# Patient Record
Sex: MALE | Race: BLACK OR AFRICAN AMERICAN | Employment: OTHER | URBAN - METROPOLITAN AREA
[De-identification: names, ages, dates, MRNs, and addresses within clinical notes are randomized per-mention and may not be internally consistent; named-entity substitution may affect disease eponyms.]

---

## 2018-03-30 ENCOUNTER — HOSPITAL ENCOUNTER (EMERGENCY)
Facility: CLINIC | Age: 68
Discharge: HOME | End: 2018-03-30
Attending: EMERGENCY MEDICINE
Payer: COMMERCIAL

## 2018-03-30 VITALS
SYSTOLIC BLOOD PRESSURE: 146 MMHG | RESPIRATION RATE: 20 BRPM | OXYGEN SATURATION: 98 % | DIASTOLIC BLOOD PRESSURE: 71 MMHG | HEART RATE: 66 BPM | TEMPERATURE: 98.1 F

## 2018-03-30 DIAGNOSIS — I10 HYPERTENSION, UNSPECIFIED TYPE: Primary | ICD-10-CM

## 2018-03-30 LAB
-: ABNORMAL
ABSOLUTE EOS #: 0.2 K/UL (ref 0–0.4)
ABSOLUTE IMMATURE GRANULOCYTE: ABNORMAL K/UL (ref 0–0.3)
ABSOLUTE LYMPH #: 2.2 K/UL (ref 1–4.8)
ABSOLUTE MONO #: 0.4 K/UL (ref 0.1–1.2)
AMORPHOUS: ABNORMAL
ANION GAP SERPL CALCULATED.3IONS-SCNC: 14 MMOL/L (ref 9–17)
BACTERIA: ABNORMAL
BASOPHILS # BLD: 1 % (ref 0–2)
BASOPHILS ABSOLUTE: 0.1 K/UL (ref 0–0.2)
BILIRUBIN URINE: NEGATIVE
BUN BLDV-MCNC: 20 MG/DL (ref 8–23)
BUN/CREAT BLD: ABNORMAL (ref 9–20)
CALCIUM SERPL-MCNC: 9.1 MG/DL (ref 8.6–10.4)
CASTS UA: ABNORMAL /LPF (ref 0–2)
CHLORIDE BLD-SCNC: 104 MMOL/L (ref 98–107)
CO2: 23 MMOL/L (ref 20–31)
COLOR: YELLOW
COMMENT UA: ABNORMAL
CREAT SERPL-MCNC: 1.5 MG/DL (ref 0.7–1.2)
CRYSTALS, UA: ABNORMAL /HPF
DIFFERENTIAL TYPE: ABNORMAL
EKG ATRIAL RATE: 72 BPM
EKG P AXIS: 65 DEGREES
EKG P-R INTERVAL: 192 MS
EKG Q-T INTERVAL: 444 MS
EKG QRS DURATION: 146 MS
EKG QTC CALCULATION (BAZETT): 486 MS
EKG R AXIS: -151 DEGREES
EKG T AXIS: 42 DEGREES
EKG VENTRICULAR RATE: 72 BPM
EOSINOPHILS RELATIVE PERCENT: 3 % (ref 1–4)
EPITHELIAL CELLS UA: ABNORMAL /HPF (ref 0–5)
GFR AFRICAN AMERICAN: 56 ML/MIN
GFR NON-AFRICAN AMERICAN: 47 ML/MIN
GFR SERPL CREATININE-BSD FRML MDRD: ABNORMAL ML/MIN/{1.73_M2}
GFR SERPL CREATININE-BSD FRML MDRD: ABNORMAL ML/MIN/{1.73_M2}
GLUCOSE BLD-MCNC: 195 MG/DL (ref 75–110)
GLUCOSE BLD-MCNC: 211 MG/DL (ref 70–99)
GLUCOSE URINE: ABNORMAL
HCT VFR BLD CALC: 45.9 % (ref 41–53)
HEMOGLOBIN: 15 G/DL (ref 13.5–17.5)
IMMATURE GRANULOCYTES: ABNORMAL %
KETONES, URINE: NEGATIVE
LEUKOCYTE ESTERASE, URINE: NEGATIVE
LYMPHOCYTES # BLD: 33 % (ref 24–44)
MCH RBC QN AUTO: 27.4 PG (ref 26–34)
MCHC RBC AUTO-ENTMCNC: 32.7 G/DL (ref 31–37)
MCV RBC AUTO: 83.7 FL (ref 80–100)
MONOCYTES # BLD: 6 % (ref 2–11)
MUCUS: ABNORMAL
NITRITE, URINE: NEGATIVE
NRBC AUTOMATED: ABNORMAL PER 100 WBC
OTHER OBSERVATIONS UA: ABNORMAL
PDW BLD-RTO: 14.5 % (ref 12.5–15.4)
PH UA: 6 (ref 5–8)
PLATELET # BLD: 138 K/UL (ref 140–450)
PLATELET ESTIMATE: ABNORMAL
PMV BLD AUTO: 7.7 FL (ref 6–12)
POTASSIUM SERPL-SCNC: 3.9 MMOL/L (ref 3.7–5.3)
PROTEIN UA: ABNORMAL
RBC # BLD: 5.48 M/UL (ref 4.5–5.9)
RBC # BLD: ABNORMAL 10*6/UL
RBC UA: ABNORMAL /HPF (ref 0–2)
RENAL EPITHELIAL, UA: ABNORMAL /HPF
SEG NEUTROPHILS: 57 % (ref 36–66)
SEGMENTED NEUTROPHILS ABSOLUTE COUNT: 3.8 K/UL (ref 1.8–7.7)
SODIUM BLD-SCNC: 141 MMOL/L (ref 135–144)
SPECIFIC GRAVITY UA: 1.02 (ref 1–1.03)
TRICHOMONAS: ABNORMAL
TROPONIN INTERP: NORMAL
TROPONIN T: <0.03 NG/ML
TURBIDITY: CLEAR
URINE HGB: ABNORMAL
UROBILINOGEN, URINE: NORMAL
WBC # BLD: 6.6 K/UL (ref 3.5–11)
WBC # BLD: ABNORMAL 10*3/UL
WBC UA: ABNORMAL /HPF (ref 0–5)
YEAST: ABNORMAL

## 2018-03-30 PROCEDURE — 36415 COLL VENOUS BLD VENIPUNCTURE: CPT

## 2018-03-30 PROCEDURE — 84484 ASSAY OF TROPONIN QUANT: CPT

## 2018-03-30 PROCEDURE — 81001 URINALYSIS AUTO W/SCOPE: CPT

## 2018-03-30 PROCEDURE — 85025 COMPLETE CBC W/AUTO DIFF WBC: CPT

## 2018-03-30 PROCEDURE — 80048 BASIC METABOLIC PNL TOTAL CA: CPT

## 2018-03-30 PROCEDURE — 93005 ELECTROCARDIOGRAM TRACING: CPT

## 2018-03-30 PROCEDURE — 99283 EMERGENCY DEPT VISIT LOW MDM: CPT

## 2018-03-30 PROCEDURE — 82947 ASSAY GLUCOSE BLOOD QUANT: CPT

## 2019-09-27 ENCOUNTER — HOSPITAL ENCOUNTER (OUTPATIENT)
Age: 69
Setting detail: SPECIMEN
Discharge: HOME | End: 2019-09-27
Payer: COMMERCIAL

## 2019-09-27 LAB
ABSOLUTE EOS #: 0.26 K/UL (ref 0–0.44)
ABSOLUTE IMMATURE GRANULOCYTE: 0.06 K/UL (ref 0–0.3)
ABSOLUTE LYMPH #: 2.7 K/UL (ref 1.1–3.7)
ABSOLUTE MONO #: 0.62 K/UL (ref 0.1–1.2)
ALBUMIN SERPL-MCNC: 3.4 G/DL (ref 3.5–5.2)
ALBUMIN/GLOBULIN RATIO: 0.8 (ref 1–2.5)
ALP BLD-CCNC: 82 U/L (ref 40–129)
ALT SERPL-CCNC: 32 U/L (ref 5–41)
ANION GAP SERPL CALCULATED.3IONS-SCNC: 14 MMOL/L (ref 9–17)
AST SERPL-CCNC: 27 U/L
BASOPHILS # BLD: 1 % (ref 0–2)
BASOPHILS ABSOLUTE: 0.09 K/UL (ref 0–0.2)
BILIRUB SERPL-MCNC: 0.29 MG/DL (ref 0.3–1.2)
BUN BLDV-MCNC: 42 MG/DL (ref 8–23)
BUN/CREAT BLD: ABNORMAL (ref 9–20)
CALCIUM SERPL-MCNC: 9 MG/DL (ref 8.6–10.4)
CHLORIDE BLD-SCNC: 111 MMOL/L (ref 98–107)
CHOLESTEROL, FASTING: 104 MG/DL
CHOLESTEROL/HDL RATIO: 2.7
CO2: 20 MMOL/L (ref 20–31)
CREAT SERPL-MCNC: 1.85 MG/DL (ref 0.7–1.2)
DIFFERENTIAL TYPE: ABNORMAL
EOSINOPHILS RELATIVE PERCENT: 3 % (ref 1–4)
ESTIMATED AVERAGE GLUCOSE: 169 MG/DL
FOLATE: 5.3 NG/ML
GFR AFRICAN AMERICAN: 44 ML/MIN
GFR NON-AFRICAN AMERICAN: 36 ML/MIN
GFR SERPL CREATININE-BSD FRML MDRD: ABNORMAL ML/MIN/{1.73_M2}
GFR SERPL CREATININE-BSD FRML MDRD: ABNORMAL ML/MIN/{1.73_M2}
GLUCOSE FASTING: 128 MG/DL (ref 70–99)
HBA1C MFR BLD: 7.5 % (ref 4–6)
HCT VFR BLD CALC: 46 % (ref 40.7–50.3)
HDLC SERPL-MCNC: 38 MG/DL
HEMOGLOBIN: 14 G/DL (ref 13–17)
IMMATURE GRANULOCYTES: 1 %
IRON SATURATION: 53 % (ref 20–55)
IRON: 127 UG/DL (ref 59–158)
LDL CHOLESTEROL: 51 MG/DL (ref 0–130)
LYMPHOCYTES # BLD: 34 % (ref 24–43)
MAGNESIUM: 1.4 MG/DL (ref 1.6–2.6)
MCH RBC QN AUTO: 25.4 PG (ref 25.2–33.5)
MCHC RBC AUTO-ENTMCNC: 30.4 G/DL (ref 28.4–34.8)
MCV RBC AUTO: 83.3 FL (ref 82.6–102.9)
MONOCYTES # BLD: 8 % (ref 3–12)
NRBC AUTOMATED: 0 PER 100 WBC
PDW BLD-RTO: 15.8 % (ref 11.8–14.4)
PLATELET # BLD: 123 K/UL (ref 138–453)
PLATELET ESTIMATE: ABNORMAL
PMV BLD AUTO: 10.5 FL (ref 8.1–13.5)
POTASSIUM SERPL-SCNC: 4.8 MMOL/L (ref 3.7–5.3)
RBC # BLD: 5.52 M/UL (ref 4.21–5.77)
RBC # BLD: ABNORMAL 10*6/UL
SEG NEUTROPHILS: 53 % (ref 36–65)
SEGMENTED NEUTROPHILS ABSOLUTE COUNT: 4.12 K/UL (ref 1.5–8.1)
SODIUM BLD-SCNC: 145 MMOL/L (ref 135–144)
T3 TOTAL: 120 NG/DL (ref 80–200)
THYROXINE, FREE: 1.19 NG/DL (ref 0.93–1.7)
TOTAL IRON BINDING CAPACITY: 240 UG/DL (ref 250–450)
TOTAL PROTEIN: 7.8 G/DL (ref 6.4–8.3)
TRIGLYCERIDE, FASTING: 73 MG/DL
TSH SERPL DL<=0.05 MIU/L-ACNC: 1.94 MIU/L (ref 0.3–5)
UNSATURATED IRON BINDING CAPACITY: 113 UG/DL (ref 112–347)
URIC ACID: 9.1 MG/DL (ref 3.4–7)
VITAMIN B-12: 716 PG/ML (ref 232–1245)
VLDLC SERPL CALC-MCNC: ABNORMAL MG/DL (ref 1–30)
WBC # BLD: 7.9 K/UL (ref 3.5–11.3)
WBC # BLD: ABNORMAL 10*3/UL

## 2019-09-27 PROCEDURE — 36415 COLL VENOUS BLD VENIPUNCTURE: CPT

## 2019-09-27 PROCEDURE — 84550 ASSAY OF BLOOD/URIC ACID: CPT

## 2019-09-27 PROCEDURE — 82746 ASSAY OF FOLIC ACID SERUM: CPT

## 2019-09-27 PROCEDURE — 80061 LIPID PANEL: CPT

## 2019-09-27 PROCEDURE — 85025 COMPLETE CBC W/AUTO DIFF WBC: CPT

## 2019-09-27 PROCEDURE — 83036 HEMOGLOBIN GLYCOSYLATED A1C: CPT

## 2019-09-27 PROCEDURE — 83550 IRON BINDING TEST: CPT

## 2019-09-27 PROCEDURE — 84480 ASSAY TRIIODOTHYRONINE (T3): CPT

## 2019-09-27 PROCEDURE — 84439 ASSAY OF FREE THYROXINE: CPT

## 2019-09-27 PROCEDURE — 83735 ASSAY OF MAGNESIUM: CPT

## 2019-09-27 PROCEDURE — 84443 ASSAY THYROID STIM HORMONE: CPT

## 2019-09-27 PROCEDURE — 80053 COMPREHEN METABOLIC PANEL: CPT

## 2019-09-27 PROCEDURE — 82607 VITAMIN B-12: CPT

## 2019-09-27 PROCEDURE — 83540 ASSAY OF IRON: CPT

## 2020-06-17 ENCOUNTER — HOSPITAL ENCOUNTER (INPATIENT)
Age: 70
LOS: 5 days | Discharge: HOME HEALTH | DRG: 253 | End: 2020-06-22
Attending: EMERGENCY MEDICINE | Admitting: INTERNAL MEDICINE
Payer: COMMERCIAL

## 2020-06-17 ENCOUNTER — APPOINTMENT (OUTPATIENT)
Dept: GENERAL RADIOLOGY | Age: 70
DRG: 253 | End: 2020-06-17
Payer: COMMERCIAL

## 2020-06-17 PROBLEM — E11.628 DIABETIC FOOT INFECTION (HCC): Status: ACTIVE | Noted: 2020-06-17

## 2020-06-17 PROBLEM — L08.9 DIABETIC FOOT INFECTION (HCC): Status: ACTIVE | Noted: 2020-06-17

## 2020-06-17 LAB
ABSOLUTE EOS #: 0.17 K/UL (ref 0–0.44)
ABSOLUTE IMMATURE GRANULOCYTE: 0.05 K/UL (ref 0–0.3)
ABSOLUTE LYMPH #: 1.78 K/UL (ref 1.1–3.7)
ABSOLUTE MONO #: 0.51 K/UL (ref 0.1–1.2)
ANION GAP SERPL CALCULATED.3IONS-SCNC: 12 MMOL/L (ref 9–17)
BASOPHILS # BLD: 1 % (ref 0–2)
BASOPHILS ABSOLUTE: 0.04 K/UL (ref 0–0.2)
BNP INTERPRETATION: ABNORMAL
BUN BLDV-MCNC: 21 MG/DL (ref 8–23)
BUN/CREAT BLD: 13 (ref 9–20)
C-REACTIVE PROTEIN: 33.1 MG/L (ref 0–5)
CALCIUM SERPL-MCNC: 8.6 MG/DL (ref 8.6–10.4)
CHLORIDE BLD-SCNC: 103 MMOL/L (ref 98–107)
CO2: 23 MMOL/L (ref 20–31)
CREAT SERPL-MCNC: 1.64 MG/DL (ref 0.7–1.2)
DIFFERENTIAL TYPE: ABNORMAL
EOSINOPHILS RELATIVE PERCENT: 2 % (ref 1–4)
GFR AFRICAN AMERICAN: 51 ML/MIN
GFR NON-AFRICAN AMERICAN: 42 ML/MIN
GFR SERPL CREATININE-BSD FRML MDRD: ABNORMAL ML/MIN/{1.73_M2}
GFR SERPL CREATININE-BSD FRML MDRD: ABNORMAL ML/MIN/{1.73_M2}
GLUCOSE BLD-MCNC: 220 MG/DL (ref 70–99)
HCT VFR BLD CALC: 41.1 % (ref 40.7–50.3)
HEMOGLOBIN: 13 G/DL (ref 13–17)
IMMATURE GRANULOCYTES: 1 %
LYMPHOCYTES # BLD: 20 % (ref 24–43)
MAGNESIUM: 1.6 MG/DL (ref 1.6–2.6)
MCH RBC QN AUTO: 26.3 PG (ref 25.2–33.5)
MCHC RBC AUTO-ENTMCNC: 31.6 G/DL (ref 28.4–34.8)
MCV RBC AUTO: 83 FL (ref 82.6–102.9)
MONOCYTES # BLD: 6 % (ref 3–12)
NRBC AUTOMATED: 0 PER 100 WBC
PDW BLD-RTO: 13.2 % (ref 11.8–14.4)
PLATELET # BLD: 163 K/UL (ref 138–453)
PLATELET ESTIMATE: ABNORMAL
PMV BLD AUTO: 9.2 FL (ref 8.1–13.5)
POTASSIUM SERPL-SCNC: 4.1 MMOL/L (ref 3.7–5.3)
PRO-BNP: 2561 PG/ML
RBC # BLD: 4.95 M/UL (ref 4.21–5.77)
RBC # BLD: ABNORMAL 10*6/UL
SEDIMENTATION RATE, ERYTHROCYTE: 128 MM (ref 0–20)
SEG NEUTROPHILS: 70 % (ref 36–65)
SEGMENTED NEUTROPHILS ABSOLUTE COUNT: 6.27 K/UL (ref 1.5–8.1)
SODIUM BLD-SCNC: 138 MMOL/L (ref 135–144)
TROPONIN INTERP: ABNORMAL
TROPONIN INTERP: ABNORMAL
TROPONIN T: ABNORMAL NG/ML
TROPONIN T: ABNORMAL NG/ML
TROPONIN, HIGH SENSITIVITY: 39 NG/L (ref 0–22)
TROPONIN, HIGH SENSITIVITY: 39 NG/L (ref 0–22)
WBC # BLD: 8.8 K/UL (ref 3.5–11.3)
WBC # BLD: ABNORMAL 10*3/UL

## 2020-06-17 PROCEDURE — 93005 ELECTROCARDIOGRAM TRACING: CPT | Performed by: EMERGENCY MEDICINE

## 2020-06-17 PROCEDURE — 96375 TX/PRO/DX INJ NEW DRUG ADDON: CPT

## 2020-06-17 PROCEDURE — 2580000003 HC RX 258: Performed by: EMERGENCY MEDICINE

## 2020-06-17 PROCEDURE — 87205 SMEAR GRAM STAIN: CPT

## 2020-06-17 PROCEDURE — 96365 THER/PROPH/DIAG IV INF INIT: CPT

## 2020-06-17 PROCEDURE — 6360000002 HC RX W HCPCS: Performed by: EMERGENCY MEDICINE

## 2020-06-17 PROCEDURE — 84484 ASSAY OF TROPONIN QUANT: CPT

## 2020-06-17 PROCEDURE — 6370000000 HC RX 637 (ALT 250 FOR IP): Performed by: NURSE PRACTITIONER

## 2020-06-17 PROCEDURE — 99223 1ST HOSP IP/OBS HIGH 75: CPT | Performed by: NURSE PRACTITIONER

## 2020-06-17 PROCEDURE — 2580000003 HC RX 258: Performed by: NURSE PRACTITIONER

## 2020-06-17 PROCEDURE — 71045 X-RAY EXAM CHEST 1 VIEW: CPT

## 2020-06-17 PROCEDURE — 73630 X-RAY EXAM OF FOOT: CPT

## 2020-06-17 PROCEDURE — G0378 HOSPITAL OBSERVATION PER HR: HCPCS

## 2020-06-17 PROCEDURE — 87186 SC STD MICRODIL/AGAR DIL: CPT

## 2020-06-17 PROCEDURE — 85651 RBC SED RATE NONAUTOMATED: CPT

## 2020-06-17 PROCEDURE — 2500000003 HC RX 250 WO HCPCS: Performed by: NURSE PRACTITIONER

## 2020-06-17 PROCEDURE — 1200000000 HC SEMI PRIVATE

## 2020-06-17 PROCEDURE — 87070 CULTURE OTHR SPECIMN AEROBIC: CPT

## 2020-06-17 PROCEDURE — 87077 CULTURE AEROBIC IDENTIFY: CPT

## 2020-06-17 PROCEDURE — 36415 COLL VENOUS BLD VENIPUNCTURE: CPT

## 2020-06-17 PROCEDURE — 85025 COMPLETE CBC W/AUTO DIFF WBC: CPT

## 2020-06-17 PROCEDURE — 96372 THER/PROPH/DIAG INJ SC/IM: CPT

## 2020-06-17 PROCEDURE — 6360000002 HC RX W HCPCS: Performed by: NURSE PRACTITIONER

## 2020-06-17 PROCEDURE — 99285 EMERGENCY DEPT VISIT HI MDM: CPT

## 2020-06-17 PROCEDURE — 96367 TX/PROPH/DG ADDL SEQ IV INF: CPT

## 2020-06-17 PROCEDURE — 80048 BASIC METABOLIC PNL TOTAL CA: CPT

## 2020-06-17 PROCEDURE — 83880 ASSAY OF NATRIURETIC PEPTIDE: CPT

## 2020-06-17 PROCEDURE — 86140 C-REACTIVE PROTEIN: CPT

## 2020-06-17 PROCEDURE — 83735 ASSAY OF MAGNESIUM: CPT

## 2020-06-17 PROCEDURE — 2060000000 HC ICU INTERMEDIATE R&B

## 2020-06-17 PROCEDURE — 87040 BLOOD CULTURE FOR BACTERIA: CPT

## 2020-06-17 RX ORDER — DOCUSATE SODIUM 100 MG/1
100 CAPSULE, LIQUID FILLED ORAL 2 TIMES DAILY
Status: DISCONTINUED | OUTPATIENT
Start: 2020-06-17 | End: 2020-06-22 | Stop reason: HOSPADM

## 2020-06-17 RX ORDER — AMLODIPINE BESYLATE 5 MG/1
10 TABLET ORAL DAILY
Status: DISCONTINUED | OUTPATIENT
Start: 2020-06-18 | End: 2020-06-22 | Stop reason: HOSPADM

## 2020-06-17 RX ORDER — ATORVASTATIN CALCIUM 40 MG/1
40 TABLET, FILM COATED ORAL DAILY
Status: DISCONTINUED | OUTPATIENT
Start: 2020-06-18 | End: 2020-06-18

## 2020-06-17 RX ORDER — ACETAMINOPHEN 650 MG/1
650 SUPPOSITORY RECTAL EVERY 6 HOURS PRN
Status: DISCONTINUED | OUTPATIENT
Start: 2020-06-17 | End: 2020-06-22 | Stop reason: HOSPADM

## 2020-06-17 RX ORDER — MAGNESIUM SULFATE 1 G/100ML
1 INJECTION INTRAVENOUS PRN
Status: DISCONTINUED | OUTPATIENT
Start: 2020-06-17 | End: 2020-06-22 | Stop reason: HOSPADM

## 2020-06-17 RX ORDER — POTASSIUM CHLORIDE 7.45 MG/ML
10 INJECTION INTRAVENOUS PRN
Status: DISCONTINUED | OUTPATIENT
Start: 2020-06-17 | End: 2020-06-22 | Stop reason: HOSPADM

## 2020-06-17 RX ORDER — OXYCODONE AND ACETAMINOPHEN 10; 325 MG/1; MG/1
1 TABLET ORAL EVERY 6 HOURS PRN
Status: DISCONTINUED | OUTPATIENT
Start: 2020-06-17 | End: 2020-06-22 | Stop reason: HOSPADM

## 2020-06-17 RX ORDER — TRAZODONE HYDROCHLORIDE 50 MG/1
150 TABLET ORAL NIGHTLY
Status: DISCONTINUED | OUTPATIENT
Start: 2020-06-17 | End: 2020-06-22 | Stop reason: HOSPADM

## 2020-06-17 RX ORDER — LANOLIN ALCOHOL/MO/W.PET/CERES
325 CREAM (GRAM) TOPICAL
Status: DISCONTINUED | OUTPATIENT
Start: 2020-06-18 | End: 2020-06-22 | Stop reason: HOSPADM

## 2020-06-17 RX ORDER — HYDROCHLOROTHIAZIDE 12.5 MG/1
25 CAPSULE, GELATIN COATED ORAL DAILY
Status: DISCONTINUED | OUTPATIENT
Start: 2020-06-18 | End: 2020-06-18

## 2020-06-17 RX ORDER — POTASSIUM CHLORIDE 20 MEQ/1
40 TABLET, EXTENDED RELEASE ORAL PRN
Status: DISCONTINUED | OUTPATIENT
Start: 2020-06-17 | End: 2020-06-22 | Stop reason: HOSPADM

## 2020-06-17 RX ORDER — INSULIN GLARGINE 100 [IU]/ML
90 INJECTION, SOLUTION SUBCUTANEOUS NIGHTLY
Status: DISCONTINUED | OUTPATIENT
Start: 2020-06-17 | End: 2020-06-19

## 2020-06-17 RX ORDER — HYDRALAZINE HYDROCHLORIDE 20 MG/ML
10 INJECTION INTRAMUSCULAR; INTRAVENOUS EVERY 6 HOURS PRN
Status: DISCONTINUED | OUTPATIENT
Start: 2020-06-17 | End: 2020-06-17

## 2020-06-17 RX ORDER — HYDRALAZINE HYDROCHLORIDE 20 MG/ML
20 INJECTION INTRAMUSCULAR; INTRAVENOUS EVERY 6 HOURS PRN
Status: DISCONTINUED | OUTPATIENT
Start: 2020-06-17 | End: 2020-06-22 | Stop reason: HOSPADM

## 2020-06-17 RX ORDER — SODIUM CHLORIDE 0.9 % (FLUSH) 0.9 %
10 SYRINGE (ML) INJECTION EVERY 12 HOURS SCHEDULED
Status: DISCONTINUED | OUTPATIENT
Start: 2020-06-17 | End: 2020-06-22 | Stop reason: HOSPADM

## 2020-06-17 RX ORDER — PROMETHAZINE HYDROCHLORIDE 25 MG/1
12.5 TABLET ORAL EVERY 6 HOURS PRN
Status: DISCONTINUED | OUTPATIENT
Start: 2020-06-17 | End: 2020-06-22 | Stop reason: HOSPADM

## 2020-06-17 RX ORDER — NICOTINE 21 MG/24HR
1 PATCH, TRANSDERMAL 24 HOURS TRANSDERMAL DAILY PRN
Status: DISCONTINUED | OUTPATIENT
Start: 2020-06-17 | End: 2020-06-22 | Stop reason: HOSPADM

## 2020-06-17 RX ORDER — SODIUM CHLORIDE 0.9 % (FLUSH) 0.9 %
10 SYRINGE (ML) INJECTION PRN
Status: DISCONTINUED | OUTPATIENT
Start: 2020-06-17 | End: 2020-06-22 | Stop reason: HOSPADM

## 2020-06-17 RX ORDER — LISINOPRIL 10 MG/1
20 TABLET ORAL DAILY
Status: DISCONTINUED | OUTPATIENT
Start: 2020-06-18 | End: 2020-06-22 | Stop reason: HOSPADM

## 2020-06-17 RX ORDER — POLYETHYLENE GLYCOL 3350 17 G/17G
17 POWDER, FOR SOLUTION ORAL DAILY PRN
Status: DISCONTINUED | OUTPATIENT
Start: 2020-06-17 | End: 2020-06-22 | Stop reason: HOSPADM

## 2020-06-17 RX ORDER — ONDANSETRON 2 MG/ML
4 INJECTION INTRAMUSCULAR; INTRAVENOUS EVERY 6 HOURS PRN
Status: DISCONTINUED | OUTPATIENT
Start: 2020-06-17 | End: 2020-06-22 | Stop reason: HOSPADM

## 2020-06-17 RX ORDER — ALOGLIPTIN 12.5 MG/1
12.5 TABLET, FILM COATED ORAL DAILY
Status: DISCONTINUED | OUTPATIENT
Start: 2020-06-18 | End: 2020-06-18

## 2020-06-17 RX ORDER — FUROSEMIDE 40 MG/1
40 TABLET ORAL DAILY
Status: ON HOLD | COMMUNITY
End: 2020-09-20 | Stop reason: SDUPTHER

## 2020-06-17 RX ORDER — FUROSEMIDE 40 MG/1
40 TABLET ORAL 2 TIMES DAILY
Status: DISCONTINUED | OUTPATIENT
Start: 2020-06-18 | End: 2020-06-22 | Stop reason: HOSPADM

## 2020-06-17 RX ORDER — ACETAMINOPHEN 325 MG/1
650 TABLET ORAL EVERY 6 HOURS PRN
Status: DISCONTINUED | OUTPATIENT
Start: 2020-06-17 | End: 2020-06-19 | Stop reason: SDUPTHER

## 2020-06-17 RX ORDER — ASPIRIN 81 MG/1
81 TABLET ORAL DAILY
Status: DISCONTINUED | OUTPATIENT
Start: 2020-06-18 | End: 2020-06-18

## 2020-06-17 RX ORDER — HEPARIN SODIUM 5000 [USP'U]/ML
5000 INJECTION, SOLUTION INTRAVENOUS; SUBCUTANEOUS EVERY 8 HOURS SCHEDULED
Status: DISCONTINUED | OUTPATIENT
Start: 2020-06-17 | End: 2020-06-22 | Stop reason: HOSPADM

## 2020-06-17 RX ORDER — SODIUM CHLORIDE 9 MG/ML
INJECTION, SOLUTION INTRAVENOUS CONTINUOUS
Status: DISCONTINUED | OUTPATIENT
Start: 2020-06-17 | End: 2020-06-21

## 2020-06-17 RX ORDER — FERROUS SULFATE 325(65) MG
325 TABLET ORAL
Status: ON HOLD | COMMUNITY
End: 2022-04-13

## 2020-06-17 RX ADMIN — TRAZODONE HYDROCHLORIDE 150 MG: 50 TABLET ORAL at 23:21

## 2020-06-17 RX ADMIN — SODIUM CHLORIDE: 9 INJECTION, SOLUTION INTRAVENOUS at 23:20

## 2020-06-17 RX ADMIN — HEPARIN SODIUM 5000 UNITS: 5000 INJECTION INTRAVENOUS; SUBCUTANEOUS at 23:21

## 2020-06-17 RX ADMIN — Medication 10 ML: at 23:19

## 2020-06-17 RX ADMIN — OXYCODONE HYDROCHLORIDE AND ACETAMINOPHEN 1 TABLET: 10; 325 TABLET ORAL at 23:35

## 2020-06-17 RX ADMIN — CEFEPIME HYDROCHLORIDE 2 G: 2 INJECTION, POWDER, FOR SOLUTION INTRAVENOUS at 18:00

## 2020-06-17 RX ADMIN — INSULIN GLARGINE 90 UNITS: 100 INJECTION, SOLUTION SUBCUTANEOUS at 23:20

## 2020-06-17 RX ADMIN — HYDRALAZINE HYDROCHLORIDE 10 MG: 20 INJECTION INTRAMUSCULAR; INTRAVENOUS at 17:59

## 2020-06-17 RX ADMIN — METRONIDAZOLE 500 MG: 500 INJECTION, SOLUTION INTRAVENOUS at 23:23

## 2020-06-17 RX ADMIN — DOCUSATE SODIUM 100 MG: 100 CAPSULE, LIQUID FILLED ORAL at 23:22

## 2020-06-17 RX ADMIN — METOPROLOL TARTRATE 50 MG: 25 TABLET, FILM COATED ORAL at 23:21

## 2020-06-17 ASSESSMENT — PAIN DESCRIPTION - LOCATION
LOCATION: FOOT
LOCATION: LEG;FOOT

## 2020-06-17 ASSESSMENT — PAIN DESCRIPTION - FREQUENCY
FREQUENCY: INTERMITTENT
FREQUENCY: INTERMITTENT

## 2020-06-17 ASSESSMENT — ENCOUNTER SYMPTOMS
COLOR CHANGE: 1
COLOR CHANGE: 0
SHORTNESS OF BREATH: 0
EYE DISCHARGE: 0
NAUSEA: 0
SORE THROAT: 0
EYE REDNESS: 0
RHINORRHEA: 0
COUGH: 0
WHEEZING: 0
VOMITING: 0
SHORTNESS OF BREATH: 1
DIARRHEA: 0

## 2020-06-17 ASSESSMENT — PAIN DESCRIPTION - ORIENTATION
ORIENTATION: LEFT
ORIENTATION: LEFT

## 2020-06-17 ASSESSMENT — PAIN DESCRIPTION - DESCRIPTORS
DESCRIPTORS: BURNING;SHARP;THROBBING
DESCRIPTORS: THROBBING;BURNING;SHARP

## 2020-06-17 ASSESSMENT — PAIN SCALES - GENERAL
PAINLEVEL_OUTOF10: 8
PAINLEVEL_OUTOF10: 6

## 2020-06-17 ASSESSMENT — PAIN DESCRIPTION - PAIN TYPE: TYPE: CHRONIC PAIN;NEUROPATHIC PAIN

## 2020-06-17 NOTE — ED NOTES
ASSESSMENT:    Presents [facility] per [facility]vt auto per own motorized w/c. Lives alone. Was evaluated at home today per JEAN PAUL Abdullahi. Patient states this nurse comes to home 2-3 times per week. Today was concerned about an open wound to bottom lt foot, 2\" X 2\". . Bottom of heel. States is a diabetic foot ulcer with necrosis and oozing sero-sanguinous foul smelling drainage. This RN also states patient needs a Cardiology consult as patient does nor have one. Patient states has no PCP. Use to see jarrett Alonzo MD. But left the practrice. Now sees JEAN PAUL Abdullahi. [facility] is alert and oriented. Answers questions approp. Has hx RBKA per Dr Markel Otto, Podiatry about 5 years ago. Also has hx PVD, Osteomyelitis lt foot, acute kidney failure, and Hep-B. Lt lower leg is hard, scaly with marked discoloration, pink and dark brown. Leg warm to touch and 4+ edema and vascular scarring. .   Monitor shows NSR 60's. Denies chest pain or SOB. C/o pain to lt foot. Denies fever or chills. Denies n/v.    Kvng Thomas RN  06/17/20 1716       Kvng Thomas RN  06/17/20 7093       Kvng Thomas RN  06/17/20 4309

## 2020-06-17 NOTE — ED PROVIDER NOTES
EMERGENCY DEPARTMENT ENCOUNTER    Pt Name: Sammy Dozier  MRN: [id number]  [place] 1950  Date of evaluation: 6/17/20  CHIEF COMPLAINT       Chief Complaint   Patient presents with    Wound Infection     left foot    Hypertension     HISTORY OF PRESENT ILLNESS   This is a 51-year-old male that presents with complaints of hypertension and a wound in the left foot. The patient has a history of diabetes, with a previous right above-knee amputation as well as a left transmetatarsal amputation. Patient was evaluated today by his nurse practitioner, she sent him to the emergency department for cardiology consultation for his hypertension and to be evaluated for congestive heart failure. She also states that he had a significant wound to his left heel and recommended he be seen. Patient denies any fevers or chills, he does have some pain and discomfort in the left foot. He denies any chest pain, he has some mild associated shortness of breath. Patient states he is compliant with his prescribed hypertension medications. REVIEW OF SYSTEMS     Review of Systems   Constitutional: Negative for chills and fever. HENT: Negative for rhinorrhea and sore throat. Eyes: Negative for discharge, redness and visual disturbance. Respiratory: Positive for shortness of breath. Negative for cough. Cardiovascular: Negative for chest pain, palpitations and leg swelling. Gastrointestinal: Negative for diarrhea, nausea and vomiting. Musculoskeletal: Negative for arthralgias, myalgias and neck pain. Skin: Positive for wound. Negative for color change and rash. Neurological: Negative for seizures, weakness and headaches. Psychiatric/Behavioral: Negative for hallucinations, self-injury and suicidal ideas.      PASTMEDICAL HISTORY     Past Medical History:   Diagnosis Date    RISSA [facility]acute kidney [facility]) [facility] [address]     Cerebrovascular disease     Erectile dysfunction     Hepatitis B infection     Hyperlipidemia  Hypertension     Kidney cysts     Liver cyst     MRSA (methicillin resistant staph aureus) culture positive 1/10/2014    right foot    [facility]trophic ulcer of the foot [facility] [address]     Obesity     Osteomyelitis of ankle or foot     Peripheral vascular disease ([facility] [address]     Tobacco abuse     Type II or unspecified type diabetes mellitus without mention of complication, not stated as uncontrolled      Past Problem List  Patient Active Problem List   Diagnosis Code    HTN (hypertension) I10    [facility] I73.9    Diabetes mellitus type 2, insulin dependent ([facility] E11.9, Z79.4    Insomnia G47.00    Erectile dysfunction N52.9    Partial traumatic amputation of left foot ([facility] J66.614T    Obesity E66.9    Tobacco abuse Z72.0    Hepatitis B infection B19.10    Liver cyst K76.89    Kidney cysts N28.1    Osteomyelitis of ankle or foot M86.9    Neurotrophic ulcer of the foot ([facility] L97.509    RISSA (acute kidney injury) ([facility] N17.9    Hyperlipidemia E78.5    Microcytic anemia D50.9     SURGICAL HISTORY       Past Surgical History:   Procedure Laterality Date    FOOT AMPUTATION      FOOT AMPUTATION THROUGH METATARSAL  2011    pt can't give exact date of surg-- toes and part of lt foot removed    FOOT SURGERY Right 3/2013     CURRENT MEDICATIONS       Previous Medications    ACETAMINOPHEN (TYLENOL) 325 MG TABLET    Take 650 mg by mouth every 4 hours as needed for Pain. Give 2 tabs = 650 MG by mouth every 4 hours prn    ACETAMINOPHEN (TYLENOL) 650 MG SUPPOSITORY    Place 650 mg rectally every 4 hours as needed for Fever. AMLODIPINE (NORVASC) 10 MG TABLET    take 1 tablet by mouth once daily    ASPIRIN 81 MG EC TABLET    Take 81 mg by mouth daily. ATORVASTATIN (LIPITOR) 10 MG TABLET    Take 10 mg by mouth daily. B-D INS SYR ULTRAFINE 1CC/31G 31G X 5/16\" 1 ML MISC        BISACODYL (DULCOLAX) 10 MG SUPPOSITORY    Place 10 mg rectally daily as needed for Constipation.     BLOOD GLUC METER DISP-STRIPS (BLOOD GLUCOSE METER DISPOSABLE) DANIEL    Use three to four times daily. Patient is insulin dependent 250.00    CARBAMIDE PEROXIDE (DEBROX) 6.5 % OTIC SOLUTION    1 drop as needed. CIPROFLOXACIN (CIPRO) 500 MG TABLET        COLLAGENASE (SANTYL) OINTMENT    Apply topically daily. DOCUSATE SODIUM (COLACE) 100 MG CAPSULE    Take 100 mg by mouth 2 times daily     DOXYCYCLINE (VIBRAMYCIN) 100 MG CAPSULE        FERROUS SULFATE (IRON 325) 325 (65 FE) MG TABLET    Take 325 mg by mouth daily (with breakfast)    FUROSEMIDE (LASIX) 40 MG TABLET    Take 40 mg by mouth 2 times daily    GLUCAGON HCL, RDNA, (GLUCAGEN) 1 MG SOLR    Infuse 1 mg intravenously once. Glucagon 1 mg IM for blood sugar levels below 40 if unable to take orange juice with sugar by mouth or tube -repeat-    GLUCOSE BLOOD VI TEST STRIPS (TRUETRACK TEST) STRIP    by In Vitro route 3 times daily. HYDROCHLOROTHIAZIDE (MICROZIDE) 12.5 MG CAPSULE    Take 2 capsules by mouth daily. INSULIN ASPART (NOVOLOG) 100 UNIT/ML INJECTION    Inject 3 Units into the skin continuous prn for High Blood Sugar (he is to use sliding scale to increase from 3 units if needed).  use 0 units,151-200 use 3 units, 201-250 use 4 units, 251-300 use 5 units, 301-350 use 6 units, 351-400 use 7 units use before meals and at bedtime. INSULIN GLARGINE (LANTUS) 100 UNIT/ML INJECTION    INJECT 60 UNITS INTO THE SKIN NIGHTLY. MAY NEED TO SLOWLY INCREASE AS DIRECTED    INSULIN SYRINGE-NEEDLE U-100 (ACCUSURE INS SYR 1CC/30GX5/16\") 30G X 5/16\" 1 ML MISC    by Does not apply route. LISINOPRIL (PRINIVIL;ZESTRIL) 20 MG TABLET    10 mg     MAGNESIUM HYDROXIDE (MILK OF MAGNESIA) 400 MG/5ML SUSPENSION    Take 30 mLs by mouth daily as needed for Constipation. METOPROLOL (LOPRESSOR) 25 MG TABLET    Take 2 tablets by mouth 2 times daily. METOPROLOL (LOPRESSOR) 50 MG TABLET        METRONIDAZOLE (FLAGYL) 500 MG TABLET    Take 500 mg by mouth 3 times daily. Pulmonary:      Effort: Pulmonary effort is normal. No accessory muscle usage or respiratory distress. Breath sounds: Normal breath sounds. Chest:      Chest wall: No deformity or tenderness. Abdominal:      General: Bowel sounds are normal. There is no distension or abdominal bruit. Palpations: Abdomen is not rigid. Tenderness: There is no abdominal tenderness. There is no guarding or rebound. Musculoskeletal:        Feet:    Skin:     General: Skin is warm. Findings: No rash. Neurological:      Mental Status: He is alert and oriented to person, place, and time. GCS: GCS eye subscore is 4. GCS verbal subscore is 5. GCS motor subscore is 6. Psychiatric:         Speech: Speech normal.         MEDICAL DECISION MAKIN-year-old male presents with complaints of some shortness of breath, hypertension and a left-sided heel wound. Plan is consultation with podiatry, x-ray, sed rate CRP, cardiac enzymes BNP EKG and reevaluation. 5:39 PM EDT  Patient has a mildly elevated troponin associated with hypertension. Plan is admission to the hospitalist service, antibiotics per the podiatry service. CRITICAL CARE:       PROCEDURES:    Procedures    DIAGNOSTIC RESULTS   EKG:All EKG's are interpreted by the Emergency Department Physician who either signs or Co-signs this chart in the absence of a cardiologist.    Patient's EKG shows sinus rhythm at of 64, SD QRS and QTC intervals unremarkable, patient has normal axis no ST elevations or depressions, no significant T wave changes. Nonspecific EKG. RADIOLOGY:All plain film, CT, MRI, and formal ultrasound images (except ED bedside ultrasound) are read by the radiologist, see reports below, unless otherwisenoted in MDM or here. XR CHEST PORTABLE   Final Result   No acute cardiopulmonary disease. XR FOOT LEFT (MIN 3 VIEWS)   Final Result   Status post transmetatarsal amputation. Diffuse soft tissue edema.       No convincing evidence for osteomyelitis. LABS: All lab results were reviewed by myself, and all abnormals are listed below. Labs Reviewed   BASIC METABOLIC PANEL - Abnormal; Notable for the following components:       Result Value    Glucose 220 (*)     CREATININE 1.64 (*)     GFR Non- 42 (*)     GFR  51 (*)     All other components within normal limits   BRAIN NATRIURETIC PEPTIDE - Abnormal; Notable for the following components:    Pro-BNP 2,561 (*)     All other components within normal limits   CBC WITH AUTO DIFFERENTIAL - Abnormal; Notable for the following components:    Seg Neutrophils 70 (*)     Lymphocytes 20 (*)     Immature Granulocytes 1 (*)     All other components within normal limits   TROPONIN - Abnormal; Notable for the following components:    Troponin, High Sensitivity 39 (*)     All other components within normal limits   SEDIMENTATION RATE - Abnormal; Notable for the following components:    Sed Rate 128 (*)     All other components within normal limits   MAGNESIUM   TROPONIN   C-REACTIVE PROTEIN       EMERGENCY DEPARTMENTCOURSE:         Vitals:    Vitals:    06/17/20 1619   BP: (!) 198/70   Pulse: 62   Resp: 16   Temp: 98.2 °F (36.8 °C)   TempSrc: Oral   SpO2: 99%   Weight: 275 lb (124.7 kg)   Height: 6' (1.829 m)       The patient was given the following medications while in the emergency department:  Orders Placed This Encounter   Medications    hydrALAZINE (APRESOLINE) injection 10 mg     CONSULTS:  IP CONSULT TO PODIATRY    FINAL IMPRESSION      1. Hypertensive urgency    2. Elevated troponin    3. Diabetic ulcer of left heel associated with type 1 diabetes mellitus, unspecified ulcer stage [facility])          DISPOSITION/PLAN   DISPOSITION Decision To Admit 06/17/2020 05:36:21 PM      PATIENT REFERRED TO:  No follow-up provider specified.   DISCHARGE MEDICATIONS:  New Prescriptions    No medications on file     Gianna Membreno MD  Attending Emergency Physician                    Erin Rothman MD  06/17/20 5273

## 2020-06-18 PROBLEM — E83.42 HYPOMAGNESEMIA: Status: ACTIVE | Noted: 2020-06-18

## 2020-06-18 PROBLEM — E87.6 HYPOKALEMIA: Status: ACTIVE | Noted: 2020-06-18

## 2020-06-18 LAB
ANION GAP SERPL CALCULATED.3IONS-SCNC: 12 MMOL/L (ref 9–17)
BUN BLDV-MCNC: 21 MG/DL (ref 8–23)
BUN/CREAT BLD: 10 (ref 9–20)
CALCIUM SERPL-MCNC: 8.5 MG/DL (ref 8.6–10.4)
CHLORIDE BLD-SCNC: 106 MMOL/L (ref 98–107)
CO2: 23 MMOL/L (ref 20–31)
CREAT SERPL-MCNC: 2.02 MG/DL (ref 0.7–1.2)
EKG ATRIAL RATE: 64 BPM
EKG P AXIS: 63 DEGREES
EKG P-R INTERVAL: 206 MS
EKG Q-T INTERVAL: 486 MS
EKG QRS DURATION: 148 MS
EKG QTC CALCULATION (BAZETT): 501 MS
EKG R AXIS: 44 DEGREES
EKG T AXIS: 27 DEGREES
EKG VENTRICULAR RATE: 64 BPM
ESTIMATED AVERAGE GLUCOSE: 137 MG/DL
GFR AFRICAN AMERICAN: 40 ML/MIN
GFR NON-AFRICAN AMERICAN: 33 ML/MIN
GFR SERPL CREATININE-BSD FRML MDRD: ABNORMAL ML/MIN/{1.73_M2}
GFR SERPL CREATININE-BSD FRML MDRD: ABNORMAL ML/MIN/{1.73_M2}
GLUCOSE BLD-MCNC: 110 MG/DL (ref 75–110)
GLUCOSE BLD-MCNC: 126 MG/DL (ref 75–110)
GLUCOSE BLD-MCNC: 153 MG/DL (ref 75–110)
GLUCOSE BLD-MCNC: 51 MG/DL (ref 70–99)
GLUCOSE BLD-MCNC: 81 MG/DL (ref 75–110)
GLUCOSE BLD-MCNC: 93 MG/DL (ref 75–110)
HBA1C MFR BLD: 6.4 % (ref 4–6)
HCT VFR BLD CALC: 39.5 % (ref 40.7–50.3)
HEMOGLOBIN: 12.6 G/DL (ref 13–17)
MAGNESIUM: 1.4 MG/DL (ref 1.6–2.6)
MCH RBC QN AUTO: 26.5 PG (ref 25.2–33.5)
MCHC RBC AUTO-ENTMCNC: 31.9 G/DL (ref 28.4–34.8)
MCV RBC AUTO: 83.2 FL (ref 82.6–102.9)
NRBC AUTOMATED: 0 PER 100 WBC
PDW BLD-RTO: 13.2 % (ref 11.8–14.4)
PLATELET # BLD: 158 K/UL (ref 138–453)
PMV BLD AUTO: 9.6 FL (ref 8.1–13.5)
POTASSIUM SERPL-SCNC: 3.1 MMOL/L (ref 3.7–5.3)
RBC # BLD: 4.75 M/UL (ref 4.21–5.77)
SODIUM BLD-SCNC: 141 MMOL/L (ref 135–144)
WBC # BLD: 7.3 K/UL (ref 3.5–11.3)

## 2020-06-18 PROCEDURE — 2500000003 HC RX 250 WO HCPCS: Performed by: NURSE PRACTITIONER

## 2020-06-18 PROCEDURE — 96361 HYDRATE IV INFUSION ADD-ON: CPT

## 2020-06-18 PROCEDURE — G0378 HOSPITAL OBSERVATION PER HR: HCPCS

## 2020-06-18 PROCEDURE — 96366 THER/PROPH/DIAG IV INF ADDON: CPT

## 2020-06-18 PROCEDURE — 93010 ELECTROCARDIOGRAM REPORT: CPT | Performed by: INTERNAL MEDICINE

## 2020-06-18 PROCEDURE — 97535 SELF CARE MNGMENT TRAINING: CPT

## 2020-06-18 PROCEDURE — 97163 PT EVAL HIGH COMPLEX 45 MIN: CPT

## 2020-06-18 PROCEDURE — 2580000003 HC RX 258: Performed by: NURSE PRACTITIONER

## 2020-06-18 PROCEDURE — 96367 TX/PROPH/DG ADDL SEQ IV INF: CPT

## 2020-06-18 PROCEDURE — 96372 THER/PROPH/DIAG INJ SC/IM: CPT

## 2020-06-18 PROCEDURE — 99222 1ST HOSP IP/OBS MODERATE 55: CPT | Performed by: INTERNAL MEDICINE

## 2020-06-18 PROCEDURE — 6360000002 HC RX W HCPCS: Performed by: NURSE PRACTITIONER

## 2020-06-18 PROCEDURE — 1200000000 HC SEMI PRIVATE

## 2020-06-18 PROCEDURE — 36415 COLL VENOUS BLD VENIPUNCTURE: CPT

## 2020-06-18 PROCEDURE — 82947 ASSAY GLUCOSE BLOOD QUANT: CPT

## 2020-06-18 PROCEDURE — 6370000000 HC RX 637 (ALT 250 FOR IP): Performed by: INTERNAL MEDICINE

## 2020-06-18 PROCEDURE — 93923 UPR/LXTR ART STDY 3+ LVLS: CPT

## 2020-06-18 PROCEDURE — 97166 OT EVAL MOD COMPLEX 45 MIN: CPT

## 2020-06-18 PROCEDURE — 85027 COMPLETE CBC AUTOMATED: CPT

## 2020-06-18 PROCEDURE — 99232 SBSQ HOSP IP/OBS MODERATE 35: CPT | Performed by: INTERNAL MEDICINE

## 2020-06-18 PROCEDURE — 6370000000 HC RX 637 (ALT 250 FOR IP): Performed by: STUDENT IN AN ORGANIZED HEALTH CARE EDUCATION/TRAINING PROGRAM

## 2020-06-18 PROCEDURE — 83735 ASSAY OF MAGNESIUM: CPT

## 2020-06-18 PROCEDURE — 83036 HEMOGLOBIN GLYCOSYLATED A1C: CPT

## 2020-06-18 PROCEDURE — 6370000000 HC RX 637 (ALT 250 FOR IP): Performed by: NURSE PRACTITIONER

## 2020-06-18 PROCEDURE — 80048 BASIC METABOLIC PNL TOTAL CA: CPT

## 2020-06-18 PROCEDURE — 4A03X51 MEASUREMENT OF ARTERIAL FLOW, PERIPHERAL, EXTERNAL APPROACH: ICD-10-PCS | Performed by: SURGERY

## 2020-06-18 PROCEDURE — 97530 THERAPEUTIC ACTIVITIES: CPT

## 2020-06-18 RX ORDER — NICOTINE POLACRILEX 4 MG
15 LOZENGE BUCCAL PRN
Status: DISCONTINUED | OUTPATIENT
Start: 2020-06-18 | End: 2020-06-22 | Stop reason: HOSPADM

## 2020-06-18 RX ORDER — DEXTROSE MONOHYDRATE 50 MG/ML
100 INJECTION, SOLUTION INTRAVENOUS PRN
Status: DISCONTINUED | OUTPATIENT
Start: 2020-06-18 | End: 2020-06-22 | Stop reason: HOSPADM

## 2020-06-18 RX ORDER — INSULIN GLARGINE 100 [IU]/ML
40 INJECTION, SOLUTION SUBCUTANEOUS EVERY MORNING
Status: ON HOLD | COMMUNITY
End: 2020-06-22 | Stop reason: SDUPTHER

## 2020-06-18 RX ORDER — BUDESONIDE AND FORMOTEROL FUMARATE DIHYDRATE 160; 4.5 UG/1; UG/1
2 AEROSOL RESPIRATORY (INHALATION) 2 TIMES DAILY
Status: DISCONTINUED | OUTPATIENT
Start: 2020-06-18 | End: 2020-06-22 | Stop reason: HOSPADM

## 2020-06-18 RX ORDER — SIMVASTATIN 10 MG
10 TABLET ORAL NIGHTLY
COMMUNITY

## 2020-06-18 RX ORDER — METOPROLOL SUCCINATE 25 MG/1
25 TABLET, EXTENDED RELEASE ORAL 2 TIMES DAILY
Status: ON HOLD | COMMUNITY
End: 2022-04-13

## 2020-06-18 RX ORDER — INSULIN GLARGINE 100 [IU]/ML
40 INJECTION, SOLUTION SUBCUTANEOUS EVERY MORNING
Status: DISCONTINUED | OUTPATIENT
Start: 2020-06-19 | End: 2020-06-22 | Stop reason: HOSPADM

## 2020-06-18 RX ORDER — BUDESONIDE AND FORMOTEROL FUMARATE DIHYDRATE 160; 4.5 UG/1; UG/1
2 AEROSOL RESPIRATORY (INHALATION) 2 TIMES DAILY
Status: ON HOLD | COMMUNITY
End: 2022-04-13

## 2020-06-18 RX ORDER — ATORVASTATIN CALCIUM 10 MG/1
10 TABLET, FILM COATED ORAL DAILY
Status: DISCONTINUED | OUTPATIENT
Start: 2020-06-19 | End: 2020-06-22 | Stop reason: HOSPADM

## 2020-06-18 RX ORDER — METOPROLOL SUCCINATE 25 MG/1
25 TABLET, EXTENDED RELEASE ORAL 2 TIMES DAILY
Status: DISCONTINUED | OUTPATIENT
Start: 2020-06-18 | End: 2020-06-22 | Stop reason: HOSPADM

## 2020-06-18 RX ORDER — TRAZODONE HYDROCHLORIDE 150 MG/1
150 TABLET ORAL NIGHTLY
COMMUNITY

## 2020-06-18 RX ORDER — INSULIN ASPART 100 [IU]/ML
0-10 INJECTION, SOLUTION INTRAVENOUS; SUBCUTANEOUS 3 TIMES DAILY PRN
COMMUNITY

## 2020-06-18 RX ORDER — INSULIN GLARGINE 100 [IU]/ML
90 INJECTION, SOLUTION SUBCUTANEOUS NIGHTLY
Status: ON HOLD | COMMUNITY
End: 2020-06-22 | Stop reason: HOSPADM

## 2020-06-18 RX ORDER — DEXTROSE MONOHYDRATE 25 G/50ML
12.5 INJECTION, SOLUTION INTRAVENOUS PRN
Status: DISCONTINUED | OUTPATIENT
Start: 2020-06-18 | End: 2020-06-22 | Stop reason: HOSPADM

## 2020-06-18 RX ADMIN — DOCUSATE SODIUM 100 MG: 100 CAPSULE, LIQUID FILLED ORAL at 10:33

## 2020-06-18 RX ADMIN — METRONIDAZOLE 500 MG: 500 INJECTION, SOLUTION INTRAVENOUS at 22:30

## 2020-06-18 RX ADMIN — HYDROCHLOROTHIAZIDE 25 MG: 12.5 CAPSULE ORAL at 10:19

## 2020-06-18 RX ADMIN — SODIUM CHLORIDE: 9 INJECTION, SOLUTION INTRAVENOUS at 17:45

## 2020-06-18 RX ADMIN — MAGNESIUM SULFATE HEPTAHYDRATE 1 G: 1 INJECTION, SOLUTION INTRAVENOUS at 21:09

## 2020-06-18 RX ADMIN — INSULIN LISPRO 1 UNITS: 100 INJECTION, SOLUTION INTRAVENOUS; SUBCUTANEOUS at 21:08

## 2020-06-18 RX ADMIN — VANCOMYCIN HYDROCHLORIDE 2500 MG: 1 INJECTION, POWDER, LYOPHILIZED, FOR SOLUTION INTRAVENOUS at 00:32

## 2020-06-18 RX ADMIN — MAGNESIUM SULFATE HEPTAHYDRATE 1 G: 1 INJECTION, SOLUTION INTRAVENOUS at 19:54

## 2020-06-18 RX ADMIN — ASPIRIN 81 MG: 81 TABLET, COATED ORAL at 10:19

## 2020-06-18 RX ADMIN — METOPROLOL SUCCINATE 25 MG: 25 TABLET, EXTENDED RELEASE ORAL at 18:48

## 2020-06-18 RX ADMIN — METOPROLOL TARTRATE 50 MG: 25 TABLET, FILM COATED ORAL at 10:18

## 2020-06-18 RX ADMIN — HEPARIN SODIUM 5000 UNITS: 5000 INJECTION INTRAVENOUS; SUBCUTANEOUS at 21:08

## 2020-06-18 RX ADMIN — AMLODIPINE BESYLATE 10 MG: 5 TABLET ORAL at 10:19

## 2020-06-18 RX ADMIN — TRAZODONE HYDROCHLORIDE 150 MG: 50 TABLET ORAL at 19:54

## 2020-06-18 RX ADMIN — ALOGLIPTIN 12.5 MG: 12.5 TABLET, FILM COATED ORAL at 10:19

## 2020-06-18 RX ADMIN — ATORVASTATIN CALCIUM 40 MG: 40 TABLET, FILM COATED ORAL at 10:19

## 2020-06-18 RX ADMIN — COLLAGENASE SANTYL: 250 OINTMENT TOPICAL at 12:23

## 2020-06-18 RX ADMIN — CEFEPIME HYDROCHLORIDE 2 G: 2 INJECTION, POWDER, FOR SOLUTION INTRAVENOUS at 17:38

## 2020-06-18 RX ADMIN — METRONIDAZOLE 500 MG: 500 INJECTION, SOLUTION INTRAVENOUS at 10:20

## 2020-06-18 RX ADMIN — HEPARIN SODIUM 5000 UNITS: 5000 INJECTION INTRAVENOUS; SUBCUTANEOUS at 05:52

## 2020-06-18 RX ADMIN — METRONIDAZOLE 500 MG: 500 INJECTION, SOLUTION INTRAVENOUS at 15:28

## 2020-06-18 RX ADMIN — HEPARIN SODIUM 5000 UNITS: 5000 INJECTION INTRAVENOUS; SUBCUTANEOUS at 15:28

## 2020-06-18 RX ADMIN — LISINOPRIL 20 MG: 10 TABLET ORAL at 10:18

## 2020-06-18 RX ADMIN — CEFEPIME HYDROCHLORIDE 2 G: 2 INJECTION, POWDER, FOR SOLUTION INTRAVENOUS at 05:52

## 2020-06-18 RX ADMIN — FERROUS SULFATE TAB EC 325 MG (65 MG FE EQUIVALENT) 325 MG: 325 (65 FE) TABLET DELAYED RESPONSE at 10:18

## 2020-06-18 RX ADMIN — DOCUSATE SODIUM 100 MG: 100 CAPSULE, LIQUID FILLED ORAL at 19:54

## 2020-06-18 RX ADMIN — OXYCODONE HYDROCHLORIDE AND ACETAMINOPHEN 1 TABLET: 10; 325 TABLET ORAL at 22:30

## 2020-06-18 RX ADMIN — POTASSIUM CHLORIDE 40 MEQ: 20 TABLET, EXTENDED RELEASE ORAL at 10:29

## 2020-06-18 ASSESSMENT — ENCOUNTER SYMPTOMS
DIARRHEA: 0
COLOR CHANGE: 1
SHORTNESS OF BREATH: 0
CONSTIPATION: 0
BLOOD IN STOOL: 0
CHEST TIGHTNESS: 0
COUGH: 0
NAUSEA: 0
VOMITING: 0
WHEEZING: 0
ABDOMINAL PAIN: 0

## 2020-06-18 ASSESSMENT — PAIN SCALES - GENERAL
PAINLEVEL_OUTOF10: 0
PAINLEVEL_OUTOF10: 2
PAINLEVEL_OUTOF10: 7

## 2020-06-18 NOTE — PROGRESS NOTES
Progress Note  Podiatric Medicine and Surgery     Subjective     CC: Left plantar heel wound    Patient seen and examined at bedside. Afebrile, hypertensive  Denies any pain  NIVS to be obtained today    HPI :  Jacquelyn Sewell is a 79 y.o. male seen at [facility] [address] for a heel wound on the left foot. The patient states the wound has been getting progressively worse with increasing serous drainage and edema. The patient was previously cared for by Dr. Jaswant Monroe, but states he does not currently follow with a podiatrist. 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 Their last HgbA1c was 7.5% in September 2019. Patient admitted to the hospital for further work up of his CHF and hypertension. The patient denies any nausea, vomiting, fever, chills, shortness of breath.     PCP is No primary care provider on file. ROS: Denies N/V/F/C/SOB/CP. Otherwise negative except at stated in the HPI.      Medications:  Scheduled Meds:   insulin lispro  0-12 Units Subcutaneous TID WC    insulin lispro  0-6 Units Subcutaneous Nightly    cefepime  2 g Intravenous Q12H    aspirin  81 mg Oral Daily    amLODIPine  10 mg Oral Daily    docusate sodium  100 mg Oral BID    ferrous sulfate  325 mg Oral Daily with breakfast    [Held by provider] furosemide  40 mg Oral BID    hydroCHLOROthiazide  25 mg Oral Daily    insulin glargine  90 Units Subcutaneous Nightly    lisinopril  20 mg Oral Daily    metoprolol tartrate  50 mg Oral BID    atorvastatin  40 mg Oral Daily    alogliptin  12.5 mg Oral Daily    traZODone  150 mg Oral Nightly    sodium chloride flush  10 mL Intravenous 2 times per day    collagenase   Topical Daily    metroNIDAZOLE  500 mg Intravenous Q8H    vancomycin (VANCOCIN) intermittent dosing (placeholder)   Other RX Placeholder    vancomycin  1,750 mg Intravenous Q24H    heparin (porcine)  5,000 Units Subcutaneous 3 times per day       Continuous Infusions:   dextrose      sodium chloride the left. Gross deformity is s/p right BKA.     Dermatologic: Full thickness ulcer #1 located left plantar heel and measures approximately 4.5cm x 3.0cm x 0.8cm. The wound base is fibro-necrotic. Periwound skin is macerated. serous drainage noted with no associated mal odor. Erythema minimal with minimal associated increase in warmth. Does not probe to bone, sinus track, or undermine. No fluctuance, crepitus, or induration. Interdigital maceration absent. Clinical Images:          Imaging:   XR CHEST PORTABLE   Final Result   No acute cardiopulmonary disease. XR FOOT LEFT (MIN 3 VIEWS)   Final Result   Status post transmetatarsal amputation. Diffuse soft tissue edema. No convincing evidence for osteomyelitis. VL Lower Extremity Arterial Segmental Pressures W Ppg    (Results Pending)       Cultures:     Assessment   Jacquelyn Sewell is a 79 y.o. male with   1. Montesinos grade 2 wound, left foot  2. S/p R BKA  3. Type II DM with secondary peripheral neuropathy  4. PAD    Principal Problem:    Diabetic foot infection ([address]  Active Problems:    Uncontrolled hypertension    PVD (peripheral vascular disease) ([address]    Type 2 diabetes mellitus with stage 3 chronic kidney disease, with long-term current use of insulin (HCC)    Tobacco abuse    RISSA (acute kidney injury) ([address]    Dyslipidemia    Tobacco dependence    Diabetic ulcer of left heel associated with type 2 diabetes mellitus (HCC)    Elevated troponin    Hypomagnesemia    Hypokalemia  Resolved Problems:    * No resolved hospital problems. *       Plan     · Patient examined and evaluated at bedside   · Treatment options discussed in detail with the patient. · Radiographs reviewed and discussed in detail with the patient. · No apparent soft tissue emphysema or osteomyelitis appreciated. · NIVS ordered due to diminished peripheral pulses, to be obtained today. · Medical management per Internal Medicine.   · Abx: Cefepime, ID consulted  · Dressing applied to Left foot: santyl + DSD   · Continue to use ROOKE/PRAFO boot to the left lower extremity to elevate heels. · Toe touch for transfers to Left lower extremity. · Will discuss with  Dr. Shannon Chan.     Parker Tyler DPM   Podiatric Medicine & Surgery   6/18/2020 at 9:36 AM

## 2020-06-18 NOTE — CONSULTS
[facility]  Initial Consult Note  Date: 6/18/2020    Hospital day :1     Impression:   1. Diabetes mellitus type 2 with complications including neuropathy, nephropathy and peripheral arterial disease  2. Left plantar foot infected diabetic ulceration  3. Acute kidney injury on chronic kidney disease stage III  4. Chronic dermatitis    Recommendations   · Continue cefepime and vancomycin. · The x-ray does not show findings consistent with osteomyelitis. · The patient would likely benefit from surgical debridement. · We will follow the culture data and the clinical progress as well as await the podiatry input-plan. Chief complaint/reason for consultation:   Infected diabetic foot ulcer    History of Present Illness:   Jacquelyn Sewell is a 79y.o.-year-old male who was initially admitted on 6/17/2020. Brittnee Dowd has a history of diabetes mellitus type 2 with multiple complications including peripheral neuropathy, chronic kidney disease, and peripheral arterial disease. 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 The patient is a poor historian and cannot really tell me how long he has had the ulceration on the plantar aspect of his left heel. He thinks he has had it for about 2 weeks and reports that there is a visiting nurse who does take care of him at home. The wound has been progressively getting worse over the past 2 weeks and he recalls that 2 weeks ago he had some fevers and chills but none recently. Due to the progressively worsening wound the patient was brought into the emergency room for evaluation. The patient was noted to have a full-thickness ulceration on the plantar aspect of the left heel with a fibronecrotic wound base difficult to stage. The patient was seen by the podiatry service and has since been admitted for a diabetic foot ulcer and I been asked to evaluate and help with antibiotic choice.     I have personally reviewed the past medical history, past surgical history, medications, social history, and family history, and I have updated the database accordingly.   Past Medical History:     Past Medical History:   Diagnosis Date    [facility]ISSA [facility] ([facility]     Cerebrovascular disease     Erectile dysfunction     Hepatitis B infection     Hyperlipidemia     Hypertension     Kidney cysts     Liver cyst     MRSA (methicillin resistant staph aureus) culture positive 1/10/2014    right foot    Neurotrophic ulcer of the foot ([facility]     Obesity     Osteomyelitis of ankle or foot     Peripheral vascular disease ([facility]     Tobacco abuse     Type II or unspecified type diabetes mellitus without mention of complication, not stated as uncontrolled      Past Surgical  History:     Past Surgical History:   Procedure Laterality Date    FOOT AMPUTATION      FOOT AMPUTATION THROUGH METATARSAL  2011    pt can't give exact date of surg-- toes and part of lt foot removed    FOOT SURGERY Right 3/2013     Medications:      insulin lispro  0-12 Units Subcutaneous TID WC    insulin lispro  0-6 Units Subcutaneous Nightly    vancomycin  1,500 mg Intravenous Q24H    cefepime  2 g Intravenous Q12H    aspirin  81 mg Oral Daily    amLODIPine  10 mg Oral Daily    docusate sodium  100 mg Oral BID    ferrous sulfate  325 mg Oral Daily with breakfast    [Held by provider] furosemide  40 mg Oral BID    hydroCHLOROthiazide  25 mg Oral Daily    insulin glargine  90 Units Subcutaneous Nightly    lisinopril  20 mg Oral Daily    metoprolol tartrate  50 mg Oral BID    atorvastatin  40 mg Oral Daily    alogliptin  12.5 mg Oral Daily    traZODone  150 mg Oral Nightly    sodium chloride flush  10 mL Intravenous 2 times per day    collagenase   Topical Daily    metroNIDAZOLE  500 mg Intravenous Q8H    vancomycin (VANCOCIN) intermittent dosing (placeholder)   Other RX Placeholder    heparin (porcine)  5,000 Units throat or runny nose. Cardiovascular: No chest pain or palpitations. Lung: No shortness of breath or cough. Abdomen: No nausea, vomiting, diarrhea, or abdominal pain. Genitourinary: No increased urinary frequency, or dysuria. Musculoskeletal: No muscle aches or pains. Hematologic: No bleeding or bruising. Neurologic: No headache, weakness, numbness, or tingling. Physical Examination :   BP (!) 144/59   Pulse 62   Temp 98.4 °F (36.9 °C) (Oral)   Resp 20   Ht 6' (1.829 m)   Wt 240 lb 9.6 oz (109.1 kg)   SpO2 97%   BMI 32.63 kg/m²     Temperature Range: Temp: 98.4 °F (36.9 °C) Temp  Av.3 °F (36.8 °C)  Min: 97.9 °F (36.6 °C)  Max: 98.6 °F (37 °C)     \"[x]\" Indicates a positive item  \"[]\" Indicates a negative item      Constitutional: [x] Appears well-developed and well-nourished [x] No apparent distress      [] Abnormal-   Mental status  [x] Alert and awake  [x] Oriented to person/place/time [x]Able to follow commands      Eyes:  EOM    [x]  Normal  [] Abnormal-  Sclera  [x]  Normal  [] Abnormal -         Discharge [x]  None visible  [] Abnormal -    HENT:   [x] Normocephalic, atraumatic. [] Abnormal   [x] Mouth/Throat: Mucous membranes are moist.     External Ears [x] Normal  [] Abnormal-     Neck: [x] No visualized mass     Pulmonary/Chest: [x] Respiratory effort normal.  [x] No visualized signs of difficulty breathing or respiratory distress        [] Abnormal-      Musculoskeletal:   [x] Normal gait with no signs of ataxia         [x] Normal range of motion of neck        [] Abnormal-       Neurological:        [x] No Facial Asymmetry (Cranial nerve 7 motor function) (limited exam to video visit)          [x] No gaze palsy        [] Abnormal-         Skin:        [x] No significant exanthematous lesions or discoloration noted on facial skin         [x] Abnormal-there are multiple hyperpigmented skin lesions on the extremities, abdominal wall.   There is significant dermatitis in the left lower extremity. There is a left plantar heel full-thickness ulceration with some fibrin necrotic tissue unstageable. Psychiatric:       [x] Normal Affect [x] No Hallucinations        [] Abnormal-       Medical Decision Making:   I have independently reviewed/ordered the following labs:  CBC with Differential:   Recent Labs     06/17/20  1700 06/18/20  0534   WBC 8.8 7.3   HGB 13.0 12.6*   HCT 41.1 39.5*    158   LYMPHOPCT 20*  --    MONOPCT 6  --      BMP:   Recent Labs     06/17/20  1700 06/18/20  0534    141   K 4.1 3.1*    106   CO2 23 23   BUN 21 21   CREATININE 1.64* 2.02*   MG 1.6 1.4*     Hepatic Function Panel: No results for input(s): PROT, LABALBU, BILIDIR, IBILI, BILITOT, ALKPHOS, ALT, AST in the last 72 hours. Lab Results   Component Value Date    CRP 33.1 (H) 06/17/2020     Lab Results   Component Value Date    SEDRATE 128 (H) 06/17/2020       No results for input(s): PROCAL in the last 72 hours. Imaging Studies:   THREE XRAY VIEWS OF THE LEFT FOOT 6/17/2020 4:54 pm  FINDINGS:   Status post transmetatarsal amputation. Diffuse soft tissue edema. No convincing evidence for osteomyelitis. ONE XRAY VIEW OF THE CHEST 6/17/2020 4:54 pm   FINDINGS:   No acute cardiopulmonary disease. Cultures:     Culture, Blood 1 [[id number]] Collected: 06/17/20 2219   Order Status: Completed Specimen: Blood Updated: 06/18/20 1149    Specimen Description . BLOOD    Special Requests 4 lhand    Culture NO GROWTH 11 HOURS   Culture, Blood 2 [[id number]] Collected: 06/17/20 2221   Order Status: Completed Specimen: Blood Updated: 06/18/20 1149    Specimen Description . BLOOD    Special Requests RT AC 12ML    Culture NO GROWTH 11 HOURS   Wound Culture [[id number]] (Abnormal) Collected: 06/17/20 0400   Order Status: Completed Specimen: No Site Given from Foot Updated: 06/18/20 0955    Specimen Description . FOOT SWAB    Special Requests NOT REPORTED    Direct Exam NO NEUTROPHILS SEEN     MODERATE MIXED BACTERIAL MORPHOTYPES SEEN ON GRAM STAIN. Abnormal     Culture PENDING       Thank you for allowing us to participate in the care of this patient. Please call with questions. Electronically signed by Caren Horta MD on 6/18/2020 at 12:46 PM      [facility]  Caren Horta MD  [employer] messaging  OFFICE: [phone]    Issa Nelson is a 79 y.o. male being evaluated by a Virtual Visit (video visit) encounter to address concerns as mentioned above. A caregiver was present when appropriate. Due to this being a TeleHealth encounter (During YRWYA-90 public health emergency), evaluation of the following organ systems was limited: Vitals/Constitutional/EENT/Resp/CV/GI//MS/Neuro/Skin/Heme-Lymph-Imm. Pursuant to the emergency declaration under the [address] [place] [address], [address] [place] [address] authority and the [employer] and [employer] Act, this Virtual Visit was conducted with patient's (and/or legal guardian's) consent, to reduce the patient's risk of exposure to COVID-19 and provide necessary medical care. Services were provided through a video synchronous discussion virtually to substitute for in-person inpatient visit. This note is created with the assistance of a speech recognition program.  While intending to generate a document that actually reflects the content of the visit, the document can still have some errors including those of syntax and sound a like substitutions which may escape proof reading. In such instances, actual meaning can be extrapolated by contextual diversion.

## 2020-06-18 NOTE — CARE COORDINATION
SW met with pt to discuss the therapy team recommendation regarding SNF, pt is refusing SNF and planning to return home with current services from [facility]

## 2020-06-18 NOTE — H&P
[facility]    HISTORY AND PHYSICAL EXAMINATION            Date:   6/17/2020  Patient name:  Elvis Correa  Date of admission:  6/17/2020  4:26 PM  MRN:   [id number]  Account:  [de-identified]  YOB: 1950  PCP:    Anat Garcia  Room:   [facility]-02  Code Status:    Full Code    Chief Complaint:     Chief Complaint   Patient presents with    Wound Infection     left foot    Hypertension     History Obtained From:     Patient and electronic medical record. History of Present Illness:     Elvis Correa is a 79 y.o. Non-/non  male who presents with Wound Infection (left foot) and Hypertension   and is admitted to the hospital for the management of Di[facility] foot [facility] ([facility] Patient reports to the hospital with complaint of high blood pressure and worsening left foot wound. The patient states that he developed a wound to the bottom of his foot several weeks ago. He has a visiting RN that comes to the house and performs dressing changes 3X week. He states that the nurse noted increased drainage to the area. He was subsequently advised to report to the hospital for evaluation. He had also had increased blood pressure readings. He is on multiple antihypertensive agents. He endorses neuropathy to his left lower extremity. He has a right BKA. He denies nausea, vomiting, fever or chills. He has additional past medical history that includes CKD, diabetes, hypertension and hyperlipidemia. He is a current every day cigarette smoker. He does not currently follow with podiatry. Creatinine 1.64, proBNP 2561, high-sensitivity troponin 39, 39, CRP 33.1, sed rate 128, , WBC 8.8    EKG indicates normal sinus rhythm, indeterminate axis, right bundle branch block.     Past Medical History:     Past Medical History:   Diagnosis Date    [facility]     Cerebrovascular disease     Erectile dysfunction     mouth every evening  7/9/13  Yes Brenda Saint, MD   traZODone (DESYREL) 50 MG tablet One or two tabs at bedtime  Patient taking differently: 150 mg One or two tabs at bedtime 7/9/13  Yes Brenda Saint, MD   insulin aspart (NOVOLOG) 100 UNIT/ML injection Inject 3 Units into the skin continuous prn for High Blood Sugar (he is to use sliding scale to increase from 3 units if needed).  use 0 units,151-200 use 3 units, 201-250 use 4 units, 251-300 use 5 units, 301-350 use 6 units, 351-400 use 7 units use before meals and at bedtime. 7/9/13  Yes Brenda Saint, MD   metroNIDAZOLE (FLAGYL) 500 MG tablet Take 500 mg by mouth 3 times daily. Historical Provider, MD   magnesium hydroxide (MILK OF MAGNESIA) 400 MG/5ML suspension Take 30 mLs by mouth daily as needed for Constipation. Historical Provider, MD   bisacodyl (DULCOLAX) 10 MG suppository Place 10 mg rectally daily as needed for Constipation. Historical Provider, MD   Sodium Phosphates (FLEET) Place 1 enema rectally once as needed. Historical Provider, MD   acetaminophen (TYLENOL) 650 MG suppository Place 650 mg rectally every 4 hours as needed for Fever. Historical Provider, MD   polyvinyl alcohol (LIQUIFILM TEARS) 1.4 % ophthalmic solution Place 1 drop into both eyes as needed. Historical Provider, MD   carbamide peroxide (DEBROX) 6.5 % otic solution 1 drop as needed. Historical Provider, MD   glucagon hcl, rDNA, (GLUCAGEN) 1 MG SOLR Infuse 1 mg intravenously once. Glucagon 1 mg IM for blood sugar levels below 40 if unable to take orange juice with sugar by mouth or tube -repeat-    Historical Provider, MD   atorvastatin (LIPITOR) 10 MG tablet Take 10 mg by mouth daily. Historical Provider, MD   sitaGLIPtin (JANUVIA) 50 MG tablet Take 1 tablet by mouth daily. 10/15/13   Stephany Hawkins MD   collagenase (SANTYL) ointment Apply topically daily.  10/14/13   Sara Frye DPM   metoprolol (LOPRESSOR) 50 MG tablet Constitutional: Negative for chills, diaphoresis and fever. HENT: Negative for congestion. Eyes: Negative for visual disturbance. Respiratory: Negative for cough, chest tightness, shortness of breath and wheezing. Cardiovascular: Negative for chest pain, palpitations and leg swelling. Gastrointestinal: Negative for abdominal pain, blood in stool, constipation, diarrhea, nausea and vomiting. Endocrine: Negative for cold intolerance and heat intolerance. Genitourinary: Negative for difficulty urinating, dysuria, frequency and urgency. Musculoskeletal: Positive for gait problem. Negative for arthralgias. Skin: Positive for color change and wound. Neurological: Positive for numbness. Negative for dizziness, weakness, light-headedness and headaches. Chronic neuropathy   Psychiatric/Behavioral: The patient is not nervous/anxious. All other systems reviewed and are negative. Physical Exam:   BP (!) 214/78   Pulse 95   Temp 97.9 °F (36.6 °C) (Oral)   Resp 18   Ht 6' (1.829 m)   Wt 275 lb (124.7 kg)   SpO2 95%   BMI 37.30 kg/m²   Temp (24hrs), Av.1 °F (36.7 °C), Min:97.9 °F (36.6 °C), Max:98.2 °F (36.8 °C)    No results for input(s): POCGLU in the last 72 hours. Intake/Output Summary (Last 24 hours) at 2020 2248  Last data filed at 2020 2151  Gross per 24 hour   Intake --   Output 100 ml   Net -100 ml       Physical Exam  Vitals signs and nursing note reviewed. Constitutional:       General: He is not in acute distress. Appearance: He is well-developed. He is not diaphoretic. HENT:      Head: Normocephalic and atraumatic. Right Ear: Hearing normal.      Left Ear: Hearing normal.      Nose: Nose normal. No rhinorrhea. Eyes:      General: Lids are normal.      Extraocular Movements:      Right eye: Normal extraocular motion. Left eye: Normal extraocular motion.       Conjunctiva/sclera: Conjunctivae normal.      Right eye: Right conjunctiva is not injected. Left eye: Left conjunctiva is not injected. Pupils: Pupils are equal, round, and reactive to light. Pupils are equal.      Right eye: Pupil is reactive. Left eye: Pupil is reactive. Neck:      Musculoskeletal: Neck supple. Thyroid: No thyromegaly. Vascular: No carotid bruit. Trachea: Trachea and phonation normal. No tracheal deviation. Cardiovascular:      Rate and Rhythm: Normal rate and regular rhythm. Pulses: Normal pulses. Heart sounds: Normal heart sounds. No murmur. Pulmonary:      Effort: Pulmonary effort is normal. No respiratory distress. Breath sounds: No stridor. Examination of the right-lower field reveals decreased breath sounds. Examination of the left-lower field reveals decreased breath sounds. Decreased breath sounds present. Abdominal:      General: Bowel sounds are normal. There is no distension. Palpations: Abdomen is soft. There is no mass. Tenderness: There is no abdominal tenderness. There is no guarding. Musculoskeletal:         General: No tenderness. Comments: Right BKA. Skin:     General: Skin is warm and dry. Comments: Several scabs and small lesions to the patients chest at various stages of healing. Stasis dermatitis to left lower extremity. Dressing to left foot, see photos for wound reference. Neurological:      Mental Status: He is alert and oriented to person, place, and time. He is not disoriented. Cranial Nerves: No cranial nerve deficit. Psychiatric:         Speech: Speech normal.         Behavior: Behavior normal. Behavior is cooperative.              Investigations:      Laboratory Testing:  Recent Results (from the past 24 hour(s))   Basic Metabolic Panel    Collection Time: 06/17/20  5:00 PM   Result Value Ref Range    Glucose 220 (H) 70 - 99 mg/dL    BUN 21 8 - 23 mg/dL    CREATININE 1.64 (H) 0.70 - 1.20 mg/dL    Bun/Cre Ratio 13 9 - 20    Calcium 8.6 8.6 - 10.4 mg/dL Sodium 138 135 - 144 mmol/L    Potassium 4.1 3.7 - 5.3 mmol/L    Chloride 103 98 - 107 mmol/L    CO2 23 20 - 31 mmol/L    Anion Gap 12 9 - 17 mmol/L    GFR Non-African American 42 (L) >60 mL/min    GFR  51 (L) >60 mL/min    GFR Comment          GFR Staging NOT REPORTED    Brain Natriuretic Peptide    Collection Time: 06/17/20  5:00 PM   Result Value Ref Range    Pro-BNP 2,561 (H) <300 pg/mL    BNP Interpretation Pro-BNP Reference Range:    CBC Auto Differential    Collection Time: 06/17/20  5:00 PM   Result Value Ref Range    WBC 8.8 3.5 - 11.3 k/uL    RBC 4.95 4.21 - 5.77 m/uL    Hemoglobin 13.0 13.0 - 17.0 g/dL    Hematocrit 41.1 40.7 - 50.3 %    MCV 83.0 82.6 - 102.9 fL    MCH 26.3 25.2 - 33.5 pg    MCHC 31.6 28.4 - 34.8 g/dL    RDW 13.2 11.8 - 14.4 %    Platelets 625 435 - 548 k/uL    MPV 9.2 8.1 - 13.5 fL    NRBC Automated 0.0 0.0 per 100 WBC    Differential Type NOT REPORTED     Seg Neutrophils 70 (H) 36 - 65 %    Lymphocytes 20 (L) 24 - 43 %    Monocytes 6 3 - 12 %    Eosinophils % 2 1 - 4 %    Basophils 1 0 - 2 %    Immature Granulocytes 1 (H) 0 %    Segs Absolute 6.27 1.50 - 8.10 k/uL    Absolute Lymph # 1.78 1.10 - 3.70 k/uL    Absolute Mono # 0.51 0.10 - 1.20 k/uL    Absolute Eos # 0.17 0.00 - 0.44 k/uL    Basophils Absolute 0.04 0.00 - 0.20 k/uL    Absolute Immature Granulocyte 0.05 0.00 - 0.30 k/uL    WBC Morphology NOT REPORTED     RBC Morphology NOT REPORTED     Platelet Estimate NOT REPORTED    Magnesium    Collection Time: 06/17/20  5:00 PM   Result Value Ref Range    Magnesium 1.6 1.6 - 2.6 mg/dL   Troponin    Collection Time: 06/17/20  5:00 PM   Result Value Ref Range    Troponin, High Sensitivity 39 (H) 0 - 22 ng/L    Troponin T NOT REPORTED <0.03 ng/mL    Troponin Interp NOT REPORTED    Sedimentation Rate    Collection Time: 06/17/20  5:00 PM   Result Value Ref Range    Sed Rate 128 (H) 0 - 20 mm   C-Reactive Protein    Collection Time: 06/17/20  5:00 PM   Result Value Ref Range CRP 33.1 (H) 0.0 - 5.0 mg/L   EKG 12 Lead    Collection Time: 06/17/20  5:07 PM   Result Value Ref Range    Ventricular Rate 64 BPM    Atrial Rate 64 BPM    P-R Interval 206 ms    QRS Duration 148 ms    Q-T Interval 486 ms    QTc Calculation (Bazett) 501 ms    P Axis 63 degrees    R Axis 44 degrees    T Axis 27 degrees       Imaging/Diagnostics:  Xr Foot Left (min 3 Views)    Result Date: 6/17/2020  Status post transmetatarsal amputation. Diffuse soft tissue edema. No convincing evidence for osteomyelitis. Xr Chest Portable    Result Date: 6/17/2020  No acute cardiopulmonary disease. Assessment :      Hospital Problems           Last Modified POA    * (Principal) Diabetic foot infection ([facility] 6/17/2020 Yes    Uncontrolled hypertension 6/17/2020 Yes    Type 2 diabetes mellitus with stage 3 chronic kidney disease, with long-term current use of insulin ([facility] 6/17/2020 Yes    Dyslipidemia 6/17/2020 Yes    Tobacco dependence 6/17/2020 Yes          Plan:     Patient status inpatient in the  Progressive Unit/Step down unit. 1. CXR, xray left foot reviewed. EKG reviewed. 2. Consult podiatry, appreciate recommendations. 3. Consult infectious disease for antibiotic recommendations. 4. Uncontrolled hypertension- resume home antihypertensives, PRN IV hydralazine for SBP >160.  5. DM- monitor and control blood glucose. Insulin sliding scale. Check hbA1C.   6. CKD- avoid nephrotoxic agents. 7. Dyslipidemia- continue atorvastatin. 8. IV hydration. 9. Tobacco dependence- smoking cessation. 10. Subq heparin for DVT prophylaxis. 11. Diabetic diet. 12. Activity as tolerated with assist.   13. PT/OT. 14. Discussed importance of medical compliance with patient. Plan of care discussed with patient and April RN.      Consultations:   IP CONSULT TO PODIATRY  IP CONSULT TO HOSPITALIST  IP CONSULT TO INFECTIOUS DISEASES  PHARMACY TO DOSE VANCOMYCIN    Patient is admitted as inpatient status because of co-morbidities listed above, severity of signs and symptoms as outlined, requirement for current medical therapies and most importantly because of direct risk to patient if care not provided in a hospital setting.     ALBERTO Rojas CNP  6/17/2020  10:48 PM    Copy sent to Dr. Reginald Park     (Please note that portions of this note were completed with a voice recognition program. Efforts were made to edit the dictations but occasionally words are mis-transcribed.)

## 2020-06-18 NOTE — PLAN OF CARE
Problem: Falls - Risk of:  Goal: Will remain free from falls  Description: Will remain free from falls  Outcome: Ongoing  Note: Bed in lowest position and locked     Problem: Falls - Risk of:  Goal: Absence of physical injury  Description: Absence of physical injury  Outcome: Ongoing  Note: Bed alarm in use. Wheelchair next to bed. Problem: Physical Regulation:  Goal: Will remain free from infection  Description: Will remain free from infection  Outcome: Ongoing  Note: Hand hygiene in and out of room.

## 2020-06-18 NOTE — PROGRESS NOTES
Occupational Therapy   Occupational Therapy Initial Assessment  Date: 2020   Patient Name: Sary Moreno  MRN: [id number]     : 1950    Date of Service: 2020    Discharge Recommendations:  [address] RN reports patient is medically stable for therapy treatment this date. Chart reviewed prior to treatment and patient is agreeable for therapy. All lines intact and patient positioned comfortably at end of treatment. All patient needs addressed prior to ending therapy session. Assessment   Performance deficits / Impairments: Decreased functional mobility ; Decreased ADL status; Decreased strength;Decreased safe awareness;Decreased balance;Decreased endurance;Decreased posture;Decreased cognition  Prognosis: Good  Decision Making: Medium Complexity  OT Education: OT Role;Energy Conservation;Plan of Care;Home Exercise Program;ADL Adaptive Strategies;Transfer Training;Equipment; Family Education;Precautions  REQUIRES OT FOLLOW UP: Yes  Activity Tolerance  Activity Tolerance: Patient Tolerated treatment well;Patient limited by fatigue  Safety Devices  Safety Devices in place: Yes  Type of devices: Call light within reach;Nurse notified;Gait belt;Patient at risk for falls; Bed alarm in place; Left in bed; All fall risk precautions in place           Patient Diagnosis(es): The primary encounter diagnosis was Hypertensive urgency. Diagnoses of Elevated troponin and Diabetic ulcer of left heel associated with type 1 diabetes mellitus, unspecified ulcer stage ([address] were also pertinent to this visit.      has a past medical history of RISSA (acute kidney injury) ([address] Cerebrovascular disease, Erectile dysfunction, Hepatitis B infection, Hyperlipidemia, Hypertension, Kidney cysts, Liver cyst, MRSA (methicillin resistant staph aureus) culture positive, Neurotrophic ulcer of the foot ([address] Obesity, Osteomyelitis of ankle or foot, Peripheral vascular disease ([address] Tobacco abuse, and Type II or unspecified type diabetes mellitus without mention of complication, not stated as uncontrolled. has a past surgical history that includes Foot amputation through metatarsal (2011); Foot Amputation; and Foot surgery (Right, 3/2013).            Restrictions  Restrictions/Precautions  Restrictions/Precautions: Weight Bearing, General Precautions  Required Braces or Orthoses?: Yes(Prosthesis for right TKA)  Lower Extremity Weight Bearing Restrictions  Left Lower Extremity Weight Bearing: Toe Touch Weight Bearing(For transfers)  Position Activity Restriction  Other position/activity restrictions: Right BKA with poorly fitting prosthesis, Left heel wounds    Subjective   General  Chart Reviewed: Yes  Patient assessed for rehabilitation services?: Yes  Family / Caregiver Present: No  Patient Currently in Pain: Yes  Vital Signs  Temp: 98.4 °F (36.9 °C)  Temp Source: Oral  Pulse: 62  Heart Rate Source: Monitor  Resp: 20  BP: (!) 144/59  BP Location: Left Arm  BP Upper/Lower: Upper  MAP (mmHg): 81  Patient Currently in Pain: Yes  Oxygen Therapy  SpO2: 97 %  O2 Device: None (Room air)  Social/Functional History  Social/Functional History  Lives With: Alone  Type of Home: Apartment(Senior Apartment)  Home Layout: One level  Home Access: Level entry, Elevator  Bathroom Shower/Tub: Tub/Shower unit  Bathroom Toilet: Standard  Bathroom Equipment: Tub transfer bench, Grab bars in shower  Home Equipment: Wheelchair-electric, Rolling walker(R TKA prosthesis)  Receives Help From: Home health(HHA 7days a week 2hours, 5 days a week in the evening, Meals delivered)  ADL Assistance: Needs assistance  Homemaking Assistance: Needs assistance  Ambulation Assistance: Independent(RW, prothesis, short distances in apartment, also uses Power chair inside apartment)  Transfer Assistance: Independent(Uses lift chair and walker to transfer)  Active : No  Patient's  Info: Uses transportation services  Occupation: Retired  Additional Comments: No falls       Objective   Vision: Impaired  Vision Exceptions: Wears glasses at all times  Hearing: Within functional limits    Orientation  Overall Orientation Status: Within Functional Limits  Observation/Palpation  Posture: Poor(Flexed posture)  Observation: Left LE edema, Right TKA prosthesis with poor fit due to being very loose  Balance  Sitting Balance: Contact guard assistance  Standing Balance: Maximum assistance(x2)  Functional Mobility  Functional Mobility Comments: pt unable to take steps this date. Pt stood at walker using prosthetic R LE and mostly able in static standing to maintain TTWB on L LE, but unable to take hops/steps with walker. Toilet Transfers  Toilet Transfers Comments: unable to safely transfer BSC  ADL  Feeding: Independent  Grooming: Stand by assistance  UE Bathing: Moderate assistance  LE Bathing: Maximum assistance  UE Dressing: Moderate assistance  LE Dressing: Maximum assistance  Toileting: Maximum assistance  Tone RUE  RUE Tone: Normotonic  Tone LUE  LUE Tone: Normotonic  Coordination  Movements Are Fluid And Coordinated: Yes     Bed mobility  Rolling to Left: Contact guard assistance  Rolling to Right: Contact guard assistance  Supine to Sit: Contact guard assistance  Sit to Supine: Contact guard assistance  Comment: good use of bed rails  Transfers  Sit to stand: 2 Person assistance;Maximum assistance  Stand to sit: Maximum assistance;2 Person assistance  Transfer Comments: max cues for safety/tech for TTWB.      Cognition  Overall Cognitive Status: Exceptions  Safety Judgement: Decreased awareness of need for assistance;Decreased awareness of need for safety  Problem Solving: Assistance required to implement solutions;Assistance required to generate solutions;Assistance required to correct errors made;Decreased awareness of errors  Insights: Decreased awareness of deficits  Perception  Overall Perceptual Status: WFL     Sensation  Overall Sensation Status: Impaired(BLE neuropathy)        LUE AROM (degrees)  LUE AROM : WFL  RUE AROM (degrees)  RUE AROM : WFL  LUE Strength  Gross LUE Strength: WFL  LUE Strength Comment: B UE's 4/5  RUE Strength  Gross RUE Strength: WFL                   Plan   Plan  Times per week: 4-5x/week, 1-2x/day  Current Treatment Recommendations: Strengthening, Balance Training, Functional Mobility Training, Endurance Training, Equipment Evaluation, Education, & procurement, Patient/Caregiver Education & Training, Self-Care / ADL, Safety Education & Training, Positioning      Goals  Short term goals  Time Frame for Short term goals: by discharge, pt will  Short term goal 1: demo mod A x 1 for ADL transfers with good safety, approp DME  Short term goal 2: demo mod A with toileting routine at approp level  Short term goal 3: demo SBA with UB ADLs and mod A LB ADLs with good safety/pacing, good adherance to WB restrictions and approp AE/DME  Short term goal 4: demo and good understanding of fall prevention techs, EC/WS techs, equip needs, and B UE HEP  Short term goal 5: demo mod A with functional mob short distances for ADL completion with approp AD/DME  Patient Goals   Patient goals : to go home       Therapy Time   Individual Concurrent Group Co-treatment   Time In 1027         Time Out 1116         Minutes 49              Patient would benefit from SNF for continued occupational therapy to increase independence with  ADL of bathing, dressing, toileting and grooming. Writer recommending SNF placement for for activity tolerance and strength which will increase independence with ADL's coordinated with bed mobility and chair transfers. Continued skilled OT services to address decreased safety awareness with ADL and IADL tasks and for education and increased independence with DME and AE for fall prevention and ec/ws techniques prior to d/c home.     Co-treatment with PT warranted secondary to decreased safety and independence requiring 2 skilled

## 2020-06-18 NOTE — PROGRESS NOTES
Transitions of Care Pharmacy Service   Medication Review    The patient's list of current home medications has been reviewed and updated. His medications [facility]. Source(s) of information: [facility]  [place], PCP office, RN notations on home med list      PROVIDER ACTION REQUESTED  Discrepancies on current hospital orders that need to be addressed by a physician/nurse practitioner:    Medication Action Requested   Alogliptin,  Amlodipine 10mg,  Aspirin 81mg,  HCTZ 25mg   Not current home meds -- please discontinue if appropriate, otherwise he will need new prescriptions at discharge         Lipitor 40mg  Lasix 40mg BID,  Lantus 90 units HS,  Lisinopril 20mg,  Lopressor 50mg BID Home doses are different -- please adjust orders to match as appopriate    Lipitor 10mg (formulary sub for home dose Zocor 10mg)  Lasix 40mg daily  Lantus 40 units AM + 90 units HS  Lisinopril 10mg daily  Toprol XL 25mg BID      Symbicort added to list -- please review/reorder as appopriate         -----------------------------------------------------------------------------------------------------------------------  Based on information provided by the above source(s), I have updated the patient's home med list as described below.      I had changed or updated the following medications on the patient's home medication list:  Removed   (old orders from 2013/2014) Tylenol suppository  Amlodipine  Aspirin 81mg  Lipitor (takes Zocor instead)  Debrox ear drops  Cipro   Santyl  Doxycycline  Glucagon inj  HCTZ  Milk of mag  Duplicate entry for metoprolol  Flagyl  2 entries for Percocet  Artificial tears  Senna  Januvia  Fleet enema     Added Symbicort 160/4.5mcg inhaler 2 puff BID     Adjusted   Lasix 40mg BID adjusted to 40mg once daily  Novolog insulin prn adjusted to 8 units AM and 12 units with supper plus additional sliding scale  Lantus 90 units nightly adjusted to Basaglar 40 units AM and 90 units nightly  Lopressor 50mg BID adjusted to XL 25mg BID  Zocor 20mg tab adjusted to 10mg tab nightly  Trazodone 50mg tab adjusted to 150mg tab nightly           Please feel free to call me with any questions about this encounter. Thank you. Kassi Renae [facility] Pharmacy Service  Phone:  [phone]  Fax: [phone]      Electronically signed by Kassi Renae [address] [place] [address] on 6/18/2020 at 4:02 PM           Medications Prior to Admission:   insulin aspart (NOVOLOG FLEXPEN) 100 UNIT/ML injection pen, Inject 8-12 Units into the skin 2 times daily (before meals) 8 units AM and 12 units at supper + additional sliding scale  insulin glargine (BASAGLAR KWIKPEN) 100 UNIT/ML injection pen, Inject 40 Units into the skin every morning Injects 40 units AM and 90 units HS  insulin glargine (BASAGLAR KWIKPEN) 100 UNIT/ML injection pen, Inject 90 Units into the skin nightly Injects 40 units AM and 90 units HS  metoprolol succinate (TOPROL XL) 25 MG extended release tablet, Take 25 mg by mouth 2 times daily  simvastatin (ZOCOR) 10 MG tablet, Take 10 mg by mouth nightly  traZODone (DESYREL) 150 MG tablet, Take 150 mg by mouth nightly  budesonide-formoterol (SYMBICORT) 160-4.5 MCG/ACT AERO, Inhale 2 puffs into the lungs 2 times daily  ferrous sulfate (IRON 325) 325 (65 Fe) MG tablet, Take 325 mg by mouth daily (with breakfast)  furosemide (LASIX) 40 MG tablet, Take 40 mg by mouth daily   docusate sodium (COLACE) 100 MG capsule, Take 100 mg by mouth 2 times daily   acetaminophen (TYLENOL) 325 MG tablet, Take 650 mg by mouth every 4 hours as needed for Pain.  Give 2 tabs = 650 MG by mouth every 4 hours prn  lisinopril (PRINIVIL;ZESTRIL) 10 MG tablet, Take 10 mg by mouth daily

## 2020-06-18 NOTE — PROGRESS NOTES
Physical Therapy    Facility/Department: [facility]  Initial Assessment    NAME: Anitha Terrazas  : 1950  MRN: [id number]    Date of Service: 2020    Discharge Recommendations:  Subacute/Skilled Nursing Facility        Assessment   Body structures, Functions, Activity limitations: Decreased functional mobility ; Decreased ADL status; Decreased strength;Decreased posture;Decreased cognition;Decreased endurance;Decreased balance  Assessment: Recommend SNF due to patient requires 2 assist to stand with bed raised, and unable to maintain TTWB on LLE. Patient requires skilled PT to improve transfers and ambulation to return home. Prognosis: Good  Decision Making: High Complexity  PT Education: Goals;PT Role;Plan of Care;General Safety;Weight-bearing Education  Patient Education: Patient requires frequent verbal cues to understand / maintain TTWB. REQUIRES PT FOLLOW UP: Yes  Activity Tolerance  Activity Tolerance: Patient Tolerated treatment well       Patient Diagnosis(es): The primary encounter diagnosis was Hypertensive urgency. Diagnoses of Elevated troponin and Diabetic ulcer of left heel associated with type 1 diabetes mellitus, unspecified ulcer stage ([address] were also pertinent to this visit. has a past medical history of RISSA (acute kidney injury) ([address] Cerebrovascular disease, Erectile dysfunction, Hepatitis B infection, Hyperlipidemia, Hypertension, Kidney cysts, Liver cyst, MRSA (methicillin resistant staph aureus) culture positive, Neurotrophic ulcer of the foot ([address] Obesity, Osteomyelitis of ankle or foot, Peripheral vascular disease ([address] Tobacco abuse, and Type II or unspecified type diabetes mellitus without mention of complication, not stated as uncontrolled. has a past surgical history that includes Foot amputation through metatarsal (); Foot Amputation; and Foot surgery (Right, 3/2013).     Restrictions  Restrictions/Precautions  Restrictions/Precautions: Weight Bearing, General Precautions  Required Braces or Orthoses?: Yes(Prosthesis for right TKA)  Lower Extremity Weight Bearing Restrictions  Left Lower Extremity Weight Bearing: Toe Touch Weight Bearing(For transfers)  Position Activity Restriction  Other position/activity restrictions: Right BKA with poorly fitting prosthesis, Left heel wounds  Vision/Hearing  Vision: Impaired  Vision Exceptions: Wears glasses at all times  Hearing: Within functional limits     Subjective  General  Chart Reviewed: Yes  Patient assessed for rehabilitation services?: Yes  Additional Pertinent Hx: CHF, HTN, CVA, Right BKA, DM, Left foot amp through metatarsals  Response To Previous Treatment: Not applicable  Family / Caregiver Present: No  Diagnosis: Left heel diabetic foot infection  Follows Commands: Within Functional Limits  General Comment  Comments: RN reports patient medically appropriate for PT. Subjective  Subjective: Patient pleasant and agreeable to PT.   Pain Screening  Patient Currently in Pain: Yes     Pre Treatment Pain Screening  Intervention List: Patient able to continue with treatment    Orientation  Orientation  Overall Orientation Status: Within Normal Limits  Social/Functional History  Social/Functional History  Lives With: Alone  Type of Home: Apartment(Senior Apartment)  Home Layout: One level  Home Access: Level entry, Elevator  Bathroom Shower/Tub: Tub/Shower unit  Bathroom Toilet: Standard  Bathroom Equipment: Tub transfer bench, Grab bars in shower  Home Equipment: Wheelchair-electric, Rolling walker(R TKA prosthesis)  Receives Help From: Home health(HHA 7days a week 2hours, 5 days a week in the evening, Meals delivered)  ADL Assistance: Needs assistance  Homemaking Assistance: Needs assistance  Ambulation Assistance: Independent(RW, prothesis, short distances in apartment, also uses Power chair inside apartment)  Transfer Assistance: Independent(Uses lift chair and walker to transfer)  Active : No  Patient's  Info: Uses transportation services  Occupation: Retired  Additional Comments: No falls  Cognition   Cognition  Overall Cognitive Status: Exceptions  Arousal/Alertness: Appropriate responses to stimuli  Attention Span: Appears intact  Memory: Appears intact  Safety Judgement: Decreased awareness of need for assistance;Decreased awareness of need for safety  Problem Solving: Assistance required to implement solutions;Assistance required to generate solutions;Assistance required to correct errors made;Decreased awareness of errors  Insights: Fully aware of deficits  Initiation: Requires cues for some    Objective     Observation/Palpation  Posture: Poor(Flexed posture)  Observation: Left LE edema, Right TKA prosthesis with poor fit due to being very loose    AROM RLE (degrees)  RLE General AROM: hip and knee WFL  AROM LLE (degrees)  LLE General AROM: hip and knee WFL, ankle DF to neutral  Strength RLE  Comment: hip flexion 4-/5, knee 4-/5  Strength LLE  Comment: hip 4-/5, knee 4-/5  Motor Control  Gross Motor?: (slowed motor planning and slowed motor response)  Sensation  Overall Sensation Status: Impaired(BLE neuropathy)  Bed mobility  Rolling to Left: Contact guard assistance  Rolling to Right: Contact guard assistance  Supine to Sit: Contact guard assistance  Sit to Supine: Contact guard assistance  Comment: Good use of bed rails  Transfers  Sit to Stand: Maximum Assistance;2 Person Assistance(RW)  Stand to sit: Maximum Assistance;2 Person Assistance(RW)  Comment: Patient required bed to be raised significantly to stand with 2 assist, not able to maintain TTWB, very unsteady.   Ambulation  Ambulation?: No(Patient unable to to take steps due to unable to maintain TTWB on left and RLE unstable due to loose fitting RLE prosthesis)     Balance  Sitting - Static: Good  Sitting - Dynamic: Good  Standing - Static: Poor        Plan   Plan  Times per week: 1-2x/d, 5-7 d/wk  Current Treatment Recommendations: Strengthening, Balance Training, Functional Mobility Training, Transfer Training, Patient/Caregiver Education & Training, Safety Education & Training, Home Exercise Program, Endurance Training  Safety Devices  Type of devices: All fall risk precautions in place, Gait belt, Bed alarm in place, Patient at risk for falls, Nurse notified, Left in bed, Call light within reach    G-Code       OutComes Score  Balance Score: 1 (06/18/20 1252)  Gait Score: 0 (06/18/20 1252)        Tinetti Total Score: 1 (06/18/20 1252)                                   AM-PAC Score  AM-PAC Inpatient Mobility Raw Score : 11 (06/18/20 1249)  AM-PAC Inpatient T-Scale Score : 33.86 (06/18/20 1249)  Mobility Inpatient CMS 0-100% Score: 72.57 (06/18/20 1249)  Mobility Inpatient CMS G-Code Modifier : CL (06/18/20 1249)          Goals  Short term goals  Time Frame for Short term goals: 12 visits  Short term goal 1: Patient will be indep with bed mobility. Short term goal 2: Patient will perform sit to stand and pivot transfers with mod x 2. Short term goal 3: Patient will be indep with HEP and TTWB. Short term goal 4: Patient will be reassessed for ambulation when appropriate. Short term goal 5: Patient will tolerate 30 minutes of ther-ex and ther-act. Patient Goals   Patient goals : Improve transfers and ambulation     Co-treatment with OT warranted secondary to decreased safety and independence requiring 2 skilled therapy professionals to address individual discipline's goals. PT addressing pre gait trunk strengthening, weight shifting prior to transfers, transfer training and postural control in sitting.   Therapy Time   Individual Concurrent Group Co-treatment   Time In 1015         Time Out 1055         Minutes 40         Timed Code Treatment Minutes: [address] [facility] [address], PT

## 2020-06-18 NOTE — PLAN OF CARE
Pt remains free from falls, resting in bed. Calls out appropriately. VSS. Afebrile. WBC stable. Continues on IV fluids, antibiotics. Awaiting plan per podiatry.

## 2020-06-18 NOTE — PROGRESS NOTES
[facility]    Progress Note    6/18/2020    8:47 AM    Name:   Jim Anne  MRN:     [id number]     Acct:      [de-identified]   Room:   [address] [place] [address] Day:  1  Admit Date:  6/17/2020  4:26 PM    PCP:   Reynold Da Silva  Code Status:  Full Code    Subjective:     C/C:   Chief Complaint   Patient presents with    Wound Infection     left foot    Hypertension     Interval History Status: not changed. Feels ok  Denies cp/sob  Has a wound on foot  Sbp>200 yesterday    Brief History:     Per my BLAYNE:  Trini Angela is a 79 y.o. Non-/non  male who presents with Wound Infection (left foot) and Hypertension   and is admitted to the hospital for the management of Diabetic foot infection ([facility] [address]    Patient reports to the hospital with complaint of high blood pressure and worsening left foot wound. The patient states that he developed a wound to the bottom of his foot several weeks ago. He has a visiting RN that comes to the house and performs dressing changes 3X week. He states that the nurse noted increased drainage to the area. He was subsequently advised to report to the hospital for evaluation. He had also had increased blood pressure readings. He is on multiple antihypertensive agents. He endorses neuropathy to his left lower extremity. He has a right BKA. He denies nausea, vomiting, fever or chills. He has additional past medical history that includes CKD, diabetes, hypertension and hyperlipidemia. He is a current every day cigarette smoker. \"    Review of Systems:     Constitutional:  negative for chills, fevers, sweats  Respiratory:  negative for cough, dyspnea on exertion, shortness of breath, wheezing  Cardiovascular:  negative for chest pain, chest pressure/discomfort, lower extremity edema, palpitations  Gastrointestinal:  negative for abdominal pain, constipation, diarrhea, nausea, vomiting  Neurological:  negative for dizziness, headache    Medications:      Allergies:  No Known Allergies    Current Meds:   Scheduled Meds:    insulin lispro  0-12 Units Subcutaneous TID WC    insulin lispro  0-6 Units Subcutaneous Nightly    cefepime  2 g Intravenous Q12H    aspirin  81 mg Oral Daily    amLODIPine  10 mg Oral Daily    docusate sodium  100 mg Oral BID    ferrous sulfate  325 mg Oral Daily with breakfast    furosemide  40 mg Oral BID    hydroCHLOROthiazide  25 mg Oral Daily    insulin glargine  90 Units Subcutaneous Nightly    lisinopril  20 mg Oral Daily    metoprolol tartrate  50 mg Oral BID    atorvastatin  40 mg Oral Daily    alogliptin  12.5 mg Oral Daily    traZODone  150 mg Oral Nightly    sodium chloride flush  10 mL Intravenous 2 times per day    collagenase   Topical Daily    metroNIDAZOLE  500 mg Intravenous Q8H    vancomycin (VANCOCIN) intermittent dosing (placeholder)   Other RX Placeholder    vancomycin  1,750 mg Intravenous Q24H    heparin (porcine)  5,000 Units Subcutaneous 3 times per day     Continuous Infusions:    dextrose      sodium chloride 75 mL/hr at 06/17/20 2320     PRN Meds: glucose, dextrose, glucagon (rDNA), dextrose, oxyCODONE-acetaminophen, sodium chloride flush, potassium chloride **OR** potassium alternative oral replacement **OR** potassium chloride, magnesium sulfate, acetaminophen **OR** acetaminophen, polyethylene glycol, promethazine **OR** ondansetron, nicotine, hydrALAZINE    Data:     Past Medical History:   has a past medical history of RISSA (acute kidney injury) ([facility] [address] Cerebrovascular disease, Erectile dysfunction, Hepatitis B infection, Hyperlipidemia, Hypertension, Kidney cysts, Liver cyst, MRSA (methicillin resistant staph aureus) culture positive, Neurotrophic ulcer of the foot ([facility] [address] Obesity, Osteomyelitis of ankle or foot, Peripheral vascular disease ([facility] Tobacco abuse, and Type II or unspecified type diabetes mellitus without mention of complication, not stated as uncontrolled. Social History:   reports that he has been smoking cigarettes. He has a 30.00 pack-year smoking history. He has never used smokeless tobacco. He reports that he does not drink alcohol or use drugs. Family History:   Family History   Problem Relation Age of Onset    Diabetes Mother     Diabetes Sister     Diabetes Brother     Diabetes Sister     Diabetes Sister     Diabetes Sister        Vitals:  BP (!) 162/65   Pulse 65   Temp 98.2 °F (36.8 °C) (Oral)   Resp 16   Ht 6' (1.829 m)   Wt 240 lb 9.6 oz (109.1 kg)   SpO2 94%   BMI 32.63 kg/m²   Temp (24hrs), Av.2 °F (36.8 °C), Min:97.9 °F (36.6 °C), Max:98.6 °F (37 °C)    Recent Labs     20  0720  0735   POCGLU 81 93       I/O (24Hr):     Intake/Output Summary (Last 24 hours) at 2020 0847  Last data filed at 2020 0558  Gross per 24 hour   Intake 1047.5 ml   Output 250 ml   Net 797.5 ml       Labs:  Hematology:  Recent Labs     20  1700 20  0534   WBC 8.8 7.3   RBC 4.95 4.75   HGB 13.0 12.6*   HCT 41.1 39.5*   MCV 83.0 83.2   MCH 26.3 26.5   MCHC 31.6 31.9   RDW 13.2 13.2    158   MPV 9.2 9.6   SEDRATE 128*  --    CRP 33.1*  --      Chemistry:  Recent Labs     20  17020  2218 20  0534     --  141   K 4.1  --  3.1*     --  106   CO2 23  --  23   GLUCOSE 220*  --  51*   BUN 21  --  21   CREATININE 1.64*  --  2.02*   MG 1.6  --  1.4*   ANIONGAP 12  --  12   LABGLOM 42*  --  33*   GFRAA 51*  --  40*   CALCIUM 8.6  --  8.5*   PROBNP 2,561*  --   --    TROPHS 39* 39*  --      Recent Labs     20  0720  0735   POCGLU 81 93     ABG:No results found for: POCPH, PHART, PH, POCPCO2, JSR3VHW, PCO2, POCPO2, PO2ART, PO2, POCHCO3, YRS6PDY, HCO3, NBEA, PBEA, BEART, BE, THGBART, THB, QNH4BKI, OPRJ4JGJ, E5FJKQMP, O2SAT, FIO2  Lab Results   Component Value Date/Time    SPECIAL RT [facility] 12ML 2020 10:21 PM     Lab Results   Component Value Date/Time    CULTURE NO GROWTH 7 HOURS 06/17/2020 10:21 PM       Radiology:  Barbara Yaakove Foot Left (min 3 Views)    Result Date: 6/17/2020  Status post transmetatarsal amputation. Diffuse soft tissue edema. No convincing evidence for osteomyelitis. Xr Chest Portable    Result Date: 6/17/2020  No acute cardiopulmonary disease. Physical Examination:        General appearance:  alert, cooperative and no distress  Mental Status:  oriented to person, place and time and normal affect  Lungs:  clear to auscultation bilaterally, normal effort  Heart:  regular rate and rhythm, no murmur  Abdomen:  soft, nontender, nondistended, normal bowel sounds, no masses, hepatomegaly, splenomegaly  Extremities:  no edema, redness, tenderness in the calf; right bka  Skin:  See pics left foot    Assessment:        Hospital Problems           Last Modified POA    * (Principal) Diabetic foot infection ([address] 6/17/2020 Yes    Uncontrolled hypertension 6/17/2020 Yes    PVD (peripheral vascular disease) ([address] 6/18/2020 Yes    Type 2 diabetes mellitus with stage 3 chronic kidney disease, with long-term current use of insulin ([address] 6/17/2020 Yes    Tobacco abuse 6/18/2020 Yes    RISSA (acute kidney injury) ([address] 6/18/2020 Yes    Dyslipidemia 6/17/2020 Yes    Tobacco dependence 6/17/2020 Yes    Diabetic ulcer of left heel associated with type 2 diabetes mellitus ([address] 6/18/2020 Yes    Elevated troponin 6/17/2020 Yes    Hypomagnesemia 6/18/2020 Yes    Hypokalemia 6/18/2020 Yes          Plan:        1. Podiatry eval  2. ID eval  3. Currently on broad spectrum iv antibiotics pending ID consult  4. Replace mg and k  5. Hold lasix due to rissa  6.  Monitor/control htn    Toan HERNANDEZ Blood, DO  6/18/2020  8:47 AM

## 2020-06-18 NOTE — PROGRESS NOTES
Patient brought to unit on bed. Moved him into new bed, vitals taken, and patient oriented to room. He used urinal at bedside. Assessment completed and patient left resting in stable condition.

## 2020-06-18 NOTE — CONSULTS
Pharmacy Note  Vancomycin Consult - Initial Note     Theresa Mclaughlin is a 79 y.o. male ordered Vancomycin. .  Current diagnosis for which MRSA is suspected/confirmed: Diabetic foot infection  Vancomycin order/consult received from Dr. Kaleb Currie . 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 No results found for: PROCAL  Recent Labs     06/17/20  1700   BUN 21     Recent Labs     06/17/20  1700   CREATININE 1.64*     Recent Labs     06/17/20  1700   WBC 8.8       Intake/Output Summary (Last 24 hours) at 6/17/2020 2221  Last data filed at 6/17/2020 2151  Gross per 24 hour   Intake --   Output 100 ml   Net -100 ml       Temp: 97.9F    Actual Weight:    Wt Readings from Last 1 Encounters:   06/17/20 275 lb (124.7 kg)     CrCl based on IBW 57 mL/min        PLAN   Initial loading dose of 25mg/kg (max of 2500 mg) = 2500  mg   2. Vancomycin 1750 mg IV every 24 hours. 3.   Ensured BUN/sCr ordered at baseline and at least every 3rd day. 4.   ONLY for suspected pneumonia or COPD: MRSA nasal swab  is not ordered. Non respiratory infection. .    5. Trough ordered for: 6/19 @ 2200 . Trough Goals (Non-dialysis patient) Peaks are not routinely recommended.   10-20 mcg/mL for mild skin/soft tissue infections or UTI.  15-20 mcg/mL is the target trough for all other indications that MRSA infection is suspected. TROUGH TIMING: (Additional levels drawn based on renal function and/or clinical response). Dosing interval Timing of Trough   Every 8 hr, 12 hr, or 18 hr regimen Prior to the 4th dose  Twice weekly troughs for every 8 hour dosing   Every 24 hr regimen Prior to the 3rd or 4th dose   Every 36 hr regimen Prior to the 3rd dose           Thank you for the consult. Pharmacy will continue to follow.   Mortimer Pack, RPh/PharmJASMINE  6/17/2020  10:21 PM

## 2020-06-18 NOTE — PROGRESS NOTES
Pharmacy Note  Vancomycin Consult - Follow Up     Vancomycin Therapy Day: 2  Current Dosinmg Q24h  Current diagnosis for which MRSA is suspected/confirmed: Diabetic foot infection  ONLY for suspected pneumonia or COPD: MRSA nasal swab   N/A: Non respiratory infection. .        Actual Weight:   Wt Readings from Last 1 Encounters:   20 240 lb 9.6 oz (109.1 kg)       Recent Labs     20  1700 20  0534   CREATININE 1.64* 2.02*     Calculate CrCl based on IBW: 37mL/min    Recent Labs     20  1700 20  0534   WBC 8.8 7.3         Intake/Output Summary (Last 24 hours) at 2020 1831  Last data filed at 2020 1738  Gross per 24 hour   Intake 1930.5 ml   Output 1125 ml   Net 805.5 ml           ASSESSMENT/PLAN    Due to large rise in serum Cr from yesterday, will hold vancomycin at this time and obtain random level with morning labs. Discussed with Dr. Leah Dai. Thank you for the consult. Will Continue to follow. Paulo Herrera.  Brandi Doran, PharmD  Emergency Department and Critical Care Pharmacist

## 2020-06-18 NOTE — PROGRESS NOTES
Patient weight is between 101-149kg. For prophylaxis with Enoxaparin, Pharmacy adjusted the dose to account for the patient's increased body weight in accordance with hospital approved protocol. The dose has been changed to 30mg BID. Please contact pharmacy with any concerns @ [phone]. Thank you.    Ruth Martinez  6/17/2020 10:19 PM

## 2020-06-18 NOTE — CARE COORDINATION
Case Management Initial Discharge Plan  Eletha Police,         Readmission Risk              Risk of Unplanned Readmission:        21             Met with:patient to discuss discharge plans. Information verified: address, contacts, phone number, , insurance Yes  PCP: Shruthi Wolf  Date of last visit: 2020    Insurance Provider: [address] [place] [address]     Discharge Planning  Current Residence:  Private residence - apartment   Living Arrangements:    alone  Home has 3stories/ 0 stairs to climb has elevator  Support Systems:   family, AOA    Current Services PTA:  Aid service  Agency: [address] [place] [address]   Patient able to perform ADL's:Assisted  DME in home:  Power wheel chair, walker, shower chair   DME used to aid ambulation prior to admission:      DME used during admission:       Potential Assistance Needed:       Pharmacy: [facility]  Potential Assistance Purchasing Medications:     Does patient want to participate in local refill/ meds to beds program?  No    Patient agreeable to home care: Yes  Freedom of choice provided:  yes      Type of Home Care Services:     Patient expects to be discharged to:       Prior SNF/Rehab Placement and Facility: cannot remember names of previous SNF placement   Agreeable to SNF/Rehab: No  [place] of choice provided: n/a   Evaluation: yes    Expected Discharge date: Follow Up Appointment: Best Day/ Time:      Transportation provider: call a ride transportation service   Transportation arrangements needed for discharge: TBD    Discharge Plan:    SW met with pt to discuss discharge plan, pt is planning to return home at discharge with current services. Pt reports living in an apartment with an elevator. Pt reports using [facility] the [facility] store. Pt reports using call a ride transport services to get around. SW received call from Isidro Escamilla with [address] pt   regarding pts discharge plan.   Isidro Escamilla reports the pt has aid services with [address] [place] [address] Monday -Sunday 7 days a week from 7-11am and 6-8pm, and nurse visit 3 times a week to wrap legs.   Marimar Davila reports pt has visiting physician     Marimar Davila can be reached [phone]      Electronically signed by RASHEED Rivero, LSW on 6/18/20 at 4:11 PM EDT

## 2020-06-19 ENCOUNTER — ANESTHESIA EVENT (OUTPATIENT)
Dept: OPERATING ROOM | Age: 70
DRG: 253 | End: 2020-06-19
Payer: COMMERCIAL

## 2020-06-19 ENCOUNTER — APPOINTMENT (OUTPATIENT)
Dept: CARDIAC CATH/INVASIVE PROCEDURES | Age: 70
DRG: 253 | End: 2020-06-19
Payer: COMMERCIAL

## 2020-06-19 LAB
ANION GAP SERPL CALCULATED.3IONS-SCNC: 10 MMOL/L (ref 9–17)
BUN BLDV-MCNC: 19 MG/DL (ref 8–23)
BUN/CREAT BLD: 10 (ref 9–20)
CALCIUM SERPL-MCNC: 8.1 MG/DL (ref 8.6–10.4)
CHLORIDE BLD-SCNC: 108 MMOL/L (ref 98–107)
CO2: 23 MMOL/L (ref 20–31)
CREAT SERPL-MCNC: 1.94 MG/DL (ref 0.7–1.2)
GFR AFRICAN AMERICAN: 42 ML/MIN
GFR NON-AFRICAN AMERICAN: 34 ML/MIN
GFR SERPL CREATININE-BSD FRML MDRD: ABNORMAL ML/MIN/{1.73_M2}
GFR SERPL CREATININE-BSD FRML MDRD: ABNORMAL ML/MIN/{1.73_M2}
GLUCOSE BLD-MCNC: 121 MG/DL (ref 75–110)
GLUCOSE BLD-MCNC: 126 MG/DL (ref 70–99)
GLUCOSE BLD-MCNC: 141 MG/DL (ref 75–110)
GLUCOSE BLD-MCNC: 204 MG/DL (ref 75–110)
GLUCOSE BLD-MCNC: 214 MG/DL (ref 75–110)
HCT VFR BLD CALC: 36.1 % (ref 40.7–50.3)
HEMOGLOBIN: 11.6 G/DL (ref 13–17)
MAGNESIUM: 1.9 MG/DL (ref 1.6–2.6)
MCH RBC QN AUTO: 26.9 PG (ref 25.2–33.5)
MCHC RBC AUTO-ENTMCNC: 32.1 G/DL (ref 28.4–34.8)
MCV RBC AUTO: 83.8 FL (ref 82.6–102.9)
NRBC AUTOMATED: 0 PER 100 WBC
PDW BLD-RTO: 13.4 % (ref 11.8–14.4)
PLATELET # BLD: 149 K/UL (ref 138–453)
PMV BLD AUTO: 10.7 FL (ref 8.1–13.5)
POTASSIUM SERPL-SCNC: 4.2 MMOL/L (ref 3.7–5.3)
RBC # BLD: 4.31 M/UL (ref 4.21–5.77)
SARS-COV-2, PCR: NORMAL
SARS-COV-2, RAPID: NOT DETECTED
SARS-COV-2: NORMAL
SODIUM BLD-SCNC: 141 MMOL/L (ref 135–144)
SOURCE: NORMAL
VANCOMYCIN RANDOM DATE LAST DOSE: NORMAL
VANCOMYCIN RANDOM DOSE AMOUNT: NORMAL
VANCOMYCIN RANDOM TIME LAST DOSE: NORMAL
VANCOMYCIN RANDOM: 13.2 UG/ML
WBC # BLD: 6.3 K/UL (ref 3.5–11.3)

## 2020-06-19 PROCEDURE — 80202 ASSAY OF VANCOMYCIN: CPT

## 2020-06-19 PROCEDURE — 6370000000 HC RX 637 (ALT 250 FOR IP): Performed by: NURSE PRACTITIONER

## 2020-06-19 PROCEDURE — 94640 AIRWAY INHALATION TREATMENT: CPT

## 2020-06-19 PROCEDURE — 2060000000 HC ICU INTERMEDIATE R&B

## 2020-06-19 PROCEDURE — 97116 GAIT TRAINING THERAPY: CPT

## 2020-06-19 PROCEDURE — 83735 ASSAY OF MAGNESIUM: CPT

## 2020-06-19 PROCEDURE — 6360000004 HC RX CONTRAST MEDICATION

## 2020-06-19 PROCEDURE — B4101ZZ FLUOROSCOPY OF ABDOMINAL AORTA USING LOW OSMOLAR CONTRAST: ICD-10-PCS | Performed by: SURGERY

## 2020-06-19 PROCEDURE — 6360000002 HC RX W HCPCS: Performed by: NURSE PRACTITIONER

## 2020-06-19 PROCEDURE — 96361 HYDRATE IV INFUSION ADD-ON: CPT

## 2020-06-19 PROCEDURE — 82947 ASSAY GLUCOSE BLOOD QUANT: CPT

## 2020-06-19 PROCEDURE — B41G1ZZ FLUOROSCOPY OF LEFT LOWER EXTREMITY ARTERIES USING LOW OSMOLAR CONTRAST: ICD-10-PCS | Performed by: SURGERY

## 2020-06-19 PROCEDURE — 97535 SELF CARE MNGMENT TRAINING: CPT

## 2020-06-19 PROCEDURE — G0378 HOSPITAL OBSERVATION PER HR: HCPCS

## 2020-06-19 PROCEDURE — 2500000003 HC RX 250 WO HCPCS

## 2020-06-19 PROCEDURE — 85027 COMPLETE CBC AUTOMATED: CPT

## 2020-06-19 PROCEDURE — 6360000002 HC RX W HCPCS: Performed by: INTERNAL MEDICINE

## 2020-06-19 PROCEDURE — 6370000000 HC RX 637 (ALT 250 FOR IP): Performed by: INTERNAL MEDICINE

## 2020-06-19 PROCEDURE — 97530 THERAPEUTIC ACTIVITIES: CPT

## 2020-06-19 PROCEDURE — U0002 COVID-19 LAB TEST NON-CDC: HCPCS

## 2020-06-19 PROCEDURE — 75710 ARTERY X-RAYS ARM/LEG: CPT | Performed by: SURGERY

## 2020-06-19 PROCEDURE — 2580000003 HC RX 258: Performed by: INTERNAL MEDICINE

## 2020-06-19 PROCEDURE — 37228 HC PLASTY UNI TIB/PER INITIAL: CPT | Performed by: SURGERY

## 2020-06-19 PROCEDURE — 7100000001 HC PACU RECOVERY - ADDTL 15 MIN

## 2020-06-19 PROCEDURE — 7100000000 HC PACU RECOVERY - FIRST 15 MIN

## 2020-06-19 PROCEDURE — 36415 COLL VENOUS BLD VENIPUNCTURE: CPT

## 2020-06-19 PROCEDURE — 80048 BASIC METABOLIC PNL TOTAL CA: CPT

## 2020-06-19 PROCEDURE — 96372 THER/PROPH/DIAG INJ SC/IM: CPT

## 2020-06-19 PROCEDURE — 99211 OFF/OP EST MAY X REQ PHY/QHP: CPT

## 2020-06-19 PROCEDURE — 6360000002 HC RX W HCPCS

## 2020-06-19 PROCEDURE — 2580000003 HC RX 258: Performed by: NURSE PRACTITIONER

## 2020-06-19 PROCEDURE — 047Q3ZZ DILATION OF LEFT ANTERIOR TIBIAL ARTERY, PERCUTANEOUS APPROACH: ICD-10-PCS | Performed by: SURGERY

## 2020-06-19 PROCEDURE — 97168 OT RE-EVAL EST PLAN CARE: CPT

## 2020-06-19 PROCEDURE — 6370000000 HC RX 637 (ALT 250 FOR IP): Performed by: SURGERY

## 2020-06-19 PROCEDURE — 96366 THER/PROPH/DIAG IV INF ADDON: CPT

## 2020-06-19 PROCEDURE — 99232 SBSQ HOSP IP/OBS MODERATE 35: CPT | Performed by: INTERNAL MEDICINE

## 2020-06-19 PROCEDURE — 2500000003 HC RX 250 WO HCPCS: Performed by: NURSE PRACTITIONER

## 2020-06-19 PROCEDURE — 75774 ARTERY X-RAY EACH VESSEL: CPT | Performed by: SURGERY

## 2020-06-19 RX ORDER — INSULIN GLARGINE 100 [IU]/ML
75 INJECTION, SOLUTION SUBCUTANEOUS NIGHTLY
Status: DISCONTINUED | OUTPATIENT
Start: 2020-06-19 | End: 2020-06-20

## 2020-06-19 RX ORDER — DIPHENHYDRAMINE HCL 25 MG
25 TABLET ORAL EVERY 6 HOURS PRN
Status: DISCONTINUED | OUTPATIENT
Start: 2020-06-19 | End: 2020-06-22 | Stop reason: HOSPADM

## 2020-06-19 RX ORDER — DEXTROSE, SODIUM CHLORIDE, AND POTASSIUM CHLORIDE 5; .45; .15 G/100ML; G/100ML; G/100ML
INJECTION INTRAVENOUS CONTINUOUS
Status: DISCONTINUED | OUTPATIENT
Start: 2020-06-19 | End: 2020-06-21

## 2020-06-19 RX ORDER — ACETAMINOPHEN 325 MG/1
650 TABLET ORAL EVERY 4 HOURS PRN
Status: DISCONTINUED | OUTPATIENT
Start: 2020-06-19 | End: 2020-06-22 | Stop reason: HOSPADM

## 2020-06-19 RX ORDER — CLOPIDOGREL BISULFATE 75 MG/1
75 TABLET ORAL DAILY
Status: DISCONTINUED | OUTPATIENT
Start: 2020-06-20 | End: 2020-06-22

## 2020-06-19 RX ORDER — CLOPIDOGREL BISULFATE 75 MG/1
300 TABLET ORAL ONCE
Status: COMPLETED | OUTPATIENT
Start: 2020-06-19 | End: 2020-06-19

## 2020-06-19 RX ADMIN — DIPHENHYDRAMINE HCL 25 MG: 25 TABLET ORAL at 04:15

## 2020-06-19 RX ADMIN — OXYCODONE HYDROCHLORIDE AND ACETAMINOPHEN 1 TABLET: 10; 325 TABLET ORAL at 21:11

## 2020-06-19 RX ADMIN — HEPARIN SODIUM 5000 UNITS: 5000 INJECTION INTRAVENOUS; SUBCUTANEOUS at 13:50

## 2020-06-19 RX ADMIN — CEFEPIME HYDROCHLORIDE 1 G: 1 INJECTION, POWDER, FOR SOLUTION INTRAMUSCULAR; INTRAVENOUS at 17:36

## 2020-06-19 RX ADMIN — METRONIDAZOLE 500 MG: 500 INJECTION, SOLUTION INTRAVENOUS at 23:32

## 2020-06-19 RX ADMIN — DIPHENHYDRAMINE HCL 25 MG: 25 TABLET ORAL at 23:37

## 2020-06-19 RX ADMIN — HEPARIN SODIUM 5000 UNITS: 5000 INJECTION INTRAVENOUS; SUBCUTANEOUS at 05:31

## 2020-06-19 RX ADMIN — AMLODIPINE BESYLATE 10 MG: 5 TABLET ORAL at 07:50

## 2020-06-19 RX ADMIN — BUDESONIDE AND FORMOTEROL FUMARATE DIHYDRATE 2 PUFF: 160; 4.5 AEROSOL RESPIRATORY (INHALATION) at 20:31

## 2020-06-19 RX ADMIN — BUDESONIDE AND FORMOTEROL FUMARATE DIHYDRATE 2 PUFF: 160; 4.5 AEROSOL RESPIRATORY (INHALATION) at 09:17

## 2020-06-19 RX ADMIN — INSULIN LISPRO 4 UNITS: 100 INJECTION, SOLUTION INTRAVENOUS; SUBCUTANEOUS at 17:35

## 2020-06-19 RX ADMIN — COLLAGENASE SANTYL: 250 OINTMENT TOPICAL at 08:01

## 2020-06-19 RX ADMIN — METOPROLOL SUCCINATE 25 MG: 25 TABLET, EXTENDED RELEASE ORAL at 15:54

## 2020-06-19 RX ADMIN — VANCOMYCIN HYDROCHLORIDE 1500 MG: 1.5 INJECTION, POWDER, LYOPHILIZED, FOR SOLUTION INTRAVENOUS at 07:59

## 2020-06-19 RX ADMIN — TRAZODONE HYDROCHLORIDE 150 MG: 50 TABLET ORAL at 20:49

## 2020-06-19 RX ADMIN — INSULIN LISPRO 2 UNITS: 100 INJECTION, SOLUTION INTRAVENOUS; SUBCUTANEOUS at 20:51

## 2020-06-19 RX ADMIN — INSULIN LISPRO 2 UNITS: 100 INJECTION, SOLUTION INTRAVENOUS; SUBCUTANEOUS at 13:49

## 2020-06-19 RX ADMIN — METOPROLOL SUCCINATE 25 MG: 25 TABLET, EXTENDED RELEASE ORAL at 07:50

## 2020-06-19 RX ADMIN — CLOPIDOGREL BISULFATE 300 MG: 75 TABLET ORAL at 15:54

## 2020-06-19 RX ADMIN — CEFEPIME HYDROCHLORIDE 1 G: 1 INJECTION, POWDER, FOR SOLUTION INTRAMUSCULAR; INTRAVENOUS at 05:31

## 2020-06-19 RX ADMIN — DOCUSATE SODIUM 100 MG: 100 CAPSULE, LIQUID FILLED ORAL at 20:49

## 2020-06-19 RX ADMIN — LISINOPRIL 20 MG: 10 TABLET ORAL at 07:50

## 2020-06-19 RX ADMIN — ATORVASTATIN CALCIUM 10 MG: 10 TABLET, FILM COATED ORAL at 07:50

## 2020-06-19 RX ADMIN — METRONIDAZOLE 500 MG: 500 INJECTION, SOLUTION INTRAVENOUS at 06:12

## 2020-06-19 RX ADMIN — INSULIN GLARGINE 40 UNITS: 100 INJECTION, SOLUTION SUBCUTANEOUS at 13:13

## 2020-06-19 RX ADMIN — INSULIN GLARGINE 75 UNITS: 100 INJECTION, SOLUTION SUBCUTANEOUS at 20:50

## 2020-06-19 RX ADMIN — METRONIDAZOLE 500 MG: 500 INJECTION, SOLUTION INTRAVENOUS at 14:01

## 2020-06-19 RX ADMIN — HEPARIN SODIUM 5000 UNITS: 5000 INJECTION INTRAVENOUS; SUBCUTANEOUS at 20:50

## 2020-06-19 RX ADMIN — DIPHENHYDRAMINE HCL 25 MG: 25 TABLET ORAL at 13:58

## 2020-06-19 RX ADMIN — DOCUSATE SODIUM 100 MG: 100 CAPSULE, LIQUID FILLED ORAL at 07:51

## 2020-06-19 RX ADMIN — FERROUS SULFATE TAB EC 325 MG (65 MG FE EQUIVALENT) 325 MG: 325 (65 FE) TABLET DELAYED RESPONSE at 07:51

## 2020-06-19 ASSESSMENT — PAIN SCALES - GENERAL
PAINLEVEL_OUTOF10: 10
PAINLEVEL_OUTOF10: 10
PAINLEVEL_OUTOF10: 0

## 2020-06-19 ASSESSMENT — ENCOUNTER SYMPTOMS
RESPIRATORY NEGATIVE: 1
GASTROINTESTINAL NEGATIVE: 1
ALLERGIC/IMMUNOLOGIC NEGATIVE: 1

## 2020-06-19 NOTE — PROGRESS NOTES
[facility] Contin[facility]  Follow Up      NAME:  Anitha Terrazas  MEDICAL RECORD NUMBER:  [id number]  AGE: 79 y.o. GENDER: male  : 1950  TODAY'S DATE:  2020    [facility] nursing consult noted. The patient's wound is being managed by vascular and podiatry services. Will sign off case at this time. Please call or reconsult if needed. Thank you.      Adin ESCOBARN, RN, [address] [place]. Lb Sanchez and [facility]   Wound, Ostomy, and Continence Care  [phone]

## 2020-06-19 NOTE — PLAN OF CARE
Problem: Falls - Risk of:  Goal: Will remain free from falls  Description: Will remain free from falls  6/19/2020 0538 by Robert Culp RN  Outcome: Ongoing  Note: Siderails up x 2  Hourly rounding  Call light in reach  Instructed to call for assist before attempting out of bed. Remains free from falls and accidental injury at this time   Floor free from obstacles  Bed is locked and in lowest position  Adequate lighting provided  Bed alarm on, [place] star and Stay with Me signs posted  Will continue to monitor. Problem: Pain:  Goal: Control of acute pain  Description: Control of acute pain  6/19/2020 0538 by Robert Culp RN  Outcome: Ongoing  Note: Pain level assessment complete. Patient educated on pain scale and control interventions  Percocet PRN pain medication given per patient request  Patient instructed to call out with new onset of pain or unrelieved pain  Will continue to monitor.      Problem: Skin Integrity:  Goal: Demonstration of wound healing without infection will improve  Description: Demonstration of wound healing without infection will improve  6/18/2020 1655 by Amanda Mahoney RN  Outcome: Ongoing

## 2020-06-19 NOTE — PROGRESS NOTES
Nutrition Assessment    Type and Reason for Visit: Initial, Positive Nutrition Screen(wound)    Nutrition Recommendations:   1. Once medically feasible, suggest diet advancement to Carb Control (4 carb choices); sodium and fluid restriction per Physician order  2. Start ONS BID once diet is advanced, may also add extra protein to meals  3. Monitor nutrition progression    Nutrition Assessment: Patient with increased nutrient needs related to wound healing. Patient admitted with peripheral arterial disease with nonhealing wound left heel. Patient currently NPO for procedure. Once diet is advanced suggest adding double protein to meals and/or nutrition supplement (Glucerna) BID. Will monitor diet advancement and tolerance. Malnutrition Assessment:  · Malnutrition Status: Insufficient data  · Context: Acute illness or injury  · Findings of the 6 clinical characteristics of malnutrition (Minimum of 2 out of 6 clinical characteristics is required to make the diagnosis of moderate or severe Protein Calorie Malnutrition based on AND/ASPEN Guidelines):  1. Energy Intake-Unable to assess,      2. Weight Loss-Unable to assess(No weight hx per EMR),    3. Fat Loss-Unable to assess,    4. Muscle Loss-Unable to assess,    5. Fluid Accumulation-Mild fluid accumulation, Extremities  6.  Strength-Not measured    Nutrition Risk Level:  Moderate    Nutrient Needs:  · Estimated Daily Total Kcal: 4045-7936 kcal (16-18 kcal/kg)  · Estimated Daily Protein (g): 113-122 g (1.4-1.5 g/kg IBW)(increased needs for wound healing, also noted CKD stage 3 - do not suggest going higher than 122 g)  · Estimated Daily Total Fluid (ml/day): 1 mL/kcal     Nutrition Diagnosis:   · Problem: Increased nutrient needs  · Etiology: related to Acute injury/trauma, Increased demand for energy/nutrients     Signs and symptoms:  as evidenced by Presence of wounds    Objective Information:  · Nutrition-Focused Physical Findings: GI: WDL; Edema: +1 RUE/LUE; SkinL diabetic ulcer (non-healing) L heel - Montesinos Grade 2  · Wound Type: Diabetic Ulcer  · Current Nutrition Therapies:  · Oral Diet Orders: NPO   · Oral Diet intake: NPO  · Anthropometric Measures:  · Ht: 6' (182.9 cm)   · Current Body Wt: 242 lb 8.1 oz (110 kg)(bed)  · Admission Body Wt: 240 lb 8.4 oz (109.1 kg)  · Usual Body Wt:  Unable to obtain information  · Ideal Body Wt: 178 lb (80.7 kg), % Ideal Body 136%  · Adjusted Body Wt:  , body weight adjusted for BKA  · BMI Classification: (not appropriate to use d/t BKA)    Nutrition Interventions:   Continue NPO(advance diet as medically feasible & start ONS)  Continued Inpatient Monitoring    Nutrition Evaluation:   · Evaluation: Goals set   · Goals: PO intake to meet 75% of protein needs    · Monitoring: Nutrition Progression, Wound Healing, Weight      Giovana Ferrera RDN, NNAMDIN  RD Office Phone: [phone]

## 2020-06-19 NOTE — CONSULTS
David Thorpe      Name: Jens Ferrer  MRN: [id number]     Acct: [de-identified]  Room: [address] [place] [address]    Admit Date: 6/17/2020  PCP: Fiona Lilly    Physician Requesting Consult:  Dr. Brian Bishop    Reason for Consult: Left foot wound, peripheral arterial disease    Chief Complaint:     Chief Complaint   Patient presents with    Wound Infection     left foot    Hypertension         History Obtained From:     patient    History of Present Illness:      Jens Ferrer is a  79 y.o.  male who presents with Wound Infection (left foot) and Hypertension      This is a 72-year-old male with a previous right below-knee amputation. He has previous transmetatarsal amputation and heel wound. His noninvasive vascular study showed an YANCI of 0.3. Although he has chronic kidney disease, an angiogram is recommended for limb salvage. I discussed this with the patient and he is agreeable. Past Medical History:     Past Medical History:   Diagnosis Date    RISSA (acute kidney injury) ([address]     Cerebrovascular disease     Erectile dysfunction     Hepatitis B infection     Hyperlipidemia     Hypertension     Kidney cysts     Liver cyst     MRSA (methicillin resistant staph aureus) culture positive 1/10/2014    right foot    Neurotrophic ulcer of the foot ([address]     Obesity     Osteomyelitis of ankle or foot     Peripheral vascular disease ([address]     Tobacco abuse     Type II or unspecified type diabetes mellitus without mention of complication, not stated as uncontrolled         Past Surgical History:     Past Surgical History:   Procedure Laterality Date    FOOT AMPUTATION      FOOT AMPUTATION THROUGH METATARSAL  2011    pt can't give exact date of surg-- toes and part of lt foot removed    FOOT SURGERY Right 3/2013        Medications Prior to Admission:       Prior to Admission medications    Medication Sig Start Date End Date Taking?  Authorizing Provider   insulin aspart (NOVOLOG FLEXPEN) 100 UNIT/ML injection pen Inject 8-12 Units into the skin 2 times daily (before meals) 8 units AM and 12 units at supper + additional sliding scale   Yes Historical Provider, MD   insulin glargine (BASAGLAR KWIKPEN) 100 UNIT/ML injection pen Inject 40 Units into the skin every morning Injects 40 units AM and 90 units HS   Yes Historical Provider, MD   insulin glargine (BASAGLAR KWIKPEN) 100 UNIT/ML injection pen Inject 90 Units into the skin nightly Injects 40 units AM and 90 units HS   Yes Historical Provider, MD   metoprolol succinate (TOPROL XL) 25 MG extended release tablet Take 25 mg by mouth 2 times daily   Yes Historical Provider, MD   simvastatin (ZOCOR) 10 MG tablet Take 10 mg by mouth nightly   Yes Historical Provider, MD   traZODone (DESYREL) 150 MG tablet Take 150 mg by mouth nightly   Yes Historical Provider, MD   budesonide-formoterol (SYMBICORT) 160-4.5 MCG/ACT AERO Inhale 2 puffs into the lungs 2 times daily   Yes Historical Provider, MD   ferrous sulfate (IRON 325) 325 (65 Fe) MG tablet Take 325 mg by mouth daily (with breakfast)   Yes Historical Provider, MD   furosemide (LASIX) 40 MG tablet Take 40 mg by mouth daily    Yes Historical Provider, MD   docusate sodium (COLACE) 100 MG capsule Take 100 mg by mouth 2 times daily    Yes Historical Provider, MD   acetaminophen (TYLENOL) 325 MG tablet Take 650 mg by mouth every 4 hours as needed for Pain. Give 2 tabs = 650 MG by mouth every 4 hours prn   Yes Historical Provider, MD   lisinopril (PRINIVIL;ZESTRIL) 10 MG tablet Take 10 mg by mouth daily  7/27/13  Yes Historical Provider, MD   Insulin Syringe-Needle U-100 (ACCUSURE INS SYR 1CC/30GX5/16\") 30G X 5/16\" 1 ML MISC by Does not apply route. 7/9/13   Edwige Avila MD        Allergies:       Patient has no known allergies. Social History:     Tobacco:    reports that he has been smoking cigarettes. He has a 30.00 pack-year smoking history.  He has never used smokeless tobacco.  Alcohol:      reports no history of alcohol use. Drug Use:  reports no history of drug use.     Family History:     Family History   Problem Relation Age of Onset    Diabetes Mother     Diabetes Sister     Diabetes Brother     Diabetes Sister     Diabetes Sister     Diabetes Sister        Review of Systems:     Positive and Negative as described in HPI    Constitutional:  negative for  fevers, chills, sweats, fatigue, and weight loss  HEENT:  negative for vision or hearing changes,   Respiratory:  negative for shortness of breath, cough, or congestion  Cardiovascular:  negative for  chest pain, palpitations  Gastrointestinal:  negative for nausea, vomiting, diarrhea, constipation, abdominal pain  Genitourinary:  negative for frequency, dysuria  Integument/Breast:  negative for rash, skin lesions  Musculoskeletal:  negative for muscle aches or joint pain  Neurological:  negative for headaches, dizziness, lightheadedness, numbness, pain and tingling extremities  Behavior/Psych:  negative for depression and anxiety    Code Status:  Full Code    Physical Exam:     Vitals:  BP (!) 143/61   Pulse 63   Temp 98 °F (36.7 °C) (Oral)   Resp 18   Ht 6' (1.829 m)   Wt 242 lb 8 oz (110 kg)   SpO2 97%   BMI 32.89 kg/m²   Temp (24hrs), Av.1 °F (36.7 °C), Min:97.9 °F (36.6 °C), Max:98.4 °F (36.9 °C)      General appearance - alert, well appearing and in no acute distress  Mental status -patient is drowsy that that is okay I know thank you head - normocephalic and atraumatic  Eyes - pupils equal and reactive, extraocular eye movements intact, conjunctiva clear  Ears - hearing appears to be intact  Nose - no drainage noted  Mouth - mucous membranes moist  Neck - supple, no carotid bruits, thyroid not palpable, no JVD  Chest - clear to auscultation, normal effort  Heart - normal rate, regular rhythm, no murmurs  Abdomen - soft, non-tender, non-distended, bowel sounds present all four quadrants, no masses, hepatomegaly, splenomegaly or aortic enlargement  Neurological -somewhat somnolent. Extremities -left foot with heel ulcer with full-thickness skin necrosis. Right below-knee amputation noted      Data:     Lab Results   Component Value Date    WBC 6.3 06/19/2020    HGB 11.6 (L) 06/19/2020    HCT 36.1 (L) 06/19/2020    MCV 83.8 06/19/2020     06/19/2020     Lab Results   Component Value Date     06/19/2020    K 4.2 06/19/2020     06/19/2020    CO2 23 06/19/2020    BUN 19 06/19/2020    CREATININE 1.94 06/19/2020    GLUCOSE 126 06/19/2020    GLUCOSE 133 10/18/2011    CALCIUM 8.1 06/19/2020      Lab Results   Component Value Date    INR 1.0 07/11/2013    PROTIME 10.3 07/11/2013       Assessment:     Primary Problem  Diabetic foot infection ([facility]  3 26-year-old male with diabetic foot infection and significant peripheral arterial disease    Active Hospital Problems    Diagnosis Date Noted    Hypomagnesemia [E83.42] 06/18/2020    Hypokalemia [E87.6] 06/18/2020    Diabetic foot infection ([facility] [Y19.384, L08.9]     Tobacco dependence [F17.200]     Diabetic ulcer of left heel associated with type 2 diabetes mellitus (HCC) [M37.780, L97.429]     Elevated troponin [R79.89]     Dyslipidemia [E78.5]     RISSA ([facility]) ([facility] [N17.9]     Tobacco abuse [Z72.0] 01/22/2013    PVD (peripheral vascular disease) ([facility] [I73.9] 09/10/2011    Uncontrolled hypertension [I10]     Type 2 diabetes mellitus with stage 3 chronic kidney disease, with long-term current use of insulin ([facility] [E11.22, N18.3, Z79.4]        Plan:     1. We will plan for an arteriogram today with potential revascularization.       Electronically signed by Craig Harrington MD on 6/19/2020 at 7:51 AM     Copy sent to Dr. Chapo Lara

## 2020-06-19 NOTE — PROGRESS NOTES
unable to support weight with arm to unweight RLE. Distance: 2 minimal steps forward, unable to turn or pivot     Balance  Sitting - Static: Good  Sitting - Dynamic: Good  Standing - Static: Poor  Standing - Dynamic: Poor  Exercises  Comments: Had to complete tx prior to performing exercises due to patient had to leave for procedure. HEP left in his room to review next tx. G-Code     OutComes Score    AM-PAC Score  AM-PAC Inpatient Mobility Raw Score : 11 (06/18/20 1249)  AM-PAC Inpatient T-Scale Score : 33.86 (06/18/20 1249)  Mobility Inpatient CMS 0-100% Score: 72.57 (06/18/20 1249)  Mobility Inpatient CMS G-Code Modifier : CL (06/18/20 1249)          Goals  Short term goals  Time Frame for Short term goals: 12 visits  Short term goal 1: Patient will be indep with bed mobility. Short term goal 2: Patient will perform sit to stand and pivot transfers with mod x 2. Short term goal 3: Patient will be indep with HEP and TTWB. Short term goal 4: Patient will amb 5 steps with RW and mod x 2. Short term goal 5: Patient will tolerate 30 minutes of ther-ex and ther-act. Patient Goals   Patient goals : Improve transfers and ambulation    Plan    Plan  Times per week: 1-2x/d, 5-7 d/wk  Current Treatment Recommendations: Strengthening, Balance Training, Functional Mobility Training, Transfer Training, Patient/Caregiver Education & Training, Safety Education & Training, Home Exercise Program, Endurance Training  Safety Devices  Type of devices: All fall risk precautions in place, Gait belt, Bed alarm in place, Patient at risk for falls, Nurse notified, Left in bed, Call light within reach   Co-treatment with OT warranted secondary to decreased safety and independence requiring 2 skilled therapy professionals to address individual discipline's goals. PT addressing pre gait trunk strengthening, weight shifting prior to transfers and transfer training.   Therapy Time   Individual Concurrent Group Co-treatment   Time In 3143         Time Out 1007         Minutes [facility],[address] [facility], [place]

## 2020-06-19 NOTE — PROGRESS NOTES
Pharmacy Note  Vancomycin Consult - Follow Up     Vancomycin Therapy Day: 3  Current Dosing: vancomycin 1500 mg q24h - dc'd prior to level due to unstable renal function. Current diagnosis for which MRSA is suspected/confirmed: Diabetic foot infection  ONLY for suspected pneumonia or COPD: MRSA nasal swab   N/A: Non respiratory infection. .      Temp: 98 F    Actual Weight:   Wt Readings from Last 1 Encounters:   06/19/20 242 lb 8 oz (110 kg)       Recent Labs     06/18/20  0534 06/19/20  0518   CREATININE 2.02* 1.94*     Calculate CrCl based on IBW: 38.9 mL/min    Recent Labs     06/18/20  0534 06/19/20  0518   WBC 7.3 6.3         Intake/Output Summary (Last 24 hours) at 6/19/2020 0636  Last data filed at 6/19/2020 0025  Gross per 24 hour   Intake 883 ml   Output 1275 ml   Net -392 ml           ASSESSMENT/PLAN    1. Random trough drawn 6/19/20 05:18 was 13.2 mcg/mL. SCr has decreased from 2.02 to 1.94. Will restart vancomycin at 1500 mg q24h and continue to monitor closely. Next trough 6/21/20 0700. Thank you for the consult. Will Continue to follow.   Mei Azul  6/19/2020  6:36 AM

## 2020-06-19 NOTE — PROGRESS NOTES
full-thickness ulceration on the plantar aspect of the left heel with a fibronecrotic wound base difficult to stage. The patient was seen by the podiatry service and has since been admitted for a diabetic foot ulcer and I been asked to evaluate and help with antibiotic choice. Current evaluation:2020    BP (!) 143/61   Pulse 63   Temp 98 °F (36.7 °C) (Oral)   Resp 18   Ht 6' (1.829 m)   Wt 242 lb 8 oz (110 kg)   SpO2 97%   BMI 32.89 kg/m²     Temperature Range: Temp: 98 °F (36.7 °C) Temp  Av.1 °F (36.7 °C)  Min: 97.9 °F (36.6 °C)  Max: 98.4 °F (36.9 °C)  The patient is seen and evaluated at bedside he is awake and alert in no acute distress  He was seen by vascular surgery today and taken to the operating room underwent an ultrasound-guided access of the right common femoral artery with aortogram and left tibial angiogram.  The patient underwent angioplasty of the left anterior tibial artery. Review of Systems   Constitutional: Negative. Respiratory: Negative. Cardiovascular: Negative. Gastrointestinal: Negative. Genitourinary: Negative. Musculoskeletal: Negative. Skin: Positive for wound. Allergic/Immunologic: Negative. Neurological: Negative. Psychiatric/Behavioral: Negative.         Physical Examination :     BP (!) 143/61   Pulse 63   Temp 98 °F (36.7 °C) (Oral)   Resp 18   Ht 6' (1.829 m)   Wt 242 lb 8 oz (110 kg)   SpO2 97%   BMI 32.89 kg/m²     Temperature Range: Temp: 98 °F (36.7 °C) Temp  Av.1 °F (36.7 °C)  Min: 97.9 °F (36.6 °C)  Max: 98.4 °F (36.9 °C)      \"[x]\" Indicates a positive item  \"[]\" Indicates a negative item      Constitutional: [x] Appears well-developed and well-nourished [x] No apparent distress      [] Abnormal-     Mental status  [x] Alert and awake  [x] Oriented to person/place/time [x]Able to follow commands      Eyes:  EOM    [x]  Normal  [] Abnormal-  Sclera  [x]  Normal  [] Abnormal -         Discharge [x]  None visible [] Abnormal -    HENT:   [x] Normocephalic, atraumatic. [] Abnormal   [x] Mouth/Throat: Mucous membranes are moist.     External Ears [x] Normal  [] Abnormal-     Neck: [x] No visualized mass     Pulmonary/Chest: [x] Respiratory effort normal.  [x] No visualized signs of difficulty breathing or respiratory distress        [] Abnormal-      Musculoskeletal:   [x] Normal gait with no signs of ataxia         [x] Normal range of motion of neck        [] Abnormal-       Neurological:        [x] No Facial Asymmetry (Cranial nerve 7 motor function) (limited exam to video visit)          [x] No gaze palsy        [] Abnormal-         Skin:        [x] No significant exanthematous lesions or discoloration noted on facial skin         [] Abnormal-            Psychiatric:       [x] Normal Affect [x] No Hallucinations        [] Abnormal-     Other pertinent observable physical exam findings-     Laboratory data:   I have independently reviewed the followinglabs:  CBC with Differential:   Recent Labs     06/17/20  1700 06/18/20  0534 06/19/20  0518   WBC 8.8 7.3 6.3   HGB 13.0 12.6* 11.6*   HCT 41.1 39.5* 36.1*    158 149   LYMPHOPCT 20*  --   --    MONOPCT 6  --   --      BMP:   Recent Labs     06/18/20  0534 06/19/20  0518    141   K 3.1* 4.2    108*   CO2 23 23   BUN 21 19   CREATININE 2.02* 1.94*   MG 1.4* 1.9     Hepatic Function Panel: No results for input(s): PROT, LABALBU, BILIDIR, IBILI, BILITOT, ALKPHOS, ALT, AST in the last 72 hours. No results for input(s): VANCOTROUGH in the last 72 hours. Lab Results   Component Value Date    CRP 33.1 (H) 06/17/2020     Lab Results   Component Value Date    SEDRATE 128 (H) 06/17/2020       No results for input(s): PROCAL in the last 72 hours.     Imaging Studies:   Lower Arterial Plethysmography, PVR Lower with PPG0 6/18/2020 06:04 PM  Summary        Abnormal PVR study at rest for the left lower extremity ,with evidence of    tibial arterial occlusive disease and calcinosis. ABIs consistent with    ischemia. History of TM amputation.    History of right AK amputation. Cultures:     Culture, Blood 1 [[id number]] Collected: 06/17/20 2219   Order Status: Completed Specimen: Blood Updated: 06/19/20 8720    Specimen Description . BLOOD    Special Requests 4 lhand    Culture NO GROWTH 1 DAY   Culture, Blood 2 [[id number]] Collected: 06/17/20 2221   Order Status: Completed Specimen: Blood Updated: 06/19/20 5132    Specimen Description . BLOOD    Special Requests RT AC 12ML    Culture NO GROWTH 1 DAY   Wound Culture [[id number]] (Abnormal) Collected: 06/17/20 0400   Order Status: Completed Specimen: No Site Given from Foot Updated: 06/18/20 0955    Specimen Description . FOOT SWAB    Special Requests NOT REPORTED    Direct Exam NO NEUTROPHILS SEEN     MODERATE MIXED BACTERIAL MORPHOTYPES SEEN ON GRAM STAIN. Abnormal     Culture PENDING         Medications:      vancomycin  1,500 mg Intravenous Q24H    insulin lispro  0-12 Units Subcutaneous TID WC    insulin lispro  0-6 Units Subcutaneous Nightly    cefepime  1 g Intravenous Q12H    atorvastatin  10 mg Oral Daily    insulin glargine  40 Units Subcutaneous QAM    budesonide-formoterol  2 puff Inhalation BID    metoprolol succinate  25 mg Oral BID    amLODIPine  10 mg Oral Daily    docusate sodium  100 mg Oral BID    ferrous sulfate  325 mg Oral Daily with breakfast    [Held by provider] furosemide  40 mg Oral BID    insulin glargine  90 Units Subcutaneous Nightly    lisinopril  20 mg Oral Daily    traZODone  150 mg Oral Nightly    sodium chloride flush  10 mL Intravenous 2 times per day    collagenase   Topical Daily    metroNIDAZOLE  500 mg Intravenous Q8H    vancomycin (VANCOCIN) intermittent dosing (placeholder)   Other RX Placeholder    heparin (porcine)  5,000 Units Subcutaneous 3 times per day           [facility]  Farnaz Saucedo MD  Perfect Serve messaging  OFFICE: [phone]      Electronically signed by Caren Horta MD on 6/19/2020 at 7:05 AM  Thank you for allowing us to participate in the care of this patient. Please call with questions. Issa Nelson is a 79 y.o. male being evaluated by a Virtual Visit (video visit) encounter to address concerns as mentioned above. A caregiver was present when appropriate. Due to this being a TeleHealth encounter (During [facility]- public health emergency), evaluation of the following organ systems was limited: Vitals/Constitutional/EENT/Resp/CV/GI//MS/Neuro/Skin/Heme-Lymph-Imm. Pursuant to the emergency declaration under the [address] [place] [address] authority and the [employer] and [employer] Act, this Virtual Visit was conducted with patient's (and/or legal guardian's) consent, to reduce the patient's risk of exposure to COVID-19 and provide necessary medical care. Services were provided through a video synchronous discussion virtually to substitute for in-person inpatient visit. An electronic signature was used to authenticate this note. This note iscreated with the assistance of a speech recognition program.  While intending to generate a document that actually reflects the content of the visit, the document can still have some errors including those of syntax andsound a like substitutions which may escape proof reading. In such instances, actual meaning can be extrapolated by contextual diversion.

## 2020-06-19 NOTE — PRE SEDATION
Sedation Pre-Procedure Note    Patient Name: Fred Escamilla   YOB: 1950  Room/Bed: 1001/1001-02  Medical Record Number: [id number]  Date: 6/19/2020   Time: 10:50 AM       Indication: Ischemic gangrene left heel    Consent: I have discussed with the patient and/or the patient representative the indication, alternatives, and the possible risks and/or complications of the planned procedure and the anesthesia methods. The patient and/or patient representative appear to understand and agree to proceed. Vital Signs:   Vitals:    06/19/20 0844   BP: (!) 163/63   Pulse: 64   Resp: 20   Temp: 98.2 °F (36.8 °C)   SpO2: 94%       Past Medical History:   has a past medical history of RISSA (acute kidney injury) ([address] Cerebrovascular disease, Erectile dysfunction, Hepatitis B infection, Hyperlipidemia, Hypertension, Kidney cysts, Liver cyst, MRSA (methicillin resistant staph aureus) culture positive, Neurotrophic ulcer of the foot ([address] Obesity, Osteomyelitis of ankle or foot, Peripheral vascular disease ([address] Tobacco abuse, and Type II or unspecified type diabetes mellitus without mention of complication, not stated as uncontrolled. Past Surgical History:   has a past surgical history that includes Foot amputation through metatarsal (2011); Foot Amputation; and Foot surgery (Right, 3/2013).     Medications:   Scheduled Meds:    vancomycin  1,500 mg Intravenous Q24H    insulin lispro  0-12 Units Subcutaneous TID WC    insulin lispro  0-6 Units Subcutaneous Nightly    cefepime  1 g Intravenous Q12H    atorvastatin  10 mg Oral Daily    insulin glargine  40 Units Subcutaneous QAM    budesonide-formoterol  2 puff Inhalation BID    metoprolol succinate  25 mg Oral BID    amLODIPine  10 mg Oral Daily    docusate sodium  100 mg Oral BID    ferrous sulfate  325 mg Oral Daily with breakfast    [Held by provider] furosemide  40 mg Oral BID    insulin glargine  90 Units Subcutaneous Nightly    lisinopril  20 mg Oral Daily    traZODone  150 mg Oral Nightly    sodium chloride flush  10 mL Intravenous 2 times per day    collagenase   Topical Daily    metroNIDAZOLE  500 mg Intravenous Q8H    vancomycin (VANCOCIN) intermittent dosing (placeholder)   Other RX Placeholder    heparin (porcine)  5,000 Units Subcutaneous 3 times per day     Continuous Infusions:    dextrose      sodium chloride 75 mL/hr at 06/18/20 1745     PRN Meds: diphenhydrAMINE, glucose, dextrose, glucagon (rDNA), dextrose, oxyCODONE-acetaminophen, sodium chloride flush, potassium chloride **OR** potassium alternative oral replacement **OR** potassium chloride, magnesium sulfate, acetaminophen **OR** acetaminophen, polyethylene glycol, promethazine **OR** ondansetron, nicotine, hydrALAZINE  Home Meds:   Prior to Admission medications    Medication Sig Start Date End Date Taking?  Authorizing Provider   insulin aspart (NOVOLOG FLEXPEN) 100 UNIT/ML injection pen Inject 8-12 Units into the skin 2 times daily (before meals) 8 units AM and 12 units at supper + additional sliding scale   Yes Historical Provider, MD   insulin glargine (BASAGLAR KWIKPEN) 100 UNIT/ML injection pen Inject 40 Units into the skin every morning Injects 40 units AM and 90 units HS   Yes Historical Provider, MD   insulin glargine (BASAGLAR KWIKPEN) 100 UNIT/ML injection pen Inject 90 Units into the skin nightly Injects 40 units AM and 90 units HS   Yes Historical Provider, MD   metoprolol succinate (TOPROL XL) 25 MG extended release tablet Take 25 mg by mouth 2 times daily   Yes Historical Provider, MD   simvastatin (ZOCOR) 10 MG tablet Take 10 mg by mouth nightly   Yes Historical Provider, MD   traZODone (DESYREL) 150 MG tablet Take 150 mg by mouth nightly   Yes Historical Provider, MD   budesonide-formoterol (SYMBICORT) 160-4.5 MCG/ACT AERO Inhale 2 puffs into the lungs 2 times daily   Yes Historical Provider, MD   ferrous sulfate (IRON 325) 325 (65 Fe) MG tablet Take 325 mg by mouth daily (with breakfast)   Yes Historical Provider, MD   furosemide (LASIX) 40 MG tablet Take 40 mg by mouth daily    Yes Historical Provider, MD   docusate sodium (COLACE) 100 MG capsule Take 100 mg by mouth 2 times daily    Yes Historical Provider, MD   acetaminophen (TYLENOL) 325 MG tablet Take 650 mg by mouth every 4 hours as needed for Pain. Give 2 tabs = 650 MG by mouth every 4 hours prn   Yes Historical Provider, MD   lisinopril (PRINIVIL;ZESTRIL) 10 MG tablet Take 10 mg by mouth daily  7/27/13  Yes Historical Provider, MD   Insulin Syringe-Needle U-100 (ACCUSURE INS SYR 1CC/30GX5/16\") 30G X 5/16\" 1 ML MISC by Does not apply route. 7/9/13   Laurine Primrose, MD     Coumadin Use Last 7 Days:  no  Antiplatelet drug therapy use last 7 days: no  Other anticoagulant use last 7 days: no  Additional Medication Information:        Pre-Sedation Documentation and Exam:   I have personally completed a history, physical exam & review of systems for this patient (see notes).     Mallampati Airway Assessment:  Mallampati Class II - (soft palate, fauces & uvula are visible)    Prior History of Anesthesia Complications:   none    ASA Classification:  Class 2 - A normal healthy patient with mild systemic disease    Sedation/ Anesthesia Plan:   intravenous sedation    Medications Planned:   midazolam (Versed) intravenously and fentanyl intravenously    Patient is an appropriate candidate for plan of sedation: yes    Electronically signed by Oralia Oneal MD on 6/19/2020 at 10:50 AM

## 2020-06-19 NOTE — BRIEF OP NOTE
Brief Postoperative Note      Patient: James Vasquez  YOB: 1950  MRN: [id number]    Date of Procedure: 6/19/2020    Pre-Op Diagnosis: Peripheral arterial disease with nonhealing wound left heel    Post-Op Diagnosis: Same    Procedure:  1)  US guided access right common femoral artery  2)  Aortogram, left tibial angiogram  3)  Angioplasty of left anterior tibial artery    Surgeon: Hiral Palmer MD    Assistant:  * No surgical staff found *    Anesthesia: Fentanyl 100mcg, Versed 1 mg    Estimated Blood Loss (mL): Minimal    Complications: None    Specimens:   * No specimens in log *    Implants:  * No implants in log *      Drains: * No LDAs found *    Findings:   No evidence of aortoiliac occlusive disease  patent popliteal artery above and below the knee  Occlusion of all 3 tibial vessels. Recanalization of occluded anterior tibial artery with distal reconstitution of the posterior tibial via anterior tibial collaterals   Inline flow restored to the anterior tibial artery to the foot.     Electronically signed by Hiral Palmer MD on 6/19/2020 at 10:52 AM

## 2020-06-19 NOTE — DISCHARGE INSTR - COC
infection ([facility] E11.628, L08.9    Tobacco dependence F17.200    Diabetic ulcer of left heel associated with type 2 diabetes mellitus ([facility] E11.621, L97.429    Elevated troponin R79.89    Hypomagnesemia E83.42    Hypokalemia E87.6       Isolation/Infection:   Isolation          Contact        Patient Infection Status     Infection Onset Added Last Indicated Last Indicated By Review Planned Expiration Resolved Resolved By    MRSA  01/13/14 01/13/14 Livier Atkinson, COREY        right foot          Nurse Assessment:  Last Vital Signs: BP (!) 163/61   Pulse 67   Temp 97.7 °F (36.5 °C) (Oral)   Resp 16   Ht 6' (1.829 m)   Wt 242 lb 8 oz (110 kg)   SpO2 96%   BMI 32.89 kg/m²     Last documented pain score (0-10 scale): Pain Level: 0(sleeping when not disturbed)  Last Weight:   Wt Readings from Last 1 Encounters:   06/19/20 242 lb 8 oz (110 kg)     Mental Status:  oriented and alert    IV Access:  - None    Nursing Mobility/ADLs:  Walking   Dependent  Transfer  Assisted  Bathing  Assisted  Dressing  Assisted  Toileting  Independent  Feeding  Independent  Med Admin  Independent  Med Delivery   whole    Wound Care Documentation and Therapy:  Wound 06/17/20 Heel Left;Plantar (Active)   Wound Pressure Unstageable 6/18/2020 11:00 AM   Dressing Status Clean;Dry; Intact 6/19/2020  4:15 AM   Dressing Changed Changed/New 6/18/2020 11:00 AM   Dressing/Treatment Ace wrap 6/19/2020  4:15 AM   Wound Cleansed Rinsed/Irrigated with saline 6/18/2020 11:00 AM   Wound Length (cm) 5 cm 6/18/2020 11:00 AM   Wound Width (cm) 3 cm 6/18/2020 11:00 AM   Wound Surface Area (cm^2) 15 cm^2 6/18/2020 11:00 AM   Wound Assessment Other (Comment) 6/19/2020  4:15 AM   Drainage Amount None 6/19/2020  4:15 AM   Odor Strong 6/19/2020  4:15 AM   Number of days: 2        Elimination:  Continence:   · Bowel:  Yes  · Bladder: Yes  Urinary Catheter: None   Colostomy/Ileostomy/Ileal Conduit: No       Date of Last BM: 6/19/20    Intake/Output Summary (Last 24 hours) at 6/19/2020 1356  Last data filed at 6/19/2020 1347  Gross per 24 hour   Intake 929 ml   Output 1000 ml   Net -71 ml     I/O last 3 completed shifts: In: 883 [I.V.:883]  Out: May [Urine:1275]    Safety Concerns: At Risk for Falls    Impairments/Disabilities:      None    Nutrition Therapy:  Current Nutrition Therapy:   - Oral Diet:  Carb Control 4 carbs/meal (1800kcals/day)    Routes of Feeding: Oral  Liquids: No Restrictions  Daily Fluid Restriction: no  Last Modified Barium Swallow with Video (Video Swallowing Test): not done    Treatments at the Time of Hospital Discharge:   Respiratory Treatments: see MAR  Oxygen Therapy:  is not on home oxygen therapy. Ventilator:    - No ventilator support    Rehab Therapies: Physical Therapy and Occupational Therapy  Weight Bearing Status/Restrictions: Non-weight bearing on left leg  Other Medical Equipment (for information only, NOT a DME order):  wheelchair  Other Treatments:   Skilled nurse assessment  Medication teaching and compliance  Wound Vac change  Resume home health aide    Patient's personal belongings (please select all that are sent with patient):  clothing    RN SIGNATURE:  Electronically signed by Stacey Gan RN on 6/22/20 at 4:18 PM EDT    CASE MANAGEMENT/SOCIAL WORK SECTION    Inpatient Status Date: 6/17/20    Readmission Risk Assessment Score:  Readmission Risk              Risk of Unplanned Readmission:        23           Discharging to Facility/ Agency   · Name: [facility] Please ADD PT/OT at discharge. · Address:  · Phone:[phone]  · Fax:[phone]    Dialysis Facility (if applicable)   · Name:  · Address:  · Dialysis Schedule:  · Phone:  · Fax:    / signature: Electronically signed by RASHEED Cintron, LSW on 6/19/20 at 1:56 PM EDT    PHYSICIAN SECTION    Prognosis: Fair    Condition at Discharge: Stable    Rehab Potential (if transferring to Rehab):  Fair    Recommended Labs or Other Treatments

## 2020-06-19 NOTE — CARE COORDINATION
Social work; called to confirm pt was current with [facility] and pt is current with them. Will need to call and fax at discharge: phone: [phone] and fax dominique: [phone]. PT/OT to be added per VIPIN Sidhu w    SOCIAL WORK; need to fax dominique when completed. Not yet signed. The Plan for Transition of Care is related to the following treatment goals: home at discharge. The Patient and/or patient representative patient was provided with a choice of provider and agrees   with the discharge plan. [x] Yes [] No    Freedom of choice list was provided with basic dialogue that supports the patient's individualized plan of care/goals, treatment preferences and shares the quality data associated with the providers.  [x] Yes [] No

## 2020-06-19 NOTE — POST SEDATION
Sedation Post Procedure Note    Patient Name: Dusty Hatch   YOB: 1950  Room/Bed: [facility]-02  Medical Record Number: [id number]  Date: 6/19/2020   Time: 10:51 AM         Physicians/Assistants: Yana Rivera MD    Procedure Performed:    1)  US guided access right common femoral artery  2)  Aortogram, left tibial angiogram  3)  Angioplasty of left anterior tibial artery    Post-Sedation Vital Signs:  Vitals:    06/19/20 0844   BP: (!) 163/63   Pulse: 64   Resp: 20   Temp: 98.2 °F (36.8 °C)   SpO2: 94%      Vital signs were reviewed and were stable after the procedure (see flow sheet for vitals)            Post-Sedation Exam: Lungs: clear to auscultation bilaterally and Cardiovascular: regular rate and rhythm           Complications: none    Electronically signed by Yana Rivera MD on 6/19/2020 at 10:51 AM

## 2020-06-19 NOTE — PROGRESS NOTES
Progress Note  Podiatric Medicine and Surgery     Subjective     CC: Left plantar heel wound    Patient seen and examined at bedside with Dr. Agustin Tineo, mildly hypertensive  Denies any pain  Denies any N/V/F/C/CP/SOB    HPI :  Jassi Cevallos is a 79 y.o. male seen at [facility] [address] for a heel wound on the left foot. The patient states the wound has been getting progressively worse with increasing serous drainage and edema. The patient was previously cared for by Dr. Terry Chappell, but states he does not currently follow with a podiatrist. 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 Their last HgbA1c was 7.5% in September 2019. Patient admitted to the hospital for further work up of his CHF and hypertension. The patient denies any nausea, vomiting, fever, chills, shortness of breath.     PCP is No primary care provider on file. ROS: Denies N/V/F/C/SOB/CP. Otherwise negative except at stated in the HPI.      Medications:  Scheduled Meds:   vancomycin  1,500 mg Intravenous Q24H    insulin lispro  0-12 Units Subcutaneous TID WC    insulin lispro  0-6 Units Subcutaneous Nightly    cefepime  1 g Intravenous Q12H    atorvastatin  10 mg Oral Daily    insulin glargine  40 Units Subcutaneous QAM    budesonide-formoterol  2 puff Inhalation BID    metoprolol succinate  25 mg Oral BID    amLODIPine  10 mg Oral Daily    docusate sodium  100 mg Oral BID    ferrous sulfate  325 mg Oral Daily with breakfast    [Held by provider] furosemide  40 mg Oral BID    insulin glargine  90 Units Subcutaneous Nightly    lisinopril  20 mg Oral Daily    traZODone  150 mg Oral Nightly    sodium chloride flush  10 mL Intravenous 2 times per day    collagenase   Topical Daily    metroNIDAZOLE  500 mg Intravenous Q8H    vancomycin (VANCOCIN) intermittent dosing (placeholder)   Other RX Placeholder    heparin (porcine)  5,000 Units Subcutaneous 3 times per day       Continuous Infusions:   dextrose      all lower extremity muscle groups on the left. Gross deformity is s/p right BKA.     Dermatologic: Full thickness ulcer #1 located left plantar heel and measures approximately 4.5cm x 3.0cm x 0.8cm. The wound base is fibro-necrotic. Periwound skin is macerated. serous drainage noted with no associated mal odor. Erythema minimal with minimal associated increase in warmth. Does not probe to bone, sinus track, or undermine. No fluctuance, crepitus, or induration. Interdigital maceration absent. Clinical Images:              Imaging:   VL Lower Extremity Arterial Segmental Pressures W Ppg   Final Result      XR CHEST PORTABLE   Final Result   No acute cardiopulmonary disease. XR FOOT LEFT (MIN 3 VIEWS)   Final Result   Status post transmetatarsal amputation. Diffuse soft tissue edema. No convincing evidence for osteomyelitis. Cultures:  Cx 6/17: mixed bacterial morphotypes    Assessment   Mekhi Fernandez is a 79 y.o. male with   1. Montesinos grade 2 wound, left foot  2. S/p R BKA  3. Type II DM with secondary peripheral neuropathy  4. PAD    Principal Problem:    Diabetic foot infection ([address]  Active Problems:    Uncontrolled hypertension    PVD (peripheral vascular disease) ([address]    Type 2 diabetes mellitus with stage 3 chronic kidney disease, with long-term current use of insulin (HCC)    Tobacco abuse    RISSA (acute kidney injury) ([address]    Dyslipidemia    Tobacco dependence    Diabetic ulcer of left heel associated with type 2 diabetes mellitus (HCC)    Elevated troponin    Hypomagnesemia    Hypokalemia  Resolved Problems:    * No resolved hospital problems. *       Plan     · Patient examined and evaluated at bedside with Dr. Gilbert Taveras  · Treatment options discussed in detail with the patient. · Radiographs reviewed and discussed in detail with the patient. · No apparent soft tissue emphysema or osteomyelitis appreciated.   · NIVS: Left: 0.3, tibial arterial occlusion  · Vascular surgery following. Due to poor blood flow patient will need some type of revascularization procedure. Vascular team discussed with patient extensively and patient is in agreement. Possible angio of LLE later today. · Medical management per Internal Medicine. · Abx: Cefepime/Vanc, ID consulted  · Dressing applied to Left foot: santyl + DSD   · Continue to use ROOKE/PRAFO boot to the left lower extremity to elevate heels. · Toe touch for transfers to Left lower extremity. · Will await any formal debridement pending revascularization and vascular recs  · Discussed with  Dr. Favio Driscoll. Roberta Tan DPM   Podiatric Medicine & Surgery   6/19/2020 at 7:35 AM    I personally evaluated patient at 46. Patient underwent revascularization today of LLE. I have recommend debridement of his ulcer in OR tomorrow with possible wound vac. Discussed risks and benefits. Discussed risk of limb loss. Patient agreeable. NPO after midnight, patient scheduled for 0800 tomorrow.     Radha Lord DPM FACFAS

## 2020-06-19 NOTE — PLAN OF CARE
Nutrition Problem: Increased nutrient needs  Intervention: Food and/or Nutrient Delivery: Continue NPO(advance diet as medically feasible & start ONS)  Nutritional Goals: PO intake to meet 75% of protein needs

## 2020-06-19 NOTE — PROGRESS NOTES
Occupational Therapy   Occupational Therapy Re-Assessment  Date: 2020   Patient Name: Rand Kim  MRN: [id number]     : 1950    Date of Service: 2020    Discharge Recommendations:  [address]     RN reports patient is medically stable for therapy treatment this date. Chart reviewed prior to treatment and patient is agreeable for therapy. All lines intact and patient positioned comfortably at end of treatment. All patient needs addressed prior to ending therapy session. Assessment  (pt reassesed this date for ADL transfers, goals remain approp)  Performance deficits / Impairments: Decreased functional mobility ; Decreased ADL status; Decreased strength;Decreased safe awareness;Decreased balance;Decreased endurance;Decreased posture;Decreased cognition  Prognosis: Good  OT Education: OT Role;Energy Conservation;Plan of Care;Home Exercise Program;ADL Adaptive Strategies;Transfer Training;Equipment; Family Education;Precautions  REQUIRES OT FOLLOW UP: Yes  Activity Tolerance  Activity Tolerance: Patient Tolerated treatment well;Patient limited by fatigue  Safety Devices  Safety Devices in place: Yes  Type of devices: Call light within reach;Nurse notified;Gait belt;Patient at risk for falls; Bed alarm in place; Left in bed; All fall risk precautions in place           Patient Diagnosis(es): The primary encounter diagnosis was Hypertensive urgency. Diagnoses of Elevated troponin and Diabetic ulcer of left heel associated with type 1 diabetes mellitus, unspecified ulcer stage ([address] were also pertinent to this visit.      has a past medical history of RISSA (acute kidney injury) ([address] Cerebrovascular disease, Erectile dysfunction, Hepatitis B infection, Hyperlipidemia, Hypertension, Kidney cysts, Liver cyst, MRSA (methicillin resistant staph aureus) culture positive, Neurotrophic ulcer of the foot ([address] Obesity, Osteomyelitis of ankle or foot, Peripheral vascular disease ([address] prothesis, short distances in apartment, also uses Power chair inside apartment)  Transfer Assistance: Independent(Uses lift chair and walker to transfer)  Active : No  Patient's  Info: Uses transportation services  Occupation: Retired  Additional Comments: No falls       Objective   Vision: Impaired  Vision Exceptions: Wears glasses at all times  Hearing: Within functional limits    Orientation  Overall Orientation Status: Within Functional Limits  Observation/Palpation  Posture: Fair  Observation: Left LE edema, Right TKA prosthesis with poor fit due to being very loose  Balance  Sitting Balance: Contact guard assistance  Standing Balance: Maximum assistance(x2)  Functional Mobility  Functional Mobility Comments: pt trialed with sit pivot transfer bed to w/c; mod A x 2 with max verbal cues on tech and max A to safely position pwc along side of bed. Pt needing cues to push from R LE to lift hips to be able to move laterally. Pt also trialed with standing at walker and needing max A x2. Pt is unable to keep TTWB d.t overall weakness. Pt max A x 2 to pivot back to bed. Toilet Transfers  Toilet Transfers Comments: unable to safely transfer [facility]  Wheelchair Bed Transfers  Wheelchair/Bed - Technique: Sit pivot  Equipment Used: Wheelchair(pt's pwc)  Level of Asssistance: 2 Person assistance; Moderate assistance  ADL  Feeding: Independent  Grooming: Stand by assistance  UE Bathing: Minimal assistance; Moderate assistance  LE Bathing: Maximum assistance  UE Dressing:  Moderate assistance;Minimal assistance  LE Dressing: Maximum assistance  Toileting: Maximum assistance  Tone RUE  RUE Tone: Normotonic  Tone LUE  LUE Tone: Normotonic  Coordination  Movements Are Fluid And Coordinated: Yes     Bed mobility  Rolling to Left: Contact guard assistance  Rolling to Right: Contact guard assistance  Supine to Sit: Contact guard assistance  Sit to Supine: Minimal assistance  Transfers  Sit to stand: 2 Person goal 4: demo and good understanding of fall prevention techs, EC/WS techs, equip needs, and B UE HEP  Short term goal 5: demo mod A with functional mob short distances for ADL completion with approp AD/DME  Patient Goals   Patient goals : to go home       Therapy Time   Individual Concurrent Group Co-treatment   Time In 0936         Time Out 1007         Minutes 31              Patient would benefit from SNF for continued occupational therapy to increase independence with  ADL of bathing, dressing, toileting and grooming. Writer recommending SNF placement for for activity tolerance and strength which will increase independence with ADL's coordinated with bed mobility and chair transfers. Continued skilled OT services to address decreased safety awareness with ADL and IADL tasks and for education and increased independence with DME and AE for fall prevention and ec/ws techniques prior to d/c home. Co-treatment with PT warranted secondary to decreased safety and independence requiring 2 skilled therapy professionals to address individual discipline's goals. OT addressing preparation for ADL transfer, sitting balance for increased ADL performance, sitting/activity tolerance, functional reaching, environmental safety/scanning, fall prevention, functional mobility for ADL transfers, ability to sequence and follow directions and functional UE strength.     Marisela Kimball, OT

## 2020-06-19 NOTE — OP NOTE
Operative Note  Patient: Flaquito Venegas  YOB: 1950  MRN: [id number]    Date of Procedure: 6/19/2020    Pre-Op Diagnosis: Peripheral arterial disease with nonhealing wound left heel    Post-Op Diagnosis: Same    Procedure:  1)  US guided access right common femoral artery  2)  Aortogram, left tibial angiogram  3)  Angioplasty of left anterior tibial artery    Surgeon: Ahmet Smyth MD    Assistant:  * No surgical staff found *    Anesthesia: Fentanyl 100mcg, Versed 1 mg    Estimated Blood Loss (mL): Minimal    Complications: None    Specimens:   * No specimens in log *    Implants:  * No implants in log *      Drains: * No LDAs found *    Findings:   No evidence of aortoiliac occlusive disease  patent popliteal artery above and below the knee  Occlusion of all 3 tibial vessels. Recanalization of occluded anterior tibial artery with distal reconstitution of the posterior tibial via anterior tibial collaterals   Inline flow restored to the anterior tibial artery to the foot. Indications and description of operative procedure: This is a 68-year-old male with diabetes. He presents with a diabetic foot infection and a previous left TMA. His YANCI was 0.30 on the left leg and he ready has a right below-knee amputation. He has chronic kidney disease so the amount of contrast was limited to 7after the risks benefits alternatives were discussed informed consent was obtained. The patient was brought to the angios suite and placed supine the groins were cleaned and prepped in usual fashion sterile drapes were applied. Ultrasound was used to identify the right common femoral artery. ML's. Using a micropuncture needle and catheter access was gained. 5 [place] sheath was placed and an aortogram with pelvic runoff was taken. The bifurcation was crossed and the wire advanced easily through the SFA which was seen to be calcified.   In the below-knee popliteal artery the catheter was placed and a left lower extremity runoff of the tibials was taken with the findings as above. Patient was heparinized with 5000 units of heparin and a 6 [place] destination sheath was used to cross the aortic bifurcation. Using a support catheter and a Glidewire the chronic occlusion in the anterior tibial artery was successfully crossed and reentry was gained into the true lumen as confirmed on hand-injection of contrast.  The area was angioplastied with a 3 mm x 250 mm balloon and left inflated for 3 minutes. On completion there was inline flow through the anterior tibial artery with reconstitution of the posterior tibial at the heel via collaterals. A 6 Mynx was used to close the arteriotomy.   The procedure was tolerated well without complication

## 2020-06-19 NOTE — PROGRESS NOTES
[address]    Progress Note    6/19/2020    2:25 PM    Name:   Issa Nelson  MRN:     [id number]     Acct:      [de-identified]   Room:   [address] [place] [address]  IP Day:  2  Admit Date:  6/17/2020  4:26 PM    PCP:   Elmer Chun  Code Status:  Full Code    Subjective:     C/C:   Chief Complaint   Patient presents with    Wound Infection     left foot    Hypertension     Interval History Status: not changed. States he feels about the same  [facility] for angio and had angioplasty of ant tibial artery done    No cp/sob/n/v    Brief History:     Per my BLAYNE:  \"Rohan Sr is a 79 y.o. Non-/non  male who presents with Wound Infection (left foot) and Hypertension   and is admitted to the hospital for the management of Diabetic foot infection ([facility] [address]    Patient reports to the hospital with complaint of high blood pressure and worsening left foot wound.  The patient states that he developed a wound to the bottom of his foot several weeks ago. Eliazar Figueroa has a visiting RN that comes to the house and performs dressing changes 3X week. Eliazar Figueroa states that the nurse noted increased drainage to the area. He was subsequently advised to report to the hospital for evaluation. Eliazar Figueroa had also had increased blood pressure readings. He is on multiple antihypertensive agents. He endorses neuropathy to his left lower extremity. He has a right BKA.  He denies nausea, vomiting, fever or chills.  He has additional past medical history that includes CKD, diabetes, hypertension and hyperlipidemia.  He is a current every day cigarette smoker. \"    Review of Systems:     Constitutional:  negative for chills, fevers, sweats  Respiratory:  negative for cough, dyspnea on exertion, shortness of breath, wheezing  Cardiovascular:  negative for chest pain, chest pressure/discomfort, palpitations  Gastrointestinal:  negative for abdominal pain, constipation, diarrhea, nausea, vomiting  Neurological: negative for dizziness, headache    Medications:      Allergies:  No Known Allergies    Current Meds:   Scheduled Meds:    vancomycin  1,500 mg Intravenous Q24H    clopidogrel  300 mg Oral Once    Followed by   Monica Tanner ON 6/20/2020] clopidogrel  75 mg Oral Daily    insulin lispro  0-12 Units Subcutaneous TID WC    insulin lispro  0-6 Units Subcutaneous Nightly    cefepime  1 g Intravenous Q12H    atorvastatin  10 mg Oral Daily    insulin glargine  40 Units Subcutaneous QAM    budesonide-formoterol  2 puff Inhalation BID    metoprolol succinate  25 mg Oral BID    amLODIPine  10 mg Oral Daily    docusate sodium  100 mg Oral BID    ferrous sulfate  325 mg Oral Daily with breakfast    [Held by provider] furosemide  40 mg Oral BID    insulin glargine  90 Units Subcutaneous Nightly    lisinopril  20 mg Oral Daily    traZODone  150 mg Oral Nightly    sodium chloride flush  10 mL Intravenous 2 times per day    collagenase   Topical Daily    metroNIDAZOLE  500 mg Intravenous Q8H    vancomycin (VANCOCIN) intermittent dosing (placeholder)   Other RX Placeholder    heparin (porcine)  5,000 Units Subcutaneous 3 times per day     Continuous Infusions:    dextrose 5% and 0.45% NaCl with KCl 20 mEq      dextrose      sodium chloride 75 mL/hr at 06/18/20 1745     PRN Meds: diphenhydrAMINE, acetaminophen, glucose, dextrose, glucagon (rDNA), dextrose, oxyCODONE-acetaminophen, sodium chloride flush, potassium chloride **OR** potassium alternative oral replacement **OR** potassium chloride, magnesium sulfate, [DISCONTINUED] acetaminophen **OR** acetaminophen, polyethylene glycol, promethazine **OR** ondansetron, nicotine, hydrALAZINE    Data:     Past Medical History:   has a past medical history of RISSA (acute kidney injury) ([facility] [address] Cerebrovascular disease, Erectile dysfunction, Hepatitis B infection, Hyperlipidemia, Hypertension, Kidney cysts, Liver cyst, MRSA (methicillin resistant staph aureus) culture positive, Neurotrophic ulcer of the foot ([facility] [address] Obesity, Osteomyelitis of ankle or foot, Peripheral vascular disease ([facility] [address] Tobacco abuse, and Type II or unspecified type diabetes mellitus without mention of complication, not stated as uncontrolled. Social History:   reports that he has been smoking cigarettes. He has a 30.00 pack-year smoking history. He has never used smokeless tobacco. He reports that he does not drink alcohol or use drugs. Family History:   Family History   Problem Relation Age of Onset    Diabetes Mother     Diabetes Sister     Diabetes Brother     Diabetes Sister     Diabetes Sister     Diabetes Sister        Vitals:  BP (!) 163/61   Pulse 67   Temp 97.7 °F (36.5 °C) (Oral)   Resp 16   Ht 6' (1.829 m)   Wt 242 lb 8 oz (110 kg)   SpO2 96%   BMI 32.89 kg/m²   Temp (24hrs), Av.7 °F (36.5 °C), Min:96.8 °F (36 °C), Max:98.2 °F (36.8 °C)    Recent Labs     20  1730 20  0727 20  1210   POCGLU 126* 153* 121* 141*       I/O (24Hr):     Intake/Output Summary (Last 24 hours) at 2020 1425  Last data filed at 2020 1347  Gross per 24 hour   Intake 929 ml   Output 1000 ml   Net -71 ml       Labs:  Hematology:  Recent Labs     20  17020  0534 20  0518   WBC 8.8 7.3 6.3   RBC 4.95 4.75 4.31   HGB 13.0 12.6* 11.6*   HCT 41.1 39.5* 36.1*   MCV 83.0 83.2 83.8   MCH 26.3 26.5 26.9   MCHC 31.6 31.9 32.1   RDW 13.2 13.2 13.4    158 149   MPV 9.2 9.6 10.7   SEDRATE 128*  --   --    CRP 33.1*  --   --      Chemistry:  Recent Labs     20  1700 20  2218 20  0534 20  0518     --  141 141   K 4.1  --  3.1* 4.2     --  106 108*   CO2 23  --  23 23   GLUCOSE 220*  --  51* 126*   BUN 21  --  21 19   CREATININE 1.64*  --  2.02* 1.94*   MG 1.6  --  1.4* 1.9   ANIONGAP 12  --  12 10   LABGLOM 42*  --  33* 34*   GFRAA 51*  --  40* 42*   CALCIUM 8.6  --  8.5* 8.1*   PROBNP 2,561*  --   --   --    TROPHS Elevated troponin 6/17/2020 Yes    Hypomagnesemia 6/18/2020 Yes    Hypokalemia 6/18/2020 Yes          Plan:        1. Resume lantus post-angio (was held this am)  2. Reduce pm lantus dose as am glucose levels lower  3. Monitor renal function  4.  Continue iv antibiotics for foot infection    Toan Santana, DO  6/19/2020  2:25 PM

## 2020-06-20 ENCOUNTER — ANESTHESIA (OUTPATIENT)
Dept: OPERATING ROOM | Age: 70
DRG: 253 | End: 2020-06-20
Payer: COMMERCIAL

## 2020-06-20 VITALS — TEMPERATURE: 96.8 F | OXYGEN SATURATION: 100 % | DIASTOLIC BLOOD PRESSURE: 71 MMHG | SYSTOLIC BLOOD PRESSURE: 146 MMHG

## 2020-06-20 LAB
ANION GAP SERPL CALCULATED.3IONS-SCNC: 7 MMOL/L (ref 9–17)
BUN BLDV-MCNC: 19 MG/DL (ref 8–23)
BUN/CREAT BLD: 9 (ref 9–20)
CALCIUM SERPL-MCNC: 8.2 MG/DL (ref 8.6–10.4)
CHLORIDE BLD-SCNC: 111 MMOL/L (ref 98–107)
CO2: 24 MMOL/L (ref 20–31)
CREAT SERPL-MCNC: 2.09 MG/DL (ref 0.7–1.2)
GFR AFRICAN AMERICAN: 38 ML/MIN
GFR NON-AFRICAN AMERICAN: 32 ML/MIN
GFR SERPL CREATININE-BSD FRML MDRD: ABNORMAL ML/MIN/{1.73_M2}
GFR SERPL CREATININE-BSD FRML MDRD: ABNORMAL ML/MIN/{1.73_M2}
GLUCOSE BLD-MCNC: 111 MG/DL (ref 75–110)
GLUCOSE BLD-MCNC: 174 MG/DL (ref 75–110)
GLUCOSE BLD-MCNC: 175 MG/DL (ref 75–110)
GLUCOSE BLD-MCNC: 192 MG/DL (ref 75–110)
GLUCOSE BLD-MCNC: 45 MG/DL (ref 70–99)
GLUCOSE BLD-MCNC: 55 MG/DL (ref 75–110)
GLUCOSE BLD-MCNC: 59 MG/DL (ref 75–110)
GLUCOSE BLD-MCNC: 60 MG/DL (ref 75–110)
GLUCOSE BLD-MCNC: 95 MG/DL (ref 75–110)
GLUCOSE BLD-MCNC: 97 MG/DL (ref 75–110)
POTASSIUM SERPL-SCNC: 3.8 MMOL/L (ref 3.7–5.3)
SODIUM BLD-SCNC: 142 MMOL/L (ref 135–144)

## 2020-06-20 PROCEDURE — 6360000002 HC RX W HCPCS: Performed by: SURGERY

## 2020-06-20 PROCEDURE — 6370000000 HC RX 637 (ALT 250 FOR IP): Performed by: SURGERY

## 2020-06-20 PROCEDURE — 6360000002 HC RX W HCPCS: Performed by: NURSE PRACTITIONER

## 2020-06-20 PROCEDURE — 6370000000 HC RX 637 (ALT 250 FOR IP): Performed by: INTERNAL MEDICINE

## 2020-06-20 PROCEDURE — 2580000003 HC RX 258: Performed by: NURSE PRACTITIONER

## 2020-06-20 PROCEDURE — 36415 COLL VENOUS BLD VENIPUNCTURE: CPT

## 2020-06-20 PROCEDURE — 88304 TISSUE EXAM BY PATHOLOGIST: CPT

## 2020-06-20 PROCEDURE — 97535 SELF CARE MNGMENT TRAINING: CPT

## 2020-06-20 PROCEDURE — 2500000003 HC RX 250 WO HCPCS: Performed by: ANESTHESIOLOGY

## 2020-06-20 PROCEDURE — G0378 HOSPITAL OBSERVATION PER HR: HCPCS

## 2020-06-20 PROCEDURE — 0JBR0ZZ EXCISION OF LEFT FOOT SUBCUTANEOUS TISSUE AND FASCIA, OPEN APPROACH: ICD-10-PCS | Performed by: PODIATRIST

## 2020-06-20 PROCEDURE — 2580000003 HC RX 258: Performed by: SURGERY

## 2020-06-20 PROCEDURE — 3700000000 HC ANESTHESIA ATTENDED CARE: Performed by: PODIATRIST

## 2020-06-20 PROCEDURE — 2500000003 HC RX 250 WO HCPCS: Performed by: SURGERY

## 2020-06-20 PROCEDURE — 99232 SBSQ HOSP IP/OBS MODERATE 35: CPT | Performed by: INTERNAL MEDICINE

## 2020-06-20 PROCEDURE — 2709999900 HC NON-CHARGEABLE SUPPLY: Performed by: PODIATRIST

## 2020-06-20 PROCEDURE — 2060000000 HC ICU INTERMEDIATE R&B

## 2020-06-20 PROCEDURE — 3600000012 HC SURGERY LEVEL 2 ADDTL 15MIN: Performed by: PODIATRIST

## 2020-06-20 PROCEDURE — 97530 THERAPEUTIC ACTIVITIES: CPT

## 2020-06-20 PROCEDURE — 3700000001 HC ADD 15 MINUTES (ANESTHESIA): Performed by: PODIATRIST

## 2020-06-20 PROCEDURE — 2500000003 HC RX 250 WO HCPCS: Performed by: PODIATRIST

## 2020-06-20 PROCEDURE — 6360000002 HC RX W HCPCS: Performed by: ANESTHESIOLOGY

## 2020-06-20 PROCEDURE — 82947 ASSAY GLUCOSE BLOOD QUANT: CPT

## 2020-06-20 PROCEDURE — 7100000000 HC PACU RECOVERY - FIRST 15 MIN: Performed by: PODIATRIST

## 2020-06-20 PROCEDURE — 2580000003 HC RX 258: Performed by: ANESTHESIOLOGY

## 2020-06-20 PROCEDURE — 80048 BASIC METABOLIC PNL TOTAL CA: CPT

## 2020-06-20 PROCEDURE — 3600000002 HC SURGERY LEVEL 2 BASE: Performed by: PODIATRIST

## 2020-06-20 PROCEDURE — 2500000003 HC RX 250 WO HCPCS: Performed by: NURSE PRACTITIONER

## 2020-06-20 PROCEDURE — 7100000001 HC PACU RECOVERY - ADDTL 15 MIN: Performed by: PODIATRIST

## 2020-06-20 RX ORDER — MIDAZOLAM HYDROCHLORIDE 1 MG/ML
INJECTION INTRAMUSCULAR; INTRAVENOUS PRN
Status: DISCONTINUED | OUTPATIENT
Start: 2020-06-20 | End: 2020-06-20 | Stop reason: SDUPTHER

## 2020-06-20 RX ORDER — FENTANYL CITRATE 50 UG/ML
50 INJECTION, SOLUTION INTRAMUSCULAR; INTRAVENOUS EVERY 5 MIN PRN
Status: DISCONTINUED | OUTPATIENT
Start: 2020-06-20 | End: 2020-06-20 | Stop reason: HOSPADM

## 2020-06-20 RX ORDER — FENTANYL CITRATE 50 UG/ML
25 INJECTION, SOLUTION INTRAMUSCULAR; INTRAVENOUS EVERY 5 MIN PRN
Status: DISCONTINUED | OUTPATIENT
Start: 2020-06-20 | End: 2020-06-20 | Stop reason: HOSPADM

## 2020-06-20 RX ORDER — HYDROMORPHONE HCL 110MG/55ML
0.25 PATIENT CONTROLLED ANALGESIA SYRINGE INTRAVENOUS EVERY 5 MIN PRN
Status: DISCONTINUED | OUTPATIENT
Start: 2020-06-20 | End: 2020-06-20 | Stop reason: HOSPADM

## 2020-06-20 RX ORDER — LIDOCAINE HYDROCHLORIDE 20 MG/ML
INJECTION, SOLUTION EPIDURAL; INFILTRATION; INTRACAUDAL; PERINEURAL PRN
Status: DISCONTINUED | OUTPATIENT
Start: 2020-06-20 | End: 2020-06-20 | Stop reason: SDUPTHER

## 2020-06-20 RX ORDER — INSULIN GLARGINE 100 [IU]/ML
50 INJECTION, SOLUTION SUBCUTANEOUS NIGHTLY
Status: DISCONTINUED | OUTPATIENT
Start: 2020-06-20 | End: 2020-06-21

## 2020-06-20 RX ORDER — HYDROMORPHONE HCL 110MG/55ML
0.5 PATIENT CONTROLLED ANALGESIA SYRINGE INTRAVENOUS EVERY 5 MIN PRN
Status: DISCONTINUED | OUTPATIENT
Start: 2020-06-20 | End: 2020-06-20 | Stop reason: HOSPADM

## 2020-06-20 RX ORDER — FENTANYL CITRATE 50 UG/ML
INJECTION, SOLUTION INTRAMUSCULAR; INTRAVENOUS PRN
Status: DISCONTINUED | OUTPATIENT
Start: 2020-06-20 | End: 2020-06-20 | Stop reason: SDUPTHER

## 2020-06-20 RX ORDER — SODIUM CHLORIDE 9 MG/ML
INJECTION, SOLUTION INTRAVENOUS CONTINUOUS PRN
Status: DISCONTINUED | OUTPATIENT
Start: 2020-06-20 | End: 2020-06-20 | Stop reason: SDUPTHER

## 2020-06-20 RX ORDER — PROPOFOL 10 MG/ML
INJECTION, EMULSION INTRAVENOUS CONTINUOUS PRN
Status: DISCONTINUED | OUTPATIENT
Start: 2020-06-20 | End: 2020-06-20 | Stop reason: SDUPTHER

## 2020-06-20 RX ORDER — ONDANSETRON 2 MG/ML
4 INJECTION INTRAMUSCULAR; INTRAVENOUS
Status: DISCONTINUED | OUTPATIENT
Start: 2020-06-20 | End: 2020-06-20 | Stop reason: HOSPADM

## 2020-06-20 RX ADMIN — METOPROLOL SUCCINATE 25 MG: 25 TABLET, EXTENDED RELEASE ORAL at 15:12

## 2020-06-20 RX ADMIN — LIDOCAINE HYDROCHLORIDE 5 ML: 20 INJECTION, SOLUTION EPIDURAL; INFILTRATION; INTRACAUDAL; PERINEURAL at 08:12

## 2020-06-20 RX ADMIN — INSULIN GLARGINE 50 UNITS: 100 INJECTION, SOLUTION SUBCUTANEOUS at 20:42

## 2020-06-20 RX ADMIN — Medication 25 MCG: at 08:31

## 2020-06-20 RX ADMIN — METOPROLOL SUCCINATE 25 MG: 25 TABLET, EXTENDED RELEASE ORAL at 10:48

## 2020-06-20 RX ADMIN — TRAZODONE HYDROCHLORIDE 150 MG: 50 TABLET ORAL at 20:41

## 2020-06-20 RX ADMIN — INSULIN LISPRO 1 UNITS: 100 INJECTION, SOLUTION INTRAVENOUS; SUBCUTANEOUS at 20:43

## 2020-06-20 RX ADMIN — Medication 25 MCG: at 08:26

## 2020-06-20 RX ADMIN — DEXTROSE MONOHYDRATE 12.5 G: 25 INJECTION, SOLUTION INTRAVENOUS at 05:45

## 2020-06-20 RX ADMIN — DIPHENHYDRAMINE HCL 25 MG: 25 TABLET ORAL at 20:42

## 2020-06-20 RX ADMIN — FERROUS SULFATE TAB EC 325 MG (65 MG FE EQUIVALENT) 325 MG: 325 (65 FE) TABLET DELAYED RESPONSE at 10:47

## 2020-06-20 RX ADMIN — HEPARIN SODIUM 5000 UNITS: 5000 INJECTION INTRAVENOUS; SUBCUTANEOUS at 20:44

## 2020-06-20 RX ADMIN — PROPOFOL 100 MCG/KG/MIN: 10 INJECTION, EMULSION INTRAVENOUS at 08:12

## 2020-06-20 RX ADMIN — DEXTROSE MONOHYDRATE 12.5 G: 25 INJECTION, SOLUTION INTRAVENOUS at 07:18

## 2020-06-20 RX ADMIN — VANCOMYCIN HYDROCHLORIDE 1500 MG: 1.5 INJECTION, POWDER, LYOPHILIZED, FOR SOLUTION INTRAVENOUS at 10:49

## 2020-06-20 RX ADMIN — DOCUSATE SODIUM 100 MG: 100 CAPSULE, LIQUID FILLED ORAL at 10:47

## 2020-06-20 RX ADMIN — CEFEPIME HYDROCHLORIDE 1 G: 1 INJECTION, POWDER, FOR SOLUTION INTRAMUSCULAR; INTRAVENOUS at 05:18

## 2020-06-20 RX ADMIN — INSULIN LISPRO 2 UNITS: 100 INJECTION, SOLUTION INTRAVENOUS; SUBCUTANEOUS at 16:59

## 2020-06-20 RX ADMIN — DIPHENHYDRAMINE HCL 25 MG: 25 TABLET ORAL at 10:46

## 2020-06-20 RX ADMIN — LISINOPRIL 20 MG: 10 TABLET ORAL at 10:46

## 2020-06-20 RX ADMIN — METRONIDAZOLE 500 MG: 500 INJECTION, SOLUTION INTRAVENOUS at 15:13

## 2020-06-20 RX ADMIN — OXYCODONE HYDROCHLORIDE AND ACETAMINOPHEN 1 TABLET: 10; 325 TABLET ORAL at 20:48

## 2020-06-20 RX ADMIN — MIDAZOLAM 2 MG: 1 INJECTION INTRAMUSCULAR; INTRAVENOUS at 08:12

## 2020-06-20 RX ADMIN — Medication 25 MCG: at 08:12

## 2020-06-20 RX ADMIN — METRONIDAZOLE 500 MG: 500 INJECTION, SOLUTION INTRAVENOUS at 23:17

## 2020-06-20 RX ADMIN — SODIUM CHLORIDE: 9 INJECTION, SOLUTION INTRAVENOUS at 12:23

## 2020-06-20 RX ADMIN — CLOPIDOGREL BISULFATE 75 MG: 75 TABLET ORAL at 15:12

## 2020-06-20 RX ADMIN — ATORVASTATIN CALCIUM 10 MG: 10 TABLET, FILM COATED ORAL at 10:48

## 2020-06-20 RX ADMIN — METRONIDAZOLE 500 MG: 500 INJECTION, SOLUTION INTRAVENOUS at 05:53

## 2020-06-20 RX ADMIN — DOCUSATE SODIUM 100 MG: 100 CAPSULE, LIQUID FILLED ORAL at 20:42

## 2020-06-20 RX ADMIN — HEPARIN SODIUM 5000 UNITS: 5000 INJECTION INTRAVENOUS; SUBCUTANEOUS at 12:21

## 2020-06-20 RX ADMIN — SODIUM CHLORIDE: 9 INJECTION, SOLUTION INTRAVENOUS at 08:03

## 2020-06-20 RX ADMIN — AMLODIPINE BESYLATE 10 MG: 5 TABLET ORAL at 10:47

## 2020-06-20 RX ADMIN — Medication 25 MCG: at 08:21

## 2020-06-20 RX ADMIN — CEFEPIME HYDROCHLORIDE 1 G: 1 INJECTION, POWDER, FOR SOLUTION INTRAMUSCULAR; INTRAVENOUS at 17:00

## 2020-06-20 RX ADMIN — INSULIN LISPRO 2 UNITS: 100 INJECTION, SOLUTION INTRAVENOUS; SUBCUTANEOUS at 12:21

## 2020-06-20 ASSESSMENT — PULMONARY FUNCTION TESTS
PIF_VALUE: 1
PIF_VALUE: 0
PIF_VALUE: 1
PIF_VALUE: 0
PIF_VALUE: 1

## 2020-06-20 ASSESSMENT — PAIN SCALES - GENERAL
PAINLEVEL_OUTOF10: 0
PAINLEVEL_OUTOF10: 7
PAINLEVEL_OUTOF10: 7
PAINLEVEL_OUTOF10: 0
PAINLEVEL_OUTOF10: 5
PAINLEVEL_OUTOF10: 0

## 2020-06-20 ASSESSMENT — ENCOUNTER SYMPTOMS
RESPIRATORY NEGATIVE: 1
ALLERGIC/IMMUNOLOGIC NEGATIVE: 1
GASTROINTESTINAL NEGATIVE: 1

## 2020-06-20 ASSESSMENT — LIFESTYLE VARIABLES: SMOKING_STATUS: 1

## 2020-06-20 NOTE — PROGRESS NOTES
[facility]    Progress Note    6/20/2020    11:40 AM    Name:   Keron Groves  MRN:     [id number]     Acct:      [de-identified]   Room:   [address] [place] [address] Day:  3  Admit Date:  6/17/2020  4:26 PM    PCP:   Juancarlos Alcocer  Code Status:  Full Code    Subjective:     C/C:   Chief Complaint   Patient presents with    Wound Infection     left foot    Hypertension     Interval History Status: not changed. Had debridement this am  Denies cp/sob/n/v/abd pain    Low glucose this am, and also yesterday am    Brief History:     Per my BLAYNE:  \"Rohan Sr is a 79 y.o. Non-/non  male who presents with Wound Infection (left foot) and Hypertension   and is admitted to the hospital for the management of Diabetic foot infection ([facility]    Patient reports to the hospital with complaint of high blood pressure and worsening left foot wound.  The patient states that he developed a wound to the bottom of his foot several weeks ago. Raghav Keating has a visiting RN that comes to the house and performs dressing changes 3X week. Raghav Keating states that the nurse noted increased drainage to the area. He was subsequently advised to report to the hospital for evaluation. Raghav Keating had also had increased blood pressure readings. He is on multiple antihypertensive agents. He endorses neuropathy to his left lower extremity. He has a right BKA.  He denies nausea, vomiting, fever or chills.  He has additional past medical history that includes CKD, diabetes, hypertension and hyperlipidemia.  He is a current every day cigarette smoker. \"    Review of Systems:     Constitutional:  negative for chills, fevers, sweats  Respiratory:  negative for cough, dyspnea on exertion, shortness of breath, wheezing  Cardiovascular:  negative for chest pain, chest pressure/discomfort, palpitations  Gastrointestinal:  negative for abdominal pain, constipation, diarrhea, nausea, vomiting  Neurological:  negative for dizziness, headache    Medications:      Allergies:  No Known Allergies    Current Meds:   Scheduled Meds:    vancomycin  1,500 mg Intravenous Q24H    clopidogrel  75 mg Oral Daily    insulin glargine  75 Units Subcutaneous Nightly    insulin lispro  0-12 Units Subcutaneous TID WC    insulin lispro  0-6 Units Subcutaneous Nightly    cefepime  1 g Intravenous Q12H    atorvastatin  10 mg Oral Daily    [Held by provider] insulin glargine  40 Units Subcutaneous QAM    budesonide-formoterol  2 puff Inhalation BID    metoprolol succinate  25 mg Oral BID    amLODIPine  10 mg Oral Daily    docusate sodium  100 mg Oral BID    ferrous sulfate  325 mg Oral Daily with breakfast    [Held by provider] furosemide  40 mg Oral BID    lisinopril  20 mg Oral Daily    traZODone  150 mg Oral Nightly    sodium chloride flush  10 mL Intravenous 2 times per day    collagenase   Topical Daily    metroNIDAZOLE  500 mg Intravenous Q8H    vancomycin (VANCOCIN) intermittent dosing (placeholder)   Other RX Placeholder    heparin (porcine)  5,000 Units Subcutaneous 3 times per day     Continuous Infusions:    dextrose 5% and 0.45% NaCl with KCl 20 mEq      dextrose      sodium chloride 75 mL/hr at 06/18/20 1745     PRN Meds: diphenhydrAMINE, acetaminophen, glucose, dextrose, glucagon (rDNA), dextrose, oxyCODONE-acetaminophen, sodium chloride flush, potassium chloride **OR** potassium alternative oral replacement **OR** potassium chloride, magnesium sulfate, [DISCONTINUED] acetaminophen **OR** acetaminophen, polyethylene glycol, promethazine **OR** ondansetron, nicotine, hydrALAZINE    Data:     Past Medical History:   has a past medical history of RISSA (acute kidney injury) ([facility] [address] Cerebrovascular disease, Erectile dysfunction, Hepatitis B infection, Hyperlipidemia, Hypertension, Kidney cysts, Liver cyst, MRSA (methicillin resistant staph aureus) culture positive, Neurotrophic ulcer of the foot ([facility] [address] Obesity, Osteomyelitis TROPHS 39* 39*  --   --   --      Recent Labs     06/18/20  0534  06/19/20  1923 06/20/20  0553 06/20/20  0705 06/20/20  0910 06/20/20  0934 06/20/20  1013   LABA1C 6.4*  --   --   --   --   --   --   --    POCGLU  --    < > 214* 95 60* 59* 55* 111*    < > = values in this interval not displayed. ABG:No results found for: POCPH, PHART, PH, POCPCO2, ENM6PEE, PCO2, POCPO2, PO2ART, PO2, POCHCO3, SYE1UNW, HCO3, NBEA, PBEA, BEART, BE, THGBART, THB, YWD3FRP, BVNA1IQQ, O0RIITGK, O2SAT, FIO2  Lab Results   Component Value Date/Time    SPECIAL RT [facility] 12ML 06/17/2020 10:21 PM     Lab Results   Component Value Date/Time    CULTURE NO GROWTH 2 DAYS 06/17/2020 10:21 PM       Radiology:  RickBurke Obey Foot Left (min 3 Views)    Result Date: 6/17/2020  Status post transmetatarsal amputation. Diffuse soft tissue edema. No convincing evidence for osteomyelitis. Xr Chest Portable    Result Date: 6/17/2020  No acute cardiopulmonary disease.        Physical Examination:        General appearance:  alert, cooperative and no distress  Mental Status:  oriented to person, place and time and normal affect  Lungs:  clear to auscultation bilaterally, normal effort  Heart:  regular rate and rhythm, no murmur  Abdomen:  soft, nontender, nondistended, normal bowel sounds, no masses, hepatomegaly, splenomegaly  Extremities:  no redness, tenderness in the calves; right bka  Skin:  Left foot bandaged with vac on    Assessment:        Hospital Problems           Last Modified POA    * (Principal) Diabetic foot infection ([address] 6/17/2020 Yes    Uncontrolled hypertension 6/17/2020 Yes    PVD (peripheral vascular disease) ([address] 6/18/2020 Yes    Type 2 diabetes mellitus with stage 3 chronic kidney disease, with long-term current use of insulin ([address] 6/17/2020 Yes    Tobacco abuse 6/18/2020 Yes    RISSA (acute kidney injury) ([address] 6/18/2020 Yes    Dyslipidemia 6/17/2020 Yes    Tobacco dependence 6/17/2020 Yes    Diabetic ulcer of left heel associated with type 2 diabetes mellitus ([facility] [address] 6/18/2020 Yes    Elevated troponin 6/17/2020 Yes    Hypomagnesemia 6/18/2020 Yes    Hypokalemia 6/18/2020 Yes          Plan:        1. Hold lantus this am for hypoglycemia pre and postop  2. Reduce nighttime lantus  3. Had debridement of foot today  4.  Cont iv antibiotics    Scott Santana, DO  6/20/2020  11:40 AM

## 2020-06-20 NOTE — PROGRESS NOTES
[facility]  Progress Note    Charlotte Espinal  MRN: [id number]  Date: 6/20/2020    Reason for F/U :   Diabetic foot infection    Impression :   1. Diabetes mellitus type 2 with complications including neuropathy, nephropathy    2. Peripheral arterial disease  · Status post arteriogram with stenting 6/19/2020  3. Left plantar foot infected diabetic ulceration  · Status post debridement 6/20/2020  4. Acute kidney injury on chronic kidney disease stage III  5. Chronic dermatitis    Recommendations:   · Continue intravenous antimicrobial therapy with cefepime and vancomycin. · The patient has undergone revascularization surgery. · The patient underwent surgical debridement of the plantar ulceration in the operating room today and the wound extended to the fascial layer. The patient underwent surgical bed preparation with application of a wound VAC to the left heel. · We will follow the culture data from the operating room and decide on antimicrobial therapy at that time    Infection Control Recommendations:   Contact precautions    Discharge Planning:   Estimated Length of IV antimicrobials: To be determined  Patient will need Midline Catheter Insertion/ PICC line Insertion: No  Patient will need: Home IV , [place],  SNF,  LTAC: Undetermined  Patient willneed outpatient wound care: No    Medical Decision making / Summary of Stay:   Charlotte Espinal is a 79y.o.-year-old male who was initially admitted on 6/17/2020. [facility] has a history of diabetes mellitus type 2 with multiple complications including peripheral neuropathy, chronic kidney disease, and peripheral arterial disease. 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 The patient is a poor historian and cannot really tell me how long he has had the ulceration on the plantar aspect of his left heel.   He thinks he has had it for about 2 weeks and reports that there is a visiting nurse who does take care of him at home. The wound has been progressively getting worse over the past 2 weeks and he recalls that 2 weeks ago he had some fevers and chills but none recently. Due to the progressively worsening wound the patient was brought into the emergency room for evaluation. The patient was noted to have a full-thickness ulceration on the plantar aspect of the left heel with a fibronecrotic wound base difficult to stage. The patient was seen by the podiatry service and has since been admitted for a diabetic foot ulcer and I been asked to evaluate and help with antibiotic choice. He was seen by vascular surgery today and taken to the operating room underwent an ultrasound-guided access of the right common femoral artery with aortogram and left tibial angiogram.  The patient underwent angioplasty of the left anterior tibial artery on 2020    Current evaluation:2020    BP (!) 151/69   Pulse 66   Temp 97 °F (36.1 °C) (Temporal)   Resp 17   Ht 6' (1.829 m)   Wt 248 lb 3.2 oz (112.6 kg)   SpO2 100%   BMI 33.66 kg/m²     Temperature Range: Temp: 97 °F (36.1 °C) Temp  Av.5 °F (36.4 °C)  Min: 96.8 °F (36 °C)  Max: 98.3 °F (36.8 °C)  The patient is seen and evaluated at bedside he is awake and alert in no acute distress  He was taken to the operating room and underwent debridement of the plantar ulceration which went down to the fascial layers and there was no evidence of tracking to the calcaneal bone. Patient does not report any new issues or concerns. He denies any fevers, chills, coughing or shortness of breath. No abdominal pain nausea vomiting or diarrhea. Review of Systems   Constitutional: Negative. Respiratory: Negative. Cardiovascular: Negative. Gastrointestinal: Negative. Genitourinary: Negative. Musculoskeletal: Negative. Skin: Positive for wound. Allergic/Immunologic: Negative. Neurological: Negative. Psychiatric/Behavioral: Negative. Physical Examination :     BP (!) 151/69   Pulse 66   Temp 97 °F (36.1 °C) (Temporal)   Resp 17   Ht 6' (1.829 m)   Wt 248 lb 3.2 oz (112.6 kg)   SpO2 100%   BMI 33.66 kg/m²     Temperature Range: Temp: 97 °F (36.1 °C) Temp  Av.5 °F (36.4 °C)  Min: 96.8 °F (36 °C)  Max: 98.3 °F (36.8 °C)      \"[x]\" Indicates a positive item  \"[]\" Indicates a negative item      Constitutional: [x] Appears well-developed and well-nourished [x] No apparent distress      [] Abnormal-     Mental status  [x] Alert and awake  [x] Oriented to person/place/time [x]Able to follow commands      Eyes:  EOM    [x]  Normal  [] Abnormal-  Sclera  [x]  Normal  [] Abnormal -         Discharge [x]  None visible  [] Abnormal -    HENT:   [x] Normocephalic, atraumatic.   [] Abnormal   [x] Mouth/Throat: Mucous membranes are moist.     External Ears [x] Normal  [] Abnormal-     Neck: [x] No visualized mass     Pulmonary/Chest: [x] Respiratory effort normal.  [x] No visualized signs of difficulty breathing or respiratory distress        [] Abnormal-      Musculoskeletal:   [x] Normal gait with no signs of ataxia         [x] Normal range of motion of neck        [] Abnormal-       Neurological:        [x] No Facial Asymmetry (Cranial nerve 7 motor function) (limited exam to video visit)          [x] No gaze palsy        [] Abnormal-         Skin:        [x] No significant exanthematous lesions or discoloration noted on facial skin         [x] Abnormal-the patient has a wound VAC attached to the foot           Psychiatric:       [x] Normal Affect [x] No Hallucinations        [] Abnormal-     Other pertinent observable physical exam findings-     Laboratory data:   I have independently reviewed the followinglabs:  CBC with Differential:   Recent Labs     20  1700 20  0534 20  0518   WBC 8.8 7.3 6.3   HGB 13.0 12.6* 11.6*   HCT 41.1 39.5* 36.1*    158 149   LYMPHOPCT 20*  --   -- MONOPCT 6  --   --      BMP:   Recent Labs     06/18/20  0534 06/19/20  0518 06/20/20  0449    141 142   K 3.1* 4.2 3.8    108* 111*   CO2 23 23 24   BUN 21 19 19   CREATININE 2.02* 1.94* 2.09*   MG 1.4* 1.9  --      Hepatic Function Panel: No results for input(s): PROT, LABALBU, BILIDIR, IBILI, BILITOT, ALKPHOS, ALT, AST in the last 72 hours. No results for input(s): VANCOTROUGH in the last 72 hours. Lab Results   Component Value Date    CRP 33.1 (H) 06/17/2020     Lab Results   Component Value Date    SEDRATE 128 (H) 06/17/2020       No results for input(s): PROCAL in the last 72 hours. Imaging Studies:   Lower Arterial Plethysmography, PVR Lower with PPG0 6/18/2020 06:04 PM  Summary        Abnormal PVR study at rest for the left lower extremity ,with evidence of    tibial arterial occlusive disease and calcinosis. ABIs consistent with    ischemia. History of TM amputation.    History of right AK amputation. Cultures:     Culture, Blood 1 [[id number]] Collected: 06/17/20 2219   Order Status: Completed Specimen: Blood Updated: 06/19/20 4157    Specimen Description . BLOOD    Special Requests 4 lhand    Culture NO GROWTH 1 DAY   Culture, Blood 2 [[id number]] Collected: 06/17/20 2221   Order Status: Completed Specimen: Blood Updated: 06/19/20 1526    Specimen Description . BLOOD    Special Requests RT AC 12ML    Culture NO GROWTH 1 DAY   Wound Culture [[id number]] (Abnormal) Collected: 06/17/20 0400   Order Status: Completed Specimen: No Site Given from Foot Updated: 06/18/20 0955    Specimen Description . FOOT SWAB    Special Requests NOT REPORTED    Direct Exam NO NEUTROPHILS SEEN     MODERATE MIXED BACTERIAL MORPHOTYPES SEEN ON GRAM STAIN. Abnormal     Culture PENDING         Medications:      [MAR Hold] vancomycin  1,500 mg Intravenous Q24H    clopidogrel  75 mg Oral Daily    [MAR Hold] insulin glargine  75 Units Subcutaneous Nightly    [MAR Hold] insulin lispro  0-12 Units Subcutaneous

## 2020-06-20 NOTE — OP NOTE
[address] [address]                [address] [facility], [address] [place] [address]                                OPERATIVE REPORT    PATIENT NAME: Rubin Olmedo                       :        1950  MED REC NO:   [id number]                             ROOM:  ACCOUNT NO:   [de-identified]                           ADMIT DATE: 2020  PROVIDER:     Holly Thompson    DATE OF PROCEDURE:  2020    SURGEON:  Holly Thompson DPM    ASSISTANT SURGEON:  Teofilo Cuevas DPM, PGY-1    PREOPERATIVE DIAGNOSIS:  Nonhealing ulceration, left heel with extension  to fascial layer. POSTOPERATIVE DIAGNOSIS:  Nonhealing ulceration, left heel with  extension to fascial layer. PROCEDURE PERFORMED:  1. Surgical wound bed preparation, left heel ulcer. 2.  Application of negative pressure wound therapy, left heel. ANESTHESIA:  Local with IV sedation. HEMOSTASIS:  Manual pressure only. ESTIMATED BLOOD LOSS:  40 mL. MATERIALS:  [employer] wound VAC, black foam sponge. INJECTABLES:  10 mL of 1:1 solution of 1% lidocaine plain and 0.5%  Marcaine plain. COMPLICATIONS:  None apparent. PATHOLOGY:  Deep soft tissue sent for aerobic and anaerobic culture. INDICATIONS FOR PROCEDURE:  The patient is a 70-year-old male who was  admitted to [facility] with a nonhealing ulceration of his left  heel. He had noninvasive vascular testing indicating severe peripheral  vascular disease and he underwent revascularization with Vascular  Surgery with angioplasty of the anterior tibial artery. Angiogram had  indicated flow to the posterior tibial artery via collaterals. I had  lengthy discussion with the patient regarding treatment options. He has  a limb threatening wound and infection in his left heel. I suggested  debridement of the ulceration with possible application, negative  pressure wound therapy with biologic skin graft and bone biopsy if  indicated.   I had lengthy discussion with him regarding risks, benefits  and expected postoperative protocol. No guarantees were given or  implied regarding surgical outcome. All questions have been answered to  the patient's satisfaction. Consent was signed in the patient's chart. He had been n.p.o. status since midnight, the night before. PREOPERATIVE DETAILS:  Under mild sedation, the patient was brought back  to the operating room, placed on OR table in supine position. IV  sedation was initiated by the Anesthesia Department. Following this,  the left foot was anesthetized with 10 mL of 1:1 solution of 1%  lidocaine plain and 0.5% Marcaine plain. The operative limb was then  prepped and draped in the usual aseptic fashion. Time-out was performed  to confirm the correct patient, correct site, correct procedure. Everyone was in agreement. PROCEDURE IN DETAIL:  Attention was directed towards the plantar aspect  of the left heel. There is an ulceration measuring in total 5 cm x 3 cm  x 0.2 cm deep. There was nonviable eschar, fibrotic, and necrotic  tissue within the 100% of the wound bed. At this point, I proceeded to  perform surgical wound bed preparation in anticipation of application of  negative pressure wound therapy. I utilized a combination of a pickup  and scalpel as well as rongeur to remove all nonviable eschar. Remodelling of the wound edges was performed down to the fascial layer. There was some bleeding appreciated during the procedure and this was  managed with electrocautery. The removal of nonviable necrotic tissue  was carried down into the fascial layer. There was no exposure of bone. There was no sinus tracking appreciated. At this point, the wound was  flushed with copious amounts of sterile saline utilizing pulse lavage  system. All dirty gloves and instruments were exchanged at this point.    Following surgical wound bed preparation of the new wound measured  approximately 5.2 cm x 3.2 cm x 1.3 cm deep.  At this point then applied  negative pressure wound therapy, black foam sponge to the wound bed. This was set _____ for 125 mmHg suction at low intensity continuous. The dressings were then applied to the left foot. The patient tolerated  the procedure and anesthesia well. He was transferred to recovery room  with vital signs stable. PLAN:  We will follow up on the culture results that were taken from  intraoperatively. The patient will require negative pressure wound  therapy likely for several months. He may require further debridement  or application of skin graft substitute. The patient is at risk for  limb loss and this was then communicated with the patient. The patient  will be nonweightbearing on his left lower extremity. Plan is for  discharge to skilled nursing facility with follow up in my private  office within 1-2 weeks' time.         Sybil Roman    D: 06/20/2020 9:05:06       T: 06/20/2020 9:15:47     [email or web address]  Job#: [id number]     Doc#: [id number]    CC:

## 2020-06-20 NOTE — ANESTHESIA PRE PROCEDURE
Yes Historical Provider, MD   lisinopril (PRINIVIL;ZESTRIL) 10 MG tablet Take 10 mg by mouth daily  7/27/13  Yes Historical Provider, MD   Insulin Syringe-Needle U-100 (ACCUSURE INS SYR 1CC/30GX5/16\") 30G X 5/16\" 1 ML MISC by Does not apply route.  7/9/13   Baldev Butler MD       Current medications:    Current Facility-Administered Medications   Medication Dose Route Frequency Provider Last Rate Last Dose    fentaNYL (SUBLIMAZE) injection 25 mcg  25 mcg Intravenous Q5 Min PRN Dorys Felix MD        fentaNYL (SUBLIMAZE) injection 50 mcg  50 mcg Intravenous Q5 Min PRN Dorys Felix MD        HYDROmorphone (DILAUDID) injection 0.25 mg  0.25 mg Intravenous Q5 Min PRN Dorys Felix MD        HYDROmorphone (DILAUDID) injection 0.5 mg  0.5 mg Intravenous Q5 Min PRN Evelina Barnes MD        ondansetron (ZOFRAN) injection 4 mg  4 mg Intravenous Once PRN Dorys Felix MD        [MAR Hold] diphenhydrAMINE (BENADRYL) tablet 25 mg  25 mg Oral Q6H PRN ALBERTO Bravo - CNP   25 mg at 06/19/20 2337    [MAR Hold] vancomycin (VANCOCIN) 1,500 mg in dextrose 5 % 250 mL IVPB  1,500 mg Intravenous Q24H Toan HERNANDEZ Blood, DO   Stopped at 06/19/20 0939    acetaminophen (TYLENOL) tablet 650 mg  650 mg Oral Q4H PRN Opal Mcgraw MD        dextrose 5 % and 0.45 % NaCl with KCl 20 mEq infusion   Intravenous Continuous Opal Mcgraw MD        clopidogrel (PLAVIX) tablet 75 mg  75 mg Oral Daily Opal Mcgraw MD        [facility] Hold] insulin glargine (LANTUS) injection vial 75 Units  75 Units Subcutaneous Nightly Toan P Blood, DO   75 Units at 06/19/20 2050    [MAR Hold] insulin lispro (HUMALOG) injection vial 0-12 Units  0-12 Units Subcutaneous TID ALBERTO Latham - CNP   4 Units at 06/19/20 1735    [MAR Hold] insulin lispro (HUMALOG) injection vial 0-6 Units  0-6 Units Subcutaneous Nightly ALBERTO Morrison - CNP   2 Units at 06/19/20 2051    [MAR Hold] glucose (GLUTOSE) 40 % oral gel 15 g  15 g Oral PRN Tg Hose, APRN - CNP        [MAR Hold] dextrose 50 % IV solution  12.5 g Intravenous PRN Tg Hose, APRN - CNP   12.5 g at 06/20/20 0718    [MAR Hold] glucagon (rDNA) injection 1 mg  1 mg Intramuscular PRN Tg Hose, APRN - CNP        [MAR Hold] dextrose 5 % solution  100 mL/hr Intravenous PRN Tg Hose, APRN - CNP        [MAR Hold] cefepime (MAXIPIME) 1 g IVPB minibag  1 g Intravenous Q12H Catrachito Benito, APRN - CNP   Stopped at 06/20/20 0548    [MAR Hold] atorvastatin (LIPITOR) tablet 10 mg  10 mg Oral Daily Toan P Blood, DO   10 mg at 06/19/20 0750    [MAR Hold] insulin glargine (LANTUS) injection vial 40 Units  40 Units Subcutaneous QAM Toan P Blood, DO   40 Units at 06/19/20 1313    [MAR Hold] budesonide-formoterol (SYMBICORT) 160-4.5 MCG/ACT inhaler 2 puff  2 puff Inhalation BID Toan P Blood, DO   2 puff at 06/19/20 2031    [MAR Hold] metoprolol succinate (TOPROL XL) extended release tablet 25 mg  25 mg Oral BID Toan P Blood, DO   25 mg at 06/19/20 1554    [MAR Hold] amLODIPine (NORVASC) tablet 10 mg  10 mg Oral Daily Marsha Paloma, APRN - NP   10 mg at 06/19/20 0750    [MAR Hold] docusate sodium (COLACE) capsule 100 mg  100 mg Oral BID [place] Paloma, APRN - NP   100 mg at 06/19/20 2049    [MAR Hold] ferrous sulfate (FE TABS 325) EC tablet 325 mg  325 mg Oral Daily with breakfast Marsha Paloma, APRN - NP   325 mg at 06/19/20 0751    [Held by provider] furosemide (LASIX) tablet 40 mg  40 mg Oral BID Marsha Paloma, APRN - NP        [facility] Hold] lisinopril (PRINIVIL;ZESTRIL) tablet 20 mg  20 mg Oral Daily [place] Paloma, APRN - NP   20 mg at 06/19/20 0750    [MAR Hold] oxyCODONE-acetaminophen (PERCOCET)  MG per tablet 1 tablet  1 tablet Oral Q6H PRN [place] Paloma, APRN - NP   1 tablet at 06/19/20 2111    [MAR Hold] traZODone (DESYREL) tablet 150 mg  150 mg Oral Nightly Marsha Paloma, APRN - NP   150 mg at 06/19/20 2049   [facility] Hold] 0.9 % sodium chloride infusion   Intravenous Continuous Judyann Single APRN - NP 75 mL/hr at 06/18/20 1745      [MAR Hold] sodium chloride flush 0.9 % injection 10 mL  10 mL Intravenous 2 times per day Judyann Single APRN - NP   10 mL at 06/17/20 2319    [MAR Hold] sodium chloride flush 0.9 % injection 10 mL  10 mL Intravenous PRN Judyann Single, APRN - NP        [facility] Hold] potassium chloride (KLOR-CON M) extended release tablet 40 mEq  40 mEq Oral PRN Judyann Single, APRN - NP   40 mEq at 06/18/20 1029    Or    [MAR Hold] potassium bicarb-citric acid (EFFER-K) effervescent tablet 40 mEq  40 mEq Oral PRN Judyann Single, APRN - NP        Or   [facility] Hold] potassium chloride 10 mEq/100 mL IVPB (Peripheral Line)  10 mEq Intravenous PRN Judwillardn Single, APRN - NP       [facility] Hold] magnesium sulfate 1 g in dextrose 5% 100 mL IVPB  1 g Intravenous PRN Judwillardn Single, APRN - NP   Stopped at 06/18/20 2210    [MAR Hold] acetaminophen (TYLENOL) suppository 650 mg  650 mg Rectal Q6H PRN Judyann Single, APRN - NP        [facility] Hold] polyethylene glycol (GLYCOLAX) packet 17 g  17 g Oral Daily PRN Yvonnen Single, APRN - NP        [facility] Hold] promethazine (PHENERGAN) tablet 12.5 mg  12.5 mg Oral Q6H PRN Judyann Single, APRN - NP        Or   [facility] Hold] ondansetron (ZOFRAN) injection 4 mg  4 mg Intravenous Q6H PRN Judyann Single, APRN - NP        [MAR Hold] nicotine (NICODERM CQ) 21 MG/24HR 1 patch  1 patch Transdermal Daily PRN Judyann Single, APRN - NP        [facility] Hold] hydrALAZINE (APRESOLINE) injection 20 mg  20 mg Intravenous Q6H PRN Judyann Single, APRN - NP        [facility] Hold] collagenase ointment   Topical Daily Brittany Correia DPM        [MAR Hold] metronidazole (FLAGYL) 500 mg in NaCl 100 mL IVPB premix  500 mg Intravenous Q8H ALBERTO Samson - CNP   Stopped at 06/20/20 0653    [MAR Hold] vancomycin (VANCOCIN) intermittent dosing (placeholder)   Other RX Placeholder Sin Rogel APRN - CNP        [MAR Hold] heparin (porcine) injection 5,000 Units  5,000 Units Subcutaneous 3 times per day Bhumika ALBERTO Delgadillo - CNP   Stopped at 06/20/20 7386     Facility-Administered Medications Ordered in Other Encounters   Medication Dose Route Frequency Provider Last Rate Last Dose    propofol injection    Continuous PRN Tala Navarrete MD 67.6 mL/hr at 06/20/20 0812 100 mcg/kg/min at 06/20/20 7688    fentaNYL (SUBLIMAZE) injection    PRKARLY Navarrete MD   25 mcg at 06/20/20 5572    midazolam (VERSED) injection    PRN Tala Navarrete MD   2 mg at 06/20/20 0503    lidocaine PF 2 % injection    PRN Tala Navarrete MD   5 mL at 06/20/20 0812    0.9 % sodium chloride infusion    Continuous PRN Tala Navarrete MD           Allergies:  No Known Allergies    Problem List:    Patient Active Problem List   Diagnosis Code    Uncontrolled hypertension I10    PVD (peripheral vascular disease) ([facility] I73.9    Type 2 diabetes mellitus with stage 3 chronic kidney disease, with long-term current use of insulin (HCC) E11.22, N18.3, Z79.4    Insomnia G47.00    Erectile dysfunction N52.9    Partial traumatic amputation of left foot ([address] A81.471P    Obesity E66.9    Tobacco abuse Z72.0    Hepatitis B infection B19.10    Liver cyst K76.89    Kidney cysts N28.1    Osteomyelitis of ankle or foot M86.9    Neurotrophic ulcer of the foot ([facility] [address] L97.509    RISSA (acute kidney injury) ([address] N17.9    Dyslipidemia E78.5    Microcytic anemia D50.9    Diabetic foot infection ([address] E11.628, L08.9    Tobacco dependence F17.200    Diabetic ulcer of left heel associated with type 2 diabetes mellitus ([address] E11.621, L97.429    Elevated troponin R79.89    Hypomagnesemia E83.42    Hypokalemia E87.6       Past Medical History:        Diagnosis Date    RISSA (acute kidney injury) ([address]     Cerebrovascular disease     Erectile dysfunction     Hepatitis B infection     Hyperlipidemia     Hypertension     Kidney cysts     Liver cyst     MRSA (methicillin resistant staph aureus) culture positive 1/10/2014    right foot    Neurotrophic ulcer of the foot ([facility]     Obesity     Osteomyelitis of ankle or foot     Peripheral vascular disease ([facility]     Tobacco abuse     Type II or unspecified type diabetes mellitus without mention of complication, not stated as uncontrolled        Past Surgical History:        Procedure Laterality Date    FOOT AMPUTATION      FOOT AMPUTATION THROUGH METATARSAL  2011    pt can't give exact date of surg-- toes and part of lt foot removed    FOOT SURGERY Right 3/2013       Social History:    Social History     Tobacco Use    Smoking status: Current Every Day Smoker     Packs/day: 1.00     Years: 30.00     Pack years: 30.00     Types: Cigarettes    Smokeless tobacco: Never Used    Tobacco comment: working on quitting smoking   Substance Use Topics    Alcohol use: No                                Ready to quit: Not Answered  Counseling given: Not Answered  Comment: working on quitting smoking      Vital Signs (Current):   Vitals:    06/19/20 2031 06/19/20 2330 06/20/20 0545 06/20/20 0619   BP:  (!) 141/52 (!) 142/54    Pulse:  66 64    Resp:  18 16    Temp:  98.3 °F (36.8 °C) 98.2 °F (36.8 °C)    TempSrc:  Oral Oral    SpO2: 94% 94% 94%    Weight:    248 lb 3.2 oz (112.6 kg)   Height:                                                  BP Readings from Last 3 Encounters:   06/20/20 (!) 142/54   03/30/18 (!) 146/71   02/21/14 152/82       NPO Status:                                                                                 BMI:   Wt Readings from Last 3 Encounters:   06/20/20 248 lb 3.2 oz (112.6 kg)   03/07/14 260 lb (117.9 kg)   02/21/14 260 lb (117.9 kg)     Body mass index is 33.66 kg/m².     CBC:   Lab Results   Component Value Date    WBC 6.3 06/19/2020    RBC 4.31 06/19/2020    RBC 4.20 10/10/2011    HGB 11.6 06/19/2020    HCT 36.1 06/19/2020    MCV 83.8 06/19/2020    RDW 13.4 06/19/2020

## 2020-06-20 NOTE — BRIEF OP NOTE
PODIATRY BRIEF OP NOTE    PATIENT NAME: Jim Anne  YOB: 1950  -  79 y.o. male  MRN: [id number]  DATE: 6/20/2020  BILLING #: [id number]    Surgeon(s):  Caterina Turner DPM     ASSISTANTS: Claudia Rey DPM PGY1    PRE-OP DIAGNOSIS:   1. Chronic nonhealing ulcer down to the level of fascia, left heel  2. DM2 with neuropathy  3. PVD    POST-OP DIAGNOSIS: Same as above. PROCEDURE:   1. Surgical wound bed prep with application of wound vac, Left foot  2. Application of wound vac, left    ANESTHESIA: MAC with local (10 cc of 1:1 mixture 1% lidocaine plain and 0.5% marcaine plain)    HEMOSTASIS: via direct pressure    ESTIMATED BLOOD LOSS: Less than 20cc. MATERIALS: Wound vac  * No implants in log *    INJECTABLES: none    SPECIMEN: soft tissue from left heel  ID Type Source Tests Collected by Time Destination   A : left soft tissue heel Tissue Foot SURGICAL PATHOLOGY Caterina Turner DPM 6/20/2020 3863        COMPLICATIONS: none    FINDINGS: Gray necrotic and fibrotic tissue debrided until bleeding tissue was noted. Down to the level of fascia. No purulence noted. Wound measures 5 x 3 x 3 cm. Wound vac was placed, good seal was noted. The patient was counseled at length about the risks of ruben Covid-19 during their perioperative period and any recovery window from their procedure. The patient was made aware that ruben Covid-19  may worsen their prognosis for recovering from their procedure  and lend to a higher morbidity and/or mortality risk. All material risks, benefits, and reasonable alternatives including postponing the procedure were discussed. The patient does wish to proceed with the procedure at this time.     Claudia Rey DPM   Podiatric Medicine & Surgery   6/20/2020 at 9:07 AM

## 2020-06-20 NOTE — PLAN OF CARE
Updated Plan:    Patient underwent angio of left lower extremity. Angioplasty of left anterior tibial artery. Restored blood flow via anterior tibial artery to the foot. Due to revascularization, patient has a better chance of healing debridement of left heel wound. Recommended to patient that formal debridement with possible application of wound VAC versus epi fix would increase chance of healing potential.  Patient confirms understanding and is amenable to procedure. All risks and benefits of the procedure were discussed at length prior to the patient signing the consent form. Plan for OR tomorrow (6/20/2020) at approximately 8 AM for incision and debridement of any nonviable soft tissue or bone with possible application of wound VAC versus epi fix graft, left foot. · Consent is signed, witnessed, and placed in chart. Left foot marked. · N.p.o. at midnight  · COVID negative. Appreciate medical clearance prior to surgery.       Electronically signed by Timmy Ortiz DPM on 6/19/2020 at 8:48 PM

## 2020-06-20 NOTE — PROGRESS NOTES
Pharmacy Note  Vancomycin Consult - Brief Note     Vancomycin Day: 4  Current Dose:  Vancomycin 1500mg IV q 24 hours  Patient's labs, cultures, vitals, and vancomycin regimen reviewed. No changes today. Vancomycin trough ordered for tomorrow 6/21 @ 0700. Current diagnosis for which MRSA is suspected/confirmed: diabetic foot infection  ONLY for suspected pneumonia or COPD: MRSA nasal swab  N/A: Non respiratory infection. .        TEMP: 98.2  Calculated CrCl based on IBW:  36   mL/min    Recent Labs     06/18/20  0534 06/19/20  0518   WBC 7.3 6.3     Recent Labs     06/19/20  0518 06/20/20  0449   CREATININE 1.94* 2.09*     Estimated Creatinine Clearance: 43 mL/min (A) (based on SCr of 2.09 mg/dL (H)). Intake/Output Summary (Last 24 hours) at 6/20/2020 0735  Last data filed at 6/20/2020 0216  Gross per 24 hour   Intake 286 ml   Output 975 ml   Net -689 ml         Thank you for the consult. Pharmacy will continue to follow.   Josh Sullivan RPh/PharmD  6/20/2020 7:35 AM

## 2020-06-20 NOTE — CARE COORDINATION
Social Work-Met with patient to discuss dc options. Discussed therapy recommendation of SNF for rehab and wound care. Patient refused. He has 2 hours of aide service in the morning. Discussed that without proper nursing care, that he is a risk to lose his foot. Patient  is concerned that if he goes to rehab, he will lose his apartment and DME. His brother lives in the same building, but is in a wheelchair. Patient continues to refuse SNF. He is max assist to transfer.  Mason Flor

## 2020-06-20 NOTE — PROGRESS NOTES
that includes Foot amputation through metatarsal (2011); Foot Amputation; and Foot surgery (Right, 3/2013). Restrictions  Restrictions/Precautions  Restrictions/Precautions: Weight Bearing, General Precautions, Fall Risk  Required Braces or Orthoses?: Yes  Lower Extremity Weight Bearing Restrictions  Left Lower Extremity Weight Bearing: Toe Touch Weight Bearing(for transfers)  Position Activity Restriction  Other position/activity restrictions: Right BKA with poorly fitting prosthesis, Left heel wounds  Subjective   General  Chart Reviewed: Yes  Patient assessed for rehabilitation services?: Yes  Family / Caregiver Present: No  Vital Signs  Patient Currently in Pain: No   Orientation     Objective    ADL  Feeding: Minimal assistance(Assist to cut food, open containers, set up per limits LUE)  Additional Comments: min asssit for bed mobility for upright position for lunch           Bed mobility  Rolling to Left: Minimal assistance  Rolling to Right: Minimal assistance  Comment: min assist to sit in upright position to eat lunch                             Functional Activity Tolerance  Functional Activity Tolerance: Tolerates 30 min exercise with multiple rests                          Hand Dominance  Hand Dominance: Right   Limited L UE [facility] and [address] with 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            Plan   Plan  Times per week: 4-5x/week, 1-2x/day  Current Treatment Recommendations: Strengthening, Balance Training, Functional Mobility Training, Endurance Training, Equipment Evaluation, Education, & procurement, Patient/Caregiver Education & Training, Self-Care / ADL, Safety Education & Training, Positioning  G-Code     OutComes Score                                                  AM-PAC Score        AM-[facility] Inpatient Daily Activity Raw Score: 12 (06/20/20 1330)  AM-PAC Inpatient ADL T-Scale Score : 30.6 (06/20/20 1330)  ADL Inpatient CMS 0-100% Score: 66.57 (06/20/20 1330)  ADL Inpatient CMS G-Code Modifier : CL (06/20/20 1330)    Goals  Short term goals  Time Frame for Short term goals: by discharge, pt will  Short term goal 1: demo mod A x 1 for ADL transfers with good safety, approp DME  Short term goal 2: demo mod A with toileting routine at approp level  Short term goal 3: demo SBA with UB ADLs and mod A LB ADLs with good safety/pacing, good adherance to WB restrictions and approp AE/DME  Short term goal 4: demo and good understanding of fall prevention techs, EC/WS techs, equip needs, and B UE HEP  Short term goal 5: demo mod A with functional mob short distances for ADL completion with approp AD/DME  Patient Goals   Patient goals : to go home       Therapy Time   Individual Concurrent Group Co-treatment   Time In  12:55         Time Out  13:36         Minutes  41 minutes           Patient would benefit from SNF for continued occupational therapy to increase independence with  ADL of bathing, dressing, toileting and grooming. Writer recommending SNF placement for for activity tolerance and strength which will increase independence with ADL's coordinated with bed mobility and chair transfers. Continued skilled OT services to address decreased safety awareness with ADL and IADL tasks and for education and increased independence with DME and AE for fall prevention and ec/ws techniques prior to d/c home.          Michelle Emery, OT

## 2020-06-20 NOTE — PROGRESS NOTES
Physical Therapy  DATE: 2020    NAME: Sary Moreno  MRN: [id number]   : 1950    Patient not seen this date for Physical Therapy due to:  [] Blood transfusion in progress  [] Cancel by RN  [] Hemodialysis  []  Refusal by Patient   [] Spine Precautions   [] Strict Bedrest  [x] Surgery: checked patient in morning, but at I&D  [] Testing      [] Other        [] PT being discontinued at this time. Patient independent. No further needs. [] PT being discontinued at this time as the patient has been transferred to hospice care. No further needs.     Stacy Funes, PT

## 2020-06-20 NOTE — PROGRESS NOTES
Vascular Surgery   Progress Note    6/20/2020 6:53 AM  Subjective:   Admit Date: 6/17/2020  PCP: Andi Russ  Interval History: Patient stable with no evidence of hematoma in the groin. Even with revascularization only has a single vessel runoff anterior tibial.  The remainder of his vessels to the foot are severely limited and chronically occluded.     Diet: Dietary Nutrition Supplements: Diabetic Oral Supplement  Diet NPO, After Midnight    Medications:   Scheduled Meds:   vancomycin  1,500 mg Intravenous Q24H    clopidogrel  75 mg Oral Daily    insulin glargine  75 Units Subcutaneous Nightly    insulin lispro  0-12 Units Subcutaneous TID WC    insulin lispro  0-6 Units Subcutaneous Nightly    cefepime  1 g Intravenous Q12H    atorvastatin  10 mg Oral Daily    insulin glargine  40 Units Subcutaneous QAM    budesonide-formoterol  2 puff Inhalation BID    metoprolol succinate  25 mg Oral BID    amLODIPine  10 mg Oral Daily    docusate sodium  100 mg Oral BID    ferrous sulfate  325 mg Oral Daily with breakfast    [Held by provider] furosemide  40 mg Oral BID    lisinopril  20 mg Oral Daily    traZODone  150 mg Oral Nightly    sodium chloride flush  10 mL Intravenous 2 times per day    collagenase   Topical Daily    metroNIDAZOLE  500 mg Intravenous Q8H    vancomycin (VANCOCIN) intermittent dosing (placeholder)   Other RX Placeholder    heparin (porcine)  5,000 Units Subcutaneous 3 times per day     Continuous Infusions:   dextrose 5% and 0.45% NaCl with KCl 20 mEq      dextrose      sodium chloride 75 mL/hr at 06/18/20 1745         Labs:   CBC:   Recent Labs     06/17/20  1700 06/18/20  0534 06/19/20  0518   WBC 8.8 7.3 6.3   HGB 13.0 12.6* 11.6*    158 149     BMP:    Recent Labs     06/18/20  0534 06/19/20  0518 06/20/20  0449    141 142   K 3.1* 4.2 3.8    108* 111*   CO2 23 23 24   BUN 21 19 19   CREATININE 2.02* 1.94* 2.09*   GLUCOSE 51* 126* 45*     Hepatic: No results for input(s): AST, ALT, ALB, BILITOT, ALKPHOS in the last 72 hours. Troponin: Invalid input(s): TROPONIN  BNP: No results for input(s): BNP in the last 72 hours. Lipids: No results for input(s): CHOL, HDL in the last 72 hours. Invalid input(s): LDLCALCU  INR: No results for input(s): INR in the last 72 hours. Objective:   Vitals: BP (!) 142/54   Pulse 64   Temp 98.2 °F (36.8 °C) (Oral)   Resp 16   Ht 6' (1.829 m)   Wt 248 lb 3.2 oz (112.6 kg)   SpO2 94%   BMI 33.66 kg/m²   General appearance: alert, cooperative and no distress  Mental Status: oriented to person, place and time with normal affect  Neck: good carotid pulses, no JVD  Lungs: clear to auscultation bilaterally, normal effort  Heart: regular rate and rhythm, no murmur,  Abdomen: soft, non-tender, non-distended, bowel sounds present all four quadrants, no masses, hepatomegaly, splenomegaly or aortic enlargement      Assessment:   [address] [facility] 70-year-old male with diabetic foot infection and peripheral arterial disease postop day #1 status post angiogram with angioplasty of anterior tibial artery    Patient Active Problem List:     Uncontrolled hypertension     PVD (peripheral vascular disease) (HCC)     Type 2 diabetes mellitus with stage 3 chronic kidney disease, with long-term current use of insulin (HCC)     Insomnia     Erectile dysfunction     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 From a vascular standpoint his perfusion is improved. 2. Continue Plavix daily. 3. Continue local wound care.   This

## 2020-06-20 NOTE — PROGRESS NOTES
Progress Note  Podiatric Medicine and Surgery     Subjective     CC: Left plantar heel wound    Afebrile, VSS  Denies any pain  Denies any N/V/F/C/CP/SOB  OR today for I&D with application of wound vac vs epifix at 8 AM    HPI :  Cassie Davis is a 79 y.o. male seen at [address] [place] [address],[address] for a heel wound on the left foot. The patient states the wound has been getting progressively worse with increasing serous drainage and edema. The patient was previously cared for by Dr. Roney Wilcox, but states he does not currently follow with a podiatrist. 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 Their last HgbA1c was 7.5% in September 2019. Patient admitted to the hospital for further work up of his CHF and hypertension. The patient denies any nausea, vomiting, fever, chills, shortness of breath.     PCP is No primary care provider on file. ROS: Denies N/V/F/C/SOB/CP. Otherwise negative except at stated in the HPI.      Medications:  Scheduled Meds:   vancomycin  1,500 mg Intravenous Q24H    clopidogrel  75 mg Oral Daily    insulin glargine  75 Units Subcutaneous Nightly    insulin lispro  0-12 Units Subcutaneous TID WC    insulin lispro  0-6 Units Subcutaneous Nightly    cefepime  1 g Intravenous Q12H    atorvastatin  10 mg Oral Daily    insulin glargine  40 Units Subcutaneous QAM    budesonide-formoterol  2 puff Inhalation BID    metoprolol succinate  25 mg Oral BID    amLODIPine  10 mg Oral Daily    docusate sodium  100 mg Oral BID    ferrous sulfate  325 mg Oral Daily with breakfast    [Held by provider] furosemide  40 mg Oral BID    lisinopril  20 mg Oral Daily    traZODone  150 mg Oral Nightly    sodium chloride flush  10 mL Intravenous 2 times per day    collagenase   Topical Daily    metroNIDAZOLE  500 mg Intravenous Q8H    vancomycin (VANCOCIN) intermittent dosing (placeholder)   Other RX Placeholder    heparin (porcine)  5,000 Units Subcutaneous 3 times per day       Continuous Infusions:   dextrose 5% and 0.45% NaCl with KCl 20 mEq      dextrose      sodium chloride 75 mL/hr at 20 1745       PRN Meds:diphenhydrAMINE, acetaminophen, glucose, dextrose, glucagon (rDNA), dextrose, oxyCODONE-acetaminophen, sodium chloride flush, potassium chloride **OR** potassium alternative oral replacement **OR** potassium chloride, magnesium sulfate, [DISCONTINUED] acetaminophen **OR** acetaminophen, polyethylene glycol, promethazine **OR** ondansetron, nicotine, hydrALAZINE    Objective     Vitals:  Patient Vitals for the past 8 hrs:   BP Temp Temp src Pulse Resp SpO2 Weight   20 0619 -- -- -- -- -- -- 248 lb 3.2 oz (112.6 kg)   20 0545 (!) 142/54 98.2 °F (36.8 °C) Oral 64 16 94 % --     Average, Min, and Max for last 24 hours Vitals:  TEMPERATURE:  Temp  Av.9 °F (36.6 °C)  Min: 96.8 °F (36 °C)  Max: 98.3 °F (36.8 °C)    RESPIRATIONS RANGE: Resp  Av.1  Min: 12  Max: 20    PULSE RANGE: Pulse  Av.5  Min: 64  Max: 73    BLOOD PRESSURE RANGE:  Systolic (32XYA), WYC:410 , Min:141 , PVZ:626   ; Diastolic (52JQA), DANIA:29, Min:52, Max:69      PULSE OXIMETRY RANGE: SpO2  Av.5 %  Min: 94 %  Max: 96 %    I/O last 3 completed shifts: In: 286 [P.O.:240; I.V.:46]  Out: 975 [Urine:975]    CBC:  Recent Labs     20  1700 20  0534 20  0518   WBC 8.8 7.3 6.3   HGB 13.0 12.6* 11.6*   HCT 41.1 39.5* 36.1*    158 149   CRP 33.1*  --   --         BMP:  Recent Labs     20  0534 20  0518 20  0449    141 142   K 3.1* 4.2 3.8    108* 111*   CO2 23 23 24   BUN  19   CREATININE 2.02* 1.94* 2.09*   GLUCOSE 51* 126* 45*   CALCIUM 8.5* 8.1* 8.2*        Coags:  No results for input(s): APTT, PROT, INR in the last 72 hours.     Lab Results   Component Value Date    SEDRATE 128 (H) 2020     Recent Labs     20  1700   CRP 33.1*       Left Lower Extremity Physical Exam: Exam from , dressings intact  Vascular: DP and PT pulses patient. · No apparent soft tissue emphysema or osteomyelitis appreciated. · NIVS: Left: 0.3, tibial arterial occlusion  · Vascular surgery following. Received angio yesterday, revascularized flow to anterior tibial artery. · Medical management per Internal Medicine. · Abx: Cefepime/Vanc/flagyl, ID consulted  · Plan for OR today (6/20/2020) at 8 AM for I&D with possible application of wound VAC vs epifix graft, left foot. · Consent is signed, witnessed, and placed in chart. Left foot marked. · N.p.o. at midnight  · COVID negative. · Dressing applied to Left foot: santyl + DSD   · Continue to use ROOKE/PRAFO boot to the left lower extremity to elevate heels. · Toe touch for transfers to Left lower extremity. · Will discuss with  Dr. Shannon Chna.     Parker Tyler DPM   Podiatric Medicine & Surgery   6/20/2020 at 7:51 AM

## 2020-06-20 NOTE — PLAN OF CARE
[address] [place] [address]    Second Visit Note  For more detailed information please refer to the progress note of the day      6/20/2020    4:14 PM    Name:   Anne Rodríguez  MRN:     [id number]     Acct:      [de-identified]   Room:   [address] [place] [address] Day:  3  Admit Date:  6/17/2020  4:26 PM    PCP:   Bud Dallas  Code Status:  Full Code      Pt vitals were reviewed   New labs were reviewed   Patient was seen    Updated plan :     1. Monitor foot wound  2.  Cont iv antibiotics        Dilip Relic Blood, DO  6/20/2020  4:14 PM

## 2020-06-21 LAB
ANION GAP SERPL CALCULATED.3IONS-SCNC: 10 MMOL/L (ref 9–17)
BUN BLDV-MCNC: 14 MG/DL (ref 8–23)
BUN/CREAT BLD: 8 (ref 9–20)
C-REACTIVE PROTEIN: 32.2 MG/L (ref 0–5)
CALCIUM SERPL-MCNC: 8.1 MG/DL (ref 8.6–10.4)
CHLORIDE BLD-SCNC: 113 MMOL/L (ref 98–107)
CO2: 21 MMOL/L (ref 20–31)
CREAT SERPL-MCNC: 1.85 MG/DL (ref 0.7–1.2)
CULTURE: ABNORMAL
CULTURE: ABNORMAL
DIRECT EXAM: ABNORMAL
DIRECT EXAM: ABNORMAL
GFR AFRICAN AMERICAN: 44 ML/MIN
GFR NON-AFRICAN AMERICAN: 36 ML/MIN
GFR SERPL CREATININE-BSD FRML MDRD: ABNORMAL ML/MIN/{1.73_M2}
GFR SERPL CREATININE-BSD FRML MDRD: ABNORMAL ML/MIN/{1.73_M2}
GLUCOSE BLD-MCNC: 112 MG/DL (ref 75–110)
GLUCOSE BLD-MCNC: 130 MG/DL (ref 75–110)
GLUCOSE BLD-MCNC: 34 MG/DL (ref 75–110)
GLUCOSE BLD-MCNC: 46 MG/DL (ref 75–110)
GLUCOSE BLD-MCNC: 81 MG/DL (ref 75–110)
GLUCOSE BLD-MCNC: 91 MG/DL (ref 75–110)
GLUCOSE BLD-MCNC: 94 MG/DL (ref 70–99)
GLUCOSE BLD-MCNC: 99 MG/DL (ref 75–110)
Lab: ABNORMAL
POTASSIUM SERPL-SCNC: 4 MMOL/L (ref 3.7–5.3)
SODIUM BLD-SCNC: 144 MMOL/L (ref 135–144)
SPECIMEN DESCRIPTION: ABNORMAL
VANCOMYCIN TROUGH DATE LAST DOSE: NORMAL
VANCOMYCIN TROUGH DOSE AMOUNT: NORMAL
VANCOMYCIN TROUGH TIME LAST DOSE: NORMAL
VANCOMYCIN TROUGH: 17.9 UG/ML (ref 10–20)

## 2020-06-21 PROCEDURE — 2580000003 HC RX 258: Performed by: SURGERY

## 2020-06-21 PROCEDURE — 2580000003 HC RX 258: Performed by: INTERNAL MEDICINE

## 2020-06-21 PROCEDURE — 6360000002 HC RX W HCPCS: Performed by: SURGERY

## 2020-06-21 PROCEDURE — 6370000000 HC RX 637 (ALT 250 FOR IP): Performed by: SURGERY

## 2020-06-21 PROCEDURE — 2060000000 HC ICU INTERMEDIATE R&B

## 2020-06-21 PROCEDURE — 99232 SBSQ HOSP IP/OBS MODERATE 35: CPT | Performed by: INTERNAL MEDICINE

## 2020-06-21 PROCEDURE — 2500000003 HC RX 250 WO HCPCS: Performed by: SURGERY

## 2020-06-21 PROCEDURE — 80048 BASIC METABOLIC PNL TOTAL CA: CPT

## 2020-06-21 PROCEDURE — 94640 AIRWAY INHALATION TREATMENT: CPT

## 2020-06-21 PROCEDURE — G0378 HOSPITAL OBSERVATION PER HR: HCPCS

## 2020-06-21 PROCEDURE — 82947 ASSAY GLUCOSE BLOOD QUANT: CPT

## 2020-06-21 PROCEDURE — 96366 THER/PROPH/DIAG IV INF ADDON: CPT

## 2020-06-21 PROCEDURE — 80202 ASSAY OF VANCOMYCIN: CPT

## 2020-06-21 PROCEDURE — 86140 C-REACTIVE PROTEIN: CPT

## 2020-06-21 PROCEDURE — 36415 COLL VENOUS BLD VENIPUNCTURE: CPT

## 2020-06-21 PROCEDURE — 96372 THER/PROPH/DIAG INJ SC/IM: CPT

## 2020-06-21 PROCEDURE — 96361 HYDRATE IV INFUSION ADD-ON: CPT

## 2020-06-21 PROCEDURE — 6370000000 HC RX 637 (ALT 250 FOR IP): Performed by: INTERNAL MEDICINE

## 2020-06-21 PROCEDURE — 96376 TX/PRO/DX INJ SAME DRUG ADON: CPT

## 2020-06-21 PROCEDURE — 96375 TX/PRO/DX INJ NEW DRUG ADDON: CPT

## 2020-06-21 RX ORDER — DEXTROSE AND SODIUM CHLORIDE 5; .9 G/100ML; G/100ML
INJECTION, SOLUTION INTRAVENOUS CONTINUOUS
Status: DISCONTINUED | OUTPATIENT
Start: 2020-06-21 | End: 2020-06-22 | Stop reason: HOSPADM

## 2020-06-21 RX ORDER — INSULIN GLARGINE 100 [IU]/ML
40 INJECTION, SOLUTION SUBCUTANEOUS NIGHTLY
Status: DISCONTINUED | OUTPATIENT
Start: 2020-06-21 | End: 2020-06-22 | Stop reason: HOSPADM

## 2020-06-21 RX ADMIN — TRAZODONE HYDROCHLORIDE 150 MG: 50 TABLET ORAL at 22:49

## 2020-06-21 RX ADMIN — CEFEPIME HYDROCHLORIDE 1 G: 1 INJECTION, POWDER, FOR SOLUTION INTRAMUSCULAR; INTRAVENOUS at 05:33

## 2020-06-21 RX ADMIN — BUDESONIDE AND FORMOTEROL FUMARATE DIHYDRATE 2 PUFF: 160; 4.5 AEROSOL RESPIRATORY (INHALATION) at 07:42

## 2020-06-21 RX ADMIN — DEXTROSE AND SODIUM CHLORIDE: 5; 900 INJECTION, SOLUTION INTRAVENOUS at 17:24

## 2020-06-21 RX ADMIN — METRONIDAZOLE 500 MG: 500 INJECTION, SOLUTION INTRAVENOUS at 13:36

## 2020-06-21 RX ADMIN — METOPROLOL SUCCINATE 25 MG: 25 TABLET, EXTENDED RELEASE ORAL at 07:56

## 2020-06-21 RX ADMIN — METRONIDAZOLE 500 MG: 500 INJECTION, SOLUTION INTRAVENOUS at 06:17

## 2020-06-21 RX ADMIN — LISINOPRIL 20 MG: 10 TABLET ORAL at 07:56

## 2020-06-21 RX ADMIN — HEPARIN SODIUM 5000 UNITS: 5000 INJECTION INTRAVENOUS; SUBCUTANEOUS at 13:36

## 2020-06-21 RX ADMIN — CEFEPIME HYDROCHLORIDE 1 G: 1 INJECTION, POWDER, FOR SOLUTION INTRAMUSCULAR; INTRAVENOUS at 16:35

## 2020-06-21 RX ADMIN — BUDESONIDE AND FORMOTEROL FUMARATE DIHYDRATE 2 PUFF: 160; 4.5 AEROSOL RESPIRATORY (INHALATION) at 20:36

## 2020-06-21 RX ADMIN — ATORVASTATIN CALCIUM 10 MG: 10 TABLET, FILM COATED ORAL at 07:56

## 2020-06-21 RX ADMIN — COLLAGENASE SANTYL: 250 OINTMENT TOPICAL at 07:57

## 2020-06-21 RX ADMIN — FERROUS SULFATE TAB EC 325 MG (65 MG FE EQUIVALENT) 325 MG: 325 (65 FE) TABLET DELAYED RESPONSE at 07:56

## 2020-06-21 RX ADMIN — DOCUSATE SODIUM 100 MG: 100 CAPSULE, LIQUID FILLED ORAL at 20:27

## 2020-06-21 RX ADMIN — HYDRALAZINE HYDROCHLORIDE 20 MG: 20 INJECTION INTRAMUSCULAR; INTRAVENOUS at 17:25

## 2020-06-21 RX ADMIN — DIPHENHYDRAMINE HCL 25 MG: 25 TABLET ORAL at 20:27

## 2020-06-21 RX ADMIN — HYDROCORTISONE: 25 OINTMENT TOPICAL at 20:27

## 2020-06-21 RX ADMIN — DEXTROSE MONOHYDRATE 12.5 G: 25 INJECTION, SOLUTION INTRAVENOUS at 16:32

## 2020-06-21 RX ADMIN — AMLODIPINE BESYLATE 10 MG: 5 TABLET ORAL at 07:56

## 2020-06-21 RX ADMIN — CLOPIDOGREL BISULFATE 75 MG: 75 TABLET ORAL at 08:08

## 2020-06-21 RX ADMIN — METRONIDAZOLE 500 MG: 500 INJECTION, SOLUTION INTRAVENOUS at 22:54

## 2020-06-21 RX ADMIN — METOPROLOL SUCCINATE 25 MG: 25 TABLET, EXTENDED RELEASE ORAL at 16:32

## 2020-06-21 RX ADMIN — HYDROCORTISONE: 25 OINTMENT TOPICAL at 13:36

## 2020-06-21 RX ADMIN — DIPHENHYDRAMINE HCL 25 MG: 25 TABLET ORAL at 13:36

## 2020-06-21 RX ADMIN — HEPARIN SODIUM 5000 UNITS: 5000 INJECTION INTRAVENOUS; SUBCUTANEOUS at 05:33

## 2020-06-21 RX ADMIN — OXYCODONE HYDROCHLORIDE AND ACETAMINOPHEN 1 TABLET: 10; 325 TABLET ORAL at 20:27

## 2020-06-21 RX ADMIN — DEXTROSE MONOHYDRATE 12.5 G: 25 INJECTION, SOLUTION INTRAVENOUS at 04:09

## 2020-06-21 RX ADMIN — HEPARIN SODIUM 5000 UNITS: 5000 INJECTION INTRAVENOUS; SUBCUTANEOUS at 20:27

## 2020-06-21 RX ADMIN — DOCUSATE SODIUM 100 MG: 100 CAPSULE, LIQUID FILLED ORAL at 07:56

## 2020-06-21 RX ADMIN — VANCOMYCIN HYDROCHLORIDE 1500 MG: 1.5 INJECTION, POWDER, LYOPHILIZED, FOR SOLUTION INTRAVENOUS at 07:56

## 2020-06-21 ASSESSMENT — ENCOUNTER SYMPTOMS
ALLERGIC/IMMUNOLOGIC NEGATIVE: 1
RESPIRATORY NEGATIVE: 1
GASTROINTESTINAL NEGATIVE: 1

## 2020-06-21 ASSESSMENT — PAIN SCALES - GENERAL
PAINLEVEL_OUTOF10: 0
PAINLEVEL_OUTOF10: 7
PAINLEVEL_OUTOF10: 4

## 2020-06-21 NOTE — PLAN OF CARE
Problem: Falls - Risk of:  Goal: Will remain free from falls  Description: Will remain free from falls  Outcome: Ongoing   Will remain free from falls. Fall risk assessment completed. Patient instructed to use call light. Bed locked and in lowest position, side rails up 2/4, call light and bedside table within reach, clutter removed, and non-skid footwear on when pt out of bed. Hourly rounds will continue.      Problem: Physical Regulation:  Goal: Diagnostic test results will improve  Description: Diagnostic test results will improve  Outcome: Ongoing     Problem: Physical Regulation:  Goal: Will remain free from infection  Description: Will remain free from infection  Outcome: Ongoing     Problem: Physical Regulation:  Goal: Ability to maintain vital signs within normal range will improve  Description: Ability to maintain vital signs within normal range will improve  Outcome: Ongoing     Problem: Skin Integrity:  Goal: Demonstration of wound healing without infection will improve  Description: Demonstration of wound healing without infection will improve  Outcome: Ongoing     Problem: Skin Integrity:  Goal: Complications related to intravenous access or infusion will be avoided or minimized  Description: Complications related to intravenous access or infusion will be avoided or minimized  Outcome: Ongoing     Problem: Pain:  Goal: Pain level will decrease  Description: Pain level will decrease  Outcome: Ongoing     Problem: Nutrition  Goal: Optimal nutrition therapy  Outcome: Ongoing

## 2020-06-21 NOTE — PROGRESS NOTES
Pharmacy Note  Vancomycin Consult - Follow Up     Vancomycin Therapy Day: 5  Current Dosing: Vancomycin 1500mg IV q 24 hours  Current diagnosis for which MRSA is suspected/confirmed: diabetic foot infection  ONLY for suspected pneumonia or COPD: MRSA nasal swab   N/A: Non respiratory infection. .      Temp: 98.2    Actual Weight:   Wt Readings from Last 1 Encounters:   06/21/20 253 lb 4.8 oz (114.9 kg)       Recent Labs     06/20/20  0449 06/21/20  0701   CREATININE 2.09* 1.85*     Calculate CrCl based on IBW: 40 ml/min    Recent Labs     06/19/20  0518   WBC 6.3         Intake/Output Summary (Last 24 hours) at 6/21/2020 0745  Last data filed at 6/21/2020 0411  Gross per 24 hour   Intake 4908 ml   Output 1150 ml   Net 3758 ml           ASSESSMENT/PLAN    Vancomycin trough drawn this AM was 17.9 ug/ml, within our goal range of 15 - 20 ug/ml. Continue current vancomycin dosing of 1500mg IV q 24 hours. Thank you for the consult. Will Continue to follow.   Antonino Beltran RPh  6/21/2020  7:45 AM

## 2020-06-21 NOTE — PROGRESS NOTES
Progress Note  Podiatric Medicine and Surgery     Subjective     CC: Left plantar heel wound  Afebrile, hypertensive  Pain controlled  Denies any N/V/F/C/CP/SOB  Status post POD #1 left foot I&D with application of wound vac (D OS: 6/20/2020)    HPI :  Stewart Wells is a 79 y.o. male seen at [address] [place], [place] [id number],[address] for a heel wound on the left foot. The patient states the wound has been getting progressively worse with increasing serous drainage and edema. The patient was previously cared for by Dr. Saida Singh, but states he does not currently follow with a podiatrist. 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 Their last HgbA1c was 7.5% in September 2019. Patient admitted to the hospital for further work up of his CHF and hypertension. The patient denies any nausea, vomiting, fever, chills, shortness of breath.     PCP is No primary care provider on file. ROS: Denies N/V/F/C/SOB/CP. Otherwise negative except at stated in the HPI.      Medications:  Scheduled Meds:   insulin glargine  50 Units Subcutaneous Nightly    vancomycin  1,500 mg Intravenous Q24H    clopidogrel  75 mg Oral Daily    insulin lispro  0-12 Units Subcutaneous TID WC    insulin lispro  0-6 Units Subcutaneous Nightly    cefepime  1 g Intravenous Q12H    atorvastatin  10 mg Oral Daily    [Held by provider] insulin glargine  40 Units Subcutaneous QAM    budesonide-formoterol  2 puff Inhalation BID    metoprolol succinate  25 mg Oral BID    amLODIPine  10 mg Oral Daily    docusate sodium  100 mg Oral BID    ferrous sulfate  325 mg Oral Daily with breakfast    [Held by provider] furosemide  40 mg Oral BID    lisinopril  20 mg Oral Daily    traZODone  150 mg Oral Nightly    sodium chloride flush  10 mL Intravenous 2 times per day    collagenase   Topical Daily    metroNIDAZOLE  500 mg Intravenous Q8H    vancomycin (VANCOCIN) intermittent dosing (placeholder)   Other RX Placeholder    heparin (porcine)  5,000 Units Subcutaneous 3 times per day       Continuous Infusions:   dextrose 5% and 0.45% NaCl with KCl 20 mEq      dextrose      sodium chloride 75 mL/hr at 20 1223       PRN Meds:diphenhydrAMINE, acetaminophen, glucose, dextrose, glucagon (rDNA), dextrose, oxyCODONE-acetaminophen, sodium chloride flush, potassium chloride **OR** potassium alternative oral replacement **OR** potassium chloride, magnesium sulfate, [DISCONTINUED] acetaminophen **OR** acetaminophen, polyethylene glycol, promethazine **OR** ondansetron, nicotine, hydrALAZINE    Objective     Vitals:  Patient Vitals for the past 8 hrs:   BP Temp Temp src Pulse Resp SpO2 Weight   20 0646 -- -- -- -- -- -- 253 lb 4.8 oz (114.9 kg)   20 0401 (!) 152/60 98.2 °F (36.8 °C) Oral 69 16 97 % --     Average, Min, and Max for last 24 hours Vitals:  TEMPERATURE:  Temp  Av.5 °F (36.4 °C)  Min: 96.8 °F (36 °C)  Max: 98.5 °F (36.9 °C)    RESPIRATIONS RANGE: Resp  Av.5  Min: 0  Max: 21    PULSE RANGE: Pulse  Av.8  Min: 65  Max: 73    BLOOD PRESSURE RANGE:  Systolic (59DVF), AIN:497 , Min:137 , QOA:569   ; Diastolic (16CSC), ATQ:50, Min:57, Max:116      PULSE OXIMETRY RANGE: SpO2  Av.6 %  Min: 94 %  Max: 100 %    I/O last 3 completed shifts: In: 4908 [P.O.:990; I.V.:3918]  Out: 1150 [Urine:1150]    CBC:  Recent Labs     20  0518   WBC 6.3   HGB 11.6*   HCT 36.1*           BMP:  Recent Labs     2018 20  0449    142   K 4.2 3.8   * 111*   CO2 23 24   BUN 19 19   CREATININE 1.94* 2.09*   GLUCOSE 126* 45*   CALCIUM 8.1* 8.2*        Coags:  No results for input(s): APTT, PROT, INR in the last 72 hours. Lab Results   Component Value Date    SEDRATE 128 (H) 2020     No results for input(s): CRP in the last 72 hours.     Left Lower Extremity Physical Exam:     Vascular: DP and PT pulses are Non-palpable but dopplerable.      Neuro: Saph/sural/SP/DP/plantar sensation diminished to light touch.     Musculoskeletal: Muscle strength is 4/5 to all lower extremity muscle groups on the left. Gross deformity is s/p right BKA.     Dermatologic:  Wound VAC with black foam in place with good seal.  Sanguinous drainage noted in canister. Underlying full thickness ulcer located left plantar heel and measures approximately 5cm x 3.0cm x 3 cm. No fluctuance, crepitus, or induration. Interdigital maceration absent. Clinical Images: Wound VAC intact        Imaging:   VL Lower Extremity Arterial Segmental Pressures W Ppg   Final Result      XR CHEST PORTABLE   Final Result   No acute cardiopulmonary disease. XR FOOT LEFT (MIN 3 VIEWS)   Final Result   Status post transmetatarsal amputation. Diffuse soft tissue edema. No convincing evidence for osteomyelitis. Cultures:  Cx 6/17: non lactose fermenting GNR, mixed bacterial morphotypes  Intra-Op surgical path 6/20: Pending    Assessment   Jens Ferrer is a 79 y.o. male with   1. I&D with application of wound VAC, left foot (D OS: 6/20/2020)  2. Status post Montesinos grade 2 wound, left foot  3. S/p R BKA  4. Type II DM with secondary peripheral neuropathy  5. PAD    Principal Problem:    Diabetic foot infection ([address]  Active Problems:    Uncontrolled hypertension    PVD (peripheral vascular disease) ([address]    Type 2 diabetes mellitus with stage 3 chronic kidney disease, with long-term current use of insulin (HCC)    Tobacco abuse    RISSA (acute kidney injury) ([address]    Dyslipidemia    Tobacco dependence    Diabetic ulcer of left heel associated with type 2 diabetes mellitus (HCC)    Elevated troponin    Hypomagnesemia    Hypokalemia  Resolved Problems:    * No resolved hospital problems. *       Plan     · Treatment options discussed in detail with the patient. · Radiographs reviewed and discussed in detail with the patient. · No apparent soft tissue emphysema or osteomyelitis appreciated.   · NIVS: Left: 0.3, tibial arterial occlusion  · Vascular surgery following. Received angio, revascularized flow to anterior tibial artery. · Medical management per Internal Medicine. · Abx: Cefepime/Vanc/flagyl, ID consulted  · Patient is stable from podiatry standpoint. Patient is status post postop day #1 of left heel I&D with application of wound VAC (D OS: 2020). Explained to patient in detail that wound VAC will be changed 3 times a week and this will help granulate new tissue to form at area of wound. Recommended patient go to a facility due to limited to help at home. Patient states he has had a wound vac before and has a aide that comes to his house a few times a week. He usually uses a motorized wheelchair and walker, which he feels he is comfortable using tpo be NWB to the R. Patient is currently refusing facility and would like to be discharged home with home care. · Upon discharge patient will follow-up with Dr. Benetta Apgar within 1 week. Wound VAC changes to occur every //S by home care versus facility. Wound vac paperwork complete. Wound area to be kept clean, dry, and intact and to avoid getting it wet or moist.  · Dressing intact to Left foot: Wound VAC with black foam at 125 mmHg continuous. To be changed every Tuesday/Thursday/Saturday-next change on 2020. · Continue to use ROOKE/PRAFO boot to the left lower extremity to elevate heels. · Nonweightbearing to the left extremity  · Will discuss with  Dr. Benetta Apgar.     Chrissy Arias DPM   Podiatric Medicine & Surgery   2020 at 7:24 AM

## 2020-06-21 NOTE — FLOWSHEET NOTE
Patient was sleeping. Silent prayer shared. Follow up as needed.      06/21/20 1610   Encounter Summary   Services provided to: Patient   Referral/Consult From: Rounding   Continue Visiting   (6-21-20)   Complexity of Encounter Low   Length of Encounter 15 minutes   Routine   Type Initial   Assessment Sleeping   Intervention Prayer

## 2020-06-21 NOTE — PROGRESS NOTES
[facility]    Progress Note    6/21/2020    8:03 AM    Name:   Sherry Lloyd  MRN:     [id number]     Acct:      [de-identified]   Room:   [address] [place] [address] Day:  4  Admit Date:  6/17/2020  4:26 PM    PCP:   Shruthi Wolf  Code Status:  Full Code    Subjective:     C/C:   Chief Complaint   Patient presents with    Wound Infection     left foot    Hypertension     Interval History Status: not changed. Feels about the same  \no cp/sob/n/v  C/o itchy back and \"bumps\" there    Brief History:     Per my BLAYNE:  \"Rohan Sr is a 79 y.o. Non-/non  male who presents with Wound Infection (left foot) and Hypertension   and is admitted to the hospital for the management of Diabetic foot infection ([facility] [address]    Patient reports to the hospital with complaint of high blood pressure and worsening left foot wound.  The patient states that he developed a wound to the bottom of his foot several weeks ago. Jammie Ruiz has a visiting RN that comes to the house and performs dressing changes 3X week. Jammie Ruiz states that the nurse noted increased drainage to the area. He was subsequently advised to report to the hospital for evaluation. Jammie Ruiz had also had increased blood pressure readings. He is on multiple antihypertensive agents. He endorses neuropathy to his left lower extremity. He has a right BKA.  He denies nausea, vomiting, fever or chills.  He has additional past medical history that includes CKD, diabetes, hypertension and hyperlipidemia.  He is a current every day cigarette smoker. \"    Review of Systems:     Constitutional:  negative for chills, fevers, sweats  Respiratory:  negative for cough, dyspnea on exertion, shortness of breath, wheezing  Cardiovascular:  negative for chest pain, chest pressure/discomfort, palpitations  Gastrointestinal:  negative for abdominal pain, constipation, diarrhea, nausea, vomiting  Neurological:  negative for dizziness, headache    Medications:      Allergies:  No Known Allergies    Current Meds:   Scheduled Meds:    insulin glargine  50 Units Subcutaneous Nightly    vancomycin  1,500 mg Intravenous Q24H    clopidogrel  75 mg Oral Daily    insulin lispro  0-12 Units Subcutaneous TID WC    insulin lispro  0-6 Units Subcutaneous Nightly    cefepime  1 g Intravenous Q12H    atorvastatin  10 mg Oral Daily    [Held by provider] insulin glargine  40 Units Subcutaneous QAM    budesonide-formoterol  2 puff Inhalation BID    metoprolol succinate  25 mg Oral BID    amLODIPine  10 mg Oral Daily    docusate sodium  100 mg Oral BID    ferrous sulfate  325 mg Oral Daily with breakfast    [Held by provider] furosemide  40 mg Oral BID    lisinopril  20 mg Oral Daily    traZODone  150 mg Oral Nightly    sodium chloride flush  10 mL Intravenous 2 times per day    collagenase   Topical Daily    metroNIDAZOLE  500 mg Intravenous Q8H    vancomycin (VANCOCIN) intermittent dosing (placeholder)   Other RX Placeholder    heparin (porcine)  5,000 Units Subcutaneous 3 times per day     Continuous Infusions:    dextrose 5% and 0.45% NaCl with KCl 20 mEq      dextrose      sodium chloride 75 mL/hr at 06/20/20 1223     PRN Meds: diphenhydrAMINE, acetaminophen, glucose, dextrose, glucagon (rDNA), dextrose, oxyCODONE-acetaminophen, sodium chloride flush, potassium chloride **OR** potassium alternative oral replacement **OR** potassium chloride, magnesium sulfate, [DISCONTINUED] acetaminophen **OR** acetaminophen, polyethylene glycol, promethazine **OR** ondansetron, nicotine, hydrALAZINE    Data:     Past Medical History:   has a past medical history of RISSA (acute kidney injury) ([facility] [address] Cerebrovascular disease, Erectile dysfunction, Hepatitis B infection, Hyperlipidemia, Hypertension, Kidney cysts, Liver cyst, MRSA (methicillin resistant staph aureus) culture positive, Neurotrophic ulcer of the foot ([facility] [address] Obesity, Osteomyelitis of ankle or foot, Peripheral vascular disease ([facility] [address] Tobacco abuse, and Type II or unspecified type diabetes mellitus without mention of complication, not stated as uncontrolled. Social History:   reports that he has been smoking cigarettes. He has a 30.00 pack-year smoking history. He has never used smokeless tobacco. He reports that he does not drink alcohol or use drugs. Family History:   Family History   Problem Relation Age of Onset    Diabetes Mother     Diabetes Sister     Diabetes Brother     Diabetes Sister     Diabetes Sister     Diabetes Sister        Vitals:  BP (!) 158/60   Pulse 67   Temp 97.7 °F (36.5 °C) (Oral)   Resp 18   Ht 6' (1.829 m)   Wt 253 lb 4.8 oz (114.9 kg)   SpO2 96%   BMI 34.35 kg/m²   Temp (24hrs), Av.5 °F (36.4 °C), Min:96.8 °F (36 °C), Max:98.5 °F (36.9 °C)    Recent Labs     20  0406 20  0415 20  0753   POCGLU 174* 34* 99 81       I/O (24Hr):     Intake/Output Summary (Last 24 hours) at 2020 0803  Last data filed at 2020 0411  Gross per 24 hour   Intake 4908 ml   Output 1150 ml   Net 3758 ml       Labs:  Hematology:  Recent Labs     20   WBC 6.3   RBC 4.31   HGB 11.6*   HCT 36.1*   MCV 83.8   MCH 26.9   MCHC 32.1   RDW 13.4      MPV 10.7     Chemistry:  Recent Labs     20  0518 20  0449 20  0701    142 144   K 4.2 3.8 4.0   * 111* 113*   CO2 23 24 21   GLUCOSE 126* 45* 94   BUN 19 19 14   CREATININE 1.94* 2.09* 1.85*   MG 1.9  --   --    ANIONGAP 10 7* 10   LABGLOM 34* 32* 36*   GFRAA 42* 38* 44*   CALCIUM 8.1* 8.2* 8.1*     Recent Labs     20  1136 20  1605 20  0406 20  0415 20  0753   POCGLU 192* 175* 174* 34* 99 81     ABG:No results found for: POCPH, PHART, PH, POCPCO2, AYH1KZM, PCO2, POCPO2, PO2ART, PO2, POCHCO3, KTO1SCY, HCO3, NBEA, PBEA, BEART, BE, THGBART, THB, TQA9LNA, RQAL0YLH, G3WKXAXI, O2SAT, FIO2  Lab Results   Component Value Date/Time    [facility] [place] 12ML 06/17/2020 10:21 PM     Lab Results   Component Value Date/Time    CULTURE NO GROWTH 3 DAYS 06/17/2020 10:21 PM       Radiology:  Luiz Hess Foot Left (min 3 Views)    Result Date: 6/17/2020  Status post transmetatarsal amputation. Diffuse soft tissue edema. No convincing evidence for osteomyelitis. Xr Chest Portable    Result Date: 6/17/2020  No acute cardiopulmonary disease. Physical Examination:        General appearance:  alert, cooperative and no distress  Mental Status:  oriented to person, place and time and normal affect  Lungs:  clear to auscultation bilaterally, normal effort  Heart:  regular rate and rhythm, no murmur  Abdomen:  soft, nontender, nondistended, normal bowel sounds, no masses, hepatomegaly, splenomegaly  Extremities:  no redness, tenderness in the calves  Skin:  Left foot bandaged    Assessment:        Hospital Problems           Last Modified POA    * (Principal) Diabetic foot infection due to pad ([address] 6/17/2020 Yes    Uncontrolled hypertension 6/17/2020 Yes    PVD (peripheral vascular disease) ([address] 6/18/2020 Yes    Type 2 diabetes mellitus with stage 3 chronic kidney disease, with long-term current use of insulin ([address] 6/17/2020 Yes    Tobacco abuse 6/18/2020 Yes    RISSA (acute kidney injury) ([address] 6/18/2020 Yes    Dyslipidemia 6/17/2020 Yes    Tobacco dependence 6/17/2020 Yes    Diabetic ulcer of left heel associated with type 2 diabetes mellitus ([address] 6/18/2020 Yes    Elevated troponin 6/17/2020 Yes    Hypomagnesemia 6/18/2020 Yes    Hypokalemia 6/18/2020 Yes          Plan:        1. Reduce nighttime lantus more-usually takes 90u qhs at home  2. Continue to hold am lantus given hypoglycemia  3.  Cont iv antibiotics    Harlene Drown Blood, DO  6/21/2020  8:03 AM

## 2020-06-21 NOTE — PLAN OF CARE
Problem: Falls - Risk of:  Goal: Will remain free from falls  Description: Will remain free from falls  Outcome: Ongoing  Note: Pt fall risk, fall band present, falling star, safety alarm activated and in use as needed. Hourly rounding performed. Pt encouraged to use call light. See Yohana Dust fall risk assessment. Will continue to monitor patient closely.   Goal: Absence of physical injury  Description: Absence of physical injury  Outcome: Ongoing     Problem: Physical Regulation:  Goal: Diagnostic test results will improve  Description: Diagnostic test results will improve  Outcome: Ongoing  Goal: Will remain free from infection  Description: Will remain free from infection  Outcome: Ongoing  Goal: Ability to maintain vital signs within normal range will improve  Description: Ability to maintain vital signs within normal range will improve  Outcome: Ongoing     Problem: Skin Integrity:  Goal: Demonstration of wound healing without infection will improve  Description: Demonstration of wound healing without infection will improve  Outcome: Ongoing  Goal: Complications related to intravenous access or infusion will be avoided or minimized  Description: Complications related to intravenous access or infusion will be avoided or minimized  Outcome: Ongoing     Problem: Pain:  Goal: Pain level will decrease  Description: Pain level will decrease  Outcome: Ongoing  Goal: Control of acute pain  Description: Control of acute pain  Outcome: Ongoing  Goal: Control of chronic pain  Description: Control of chronic pain  Outcome: Ongoing     Problem: IP BALANCE  Goal: LTG - Patient will maintain balance to allow for safe/functional mobility  Outcome: Ongoing     Problem: Nutrition  Goal: Optimal nutrition therapy  Outcome: Ongoing

## 2020-06-22 VITALS
RESPIRATION RATE: 16 BRPM | SYSTOLIC BLOOD PRESSURE: 175 MMHG | HEIGHT: 72 IN | BODY MASS INDEX: 36.62 KG/M2 | TEMPERATURE: 98.2 F | OXYGEN SATURATION: 94 % | WEIGHT: 270.4 LBS | HEART RATE: 73 BPM | DIASTOLIC BLOOD PRESSURE: 65 MMHG

## 2020-06-22 LAB
ANION GAP SERPL CALCULATED.3IONS-SCNC: 9 MMOL/L (ref 9–17)
BUN BLDV-MCNC: 11 MG/DL (ref 8–23)
BUN/CREAT BLD: 7 (ref 9–20)
CALCIUM SERPL-MCNC: 8.3 MG/DL (ref 8.6–10.4)
CHLORIDE BLD-SCNC: 112 MMOL/L (ref 98–107)
CO2: 21 MMOL/L (ref 20–31)
CREAT SERPL-MCNC: 1.69 MG/DL (ref 0.7–1.2)
GFR AFRICAN AMERICAN: 49 ML/MIN
GFR NON-AFRICAN AMERICAN: 40 ML/MIN
GFR SERPL CREATININE-BSD FRML MDRD: ABNORMAL ML/MIN/{1.73_M2}
GFR SERPL CREATININE-BSD FRML MDRD: ABNORMAL ML/MIN/{1.73_M2}
GLUCOSE BLD-MCNC: 117 MG/DL (ref 75–110)
GLUCOSE BLD-MCNC: 168 MG/DL (ref 75–110)
GLUCOSE BLD-MCNC: 193 MG/DL (ref 75–110)
GLUCOSE BLD-MCNC: 68 MG/DL (ref 75–110)
GLUCOSE BLD-MCNC: 86 MG/DL (ref 70–99)
POTASSIUM SERPL-SCNC: 3.9 MMOL/L (ref 3.7–5.3)
SODIUM BLD-SCNC: 142 MMOL/L (ref 135–144)

## 2020-06-22 PROCEDURE — G0378 HOSPITAL OBSERVATION PER HR: HCPCS

## 2020-06-22 PROCEDURE — 99239 HOSP IP/OBS DSCHRG MGMT >30: CPT | Performed by: INTERNAL MEDICINE

## 2020-06-22 PROCEDURE — 2580000003 HC RX 258: Performed by: INTERNAL MEDICINE

## 2020-06-22 PROCEDURE — 94640 AIRWAY INHALATION TREATMENT: CPT

## 2020-06-22 PROCEDURE — 2500000003 HC RX 250 WO HCPCS: Performed by: SURGERY

## 2020-06-22 PROCEDURE — 6370000000 HC RX 637 (ALT 250 FOR IP): Performed by: SURGERY

## 2020-06-22 PROCEDURE — 2580000003 HC RX 258: Performed by: SURGERY

## 2020-06-22 PROCEDURE — 36415 COLL VENOUS BLD VENIPUNCTURE: CPT

## 2020-06-22 PROCEDURE — 6360000002 HC RX W HCPCS: Performed by: SURGERY

## 2020-06-22 PROCEDURE — 96366 THER/PROPH/DIAG IV INF ADDON: CPT

## 2020-06-22 PROCEDURE — C1760 CLOSURE DEV, VASC: HCPCS

## 2020-06-22 PROCEDURE — C1894 INTRO/SHEATH, NON-LASER: HCPCS

## 2020-06-22 PROCEDURE — 97535 SELF CARE MNGMENT TRAINING: CPT

## 2020-06-22 PROCEDURE — 97530 THERAPEUTIC ACTIVITIES: CPT

## 2020-06-22 PROCEDURE — 6370000000 HC RX 637 (ALT 250 FOR IP): Performed by: INTERNAL MEDICINE

## 2020-06-22 PROCEDURE — C1769 GUIDE WIRE: HCPCS

## 2020-06-22 PROCEDURE — C1887 CATHETER, GUIDING: HCPCS

## 2020-06-22 PROCEDURE — 96372 THER/PROPH/DIAG INJ SC/IM: CPT

## 2020-06-22 PROCEDURE — 96376 TX/PRO/DX INJ SAME DRUG ADON: CPT

## 2020-06-22 PROCEDURE — 82947 ASSAY GLUCOSE BLOOD QUANT: CPT

## 2020-06-22 PROCEDURE — 99232 SBSQ HOSP IP/OBS MODERATE 35: CPT | Performed by: INTERNAL MEDICINE

## 2020-06-22 PROCEDURE — C1725 CATH, TRANSLUMIN NON-LASER: HCPCS

## 2020-06-22 PROCEDURE — 80048 BASIC METABOLIC PNL TOTAL CA: CPT

## 2020-06-22 RX ORDER — DOXYCYCLINE HYCLATE 100 MG
100 TABLET ORAL 2 TIMES DAILY
Qty: 14 TABLET | Refills: 0 | Status: SHIPPED | OUTPATIENT
Start: 2020-06-22 | End: 2020-06-29

## 2020-06-22 RX ORDER — LISINOPRIL 10 MG/1
20 TABLET ORAL DAILY
Qty: 60 TABLET | Refills: 3 | Status: ON HOLD
Start: 2020-06-22 | End: 2020-09-20 | Stop reason: SDUPTHER

## 2020-06-22 RX ORDER — CLOPIDOGREL BISULFATE 75 MG/1
75 TABLET ORAL DAILY
Status: DISCONTINUED | OUTPATIENT
Start: 2020-06-22 | End: 2020-06-22 | Stop reason: HOSPADM

## 2020-06-22 RX ORDER — AMLODIPINE BESYLATE 10 MG/1
10 TABLET ORAL DAILY
Qty: 30 TABLET | Refills: 3 | Status: ON HOLD | OUTPATIENT
Start: 2020-06-23 | End: 2020-09-20 | Stop reason: SDUPTHER

## 2020-06-22 RX ORDER — INSULIN GLARGINE 100 [IU]/ML
40 INJECTION, SOLUTION SUBCUTANEOUS 2 TIMES DAILY
Qty: 5 PEN | Refills: 3 | Status: ON HOLD
Start: 2020-06-22 | End: 2020-09-20 | Stop reason: SDUPTHER

## 2020-06-22 RX ORDER — LEVOFLOXACIN 500 MG/1
500 TABLET, FILM COATED ORAL DAILY
Qty: 10 TABLET | Refills: 0 | Status: SHIPPED | OUTPATIENT
Start: 2020-06-22 | End: 2020-07-02

## 2020-06-22 RX ORDER — CLOPIDOGREL BISULFATE 75 MG/1
75 TABLET ORAL DAILY
Qty: 30 TABLET | Refills: 3 | Status: SHIPPED | OUTPATIENT
Start: 2020-06-23

## 2020-06-22 RX ORDER — OXYCODONE HYDROCHLORIDE AND ACETAMINOPHEN 5; 325 MG/1; MG/1
1 TABLET ORAL EVERY 4 HOURS PRN
Qty: 10 TABLET | Refills: 0 | Status: SHIPPED | OUTPATIENT
Start: 2020-06-22 | End: 2020-06-25

## 2020-06-22 RX ADMIN — INSULIN LISPRO 1 UNITS: 100 INJECTION, SOLUTION INTRAVENOUS; SUBCUTANEOUS at 12:03

## 2020-06-22 RX ADMIN — METRONIDAZOLE 500 MG: 500 INJECTION, SOLUTION INTRAVENOUS at 06:10

## 2020-06-22 RX ADMIN — BUDESONIDE AND FORMOTEROL FUMARATE DIHYDRATE 2 PUFF: 160; 4.5 AEROSOL RESPIRATORY (INHALATION) at 09:11

## 2020-06-22 RX ADMIN — HEPARIN SODIUM 5000 UNITS: 5000 INJECTION INTRAVENOUS; SUBCUTANEOUS at 15:25

## 2020-06-22 RX ADMIN — VANCOMYCIN HYDROCHLORIDE 1500 MG: 1.5 INJECTION, POWDER, LYOPHILIZED, FOR SOLUTION INTRAVENOUS at 09:25

## 2020-06-22 RX ADMIN — HYDROCORTISONE: 25 OINTMENT TOPICAL at 09:28

## 2020-06-22 RX ADMIN — AMLODIPINE BESYLATE 10 MG: 5 TABLET ORAL at 09:24

## 2020-06-22 RX ADMIN — CEFEPIME HYDROCHLORIDE 1 G: 1 INJECTION, POWDER, FOR SOLUTION INTRAMUSCULAR; INTRAVENOUS at 18:32

## 2020-06-22 RX ADMIN — ATORVASTATIN CALCIUM 10 MG: 10 TABLET, FILM COATED ORAL at 09:25

## 2020-06-22 RX ADMIN — METRONIDAZOLE 500 MG: 500 INJECTION, SOLUTION INTRAVENOUS at 15:25

## 2020-06-22 RX ADMIN — LISINOPRIL 20 MG: 10 TABLET ORAL at 09:25

## 2020-06-22 RX ADMIN — Medication 10 ML: at 09:30

## 2020-06-22 RX ADMIN — HYDRALAZINE HYDROCHLORIDE 20 MG: 20 INJECTION INTRAMUSCULAR; INTRAVENOUS at 11:10

## 2020-06-22 RX ADMIN — DIPHENHYDRAMINE HCL 25 MG: 25 TABLET ORAL at 09:25

## 2020-06-22 RX ADMIN — CLOPIDOGREL BISULFATE 75 MG: 75 TABLET ORAL at 10:05

## 2020-06-22 RX ADMIN — METOPROLOL SUCCINATE 25 MG: 25 TABLET, EXTENDED RELEASE ORAL at 09:24

## 2020-06-22 RX ADMIN — INSULIN LISPRO 1 UNITS: 100 INJECTION, SOLUTION INTRAVENOUS; SUBCUTANEOUS at 18:31

## 2020-06-22 RX ADMIN — DEXTROSE AND SODIUM CHLORIDE: 5; 900 INJECTION, SOLUTION INTRAVENOUS at 14:07

## 2020-06-22 RX ADMIN — FERROUS SULFATE TAB EC 325 MG (65 MG FE EQUIVALENT) 325 MG: 325 (65 FE) TABLET DELAYED RESPONSE at 09:42

## 2020-06-22 RX ADMIN — CEFEPIME HYDROCHLORIDE 1 G: 1 INJECTION, POWDER, FOR SOLUTION INTRAMUSCULAR; INTRAVENOUS at 05:35

## 2020-06-22 RX ADMIN — DOCUSATE SODIUM 100 MG: 100 CAPSULE, LIQUID FILLED ORAL at 09:24

## 2020-06-22 RX ADMIN — DEXTROSE AND SODIUM CHLORIDE: 5; 900 INJECTION, SOLUTION INTRAVENOUS at 04:28

## 2020-06-22 RX ADMIN — HEPARIN SODIUM 5000 UNITS: 5000 INJECTION INTRAVENOUS; SUBCUTANEOUS at 05:35

## 2020-06-22 RX ADMIN — METOPROLOL SUCCINATE 25 MG: 25 TABLET, EXTENDED RELEASE ORAL at 15:25

## 2020-06-22 ASSESSMENT — PAIN DESCRIPTION - ORIENTATION: ORIENTATION: LEFT

## 2020-06-22 ASSESSMENT — PAIN DESCRIPTION - FREQUENCY: FREQUENCY: INTERMITTENT

## 2020-06-22 ASSESSMENT — ENCOUNTER SYMPTOMS
RESPIRATORY NEGATIVE: 1
ALLERGIC/IMMUNOLOGIC NEGATIVE: 1
GASTROINTESTINAL NEGATIVE: 1

## 2020-06-22 ASSESSMENT — PAIN DESCRIPTION - PAIN TYPE: TYPE: CHRONIC PAIN

## 2020-06-22 ASSESSMENT — PAIN DESCRIPTION - LOCATION: LOCATION: FOOT

## 2020-06-22 NOTE — PROGRESS NOTES
position/activity restrictions: Right BKA with poorly fitting prosthesis, Left heel wounds, PT IS NOW NWB TO LEFT FOOT, pt unable to stand or transfer at this time  Subjective   General  Chart Reviewed: Yes  Additional Pertinent Hx: CHF, HTN, CVA, Right BKA, DM, Left foot amp through metatarsals  Subjective  Subjective: Patient pleasant and agreeable to PT. Pain Screening  Patient Currently in Pain: Yes  Pain Assessment  Pain Assessment: 0-10  Patient's Stated Pain Goal: No pain  Pain Type: Chronic pain  Pain Location: Foot  Pain Orientation: Left  Pain Frequency: Intermittent  Response to Pain Intervention: Patient Satisfied  Vital Signs  Patient Currently in Pain: Yes  Pre Treatment Pain Screening  Intervention List: Patient able to continue with treatment    Orientation     Cognition      Objective   Bed mobility  Scootin Person assistance;Maximal assistance  Transfers  Sit to Stand: Maximum Assistance;2 Person Assistance  Comment: attempted standing   Ambulation  Ambulation?: Yes  Ambulation 1  Surface: level tile  Assistance: Maximum assistance;2 Person assistance  Comments: pt unable to achieve lift off from bed with MAX A x 2 using RW, attempted standing 3 x with milton royer again with MAX A x 2 pt unable to lift off from bed.  Pt required min assist to don/doff prosthesis to R leg prior to the standing attempts       Balance  Sitting - Static: Good  Sitting - Dynamic: Good  Standing - Static: Poor  Standing - Dynamic: Poor            Comment: bed mobility to position R side lying MAX A x 2              G-Code     OutComes Score                                                     AM-PAC Score  AM-PAC Inpatient Mobility Raw Score : 10 (20)  AM-PAC Inpatient T-Scale Score : 32.29 (20)  Mobility Inpatient CMS 0-100% Score: 76.75 (20)  Mobility Inpatient CMS G-Code Modifier : CL (20)          Goals  Short term goals  Time Frame for Short term goals: 12 visits  Short term goal 1: Patient will be indep with bed mobility. Short term goal 2: Patient will perform sit to stand and pivot transfers with mod x 2. Short term goal 3: Patient will be indep with HEP and TTWB. Short term goal 4: Patient will amb 5 steps with RW and mod x 2. Short term goal 5: Patient will tolerate 30 minutes of ther-ex and ther-act. Patient Goals   Patient goals : Improve transfers and ambulation    Plan    Plan  Times per week: 1-2x/d, 5-7 d/wk  Current Treatment Recommendations: Strengthening, Balance Training, Functional Mobility Training, Transfer Training, Patient/Caregiver Education & Training, Safety Education & Training, Home Exercise Program, Endurance Training  Safety Devices  Type of devices: All fall risk precautions in place, Gait belt, Bed alarm in place, Patient at risk for falls, Nurse notified, Left in bed, Call light within reach     Therapy Time   Individual Concurrent Group Co-treatment   Time In  200         Time Out  1153         Minutes  45               Co-treatment with OT warranted secondary to decreased safety and independence requiring 2 skilled therapy professionals to address individual discipline's goals. PT addressing preparation for  Transfers, gait and pre gait activities,  sitting balance for increased  sitting/activity tolerance, functional mobility, environmental safety/scanning, fall prevention, functional mobility  transfers and functional LE strength. Upon writer exit, call light within reach, pt retired to bed. All lines intact and patient positioned comfortably. All patient needs addressed prior to ending therapy session. Chart reviewed prior to treatment and patient is agreeable for therapy. RN reports patient is medically stable for therapy treatment this date.        GEORGE GARCIA, PTA

## 2020-06-22 NOTE — PROGRESS NOTES
Progress Note  Podiatric Medicine and Surgery     Subjective     CC: Left plantar heel wound  Afebrile, mildly hypertensive  Pain controlled  Complains of having chills, Denies any N/V/F/CP/SOB  Status post POD #2 left foot I&D with application of wound vac (D OS: 6/20/2020)    HPI :  Sherry Lloyd is a 79 y.o. male seen at [address] for a heel wound on the left foot. The patient states the wound has been getting progressively worse with increasing serous drainage and edema. The patient was previously cared for by Dr. Rebecca Colón, but states he does not currently follow with a podiatrist. 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 Their last HgbA1c was 7.5% in September 2019. Patient admitted to the hospital for further work up of his CHF and hypertension. The patient denies any nausea, vomiting, fever, chills, shortness of breath.     PCP is No primary care provider on file. ROS: Denies N/V/F/C/SOB/CP. Otherwise negative except at stated in the HPI.      Medications:  Scheduled Meds:   insulin glargine  40 Units Subcutaneous Nightly    hydrocortisone   Topical BID    insulin lispro  0-6 Units Subcutaneous TID WC    insulin lispro  0-3 Units Subcutaneous Nightly    vancomycin  1,500 mg Intravenous Q24H    clopidogrel  75 mg Oral Daily    cefepime  1 g Intravenous Q12H    atorvastatin  10 mg Oral Daily    [Held by provider] insulin glargine  40 Units Subcutaneous QAM    budesonide-formoterol  2 puff Inhalation BID    metoprolol succinate  25 mg Oral BID    amLODIPine  10 mg Oral Daily    docusate sodium  100 mg Oral BID    ferrous sulfate  325 mg Oral Daily with breakfast    [Held by provider] furosemide  40 mg Oral BID    lisinopril  20 mg Oral Daily    traZODone  150 mg Oral Nightly    sodium chloride flush  10 mL Intravenous 2 times per day    collagenase   Topical Daily    metroNIDAZOLE  500 mg Intravenous Q8H    vancomycin (VANCOCIN) intermittent dosing (placeholder) Other RX Placeholder    heparin (porcine)  5,000 Units Subcutaneous 3 times per day       Continuous Infusions:   dextrose 5 % and 0.9 % NaCl 100 mL/hr at 20 0428    dextrose         PRN Meds:diphenhydrAMINE, acetaminophen, glucose, dextrose, glucagon (rDNA), dextrose, oxyCODONE-acetaminophen, sodium chloride flush, potassium chloride **OR** potassium alternative oral replacement **OR** potassium chloride, magnesium sulfate, [DISCONTINUED] acetaminophen **OR** acetaminophen, polyethylene glycol, promethazine **OR** ondansetron, nicotine, hydrALAZINE    Objective     Vitals:  Patient Vitals for the past 8 hrs:   BP Pulse Weight   20 0610 -- -- 270 lb 6.4 oz (122.7 kg)   20 2336 (!) 148/58 68 --     Average, Min, and Max for last 24 hours Vitals:  TEMPERATURE:  Temp  Av.2 °F (36.8 °C)  Min: 97.7 °F (36.5 °C)  Max: 98.8 °F (37.1 °C)    RESPIRATIONS RANGE: Resp  Av.5  Min: 16  Max: 18    PULSE RANGE: Pulse  Av  Min: 67  Max: 75    BLOOD PRESSURE RANGE:  Systolic (69WTN), XVN:854 , Min:148 , SRW:271   ; Diastolic (69LOK), JCA:96, Min:58, Max:67      PULSE OXIMETRY RANGE: SpO2  Av.6 %  Min: 93 %  Max: 96 %    I/O last 3 completed shifts: In: 1688 [P.O.:360; I.V.:1328]  Out: 1200 [Urine:1200]    CBC:  Recent Labs     20  0701   CRP 32.2*        BMP:  Recent Labs     20  0449 20  0701 20  0459    144 142   K 3.8 4.0 3.9   * 113* 112*   CO2 24 21 21   BUN 19 14 11   CREATININE 2.09* 1.85* 1.69*   GLUCOSE 45* 94 86   CALCIUM 8.2* 8.1* 8.3*        Coags:  No results for input(s): APTT, PROT, INR in the last 72 hours.     Lab Results   Component Value Date    SEDRATE 128 (H) 2020     Recent Labs     20  0701   CRP 32.2*       Left Lower Extremity Physical Exam:     Vascular: DP and PT pulses are Non-palpable but dopplerable.      Neuro: Saph/sural/SP/DP/plantar sensation diminished to light touch.     Musculoskeletal: Muscle strength is following. Received angio, revascularized flow to anterior tibial artery. · Medical management per Internal Medicine. · Abx: Cefepime/Vanc/flagyl, ID consulted  · Patient is stable from podiatry standpoint. Patient states he has had a wound vac before and has a aide that comes to his house a few times a week. He usually uses a motorized wheelchair and walker, which he feels he is comfortable using to be NWB to the R. Patient is currently refusing facility and would like to be discharged home with home care. · Upon discharge patient will follow-up with Dr. Urszula Serrato within 1 week. Wound VAC changes to occur every T/TH/S by home care versus facility. Wound vac paperwork complete. Wound area to be kept clean, dry, and intact and to avoid getting it wet or moist.  · Dressing intact to Left foot: Wound VAC with black foam at 125 mmHg continuous. To be changed every Tuesday/Thursday/Saturday-next change on 6/23/2020. · Continue to use ROOKE/PRAFO boot to the left lower extremity to elevate heels. · Nonweightbearing to the left extremity  · Will discuss with  Dr. Urszula Serrato.     Bernard Ramsey DPM   Podiatric Medicine & Surgery   6/22/2020 at 6:25 AM

## 2020-06-22 NOTE — PROGRESS NOTES
[facility]    Progress Note    6/22/2020    10:44 AM    Name:   Jim Anne  MRN:     [id number]     Acct:      [de-identified]   Room:   [address] [place] [address] Day:  5  Admit Date:  6/17/2020  4:26 PM    PCP:   Reynold Da Silva  Code Status:  Full Code    Subjective:     C/C:   Chief Complaint   Patient presents with    Wound Infection     left foot    Hypertension     Interval History Status: improved. Overall feels better  Denies cp/sob/n/v  No foot pain    Brief History:     Per my BLAYNE:  \"Rohan Sr is a 79 y.o. Non-/non  male who presents with Wound Infection (left foot) and Hypertension   and is admitted to the hospital for the management of Diabetic foot infection ([facility] [address]    Patient reports to the hospital with complaint of high blood pressure and worsening left foot wound.  The patient states that he developed a wound to the bottom of his foot several weeks ago. [facility] has a visiting RN that comes to the house and performs dressing changes 3X week. [facility] states that the nurse noted increased drainage to the area. He was subsequently advised to report to the hospital for evaluation. [facility] had also had increased blood pressure readings. He is on multiple antihypertensive agents. He endorses neuropathy to his left lower extremity. He has a right BKA.  He denies nausea, vomiting, fever or chills.  He has additional past medical history that includes CKD, diabetes, hypertension and hyperlipidemia.  He is a current every day cigarette smoker. \"    Had arteriogram with angioplasty to left ant tibial artery on 6/19/20 6/20/20: I&D left foot wound    Review of Systems:     Constitutional:  negative for chills, fevers, sweats  Respiratory:  negative for cough, dyspnea on exertion, shortness of breath, wheezing  Cardiovascular:  negative for chest pain, chest pressure/discomfort,  palpitations  Gastrointestinal:  negative for abdominal pain, constipation, diarrhea, nausea, vomiting  Neurological:  negative for dizziness, headache    Medications:      Allergies:  No Known Allergies    Current Meds:   Scheduled Meds:    clopidogrel  75 mg Oral Daily    insulin glargine  40 Units Subcutaneous Nightly    hydrocortisone   Topical BID    insulin lispro  0-6 Units Subcutaneous TID WC    insulin lispro  0-3 Units Subcutaneous Nightly    vancomycin  1,500 mg Intravenous Q24H    cefepime  1 g Intravenous Q12H    atorvastatin  10 mg Oral Daily    [Held by provider] insulin glargine  40 Units Subcutaneous QAM    budesonide-formoterol  2 puff Inhalation BID    metoprolol succinate  25 mg Oral BID    amLODIPine  10 mg Oral Daily    docusate sodium  100 mg Oral BID    ferrous sulfate  325 mg Oral Daily with breakfast    [Held by provider] furosemide  40 mg Oral BID    lisinopril  20 mg Oral Daily    traZODone  150 mg Oral Nightly    sodium chloride flush  10 mL Intravenous 2 times per day    collagenase   Topical Daily    metroNIDAZOLE  500 mg Intravenous Q8H    vancomycin (VANCOCIN) intermittent dosing (placeholder)   Other RX Placeholder    heparin (porcine)  5,000 Units Subcutaneous 3 times per day     Continuous Infusions:    dextrose 5 % and 0.9 % NaCl 100 mL/hr at 06/22/20 0428    dextrose       PRN Meds: diphenhydrAMINE, acetaminophen, glucose, dextrose, glucagon (rDNA), dextrose, oxyCODONE-acetaminophen, sodium chloride flush, potassium chloride **OR** potassium alternative oral replacement **OR** potassium chloride, magnesium sulfate, [DISCONTINUED] acetaminophen **OR** acetaminophen, polyethylene glycol, promethazine **OR** ondansetron, nicotine, hydrALAZINE    Data:     Past Medical History:   has a past medical history of RISSA (acute kidney injury) ([facility] [address] Cerebrovascular disease, Erectile dysfunction, Hepatitis B infection, Hyperlipidemia, Hypertension, Kidney cysts, Liver cyst, MRSA (methicillin resistant staph aureus) culture positive, Neurotrophic ulcer of the foot ([facility] [address] Obesity, Osteomyelitis of ankle or foot, Peripheral vascular disease ([facility] [address] Tobacco abuse, and Type II or unspecified type diabetes mellitus without mention of complication, not stated as uncontrolled. Social History:   reports that he has been smoking cigarettes. He has a 30.00 pack-year smoking history. He has never used smokeless tobacco. He reports that he does not drink alcohol or use drugs. Family History:   Family History   Problem Relation Age of Onset    Diabetes Mother     Diabetes Sister     Diabetes Brother     Diabetes Sister     Diabetes Sister     Diabetes Sister        Vitals:  BP (!) 169/63   Pulse 70   Temp 97.7 °F (36.5 °C) (Oral)   Resp 16   Ht 6' (1.829 m)   Wt 270 lb 6.4 oz (122.7 kg)   SpO2 92%   BMI 36.67 kg/m²   Temp (24hrs), Av.2 °F (36.8 °C), Min:97.7 °F (36.5 °C), Max:98.8 °F (37.1 °C)    Recent Labs     20  1657 20  0710 20  0834   POCGLU 112* 130* 68* 117*       I/O (24Hr):     Intake/Output Summary (Last 24 hours) at 2020 1044  Last data filed at 2020 0346  Gross per 24 hour   Intake 3000 ml   Output 1975 ml   Net 1025 ml       Labs:  Hematology:  Recent Labs     20  0701   CRP 32.2*     Chemistry:  Recent Labs     20  0449 20  0701 20  0459    144 142   K 3.8 4.0 3.9   * 113* 112*   CO2  21   GLUCOSE 45* 94 86   BUN 19 14 11   CREATININE 2.09* 1.85* 1.69*   ANIONGAP 7* 10 9   LABGLOM 32* 36* 40*   GFRAA 38* 44* 49*   CALCIUM 8.2* 8.1* 8.3*     Recent Labs     20  1058 20  1620 20  1657 20  0710 20  0834   POCGLU 91 46* 112* 130* 68* 117*     ABG:No results found for: POCPH, PHART, PH, POCPCO2, ZBW4GHI, PCO2, POCPO2, PO2ART, PO2, POCHCO3, PWV6MRE, HCO3, NBEA, PBEA, BEART, BE, THGBART, THB, PJM9YIE, ZWWJ3YEZ, W7RDZTGE, O2SAT, FIO2  Lab Results   Component Value Date/Time    SPECIAL RT AC 12ML 06/17/2020 10:21 PM     Lab Results   Component Value Date/Time    CULTURE NO GROWTH 4 DAYS 06/17/2020 10:21 PM       Radiology:  Shaw Gtz Foot Left (min 3 Views)    Result Date: 6/17/2020  Status post transmetatarsal amputation. Diffuse soft tissue edema. No convincing evidence for osteomyelitis. Xr Chest Portable    Result Date: 6/17/2020  No acute cardiopulmonary disease. Physical Examination:        General appearance:  alert, cooperative and no distress  Mental Status:  oriented to person, place and time and normal affect  Lungs:  clear to auscultation bilaterally, normal effort  Heart:  regular rate and rhythm, no murmur  Abdomen:  soft, nontender, nondistended, normal bowel sounds, no masses, hepatomegaly, splenomegaly  Extremities:  no redness, tenderness in the calf; rle bka  Skin:  no gross lesions, rashes, induration    Assessment:        Hospital Problems           Last Modified POA    * (Principal) Diabetic foot infection ([address] 6/17/2020 Yes    Uncontrolled hypertension 6/17/2020 Yes    PVD (peripheral vascular disease) ([address] 6/18/2020 Yes    Type 2 diabetes mellitus with stage 3 chronic kidney disease, with long-term current use of insulin ([address] 6/17/2020 Yes    Tobacco abuse 6/18/2020 Yes    RISSA (acute kidney injury) ([address] 6/18/2020 Yes    Dyslipidemia 6/17/2020 Yes    Tobacco dependence 6/17/2020 Yes    Diabetic ulcer of left heel associated with type 2 diabetes mellitus ([address] 6/18/2020 Yes    Elevated troponin 6/17/2020 Yes    Hypomagnesemia 6/18/2020 Yes    Hypokalemia 6/18/2020 Yes          Plan:        1. Dc home with antibiotics and VAC if ok with all  2.  rn to check with micro re: surgical culture specimen-I do not see it in computer    Melissa Valente Blood, DO  6/22/2020  10:44 AM

## 2020-06-22 NOTE — CARE COORDINATION
Call from Brooks from [facility] and wound vac will be covered under insurance. Op note faxed. To be delivered to patients room within 2-4 hours. Nurse Fina Cooper informed.

## 2020-06-22 NOTE — PROGRESS NOTES
Portable wound vac delivered. Writer sent perfect serve message to Dr. Marina Marquez DPM to see if he wants the wound vac placed today before discharge or if wet to dry dressing should be placed then let [facility] assess wound and apply wound vac tomorrow.      Dr. Ana Barrios responded to place wet to dry drsg tonight and allow home care to place wound vac tomorrow

## 2020-06-22 NOTE — PLAN OF CARE
Problem: Falls - Risk of:  Goal: Absence of physical injury  Description: Absence of physical injury  6/22/2020 0114 by Sg Wilks RN  Outcome: Ongoing     Problem: Physical Regulation:  Goal: Will remain free from infection  Description: Will remain free from infection  6/22/2020 0114 by Sg Wilks RN  Outcome: Ongoing     Problem: Skin Integrity:  Goal: Demonstration of wound healing without infection will improve  Description: Demonstration of wound healing without infection will improve  6/22/2020 0114 by Sg Wilks RN  Outcome: Ongoing     Problem: Pain:  Goal: Pain level will decrease  Description: Pain level will decrease  6/22/2020 0114 by Sg Wilks RN  Outcome: Ongoing     Problem: Nutrition  Goal: Optimal nutrition therapy  6/22/2020 0114 by Sg Wilks RN  Outcome: Ongoing

## 2020-06-22 NOTE — PROGRESS NOTES
[facility]  Progress Note    Jacquelyn Sewell  MRN: [id number]  Date: 6/22/2020    Reason for F/U :   Diabetic foot infection    Impression :   1. Diabetes mellitus type 2 with complications including neuropathy, nephropathy    2. Peripheral arterial disease  · Status post arteriogram with stenting 6/19/2020  3. Left plantar foot infected diabetic ulceration  · Status post debridement 6/20/2020  4. Acute kidney injury on chronic kidney disease stage III  5. Chronic dermatitis    Recommendations:   · The superficial culture did grow some acromial bacteria. · The surgical culture so far remains pending and is not clear if this was sent from the operating room and the nurse is going to follow-up with the microbiology lab. · Clinically the patient is doing well and the plan is for discharge today on oral antimicrobial therapy with Levaquin and doxycycline. · The patient will continue wound care by the podiatry service    Infection Control Recommendations:   Contact precautions    Discharge Planning:   Patient will need Midline Catheter Insertion/ PICC line Insertion: No  Patient willneed outpatient wound care: Yes    Medical Decision making / Summary of Stay:   Jacquelyn Sewell is a 79y.o.-year-old male who was initially admitted on 6/17/2020. Brittnee Dowd has a history of diabetes mellitus type 2 with multiple complications including peripheral neuropathy, chronic kidney disease, and peripheral arterial disease. 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 The patient is a poor historian and cannot really tell me how long he has had the ulceration on the plantar aspect of his left heel. He thinks he has had it for about 2 weeks and reports that there is a visiting nurse who does take care of him at home.   The wound has been progressively getting worse over the past 2 weeks and he recalls that 2 weeks ago he had some fevers and chills but none recently. Due to the progressively worsening wound the patient was brought into the emergency room for evaluation. The patient was noted to have a full-thickness ulceration on the plantar aspect of the left heel with a fibronecrotic wound base difficult to stage. The patient was seen by the podiatry service and has since been admitted for a diabetic foot ulcer and I been asked to evaluate and help with antibiotic choice. He was seen by vascular surgery today and taken to the operating room underwent an ultrasound-guided access of the right common femoral artery with aortogram and left tibial angiogram.  The patient underwent angioplasty of the left anterior tibial artery on 2020    He was taken to the operating room and underwent debridement of the plantar ulceration which went down to the fascial layers and there was no evidence of tracking to the calcaneal bone. Current evaluation:2020    BP (!) 169/63   Pulse 70   Temp 97.7 °F (36.5 °C) (Oral)   Resp 18   Ht 6' (1.829 m)   Wt 270 lb 6.4 oz (122.7 kg)   SpO2 92%   BMI 36.67 kg/m²     Temperature Range: Temp: 97.7 °F (36.5 °C) Temp  Av.2 °F (36.8 °C)  Min: 97.7 °F (36.5 °C)  Max: 98.8 °F (37.1 °C)  The patient is seen and evaluated at bedside and he is awake and alert in no acute distress. He is doing well does not report any new issues or concerns. He denies any fevers, chills, coughing or shortness of breath. No abdominal pain nausea vomiting or diarrhea. Review of Systems   Constitutional: Negative. Respiratory: Negative. Cardiovascular: Negative. Gastrointestinal: Negative. Genitourinary: Negative. Musculoskeletal: Negative. Skin: Positive for wound. Allergic/Immunologic: Negative. Neurological: Negative. Psychiatric/Behavioral: Negative.         Physical Examination :     BP (!) 169/63   Pulse 70   Temp 97.7 °F (36.5 °C) (Oral)   Resp 18   Ht 6' (1.829 m)   Wt 270 lb 6.4 oz (122.7 results for input(s): PROT, LABALBU, BILIDIR, IBILI, BILITOT, ALKPHOS, ALT, AST in the last 72 hours. Recent Labs     06/21/20  0701   SONU 17.9      Lab Results   Component Value Date    CRP 32.2 (H) 06/21/2020     Lab Results   Component Value Date    SEDRATE 128 (H) 06/17/2020       No results for input(s): PROCAL in the last 72 hours. Imaging Studies:   Lower Arterial Plethysmography, PVR Lower with PPG0 6/18/2020 06:04 PM  Summary        Abnormal PVR study at rest for the left lower extremity ,with evidence of    tibial arterial occlusive disease and calcinosis. ABIs consistent with    ischemia. History of TM amputation.    History of right AK amputation. Cultures:     Culture, Blood 1 [[id number]] Collected: 06/17/20 2219   Order Status: Completed Specimen: Blood Updated: 06/22/20 0547    Specimen Description . BLOOD    Special Requests 4 lhand    Culture NO GROWTH 4 DAYS   Culture, Blood 2 [[id number]] Collected: 06/17/20 2221   Order Status: Completed Specimen: Blood Updated: 06/22/20 0547    Specimen Description . BLOOD    Special Requests RT AC 12ML    Culture NO GROWTH 4 DAYS   Wound Culture [[id number]] (Abnormal)  Collected: 06/17/20 0400   Order Status: Completed Specimen: No Site Given from Foot Updated: 06/21/20 0980    Specimen Description . FOOT SWAB    Special Requests NOT REPORTED    Direct Exam NO NEUTROPHILS SEEN     MODERATE MIXED BACTERIAL MORPHOTYPES SEEN ON GRAM STAIN. Abnormal     Culture ACHROMOBACTER XYLOSOXIDANS SSP XYLOSOXIDANS LIGHT GROWTHAbnormal      NORMAL SKIN FLORAAbnormal    Achromobacter xylosoxidans ss xylosoxidans (1)     Antibiotic Interpretation GRIFFIN Status    amikacin Resistant  Final     >=64  RESISTANT   aztreonam Resistant  Final     >=64  RESISTANT   ceFAZolin Resistant  Final     >=64  RESISTANT   cefepime Sensitive  Final     8  SUSCEPTIBLE   cefTRIAXone Resistant  Final     >=64  RESISTANT   ciprofloxacin Sensitive  Final     1  SUSCEPTIBLE   gentamicin Resistant  Final     >=16  RESISTANT   meropenem Sensitive  Final     1  SUSCEPTIBLE   tigecycline   Final     NOT REPORTED   tobramycin Intermediate  Final     8  INTERMEDIATE   trimethoprim-sulfamethoxazole Sensitive  Final     <=20  SUSCEPTIBLE   piperacillin-tazobactam Sensitive  Final     <=4  SUSCEPTIBLE       Medications:      insulin glargine  40 Units Subcutaneous Nightly    hydrocortisone   Topical BID    insulin lispro  0-6 Units Subcutaneous TID WC    insulin lispro  0-3 Units Subcutaneous Nightly    vancomycin  1,500 mg Intravenous Q24H    clopidogrel  75 mg Oral Daily    cefepime  1 g Intravenous Q12H    atorvastatin  10 mg Oral Daily    [Held by provider] insulin glargine  40 Units Subcutaneous QAM    budesonide-formoterol  2 puff Inhalation BID    metoprolol succinate  25 mg Oral BID    amLODIPine  10 mg Oral Daily    docusate sodium  100 mg Oral BID    ferrous sulfate  325 mg Oral Daily with breakfast    [Held by provider] furosemide  40 mg Oral BID    lisinopril  20 mg Oral Daily    traZODone  150 mg Oral Nightly    sodium chloride flush  10 mL Intravenous 2 times per day    collagenase   Topical Daily    metroNIDAZOLE  500 mg Intravenous Q8H    vancomycin (VANCOCIN) intermittent dosing (placeholder)   Other RX Placeholder    heparin (porcine)  5,000 Units Subcutaneous 3 times per day           [facility]  Edis Perla MD  Perfect Serve messaging  OFFICE: [phone]      Electronically signed by Edis Perla MD on 6/22/2020 at 9:12 AM  Thank you for allowing us to participate in the care of this patient. Please call with questions. Soni Sheldon is a 79 y.o. male being evaluated by a Virtual Visit (video visit) encounter to address concerns as mentioned above. A caregiver was present when appropriate.  Due to this being a TeleHealth encounter (During [facility]- public health emergency), evaluation of the following organ systems was limited: Vitals/Constitutional/EENT/Resp/CV/GI//MS/Neuro/Skin/Heme-Lymph-Imm. Pursuant to the emergency declaration under the [address] [place] [address] and the [address] and [employer] Act, this Virtual Visit was conducted with patient's (and/or legal guardian's) consent, to reduce the patient's risk of exposure to COVID-19 and provide necessary medical care. Services were provided through a video synchronous discussion virtually to substitute for in-person inpatient visit. An electronic signature was used to authenticate this note. This note iscreated with the assistance of a speech recognition program.  While intending to generate a document that actually reflects the content of the visit, the document can still have some errors including those of syntax andsound a like substitutions which may escape proof reading. In such instances, actual meaning can be extrapolated by contextual diversion.

## 2020-06-22 NOTE — PROGRESS NOTES
Wound vac taken down, wet to dry dressing applied to patient's left heel per Dr. Lesia Tsang orders. Discharge instructions reviewed and explained to patient, no further questions. Will continue to monitor patient closely until discharge.

## 2020-06-22 NOTE — PLAN OF CARE
Problem: Falls - Risk of:  Goal: Will remain free from falls  Description: Will remain free from falls  6/22/2020 1828 by Alberto Mendoza RN  Outcome: Completed  6/22/2020 1221 by Bradford Hagen RN  Outcome: Ongoing  6/22/2020 1220 by Bradford Hagen RN  Outcome: Ongoing  Goal: Absence of physical injury  Description: Absence of physical injury  6/22/2020 1828 by Alberto Mendoza RN  Outcome: Completed  6/22/2020 1220 by Bradford Hagen RN  Outcome: Ongoing     Problem: Physical Regulation:  Goal: Diagnostic test results will improve  Description: Diagnostic test results will improve  6/22/2020 1828 by Alberto Mendoza RN  Outcome: Completed  6/22/2020 1221 by Bradford Hagen RN  Outcome: Ongoing  6/22/2020 1220 by Bradford Hagen RN  Outcome: Ongoing  Goal: Will remain free from infection  Description: Will remain free from infection  6/22/2020 1828 by Alberto Mendoza RN  Outcome: Completed  6/22/2020 [phone] by Bradford Hagen RN  Outcome: Ongoing  6/22/2020 1220 by Bradford Hagen RN  Outcome: Ongoing  Goal: Ability to maintain vital signs within normal range will improve  Description: Ability to maintain vital signs within normal range will improve  6/22/2020 1828 by Alberto Mendoza RN  Outcome: Completed  6/22/2020 1220 by Bradford Hagen RN  Outcome: Ongoing     Problem: Skin Integrity:  Goal: Demonstration of wound healing without infection will improve  Description: Demonstration of wound healing without infection will improve  6/22/2020 1828 by Alberto Mendoza RN  Outcome: Completed  6/22/2020 1220 by Bradford Hagen RN  Outcome: Ongoing  Goal: Complications related to intravenous access or infusion will be avoided or minimized  Description: Complications related to intravenous access or infusion will be avoided or minimized  6/22/2020 1828 by Alberto Mendoza RN  Outcome: Completed  6/22/2020 1220 by Bradford Hagen RN  Outcome: Ongoing     Problem: Pain:  Goal: Pain level will decrease  Description: Pain level will decrease  6/22/2020 1828 by Neva Flores RN  Outcome: Completed  6/22/2020 1220 by Kacie Beatty RN  Outcome: Ongoing  Goal: Control of acute pain  Description: Control of acute pain  6/22/2020 1828 by Neva Flores RN  Outcome: Completed  6/22/2020 1220 by Kacie Beatty RN  Outcome: Ongoing  Goal: Control of chronic pain  Description: Control of chronic pain  6/22/2020 1828 by Neva Flores RN  Outcome: Completed  6/22/2020 1220 by Kacie Beatty RN  Outcome: Ongoing     Problem: IP BALANCE  Goal: LTG - Patient will maintain balance to allow for safe/functional mobility  6/22/2020 1828 by Neva Flores RN  Outcome: Completed  6/22/2020 1220 by Kacie Beatty RN  Outcome: Ongoing     Problem: Nutrition  Goal: Optimal nutrition therapy  6/22/2020 1828 by Neva Flores RN  Outcome: Completed  6/22/2020 1220 by Kacie Beatty RN  Outcome: Ongoing

## 2020-06-22 NOTE — CARE COORDINATION
Spoke to [facility] [phone] from [facility] regarding status of wound vac. She will follow up and call me back. Appointment scheduled with Dr. Scarlet Lau 6-29-20 at 1:40pm and pt informed.

## 2020-06-22 NOTE — DISCHARGE SUMMARY
[facility]    Discharge Summary     Patient ID: Srinivasa Padilla  :  1950   MRN: [id number]     ACCOUNT:  [de-identified]   Patient's PCP: Sameer Nieto  Admit Date: 2020   Discharge Date: 2020     Length of Stay: 5  Code Status:  Full Code  Admitting Physician: Rukhsana Santana, DO  Discharge Physician: Rukhsana Santana DO     Active Discharge Diagnoses:     Hospital Problem Lists:  Principal Problem:    Diabetic foot infection ([facility]  Active Problems:    Uncontrolled hypertension    PVD (peripheral vascular disease) ([facility]    Type 2 diabetes mellitus with stage 3 chronic kidney disease, with long-term current use of insulin ([facility])    Tobacco abuse    RISSA (acute kidney injury) ([facility]    Dyslipidemia    Tobacco dependence    Diabetic ulcer of left heel associated with type 2 diabetes mellitus (HCC)    Elevated troponin    Hypomagnesemia    Hypokalemia  Resolved Problems:    * No resolved hospital problems. *      Admission Condition:  fair     Discharged Condition: stable    Hospital Stay:     Hospital Course:  Srinivasa Padilla is a 79 y.o. male who was admitted for the management of  Diabetic foot infection ([facility] , presented to ER with Wound Infection (left foot) and Hypertension    Admitted with diabetic foot infection, concern for inadequate blood flow. Seen by vascular who did angiogram and angioplasty of left anterior tibial artery. Taken next day for foot debridement by podiatry. Seen by ID and placed on broad spectrum iv antibiotics, will go home on po levaquin and doxy. Had wound VAC placed in OR and VAC is being set up for home with home care.      Had recurrent am hypoglycemia so nocturnal lantus reduced from 90u to 40u, am lantus 40u unchanged      Significant therapeutic interventions: see above    Significant Diagnostic Studies:   Labs / Micro:  CBC:   Lab Results   Component Value Date    WBC 6.3 2020    RBC 4.31 2020    RBC 4.20 10/10/2011    HGB 11.6 06/19/2020    HCT 36.1 06/19/2020    MCV 83.8 06/19/2020    MCH 26.9 06/19/2020    MCHC 32.1 06/19/2020    RDW 13.4 06/19/2020     06/19/2020     10/10/2011     CMP:    Lab Results   Component Value Date    GLUCOSE 86 06/22/2020    GLUCOSE 133 10/18/2011     06/22/2020    K 3.9 06/22/2020     06/22/2020    CO2 21 06/22/2020    BUN 11 06/22/2020    CREATININE 1.69 06/22/2020    ANIONGAP 9 06/22/2020    ALKPHOS 82 09/27/2019    ALT 32 09/27/2019    AST 27 09/27/2019    BILITOT 0.29 09/27/2019    LABALBU 3.4 09/27/2019    ALBUMIN 0.8 09/27/2019    LABGLOM 40 06/22/2020    GFRAA 49 06/22/2020    GFR      06/22/2020    GFR NOT REPORTED 06/22/2020    PROT 7.8 09/27/2019    CALCIUM 8.3 06/22/2020        Radiology:  Xr Foot Left (min 3 Views)    Result Date: 6/17/2020  Status post transmetatarsal amputation. Diffuse soft tissue edema. No convincing evidence for osteomyelitis. Xr Chest Portable    Result Date: 6/17/2020  No acute cardiopulmonary disease. Consultations:    Consults:     Final Specialist Recommendations/Findings:   IP CONSULT TO PODIATRY  IP CONSULT TO HOSPITALIST  IP CONSULT TO INFECTIOUS DISEASES  PHARMACY TO DOSE VANCOMYCIN  IP CONSULT TO VASCULAR SURGERY      The patient was seen and examined on day of discharge and this discharge summary is in conjunction with any daily progress note from day of discharge.     Discharge plan:     Disposition: Home    Physician Follow Up:     Niall Rueda [address] [place] [address]  [phone]    In 1 week      Issa Ling, [address]  [address] [phone]    Go on 6/29/2020  Appointment 6-29-20 at 1:40pm    [address] [phone]    home health agency    [facility]  [phone]      Wound Vac       Requiring Further Evaluation/Follow Up POST HOSPITALIZATION/Incidental Findings: foot wound    Diet: diabetic diet    Activity: As tolerated; nwb LLE    Instructions to Patient: take medications as prescribed  F/u as scheduled    Discharge Medications:      Medication List      START taking these medications    clopidogrel 75 MG tablet  Commonly known as:  PLAVIX  Take 1 tablet by mouth daily  Start taking on:  June 23, 2020     doxycycline hyclate 100 MG tablet  Commonly known as:  VIBRA-TABS  Take 1 tablet by mouth 2 times daily for 7 days     levoFLOXacin 500 MG tablet  Commonly known as:  Levaquin  Take 1 tablet by mouth daily for 10 days        CHANGE how you take these medications    amLODIPine 10 MG tablet  Commonly known as:  NORVASC  Take 1 tablet by mouth daily  Start taking on:  June 23, 2020  What changed:    · how much to take  · how to take this  · when to take this  · additional instructions     Basaglar KwikPen 100 UNIT/ML injection pen  Generic drug:  insulin glargine  Inject 40 Units into the skin 2 times daily Injects 40 units AM and 90 units HS  What changed:    · when to take this  · Another medication with the same name was removed. Continue taking this medication, and follow the directions you see here. lisinopril 10 MG tablet  Commonly known as:  PRINIVIL;ZESTRIL  Take 2 tablets by mouth daily  What changed:  how much to take     NovoLOG FlexPen 100 UNIT/ML injection pen  Generic drug:  insulin aspart  What changed:  Another medication with the same name was removed. Continue taking this medication, and follow the directions you see here. oxyCODONE-acetaminophen 5-325 MG per tablet  Commonly known as:  PERCOCET  Take 1 tablet by mouth every 4 hours as needed for Pain for up to 3 days. What changed:    · how to take this  · reasons to take this  · additional instructions     simvastatin 10 MG tablet  Commonly known as:  ZOCOR  What changed:  Another medication with the same name was removed. Continue taking this medication, and follow the directions you see here.      traZODone 150 MG tablet  Commonly known as:  DESYREL  What

## 2020-06-22 NOTE — PLAN OF CARE
Problem: Falls - Risk of:  Goal: Will remain free from falls  Description: Will remain free from falls  6/22/2020 1221 by Antonia Cole RN  Outcome: Ongoing  6/22/2020 1220 by Antonia Cole RN  Outcome: Ongoing  Pt fall risk, fall band present, falling star, safety alarm activated and in use as needed. Hourly rounding performed. Pt encouraged to use call light. See Mario Collier fall risk assessment. Will continue to monitor patient closely.     Problem: Falls - Risk of:  Goal: Absence of physical injury  Description: Absence of physical injury  6/22/2020 1220 by Antonia Cole RN  Outcome: Ongoing  Problem: Skin Integrity:  Goal: Demonstration of wound healing without infection will improve  Description: Demonstration of wound healing without infection will improve  6/22/2020 1220 by Antonia Cole RN  Outcome: Ongoing

## 2020-06-22 NOTE — PROGRESS NOTES
Writer called and notified Lydia Fan with [facility] of patient's discharge today.

## 2020-06-22 NOTE — PROGRESS NOTES
Podiatry resident rounding on unit and spoke with writer regarding patient case. Resident requested to be notified if patient will discharged today to make sure appropriate vac care is available. Writer will pass information to oncoming day shift nurse.

## 2020-06-22 NOTE — PROGRESS NOTES
Occupational Therapy  Facility/Department: [facility] PROGRESSIVE CARE  Daily Treatment Note  NAME: Osito Zepeda  : 1950  MRN: [id number]    Date of Service: 2020    Discharge Recommendations:  [address]   Patient would benefit from SNF for continued occupational therapy to increase independence with  ADL of bathing, dressing, toileting and grooming. Writer recommending SNF placement for for activity tolerance and strength which will increase independence with ADL's coordinated with bed mobility and chair transfers. Continued skilled OT services to address decreased safety awareness with ADL and IADL tasks and for education and increased independence with DME and AE for fall prevention and ec/ws techniques prior to d/c home. Assessment   Performance deficits / Impairments: Decreased functional mobility ; Decreased ADL status; Decreased strength;Decreased safe awareness;Decreased balance;Decreased endurance;Decreased posture;Decreased cognition  Prognosis: Fair  REQUIRES OT FOLLOW UP: Yes  Activity Tolerance  Activity Tolerance: Treatment limited secondary to decreased cognition;Patient limited by pain  Activity Tolerance: Poor  Safety Devices  Safety Devices in place: Yes  Type of devices: Patient at risk for falls; Bed alarm in place; Left in bed;Call light within reach;Nurse notified;Gait belt         Patient Diagnosis(es): The primary encounter diagnosis was Hypertensive urgency. Diagnoses of Elevated troponin and Diabetic ulcer of left heel associated with type 1 diabetes mellitus, unspecified ulcer stage ([address] were also pertinent to this visit.       has a past medical history of RISSA (acute kidney injury) ([address] Cerebrovascular disease, Erectile dysfunction, Hepatitis B infection, Hyperlipidemia, Hypertension, Kidney cysts, Liver cyst, MRSA (methicillin resistant staph aureus) culture positive, Neurotrophic ulcer of the foot ([address] Obesity, Osteomyelitis of ankle or foot, Peripheral vascular disease ([facility] Tobacco abuse, and Type II or unspecified type diabetes mellitus without mention of complication, not stated as uncontrolled. has a past surgical history that includes Foot amputation through metatarsal (2011); Foot Amputation; Foot surgery (Right, 3/2013); and Foot Debridement (Left, 6/20/2020). Restrictions  Restrictions/Precautions  Restrictions/Precautions: Weight Bearing, General Precautions, Fall Risk  Required Braces or Orthoses?: Yes  Lower Extremity Weight Bearing Restrictions  Left Lower Extremity Weight Bearing: Non Weight Bearing  Position Activity Restriction  Other position/activity restrictions: Right BKA with poorly fitting prosthesis, Left heel wounds, PT IS NOW NWB TO LEFT FOOT, pt unable to stand or transfer at this time  Subjective   General  Chart Reviewed: Yes  Patient assessed for rehabilitation services?: Yes  Response to previous treatment: Patient with no complaints from previous session  Family / Caregiver Present: No      Orientation  Orientation  Overall Orientation Status: Within Functional Limits  Objective             Balance  Sitting Balance: Stand by assistance  Standing Balance: Unable to assess  Functional Mobility  Functional Mobility Comments: UNABLE  Bed mobility  Rolling to Left: Moderate assistance  Rolling to Right: Moderate assistance  Supine to Sit: Moderate assistance;2 Person assistance  Sit to Supine: Moderate assistance;2 Person assistance  Scooting: Moderate assistance;2 Person assistance  Comment: Max verbal and hand over hand cues for use of bed rail and overall technique/safety  Transfers  Stand Step Transfers: Unable to assess  Stand Pivot Transfers: Unable to assess  Sit Pivot Transfers: Unable to assess  Sit to stand: Unable to assess  Stand to sit: Unable to assess  Transfer Comments: Attempted to stand pt up from EOB using both walker and milton stedy with NO success.  Pt is too weak and cannot maintain NWB to [facility] Coad LIFT needed for transfers. Cognition  Overall Cognitive Status: Exceptions  Arousal/Alertness: Appropriate responses to stimuli  Following Commands: Follows one step commands with increased time; Follows one step commands with repetition  Attention Span: Appears intact  Memory: Decreased recall of recent events;Decreased short term memory  Safety Judgement: Decreased awareness of need for assistance;Decreased awareness of need for safety  Problem Solving: Assistance required to implement solutions;Assistance required to generate solutions;Assistance required to correct errors made;Decreased awareness of errors;Assistance required to identify errors made  Insights: Not aware of deficits  Initiation: Requires cues for all  Sequencing: Requires cues for all     Perception  Overall Perceptual Status: Impaired  Initiation: Cues to initiate tasks                                   Plan   Plan  Times per week: 4-5x/week, 1-2x/day  Current Treatment Recommendations: Strengthening, Balance Training, Functional Mobility Training, Endurance Training, Equipment Evaluation, Education, & procurement, Patient/Caregiver Education & Training, Self-Care / ADL, Safety Education & Training, Positioning                        AM-PAC Score        AM-[facility] Inpatient Daily Activity Raw Score: 13 (06/22/20 1315)  AM-PAC Inpatient ADL T-Scale Score : 32.03 (06/22/20 1315)  ADL Inpatient CMS 0-100% Score: 63.03 (06/22/20 1315)  ADL Inpatient CMS G-Code Modifier : CL (06/22/20 1315)    Goals  Short term goals  Time Frame for Short term goals: by discharge, pt will  Short term goal 1: demo mod A x 1 for ADL transfers with good safety, approp DME  Short term goal 2: demo mod A with toileting routine at approp level  Short term goal 3: demo SBA with UB ADLs and mod A LB ADLs with good safety/pacing, good adherance to WB restrictions and approp AE/DME  Short term goal 4: demo and good understanding of fall prevention techs, EC/WS techs, equip needs, and B UE HEP  Short term goal 5: demo mod A with functional mob short distances for ADL completion with approp AD/DME  Patient Goals   Patient goals : to go home       Therapy Time   Individual Concurrent Group Co-treatment   Time In       1118   Time Out       1149   Minutes       31        Co-treatment with PT warranted secondary to decreased safety and independence requiring 2 skilled therapy professionals to address individual discipline's goals. OT addressing preparation for ADL transfer, sitting balance for increased ADL performance, sitting/activity tolerance, functional reaching, environmental safety/scanning, fall prevention, functional mobility for ADL transfers, ability to sequence and follow directions and functional UE strength.       Chapo Siddiqui OLIVER

## 2020-06-23 LAB
GLUCOSE BLD-MCNC: 186 MG/DL (ref 75–110)
SURGICAL PATHOLOGY REPORT: NORMAL

## 2020-06-23 NOTE — PROGRESS NOTES
Pt taken out per wheelchair. Pt discharged via [facility]   Discharge paperwork and instructions given to patient. Patient acknowledges understanding. Follow up appointments reviewed. Any questions answered. Patient had wound vac and belongings with him.

## 2020-06-24 LAB
CULTURE: NORMAL
CULTURE: NORMAL
Lab: NORMAL
Lab: NORMAL
SPECIMEN DESCRIPTION: NORMAL
SPECIMEN DESCRIPTION: NORMAL

## 2020-09-16 ENCOUNTER — HOSPITAL ENCOUNTER (INPATIENT)
Age: 70
LOS: 5 days | Discharge: HOME HEALTH | DRG: 637 | End: 2020-09-21
Attending: EMERGENCY MEDICINE | Admitting: INTERNAL MEDICINE
Payer: COMMERCIAL

## 2020-09-16 PROBLEM — E87.5 HYPERKALEMIA: Status: ACTIVE | Noted: 2020-09-16

## 2020-09-16 PROBLEM — N18.9 ACUTE KIDNEY INJURY SUPERIMPOSED ON CKD (HCC): Status: ACTIVE | Noted: 2020-09-16

## 2020-09-16 PROBLEM — Z91.199 H/O NONCOMPLIANCE WITH MEDICAL TREATMENT, PRESENTING HAZARDS TO HEALTH: Status: ACTIVE | Noted: 2020-09-16

## 2020-09-16 PROBLEM — N17.9 ACUTE KIDNEY INJURY SUPERIMPOSED ON CKD (HCC): Status: ACTIVE | Noted: 2020-09-16

## 2020-09-16 LAB
ABSOLUTE EOS #: 0.2 K/UL (ref 0–0.4)
ABSOLUTE IMMATURE GRANULOCYTE: ABNORMAL K/UL (ref 0–0.3)
ABSOLUTE LYMPH #: 1.6 K/UL (ref 1–4.8)
ABSOLUTE MONO #: 0.4 K/UL (ref 0.1–1.2)
ALBUMIN SERPL-MCNC: 3.3 G/DL (ref 3.5–5.2)
ALBUMIN/GLOBULIN RATIO: 0.8 (ref 1–2.5)
ALP BLD-CCNC: 79 U/L (ref 40–129)
ALT SERPL-CCNC: 11 U/L (ref 5–41)
ANION GAP SERPL CALCULATED.3IONS-SCNC: 10 MMOL/L (ref 9–17)
ANION GAP SERPL CALCULATED.3IONS-SCNC: 11 MMOL/L (ref 9–17)
ANION GAP SERPL CALCULATED.3IONS-SCNC: 11 MMOL/L (ref 9–17)
AST SERPL-CCNC: 15 U/L
BASOPHILS # BLD: 1 % (ref 0–2)
BASOPHILS ABSOLUTE: 0.1 K/UL (ref 0–0.2)
BETA-HYDROXYBUTYRATE: 0.57 MMOL/L (ref 0.02–0.27)
BILIRUB SERPL-MCNC: 0.4 MG/DL (ref 0.3–1.2)
BUN BLDV-MCNC: 57 MG/DL (ref 8–23)
BUN BLDV-MCNC: 57 MG/DL (ref 8–23)
BUN BLDV-MCNC: 61 MG/DL (ref 8–23)
BUN/CREAT BLD: ABNORMAL (ref 9–20)
CALCIUM SERPL-MCNC: 9.4 MG/DL (ref 8.6–10.4)
CALCIUM SERPL-MCNC: 9.6 MG/DL (ref 8.6–10.4)
CALCIUM SERPL-MCNC: 9.9 MG/DL (ref 8.6–10.4)
CHLORIDE BLD-SCNC: 100 MMOL/L (ref 98–107)
CHLORIDE BLD-SCNC: 100 MMOL/L (ref 98–107)
CHLORIDE BLD-SCNC: 95 MMOL/L (ref 98–107)
CO2: 20 MMOL/L (ref 20–31)
CO2: 21 MMOL/L (ref 20–31)
CO2: 21 MMOL/L (ref 20–31)
CREAT SERPL-MCNC: 2.7 MG/DL (ref 0.7–1.2)
CREAT SERPL-MCNC: 3 MG/DL (ref 0.7–1.2)
CREAT SERPL-MCNC: 3.2 MG/DL (ref 0.7–1.2)
DIFFERENTIAL TYPE: ABNORMAL
EKG ATRIAL RATE: 72 BPM
EKG P AXIS: 33 DEGREES
EKG P-R INTERVAL: 188 MS
EKG Q-T INTERVAL: 436 MS
EKG QRS DURATION: 128 MS
EKG QTC CALCULATION (BAZETT): 477 MS
EKG R AXIS: -120 DEGREES
EKG T AXIS: 34 DEGREES
EKG VENTRICULAR RATE: 72 BPM
EOSINOPHILS RELATIVE PERCENT: 3 % (ref 1–4)
GFR AFRICAN AMERICAN: 23 ML/MIN
GFR AFRICAN AMERICAN: 25 ML/MIN
GFR AFRICAN AMERICAN: 28 ML/MIN
GFR NON-AFRICAN AMERICAN: 19 ML/MIN
GFR NON-AFRICAN AMERICAN: 21 ML/MIN
GFR NON-AFRICAN AMERICAN: 23 ML/MIN
GFR SERPL CREATININE-BSD FRML MDRD: ABNORMAL ML/MIN/{1.73_M2}
GLUCOSE BLD-MCNC: 487 MG/DL (ref 70–99)
GLUCOSE BLD-MCNC: 555 MG/DL (ref 70–99)
GLUCOSE BLD-MCNC: 738 MG/DL (ref 70–99)
HCT VFR BLD CALC: 43 % (ref 41–53)
HEMOGLOBIN: 14 G/DL (ref 13.5–17.5)
IMMATURE GRANULOCYTES: ABNORMAL %
LYMPHOCYTES # BLD: 31 % (ref 24–44)
MCH RBC QN AUTO: 25.8 PG (ref 26–34)
MCHC RBC AUTO-ENTMCNC: 32.4 G/DL (ref 31–37)
MCV RBC AUTO: 79.6 FL (ref 80–100)
MONOCYTES # BLD: 7 % (ref 2–11)
NRBC AUTOMATED: ABNORMAL PER 100 WBC
PDW BLD-RTO: 14.2 % (ref 12.5–15.4)
PLATELET # BLD: 120 K/UL (ref 140–450)
PLATELET ESTIMATE: ABNORMAL
PMV BLD AUTO: 7.7 FL (ref 6–12)
POTASSIUM SERPL-SCNC: 5.1 MMOL/L (ref 3.7–5.3)
POTASSIUM SERPL-SCNC: 5.2 MMOL/L (ref 3.7–5.3)
POTASSIUM SERPL-SCNC: 6.2 MMOL/L (ref 3.7–5.3)
RBC # BLD: 5.41 M/UL (ref 4.5–5.9)
RBC # BLD: ABNORMAL 10*6/UL
SEG NEUTROPHILS: 58 % (ref 36–66)
SEGMENTED NEUTROPHILS ABSOLUTE COUNT: 3.1 K/UL (ref 1.8–7.7)
SODIUM BLD-SCNC: 126 MMOL/L (ref 135–144)
SODIUM BLD-SCNC: 131 MMOL/L (ref 135–144)
SODIUM BLD-SCNC: 132 MMOL/L (ref 135–144)
TOTAL PROTEIN: 7.6 G/DL (ref 6.4–8.3)
WBC # BLD: 5.4 K/UL (ref 3.5–11)
WBC # BLD: ABNORMAL 10*3/UL

## 2020-09-16 PROCEDURE — 83036 HEMOGLOBIN GLYCOSYLATED A1C: CPT

## 2020-09-16 PROCEDURE — 2580000003 HC RX 258: Performed by: EMERGENCY MEDICINE

## 2020-09-16 PROCEDURE — 96361 HYDRATE IV INFUSION ADD-ON: CPT

## 2020-09-16 PROCEDURE — 36415 COLL VENOUS BLD VENIPUNCTURE: CPT

## 2020-09-16 PROCEDURE — 80048 BASIC METABOLIC PNL TOTAL CA: CPT

## 2020-09-16 PROCEDURE — 84550 ASSAY OF BLOOD/URIC ACID: CPT

## 2020-09-16 PROCEDURE — 85025 COMPLETE CBC W/AUTO DIFF WBC: CPT

## 2020-09-16 PROCEDURE — 99222 1ST HOSP IP/OBS MODERATE 55: CPT | Performed by: NURSE PRACTITIONER

## 2020-09-16 PROCEDURE — 80053 COMPREHEN METABOLIC PANEL: CPT

## 2020-09-16 PROCEDURE — G0378 HOSPITAL OBSERVATION PER HR: HCPCS

## 2020-09-16 PROCEDURE — 93005 ELECTROCARDIOGRAM TRACING: CPT | Performed by: EMERGENCY MEDICINE

## 2020-09-16 PROCEDURE — 83605 ASSAY OF LACTIC ACID: CPT

## 2020-09-16 PROCEDURE — 6370000000 HC RX 637 (ALT 250 FOR IP): Performed by: EMERGENCY MEDICINE

## 2020-09-16 PROCEDURE — 99285 EMERGENCY DEPT VISIT HI MDM: CPT

## 2020-09-16 PROCEDURE — 96374 THER/PROPH/DIAG INJ IV PUSH: CPT

## 2020-09-16 PROCEDURE — 82010 KETONE BODYS QUAN: CPT

## 2020-09-16 PROCEDURE — 82550 ASSAY OF CK (CPK): CPT

## 2020-09-16 PROCEDURE — 2060000000 HC ICU INTERMEDIATE R&B

## 2020-09-16 PROCEDURE — 96375 TX/PRO/DX INJ NEW DRUG ADDON: CPT

## 2020-09-16 PROCEDURE — 84132 ASSAY OF SERUM POTASSIUM: CPT

## 2020-09-16 RX ORDER — SODIUM CHLORIDE 9 MG/ML
INJECTION, SOLUTION INTRAVENOUS CONTINUOUS
Status: DISCONTINUED | OUTPATIENT
Start: 2020-09-16 | End: 2020-09-16

## 2020-09-16 RX ORDER — DEXTROSE AND SODIUM CHLORIDE 5; .45 G/100ML; G/100ML
INJECTION, SOLUTION INTRAVENOUS CONTINUOUS PRN
Status: CANCELLED | OUTPATIENT
Start: 2020-09-16

## 2020-09-16 RX ORDER — 0.9 % SODIUM CHLORIDE 0.9 %
1000 INTRAVENOUS SOLUTION INTRAVENOUS ONCE
Status: COMPLETED | OUTPATIENT
Start: 2020-09-16 | End: 2020-09-16

## 2020-09-16 RX ORDER — BUDESONIDE AND FORMOTEROL FUMARATE DIHYDRATE 160; 4.5 UG/1; UG/1
2 AEROSOL RESPIRATORY (INHALATION) 2 TIMES DAILY
Status: DISCONTINUED | OUTPATIENT
Start: 2020-09-16 | End: 2020-09-21 | Stop reason: HOSPADM

## 2020-09-16 RX ORDER — MAGNESIUM SULFATE 1 G/100ML
1 INJECTION INTRAVENOUS PRN
Status: DISCONTINUED | OUTPATIENT
Start: 2020-09-16 | End: 2020-09-21 | Stop reason: HOSPADM

## 2020-09-16 RX ORDER — INSULIN GLARGINE 100 [IU]/ML
40 INJECTION, SOLUTION SUBCUTANEOUS 2 TIMES DAILY
Status: DISCONTINUED | OUTPATIENT
Start: 2020-09-16 | End: 2020-09-17

## 2020-09-16 RX ORDER — ACETAMINOPHEN 325 MG/1
650 TABLET ORAL EVERY 6 HOURS PRN
Status: DISCONTINUED | OUTPATIENT
Start: 2020-09-16 | End: 2020-09-21 | Stop reason: HOSPADM

## 2020-09-16 RX ORDER — DOCUSATE SODIUM 100 MG/1
100 CAPSULE, LIQUID FILLED ORAL 2 TIMES DAILY
Status: DISCONTINUED | OUTPATIENT
Start: 2020-09-16 | End: 2020-09-21 | Stop reason: HOSPADM

## 2020-09-16 RX ORDER — CLOPIDOGREL BISULFATE 75 MG/1
75 TABLET ORAL DAILY
Status: DISCONTINUED | OUTPATIENT
Start: 2020-09-17 | End: 2020-09-21 | Stop reason: HOSPADM

## 2020-09-16 RX ORDER — AMLODIPINE BESYLATE 10 MG/1
10 TABLET ORAL DAILY
Status: DISCONTINUED | OUTPATIENT
Start: 2020-09-17 | End: 2020-09-19

## 2020-09-16 RX ORDER — POTASSIUM CHLORIDE 7.45 MG/ML
10 INJECTION INTRAVENOUS PRN
Status: DISCONTINUED | OUTPATIENT
Start: 2020-09-16 | End: 2020-09-21 | Stop reason: HOSPADM

## 2020-09-16 RX ORDER — ALBUTEROL SULFATE 2.5 MG/3ML
10 SOLUTION RESPIRATORY (INHALATION) ONCE
Status: DISCONTINUED | OUTPATIENT
Start: 2020-09-16 | End: 2020-09-16

## 2020-09-16 RX ORDER — METOPROLOL SUCCINATE 25 MG/1
25 TABLET, EXTENDED RELEASE ORAL 2 TIMES DAILY
Status: DISCONTINUED | OUTPATIENT
Start: 2020-09-17 | End: 2020-09-21 | Stop reason: HOSPADM

## 2020-09-16 RX ORDER — LANOLIN ALCOHOL/MO/W.PET/CERES
325 CREAM (GRAM) TOPICAL
Status: DISCONTINUED | OUTPATIENT
Start: 2020-09-17 | End: 2020-09-21 | Stop reason: HOSPADM

## 2020-09-16 RX ORDER — ATORVASTATIN CALCIUM 10 MG/1
10 TABLET, FILM COATED ORAL DAILY
Status: DISCONTINUED | OUTPATIENT
Start: 2020-09-17 | End: 2020-09-21 | Stop reason: HOSPADM

## 2020-09-16 RX ORDER — SODIUM CHLORIDE 9 MG/ML
INJECTION, SOLUTION INTRAVENOUS CONTINUOUS
Status: DISCONTINUED | OUTPATIENT
Start: 2020-09-16 | End: 2020-09-17

## 2020-09-16 RX ORDER — DEXTROSE MONOHYDRATE 25 G/50ML
12.5 INJECTION, SOLUTION INTRAVENOUS PRN
Status: DISCONTINUED | OUTPATIENT
Start: 2020-09-16 | End: 2020-09-17 | Stop reason: SDUPTHER

## 2020-09-16 RX ADMIN — SODIUM CHLORIDE 1000 ML: 9 INJECTION, SOLUTION INTRAVENOUS at 16:55

## 2020-09-16 RX ADMIN — Medication 10 UNITS: at 17:01

## 2020-09-16 ASSESSMENT — ENCOUNTER SYMPTOMS
SHORTNESS OF BREATH: 0
DIARRHEA: 0
VOMITING: 0
SORE THROAT: 0

## 2020-09-16 ASSESSMENT — PAIN SCALES - GENERAL: PAINLEVEL_OUTOF10: 0

## 2020-09-16 NOTE — ED NOTES
[facility] contacted to initiate admission to [facility], [address] [place] [address]  09/16/20 1267

## 2020-09-16 NOTE — ED PROVIDER NOTES
[facility]  [facility]  Phone: [facility]  EMERGENCY DEPARTMENT ENCOUNTER      Pt Name: Mary Vazquez  MRN: [id number]  [place] 1950  Date of evaluation: 9/16/2020  Provider: Brittney Rich DO    CHIEF COMPLAINT       Chief Complaint   Patient presents with    Hyperglycemia     EMS reports that he had blood drawn yesterday and they received a call from \"a third party\" for report of hyperglycemia, pt denies complaints or concerns at present time, the patient states that he has not been taking his medications because he doesnt feel like he needs it, EMS report a \"high\" reading for blood sugar         HISTORY OF PRESENT ILLNESS   (Location/Symptom, Timing/Onset,Context/Setting, Quality, Duration, Modifying Factors, Severity)  Note limiting factors. Mary Vazquez is a 79 y.o. male who presents to the emergency department for the evaluation of an elevated blood sugar. Patient states he had his blood drawn yesterday and he received a report today that his blood sugar was very high. He was told to go to the ER. He states he has his insulin at home but he has not been taking it because he feels like he has been managing his blood sugar well without it. He states he feels fine. Denies any chest pain or abdominal pain, no nausea, vomiting or diarrhea. Denies fatigue or any mental status change. Nursing Notes were reviewed. REVIEW OF SYSTEMS    (2-9systems for level 4, 10 or more for level 5)     Review of Systems   Constitutional: Negative for fever. HENT: Negative for sore throat. Respiratory: Negative for shortness of breath. Cardiovascular: Negative for chest pain. Gastrointestinal: Negative for diarrhea and vomiting. Genitourinary: Negative for dysuria. Skin: Negative for rash. Neurological: Negative for weakness. All other systems reviewed and are negative.       Except asnoted above the remainder of the review of systems was reviewed and negative. PAST MEDICAL HISTORY     Past Medical History:   Diagnosis Date    [facility]) [facility]     Cerebrovascular disease     Erectile dysfunction     Hepatitis B infection     Hyperlipidemia     Hypertension     Kidney cysts     Liver cyst     MRSA (methicillin resistant staph aureus) culture positive 1/10/2014    right foot    Neurotrophic ulcer of the foot ([facility]     Obesity     Osteomyelitis of ankle or foot     Peripheral vascular disease ([facility]     Tobacco abuse     Type II or unspecified type diabetes mellitus without mention of complication, not stated as uncontrolled          SURGICAL HISTORY       Past Surgical History:   Procedure Laterality Date    FOOT AMPUTATION      FOOT AMPUTATION THROUGH METATARSAL  2011    pt can't give exact date of surg-- toes and part of lt foot removed    FOOT DEBRIDEMENT Left 6/20/2020    WOUND BED PREPARATION AND WOUND VAC APPLICATION performed by Haritha Harp DPM at [facility] [facility] Right 3/2013         CURRENT MEDICATIONS     Previous Medications    ACETAMINOPHEN (TYLENOL) 325 MG TABLET    Take 650 mg by mouth every 4 hours as needed for Pain.  Give 2 tabs = 650 MG by mouth every 4 hours prn    AMLODIPINE (NORVASC) 10 MG TABLET    Take 1 tablet by mouth daily    BUDESONIDE-FORMOTEROL (SYMBICORT) 160-4.5 MCG/ACT AERO    Inhale 2 puffs into the lungs 2 times daily    CLOPIDOGREL (PLAVIX) 75 MG TABLET    Take 1 tablet by mouth daily    DOCUSATE SODIUM (COLACE) 100 MG CAPSULE    Take 100 mg by mouth 2 times daily     FERROUS SULFATE (IRON 325) 325 (65 FE) MG TABLET    Take 325 mg by mouth daily (with breakfast)    FUROSEMIDE (LASIX) 40 MG TABLET    Take 40 mg by mouth daily     INSULIN ASPART (NOVOLOG FLEXPEN) 100 UNIT/ML INJECTION PEN    Inject 8-12 Units into the skin 2 times daily (before meals) 8 units AM and 12 units at supper + additional sliding scale    INSULIN GLARGINE (BASAGLAR KWIKPEN) 100 UNIT/ML INJECTION PEN    Inject 40 Units into the skin 2 times daily Injects 40 units AM and 90 units HS    INSULIN SYRINGE-NEEDLE U-100 (ACCUSURE INS SYR 1CC/30GX5/16\") 30G X 5/16\" 1 ML MISC    by Does not apply route. LISINOPRIL (PRINIVIL;ZESTRIL) 10 MG TABLET    Take 2 tablets by mouth daily    METOPROLOL SUCCINATE (TOPROL XL) 25 MG EXTENDED RELEASE TABLET    Take 25 mg by mouth 2 times daily    SIMVASTATIN (ZOCOR) 10 MG TABLET    Take 10 mg by mouth nightly    TRAZODONE (DESYREL) 150 MG TABLET    Take 150 mg by mouth nightly       ALLERGIES     Patient has no known allergies.     FAMILY HISTORY       Family History   Problem Relation Age of Onset    Diabetes Mother     Diabetes Sister     Diabetes Brother     Diabetes Sister     Diabetes Sister     Diabetes Sister           SOCIAL HISTORY       Social History     Socioeconomic History    Marital status:      Spouse name: None    Number of children: None    Years of education: None    Highest education level: None   Occupational History    None   Social Needs    Financial resource strain: None    Food insecurity     Worry: None     Inability: None    Transportation needs     Medical: None     Non-medical: None   Tobacco Use    Smoking status: Current Every Day Smoker     Packs/day: 1.00     Years: 30.00     Pack years: 30.00     Types: Cigarettes    Smokeless tobacco: Never Used    Tobacco comment: working on quitting smoking   Substance and Sexual Activity    Alcohol use: No    Drug use: No    Sexual activity: Yes     Partners: Female   Lifestyle    Physical activity     Days per week: None     Minutes per session: None    Stress: None   Relationships    Social connections     Talks on phone: None     Gets together: None     Attends Hoahaoism service: None     Active member of club or organization: None     Attends meetings of clubs or organizations: None     Relationship status: None    Intimate partner violence     Fear of current or excoriations/ulcerations of the lower extremities   Neurological:      General: No focal deficit present. Mental Status: He is alert and oriented to person, place, and time. Mental status is at baseline. Sensory: No sensory deficit. Motor: No weakness. Comments: Speaking normally. No facial asymmetry. Moving all 4 extremities. No gait testing   Psychiatric:         Mood and Affect: Mood normal.         EMERGENCY DEPARTMENT COURSE and DIFFERENTIAL DIAGNOSIS/MDM:   Vitals:    Vitals:    09/16/20 1652   BP: (!) 114/55   Pulse: 66   Resp: 20   Temp: 98 °F (36.7 °C)   TempSrc: Oral   SpO2: 98%   Weight: 122.5 kg (270 lb)       Patient presents to the emergency department with the complaint described above. Vital signs are grossly normal and he is nontoxic and resting comfortably. Patient is oriented x3, he has no mental status changes no chest pain or abdominal pain and no vomiting. He does appear dehydrated, it is reported that his blood sugar was approximately 700 wherever he had blood work but I cannot see it in our system or care everywhere. EMS took his blood sugar and it read high.   At this time I will do metabolic work-up with beta hydroxybutyrate, I am going to give him IV fluids and IV insulin and I will reevaluate      DIAGNOSTIC RESULTS     LABS:  Labs Reviewed   CBC WITH AUTO DIFFERENTIAL - Abnormal; Notable for the following components:       Result Value    MCV 79.6 (*)     MCH 25.8 (*)     Platelets 957 (*)     All other components within normal limits   COMPREHENSIVE METABOLIC PANEL - Abnormal; Notable for the following components:    Glucose 738 (*)     BUN 61 (*)     CREATININE 3.20 (*)     Sodium 126 (*)     Potassium 6.2 (*)     Chloride 95 (*)     Alb 3.3 (*)     Albumin/Globulin Ratio 0.8 (*)     GFR Non- 19 (*)     GFR  23 (*)     All other components within normal limits   BETA-HYDROXYBUTYRATE - Abnormal; Notable for the following Admitted 09/16/2020 06:02:44 PM      PATIENT REFERRED TO:  Harman Paredesbi  [facility] [address]  [phone]            DISCHARGE MEDICATIONS:  New Prescriptions    No medications on file          (Please note that portions of this note were completed with a voice recognition program.  Efforts were made to edit the dictations but occasionally words are mis-transcribed.)    Tamera Membreno DO (electronically signed)  Board Certified Emergency Physician          Tamera Membreno DO  09/16/20 802 [facility]  09/16/20 0598

## 2020-09-16 NOTE — ED NOTES
Lab calls with glucose 555, Dr. Bobby Herring notified, no new orders received      Rafael Varela RN  09/16/20 9983

## 2020-09-17 ENCOUNTER — APPOINTMENT (OUTPATIENT)
Dept: GENERAL RADIOLOGY | Age: 70
DRG: 637 | End: 2020-09-17
Payer: COMMERCIAL

## 2020-09-17 PROBLEM — Z89.511 HISTORY OF RIGHT BELOW KNEE AMPUTATION (HCC): Status: ACTIVE | Noted: 2017-08-15

## 2020-09-17 LAB
ANION GAP SERPL CALCULATED.3IONS-SCNC: 12 MMOL/L (ref 9–17)
ANION GAP SERPL CALCULATED.3IONS-SCNC: 8 MMOL/L (ref 9–17)
BUN BLDV-MCNC: 47 MG/DL (ref 8–23)
BUN BLDV-MCNC: 52 MG/DL (ref 8–23)
BUN/CREAT BLD: 19 (ref 9–20)
BUN/CREAT BLD: 21 (ref 9–20)
CALCIUM SERPL-MCNC: 9 MG/DL (ref 8.6–10.4)
CALCIUM SERPL-MCNC: 9.9 MG/DL (ref 8.6–10.4)
CHLORIDE BLD-SCNC: 106 MMOL/L (ref 98–107)
CHLORIDE BLD-SCNC: 109 MMOL/L (ref 98–107)
CO2: 21 MMOL/L (ref 20–31)
CO2: 22 MMOL/L (ref 20–31)
CREAT SERPL-MCNC: 2.29 MG/DL (ref 0.7–1.2)
CREAT SERPL-MCNC: 2.77 MG/DL (ref 0.7–1.2)
CREATININE URINE: 46.3 MG/DL (ref 39–259)
ESTIMATED AVERAGE GLUCOSE: 430 MG/DL
GFR AFRICAN AMERICAN: 28 ML/MIN
GFR AFRICAN AMERICAN: 34 ML/MIN
GFR NON-AFRICAN AMERICAN: 23 ML/MIN
GFR NON-AFRICAN AMERICAN: 28 ML/MIN
GFR SERPL CREATININE-BSD FRML MDRD: ABNORMAL ML/MIN/{1.73_M2}
GLUCOSE BLD-MCNC: 118 MG/DL (ref 75–110)
GLUCOSE BLD-MCNC: 165 MG/DL (ref 70–99)
GLUCOSE BLD-MCNC: 214 MG/DL (ref 75–110)
GLUCOSE BLD-MCNC: 221 MG/DL (ref 70–99)
GLUCOSE BLD-MCNC: 223 MG/DL (ref 75–110)
GLUCOSE BLD-MCNC: 253 MG/DL (ref 75–110)
GLUCOSE BLD-MCNC: 289 MG/DL (ref 75–110)
GLUCOSE BLD-MCNC: 432 MG/DL (ref 75–110)
HBA1C MFR BLD: 16.6 % (ref 4–6)
HCT VFR BLD CALC: 40 % (ref 40.7–50.3)
HEMOGLOBIN: 13.5 G/DL (ref 13–17)
LACTIC ACID, WHOLE BLOOD: NORMAL MMOL/L (ref 0.7–2.1)
LACTIC ACID: 1 MMOL/L (ref 0.5–2.2)
MAGNESIUM: 1.5 MG/DL (ref 1.6–2.6)
MAGNESIUM: 1.6 MG/DL (ref 1.6–2.6)
MCH RBC QN AUTO: 26 PG (ref 25.2–33.5)
MCHC RBC AUTO-ENTMCNC: 33.8 G/DL (ref 28–38)
MCV RBC AUTO: 77.1 FL (ref 82.6–102.9)
NRBC AUTOMATED: ABNORMAL PER 100 WBC
PDW BLD-RTO: 13.5 % (ref 11.8–14.4)
PHOSPHORUS: 2.8 MG/DL (ref 2.5–4.5)
PHOSPHORUS: 2.9 MG/DL (ref 2.5–4.5)
PLATELET # BLD: 115 K/UL (ref 138–453)
PMV BLD AUTO: 9.6 FL (ref 8.1–13.5)
POTASSIUM SERPL-SCNC: 4.8 MMOL/L (ref 3.7–5.3)
POTASSIUM SERPL-SCNC: 4.9 MMOL/L (ref 3.7–5.3)
POTASSIUM SERPL-SCNC: 5 MMOL/L (ref 3.7–5.3)
RBC # BLD: 5.19 M/UL (ref 4.21–5.77)
SODIUM BLD-SCNC: 138 MMOL/L (ref 135–144)
SODIUM BLD-SCNC: 140 MMOL/L (ref 135–144)
SODIUM,UR: 65 MMOL/L
TOTAL CK: 68 U/L (ref 39–308)
URIC ACID: 9.9 MG/DL (ref 3.4–7)
WBC # BLD: 5.5 K/UL (ref 3.5–11.3)

## 2020-09-17 PROCEDURE — 85027 COMPLETE CBC AUTOMATED: CPT

## 2020-09-17 PROCEDURE — G0378 HOSPITAL OBSERVATION PER HR: HCPCS

## 2020-09-17 PROCEDURE — 94761 N-INVAS EAR/PLS OXIMETRY MLT: CPT

## 2020-09-17 PROCEDURE — 97535 SELF CARE MNGMENT TRAINING: CPT

## 2020-09-17 PROCEDURE — 6370000000 HC RX 637 (ALT 250 FOR IP): Performed by: INTERNAL MEDICINE

## 2020-09-17 PROCEDURE — 2580000003 HC RX 258: Performed by: INTERNAL MEDICINE

## 2020-09-17 PROCEDURE — 82947 ASSAY GLUCOSE BLOOD QUANT: CPT

## 2020-09-17 PROCEDURE — 84100 ASSAY OF PHOSPHORUS: CPT

## 2020-09-17 PROCEDURE — 94640 AIRWAY INHALATION TREATMENT: CPT

## 2020-09-17 PROCEDURE — 82570 ASSAY OF URINE CREATININE: CPT

## 2020-09-17 PROCEDURE — 36415 COLL VENOUS BLD VENIPUNCTURE: CPT

## 2020-09-17 PROCEDURE — 73630 X-RAY EXAM OF FOOT: CPT

## 2020-09-17 PROCEDURE — 83735 ASSAY OF MAGNESIUM: CPT

## 2020-09-17 PROCEDURE — 97166 OT EVAL MOD COMPLEX 45 MIN: CPT

## 2020-09-17 PROCEDURE — 96361 HYDRATE IV INFUSION ADD-ON: CPT

## 2020-09-17 PROCEDURE — 97162 PT EVAL MOD COMPLEX 30 MIN: CPT

## 2020-09-17 PROCEDURE — 99213 OFFICE O/P EST LOW 20 MIN: CPT

## 2020-09-17 PROCEDURE — 2060000000 HC ICU INTERMEDIATE R&B

## 2020-09-17 PROCEDURE — 80048 BASIC METABOLIC PNL TOTAL CA: CPT

## 2020-09-17 PROCEDURE — 97530 THERAPEUTIC ACTIVITIES: CPT

## 2020-09-17 PROCEDURE — 99233 SBSQ HOSP IP/OBS HIGH 50: CPT | Performed by: INTERNAL MEDICINE

## 2020-09-17 PROCEDURE — 84300 ASSAY OF URINE SODIUM: CPT

## 2020-09-17 RX ORDER — SODIUM CHLORIDE 9 MG/ML
INJECTION, SOLUTION INTRAVENOUS CONTINUOUS
Status: DISCONTINUED | OUTPATIENT
Start: 2020-09-17 | End: 2020-09-19

## 2020-09-17 RX ORDER — DEXTROSE MONOHYDRATE 25 G/50ML
12.5 INJECTION, SOLUTION INTRAVENOUS PRN
Status: DISCONTINUED | OUTPATIENT
Start: 2020-09-17 | End: 2020-09-21 | Stop reason: HOSPADM

## 2020-09-17 RX ORDER — NICOTINE POLACRILEX 4 MG
15 LOZENGE BUCCAL PRN
Status: DISCONTINUED | OUTPATIENT
Start: 2020-09-17 | End: 2020-09-21 | Stop reason: HOSPADM

## 2020-09-17 RX ORDER — INSULIN GLARGINE 100 [IU]/ML
20 INJECTION, SOLUTION SUBCUTANEOUS 2 TIMES DAILY
Status: DISCONTINUED | OUTPATIENT
Start: 2020-09-17 | End: 2020-09-18

## 2020-09-17 RX ORDER — DEXTROSE MONOHYDRATE 50 MG/ML
100 INJECTION, SOLUTION INTRAVENOUS PRN
Status: DISCONTINUED | OUTPATIENT
Start: 2020-09-17 | End: 2020-09-21 | Stop reason: HOSPADM

## 2020-09-17 RX ADMIN — BUDESONIDE AND FORMOTEROL FUMARATE DIHYDRATE 2 PUFF: 160; 4.5 AEROSOL RESPIRATORY (INHALATION) at 19:31

## 2020-09-17 RX ADMIN — INSULIN LISPRO 6 UNITS: 100 INJECTION, SOLUTION INTRAVENOUS; SUBCUTANEOUS at 20:36

## 2020-09-17 RX ADMIN — NYSTATIN 500000 UNITS: 100000 SUSPENSION ORAL at 17:55

## 2020-09-17 RX ADMIN — INSULIN LISPRO 9 UNITS: 100 INJECTION, SOLUTION INTRAVENOUS; SUBCUTANEOUS at 17:55

## 2020-09-17 RX ADMIN — INSULIN GLARGINE 40 UNITS: 100 INJECTION, SOLUTION SUBCUTANEOUS at 00:41

## 2020-09-17 RX ADMIN — NYSTATIN 500000 UNITS: 100000 SUSPENSION ORAL at 20:37

## 2020-09-17 RX ADMIN — TRAZODONE HYDROCHLORIDE 150 MG: 100 TABLET ORAL at 00:40

## 2020-09-17 RX ADMIN — SODIUM CHLORIDE: 9 INJECTION, SOLUTION INTRAVENOUS at 19:16

## 2020-09-17 RX ADMIN — SODIUM CHLORIDE: 9 INJECTION, SOLUTION INTRAVENOUS at 07:14

## 2020-09-17 RX ADMIN — DOCUSATE SODIUM 100 MG: 100 CAPSULE, LIQUID FILLED ORAL at 00:40

## 2020-09-17 RX ADMIN — AMLODIPINE BESYLATE 10 MG: 10 TABLET ORAL at 08:13

## 2020-09-17 RX ADMIN — TRAZODONE HYDROCHLORIDE 150 MG: 100 TABLET ORAL at 20:37

## 2020-09-17 RX ADMIN — ATORVASTATIN CALCIUM 10 MG: 10 TABLET, FILM COATED ORAL at 08:14

## 2020-09-17 RX ADMIN — DOCUSATE SODIUM 100 MG: 100 CAPSULE, LIQUID FILLED ORAL at 08:13

## 2020-09-17 RX ADMIN — SODIUM CHLORIDE: 9 INJECTION, SOLUTION INTRAVENOUS at 00:21

## 2020-09-17 RX ADMIN — INSULIN LISPRO 18 UNITS: 100 INJECTION, SOLUTION INTRAVENOUS; SUBCUTANEOUS at 00:41

## 2020-09-17 RX ADMIN — METOPROLOL SUCCINATE 25 MG: 25 TABLET, EXTENDED RELEASE ORAL at 08:13

## 2020-09-17 RX ADMIN — INSULIN GLARGINE 20 UNITS: 100 INJECTION, SOLUTION SUBCUTANEOUS at 20:37

## 2020-09-17 RX ADMIN — INSULIN LISPRO 6 UNITS: 100 INJECTION, SOLUTION INTRAVENOUS; SUBCUTANEOUS at 13:30

## 2020-09-17 RX ADMIN — DOCUSATE SODIUM 100 MG: 100 CAPSULE, LIQUID FILLED ORAL at 20:37

## 2020-09-17 RX ADMIN — BUDESONIDE AND FORMOTEROL FUMARATE DIHYDRATE 2 PUFF: 160; 4.5 AEROSOL RESPIRATORY (INHALATION) at 09:56

## 2020-09-17 RX ADMIN — INSULIN GLARGINE 20 UNITS: 100 INJECTION, SOLUTION SUBCUTANEOUS at 09:29

## 2020-09-17 RX ADMIN — SODIUM CHLORIDE: 9 INJECTION, SOLUTION INTRAVENOUS at 12:31

## 2020-09-17 RX ADMIN — CLOPIDOGREL BISULFATE 75 MG: 75 TABLET ORAL at 08:13

## 2020-09-17 RX ADMIN — METOPROLOL SUCCINATE 25 MG: 25 TABLET, EXTENDED RELEASE ORAL at 20:37

## 2020-09-17 RX ADMIN — INSULIN LISPRO 9 UNITS: 100 INJECTION, SOLUTION INTRAVENOUS; SUBCUTANEOUS at 03:44

## 2020-09-17 RX ADMIN — FERROUS SULFATE TAB EC 325 MG (65 MG FE EQUIVALENT) 325 MG: 325 (65 FE) TABLET DELAYED RESPONSE at 08:14

## 2020-09-17 ASSESSMENT — ENCOUNTER SYMPTOMS
ALLERGIC/IMMUNOLOGIC NEGATIVE: 1
RESPIRATORY NEGATIVE: 1
EYES NEGATIVE: 1
GASTROINTESTINAL NEGATIVE: 1

## 2020-09-17 ASSESSMENT — PAIN SCALES - GENERAL
PAINLEVEL_OUTOF10: 0

## 2020-09-17 NOTE — PROGRESS NOTES
Physical Therapy    Facility/Department: [facility] PROGRESSIVE CARE  Initial Assessment    NAME: Caroline Harper  : 1950  MRN: [id number]    Date of Service: 2020    Discharge Recommendations:  Subacute/Skilled Nursing Facility, Continue to assess pending progress       Caroline Harper is a 79 y.o. male who presents to the emergency department for the evaluation of an elevated blood sugar. Patient states he had his blood drawn yesterday and he received a report today that his blood sugar was very high. He was told to go to the ER. He states he has his insulin at home but he has not been taking it because he feels like he has been managing his blood sugar well without it. He states he feels fine. Denies any chest pain or abdominal pain, no nausea, vomiting or diarrhea. Denies fatigue or any mental status change. RN reports patient is medically stable for therapy treatment this date. Chart reviewed prior to treatment and patient is agreeable for therapy. All lines intact and patient positioned comfortably at end of treatment. All patient needs addressed prior to ending therapy session. Assessment   Body structures, Functions, Activity limitations: Decreased functional mobility ; Decreased balance;Decreased endurance; Increased pain;Decreased strength  Assessment: Pt tolerated PT session well, but became very fatigued after bed mobility. Pt unable to perform transfers this date due to unclear WB orders. Per RN, Gary Perez, she believes pt is to be NWB LLE, as pt also stated he should be NWB but only puts some weight through LLE during pivot transfers. Re-assess for transfers when appropriate.   Specific instructions for Next Treatment: re-assess transfers when appropriate  Prognosis: Good  Decision Making: Medium Complexity  Clinical Presentation: evolving  PT Education: PT Role;Functional Mobility Training;Plan of Care;General Safety;Pressure Relief;Gait Training  Patient Education: Emphasis on re-assessment for transfers once WB status is given. REQUIRES PT FOLLOW UP: Yes  Activity Tolerance  Activity Tolerance: Patient limited by endurance; Patient limited by fatigue  Activity Tolerance: After bed mobility, pt very fatigued and tired. Patient Diagnosis(es): The primary encounter diagnosis was Hyperosmolarity due to type 1 diabetes mellitus ([address] Diagnoses of Hyperglycemic crisis in diabetes mellitus ([address] Acute renal failure, unspecified acute renal failure type ([address] Hyperkalemia, and Dehydration were also pertinent to this visit. has a past medical history of RISSA (acute kidney injury) ([address] Cerebrovascular disease, Erectile dysfunction, Hepatitis B infection, Hyperlipidemia, Hypertension, Kidney cysts, Liver cyst, MRSA (methicillin resistant staph aureus) culture positive, Neurotrophic ulcer of the foot ([address] Obesity, Osteomyelitis of ankle or foot, Peripheral vascular disease ([address] Tobacco abuse, and Type II or unspecified type diabetes mellitus without mention of complication, not stated as uncontrolled. has a past surgical history that includes Foot amputation through metatarsal (2011); Foot Amputation; Foot surgery (Right, 3/2013); and Foot Debridement (Left, 6/20/2020).     Restrictions  Restrictions/Precautions  Restrictions/Precautions: Fall Risk, Contact Precautions  Required Braces or Orthoses?: Yes(prosthetic on RLE)  Position Activity Restriction  Other position/activity restrictions: RUE IV, renal diet, R BKA, L TMA  Vision/Hearing  Vision: Impaired(Pt states he is getting cataract sx on R eye in september)  Hearing: Exceptions to [facility]  Hearing Exceptions: Hard of hearing/hearing concerns     Subjective  General  Chart Reviewed: Yes  Patient assessed for rehabilitation services?: Yes  Response To Previous Treatment: Not applicable  Family / Caregiver Present: No  Follows Commands: Within Functional Limits  Subjective  Subjective: Pt states his lower back hurts from laying in bed all day.  Pain Screening  Patient Currently in Pain: Yes          Orientation  Orientation  Overall Orientation Status: Within Functional Limits  Social/Functional History  Social/Functional History  Lives With: Alone  Type of Home: Apartment(3rd floor apartment)  Home Layout: Multi-level  Home Access: Elevator, Level entry  Bathroom Shower/Tub: Tub/Shower unit  Bathroom Toilet: Standard  Bathroom Equipment: Tub transfer bench, Grab bars in shower, Grab bars around toilet  Bathroom Accessibility: Accessible  Home Equipment: Electric scooter  Receives Help From: Home health, Family(Pt states he has an aid a few hours in the morning and a few hours at night. Pt states he has a supportive brother that lives in the same building)  ADL Assistance: Needs assistance  Homemaking Assistance: Needs assistance  Homemaking Responsibilities: Yes  Ambulation Assistance: Needs assistance(Uses motorized scooter. Pt states he has been non abulatory in the last year)  Transfer Assistance: Needs assistance  Active : No  Patient's  Info: Uses transportation services  Occupation: Retired  Type of occupation: factory work  Leisure & Hobbies: Watch TV  Additional Comments: Pt denies recent falls. Cognition   Cognition  Overall Cognitive Status: Exceptions  Arousal/Alertness: Appropriate responses to stimuli  Following Commands: Follows multistep commands with increased time; Follows multistep commands with repitition; Follows one step commands with increased time; Follows one step commands consistently; Follows one step commands with repetition  Attention Span: Appears intact  Memory: Decreased short term memory  Safety Judgement: Decreased awareness of need for assistance;Decreased awareness of need for safety  Problem Solving: Decreased awareness of errors;Assistance required to correct errors made;Assistance required to implement solutions;Assistance required to generate solutions;Assistance required to identify errors made  Insights: Decreased awareness of deficits  Initiation: Requires cues for some  Sequencing: Requires cues for some    Objective     Observation/Palpation  Posture: Fair  Observation: RUE IV    AROM RLE (degrees)  RLE AROM: WFL  RLE General AROM: R BKA  AROM LLE (degrees)  LLE AROM : WFL  LLE General AROM: L TMA  AROM RUE (degrees)  RUE General AROM: see OT notes  AROM LUE (degrees)  LUE General AROM: see OT notes  Strength RLE  Strength RLE: WFL  Strength LLE  Strength LLE: WFL  Tone RLE  RLE Tone: Normotonic  Tone LLE  LLE Tone: Normotonic  Sensation  Overall Sensation Status: WFL  Bed mobility  Rolling to Left: 2 Person assistance;Minimal assistance  Rolling to Right: Minimal assistance;2 Person assistance  Supine to Sit: Moderate assistance;2 Person assistance  Sit to Supine: Minimal assistance;2 Person assistance  Scooting: Maximal assistance;2 Person assistance  Comment: HOB elevated, hand rails used. Pt required assist with trunk and LEs from supine>sit. Pt required less assist when getting back into bed, but was able to complete with increased time. Waffle mattress was placed under pt requiring him to roll left<>right a couple times. verbal cueing to use handrails and Sondra to initiate roll with Sondra to main hips in side lying position. Transfers  Comment: Transfers not assessed this date. Per pt, he is able to perform pivot transfers at home alone from surface to surface. Was unable to assess transfers this date, as unclear order for LLE as pt stated he is suppose to be NWB to LLE but only puts some weight through it to transfers. Pt states he does not use RLE (prosthetic leg) to transfer, but uses the LLE instead. WIll assess transfers when WB status is given for LLE. Per RNRocio at [facility] is okay.   Ambulation  Ambulation?: No(Pt non-ambulatory at baseline)     Balance  Posture: Fair  Sitting - Static: Good  Sitting - Dynamic: Fair;+(required assist with UE, unable to tell where center was, requiring verbal/tactile cueing.)        Plan   Plan  Times per week: 1-2x day / 5-6 days per week  Specific instructions for Next Treatment: re-assess transfers when appropriate  Current Treatment Recommendations: Strengthening, Endurance Training, Balance Training, Functional Mobility Training, Positioning, Safety Education & Training  Safety Devices  Type of devices: All fall risk precautions in place, Bed alarm in place, Call light within reach, Left in bed, Nurse notified, Patient at risk for falls    G-Code       OutComes Score  AM-PAC Score  AM-PAC Inpatient Mobility Raw Score : 9 (09/17/20 1502)  AM-PAC Inpatient T-Scale Score : 30.55 (09/17/20 1502)  Mobility Inpatient CMS 0-100% Score: 81.38 (09/17/20 1502)  Mobility Inpatient CMS G-Code Modifier : CM (09/17/20 1502)          Goals  Short term goals  Time Frame for Short term goals: 12 visits  Short term goal 1: Pt will roll left<>right with SBA  Short term goal 2: Pt will supine<>sit with SBA  Short term goal 3: Pt will sit unsupported at bedside to improve core and decrease fall risk for ~10mins without UE support  Short term goal 4: Pt will tolerate 25mins of PT to improve functional mobility and activity tolerance  Patient Goals   Patient goals :  To return home       Therapy Time   Individual Concurrent Group Co-treatment   Time In 1401         Time Out 1450         Minutes 49          Tx time: 40mins       Aristides Cardenas PT

## 2020-09-17 NOTE — CARE COORDINATION
Case Management Initial Discharge Plan  Desi Narvaez,         Readmission Risk              Risk of Unplanned Readmission:        20             Met with:patient to discuss discharge plans. Information verified: address, contacts, phone number, , insurance Yes  PCP: Jerel Flowers  Date of last visit: ? Insurance Provider: [address] [place] [address]    Discharge Planning  Current Residence:  apartment  Living Arrangements:    lives alone  Home has 3rd floor apartment with elevator present   Support Systems:     Current Services PTA:  HHA twice a day  7 days a week Agency: Current with [facility] [address]  Patient able to perform ADL's:Assisted  DME in home:  Walker, bath bench, power chair, lift chair, glucometer, Meal delivery  DME used to aid ambulation prior to admission:   Power chair, walker  DME used during admission:  walker    Potential Assistance Needed:       Pharmacy: [facility] bubble packs and delivers medications   Potential Assistance Purchasing Medications:     Does patient want to participate in local refill/ meds to beds program?       Patient agreeable to home care: Yes  Freedom of choice provided:  yes  The Plan for Transition of Care is related to the following treatment goals: continue HHA daily for personal care, SN for wound care. The Patient  was provided with a choice of provider and agrees   with the discharge plan. [x] Yes [] No    Freedom of choice list was provided with basic dialogue that supports the patient's individualized plan of care/goals, treatment preferences and shares the quality data associated with the providers. [x] Yes [] No      Type of Home Care Services:     Patient expects to be discharged to:       Prior SNF/Rehab Placement and Facility:   Agreeable to SNF/Rehab: No  [place] of choice provided: n/a   Evaluation: n/a    Expected Discharge date:      Follow Up Appointment: Best Day/ Time:      Transportation provider: Call a Ride  Transportation arrangements needed for discharge: Yes    Discharge Plan:   Met with pt to review plan of care. Pt lives alone; is mostly non ambulatory. ? His safety in transfer to power chair. PT eval done and recommends SNF but pt is refusing. Encouraged pt to rehink his decision. Pt has a HHA morning and evening 7 days a week from [facility]. Spoke with [facility] at [facility] and they can provide PT/Ot but pt has always refused it. They have nursing visiting also for wound care. Pt is pleasantly non compliant with his care and medications. His brother lives in the same [facility] building but has his own health problems. Pt has no other family to help him out. PLAN  Pt refuses SNF. Current with [facility] for HHA daily BID and SN for wound care. Can add PT/OT if pt will agree. Podiatry consult pending.         Electronically signed by Jae Russo on 9/17/20 at 3:42 PM EDT

## 2020-09-17 NOTE — CONSULTS
Consultation Note  Podiatric Medicine and Surgery     Subjective     Chief Complaint: Left foot wound    HPI:  Desmond Aldridge is a 79 y.o. male seen at [facility] for diabetic left foot wound. Patient was originally admitted to the hospital due to uncontrolled blood glucose levels, elevated into the 700s. Patient very somnolent, difficult to obtain review of systems. Patient does report that he sees Dr. Ariel Barros for his wound. PCP is Shalonda Macdonald    ROS:   Review of Systems   Unable to perform ROS: Other (Patient somnolent, difficult to get him to respond to questions)       Past Medical History   has a past medical history of RISSA (acute kidney injury) ([facility] [address] Cerebrovascular disease, Erectile dysfunction, Hepatitis B infection, Hyperlipidemia, Hypertension, Kidney cysts, Liver cyst, MRSA (methicillin resistant staph aureus) culture positive, Neurotrophic ulcer of the foot ([address] Obesity, Osteomyelitis of ankle or foot, Peripheral vascular disease ([facility] [address] Tobacco abuse, and Type II or unspecified type diabetes mellitus without mention of complication, not stated as uncontrolled. Past Surgical History   has a past surgical history that includes Foot amputation through metatarsal (2011); Foot Amputation; Foot surgery (Right, 3/2013); and Foot Debridement (Left, 6/20/2020). Medications  Prior to Admission medications    Medication Sig Start Date End Date Taking?  Authorizing Provider   insulin glargine (BASAGLAR KWIKPEN) 100 UNIT/ML injection pen Inject 40 Units into the skin 2 times daily Injects 40 units AM and 90 units HS 6/22/20   Toan P Blood, DO   lisinopril (PRINIVIL;ZESTRIL) 10 MG tablet Take 2 tablets by mouth daily 6/22/20   [facility] Sag[facility] P Blood, DO   amLODIPine (NORVASC) 10 MG tablet Take 1 tablet by mouth daily 6/23/20   Stephani Sagola P Blood, DO   clopidogrel (PLAVIX) 75 MG tablet Take 1 tablet by mouth daily 6/23/20   Stephani Sagola P Blood, DO   insulin aspart (NOVOLOG FLEXPEN) 100 UNIT/ML injection pen Inject 8-12 Units into the skin 2 times daily (before meals) 8 units AM and 12 units at supper + additional sliding scale    Historical Provider, MD   metoprolol succinate (TOPROL XL) 25 MG extended release tablet Take 25 mg by mouth 2 times daily    Historical Provider, MD   simvastatin (ZOCOR) 10 MG tablet Take 10 mg by mouth nightly    Historical Provider, MD   traZODone (DESYREL) 150 MG tablet Take 150 mg by mouth nightly    Historical Provider, MD   budesonide-formoterol (SYMBICORT) 160-4.5 MCG/ACT AERO Inhale 2 puffs into the lungs 2 times daily    Historical Provider, MD   ferrous sulfate (IRON 325) 325 (65 Fe) MG tablet Take 325 mg by mouth daily (with breakfast)    Historical Provider, MD   furosemide (LASIX) 40 MG tablet Take 40 mg by mouth daily     Historical Provider, MD   docusate sodium (COLACE) 100 MG capsule Take 100 mg by mouth 2 times daily     Historical Provider, MD   acetaminophen (TYLENOL) 325 MG tablet Take 650 mg by mouth every 4 hours as needed for Pain. Give 2 tabs = 650 MG by mouth every 4 hours prn    Historical Provider, MD   Insulin Syringe-Needle U-100 (ACCUSURE INS SYR 1CC/30GX5/16\") 30G X 5/16\" 1 ML MISC by Does not apply route.  7/9/13   Celestino Maynard MD    Scheduled Meds:   insulin glargine  20 Units Subcutaneous BID    insulin lispro  0-18 Units Subcutaneous 4x Daily AC & HS    nystatin  5 mL Oral 4x Daily    amLODIPine  10 mg Oral Daily    budesonide-formoterol  2 puff Inhalation BID    clopidogrel  75 mg Oral Daily    docusate sodium  100 mg Oral BID    ferrous sulfate  325 mg Oral Daily with breakfast    metoprolol succinate  25 mg Oral BID    atorvastatin  10 mg Oral Daily    traZODone  150 mg Oral Nightly     Continuous Infusions:   dextrose      sodium chloride 150 mL/hr at 09/17/20 1231     PRN Meds:.glucose, dextrose, glucagon (rDNA), dextrose, acetaminophen, potassium chloride, magnesium sulfate, sodium phosphate IVPB **OR** sodium phosphate IVPB **OR** sodium phosphate IVPB    Allergies  has No Known Allergies. Family History  family history includes Diabetes in his brother, mother, sister, sister, sister, and sister. Social History   reports that he has been smoking cigarettes. He has a 30.00 pack-year smoking history. He has never used smokeless tobacco.   reports no history of alcohol use. reports no history of drug use. Objective     Vitals:  Patient Vitals for the past 8 hrs:   BP Temp Temp src Pulse Resp SpO2   20 1523 (!) 114/52 97.3 °F (36.3 °C) Oral 73 16 97 %   20 1130 (!) 130/51 98 °F (36.7 °C) Axillary 76 16 99 %     Average, Min, and Max for last 24 hours Vitals:  TEMPERATURE:  Temp  Av.5 °F (36.4 °C)  Min: 97.3 °F (36.3 °C)  Max: 98 °F (36.7 °C)    RESPIRATIONS RANGE: Resp  Av.3  Min: 16  Max: 19    PULSE RANGE: Pulse  Av.2  Min: 72  Max: 86    BLOOD PRESSURE RANGE:  Systolic (15GNE), BQR:313 , Min:108 , HSN:882   ; Diastolic (74ULI), JLP:08, Min:42, Max:86      PULSE OXIMETRY RANGE: SpO2  Av.5 %  Min: 97 %  Max: 100 %  I&O:  I/O last 3 completed shifts: In:  [P.O.:350;  I.V.:1655]  Out: 675 [Urine:675]    CBC:  Recent Labs     20  1648 20  0527   WBC 5.4 5.5   HGB 14.0 13.5   HCT 43.0 40.0*   * 115*        BMP:  Recent Labs     20  1803 20  2137 20  2356 20  0527   * 132*  --  140   K 5.2 5.1 5.0 4.9    100  --  106   CO2   --     BUN 57* 57*  --  52*   CREATININE 3.00* 2.70*  --  2.77*   GLUCOSE 555* 487*  --  165*   CALCIUM 9.4 9.6  --  9.9        Coags:  Recent Labs     20  1648   PROT 7.6       Lab Results   Component Value Date    LABA1C 16.6 (H) 2020     Lab Results   Component Value Date    SEDRATE 128 (H) 2020     Lab Results   Component Value Date    CRP 32.2 (H) 2020         Lower Extremity Physical Exam: LLE focused due to previous below-knee amputation on the right lower extremity    Vascular: DP and PT pulses are palpable. CFT <3 seconds to all digits. Hair growth is absent to the level of the digits. No edema. Neuro: Saph/sural/SP/DP/plantar sensation diminished to light touch. Musculoskeletal: Muscle strength is deferred to all lower extremity muscle groups. Gross deformity is present, status post transmetatarsal amputation. Dermatologic: Full thickness ulcer #1 located to the plantar heel and measures approximately 4.0 cm cm x 2.0 cm cm x 0.8 cm cm. Skin bridge noted between 2 areas of fibro-granular tissue. Periwound skin is hyperkeratotic. Scant serous drainage noted with no associated mal odor. Erythema absent with no associated increase in warmth. Wound probes to bone but does not sinus track or undermine. No fluctuance, crepitus, or induration. Interdigital maceration absent. Clinical Images:            Imaging:   XR FOOT LEFT (MIN 3 VIEWS)    (Results Pending)       Cultures: None    Assessment     Desi Narvaez is a 79 y.o. male with   1. Diabetic left foot ulcer, Montesinos grade 3  2. DM2 with associated peripheral neuropathy  3. Smoking history    Principal Problem:    Diabetes mellitus type 2 with complications, uncontrolled ([address]  Active Problems:    Acute kidney injury superimposed on CKD (HCC)    Hyperkalemia    H/O noncompliance with medical treatment, presenting hazards to health    PVD (peripheral vascular disease) ([address]    Insomnia    Dyslipidemia    Tobacco dependence    Diabetic ulcer of left heel associated with type 2 diabetes mellitus ([address]    Uncontrolled diabetes mellitus type 2 without complications ([address]    History of right below knee amputation ([address]  Resolved Problems:    * No resolved hospital problems.  *        Plan     · Patient examined and evaluated at bedside   · Treatment options discussed in detail with the patient  · XR left foot ordered       · Dressing applied to Left foot: Saline WTD, DSD  · NWB to Left lower extremity  · Discussed with Dr. Marcos Mcfadden Efraín Lincoln DPM   Podiatric Medicine & Surgery   9/17/2020 at 6:13 PM

## 2020-09-17 NOTE — PROGRESS NOTES
Occupational Therapy   Occupational Therapy Initial Assessment  Date: 2020   Patient Name: Quinton Campos  MRN: [id number]     : 1950    Date of Service: 2020    RN Timi Javier reports patient is medically stable for therapy treatment this date. Chart reviewed prior to treatment and patient is agreeable for therapy. All lines intact and patient positioned comfortably at end of treatment. All patient needs addressed prior to ending therapy session. Discharge Recommendations:  Subacute/Skilled Nursing Facility/ continue to assess pending progress  OT Equipment Recommendations  Equipment Needed: Yes  Mobility Devices: ADL Assistive Devices  ADL Assistive Devices: Reacher;Long-handled Sponge;Emergency Alert System    Assessment   Performance deficits / Impairments: Decreased functional mobility ; Decreased ADL status; Decreased strength;Decreased safe awareness;Decreased cognition;Decreased endurance;Decreased high-level IADLs;Decreased fine motor control  Assessment: Pt would benefit fom continued skilled OT services to maximize I and safety during functional task to return home at [facility] with assist.  Prognosis: Fair  Decision Making: Medium Complexity  OT Education: OT Role;Plan of Care;Energy Conservation  Patient Education: fall prevention/call light use, recommendations for continued therapy, safety in function, benefits of waffle mattress use/pressure relief education  Barriers to Learning: cognitive deficits  REQUIRES OT FOLLOW UP: Yes  Activity Tolerance  Activity Tolerance: Patient Tolerated treatment well;Patient limited by fatigue;Patient limited by pain  Activity Tolerance: fair minus  Safety Devices  Safety Devices in place: Yes  Type of devices: Bed alarm in place;Call light within reach;Gait belt;Patient at risk for falls; Left in bed;Nurse notified(elevated LLE heel off of bed with pillow for skin integrity)           Patient Diagnosis(es): The primary encounter diagnosis was Hyperosmolarity due to type 1 diabetes mellitus ([address] Diagnoses of Hyperglycemic crisis in diabetes mellitus ([address] Acute renal failure, unspecified acute renal failure type ([address] Hyperkalemia, and Dehydration were also pertinent to this visit. has a past medical history of RISSA (acute kidney injury) ([address] Cerebrovascular disease, Erectile dysfunction, Hepatitis B infection, Hyperlipidemia, Hypertension, Kidney cysts, Liver cyst, MRSA (methicillin resistant staph aureus) culture positive, Neurotrophic ulcer of the foot ([address] Obesity, Osteomyelitis of ankle or foot, Peripheral vascular disease ([address] Tobacco abuse, and Type II or unspecified type diabetes mellitus without mention of complication, not stated as uncontrolled. has a past surgical history that includes Foot amputation through metatarsal (2011); Foot Amputation; Foot surgery (Right, 3/2013); and Foot Debridement (Left, 6/20/2020). PER H&P: Donnie Encinas is a 79 y.o. Non-/non  male who presents with Hyperglycemia (EMS reports that he had blood drawn yesterday and they received a call from \"a third party\" for report of hyperglycemia, pt denies complaints or concerns at present time, the patient states that he has not been taking his medications because he doesnt feel like he needs it, EMS report a \"high\" reading for blood sugar)   and is admitted to the hospital for the management of Diabetes mellitus type 2 with complications, uncontrolled ([address]    Patient reported to the emergency department in [address] at the direction of his physician with a chief complaint of elevated blood sugar. He has a history of diabetes, dyslipidemia, peripheral vascular disease, tobacco dependence, hypertension, CKD, tobacco use, and noncompliance. He states that he had his blood drawn yesterday and that his blood sugar was noted high. He stated that his blood sugar was 138 at home, but over 600 initially in the emergency department.   He is supposed to take insulin for management of his diabetes, but states that he has not taken it in months, as he was taking it at night and felt his blood sugar was low in the morning. He is asymptomatic upon assessment. He also has a diabetic foot wound on the left foot which is currently being managed by podiatry. Pertinent labs include initial glucose 738, creatinine 3.20, BUN 61, sodium 126, potassium 6.2, GFR 23, beta hydroxybutyrate 0.57. No evidence of DKA was present. He is being admitted for diabetes mellitus type 2 with complications. Restrictions  Restrictions/Precautions  Restrictions/Precautions: Fall Risk, Contact Precautions  Required Braces or Orthoses?: Yes(prosthetic on RLE)  Position Activity Restriction  Other position/activity restrictions: RUE IV, renal diet, R BKA, L TMA, unclear NWB status in LLE     Subjective   General  Chart Reviewed: Yes  Patient assessed for rehabilitation services?: Yes  Family / Caregiver Present: No  Patient Currently in Pain: Yes  Pre Treatment Pain Screening  Comments / Details: Pt states he has pain in his back, pt did not rate  Vital Signs  Patient Currently in Pain: Yes  Social/Functional History  Social/Functional History  Lives With: Alone  Type of Home: Apartment(3rd floor apartment)  Home Layout: Multi-level  Home Access: Elevator, Level entry  Bathroom Shower/Tub: Tub/Shower unit  Bathroom Toilet: Standard  Bathroom Equipment: Tub transfer bench, Grab bars in shower, Grab bars around toilet  Bathroom Accessibility: Accessible  Home Equipment: Electric scooter  Receives Help From: Home health, Family(Pt states he has an aid a few hours in the morning and a few hours at night. Pt states he has a supportive brother that lives in the same building)  ADL Assistance: Needs assistance  Homemaking Assistance: Needs assistance  Homemaking Responsibilities: Yes  Ambulation Assistance: Needs assistance(Uses motorized scooter.  Pt states he has been non abulatory in the last year)  Transfer Assistance: Needs assistance  Active : No  Patient's  Info: Uses transportation services  Occupation: Retired  Type of occupation: factory work  Leisure & Hobbies: Watch TV  Additional Comments: Pt denies recent falls. Objective   Vision: Impaired(Pt states he is getting cataract sx on R eye in September)  Hearing: Exceptions to [facility]  Hearing Exceptions: Hard of hearing/hearing concerns    Orientation  Overall Orientation Status: Impaired  Orientation Level: Oriented to time;Oriented to situation;Oriented to person;Disoriented to place  Observation/Palpation  Posture: Fair  Observation: RUE IV  Edema: none  Balance  Sitting Balance: Contact guard assistance  Standing Balance: (N/T not safe or appropriate)  Standing Balance  Time: sitting hyacinth < 3 min on EOB  Functional Mobility  Functional Mobility Comments: N/T not safe or appropriate/ unclear NWB for LLE  Toilet Transfers  Toilet Transfer: Unable to assess  Toilet Transfers Comments: N/T not safe or appropriate at this time  ADL  Feeding: Setup  Grooming: Setup;Minimal assistance  UE Bathing: Setup;Minimal assistance  LE Bathing: Dependent/Total  UE Dressing: Setup; Moderate assistance(with hosp gown)  LE Dressing: Dependent/Total  Toileting: Dependent/Total  Additional Comments: Pt is DEP for all care when up d/t x2 staff assist for safety in balance when up.  Pt requires increased time and effort to complete  Tone RUE  RUE Tone: Normotonic  Tone LUE  LUE Tone: Normotonic  Coordination  Movements Are Fluid And Coordinated: No  Coordination and Movement description: Fine motor impairments(Pt has a tremor like movement in B hands as well as slowed and delayed opposition)     Bed mobility  Rolling to Left: 2 Person assistance;Minimal assistance  Rolling to Right: Minimal assistance;2 Person assistance  Supine to Sit: Moderate assistance;2 Person assistance(Pt required assistance for trunk and BLEs)  Sit to Supine: Minimal assistance;2 Person assistance(Pt required less assist to get back into bed)  Scooting: Maximal assistance;2 Person assistance  Comment: Waffle mattress was placed under pt requiring him to roll left and right multiple times. Pt required MOD verbal/tactile cues for hand placement on bed rail, initiation of roll and awareness/assist with lines all to increase safety. Pt was educated on benefits/use of waffle mattress and pressure relief. Pt verbalized understanding. Transfers  Transfer Comments: N/T not safe or appropriate at this time. Unclear NWB status on LLE. Cognition  Overall Cognitive Status: Exceptions  Arousal/Alertness: Appropriate responses to stimuli  Following Commands: Follows multistep commands with increased time; Follows multistep commands with repitition; Follows one step commands with increased time; Follows one step commands consistently; Follows one step commands with repetition  Attention Span: Appears intact  Memory: Decreased short term memory  Safety Judgement: Decreased awareness of need for assistance;Decreased awareness of need for safety  Problem Solving: Decreased awareness of errors;Assistance required to correct errors made;Assistance required to implement solutions;Assistance required to generate solutions;Assistance required to identify errors made  Insights: Decreased awareness of deficits  Initiation: Requires cues for some  Sequencing: Requires cues for some  Perception  Overall Perceptual Status: WFL     Sensation  Overall Sensation Status: WFL        LUE AROM (degrees)  LUE AROM : WFL  RUE AROM (degrees)  RUE AROM : WFL  LUE Strength  Gross LUE Strength: Exceptions to [facility]  LUE Strength Comment: Grossly 4/5  RUE Strength  Gross RUE Strength: Exceptions to [facility]  RUE Strength Comment: Grossly 4/5                   Plan   Plan  Times per week: 4-5x/wk 1x/day as hyacinth  Current Treatment Recommendations: Strengthening, Functional Mobility Training, Endurance Training, Safety Education & Training, Pain Management, Patient/Caregiver Education & Training, Equipment Evaluation, Education, & procurement, Self-Care / ADL, Positioning             AM-PAC Score: 14              Goals  Short term goals  Time Frame for Short term goals: By discharge, pt to demo  Short term goal 1: bed mobility to MIN A of 1 with use of bedrails as needed. Short term goal 2: increased BUE strength by 1/2 grade/ I with simple BUE HEP with use of handouts to assist with self care tasks. Short term goal 3: UB ADLs to CGA and LB ADLs to MOD A while supine in bed using bed rails/AE as needed. Short term goal 4: toileting to MOD A with use of AD/grab bars as needed. Short term goal 5: Paticipate in reassessment for ADL transfers and functional mobility, add goals to current POC. Long term goals  Long term goal 1: Pt to demo increased sitting hyacinth > 20 min with SBA using bedrail as needed to participate self care routine. Long term goal 2: Caregivers to be I with pressure relief, fall prevention/call light use and recommendations for AE with use of handouts as needed. Patient Goals   Patient goals : To go juan!        Therapy Time   Individual Concurrent Group Co-treatment   Time In 1400(plus 10 min chart review/RN communication)         Time Out 1439         Minutes [place] [id number]

## 2020-09-17 NOTE — CONSULTS
[facility] Wound Ostomy Continence Nurse  Consult Note       NAME:  Artemio Pineda  MEDICAL RECORD NUMBER:  [id number]  AGE: 79 y.o. GENDER: male  : 1950  TODAY'S DATE:  2020    Subjective:     Reason for Wound Ignacio Monday Care and Assessment: left plantar heel wound       Artemio Pineda is a 79 y.o. male referred by:   [x] Physician  [] Nursing  [] Other:       Wound Identification:  Wound Type: diabetic  Contributing Factors: diabetes and poor glucose control    Wound History: Left plantar heel wound present at least since 2020 with I&D by Podiatry service on 2020. Home care nursing has been managing. Current Wound Care Treatment:  Aquacel removed from wound bed this visit.     Patient Goal of Care:  [x] Wound Healing  [] Odor Control  [] Palliative Care  [] Pain Control   [] Other:         PAST MEDICAL HISTORY        Diagnosis Date    RISSA (acute kidney injury) ([address]     Cerebrovascular disease     Erectile dysfunction     Hepatitis B infection     Hyperlipidemia     Hypertension     Kidney cysts     Liver cyst     MRSA (methicillin resistant staph aureus) culture positive 1/10/2014    right foot    Neurotrophic ulcer of the foot ([address]     Obesity     Osteomyelitis of ankle or foot     Peripheral vascular disease [facility]     Tobacco abuse     Type II or unspecified type diabetes mellitus without mention of complication, not stated as uncontrolled        PAST SURGICAL HISTORY    Past Surgical History:   Procedure Laterality Date    FOOT AMPUTATION      FOOT AMPUTATION THROUGH METATARSAL      pt can't give exact date of surg-- toes and part of lt foot removed    FOOT DEBRIDEMENT Left 2020    WOUND BED PREPARATION AND WOUND VAC APPLICATION performed by Coty Coates DPM at [address] [place] [address] Right 3/2013       FAMILY HISTORY    Family History   Problem Relation Age of Onset    Diabetes Mother     Diabetes Sister     Diabetes Brother     Diabetes Sister     Diabetes Sister    Richa Diabetes Sister        SOCIAL HISTORY    Social History     Tobacco Use    Smoking status: Current Every Day Smoker     Packs/day: 1.00     Years: 30.00     Pack years: 30.00     Types: Cigarettes    Smokeless tobacco: Never Used    Tobacco comment: working on quitting smoking   Substance Use Topics    Alcohol use: No    Drug use: No       ALLERGIES    No Known Allergies        Objective:      BP (!) 114/52   Pulse 73   Temp 97.3 °F (36.3 °C) (Oral)   Resp 16   Ht 6' (1.829 m)   Wt 196 lb 9.6 oz (89.2 kg)   SpO2 97%   BMI 26.66 kg/m²       LABS    CBC:   Lab Results   Component Value Date    WBC 5.5 09/17/2020    RBC 5.19 09/17/2020    RBC 4.20 10/10/2011    HGB 13.5 09/17/2020     CMP:  Albumin:    Lab Results   Component Value Date    LABALBU 3.3 09/16/2020     PT/INR:    Lab Results   Component Value Date    PROTIME 10.3 07/11/2013    INR 1.0 07/11/2013     HgBA1c:    Lab Results   Component Value Date    LABA1C 16.6 09/16/2020     PTT: No components found for: LABPTT    Review of Systems    Assessment:     Physical Exam      Mckinley Risk Score: Mckinley Scale Score: 16    Patient Active Problem List   Diagnosis Code    Uncontrolled hypertension I10    PVD (peripheral vascular disease) ([facility]) I73.9    Type 2 diabetes mellitus with stage 3 chronic kidney disease, with long-term current use of insulin (HCC) E11.22, N18.3, Z79.4    Insomnia G47.00    Erectile dysfunction N52.9    Partial traumatic amputation of left foot ([facility] O13.308P    Obesity E66.9    Tobacco abuse Z72.0    Hepatitis B infection B19.10    Liver cyst K76.89    Kidney cysts N28.1    Osteomyelitis of ankle or foot M86.9    Neurotrophic ulcer of the foot ([facility] L97.509    RISSA (acute kidney injury) ([facility] N17.9    Dyslipidemia E78.5    Microcytic anemia D50.9    Diabetic foot infection (HCC) E11.628, L08.9    Tobacco dependence F17.200    Diabetic ulcer of left heel associated with type 2 diabetes mellitus (HCC) E11.621, L97.429    Hypomagnesemia E83.42    Hypokalemia E87.6    Diabetes mellitus type 2 with complications, uncontrolled (HCC) E11.8, E11.65    Acute kidney injury superimposed on CKD (HCC) N17.9, N18.9    Hyperkalemia E87.5    H/O noncompliance with medical treatment, presenting hazards to health Z91.19    Uncontrolled diabetes mellitus type 2 without complications ([address] Y03.05    History of right below knee amputation [facility]) Z89.511       Patient Active Problem List   Diagnosis    Uncontrolled hypertension    PVD (peripheral vascular disease) (HCC)    Type 2 diabetes mellitus with stage 3 chronic kidney disease, with long-term current use of insulin (HCC)    Insomnia    Erectile dysfunction    Partial traumatic amputation of left foot (HCC)    Obesity    Tobacco abuse    Hepatitis B infection    Liver cyst    Kidney cysts    Osteomyelitis of ankle or foot    Neurotrophic ulcer of the foot ([address]    RISSA (acute kidney injury) ([address]    Dyslipidemia    Microcytic anemia    Diabetic foot infection ([address]    Tobacco dependence    Diabetic ulcer of left heel associated with type 2 diabetes mellitus ([address]    Hypomagnesemia    Hypokalemia    Diabetes mellitus type 2 with complications, uncontrolled ([address]    Acute kidney injury superimposed on CKD ([address]    Hyperkalemia    H/O noncompliance with medical treatment, presenting hazards to health    Uncontrolled diabetes mellitus type 2 without complications ([address]    History of right below knee amputation ([address]       Measurements:  Wound 06/17/20 Heel Left;Plantar (Active)   Wound Image   09/17/20 1547   Wound Diabetic 09/17/20 1547   Dressing Status Old drainage; Changed 09/17/20 1547   Dressing Changed Changed/New 09/17/20 1547   Dressing/Treatment Moist to moist 09/17/20 1547   Wound Cleansed Rinsed/Irrigated with saline 09/17/20 1547   Dressing Change Due 09/17/20 09/17/20 1547   Wound Width (cm) 1.5 cm 09/17/20 1547   Wound Depth (cm) 0.8 cm 09/17/20 1547   Wound Assessment Drainage;Pale;Pink;Slough; Yellow;Fibrin 09/17/20 1547   Drainage Amount Moderate 09/17/20 1547   Drainage Description Serosanguinous 09/17/20 1547   Odor None 09/17/20 1547   Margins Attached edges; Defined edges 09/17/20 1547   Ary-wound Assessment Maceration;Calloused 09/17/20 1547   Non-staged Wound Description Full thickness 09/17/20 1547   [place]%Wound Bed 75 09/17/20 1547   Yellow%Wound Bed 25 09/17/20 1547   Culture Taken No 09/17/20 0711   Number of days: 92     Left plantar heel wound pictures and measurements taken. The wound bed appears to be fibrin covered with evidence of some newly epithelialized tissue around the perimeter. Wound edges with signs of maceration. Right buttock with discoloration that appears to be previous site of wound that is now closed. Foam in place to protect. Response to treatment:  Well tolerated by patient. Plan:     Plan of Care:     -Podiatry service to eval and treat, will defer dressing orders at this time. Skin barrier wipe and moist to moist saline dressing applied to plantar foot wound.    -Right buttock foam dressing to protect previously healed area. -Turn every 2 hours    -Float heels of of bed with pillows under     -Mepilex sacrum dressing to sacrococcygeal area. Peel back dressing, inspect skin beneath, re-secure. Change every 72 hours and prn wrinkles, soilage. Discontinue Sacral Mepilex if  repeatedly soiled by incontinence.     -Routine incontinence care with David barrier cloths and zinc oxide cream. Apply zinc oxide cream BID and prn incontinence. Covidien moisture wicking under pads vs cloth backed briefs    -Static air overlay. Check inflation each shift by sliding hand under the air overlay. Feel for the patient's heaviest/ most dependant body part. Ideally 1/2 to 1\" of air will be between your hand and the patient's body. Add air prn.     Specialty Bed Required : Yes   [] Low Air Loss   [x] Pressure

## 2020-09-17 NOTE — ED PROVIDER NOTES
[facility] ED  [address] [facility]  Phone: [phone]        ADDENDUM:      Care of this patient was assumed from Dr. Tamera Marie. The patient was seen for Hyperglycemia (EMS reports that he had blood drawn yesterday and they received a call from \"a third party\" for report of hyperglycemia, pt denies complaints or concerns at present time, the patient states that he has not been taking his medications because he doesnt feel like he needs it, EMS report a \"high\" reading for blood sugar)  . The patient's initial evaluation and plan have been discussed with the prior provider who initially evaluated the patient. Nursing Notes, Past Medical Hx, Past Surgical Hx, Allergies, were all reviewed. PAST MEDICAL HISTORY    has a past medical history of RISSA (acute kidney injury) ([address] Cerebrovascular disease, Erectile dysfunction, Hepatitis B infection, Hyperlipidemia, Hypertension, Kidney cysts, Liver cyst, MRSA (methicillin resistant staph aureus) culture positive, Neurotrophic ulcer of the foot ([address] Obesity, Osteomyelitis of ankle or foot, Peripheral vascular disease ([address] Tobacco abuse, and Type II or unspecified type diabetes mellitus without mention of complication, not stated as uncontrolled. SURGICAL HISTORY      has a past surgical history that includes Foot amputation through metatarsal (2011); Foot Amputation; Foot surgery (Right, 3/2013); and Foot Debridement (Left, 6/20/2020). CURRENT MEDICATIONS       Previous Medications    ACETAMINOPHEN (TYLENOL) 325 MG TABLET    Take 650 mg by mouth every 4 hours as needed for Pain.  Give 2 tabs = 650 MG by mouth every 4 hours prn    AMLODIPINE (NORVASC) 10 MG TABLET    Take 1 tablet by mouth daily    BUDESONIDE-FORMOTEROL (SYMBICORT) 160-4.5 MCG/ACT AERO    Inhale 2 puffs into the lungs 2 times daily    CLOPIDOGREL (PLAVIX) 75 MG TABLET    Take 1 tablet by mouth daily    DOCUSATE SODIUM (COLACE) 100 MG CAPSULE    Take 100 mg by mouth 2 times WBC Morphology NOT REPORTED     RBC Morphology NOT REPORTED     Platelet Estimate NOT REPORTED    Comprehensive Metabolic Panel   Result Value Ref Range    Glucose 738 (HH) 70 - 99 mg/dL    BUN 61 (H) 8 - 23 mg/dL    CREATININE 3.20 (H) 0.70 - 1.20 mg/dL    Bun/Cre Ratio NOT REPORTED 9 - 20    Calcium 9.9 8.6 - 10.4 mg/dL    Sodium 126 (L) 135 - 144 mmol/L    Potassium 6.2 (HH) 3.7 - 5.3 mmol/L    Chloride 95 (L) 98 - 107 mmol/L    CO2 21 20 - 31 mmol/L    Anion Gap 10 9 - 17 mmol/L    Alkaline Phosphatase 79 40 - 129 U/L    ALT 11 5 - 41 U/L    AST 15 <40 U/L    Total Bilirubin 0.40 0.3 - 1.2 mg/dL    Total Protein 7.6 6.4 - 8.3 g/dL    Alb 3.3 (L) 3.5 - 5.2 g/dL    Albumin/Globulin Ratio 0.8 (L) 1.0 - 2.5    GFR Non- 19 (L) >60 mL/min    GFR  23 (L) >60 mL/min    GFR Comment          GFR Staging NOT REPORTED    BETA-HYDROXYBUTYRATE   Result Value Ref Range    Beta-Hydroxybutyrate 0.57 (H) 0.02 - 0.27 mmol/L   Basic Metabolic Panel   Result Value Ref Range    Glucose 555 (HH) 70 - 99 mg/dL    BUN 57 (H) 8 - 23 mg/dL    CREATININE 3.00 (H) 0.70 - 1.20 mg/dL    Bun/Cre Ratio NOT REPORTED 9 - 20    Calcium 9.4 8.6 - 10.4 mg/dL    Sodium 131 (L) 135 - 144 mmol/L    Potassium 5.2 3.7 - 5.3 mmol/L    Chloride 100 98 - 107 mmol/L    CO2 20 20 - 31 mmol/L    Anion Gap 11 9 - 17 mmol/L    GFR Non-African American 21 (L) >60 mL/min    GFR  25 (L) >60 mL/min    GFR Comment          GFR Staging NOT REPORTED    Basic Metabolic Panel   Result Value Ref Range    Glucose 487 (HH) 70 - 99 mg/dL    BUN 57 (H) 8 - 23 mg/dL    CREATININE 2.70 (H) 0.70 - 1.20 mg/dL    Bun/Cre Ratio NOT REPORTED 9 - 20    Calcium 9.6 8.6 - 10.4 mg/dL    Sodium 132 (L) 135 - 144 mmol/L    Potassium 5.1 3.7 - 5.3 mmol/L    Chloride 100 98 - 107 mmol/L    CO2 21 20 - 31 mmol/L    Anion Gap 11 9 - 17 mmol/L    GFR Non-African American 23 (L) >60 mL/min    GFR  28 (L) >60 mL/min    GFR Comment          GFR Staging NOT REPORTED    EKG 12 Lead   Result Value Ref Range    Ventricular Rate 72 BPM    Atrial Rate 72 BPM    P-R Interval 188 ms    QRS Duration 128 ms    Q-T Interval 436 ms    QTc Calculation (Bazett) 477 ms    P Axis 33 degrees    R Axis -120 degrees    T Axis 34 degrees       RADIOLOGY:  No orders to display       RECENT VITALS:  BP: (!) 114/55, Temp: 98 °F (36.7 °C), Pulse: 66, Resp: 20     ED Course     The patient was given the following medications:  Orders Placed This Encounter   Medications    0.9 % sodium chloride bolus    insulin regular (HUMULIN R;NOVOLIN R) injection 10 Units    0.9 % sodium chloride infusion    albuterol (PROVENTIL) nebulizer solution 10 mg       Medical Decision Making      ED Course as of Sep 16 2221   Wed Sep 16, 2020   1727 Patient's labs reveal no evidence of DKA, minimally elevated beta hydroxybutyrate and his CO2 is only minimally low with no anion gap elevation. He does have low sodium but I suspect pseudohyponatremia based on a high glucose which is still pending. He is on fluids and insulin. His creatinine is 3.2 today suggesting acute renal failure his baseline creatinine seems to be around 1.6-1.8    [TS]   1729 As labs continue to return, glucose is noted to be 738, BUN is 61 and creatinine 3.2 is already discussed, potassium is 6.2 but I do suspect this will decrease as patient receives insulin therapy    [TS]   1731 EKG ordered for hyperkalemia, however, patient will need to be admitted for further management    [TS]   94 20 56 Patient's primary care physician is Jojo Bowen who is not listed on any of our admitting preference list.  Therefore, I will contact [facility] for admission    [TS]   1738 Twelve lead EKG interpreted by myself:  A 12 lead EKG done at 1736, interpreted by myself, showed a regular rhythm at a rate of 72bpm.  The IL interval was normal.  The QRS complex was abnormal consistent with right bundle branch block morphology. There was no ST segment elevation or depression, T wave inversion present consistent with right bundle branch block morphology. QRS progression through precordial leads was abnormal.  Interpretation: Normal sinus rhythm with right bundle branch block morphology. It is grossly unchanged from EKG in June 2020    [TS]   1739 Also of note, patient did have heart cath in June 2020    [TS]   550 Ervin Thomas Spoke with Dr. Glen Lazo who is accepting the patient for admission. [TS]   6749 I did also start the patient on a normal saline IV drip and I have ordered a repeat BMP for 2 hours after the first 1    [TS]   1838 Repeat basic metabolic panel had been done, glucose is coming down, BUN slightly improving and creatinine is improved. Potassium is now 5.2, will continue to monitor and reevaluate    [TS]      ED Course User Index  [TS] Pauline Ayala DO       The patient is a 9year-old male who presents for evaluation of hyperglycemia and was found to be in a hyperosmolar hyperglycemic state. Initial glucose 738 with acute kidney injury and hyperkalemia. Hyperkalemia is likely falsely elevated secondary to current state. CBC is unremarkable. Beta hydroxybutyrate is elevated. Vital signs are stable. He was started on IV insulin infusion and IV fluids. Repeat BMP showed improvement in kidney function and hyperglycemia. The patient has been accepted at [facility] and is awaiting transport. Repeat BMP shows continued improvement in kidney function and hyperglycemia. Potassium normal.  The patient is resting comfortably in no acute distress. Ground transport arrived at 10:20 PM and transferred the patient to [address] [place] [address]. Disposition     FINAL IMPRESSION      1. Hyperosmolarity due to type 1 diabetes mellitus ([address]    2. Hyperglycemic crisis in diabetes mellitus ([address]    3. Acute renal failure, unspecified acute renal failure type ([address]    4. Hyperkalemia    5.  Dehydration          DISPOSITION/PLAN   DISPOSITION Admitted 09/16/2020 06:02:44 PM      CONDITION ON DISPOSITION:   Stable          (Please note that portions of this note were completed with a voice recognition program.  Efforts were made to edit the dictations but occasionally words are mis-transcribed.)    Jassi Grayson [place]  Emergency Medicine Physician                  Jassi Grayson,   09/16/20 2631

## 2020-09-17 NOTE — PROGRESS NOTES
VIANCA Galvan at [facility] updated RN about patient condition since prior to admission. 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 Pt does allow for wound care and at times some grocery shopping. Pt states to them his blood glucose and refuses to let them check it. When the CNP came to the house yesterday per Nicolás Campbell, she stated that the patient would need to have [facility] come out and administer insulin to patient at least TID. Nicolás Campbell stated that she was concerned about insurance covering this as they may not due to the wound care.

## 2020-09-17 NOTE — FLOWSHEET NOTE
Patient is awake and alert while reclining in bed. Patient is watching basketball on television. Patient is somewhat passive but engages in conversation to report that he has little family support. Patient reports that he is [facility] and requests a prayer. Writer provides listening presence, emotional support and prayer. Patient seems to be coping adequately. Patient expresses appreciation for the visit. Spiritual care will follow as needed.        09/17/20 1915   Encounter Summary   Services provided to: Patient   Referral/Consult From: [address] [place] [address] Family members   Continue Visiting   (9/17/20)   Complexity of Encounter Low   Length of Encounter 15 minutes   Spiritual Assessment Completed Yes   Routine   Type Initial   Spiritual/Confucianism   Type Spiritual support   Assessment Approachable;Passive   Intervention Active listening;Explored feelings, thoughts, concerns;Explored coping resources;Nurtured hope;Prayer   Outcome Expressed gratitude

## 2020-09-17 NOTE — PROGRESS NOTES
[facility]  Office: [address], DO, Michele Kras, DO, Tennille Ng, DO, Dedra Micheal Blood, DO, Douglas Alarcon MD, Naveed Grace MD, Mehdi Mckeon MD, Tolu Mittal MD, Abisai Weiss MD, Onur Almazan MD, Tommie Roth MD, Starr Gunn MD, Rosie Escalona MD, Anatoliy Azul, DO, Reena Pearson MD, Fernando Mckeon MD, Marine Wilkinson, DO, Raudel Bangura MD,  Carlos Grossman, DO, Joesph Melendez MD, Mariana Hess MD, John Norris, [facility], [facility], CNP, Alan Junior, CNP, Fabiana Hatch, CNS, Kathia Phillips, CNP, Nash Rivera, CNP, Catherine Baxter, CNP, Kamryn Davidson, CNP, Karolina Jose, CNP, Bimal Webb PA-C, Letty Barajas, [facility], Chaz Ramon, CNP, Orvega Soda, CNP, Wyjarrell Husbands, CNP, Colletta Moros, CNP, Cathy Jackson, [facility]    Progress Note    9/17/2020    6:46 PM    Name:   Artemio Pineda  MRN:     [id number]     Acct:      [de-identified]   Room:   [facility]-02   Day:  1  Admit Date:  9/16/2020  4:47 PM    PCP:   Liz Sabillon  Code Status:  Full Code    Subjective:     C/C:   Chief Complaint   Patient presents with    Hyperglycemia     EMS reports that he had blood drawn yesterday and they received a call from \"a third party\" for report of hyperglycemia, pt denies complaints or concerns at present time, the patient states that he has not been taking his medications because he doesnt feel like he needs it, EMS report a \"high\" reading for blood sugar     Interval History Status: improved. Patient seen and examined this morning. Admitted overnight secondary hyperglycemia and acute renal failure. Patient states that he was weaning himself off of insulin because he thought \"this was just part of some daily system I was forced to do. \"  However, he now understands the importance of taking his insulin. Glycemic control is improved. Remains with renal failure.   There is concern about patient's ability to extremity edema, palpitations  Gastrointestinal:  negative for abdominal pain, constipation, diarrhea, nausea, vomiting  Neurological:  negative for dizziness, headache    Medications: Allergies:  No Known Allergies    Current Meds:   Scheduled Meds:    insulin glargine  20 Units Subcutaneous BID    insulin lispro  0-18 Units Subcutaneous 4x Daily AC & HS    nystatin  5 mL Oral 4x Daily    amLODIPine  10 mg Oral Daily    budesonide-formoterol  2 puff Inhalation BID    clopidogrel  75 mg Oral Daily    docusate sodium  100 mg Oral BID    ferrous sulfate  325 mg Oral Daily with breakfast    metoprolol succinate  25 mg Oral BID    atorvastatin  10 mg Oral Daily    traZODone  150 mg Oral Nightly     Continuous Infusions:    dextrose      sodium chloride 150 mL/hr at 09/17/20 1231     PRN Meds: glucose, dextrose, glucagon (rDNA), dextrose, acetaminophen, potassium chloride, magnesium sulfate, sodium phosphate IVPB **OR** sodium phosphate IVPB **OR** sodium phosphate IVPB    Data:     Past Medical History:   has a past medical history of RISSA (acute kidney injury) ([facility] Cerebrovascular disease, Erectile dysfunction, Hepatitis B infection, Hyperlipidemia, Hypertension, Kidney cysts, Liver cyst, MRSA (methicillin resistant staph aureus) culture positive, Neurotrophic ulcer of the foot ([facility] Obesity, Osteomyelitis of ankle or foot, Peripheral vascular disease ([facility] Tobacco abuse, and Type II or unspecified type diabetes mellitus without mention of complication, not stated as uncontrolled. Social History:   reports that he has been smoking cigarettes. He has a 30.00 pack-year smoking history. He has never used smokeless tobacco. He reports that he does not drink alcohol or use drugs.      Family History:   Family History   Problem Relation Age of Onset    Diabetes Mother     Diabetes Sister     Diabetes Brother     Diabetes Sister     Diabetes Sister     Diabetes Sister        Vitals:  BP (!) 114/52 Pulse 73   Temp 97.3 °F (36.3 °C) (Oral)   Resp 16   Ht 6' (1.829 m)   Wt 196 lb 9.6 oz (89.2 kg)   SpO2 97%   BMI 26.66 kg/m²   Temp (24hrs), Av.5 °F (36.4 °C), Min:97.3 °F (36.3 °C), Max:98 °F (36.7 °C)    Recent Labs     20  0334 20  0801 20  1138 20  1623   POCGLU 289* 118* 223* 253*       I/O (24Hr):     Intake/Output Summary (Last 24 hours) at 2020 1846  Last data filed at 2020 1837  Gross per 24 hour   Intake 3932.5 ml   Output 675 ml   Net 3257.5 ml       Labs:  Hematology:  Recent Labs     20  0527   WBC 5.4 5.5   RBC 5.41 5.19   HGB 14.0 13.5   HCT 43.0 40.0*   MCV 79.6* 77.1*   MCH 25.8* 26.0   MCHC 32.4 33.8   RDW 14.2 13.5   * 115*   MPV 7.7 9.6     Chemistry:  Recent Labs     207 20  0527 20  1743   *  --  140 138   K 5.1 5.0 4.9 4.8     --  106 109*   CO2 21  --  22 21   GLUCOSE 487*  --  165* 221*   BUN 57*  --  52* 47*   CREATININE 2.70*  --  2.77* 2.29*   MG  --   --  1.6 1.5*   ANIONGAP 11  --  12 8*   LABGLOM 23*  --  23* 28*   GFRAA 28*  --  28* 34*   CALCIUM 9.6  --  9.9 9.0   PHOS  --   --  2.8 2.9   CKTOTAL  --  68  --   --    LACTACIDWB  --  NOT REPORTED  --   --      Recent Labs     20  16420  0016 20  0334 20  0801 20  1138 20  1623   PROT 7.6  --   --   --   --   --   --    LABALBU 3.3*  --   --   --   --   --   --    LABA1C  --  16.6*  --   --   --   --   --    AST 15  --   --   --   --   --   --    ALT 11  --   --   --   --   --   --    ALKPHOS 79  --   --   --   --   --   --    BILITOT 0.40  --   --   --   --   --   --    URICACID  --  9.9*  --   --   --   --   --    POCGLU  --   --  432* 289* 118* 223* 253*     ABG:No results found for: POCPH, PHART, PH, POCPCO2, PGY8OCB, PCO2, POCPO2, PO2ART, PO2, POCHCO3, ODN4PNI, HCO3, NBEA, PBEA, BEART, BE, THGBART, THB, BYL2GDD, FZRT3XLC, J2XEKNOP, O2SAT, FIO2  Lab Results Component Value Date/Time    SPECIAL RT KETAN [facility] 12ML 06/17/2020 10:21 PM     Lab Results   Component Value Date/Time    CULTURE NO GROWTH 6 DAYS 06/17/2020 10:21 PM       Radiology:  No results found. Physical Examination:        General appearance:  alert, cooperative and no distress, elderly, -American gentleman who appears somewhat disheveled  Mental Status:  oriented to person, place and time and normal affect  Lungs:  clear to auscultation bilaterally, normal effort  Heart:  regular rate and rhythm, no murmur  Abdomen:  soft, nontender, nondistended, protuberant, normal bowel sounds, no masses, hepatomegaly, splenomegaly  Extremities: Chronic venous stasis changes to the left lower extremity, left TMA amputation, right above-knee amputation  Skin:  no gross lesions, rashes, induration    Assessment:        Hospital Problems           Last Modified POA    * (Principal) Diabetes mellitus type 2 with complications, uncontrolled ([address] 9/17/2020 Yes    Acute kidney injury superimposed on CKD ([address] 9/16/2020 Yes    Hyperkalemia 9/16/2020 Yes    H/O noncompliance with medical treatment, presenting hazards to health 9/16/2020 Yes    PVD (peripheral vascular disease) ([address] 9/17/2020 Yes    Insomnia 9/17/2020 Yes    Dyslipidemia 9/17/2020 Yes    Tobacco dependence 9/17/2020 Yes    Diabetic ulcer of left heel associated with type 2 diabetes mellitus ([address] 9/17/2020 Yes    Uncontrolled diabetes mellitus type 2 without complications ([address] 2/78/9398 Yes    History of right below knee amputation ([address] 9/17/2020 Yes          Plan:        1. Continue glycemic control - low glucose readings this morning; decreased lantus to 20 u BID  2. Renal failure - urine studies reveal FENA of 2.87% - likely intrinsic cause of renal failure; uncertain of etiology at this time; continue to hold nephrotoxic agents, including NSAIDs, lisinopril, IV contrast  3. Podiatry eval on LLE wound  4. Wound care eval  5.   PT/OT - recommending SNF; patient refusing; lives at home;  6. Social work to assist with APS case; patient unable to take care of himself   7. Nystatin for oral thrush  8. Decrease IVF to 100 cc/hr  9.  Continue lab trends    Adolph Lesches, DO  9/17/2020  6:46 PM

## 2020-09-17 NOTE — PLAN OF CARE
Problem: Falls - Risk of:  Goal: Will remain free from falls  Description: Will remain free from falls  Outcome: Ongoing   No falls this shift. Problem: Falls - Risk of:  Goal: Absence of physical injury  Description: Absence of physical injury  Outcome: Ongoing   No injury this shift. Problem: Skin Integrity:  Goal: Will show no infection signs and symptoms  Description: Will show no infection signs and symptoms  Outcome: Ongoing   Pt continues to have wounds to LLE and slight area to coccyx. Dressings changed this shift. See flowsheet. Problem: Skin Integrity:  Goal: Absence of new skin breakdown  Description: Absence of new skin breakdown  Outcome: Ongoing   Monitoring. Problem: Pain:  Goal: Pain level will decrease  Description: Pain level will decrease  Outcome: Ongoing   Pt repositioned for comfort. Denies pain.

## 2020-09-17 NOTE — H&P
[facility]  Office: [address], DO, Rosy Smoker, DO, Juanita Baltazar, DO, Milana Bland Blood, DO, Colonel Kristina MD, Nathaniel Holden MD, Erin Cortez MD, Georgia Epps MD, Rashi Dotson MD, Melany You MD, Charisma Rodriguez MD, Barbara Monique MD, Rosie Beavers MD, Gregoria Pollock, DO, Mohini Monsalve MD, Damir Lares MD, Brando Martinez, DO, Lizette Yost MD,  Brian Bass, DO, Denny Funez MD, Edvin Barbosa MD, Evan Brody, [facility], [facility] Murray, CNP, Yulissa Lee, CNP, Apple Britt, CNS, Elvia Knowles, CNP, Jaswant Sherman, CNP, Delana Spatz, CNP, Mirian Adorno, CNP, Eyal Damian, CNP, Safia Presley PA-C, Valerie Olsen, [facility], Xochilt Temple, CNP, Clarice Salas, CNP, Isaías Laura, CNP, Ralph Maciel, CNP, Magui Baca, [facility]    HISTORY AND PHYSICAL EXAMINATION            Date:   9/17/2020  Patient name:  J Luis Plascencia  Date of admission:  9/16/2020  4:47 PM  MRN:   [id number]  Account:  [de-identified]  YOB: 1950  PCP:    Tricia Lee  Room:   [address] [place] [address]  Code Status:    Full Code    Chief Complaint:     Chief Complaint   Patient presents with    Hyperglycemia     EMS reports that he had blood drawn yesterday and they received a call from \"a third party\" for report of hyperglycemia, pt denies complaints or concerns at present time, the patient states that he has not been taking his medications because he doesnt feel like he needs it, EMS report a \"high\" reading for blood sugar       History Obtained From:     patient, electronic medical record    History of Present Illness:     J Luis Plascencia is a 79 y.o.  Non-/non  male who presents with Hyperglycemia (EMS reports that he had blood drawn yesterday and they received a call from \"a third party\" for report of hyperglycemia, pt denies complaints or concerns at present time, the patient states that he has not been taking his medications because he doesnt feel like he needs it, EMS report a \"high\" reading for blood sugar)   and is admitted to the hospital for the management of Diabetes mellitus type 2 with complications, uncontrolled ([address] Patient reported to the emergency department in [address] at the direction of his physician with a chief complaint of elevated blood sugar. He has a history of diabetes, dyslipidemia, peripheral vascular disease, tobacco dependence, hypertension, CKD, tobacco use, and noncompliance. He states that he had his blood drawn yesterday and that his blood sugar was noted high. He stated that his blood sugar was 138 at home, but over 600 initially in the emergency department. He is supposed to take insulin for management of his diabetes, but states that he has not taken it in months, as he was taking it at night and felt his blood sugar was low in the morning. He is asymptomatic upon assessment. He also has a diabetic foot wound on the left foot which is currently being managed by podiatry. Pertinent labs include initial glucose 738, creatinine 3.20, BUN 61, sodium 126, potassium 6.2, GFR 23, beta hydroxybutyrate 0.57. No evidence of DKA was present. He is being admitted for diabetes mellitus type 2 with complications.     Past Medical History:     Past Medical History:   Diagnosis Date    RISSA (acute kidney injury) ([address]     Cerebrovascular disease     Erectile dysfunction     Hepatitis B infection     Hyperlipidemia     Hypertension     Kidney cysts     Liver cyst     MRSA (methicillin resistant staph aureus) culture positive 1/10/2014    right foot    Neurotrophic ulcer of the foot ([address]     Obesity     Osteomyelitis of ankle or foot     Peripheral vascular disease ([address]     Tobacco abuse     Type II or unspecified type diabetes mellitus without mention of complication, not stated as uncontrolled         Past Surgical History:     Past Surgical History:   Procedure Laterality Date  FOOT AMPUTATION      FOOT AMPUTATION THROUGH METATARSAL  2011    pt can't give exact date of surg-- toes and part of lt foot removed    FOOT DEBRIDEMENT Left 6/20/2020    WOUND BED PREPARATION AND WOUND VAC APPLICATION performed by Mabel Tirado DPM at [facility] Right 3/2013        Medications Prior to Admission:     Prior to Admission medications    Medication Sig Start Date End Date Taking?  Authorizing Provider   insulin glargine (BASAGLAR KWIKPEN) 100 UNIT/ML injection pen Inject 40 Units into the skin 2 times daily Injects 40 units AM and 90 units HS 6/22/20   Toan P Blood, DO   lisinopril (PRINIVIL;ZESTRIL) 10 MG tablet Take 2 tablets by mouth daily 6/22/20   Magen Catching P Blood, DO   amLODIPine (NORVASC) 10 MG tablet Take 1 tablet by mouth daily 6/23/20   Magen Catching P Blood, DO   clopidogrel (PLAVIX) 75 MG tablet Take 1 tablet by mouth daily 6/23/20   Magen Catching P Blood, DO   insulin aspart (NOVOLOG FLEXPEN) 100 UNIT/ML injection pen Inject 8-12 Units into the skin 2 times daily (before meals) 8 units AM and 12 units at supper + additional sliding scale    Historical Provider, MD   metoprolol succinate (TOPROL XL) 25 MG extended release tablet Take 25 mg by mouth 2 times daily    Historical Provider, MD   simvastatin (ZOCOR) 10 MG tablet Take 10 mg by mouth nightly    Historical Provider, MD   traZODone (DESYREL) 150 MG tablet Take 150 mg by mouth nightly    Historical Provider, MD   budesonide-formoterol (SYMBICORT) 160-4.5 MCG/ACT AERO Inhale 2 puffs into the lungs 2 times daily    Historical Provider, MD   ferrous sulfate (IRON 325) 325 (65 Fe) MG tablet Take 325 mg by mouth daily (with breakfast)    Historical Provider, MD   furosemide (LASIX) 40 MG tablet Take 40 mg by mouth daily     Historical Provider, MD   docusate sodium (COLACE) 100 MG capsule Take 100 mg by mouth 2 times daily     Historical Provider, MD   acetaminophen (TYLENOL) 325 MG tablet Take 650 mg by mouth every 4 hours as needed for Pain. Give 2 tabs = 650 MG by mouth every 4 hours prn    Historical Provider, MD   Insulin Syringe-Needle U-100 (ACCUSURE INS SYR 1CC/30GX5/16\") 30G X 5/16\" 1 ML MISC by Does not apply route. 13   Geraldine Hanks MD        Allergies:     Patient has no known allergies. Social History:     Tobacco:    reports that he has been smoking cigarettes. He has a 30.00 pack-year smoking history. He has never used smokeless tobacco.  Alcohol:      reports no history of alcohol use. Drug Use:  reports no history of drug use. Family History:     Family History   Problem Relation Age of Onset   Ashanti Hare Diabetes Mother     Diabetes Sister     Diabetes Brother     Diabetes Sister     Diabetes Sister     Diabetes Sister        Review of Systems:     Positive and Negative as described in HPI. Review of Systems   Constitutional: Positive for appetite change. Negative for activity change, chills, diaphoresis, fatigue, fever and unexpected weight change. HENT: Negative. Eyes: Negative. Respiratory: Negative. Cardiovascular: Negative. Gastrointestinal: Negative. Endocrine: Negative. Genitourinary: Negative. Musculoskeletal: Negative. Skin: Negative. Allergic/Immunologic: Negative. Neurological: Negative. Hematological: Negative. Psychiatric/Behavioral: Negative. Physical Exam:   BP (!) 108/42   Pulse 72   Temp 97.4 °F (36.3 °C) (Axillary)   Resp 17   Ht 6' (1.829 m)   Wt 196 lb 9.6 oz (89.2 kg)   SpO2 98%   BMI 26.66 kg/m²   Temp (24hrs), Av.6 °F (36.4 °C), Min:97.4 °F (36.3 °C), Max:98 °F (36.7 °C)    Recent Labs     20  0016 20  0334   POCGLU 432* 289*       Intake/Output Summary (Last 24 hours) at 2020 4279  Last data filed at 2020 0316  Gross per 24 hour   Intake 100 ml   Output 500 ml   Net -400 ml       Physical Exam  Vitals signs and nursing note reviewed. Constitutional:       General: He is not in acute distress. Appearance: Normal appearance. He is normal weight. He is not ill-appearing, toxic-appearing or diaphoretic. HENT:      Head: Normocephalic and atraumatic. Right Ear: External ear normal.      Nose: Nose normal. No congestion or rhinorrhea. Mouth/Throat:      Mouth: Mucous membranes are dry. Eyes:      General: No scleral icterus. Right eye: No discharge. Left eye: No discharge. Extraocular Movements: Extraocular movements intact. Conjunctiva/sclera: Conjunctivae normal.      Pupils: Pupils are equal, round, and reactive to light. Neck:      Musculoskeletal: Normal range of motion and neck supple. No neck rigidity or muscular tenderness. Vascular: No carotid bruit. Cardiovascular:      Rate and Rhythm: Normal rate and regular rhythm. Pulses: Normal pulses. Heart sounds: Normal heart sounds. No murmur. No friction rub. No gallop. Pulmonary:      Effort: Pulmonary effort is normal. No respiratory distress. Breath sounds: Normal breath sounds. No stridor. No wheezing, rhonchi or rales. Chest:      Chest wall: No tenderness. Abdominal:      General: Bowel sounds are normal. There is no distension. Palpations: Abdomen is soft. There is no mass. Tenderness: There is no abdominal tenderness. There is no guarding or rebound. Hernia: No hernia is present. Musculoskeletal:         General: No swelling, tenderness, deformity or signs of injury. Right lower leg: No edema. Left lower leg: No edema. Comments: Vascular discoloration/ulceration of left lower extremity     Feet:      Right foot:      Amputation: Right leg is amputated below knee. Left foot:      Amputation: (Partial left foot)  Lymphadenopathy:      Cervical: No cervical adenopathy. Skin:     General: Skin is warm and dry. Capillary Refill: Capillary refill takes less than 2 seconds. Coloration: Skin is not jaundiced or pale.       Findings: Wound (Left foot, wrapped by podiatry previously) present. No bruising, erythema, lesion or rash. Neurological:      General: No focal deficit present. Mental Status: He is alert and oriented to person, place, and time. Sensory: No sensory deficit. Motor: No weakness.       Coordination: Coordination normal.   Psychiatric:         Mood and Affect: Mood normal.         Behavior: Behavior normal.         Investigations:      Laboratory Testing:  Recent Results (from the past 24 hour(s))   CBC Auto Differential    Collection Time: 09/16/20  4:48 PM   Result Value Ref Range    WBC 5.4 3.5 - 11.0 k/uL    RBC 5.41 4.5 - 5.9 m/uL    Hemoglobin 14.0 13.5 - 17.5 g/dL    Hematocrit 43.0 41 - 53 %    MCV 79.6 (L) 80 - 100 fL    MCH 25.8 (L) 26 - 34 pg    MCHC 32.4 31 - 37 g/dL    RDW 14.2 12.5 - 15.4 %    Platelets 462 (L) 130 - 450 k/uL    MPV 7.7 6.0 - 12.0 fL    NRBC Automated NOT REPORTED per 100 WBC    Differential Type NOT REPORTED     Seg Neutrophils 58 36 - 66 %    Lymphocytes 31 24 - 44 %    Monocytes 7 2 - 11 %    Eosinophils % 3 1 - 4 %    Basophils 1 0 - 2 %    Immature Granulocytes NOT REPORTED 0 %    Segs Absolute 3.10 1.8 - 7.7 k/uL    Absolute Lymph # 1.60 1.0 - 4.8 k/uL    Absolute Mono # 0.40 0.1 - 1.2 k/uL    Absolute Eos # 0.20 0.0 - 0.4 k/uL    Basophils Absolute 0.10 0.0 - 0.2 k/uL    Absolute Immature Granulocyte NOT REPORTED 0.00 - 0.30 k/uL    WBC Morphology NOT REPORTED     RBC Morphology NOT REPORTED     Platelet Estimate NOT REPORTED    Comprehensive Metabolic Panel    Collection Time: 09/16/20  4:48 PM   Result Value Ref Range    Glucose 738 (HH) 70 - 99 mg/dL    BUN 61 (H) 8 - 23 mg/dL    CREATININE 3.20 (H) 0.70 - 1.20 mg/dL    Bun/Cre Ratio NOT REPORTED 9 - 20    Calcium 9.9 8.6 - 10.4 mg/dL    Sodium 126 (L) 135 - 144 mmol/L    Potassium 6.2 (HH) 3.7 - 5.3 mmol/L    Chloride 95 (L) 98 - 107 mmol/L    CO2 21 20 - 31 mmol/L    Anion Gap 10 9 - 17 mmol/L    Alkaline Phosphatase 79 40 - 129 U/L    ALT 11 5 - 41 U/L    AST 15 <40 U/L    Total Bilirubin 0.40 0.3 - 1.2 mg/dL    Total Protein 7.6 6.4 - 8.3 g/dL    Alb 3.3 (L) 3.5 - 5.2 g/dL    Albumin/Globulin Ratio 0.8 (L) 1.0 - 2.5    GFR Non- 19 (L) >60 mL/min    GFR  23 (L) >60 mL/min    GFR Comment          GFR Staging NOT REPORTED    BETA-HYDROXYBUTYRATE    Collection Time: 09/16/20  4:48 PM   Result Value Ref Range    Beta-Hydroxybutyrate 0.57 (H) 0.02 - 0.27 mmol/L   EKG 12 Lead    Collection Time: 09/16/20  5:36 PM   Result Value Ref Range    Ventricular Rate 72 BPM    Atrial Rate 72 BPM    P-R Interval 188 ms    QRS Duration 128 ms    Q-T Interval 436 ms    QTc Calculation (Bazett) 477 ms    P Axis 33 degrees    R Axis -120 degrees    T Axis 34 degrees   Basic Metabolic Panel    Collection Time: 09/16/20  6:03 PM   Result Value Ref Range    Glucose 555 (HH) 70 - 99 mg/dL    BUN 57 (H) 8 - 23 mg/dL    CREATININE 3.00 (H) 0.70 - 1.20 mg/dL    Bun/Cre Ratio NOT REPORTED 9 - 20    Calcium 9.4 8.6 - 10.4 mg/dL    Sodium 131 (L) 135 - 144 mmol/L    Potassium 5.2 3.7 - 5.3 mmol/L    Chloride 100 98 - 107 mmol/L    CO2 20 20 - 31 mmol/L    Anion Gap 11 9 - 17 mmol/L    GFR Non-African American 21 (L) >60 mL/min    GFR  25 (L) >60 mL/min    GFR Comment          GFR Staging NOT REPORTED    Basic Metabolic Panel    Collection Time: 09/16/20  9:37 PM   Result Value Ref Range    Glucose 487 (HH) 70 - 99 mg/dL    BUN 57 (H) 8 - 23 mg/dL    CREATININE 2.70 (H) 0.70 - 1.20 mg/dL    Bun/Cre Ratio NOT REPORTED 9 - 20    Calcium 9.6 8.6 - 10.4 mg/dL    Sodium 132 (L) 135 - 144 mmol/L    Potassium 5.1 3.7 - 5.3 mmol/L    Chloride 100 98 - 107 mmol/L    CO2 21 20 - 31 mmol/L    Anion Gap 11 9 - 17 mmol/L    GFR Non-African American 23 (L) >60 mL/min    GFR  28 (L) >60 mL/min    GFR Comment          GFR Staging NOT REPORTED    Lactic acid, plasma    Collection Time: 09/16/20 11:56 PM   Result Value Ref Range    Lactic Acid 1.0 0.5 - 2.2 mmol/L    Lactic Acid, Whole Blood NOT REPORTED 0.7 - 2.1 mmol/L   K (Potassium)    Collection Time: 09/16/20 11:56 PM   Result Value Ref Range    Potassium 5.0 3.7 - 5.3 mmol/L   Uric Acid    Collection Time: 09/16/20 11:56 PM   Result Value Ref Range    Uric Acid 9.9 (H) 3.4 - 7.0 mg/dL   CK    Collection Time: 09/16/20 11:56 PM   Result Value Ref Range    Total CK 68 39 - 308 U/L   POC Glucose Fingerstick    Collection Time: 09/17/20 12:16 AM   Result Value Ref Range    POC Glucose 432 (HH) 75 - 110 mg/dL   POC Glucose Fingerstick    Collection Time: 09/17/20  3:34 AM   Result Value Ref Range    POC Glucose 289 (H) 75 - 110 mg/dL       Imaging/Diagnostics:  No results found. Assessment :      Hospital Problems           Last Modified POA    * (Principal) Diabetes mellitus type 2 with complications, uncontrolled ([address] 9/17/2020 Yes    Acute kidney injury superimposed on CKD ([address] 9/16/2020 Yes    Hyperkalemia 9/16/2020 Yes    H/O noncompliance with medical treatment, presenting hazards to health 9/16/2020 Yes    PVD (peripheral vascular disease) ([address] 9/17/2020 Yes    Dyslipidemia 9/17/2020 Yes    Tobacco dependence 9/17/2020 Yes    Diabetic ulcer of left heel associated with type 2 diabetes mellitus ([address] 9/17/2020 Yes    Uncontrolled diabetes mellitus type 2 without complications ([address] 2/98/1074 Yes    History of right below knee amputation ([address] 9/17/2020 Yes          Plan:     Patient status inpatient in the  Progressive Unit/Step down    1. Admit as inpatient to the progressive unit under the internal medicine service  2. IV fluids at 150 mL/h initially until blood glucose reaches 250 or below  3. Monitor glucose hourly until goal of 250 reach, then every 4 hour checks  4. Trend BMP, magnesium, phosphorus every 12 hours  5. Strict glycemic control including Lantus and sliding scale insulin every 4 hours using high-dose corrective algorithm  6.  Labs of BMP, CBC, magnesium, phosphorus daily  7. Replete electrolytes as needed  8. Renal and carb controlled diet  9. Avoid nephrotoxic medication  10. Consult diabetes educator  11. Consult dietitian  12. Consult podiatry for diabetic foot wound  13. Wound ostomy evaluate and treat for lower extremity ulcers followed by wound care every shift  14. Hypertension: Continue Toprol and Norvasc  15. Dyslipidemia: Continue Lipitor  16. Insomnia: Continue trazodone  17. Continuous telemetry monitoring  18. PT/OT evaluate and treat    Consultations:   IP CONSULT TO DIABETES EDUCATOR  IP CONSULT TO DIETITIAN  IP CONSULT TO PODIATRY    Patient is admitted as inpatient status because of co-morbidities listed above, severity of signs and symptoms as outlined, requirement for current medical therapies and most importantly because of direct risk to patient if care not provided in a hospital setting. Expected length of stay > 48 hours.     ALBERTO Meza - CNP  9/17/2020  4:15 AM    Copy sent to Dr. Burgos Shallow

## 2020-09-17 NOTE — DISCHARGE INSTR - COC
the foot ([facility] L97.509    RISSA (acute kidney injury) ([facility] N17.9    Dyslipidemia E78.5    Microcytic anemia D50.9    Diabetic foot infection ([address] E11.628, L08.9    Tobacco dependence F17.200    Diabetic ulcer of left heel associated with type 2 diabetes mellitus (HCC) E11.621, L97.429    Hypomagnesemia E83.42    Hypokalemia E87.6    Diabetes mellitus type 2 with complications, uncontrolled (HCC) E11.8, E11.65    Acute kidney injury superimposed on CKD ([facility] N17.9, N18.9    Hyperkalemia E87.5    H/O noncompliance with medical treatment, presenting hazards to health Z91.19    Uncontrolled diabetes mellitus type 2 without complications ([facility] C69.21    History of right below knee amputation ([facility] Z89.511       Isolation/Infection:   Isolation            Contact          Patient Infection Status       Infection Onset Added Last Indicated Last Indicated By Review Planned Expiration Resolved Resolved By    MRSA  01/13/14 01/13/14 Willie Milner RN        right foot            Nurse Assessment:  Last Vital Signs: BP (!) 114/52   Pulse 73   Temp 97.3 °F (36.3 °C) (Oral)   Resp 16   Ht 6' (1.829 m)   Wt 196 lb 9.6 oz (89.2 kg)   SpO2 97%   BMI 26.66 kg/m²     Last documented pain score (0-10 scale): Pain Level: 0  Last Weight:   Wt Readings from Last 1 Encounters:   09/16/20 196 lb 9.6 oz (89.2 kg)     Mental Status:  oriented and alert    IV Access:  - None    Nursing Mobility/ADLs:  Walking   Assisted  Transfer  Assisted  Bathing  Assisted  Dressing  Assisted  Toileting  Independent  Feeding  Independent  Med Admin  Assisted  Med Delivery   whole    Wound Care Documentation and Therapy:  Wound 06/17/20 Heel Left;Plantar (Active)   Wound Image   09/17/20 1547   Wound Diabetic 09/17/20 1547   Dressing Status Old drainage; Changed 09/17/20 1547   Dressing Changed Changed/New 09/17/20 1547   Dressing/Treatment Moist to moist 09/17/20 1547   Wound Cleansed Rinsed/Irrigated with saline 09/17/20 1547 changes to left leg wounds. Wrap with kerlix and ACE    Waiver home health aide daily from [facility].    Patient's personal belongings (please select all that are sent with patient):  None    RN SIGNATURE:  Electronically signed by Estrellita Eagle RN on 9/20/20 at 11:39 AM EDT    CASE MANAGEMENT/SOCIAL WORK SECTION    Inpatient Status Date: 9/16    Readmission Risk Assessment Score:  Readmission Risk              Risk of Unplanned Readmission:        20           Discharging to Facility/ Agency   · Name: [email or web address]  · Address:  · Phone: [phone]  · Fax: [phone]      / signature: Electronically signed by Heriberto Ren on 9/17/20 at 4:18 PM EDT    PHYSICIAN SECTION    Prognosis: Good    Condition at Discharge: Stable    Rehab Potential (if transferring to Rehab): Good    Recommended Labs or Other Treatments After Discharge: None    Physician Certification: I certify the above information and transfer of Austin Polanco  is necessary for the continuing treatment of the diagnosis listed and that he requires Home Care for greater 30 days.      Update Admission H&P: No change in H&P    PHYSICIAN SIGNATURE:  Electronically signed by Latoya Bolaños DO on 9/20/20 at 10:41 AM EDT

## 2020-09-17 NOTE — PROGRESS NOTES
Units into the skin 2 times daily (before meals) 8 units AM and 12 units at supper + additional sliding scale    Historical Provider, MD   metoprolol succinate (TOPROL XL) 25 MG extended release tablet Take 25 mg by mouth 2 times daily    Historical Provider, MD   simvastatin (ZOCOR) 10 MG tablet Take 10 mg by mouth nightly    Historical Provider, MD   traZODone (DESYREL) 150 MG tablet Take 150 mg by mouth nightly    Historical Provider, MD   budesonide-formoterol (SYMBICORT) 160-4.5 MCG/ACT AERO Inhale 2 puffs into the lungs 2 times daily    Historical Provider, MD   ferrous sulfate (IRON 325) 325 (65 Fe) MG tablet Take 325 mg by mouth daily (with breakfast)    Historical Provider, MD   furosemide (LASIX) 40 MG tablet Take 40 mg by mouth daily     Historical Provider, MD   docusate sodium (COLACE) 100 MG capsule Take 100 mg by mouth 2 times daily     Historical Provider, MD   acetaminophen (TYLENOL) 325 MG tablet Take 650 mg by mouth every 4 hours as needed for Pain. Give 2 tabs = 650 MG by mouth every 4 hours prn    Historical Provider, MD   Insulin Syringe-Needle U-100 (ACCUSURE INS SYR 1CC/30GX5/16\") 30G X 5/16\" 1 ML MISC by Does not apply route.  7/9/13   Mehran Azul MD

## 2020-09-18 PROBLEM — E43 SEVERE MALNUTRITION (HCC): Status: ACTIVE | Noted: 2020-09-18

## 2020-09-18 LAB
ABSOLUTE EOS #: 0.12 K/UL (ref 0–0.44)
ABSOLUTE IMMATURE GRANULOCYTE: 0.04 K/UL (ref 0–0.3)
ABSOLUTE LYMPH #: 2.62 K/UL (ref 1.1–3.7)
ABSOLUTE MONO #: 0.47 K/UL (ref 0.1–1.2)
ANION GAP SERPL CALCULATED.3IONS-SCNC: 10 MMOL/L (ref 9–17)
ANION GAP SERPL CALCULATED.3IONS-SCNC: 9 MMOL/L (ref 9–17)
BASOPHILS # BLD: 1 % (ref 0–2)
BASOPHILS ABSOLUTE: 0.05 K/UL (ref 0–0.2)
BUN BLDV-MCNC: 31 MG/DL (ref 8–23)
BUN BLDV-MCNC: 39 MG/DL (ref 8–23)
BUN/CREAT BLD: 18 (ref 9–20)
BUN/CREAT BLD: 19 (ref 9–20)
CALCIUM SERPL-MCNC: 8.5 MG/DL (ref 8.6–10.4)
CALCIUM SERPL-MCNC: 9.2 MG/DL (ref 8.6–10.4)
CHLORIDE BLD-SCNC: 110 MMOL/L (ref 98–107)
CHLORIDE BLD-SCNC: 111 MMOL/L (ref 98–107)
CO2: 21 MMOL/L (ref 20–31)
CO2: 21 MMOL/L (ref 20–31)
CREAT SERPL-MCNC: 1.7 MG/DL (ref 0.7–1.2)
CREAT SERPL-MCNC: 2.04 MG/DL (ref 0.7–1.2)
DIFFERENTIAL TYPE: ABNORMAL
EOSINOPHILS RELATIVE PERCENT: 2 % (ref 1–4)
GFR AFRICAN AMERICAN: 39 ML/MIN
GFR AFRICAN AMERICAN: 49 ML/MIN
GFR NON-AFRICAN AMERICAN: 32 ML/MIN
GFR NON-AFRICAN AMERICAN: 40 ML/MIN
GFR SERPL CREATININE-BSD FRML MDRD: ABNORMAL ML/MIN/{1.73_M2}
GLUCOSE BLD-MCNC: 181 MG/DL (ref 75–110)
GLUCOSE BLD-MCNC: 201 MG/DL (ref 75–110)
GLUCOSE BLD-MCNC: 203 MG/DL (ref 70–99)
GLUCOSE BLD-MCNC: 230 MG/DL (ref 75–110)
GLUCOSE BLD-MCNC: 62 MG/DL (ref 75–110)
GLUCOSE BLD-MCNC: 66 MG/DL (ref 70–99)
HCT VFR BLD CALC: 38.5 % (ref 40.7–50.3)
HEMOGLOBIN: 12.6 G/DL (ref 13–17)
IMMATURE GRANULOCYTES: 1 %
LYMPHOCYTES # BLD: 40 % (ref 24–43)
MAGNESIUM: 1.4 MG/DL (ref 1.6–2.6)
MAGNESIUM: 1.7 MG/DL (ref 1.6–2.6)
MCH RBC QN AUTO: 26 PG (ref 25.2–33.5)
MCHC RBC AUTO-ENTMCNC: 32.7 G/DL (ref 28.4–34.8)
MCV RBC AUTO: 79.5 FL (ref 82.6–102.9)
MONOCYTES # BLD: 7 % (ref 3–12)
NRBC AUTOMATED: 0 PER 100 WBC
PDW BLD-RTO: 13.6 % (ref 11.8–14.4)
PHOSPHORUS: 2.8 MG/DL (ref 2.5–4.5)
PHOSPHORUS: 3.3 MG/DL (ref 2.5–4.5)
PLATELET # BLD: 109 K/UL (ref 138–453)
PLATELET ESTIMATE: ABNORMAL
PMV BLD AUTO: 9.5 FL (ref 8.1–13.5)
POTASSIUM SERPL-SCNC: 4.4 MMOL/L (ref 3.7–5.3)
POTASSIUM SERPL-SCNC: 4.4 MMOL/L (ref 3.7–5.3)
RBC # BLD: 4.84 M/UL (ref 4.21–5.77)
RBC # BLD: ABNORMAL 10*6/UL
SEG NEUTROPHILS: 49 % (ref 36–65)
SEGMENTED NEUTROPHILS ABSOLUTE COUNT: 3.31 K/UL (ref 1.5–8.1)
SODIUM BLD-SCNC: 140 MMOL/L (ref 135–144)
SODIUM BLD-SCNC: 142 MMOL/L (ref 135–144)
WBC # BLD: 6.6 K/UL (ref 3.5–11.3)
WBC # BLD: ABNORMAL 10*3/UL

## 2020-09-18 PROCEDURE — 94761 N-INVAS EAR/PLS OXIMETRY MLT: CPT

## 2020-09-18 PROCEDURE — G0378 HOSPITAL OBSERVATION PER HR: HCPCS

## 2020-09-18 PROCEDURE — 85025 COMPLETE CBC W/AUTO DIFF WBC: CPT

## 2020-09-18 PROCEDURE — 96365 THER/PROPH/DIAG IV INF INIT: CPT

## 2020-09-18 PROCEDURE — 96366 THER/PROPH/DIAG IV INF ADDON: CPT

## 2020-09-18 PROCEDURE — 82947 ASSAY GLUCOSE BLOOD QUANT: CPT

## 2020-09-18 PROCEDURE — 97530 THERAPEUTIC ACTIVITIES: CPT

## 2020-09-18 PROCEDURE — 84100 ASSAY OF PHOSPHORUS: CPT

## 2020-09-18 PROCEDURE — 99232 SBSQ HOSP IP/OBS MODERATE 35: CPT | Performed by: INTERNAL MEDICINE

## 2020-09-18 PROCEDURE — 80048 BASIC METABOLIC PNL TOTAL CA: CPT

## 2020-09-18 PROCEDURE — 83735 ASSAY OF MAGNESIUM: CPT

## 2020-09-18 PROCEDURE — 96361 HYDRATE IV INFUSION ADD-ON: CPT

## 2020-09-18 PROCEDURE — 6370000000 HC RX 637 (ALT 250 FOR IP): Performed by: INTERNAL MEDICINE

## 2020-09-18 PROCEDURE — 94640 AIRWAY INHALATION TREATMENT: CPT

## 2020-09-18 PROCEDURE — 2580000003 HC RX 258: Performed by: INTERNAL MEDICINE

## 2020-09-18 PROCEDURE — 97110 THERAPEUTIC EXERCISES: CPT

## 2020-09-18 PROCEDURE — 6360000002 HC RX W HCPCS: Performed by: INTERNAL MEDICINE

## 2020-09-18 PROCEDURE — 2060000000 HC ICU INTERMEDIATE R&B

## 2020-09-18 PROCEDURE — 36415 COLL VENOUS BLD VENIPUNCTURE: CPT

## 2020-09-18 RX ORDER — INSULIN GLARGINE 100 [IU]/ML
10 INJECTION, SOLUTION SUBCUTANEOUS 2 TIMES DAILY
Status: DISCONTINUED | OUTPATIENT
Start: 2020-09-18 | End: 2020-09-19

## 2020-09-18 RX ADMIN — MAGNESIUM GLUCONATE 500 MG ORAL TABLET 400 MG: 500 TABLET ORAL at 10:11

## 2020-09-18 RX ADMIN — NYSTATIN 500000 UNITS: 100000 SUSPENSION ORAL at 12:13

## 2020-09-18 RX ADMIN — BUDESONIDE AND FORMOTEROL FUMARATE DIHYDRATE 2 PUFF: 160; 4.5 AEROSOL RESPIRATORY (INHALATION) at 08:53

## 2020-09-18 RX ADMIN — SODIUM CHLORIDE: 9 INJECTION, SOLUTION INTRAVENOUS at 17:13

## 2020-09-18 RX ADMIN — DOCUSATE SODIUM 100 MG: 100 CAPSULE, LIQUID FILLED ORAL at 21:02

## 2020-09-18 RX ADMIN — TRAZODONE HYDROCHLORIDE 150 MG: 100 TABLET ORAL at 21:02

## 2020-09-18 RX ADMIN — INSULIN LISPRO 6 UNITS: 100 INJECTION, SOLUTION INTRAVENOUS; SUBCUTANEOUS at 21:02

## 2020-09-18 RX ADMIN — FERROUS SULFATE TAB EC 325 MG (65 MG FE EQUIVALENT) 325 MG: 325 (65 FE) TABLET DELAYED RESPONSE at 07:32

## 2020-09-18 RX ADMIN — ATORVASTATIN CALCIUM 10 MG: 10 TABLET, FILM COATED ORAL at 07:32

## 2020-09-18 RX ADMIN — MAGNESIUM SULFATE HEPTAHYDRATE 1 G: 1 INJECTION, SOLUTION INTRAVENOUS at 12:13

## 2020-09-18 RX ADMIN — MAGNESIUM SULFATE HEPTAHYDRATE 1 G: 1 INJECTION, SOLUTION INTRAVENOUS at 10:16

## 2020-09-18 RX ADMIN — NYSTATIN 500000 UNITS: 100000 SUSPENSION ORAL at 07:31

## 2020-09-18 RX ADMIN — METOPROLOL SUCCINATE 25 MG: 25 TABLET, EXTENDED RELEASE ORAL at 07:32

## 2020-09-18 RX ADMIN — AMLODIPINE BESYLATE 10 MG: 10 TABLET ORAL at 07:32

## 2020-09-18 RX ADMIN — NYSTATIN 500000 UNITS: 100000 SUSPENSION ORAL at 21:02

## 2020-09-18 RX ADMIN — DOCUSATE SODIUM 100 MG: 100 CAPSULE, LIQUID FILLED ORAL at 07:32

## 2020-09-18 RX ADMIN — INSULIN LISPRO 6 UNITS: 100 INJECTION, SOLUTION INTRAVENOUS; SUBCUTANEOUS at 12:11

## 2020-09-18 RX ADMIN — METOPROLOL SUCCINATE 25 MG: 25 TABLET, EXTENDED RELEASE ORAL at 21:02

## 2020-09-18 RX ADMIN — BUDESONIDE AND FORMOTEROL FUMARATE DIHYDRATE 2 PUFF: 160; 4.5 AEROSOL RESPIRATORY (INHALATION) at 19:26

## 2020-09-18 RX ADMIN — INSULIN GLARGINE 10 UNITS: 100 INJECTION, SOLUTION SUBCUTANEOUS at 10:11

## 2020-09-18 RX ADMIN — MAGNESIUM GLUCONATE 500 MG ORAL TABLET 400 MG: 500 TABLET ORAL at 21:02

## 2020-09-18 RX ADMIN — CLOPIDOGREL BISULFATE 75 MG: 75 TABLET ORAL at 07:32

## 2020-09-18 RX ADMIN — INSULIN GLARGINE 10 UNITS: 100 INJECTION, SOLUTION SUBCUTANEOUS at 21:01

## 2020-09-18 RX ADMIN — INSULIN LISPRO 3 UNITS: 100 INJECTION, SOLUTION INTRAVENOUS; SUBCUTANEOUS at 18:02

## 2020-09-18 RX ADMIN — NYSTATIN 500000 UNITS: 100000 SUSPENSION ORAL at 18:03

## 2020-09-18 RX ADMIN — SODIUM CHLORIDE: 9 INJECTION, SOLUTION INTRAVENOUS at 05:11

## 2020-09-18 ASSESSMENT — PAIN SCALES - GENERAL
PAINLEVEL_OUTOF10: 0

## 2020-09-18 NOTE — PROGRESS NOTES
Physical Therapy  Facility/Department: [facility] PROGRESSIVE CARE  Daily Treatment Note  NAME: Huong Cartagena  : 1950  MRN: [id number]    Date of Service: 2020    Discharge Recommendations:  Subacute/Skilled Nursing Facility, Continue to assess pending progress       RN reports patient is medically stable for therapy treatment this date. Chart reviewed prior to treatment and patient is agreeable for therapy. All lines intact and patient positioned comfortably at end of treatment. All patient needs addressed prior to ending therapy session. Assessment   Body structures, Functions, Activity limitations: Decreased functional mobility ; Decreased balance;Decreased endurance; Increased pain;Decreased strength;Decreased safe awareness  Assessment: Pt agreeable to PT session. This date, pt did not wish to transfer using RLE as he states he usually uses LLE, but LLE is NWB. States he only uses it \" a little\" during stand pivots. Specific instructions for Next Treatment: re-assess transfers when appropriate  Prognosis: Good  Clinical Presentation: evolving  PT Education: PT Role;Functional Mobility Training;General Safety;Weight-bearing Education;Pressure Relief;Precautions  Patient Education: Emphasis on NWB to LLE, posture control during sitting EOB  REQUIRES PT FOLLOW UP: Yes  Activity Tolerance  Activity Tolerance: Patient limited by fatigue  Activity Tolerance: Pt was able to sit EOB ~20mins working on exercises and balance activities before requesting to get back into bed. Patient Diagnosis(es): The primary encounter diagnosis was Hyperosmolarity due to type 1 diabetes mellitus ([facility] [address] Diagnoses of Hyperglycemic crisis in diabetes mellitus ([address] Acute renal failure, unspecified acute renal failure type ([address] Hyperkalemia, and Dehydration were also pertinent to this visit.      has a past medical history of RISSA (acute kidney injury) ([address] Cerebrovascular disease, Erectile dysfunction, Hepatitis B infection, Hyperlipidemia, Hypertension, Kidney cysts, Liver cyst, MRSA (methicillin resistant staph aureus) culture positive, Neurotrophic ulcer of the foot ([facility] [address] Obesity, Osteomyelitis of ankle or foot, Peripheral vascular disease ([facility] [address] Tobacco abuse, and Type II or unspecified type diabetes mellitus without mention of complication, not stated as uncontrolled. has a past surgical history that includes Foot amputation through metatarsal (2011); Foot Amputation; Foot surgery (Right, 3/2013); and Foot Debridement (Left, 6/20/2020). Restrictions  Restrictions/Precautions  Restrictions/Precautions: Fall Risk, Contact Precautions, Weight Bearing  Required Braces or Orthoses?: Yes(Prosthetic on RLE)  Lower Extremity Weight Bearing Restrictions  Left Lower Extremity Weight Bearing: Non Weight Bearing  Position Activity Restriction  Other position/activity restrictions: RUE IV, renal diet, R BKA, L TMA, NWB LLE  Subjective   General  Chart Reviewed: Yes  Response To Previous Treatment: Patient with no complaints from previous session. Family / Caregiver Present: No  Subjective  Subjective: Pt states he is not in pain today, but his back itches. RN notified if lotion could be applied. Orientation  Orientation  Overall Orientation Status: Within Functional Limits  Cognition   Cognition  Overall Cognitive Status: Exceptions  Arousal/Alertness: Appropriate responses to stimuli  Following Commands: Follows multistep commands with increased time; Follows multistep commands with repitition; Follows one step commands with increased time; Follows one step commands consistently; Follows one step commands with repetition  Attention Span: Appears intact  Memory: Decreased short term memory  Safety Judgement: Decreased awareness of need for assistance;Decreased awareness of need for safety  Problem Solving: Decreased awareness of errors;Assistance required to correct errors made;Assistance required to implement solutions;Assistance required to generate solutions;Assistance required to identify errors made  Insights: Decreased awareness of deficits  Initiation: Requires cues for some  Sequencing: Requires cues for some  Objective   Bed mobility  Supine to Sit: Minimal assistance(assist with trunk)  Sit to Supine: Minimal assistance(with LEs)  Scooting: Minimal assistance  Comment: Pt was instructed to act as if he was getting out of bed at home. Pt required HOB elevated, handrails, and assist with trunk. Increased time to complete. Pt was able to scoot self towards [facility] when returning to bed with use of handrails above head, required tactile cueing for hand placement. Transfers  Comment: Transfers were not assessed this date. When asked pt if he wanted to attempt and stand pivot transfers, he agreed. Once sitting at EOB, pt was asked to put on R prosthetic. Pt then stated he wanted to get back into bed and not a chair so didn't want to do any transfers at this time. Educated pt on NWB status to LLE and to practice with RLE. Pt stated he can WB/pivot on RLE but uses LLE instead and only WB's \"bare minimum\". Ambulation  Ambulation?: No(non-ambulatory at baseline)     Balance  Posture: Fair  Sitting - Static: Good  Sitting - Dynamic: Good  Comments: Unsupported sitting balance assessed and challenged. Pt was asked to reach out of PETER while sitting to improve core strength and decrease fall risk. Able to self correct when LOB. Exercises  Gluteal Sets: 20 reps  Hip Flexion: 20 reps  Knee Long Arc Quad: 20 reps  Upper Extremity: bicep curls, forward punches, shoulder flexion  Comments: Max encouragement to complete sets of exericses.  Verbal cueing for proper form     OutComes Score   AM-PAC Score  AM-PAC Inpatient Mobility Raw Score : 10 (09/18/20 1216)  AM-PAC Inpatient T-Scale Score : 32.29 (09/18/20 1216)  Mobility Inpatient CMS 0-100% Score: 76.75 (09/18/20 1216)  Mobility Inpatient CMS G-Code Modifier : CL (09/18/20 1216)          Goals  Short term goals  Time Frame for Short term goals: 12 visits  Short term goal 1: Pt will roll left<>right with SBA  Short term goal 2: Pt will supine<>sit with SBA  Short term goal 3: Pt will sit unsupported at bedside to improve core and decrease fall risk for ~10mins without UE support  Short term goal 4: Pt will tolerate 25mins of PT to improve functional mobility and activity tolerance  Patient Goals   Patient goals : To return home    Plan    Plan  Times per week: 1-2x day / 5-6 days per week  Specific instructions for Next Treatment: re-assess transfers when appropriate  Current Treatment Recommendations: Strengthening, Endurance Training, Balance Training, Functional Mobility Training, Positioning, Safety Education & Training  Safety Devices  Type of devices:  All fall risk precautions in place, Nurse notified, Bed alarm in place, Patient at risk for falls, Left in bed, Call light within reach     Therapy Time   Individual Concurrent Group Co-treatment   Time In 1024         Time Out 1053         Minutes [facility],

## 2020-09-18 NOTE — PROGRESS NOTES
[facility]  Office: [address], DO, Bautista Kras, DO, Tennille Joshua, DO, Dedra Micheal Blood, DO, Douglas Alarcon MD, Naveed Grace MD, Mehdi Mckeon MD, Tolu Mittal MD, Abisai Weiss MD, Onur Almazan MD, Tommie Roth MD, Starr Gunn MD, Rosie Escalona MD, Anatoliy Azul, DO, Reena Pearson MD, Fernando Mckeon MD, Marine Wilkinson, DO, Raudel Bangura MD,  Carlos Grossman, DO, Joesph Melendez MD, Mariana Hess MD, John Norris, [facility], [facility], CNP, Alan Junior, CNP, Fabiaan Hatch, CNS, Kathia Phillips, CNP, Nash Rivera, CNP, Catherine Baxter, CNP, Kamrny Davidson, CNP, Karolina Jose, CNP, Bimal Webb PA-C, Letty Barajas, [facility], Chaz Ramon, CNP, Orvega Soda, CNP, Francois Husbands, CNP, Colletta Moros, CNP, Cathy Jackson, [facility]   [address]    Progress Note    9/18/2020    8:26 AM    Name:   Artemio Pineda  MRN:     [id number]     Acct:      [de-identified]   Room:   [address] [place] [address] Day:  2  Admit Date:  9/16/2020  4:47 PM    PCP:   Liz Sabillon  Code Status:  Full Code    Subjective:     C/C:   Chief Complaint   Patient presents with    Hyperglycemia     EMS reports that he had blood drawn yesterday and they received a call from \"a third party\" for report of hyperglycemia, pt denies complaints or concerns at present time, the patient states that he has not been taking his medications because he doesnt feel like he needs it, EMS report a \"high\" reading for blood sugar     Interval History Status: improved. Patient seen and examined this afternoon. No acute events overnight. Sitting up in bed. Notes that he is feeling better. There is concern about patient's ability to take care of himself at home, despite home care. He notes that he lives in an apartment by himself, but has home care come out several times per week. Renal function improving. Glycemic control is improving, hypoglycemic this morning.     Brief History: Huong Cartagena is a 79 y.o. Non-/non  male who presents with Hyperglycemia (EMS reports that he had blood drawn yesterday and they received a call from \"a third party\" for report of hyperglycemia, pt denies complaints or concerns at present time, the patient states that he has not been taking his medications because he doesnt feel like he needs it, EMS report a \"high\" reading for blood sugar)   and is admitted to the hospital for the management of Diabetes mellitus type 2 with complications, uncontrolled ([facility] [address]    Patient reported to the emergency department in [place] at the direction of his physician with a chief complaint of elevated blood sugar. He has a history of diabetes, dyslipidemia, peripheral vascular disease, tobacco dependence, hypertension, CKD, tobacco use, and noncompliance. He states that he had his blood drawn yesterday and that his blood sugar was noted high. He stated that his blood sugar was 138 at home, but over 600 initially in the emergency department. He is supposed to take insulin for management of his diabetes, but states that he has not taken it in months, as he was taking it at night and felt his blood sugar was low in the morning. He is asymptomatic upon assessment. He also has a diabetic foot wound on the left foot which is currently being managed by podiatry. Pertinent labs include initial glucose 738, creatinine 3.20, BUN 61, sodium 126, potassium 6.2, GFR 23, beta hydroxybutyrate 0.57. No evidence of DKA was present. He is being admitted for diabetes mellitus type 2 with complications.     Review of Systems:     Constitutional:  negative for chills, fevers, sweats  Respiratory:  negative for cough, dyspnea on exertion, shortness of breath, wheezing  Cardiovascular:  negative for chest pain, chest pressure/discomfort, lower extremity edema, palpitations  Gastrointestinal:  negative for abdominal pain, constipation, diarrhea, nausea, vomiting  Neurological: (93.1 kg)   SpO2 97%   BMI 27.84 kg/m²   Temp (24hrs), Av.8 °F (36.6 °C), Min:97.3 °F (36.3 °C), Max:98 °F (36.7 °C)    Recent Labs     20  1138 20  1623 20  1919 20  0723   POCGLU 223* 253* 214* 62*       I/O (24Hr): Intake/Output Summary (Last 24 hours) at 2020 0826  Last data filed at 2020 0512  Gross per 24 hour   Intake 4378 ml   Output 550 ml   Net 3828 ml       Labs:  Hematology:  Recent Labs     20  16420  0527 20  0552   WBC 5.4 5.5 6.6   RBC 5.41 5.19 4.84   HGB 14.0 13.5 12.6*   HCT 43.0 40.0* 38.5*   MCV 79.6* 77.1* 79.5*   MCH 25.8* 26.0 26.0   MCHC 32.4 33.8 32.7   RDW 14.2 13.5 13.6   * 115* 109*   MPV 7.7 9.6 9.5     Chemistry:  Recent Labs     20  23520  0520  1743 20  0552   NA  --  140 138 142   K 5.0 4.9 4.8 4.4   CL  --  106 109* 111*   CO2  --  22 21 21   GLUCOSE  --  165* 221* 66*   BUN  --  52* 47* 39*   CREATININE  --  2.77* 2.29* 2.04*   MG  --  1.6 1.5* 1.4*   ANIONGAP  --  12 8* 10   LABGLOM  --  23* 28* 32*   GFRAA  --  28* 34* 39*   CALCIUM  --  9.9 9.0 9.2   PHOS  --  2.8 2.9 3.3   CKTOTAL 68  --   --   --    LACTACIDWB NOT REPORTED  --   --   --      Recent Labs     20  1648 20  2356  20  0334 20  0801 20  1138 20  1623 20  1919 20  0723   PROT 7.6  --   --   --   --   --   --   --   --    LABALBU 3.3*  --   --   --   --   --   --   --   --    LABA1C  --  16.6*  --   --   --   --   --   --   --    AST 15  --   --   --   --   --   --   --   --    ALT 11  --   --   --   --   --   --   --   --    ALKPHOS 79  --   --   --   --   --   --   --   --    BILITOT 0.40  --   --   --   --   --   --   --   --    URICACID  --  9.9*  --   --   --   --   --   --   --    POCGLU  --   --    < > 289* 118* 223* 253* 214* 62*    < > = values in this interval not displayed.      ABG:No results found for: POCPH, PHART, PH, POCPCO2, QRD2DEP, PCO2, POCPO2, PO2ART, PO2, POCHCO3, PLC5XER, HCO3, NBEA, PBEA, BEART, BE, THGBART, THB, OYH8GLV, IZJQ4SYK, R5KMTQBC, O2SAT, FIO2  Lab Results   Component Value Date/Time    SPECIAL RT [facility] 12ML 06/17/2020 10:21 PM     Lab Results   Component Value Date/Time    CULTURE NO GROWTH 6 DAYS 06/17/2020 10:21 PM       Radiology:  No results found. Physical Examination:        General appearance:  alert, cooperative and no distress, elderly, -American gentleman who appears somewhat disheveled  Mental Status:  oriented to person, place and time and normal affect  Lungs:  clear to auscultation bilaterally, normal effort  Heart:  regular rate and rhythm, no murmur  Abdomen:  soft, nontender, nondistended, protuberant, normal bowel sounds, no masses, hepatomegaly, splenomegaly  Extremities: Chronic venous stasis changes to the left lower extremity, left TMA amputation, right above-knee amputation  Skin:  no gross lesions, rashes, induration    Assessment:        Hospital Problems           Last Modified POA    * (Principal) Diabetes mellitus type 2 with complications, uncontrolled ([address] 9/17/2020 Yes    Acute kidney injury superimposed on CKD ([address] 9/16/2020 Yes    Hyperkalemia 9/16/2020 Yes    H/O noncompliance with medical treatment, presenting hazards to health 9/16/2020 Yes    PVD (peripheral vascular disease) ([address] 9/17/2020 Yes    Insomnia 9/17/2020 Yes    Dyslipidemia 9/17/2020 Yes    Tobacco dependence 9/17/2020 Yes    Diabetic ulcer of left heel associated with type 2 diabetes mellitus ([address] 9/17/2020 Yes    Uncontrolled diabetes mellitus type 2 without complications ([address] 4/05/2295 Yes    History of right below knee amputation ([address] 9/17/2020 Yes          Plan:        1. Continue glycemic control - low glucose readings again this morning; decreased lantus to 10 u BID   2. However, it should be pointed out that his A1c is > 16  3.  Renal failure - urine studies reveal FENA of 2.87% - likely intrinsic cause of renal failure; uncertain of etiology at this time; continue to hold nephrotoxic agents, including NSAIDs, lisinopril, IV contrast; renal function improving daily (2.0 today)  4. Appreciate podiatry recommendations  5. Appreciate wound care nurse  6. PT/OT - recommending SNF; patient refusing; lives at home;  7. Social work to assist with APS case; patient unable to take care of himself   8. Patient will need 3x/day nursing visits to ensure that he is taking his insulin as written for   9. Continue nystatin for oral thrush  10. Decrease IVF to 100 cc/hr  11.  Continue lab trends    33 Fanny Bolaños DO  9/18/2020  8:26 AM

## 2020-09-18 NOTE — CARE COORDINATION
Pt AOA on aging CM Omar Smithmilton called to confirm she can accommodate the pt home care 3 times a day for medication. Omar Ellison can be reached at [phone], the discharge paperwork will need to be faxed to her [phone]. MONICA will update Katy NICOLE CM regarding the above.

## 2020-09-18 NOTE — PLAN OF CARE
Nutrition Problem #1: Severe malnutrition  Intervention: Food and/or Nutrient Delivery: Continue Current Diet, Start Oral Nutrition Supplement  Nutritional Goals: PO intakes are greater than 75% at meals

## 2020-09-18 NOTE — PLAN OF CARE
Problem: Falls - Risk of:  Goal: Will remain free from falls  Description: Will remain free from falls  9/18/2020 0018 by Talita Krishna RN  Outcome: Ongoing  Note: Siderails up x 2  Hourly rounding  Call light in reach  Instructed to call for assist before attempting out of bed. Remains free from falls and accidental injury at this time   Floor free from obstacles  Bed is locked and in lowest position  Adequate lighting provided  Bed alarm on, Red Falling star and Stay with Me signs posted         Problem: Falls - Risk of:  Goal: Absence of physical injury  Description: Absence of physical injury  9/18/2020 0018 by Talita Krishna RN  Outcome: Ongoing     Problem: Skin Integrity:  Goal: Will show no infection signs and symptoms  Description: Will show no infection signs and symptoms  9/18/2020 0018 by Talita Krishna RN  Outcome: Ongoing  Note: Checked for incontinence every 2 hours and prn. Pericare as needed. Assisted to reposition off back frequently. On waffle mattress. Heels off bed with pillows. Problem: Skin Integrity:  Goal: Absence of new skin breakdown  Description: Absence of new skin breakdown  9/18/2020 0018 by Talita rKishna RN  Outcome: Ongoing     Problem: Pain:  Goal: Pain level will decrease  Description: Pain level will decrease  9/18/2020 0018 by Talita Krishna RN  Outcome: Ongoing  Note: Pain level assessment complete.    Patient educated on pain scale and control interventions  PRN pain medication given per patient request  Patient instructed to call out with new onset of pain or unrelieved pain       Problem: Pain:  Goal: Control of acute pain  Description: Control of acute pain  9/18/2020 0018 by Talita Krishna RN  Outcome: Ongoing     Problem: Pain:  Goal: Control of chronic pain  Description: Control of chronic pain  9/18/2020 0018 by Talita Krishna RN  Outcome: Ongoing

## 2020-09-18 NOTE — PROGRESS NOTES
Comprehensive Nutrition Assessment    Type and Reason for Visit:  Positive Nutrition Screen(Unplanned weight loss and pressure ulcer or non-healing wound. Consult: DKA/ uncontrolled diabetes)    Nutrition Recommendations/Plan:   1. DIET RENAL; Carb Control: 3 carb choices (45 gms)/meal  2. Start Glucerna 2x/day  3. Diet education is not appropriate at this time  4. Monitor p.o intakes and labs    Nutrition Assessment:  Patient admission is related to uncontrolled diabetes. Patient appears to be severely malnourished patient reports poor appetite and 24% weight loss in 3 months. Patient states that he is feeling a little better and is eating better at meals. Patient not appropriate to discuss diabetic diet at this time. Continue current diet and start Glucerna 2x/day. Monitor p.o intakes and labs. Malnutrition Assessment:  Malnutrition Status:  Severe malnutrition    Context:  Acute Illness     Findings of the 6 clinical characteristics of malnutrition:  Energy Intake:  7 - 50% or less of estimated energy requirements for 5 or more days  Weight Loss:  7 - Greater than 7.5% over 3 months     Body Fat Loss:  Unable to assess     Muscle Mass Loss:  7 - Moderate muscle mass loss Clavicles (pectoralis & deltoids)  Fluid Accumulation:  No significant fluid accumulation Extremities   Strength:  Not Performed    Estimated Daily Nutrient Needs:  Energy (kcal):  5147-6552 kcal based on 15-18 kcal/kg; Weight Used for Energy Requirements:  Current     Protein (g):   gm based on 1.2-1.3 gm/kg; Weight Used for Protein Requirements:  Ideal          Nutrition Related Findings:  +2 LLE edema.  Hypoactive bowel sounds      Wounds:  None       Current Nutrition Therapies:    DIET RENAL; Carb Control: 3 carb choices (45 gms)/meal  Dietary Nutrition Supplements: Diabetic Oral Supplement    Anthropometric Measures:  · Height: 6' (182.9 cm)  · Current Body Weight: 205 lb (93 kg)   · Admission Body Weight: 196 lb (88.9 kg)(bed scale)    · Usual Body Weight: 270 lb (122.5 kg)(6/17/20 bed scale)     · Ideal Body Weight: 178 lbs; % Ideal Body Weight 115.2 %   · BMI: 27.8  · BMI Categories: Overweight (BMI 25.0-29. 9)       Nutrition Diagnosis:   · Severe malnutrition related to inadequate protein-energy intake as evidenced by poor intake prior to admission, intake 0-25%, weight loss 7.5% in 3 months      Nutrition Interventions:   Food and/or Nutrient Delivery:  Continue Current Diet, Start Oral Nutrition Supplement  Nutrition Education/Counseling:  (Education not appropriate)   Coordination of Nutrition Care:  Continued Inpatient Monitoring    Goals:  PO intakes are greater than 75% at meals       Nutrition Monitoring and Evaluation:   Food/Nutrient Intake Outcomes:  Food and Nutrient Intake, Supplement Intake  Physical Signs/Symptoms Outcomes:  Biochemical Data, Fluid Status or Edema, Weight, Skin     Discharge Planning:    Continue current diet, Continue Oral Nutrition Supplement           Some areas of assessment maybe incomplete due to COVID-19 precautions.     Boni Olivas  MFN, RDN, LDN  Lead Clinical Dietitian  RD Office Phone [phone]

## 2020-09-18 NOTE — PROGRESS NOTES
Progress Note  Podiatric Medicine and Surgery     Subjective     CC: Left Foot wound    Patient seen and examined at bedside. No acute events overnight. Afebrile, bouts of hypotension  Pain to left foot well controlled  Patient more alert this morning  Pt denies nausea, vomiting, fever, chills    HPI :  Jamal Dai is a 79 y.o. male seen at [facility] for diabetic left foot wound. Patient was originally admitted to the hospital due to uncontrolled blood glucose levels, elevated into the 700s. Patient very somnolent, difficult to obtain review of systems. Patient does report that he sees Dr. Chip Bay for his wound. ROS: Denies N/V/F/C/SOB/CP. Otherwise negative except at stated in the HPI.      Medications:  Scheduled Meds:   magnesium oxide  400 mg Oral BID    insulin glargine  10 Units Subcutaneous BID    insulin lispro  0-18 Units Subcutaneous 4x Daily AC & HS    nystatin  5 mL Oral 4x Daily    amLODIPine  10 mg Oral Daily    budesonide-formoterol  2 puff Inhalation BID    clopidogrel  75 mg Oral Daily    docusate sodium  100 mg Oral BID    ferrous sulfate  325 mg Oral Daily with breakfast    metoprolol succinate  25 mg Oral BID    atorvastatin  10 mg Oral Daily    traZODone  150 mg Oral Nightly       Continuous Infusions:   dextrose      sodium chloride 100 mL/hr at 20 0511       PRN Meds:glucose, dextrose, glucagon (rDNA), dextrose, acetaminophen, potassium chloride, magnesium sulfate, sodium phosphate IVPB **OR** sodium phosphate IVPB **OR** sodium phosphate IVPB    Objective     Vitals:  Patient Vitals for the past 8 hrs:   BP Temp Temp src Pulse Resp SpO2 Weight   20 0854 -- -- -- -- 18 98 % --   20 0527 -- -- -- -- -- -- 205 lb 4.8 oz (93.1 kg)   20 0403 (!) 110/49 97.7 °F (36.5 °C) Oral 71 16 97 % --     Average, Min, and Max for last 24 hours Vitals:  TEMPERATURE:  Temp  Av.8 °F (36.6 °C)  Min: 97.3 °F (36.3 °C)  Max: 98 °F (36.7 °C)    RESPIRATIONS Images:            Imaging:   XR FOOT LEFT (MIN 3 VIEWS)   Final Result   Soft tissue ulceration overlying the calcaneus. No osseous erosion or   destruction to suggest osteomyelitis. Cultures: None     Assessment   Desi Narvaez is a 79 y.o. male with   1. Diabetic left foot ulcer, Montesinos grade 3  2. DM2 with associated peripheral neuropathy  3. Smoking history    Principal Problem:    Diabetes mellitus type 2 with complications, uncontrolled ([address]  Active Problems:    Acute kidney injury superimposed on CKD (HCC)    Hyperkalemia    H/O noncompliance with medical treatment, presenting hazards to health    PVD (peripheral vascular disease) ([address]    Insomnia    Dyslipidemia    Tobacco dependence    Diabetic ulcer of left heel associated with type 2 diabetes mellitus ([address]    Uncontrolled diabetes mellitus type 2 without complications ([address]    History of right below knee amputation ([address]  Resolved Problems:    * No resolved hospital problems. *       Plan     · Patient examined and evaluated at bedside   · Treatment options discussed in detail with the patient  · XR left foot reviewed-low index of suspicion for acute osseous or soft tissue abnormalities        · Dressing applied to Left foot: Saline WTD, DSD  · No acute surgical intervention by podiatry service indicated at this time. Patient to continue to receive daily dressing changes. Nursing orders placed. Podiatry to sign off. Please [employer] [facility] with any questions or concerns. Patient to follow-up with Dr. Nolberto Stephens within 2 weeks of discharge.   · NWB to Left lower extremity  · Discussed with Dr. Lolita Bingham DPM   Podiatric Medicine & Surgery   9/18/2020 at 9:21 AM

## 2020-09-19 LAB
ABSOLUTE EOS #: 0.15 K/UL (ref 0–0.44)
ABSOLUTE IMMATURE GRANULOCYTE: 0.02 K/UL (ref 0–0.3)
ABSOLUTE LYMPH #: 2.43 K/UL (ref 1.1–3.7)
ABSOLUTE MONO #: 0.31 K/UL (ref 0.1–1.2)
ANION GAP SERPL CALCULATED.3IONS-SCNC: 10 MMOL/L (ref 9–17)
BASOPHILS # BLD: 1 % (ref 0–2)
BASOPHILS ABSOLUTE: 0.03 K/UL (ref 0–0.2)
BUN BLDV-MCNC: 21 MG/DL (ref 8–23)
BUN/CREAT BLD: 15 (ref 9–20)
CALCIUM SERPL-MCNC: 9 MG/DL (ref 8.6–10.4)
CHLORIDE BLD-SCNC: 110 MMOL/L (ref 98–107)
CO2: 21 MMOL/L (ref 20–31)
CREAT SERPL-MCNC: 1.41 MG/DL (ref 0.7–1.2)
DIFFERENTIAL TYPE: ABNORMAL
EOSINOPHILS RELATIVE PERCENT: 3 % (ref 1–4)
GFR AFRICAN AMERICAN: >60 ML/MIN
GFR NON-AFRICAN AMERICAN: 50 ML/MIN
GFR SERPL CREATININE-BSD FRML MDRD: ABNORMAL ML/MIN/{1.73_M2}
GFR SERPL CREATININE-BSD FRML MDRD: ABNORMAL ML/MIN/{1.73_M2}
GLUCOSE BLD-MCNC: 124 MG/DL (ref 70–99)
GLUCOSE BLD-MCNC: 135 MG/DL (ref 75–110)
GLUCOSE BLD-MCNC: 230 MG/DL (ref 75–110)
GLUCOSE BLD-MCNC: 294 MG/DL (ref 75–110)
GLUCOSE BLD-MCNC: 297 MG/DL (ref 75–110)
HCT VFR BLD CALC: 39 % (ref 40.7–50.3)
HEMOGLOBIN: 12.5 G/DL (ref 13–17)
IMMATURE GRANULOCYTES: 0 %
LYMPHOCYTES # BLD: 45 % (ref 24–43)
MAGNESIUM: 1.5 MG/DL (ref 1.6–2.6)
MCH RBC QN AUTO: 25.8 PG (ref 25.2–33.5)
MCHC RBC AUTO-ENTMCNC: 32.1 G/DL (ref 28.4–34.8)
MCV RBC AUTO: 80.6 FL (ref 82.6–102.9)
MONOCYTES # BLD: 6 % (ref 3–12)
NRBC AUTOMATED: 0 PER 100 WBC
PDW BLD-RTO: 13.8 % (ref 11.8–14.4)
PHOSPHORUS: 2.5 MG/DL (ref 2.5–4.5)
PLATELET # BLD: 90 K/UL (ref 138–453)
PLATELET ESTIMATE: ABNORMAL
PMV BLD AUTO: 9.4 FL (ref 8.1–13.5)
POTASSIUM SERPL-SCNC: 4.3 MMOL/L (ref 3.7–5.3)
RBC # BLD: 4.84 M/UL (ref 4.21–5.77)
RBC # BLD: ABNORMAL 10*6/UL
SEG NEUTROPHILS: 45 % (ref 36–65)
SEGMENTED NEUTROPHILS ABSOLUTE COUNT: 2.45 K/UL (ref 1.5–8.1)
SODIUM BLD-SCNC: 141 MMOL/L (ref 135–144)
WBC # BLD: 5.4 K/UL (ref 3.5–11.3)
WBC # BLD: ABNORMAL 10*3/UL

## 2020-09-19 PROCEDURE — G0378 HOSPITAL OBSERVATION PER HR: HCPCS

## 2020-09-19 PROCEDURE — 99233 SBSQ HOSP IP/OBS HIGH 50: CPT | Performed by: INTERNAL MEDICINE

## 2020-09-19 PROCEDURE — 80048 BASIC METABOLIC PNL TOTAL CA: CPT

## 2020-09-19 PROCEDURE — 82947 ASSAY GLUCOSE BLOOD QUANT: CPT

## 2020-09-19 PROCEDURE — 84100 ASSAY OF PHOSPHORUS: CPT

## 2020-09-19 PROCEDURE — 96366 THER/PROPH/DIAG IV INF ADDON: CPT

## 2020-09-19 PROCEDURE — 94761 N-INVAS EAR/PLS OXIMETRY MLT: CPT

## 2020-09-19 PROCEDURE — 36415 COLL VENOUS BLD VENIPUNCTURE: CPT

## 2020-09-19 PROCEDURE — 83735 ASSAY OF MAGNESIUM: CPT

## 2020-09-19 PROCEDURE — 97530 THERAPEUTIC ACTIVITIES: CPT

## 2020-09-19 PROCEDURE — 6370000000 HC RX 637 (ALT 250 FOR IP): Performed by: INTERNAL MEDICINE

## 2020-09-19 PROCEDURE — 94640 AIRWAY INHALATION TREATMENT: CPT

## 2020-09-19 PROCEDURE — 6360000002 HC RX W HCPCS: Performed by: INTERNAL MEDICINE

## 2020-09-19 PROCEDURE — 97164 PT RE-EVAL EST PLAN CARE: CPT

## 2020-09-19 PROCEDURE — 2060000000 HC ICU INTERMEDIATE R&B

## 2020-09-19 PROCEDURE — 85025 COMPLETE CBC W/AUTO DIFF WBC: CPT

## 2020-09-19 RX ORDER — FUROSEMIDE 40 MG/1
40 TABLET ORAL EVERY OTHER DAY
Status: DISCONTINUED | OUTPATIENT
Start: 2020-09-20 | End: 2020-09-21 | Stop reason: HOSPADM

## 2020-09-19 RX ORDER — INSULIN GLARGINE 100 [IU]/ML
15 INJECTION, SOLUTION SUBCUTANEOUS NIGHTLY
Status: DISCONTINUED | OUTPATIENT
Start: 2020-09-20 | End: 2020-09-21 | Stop reason: HOSPADM

## 2020-09-19 RX ORDER — INSULIN GLARGINE 100 [IU]/ML
15 INJECTION, SOLUTION SUBCUTANEOUS 2 TIMES DAILY
Status: DISCONTINUED | OUTPATIENT
Start: 2020-09-19 | End: 2020-09-19

## 2020-09-19 RX ORDER — KETOROLAC TROMETHAMINE 5 MG/ML
1 SOLUTION OPHTHALMIC 4 TIMES DAILY
Status: DISCONTINUED | OUTPATIENT
Start: 2020-09-20 | End: 2020-09-21 | Stop reason: HOSPADM

## 2020-09-19 RX ORDER — MOXIFLOXACIN 5 MG/ML
1 SOLUTION/ DROPS OPHTHALMIC 4 TIMES DAILY
Status: DISCONTINUED | OUTPATIENT
Start: 2020-09-20 | End: 2020-09-20 | Stop reason: CLARIF

## 2020-09-19 RX ORDER — MOXIFLOXACIN 5 MG/ML
1 SOLUTION/ DROPS OPHTHALMIC 4 TIMES DAILY
COMMUNITY
Start: 2020-09-20 | End: 2020-09-22

## 2020-09-19 RX ORDER — LISINOPRIL 10 MG/1
10 TABLET ORAL DAILY
Status: DISCONTINUED | OUTPATIENT
Start: 2020-09-20 | End: 2020-09-21 | Stop reason: HOSPADM

## 2020-09-19 RX ORDER — INSULIN GLARGINE 100 [IU]/ML
20 INJECTION, SOLUTION SUBCUTANEOUS
Status: DISCONTINUED | OUTPATIENT
Start: 2020-09-20 | End: 2020-09-20

## 2020-09-19 RX ORDER — FUROSEMIDE 40 MG/1
40 TABLET ORAL EVERY OTHER DAY
Status: DISCONTINUED | OUTPATIENT
Start: 2020-09-19 | End: 2020-09-19

## 2020-09-19 RX ORDER — AMLODIPINE BESYLATE 5 MG/1
5 TABLET ORAL DAILY
Status: DISCONTINUED | OUTPATIENT
Start: 2020-09-20 | End: 2020-09-21 | Stop reason: HOSPADM

## 2020-09-19 RX ADMIN — INSULIN LISPRO 6 UNITS: 100 INJECTION, SOLUTION INTRAVENOUS; SUBCUTANEOUS at 12:25

## 2020-09-19 RX ADMIN — CLOPIDOGREL BISULFATE 75 MG: 75 TABLET ORAL at 08:41

## 2020-09-19 RX ADMIN — INSULIN GLARGINE 15 UNITS: 100 INJECTION, SOLUTION SUBCUTANEOUS at 08:41

## 2020-09-19 RX ADMIN — TRAZODONE HYDROCHLORIDE 150 MG: 100 TABLET ORAL at 21:32

## 2020-09-19 RX ADMIN — MAGNESIUM SULFATE HEPTAHYDRATE 1 G: 1 INJECTION, SOLUTION INTRAVENOUS at 10:23

## 2020-09-19 RX ADMIN — METOPROLOL SUCCINATE 25 MG: 25 TABLET, EXTENDED RELEASE ORAL at 08:41

## 2020-09-19 RX ADMIN — ATORVASTATIN CALCIUM 10 MG: 10 TABLET, FILM COATED ORAL at 08:41

## 2020-09-19 RX ADMIN — INSULIN LISPRO 9 UNITS: 100 INJECTION, SOLUTION INTRAVENOUS; SUBCUTANEOUS at 18:03

## 2020-09-19 RX ADMIN — BUDESONIDE AND FORMOTEROL FUMARATE DIHYDRATE 2 PUFF: 160; 4.5 AEROSOL RESPIRATORY (INHALATION) at 20:10

## 2020-09-19 RX ADMIN — NYSTATIN 500000 UNITS: 100000 SUSPENSION ORAL at 18:03

## 2020-09-19 RX ADMIN — NYSTATIN 500000 UNITS: 100000 SUSPENSION ORAL at 12:32

## 2020-09-19 RX ADMIN — METOPROLOL SUCCINATE 25 MG: 25 TABLET, EXTENDED RELEASE ORAL at 21:32

## 2020-09-19 RX ADMIN — DOCUSATE SODIUM 100 MG: 100 CAPSULE, LIQUID FILLED ORAL at 21:32

## 2020-09-19 RX ADMIN — NYSTATIN 500000 UNITS: 100000 SUSPENSION ORAL at 21:32

## 2020-09-19 RX ADMIN — MAGNESIUM SULFATE HEPTAHYDRATE 1 G: 1 INJECTION, SOLUTION INTRAVENOUS at 12:25

## 2020-09-19 RX ADMIN — BUDESONIDE AND FORMOTEROL FUMARATE DIHYDRATE 2 PUFF: 160; 4.5 AEROSOL RESPIRATORY (INHALATION) at 09:13

## 2020-09-19 RX ADMIN — FERROUS SULFATE TAB EC 325 MG (65 MG FE EQUIVALENT) 325 MG: 325 (65 FE) TABLET DELAYED RESPONSE at 08:41

## 2020-09-19 RX ADMIN — AMLODIPINE BESYLATE 10 MG: 10 TABLET ORAL at 08:41

## 2020-09-19 RX ADMIN — MAGNESIUM GLUCONATE 500 MG ORAL TABLET 400 MG: 500 TABLET ORAL at 08:41

## 2020-09-19 RX ADMIN — INSULIN LISPRO 9 UNITS: 100 INJECTION, SOLUTION INTRAVENOUS; SUBCUTANEOUS at 21:32

## 2020-09-19 RX ADMIN — MAGNESIUM GLUCONATE 500 MG ORAL TABLET 400 MG: 500 TABLET ORAL at 21:32

## 2020-09-19 RX ADMIN — NYSTATIN 500000 UNITS: 100000 SUSPENSION ORAL at 08:47

## 2020-09-19 NOTE — PROGRESS NOTES
Physical Therapy    Facility/Department: [facility] PROGRESSIVE CARE  Re-Assessment    NAME: Sara Norman  : 1950  MRN: [id number]    Date of Service: 2020    Discharge Recommendations:  Subacute/Skilled Nursing Facility, Continue to assess pending progress         RN reports patient is medically stable for therapy treatment this date. Chart reviewed prior to treatment and patient is agreeable for therapy. All lines intact and patient positioned comfortably at end of treatment. All patient needs addressed prior to ending therapy session. Assessment   Body structures, Functions, Activity limitations: Decreased functional mobility ; Decreased balance;Decreased endurance; Increased pain;Decreased strength;Decreased safe awareness  Assessment: Pt agreeable to PT this date, wishing to get out of bed. Stand pivot transfer was assessed. Pt non-compliant with LLE WB status, putting some weight through L forefoot. RW was used for stand pivot. Prognosis: Good  Clinical Presentation: evolving  PT Education: PT Role;Functional Mobility Training;General Safety;Weight-bearing Education;Pressure Relief;Precautions;Transfer Training  Patient Education: Emphasis on NWB to LLE  REQUIRES PT FOLLOW UP: Yes  Activity Tolerance  Activity Tolerance: Patient Tolerated treatment well;Treatment limited secondary to agitation  Activity Tolerance: Pt agitated today due to WB status, repeating over again that he had surgery 2months ago and should be able to put some weight through LLE. Educated pt on PT orders for LLE. Patient Diagnosis(es): The primary encounter diagnosis was Hyperosmolarity due to type 1 diabetes mellitus ([address] Diagnoses of Hyperglycemic crisis in diabetes mellitus ([address] Acute renal failure, unspecified acute renal failure type ([address] Hyperkalemia, and Dehydration were also pertinent to this visit.      has a past medical history of RISSA (acute kidney injury) ([address] Cerebrovascular disease, Erectile dysfunction, Hepatitis B infection, Hyperlipidemia, Hypertension, Kidney cysts, Liver cyst, MRSA (methicillin resistant staph aureus) culture positive, Neurotrophic ulcer of the foot ([address] Obesity, Osteomyelitis of ankle or foot, Peripheral vascular disease ([facility] Tobacco abuse, and Type II or unspecified type diabetes mellitus without mention of complication, not stated as uncontrolled. has a past surgical history that includes Foot amputation through metatarsal (2011); Foot Amputation; Foot surgery (Right, 3/2013); and Foot Debridement (Left, 6/20/2020). Restrictions  Restrictions/Precautions  Restrictions/Precautions: Fall Risk, Contact Precautions, Weight Bearing  Required Braces or Orthoses?: Yes(Prosthetic on RLE)  Lower Extremity Weight Bearing Restrictions  Left Lower Extremity Weight Bearing: Non Weight Bearing  Position Activity Restriction  Other position/activity restrictions: RUE IV, renal diet, R BKA, L TMA, NWB LLE  Vision/Hearing  Vision: Impaired(Pt states he is getting cataract sx on R eye in september)  Hearing: Exceptions to [facility] Exceptions: Hard of hearing/hearing concerns     Subjective  General  Chart Reviewed: Yes  Patient assessed for rehabilitation services?: Yes  Response To Previous Treatment: Patient with no complaints from previous session. Follows Commands: Within Functional Limits  Subjective  Subjective: Pt states he is not in pain today.           Orientation  Orientation  Overall Orientation Status: Within Functional Limits  Social/Functional History  Social/Functional History  Lives With: Alone  Type of Home: Apartment(3rd floor apartment)  Home Layout: Multi-level  Home Access: Elevator, Level entry  Bathroom Shower/Tub: Tub/Shower unit  Bathroom Toilet: Standard  Bathroom Equipment: Tub transfer bench, Grab bars in shower, Grab bars around toilet  Bathroom Accessibility: Accessible  Home Equipment: Electric scooter  Receives Help From: Home health, Family(Pt states he has an aid a few hours in the morning and a few hours at night. Pt states he has a supportive brother that lives in the same building)  ADL Assistance: Needs assistance  Homemaking Assistance: Needs assistance  Homemaking Responsibilities: Yes  Ambulation Assistance: Needs assistance(Uses motorized scooter. Pt states he has been non abulatory in the last year)  Transfer Assistance: Needs assistance  Active : No  Patient's  Info: Uses transportation services  Occupation: Retired  Type of occupation: factory work  Leisure & Hobbies: Watch TV  Additional Comments: Pt denies recent falls. Cognition   Cognition  Overall Cognitive Status: Exceptions  Arousal/Alertness: Appropriate responses to stimuli  Following Commands: Follows multistep commands with increased time; Follows multistep commands with repitition; Follows one step commands with increased time; Follows one step commands consistently; Follows one step commands with repetition  Attention Span: Appears intact  Memory: Decreased short term memory  Safety Judgement: Decreased awareness of need for assistance;Decreased awareness of need for safety  Problem Solving: Decreased awareness of errors;Assistance required to correct errors made;Assistance required to implement solutions;Assistance required to generate solutions;Assistance required to identify errors made  Insights: Decreased awareness of deficits  Initiation: Requires cues for some  Sequencing: Requires cues for some    Objective     Observation/Palpation  Posture: Fair  Observation: RUE IV, bandage on LLE  Edema: none    AROM RLE (degrees)  RLE AROM: WFL  RLE General AROM: R BKA  AROM LLE (degrees)  LLE AROM : WFL  LLE General AROM: L TMA  AROM RUE (degrees)  RUE General AROM: see OT notes  AROM LUE (degrees)  LUE General AROM: see OT notes  Strength RLE  Strength RLE: WFL  Strength LLE  Strength LLE: WFL  Tone RLE  RLE Tone: Normotonic  Tone LLE  LLE Tone: Normotonic  Sensation  Overall Sensation Status: WFL  Bed mobility  Supine to Sit: Stand by assistance  Sit to Supine: (did not assess, pt returned to sitting in chair)  Comment: HOB elevated, hand rails heavily used and increased time to complete. Transfers  Sit to Stand: (pt was able to stand with NWB LLE)  Stand to sit: Minimal Assistance  Bed to Chair: Minimal assistance  Stand Pivot Transfers: Minimal Assistance  Comment: Before transfering, pt was educated on WB to LLE. Pt stated that he has always been putting some weight through LLE to help him transfer to electric w/c. PT still educated that orders are NWB to LLE. Pt was not compliant with WB status during transfer, taking 2 steps from bed to chair. Left heel was not on ground. Ambulation  Ambulation?: No(non-ambulatory at baseline)     Balance  Posture: Fair  Sitting - Static: Good  Sitting - Dynamic: Good  Standing - Static: Poor  Standing - Dynamic: Poor(BUE supprt from RW)        Plan   Plan  Times per week: 1-2x day / 5-6 days per week  Specific instructions for Next Treatment: re-assess transfers when appropriate  Current Treatment Recommendations: Strengthening, Endurance Training, Balance Training, Functional Mobility Training, Positioning, Safety Education & Training  Safety Devices  Type of devices:  All fall risk precautions in place, Gait belt, Patient at risk for falls, Call light within reach, Left in chair, Nurse notified      OutComes Score  AM-PAC Score  AM-PAC Inpatient Mobility Raw Score : 15 (09/19/20 1606)  AM-PAC Inpatient T-Scale Score : 39.45 (09/19/20 1606)  Mobility Inpatient CMS 0-100% Score: 57.7 (09/19/20 1606)  Mobility Inpatient CMS G-Code Modifier : CK (09/19/20 1606)          Goals  Short term goals  Time Frame for Short term goals: 12 visits  Short term goal 1: Pt will roll left<>right with SBA  Short term goal 2: Pt will supine<>sit with SBA - met  Short term goal 3: Pt will sit unsupported at bedside to improve core and decrease fall risk for ~10mins

## 2020-09-19 NOTE — PROGRESS NOTES
[facility]  Office: [address], DO, Ulysses Tam, DO, Jae Dunbar, DO, Carlos Cantor Blood, DO, Misael Hogue MD, Nneka Siddiqui MD, Alicia Arana MD, Rome Alexander MD, Ye Cooper MD, Fe Oshea MD, Mariam Bhagat MD, Caitie Hall MD, Rosie Amaya MD, Vandana Cohn DO, Estefani Crawford MD, Seth Dent MD, Arsalan Rm DO, Suly Rodríguez MD,  Nathanial Apgar, DO, Edwige Long MD, Addison Enrique MD, Mary Contreras, [facility], [facility], CNP, Suly Paul, CNP, Paloma Lu, CNS, Flower Rivera, CNP, Cj Odonnell, CNP, Miguel Rosas, CNP, Hermelindo Rosa, CNP, Idalmis Fournier, CNP, Dev Johnston PA-C, Óscar Bhardwaj, [facility], Emerson Berman, CNP, Irina Benavides, CNP, Bang Kim, CNP, Melanie Osuna, CNP, David Villanueva [facility]    Progress Note    2020    5:45 PM    Name:   Renuka Hernandez  MRN:     [id number]     Acct:      [de-identified]   [place]   [facility] [place] [facility] [place] Day:  3  Admit Date:  2020  4:47 PM    PCP:   Candance Noa  Code Status:  Full Code    Subjective:     C/C:   Chief Complaint   Patient presents with    Hyperglycemia     EMS reports that he had blood drawn yesterday and they received a call from \"a third party\" for report of hyperglycemia, pt denies complaints or concerns at present time, the patient states that he has not been taking his medications because he doesnt feel like he needs it, EMS report a \"high\" reading for blood sugar     Interval History Status: improved. Patient seen and examined this afternoon. No acute events overnight. He is currently sitting up in chair at bedside. He is tolerating diet. Glucoses are slowly improving. Renal function is the best that it has been in quite some time. He reports that he is eating and drinking okay. Reports good urine output. Lower extremity wound is improving. Energy is improving. Brief History:     Renuka Hernandez is a 79 y.o. Non-/non  male who presents with Hyperglycemia (EMS reports that he had blood drawn yesterday and they received a call from \"a third party\" for report of hyperglycemia, pt denies complaints or concerns at present time, the patient states that he has not been taking his medications because he doesnt feel like he needs it, EMS report a \"high\" reading for blood sugar)   and is admitted to the hospital for the management of Diabetes mellitus type 2 with complications, uncontrolled ([facility]    Patient reported to the emergency department in [place] at the direction of his physician with a chief complaint of elevated blood sugar. He has a history of diabetes, dyslipidemia, peripheral vascular disease, tobacco dependence, hypertension, CKD, tobacco use, and noncompliance. He states that he had his blood drawn yesterday and that his blood sugar was noted high. He stated that his blood sugar was 138 at home, but over 600 initially in the emergency department. He is supposed to take insulin for management of his diabetes, but states that he has not taken it in months, as he was taking it at night and felt his blood sugar was low in the morning. He is asymptomatic upon assessment. He also has a diabetic foot wound on the left foot which is currently being managed by podiatry. Pertinent labs include initial glucose 738, creatinine 3.20, BUN 61, sodium 126, potassium 6.2, GFR 23, beta hydroxybutyrate 0.57. No evidence of DKA was present. He is being admitted for diabetes mellitus type 2 with complications.     Review of Systems:     Constitutional:  negative for chills, fevers, sweats  Respiratory:  negative for cough, dyspnea on exertion, shortness of breath, wheezing  Cardiovascular:  negative for chest pain, chest pressure/discomfort, lower extremity edema, palpitations  Gastrointestinal:  negative for abdominal pain, constipation, diarrhea, nausea, vomiting  Neurological:  negative for dizziness, headache    Medications: Allergies:  No Known Allergies    Current Meds:   Scheduled Meds:    [START ON 9/20/2020] insulin glargine  20 Units Subcutaneous QAM AC    [START ON 9/20/2020] insulin glargine  15 Units Subcutaneous Nightly    magnesium oxide  400 mg Oral BID    insulin lispro  0-18 Units Subcutaneous 4x Daily AC & HS    nystatin  5 mL Oral 4x Daily    amLODIPine  10 mg Oral Daily    budesonide-formoterol  2 puff Inhalation BID    clopidogrel  75 mg Oral Daily    docusate sodium  100 mg Oral BID    ferrous sulfate  325 mg Oral Daily with breakfast    metoprolol succinate  25 mg Oral BID    atorvastatin  10 mg Oral Daily    traZODone  150 mg Oral Nightly     Continuous Infusions:    dextrose      sodium chloride 100 mL/hr at 09/18/20 1713     PRN Meds: glucose, dextrose, glucagon (rDNA), dextrose, acetaminophen, potassium chloride, magnesium sulfate, sodium phosphate IVPB **OR** sodium phosphate IVPB **OR** sodium phosphate IVPB    Data:     Past Medical History:   has a past medical history of RISSA (acute kidney injury) ([facility] Cerebrovascular disease, Erectile dysfunction, Hepatitis B infection, Hyperlipidemia, Hypertension, Kidney cysts, Liver cyst, MRSA (methicillin resistant staph aureus) culture positive, Neurotrophic ulcer of the foot ([facility] Obesity, Osteomyelitis of ankle or foot, Peripheral vascular disease ([facility] [address] [facility] Tobacco abuse, and Type II or unspecified type diabetes mellitus without mention of complication, not stated as uncontrolled. Social History:   reports that he has been smoking cigarettes. He has a 30.00 pack-year smoking history. He has never used smokeless tobacco. He reports that he does not drink alcohol or use drugs.      Family History:   Family History   Problem Relation Age of Onset    Diabetes Mother     Diabetes Sister     Diabetes Brother     Diabetes Sister     Diabetes Sister     Diabetes Sister        Vitals:  /76   Pulse 73   Temp 97.9 °F (36.6 °C) (Oral)   Resp 14   Ht 6' (1.829 m)   Wt 209 lb 4.8 oz (94.9 kg)   SpO2 96%   BMI 28.39 kg/m²   Temp (24hrs), Av °F (36.7 °C), Min:97.7 °F (36.5 °C), Max:98.4 °F (36.9 °C)    Recent Labs     20  1941 20  0744 20  1104 20  1643   POCGLU 230* 135* 230* 294*       I/O (24Hr): Intake/Output Summary (Last 24 hours) at 2020 1745  Last data filed at 2020 1643  Gross per 24 hour   Intake 2022.33 ml   Output 2200 ml   Net -177.67 ml       Labs:  Hematology:  Recent Labs     20  0527 20  0552 20  0756   WBC 5.5 6.6 5.4   RBC 5.19 4.84 4.84   HGB 13.5 12.6* 12.5*   HCT 40.0* 38.5* 39.0*   MCV 77.1* 79.5* 80.6*   MCH 26.0 26.0 25.8   MCHC 33.8 32.7 32.1   RDW 13.5 13.6 13.8   * 109* 90*   MPV 9.6 9.5 9.4     Chemistry:  Recent Labs     20  2356  20  0552 20  1812 20  0756   NA  --    < > 142 140 141   K 5.0   < > 4.4 4.4 4.3   CL  --    < > 111* 110* 110*   CO2  --    < > 21 21 21   GLUCOSE  --    < > 66* 203* 124*   BUN  --    < > 39* 31* 21   CREATININE  --    < > 2.04* 1.70* 1.41*   MG  --    < > 1.4* 1.7 1.5*   ANIONGAP  --    < > 10 9 10   LABGLOM  --    < > 32* 40* 50*   GFRAA  --    < > 39* 49* >60   CALCIUM  --    < > 9.2 8.5* 9.0   PHOS  --    < > 3.3 2.8 2.5   CKTOTAL 68  --   --   --   --    LACTACIDWB NOT REPORTED  --   --   --   --     < > = values in this interval not displayed. Recent Labs     20  2356  20  1145 20  1730 20  1941 20  0744 20  1104 20  1643   LABA1C 16.6*  --   --   --   --   --   --   --    URICACID 9.9*  --   --   --   --   --   --   --    POCGLU  --    < > 201* 181* 230* 135* 230* 294*    < > = values in this interval not displayed.      ABG:No results found for: POCPH, PHART, PH, POCPCO2, VDP0EMA, PCO2, POCPO2, PO2ART, PO2, POCHCO3, [id number], HCO3, NBEA, PBEA, BEART, BE, THGBART, THB, ZRY2YUF, AWBK3MDB, I1TAJGNK, O2SAT, FIO2  Lab Results Component Value Date/Time    [facility] 06/17/2020 10:21 PM     Lab Results   Component Value Date/Time    CULTURE NO GROWTH 6 DAYS 06/17/2020 10:21 PM       Radiology:  No results found. Physical Examination:        General appearance:  alert, cooperative and no distress, elderly, -American gentleman who appears somewhat disheveled  Mental Status:  oriented to person, place and time and normal affect  Lungs:  clear to auscultation bilaterally, normal effort  Heart:  regular rate and rhythm, no murmur  Abdomen:  soft, nontender, nondistended, protuberant, normal bowel sounds, no masses, hepatomegaly, splenomegaly  Extremities: Chronic venous stasis changes to the left lower extremity, left TMA amputation, right above-knee amputation  Skin:  no gross lesions, rashes, induration    Assessment:        Hospital Problems           Last Modified POA    * (Principal) Diabetes mellitus type 2 with complications, uncontrolled ([address] 9/17/2020 Yes    Hyperkalemia 9/16/2020 Yes    H/O noncompliance with medical treatment, presenting hazards to health 9/16/2020 Yes    Severe malnutrition ([address] 9/19/2020 Yes    PVD (peripheral vascular disease) ([address] 9/19/2020 Yes    Insomnia 9/19/2020 Yes    Dyslipidemia 9/19/2020 Yes    Tobacco dependence 9/19/2020 Yes    Diabetic ulcer of left heel associated with type 2 diabetes mellitus ([address] 9/19/2020 Yes    Acute kidney injury superimposed on CKD ([address] 9/19/2020 Yes    History of right below knee amputation ([address] 9/17/2020 Yes          Plan:        1. Uncontrolled type 2 diabetes mellitus secondary to noncompliance -glucoses are slowly improving. Will change Lantus from 15 units twice daily to 20 units in the morning, 15 units at night  2. Hemoglobin A1c noted to be 16.6 on 9/16; previously 6.4 while he was taking his insulin on 6/18.   3. Renal failure -resolved; urine studies reveal FENA of 2.87% - likely intrinsic cause of renal failure; continue to hold nephrotoxic agents, including NSAIDs, IV contrast; renal function improving daily (1.41 today)  4. For hypertension, decrease amlodipine to 5 mg daily, restart lisinopril at 10 mg daily, restart Lasix at 40 mg every other day  5. Appreciate podiatry recommendations  6. Appreciate wound care nurse  7. PT/OT - recommending SNF; patient refusing; lives at home;  8. Patient will need 3x/day nursing visits to ensure that he is taking his insulin as written for   9. Continue nystatin for oral thrush  10. Discontinue IV fluids as patient is tolerating diet  11. Disposition: If patient continues to be improved tomorrow, particularly from a hyperglycemic standpoint, will be okay for discharge home in the morning. 12. He is scheduled to undergo cataract surgery. He is to start moxifloxacin in the right eye 4 times a day, ketorolac 4 times a day starting tomorrow morning.     Imelda Desir, DO  9/19/2020  5:45 PM

## 2020-09-20 PROBLEM — E87.5 HYPERKALEMIA: Status: RESOLVED | Noted: 2020-09-16 | Resolved: 2020-09-20

## 2020-09-20 PROBLEM — N18.30 CKD (CHRONIC KIDNEY DISEASE), STAGE III (HCC): Status: ACTIVE | Noted: 2020-09-20

## 2020-09-20 PROBLEM — N17.9 ACUTE KIDNEY INJURY SUPERIMPOSED ON CKD (HCC): Status: RESOLVED | Noted: 2020-09-16 | Resolved: 2020-09-20

## 2020-09-20 PROBLEM — N18.9 ACUTE KIDNEY INJURY SUPERIMPOSED ON CKD (HCC): Status: RESOLVED | Noted: 2020-09-16 | Resolved: 2020-09-20

## 2020-09-20 LAB
ABSOLUTE EOS #: 0.16 K/UL (ref 0–0.44)
ABSOLUTE IMMATURE GRANULOCYTE: 0.03 K/UL (ref 0–0.3)
ABSOLUTE LYMPH #: 2.45 K/UL (ref 1.1–3.7)
ABSOLUTE MONO #: 0.4 K/UL (ref 0.1–1.2)
ANION GAP SERPL CALCULATED.3IONS-SCNC: 7 MMOL/L (ref 9–17)
BASOPHILS # BLD: 1 % (ref 0–2)
BASOPHILS ABSOLUTE: 0.04 K/UL (ref 0–0.2)
BUN BLDV-MCNC: 19 MG/DL (ref 8–23)
BUN/CREAT BLD: 14 (ref 9–20)
CALCIUM SERPL-MCNC: 8.6 MG/DL (ref 8.6–10.4)
CHLORIDE BLD-SCNC: 115 MMOL/L (ref 98–107)
CO2: 21 MMOL/L (ref 20–31)
CREAT SERPL-MCNC: 1.36 MG/DL (ref 0.7–1.2)
DIFFERENTIAL TYPE: ABNORMAL
EOSINOPHILS RELATIVE PERCENT: 3 % (ref 1–4)
GFR AFRICAN AMERICAN: >60 ML/MIN
GFR NON-AFRICAN AMERICAN: 52 ML/MIN
GFR SERPL CREATININE-BSD FRML MDRD: ABNORMAL ML/MIN/{1.73_M2}
GFR SERPL CREATININE-BSD FRML MDRD: ABNORMAL ML/MIN/{1.73_M2}
GLUCOSE BLD-MCNC: 217 MG/DL (ref 75–110)
GLUCOSE BLD-MCNC: 234 MG/DL (ref 75–110)
GLUCOSE BLD-MCNC: 262 MG/DL (ref 75–110)
GLUCOSE BLD-MCNC: 74 MG/DL (ref 70–99)
GLUCOSE BLD-MCNC: 77 MG/DL (ref 75–110)
GLUCOSE BLD-MCNC: 85 MG/DL (ref 75–110)
HCT VFR BLD CALC: 36.8 % (ref 40.7–50.3)
HEMOGLOBIN: 11.4 G/DL (ref 13–17)
IMMATURE GRANULOCYTES: 1 %
LYMPHOCYTES # BLD: 42 % (ref 24–43)
MAGNESIUM: 1.7 MG/DL (ref 1.6–2.6)
MCH RBC QN AUTO: 25.4 PG (ref 25.2–33.5)
MCHC RBC AUTO-ENTMCNC: 31 G/DL (ref 28.4–34.8)
MCV RBC AUTO: 82.1 FL (ref 82.6–102.9)
MONOCYTES # BLD: 7 % (ref 3–12)
NRBC AUTOMATED: 0 PER 100 WBC
PDW BLD-RTO: 14.1 % (ref 11.8–14.4)
PHOSPHORUS: 3.3 MG/DL (ref 2.5–4.5)
PLATELET # BLD: 98 K/UL (ref 138–453)
PLATELET ESTIMATE: ABNORMAL
PMV BLD AUTO: 9.5 FL (ref 8.1–13.5)
POTASSIUM SERPL-SCNC: 4.4 MMOL/L (ref 3.7–5.3)
RBC # BLD: 4.48 M/UL (ref 4.21–5.77)
RBC # BLD: ABNORMAL 10*6/UL
SEG NEUTROPHILS: 46 % (ref 36–65)
SEGMENTED NEUTROPHILS ABSOLUTE COUNT: 2.82 K/UL (ref 1.5–8.1)
SODIUM BLD-SCNC: 143 MMOL/L (ref 135–144)
WBC # BLD: 5.9 K/UL (ref 3.5–11.3)
WBC # BLD: ABNORMAL 10*3/UL

## 2020-09-20 PROCEDURE — 83735 ASSAY OF MAGNESIUM: CPT

## 2020-09-20 PROCEDURE — G0378 HOSPITAL OBSERVATION PER HR: HCPCS

## 2020-09-20 PROCEDURE — 85025 COMPLETE CBC W/AUTO DIFF WBC: CPT

## 2020-09-20 PROCEDURE — 80048 BASIC METABOLIC PNL TOTAL CA: CPT

## 2020-09-20 PROCEDURE — 94640 AIRWAY INHALATION TREATMENT: CPT

## 2020-09-20 PROCEDURE — 84100 ASSAY OF PHOSPHORUS: CPT

## 2020-09-20 PROCEDURE — 36415 COLL VENOUS BLD VENIPUNCTURE: CPT

## 2020-09-20 PROCEDURE — 82947 ASSAY GLUCOSE BLOOD QUANT: CPT

## 2020-09-20 PROCEDURE — 2060000000 HC ICU INTERMEDIATE R&B

## 2020-09-20 PROCEDURE — 6370000000 HC RX 637 (ALT 250 FOR IP): Performed by: INTERNAL MEDICINE

## 2020-09-20 RX ORDER — AMLODIPINE BESYLATE 10 MG/1
5 TABLET ORAL DAILY
Qty: 30 TABLET | Refills: 0
Start: 2020-09-20

## 2020-09-20 RX ORDER — INSULIN GLARGINE 100 [IU]/ML
10-20 INJECTION, SOLUTION SUBCUTANEOUS 2 TIMES DAILY
Qty: 12 ML | Refills: 0 | Status: ON HOLD | OUTPATIENT
Start: 2020-09-20 | End: 2022-04-22 | Stop reason: HOSPADM

## 2020-09-20 RX ORDER — FUROSEMIDE 40 MG/1
40 TABLET ORAL EVERY OTHER DAY
Qty: 60 TABLET | Refills: 0 | Status: ON HOLD
Start: 2020-09-20 | End: 2022-04-13

## 2020-09-20 RX ORDER — INSULIN GLARGINE 100 [IU]/ML
15 INJECTION, SOLUTION SUBCUTANEOUS
Status: DISCONTINUED | OUTPATIENT
Start: 2020-09-21 | End: 2020-09-21 | Stop reason: HOSPADM

## 2020-09-20 RX ORDER — LISINOPRIL 10 MG/1
10 TABLET ORAL DAILY
Qty: 60 TABLET | Refills: 0 | Status: ON HOLD
Start: 2020-09-20 | End: 2022-04-13

## 2020-09-20 RX ORDER — CIPROFLOXACIN HYDROCHLORIDE 3.5 MG/ML
1 SOLUTION/ DROPS TOPICAL 4 TIMES DAILY
Status: DISCONTINUED | OUTPATIENT
Start: 2020-09-20 | End: 2020-09-21 | Stop reason: HOSPADM

## 2020-09-20 RX ADMIN — NYSTATIN 500000 UNITS: 100000 SUSPENSION ORAL at 20:30

## 2020-09-20 RX ADMIN — LISINOPRIL 10 MG: 10 TABLET ORAL at 09:43

## 2020-09-20 RX ADMIN — INSULIN LISPRO 6 UNITS: 100 INJECTION, SOLUTION INTRAVENOUS; SUBCUTANEOUS at 12:55

## 2020-09-20 RX ADMIN — AMLODIPINE BESYLATE 5 MG: 5 TABLET ORAL at 09:44

## 2020-09-20 RX ADMIN — METOPROLOL SUCCINATE 25 MG: 25 TABLET, EXTENDED RELEASE ORAL at 09:44

## 2020-09-20 RX ADMIN — ATORVASTATIN CALCIUM 10 MG: 10 TABLET, FILM COATED ORAL at 09:44

## 2020-09-20 RX ADMIN — DOCUSATE SODIUM 100 MG: 100 CAPSULE, LIQUID FILLED ORAL at 20:30

## 2020-09-20 RX ADMIN — KETOROLAC TROMETHAMINE 1 DROP: 5 SOLUTION/ DROPS OPHTHALMIC at 20:30

## 2020-09-20 RX ADMIN — CLOPIDOGREL BISULFATE 75 MG: 75 TABLET ORAL at 09:43

## 2020-09-20 RX ADMIN — MAGNESIUM GLUCONATE 500 MG ORAL TABLET 400 MG: 500 TABLET ORAL at 20:30

## 2020-09-20 RX ADMIN — METOPROLOL SUCCINATE 25 MG: 25 TABLET, EXTENDED RELEASE ORAL at 20:30

## 2020-09-20 RX ADMIN — KETOROLAC TROMETHAMINE 1 DROP: 5 SOLUTION/ DROPS OPHTHALMIC at 12:55

## 2020-09-20 RX ADMIN — CIPROFLOXACIN 1 DROP: 3 SOLUTION OPHTHALMIC at 12:55

## 2020-09-20 RX ADMIN — FERROUS SULFATE TAB EC 325 MG (65 MG FE EQUIVALENT) 325 MG: 325 (65 FE) TABLET DELAYED RESPONSE at 09:52

## 2020-09-20 RX ADMIN — INSULIN LISPRO 9 UNITS: 100 INJECTION, SOLUTION INTRAVENOUS; SUBCUTANEOUS at 20:30

## 2020-09-20 RX ADMIN — TRAZODONE HYDROCHLORIDE 150 MG: 100 TABLET ORAL at 20:30

## 2020-09-20 RX ADMIN — FUROSEMIDE 40 MG: 40 TABLET ORAL at 09:44

## 2020-09-20 RX ADMIN — KETOROLAC TROMETHAMINE 1 DROP: 5 SOLUTION/ DROPS OPHTHALMIC at 09:44

## 2020-09-20 RX ADMIN — INSULIN GLARGINE 20 UNITS: 100 INJECTION, SOLUTION SUBCUTANEOUS at 09:57

## 2020-09-20 RX ADMIN — BUDESONIDE AND FORMOTEROL FUMARATE DIHYDRATE 2 PUFF: 160; 4.5 AEROSOL RESPIRATORY (INHALATION) at 19:38

## 2020-09-20 RX ADMIN — CIPROFLOXACIN 1 DROP: 3 SOLUTION OPHTHALMIC at 09:44

## 2020-09-20 RX ADMIN — MAGNESIUM GLUCONATE 500 MG ORAL TABLET 400 MG: 500 TABLET ORAL at 09:44

## 2020-09-20 RX ADMIN — INSULIN LISPRO 6 UNITS: 100 INJECTION, SOLUTION INTRAVENOUS; SUBCUTANEOUS at 18:00

## 2020-09-20 RX ADMIN — CIPROFLOXACIN 1 DROP: 3 SOLUTION OPHTHALMIC at 18:02

## 2020-09-20 RX ADMIN — NYSTATIN 500000 UNITS: 100000 SUSPENSION ORAL at 09:44

## 2020-09-20 RX ADMIN — NYSTATIN 500000 UNITS: 100000 SUSPENSION ORAL at 18:02

## 2020-09-20 RX ADMIN — INSULIN GLARGINE 15 UNITS: 100 INJECTION, SOLUTION SUBCUTANEOUS at 20:30

## 2020-09-20 RX ADMIN — KETOROLAC TROMETHAMINE 1 DROP: 5 SOLUTION/ DROPS OPHTHALMIC at 18:02

## 2020-09-20 RX ADMIN — CIPROFLOXACIN 1 DROP: 3 SOLUTION OPHTHALMIC at 20:30

## 2020-09-20 RX ADMIN — DOCUSATE SODIUM 100 MG: 100 CAPSULE, LIQUID FILLED ORAL at 09:44

## 2020-09-20 NOTE — DISCHARGE INSTR - DIET
Good nutrition is important when healing from an illness, injury, or surgery. Follow any nutrition recommendations given to you during your hospital stay. If you were given an oral nutrition supplement while in the hospital, continue to take this supplement at home. You can take it with meals, in-between meals, and/or before bedtime. These supplements can be purchased at most local grocery stores, pharmacies, and chain [employer]-stores.    If you have any questions about your diet or nutrition, call the hospital and ask for the dietitian.    - Diabetic diet, carb control, low sodium

## 2020-09-20 NOTE — PROGRESS NOTES
[facility]  Office: [address], DO, Edith Wilde, DO, Garrett Dillard, DO, Selina Fernandez Blood, DO, Robel Rosales MD, Harriet Adams MD, Radha Rodriguez MD, Ariel Cuevas MD, Jens Baldwin MD, Claire Santiago MD, Fran Guerra MD, Edgardo Noble MD, Rosie Lenz MD, Lesley Santana DO, Nolvia Penaloza MD, Stanislaw Osman MD, Marisol Garcia DO, Yosef Dalton MD,  Bailey Santana DO, Suzanna Mayers MD, Jerry Goode MD, Jaziel Oakley, [facility], [facility], [facility], Hemant Sorto, CNP, Theresa Jimenez, CNS, Alexandru Valdes, CNP, Montserrat Stoner, CNP, Ricardo Figueroa, CNP, Laila Canela, CNP, Alex Weston, CNP, Nabila Quintana PA-C, Jennie Barrios, [facility], Clara Otto, CNP, Lieutenant Hernandez, CNP, Ciera Huitron, CNP, Jonnathan Flores, CNP, Ana Luisa Agudelo, [facility]    Progress Note    9/20/2020    10:33 AM    Name:   Ike Rush  MRN:     [id number]     Acct:      [de-identified]   Room:   [facility]1004-02   Day:  4  Admit Date:  9/16/2020  4:47 PM    PCP:   Yohana Linares  Code Status:  Full Code    Subjective:     C/C:   Chief Complaint   Patient presents with    Hyperglycemia     EMS reports that he had blood drawn yesterday and they received a call from \"a third party\" for report of hyperglycemia, pt denies complaints or concerns at present time, the patient states that he has not been taking his medications because he doesnt feel like he needs it, EMS report a \"high\" reading for blood sugar     Interval History Status: improved. Patient seen and examined this afternoon. No acute events overnight. He is currently sitting up in chair at bedside. He is tolerating diet. Glucoses are significantly improved this morning. Renal function continues to improve. Lower extremity wound is improving. Energy is improving. Brief History:     Ike Rush is a 79 y.o.  Non-/non  male who presents with Hyperglycemia (EMS reports that he had blood drawn yesterday and they received a call from \"a third party\" for report of hyperglycemia, pt denies complaints or concerns at present time, the patient states that he has not been taking his medications because he doesnt feel like he needs it, EMS report a \"high\" reading for blood sugar)   and is admitted to the hospital for the management of Diabetes mellitus type 2 with complications, uncontrolled ([address]    Patient reported to the emergency department in [address] at the direction of his physician with a chief complaint of elevated blood sugar. He has a history of diabetes, dyslipidemia, peripheral vascular disease, tobacco dependence, hypertension, CKD, tobacco use, and noncompliance. He states that he had his blood drawn yesterday and that his blood sugar was noted high. He stated that his blood sugar was 138 at home, but over 600 initially in the emergency department. He is supposed to take insulin for management of his diabetes, but states that he has not taken it in months, as he was taking it at night and felt his blood sugar was low in the morning. He is asymptomatic upon assessment. He also has a diabetic foot wound on the left foot which is currently being managed by podiatry. Pertinent labs include initial glucose 738, creatinine 3.20, BUN 61, sodium 126, potassium 6.2, GFR 23, beta hydroxybutyrate 0.57. No evidence of DKA was present. He is being admitted for diabetes mellitus type 2 with complications. Review of Systems:     Constitutional:  negative for chills, fevers, sweats  Respiratory:  negative for cough, dyspnea on exertion, shortness of breath, wheezing  Cardiovascular:  negative for chest pain, chest pressure/discomfort, lower extremity edema, palpitations  Gastrointestinal:  negative for abdominal pain, constipation, diarrhea, nausea, vomiting  Neurological:  negative for dizziness, headache    Medications:      Allergies:  No Known Allergies    Current Meds:   Scheduled (Oral)   Resp 16   Ht 6' (1.829 m)   Wt 207 lb 1.6 oz (93.9 kg)   SpO2 96%   BMI 28.09 kg/m²   Temp (24hrs), Av °F (36.7 °C), Min:97.7 °F (36.5 °C), Max:98.4 °F (36.9 °C)    Recent Labs     20  1643 2018 20  0757   POCGLU 294* 297* 85 77       I/O (24Hr): Intake/Output Summary (Last 24 hours) at 2020 1033  Last data filed at 2020 8992  Gross per 24 hour   Intake --   Output 1050 ml   Net -1050 ml       Labs:  Hematology:  Recent Labs     20  0552 20  0756 20  0601   WBC 6.6 5.4 5.9   RBC 4.84 4.84 4.48   HGB 12.6* 12.5* 11.4*   HCT 38.5* 39.0* 36.8*   MCV 79.5* 80.6* 82.1*   MCH 26.0 25.8 25.4   MCHC 32.7 32.1 31.0   RDW 13.6 13.8 14.1   * 90* 98*   MPV 9.5 9.4 9.5     Chemistry:  Recent Labs     20  1812 20  0756 20  0601    141 143   K 4.4 4.3 4.4   * 110* 115*   CO2 21 21 21   GLUCOSE 203* 124* 74   BUN 31* 21 19   CREATININE 1.70* 1.41* 1.36*   MG 1.7 1.5* 1.7   ANIONGAP 9 10 7*   LABGLOM 40* 50* 52*   GFRAA 49* >60 >60   CALCIUM 8.5* 9.0 8.6   PHOS 2.8 2.5 3.3     Recent Labs     20  0744 20  1104 20  1643 20  0618 20  0757   POCGLU 135* 230* 294* 297* 85 77     ABG:No results found for: POCPH, PHART, PH, POCPCO2, JGB6HHN, PCO2, POCPO2, PO2ART, PO2, POCHCO3, PWU5BQK, HCO3, NBEA, PBEA, BEART, BE, THGBART, THB, FHW1CHR, PDMF4UWA, V1RIRXOG, O2SAT, FIO2  Lab Results   Component Value Date/Time    SPECIAL RT AC 12ML 2020 10:21 PM     Lab Results   Component Value Date/Time    CULTURE NO GROWTH 6 DAYS 2020 10:21 PM       Radiology:  No results found.     Physical Examination:        General appearance:  alert, cooperative and no distress, elderly, -American gentleman who appears somewhat disheveled  Mental Status:  oriented to person, place and time and normal affect  Lungs:  clear to auscultation bilaterally, normal effort  Heart:  regular rate and rhythm, no murmur  Abdomen:  soft, nontender, nondistended, protuberant, normal bowel sounds, no masses, hepatomegaly, splenomegaly  Extremities: Chronic venous stasis changes to the left lower extremity, left TMA amputation, right above-knee amputation  Skin:  no gross lesions, rashes, induration    Assessment:        Hospital Problems           Last Modified POA    * (Principal) Diabetes mellitus type 2 with complications, uncontrolled ([address] 9/17/2020 Yes    Hyperkalemia 9/16/2020 Yes    H/O noncompliance with medical treatment, presenting hazards to health 9/16/2020 Yes    Severe malnutrition ([address] 9/19/2020 Yes    PVD (peripheral vascular disease) ([address] 9/19/2020 Yes    Insomnia 9/19/2020 Yes    Dyslipidemia 9/19/2020 Yes    Tobacco dependence 9/19/2020 Yes    Diabetic ulcer of left heel associated with type 2 diabetes mellitus ([address] 9/19/2020 Yes    Acute kidney injury superimposed on CKD ([address] 9/19/2020 Yes    History of right below knee amputation ([address] 9/17/2020 Yes          Plan:        1. Uncontrolled type 2 diabetes mellitus secondary to noncompliance - glucoses are slowly improving. Continue Lantus from 20 units in the morning 10 units at night   2. Hemoglobin A1c noted to be 16.6 on 9/16 previously 6.4 while he was taking his insulin on 6/18. 3. Renal failure - resolved; urine studies reveal FENA of 2.87% - likely intrinsic cause of renal failure; continue to hold nephrotoxic agents, including NSAIDs, IV contrast; renal function improving daily (1.36 today)  4. For hypertension, decrease amlodipine to 5 mg daily, restart lisinopril at 10 mg daily, restart Lasix at 40 mg every other day  5. Appreciate podiatry recommendations  6. Appreciate wound care nurse  7. PT/OT - recommending SNF; patient refusing; lives at home;  8. Patient will need 3x/day nursing visits to ensure that he is taking his insulin as written for   9. Continue nystatin for oral thrush  10.  Discontinue IV fluids as patient is tolerating diet  11. Disposition: OK for discharge this morning  12. He is scheduled to undergo cataract surgery on Tuesday.  Continue moxifloxacin in the right eye 4 times a day, ketorolac 4 times a day     33 Fanny Bolaños DO  9/20/2020  10:33 AM

## 2020-09-20 NOTE — DISCHARGE SUMMARY
[facility]  Office: [address], DO, Eric Santoro, DO, Eileen Laguerre, DO, Lucina Red Blood, DO, Rayray Chi MD, Moni Parrish MD, Boni Bearden MD, Rema Centeno MD, Staci Pulido MD, Mode Sotelo MD, Radha Alexander MD, Marlen Roberts MD, Rosie Nelson MD, Mirela Lerma DO, Brianna Bautista MD, Frances Munoz MD, Telly Goff, DO, Maria Del Carmen Gallego MD,  Flaco Velez DO, Veronica Driscoll MD, Jeniffer Buenrostro MD, Arieljoseph Garcia [facility], [facility], [facility], Marcia Colon, CNP, Francesca Kowalski, CNS, Lauro Ferrera, CNP, Jammie Oakley, CNP, Manan Mcghee, CNP, Tiesha Cristina, CNP, Bigg Vaca CNP, Jerardo Campoverde PA-C, Kavita Campbell, [facility], Will Loco, CNP, Coretta Esquivel, CNP, Elvis Donovan, CNP, Laila Durbin, CNP, [facility]    Discharge Summary     Patient ID: Bari Ma  :  1950   MRN: [id number]     ACCOUNT:  [de-identified]   Patient's PCP: Zulma Rios  Admit Date: 2020   Discharge Date: 2020  Length of Stay: 5  Code Status:  Full Code  Admitting Physician: Jennifer Viramontes DO  Discharge Physician: Latoya Bolañso DO     Active Discharge Diagnoses:     Hospital Problem Lists:  Principal Problem:    Diabetes mellitus type 2 with complications, uncontrolled ([facility]  Active Problems:    H/O noncompliance with medical treatment, presenting hazards to health    Severe malnutrition ([facility]    PVD (peripheral vascular disease) ([facility]    Insomnia    Dyslipidemia    Tobacco dependence    Diabetic ulcer of left heel associated with type 2 diabetes mellitus ([facility]    CKD (chronic kidney disease), stage III ([address]    History of right below knee amputation ([facility]  Resolved Problems:    Hyperkalemia    Acute kidney injury superimposed on CKD [facility])    Admission Condition:  fair     Discharged Condition: good    Hospital Stay:     Hospital Course:    Mr. Bari Ma is a pleasant 79year old  gentleman who presented to [address] [place] [address] ED on 9/16/2020 for evaluation of elevated blood glucoses. Patient had his labs drawn on the day prior to admission and was referred to the ED when his glucoses were noted to be high on this lab draw. He had a recent hospitalization in June of 2020 for a diabetic wound infection where he underwent wound debridement and placement of wound Vac. Since that time, the patient has discontinued his insulin altogether, as he was tired of being \"part of the system. \" He presented with RISSA and hyperglycemia, but no evidence of DKA. He was started on IVF and his glucoses were noted to be 738. His lantus was restarted and adjusted throughout his hospitalization. Podiatry and wound care evaluated his lower extremity wounds. Social work was involved, as there was questions as to whether or not the patient was fit to take care of himself at home. Ultimately, he is appropriate for discharge home today. He will have nursing staff out at his house to assist with basic needs daily. He is fully capable of drawing up his own lantus. His renal function is the best it has been this year. He will be discharged in stable condition this morning once home care arrangements are finalized.     Significant therapeutic interventions:   - Glycemic control - restarted insulin  - IVF  - Podiatry evaluation of LE wounds  - Adjustment of anti-hypertensive medications    Significant Diagnostic Studies:   Labs / Micro:  CBC:   Lab Results   Component Value Date    WBC 6.1 09/21/2020    RBC 4.70 09/21/2020    RBC 4.20 10/10/2011    HGB 12.1 09/21/2020    HCT 38.4 09/21/2020    MCV 81.7 09/21/2020    MCH 25.7 09/21/2020    MCHC 31.5 09/21/2020    RDW 13.9 09/21/2020    PLT 95 09/21/2020     10/10/2011     CMP:    Lab Results   Component Value Date    GLUCOSE 74 09/20/2020    GLUCOSE 133 10/18/2011     09/20/2020    K 4.4 09/20/2020     09/20/2020    CO2 21 09/20/2020    BUN 19 09/20/2020    CREATININE 1.36 09/20/2020    ANIONGAP 7 09/20/2020    ALKPHOS 79 09/16/2020    ALT 11 09/16/2020    AST 15 09/16/2020    BILITOT 0.40 09/16/2020    LABALBU 3.3 09/16/2020    ALBUMIN 0.8 09/16/2020    LABGLOM 52 09/20/2020    GFRAA >60 09/20/2020    GFR      09/20/2020    GFR NOT REPORTED 09/20/2020    PROT 7.6 09/16/2020    CALCIUM 8.6 09/20/2020       Radiology:  Xr Foot Left (min 3 Views)    Result Date: 9/17/2020  Soft tissue ulceration overlying the calcaneus. No osseous erosion or destruction to suggest osteomyelitis. Consultations:    Consults:     Final Specialist Recommendations/Findings:   IP CONSULT TO DIABETES EDUCATOR  IP CONSULT TO DIETITIAN  IP CONSULT TO PODIATRY      The patient was seen and examined on day of discharge and this discharge summary is in conjunction with any daily progress note from day of discharge. Discharge plan:     Disposition: Home    Physician Follow Up:     Jojo Bowen  [address]  [phone]    Schedule an appointment as soon as possible for a visit in 1 week  for follow up after hospitalization and after medications have been adjusted.     [facility] [address]  [address] [facility] [place] [address]  [phone]      home health agency       Requiring Further Evaluation/Follow Up POST HOSPITALIZATION/Incidental Findings:   - None    Diet: diabetic diet    Activity: As tolerated    Instructions to Patient:  None    Discharge Medications:      Medication List      CHANGE how you take these medications    amLODIPine 10 MG tablet  Commonly known as:  NORVASC  Take 0.5 tablets by mouth daily  What changed:  how much to take     Basaglar KwikPen 100 UNIT/ML injection pen  Generic drug:  insulin glargine  Inject 10-20 Units into the skin 2 times daily Injects 20 units AM in the morning and 10 units at night  What changed:    · how much to take  · additional instructions     furosemide 40 MG tablet  Commonly known as: LASIX  Take 1 tablet by mouth every other day  What changed:  when to take this     lisinopril 10 MG tablet  Commonly known as:  PRINIVIL;ZESTRIL  Take 1 tablet by mouth daily  What changed:  how much to take        CONTINUE taking these medications    acetaminophen 325 MG tablet  Commonly known as:  TYLENOL     clopidogrel 75 MG tablet  Commonly known as:  PLAVIX  Take 1 tablet by mouth daily     docusate sodium 100 MG capsule  Commonly known as:  COLACE     ferrous sulfate 325 (65 Fe) MG tablet  Commonly known as:  IRON 325     Insulin Syringe-Needle U-100 30G X 5/16\" 1 ML Misc  Commonly known as:  ACCUSURE INS SYR 1CC/30GX5/16\"  by Does not apply route. metoprolol succinate 25 MG extended release tablet  Commonly known as:  TOPROL XL     moxifloxacin 0.5 % ophthalmic solution  Commonly known as:  VIGAMOX     NovoLOG FlexPen 100 UNIT/ML injection pen  Generic drug:  insulin aspart     simvastatin 10 MG tablet  Commonly known as:  ZOCOR     Symbicort 160-4.5 MCG/ACT Aero  Generic drug:  budesonide-formoterol     traZODone 150 MG tablet  Commonly known as:  DESYREL           Where to Get Your Medications      These medications were sent to [address] [place] [id number]    Phone:  [phone]   · Basaglar KwikPen 100 UNIT/ML injection pen     Information about where to get these medications is not yet available    Ask your nurse or doctor about these medications  · amLODIPine 10 MG tablet  · furosemide 40 MG tablet  · lisinopril 10 MG tablet         No discharge procedures on file. Time Spent on discharge is  50 mins in patient examination, evaluation, counseling as well as medication reconciliation, prescriptions for required medications, discharge plan and follow up.     Electronically signed by   Quiana Larson DO  9/21/2020  7:54 AM      Thank you Dr. Jeannine Mayers for the opportunity to be involved in this patient's care.

## 2020-09-21 VITALS
HEART RATE: 68 BPM | RESPIRATION RATE: 18 BRPM | SYSTOLIC BLOOD PRESSURE: 134 MMHG | WEIGHT: 204.5 LBS | HEIGHT: 72 IN | TEMPERATURE: 97.7 F | DIASTOLIC BLOOD PRESSURE: 51 MMHG | OXYGEN SATURATION: 98 % | BODY MASS INDEX: 27.7 KG/M2

## 2020-09-21 PROBLEM — B37.0 ORAL THRUSH: Status: ACTIVE | Noted: 2020-09-21

## 2020-09-21 LAB
ABSOLUTE EOS #: 0.18 K/UL (ref 0–0.44)
ABSOLUTE IMMATURE GRANULOCYTE: 0.03 K/UL (ref 0–0.3)
ABSOLUTE LYMPH #: 2.45 K/UL (ref 1.1–3.7)
ABSOLUTE MONO #: 0.41 K/UL (ref 0.1–1.2)
BASOPHILS # BLD: 1 % (ref 0–2)
BASOPHILS ABSOLUTE: 0.04 K/UL (ref 0–0.2)
DIFFERENTIAL TYPE: ABNORMAL
EOSINOPHILS RELATIVE PERCENT: 3 % (ref 1–4)
GLUCOSE BLD-MCNC: 107 MG/DL (ref 75–110)
GLUCOSE BLD-MCNC: 159 MG/DL (ref 75–110)
GLUCOSE BLD-MCNC: 91 MG/DL (ref 75–110)
HCT VFR BLD CALC: 38.4 % (ref 40.7–50.3)
HEMOGLOBIN: 12.1 G/DL (ref 13–17)
IMMATURE GRANULOCYTES: 1 %
LYMPHOCYTES # BLD: 40 % (ref 24–43)
MCH RBC QN AUTO: 25.7 PG (ref 25.2–33.5)
MCHC RBC AUTO-ENTMCNC: 31.5 G/DL (ref 28.4–34.8)
MCV RBC AUTO: 81.7 FL (ref 82.6–102.9)
MONOCYTES # BLD: 7 % (ref 3–12)
NRBC AUTOMATED: 0 PER 100 WBC
PDW BLD-RTO: 13.9 % (ref 11.8–14.4)
PLATELET # BLD: 95 K/UL (ref 138–453)
PLATELET ESTIMATE: ABNORMAL
PMV BLD AUTO: 9.5 FL (ref 8.1–13.5)
RBC # BLD: 4.7 M/UL (ref 4.21–5.77)
RBC # BLD: ABNORMAL 10*6/UL
SEG NEUTROPHILS: 48 % (ref 36–65)
SEGMENTED NEUTROPHILS ABSOLUTE COUNT: 3.02 K/UL (ref 1.5–8.1)
WBC # BLD: 6.1 K/UL (ref 3.5–11.3)
WBC # BLD: ABNORMAL 10*3/UL

## 2020-09-21 PROCEDURE — 99239 HOSP IP/OBS DSCHRG MGMT >30: CPT | Performed by: INTERNAL MEDICINE

## 2020-09-21 PROCEDURE — 82947 ASSAY GLUCOSE BLOOD QUANT: CPT

## 2020-09-21 PROCEDURE — 85025 COMPLETE CBC W/AUTO DIFF WBC: CPT

## 2020-09-21 PROCEDURE — G0378 HOSPITAL OBSERVATION PER HR: HCPCS

## 2020-09-21 PROCEDURE — 6370000000 HC RX 637 (ALT 250 FOR IP): Performed by: INTERNAL MEDICINE

## 2020-09-21 PROCEDURE — 36415 COLL VENOUS BLD VENIPUNCTURE: CPT

## 2020-09-21 RX ADMIN — KETOROLAC TROMETHAMINE 1 DROP: 5 SOLUTION/ DROPS OPHTHALMIC at 08:39

## 2020-09-21 RX ADMIN — METOPROLOL SUCCINATE 25 MG: 25 TABLET, EXTENDED RELEASE ORAL at 08:30

## 2020-09-21 RX ADMIN — AMLODIPINE BESYLATE 5 MG: 5 TABLET ORAL at 08:39

## 2020-09-21 RX ADMIN — DOCUSATE SODIUM 100 MG: 100 CAPSULE, LIQUID FILLED ORAL at 08:29

## 2020-09-21 RX ADMIN — CLOPIDOGREL BISULFATE 75 MG: 75 TABLET ORAL at 08:30

## 2020-09-21 RX ADMIN — CIPROFLOXACIN 1 DROP: 3 SOLUTION OPHTHALMIC at 08:31

## 2020-09-21 RX ADMIN — NYSTATIN 500000 UNITS: 100000 SUSPENSION ORAL at 08:29

## 2020-09-21 RX ADMIN — ATORVASTATIN CALCIUM 10 MG: 10 TABLET, FILM COATED ORAL at 08:29

## 2020-09-21 RX ADMIN — LISINOPRIL 10 MG: 10 TABLET ORAL at 08:31

## 2020-09-21 RX ADMIN — FERROUS SULFATE TAB EC 325 MG (65 MG FE EQUIVALENT) 325 MG: 325 (65 FE) TABLET DELAYED RESPONSE at 08:29

## 2020-09-21 RX ADMIN — MAGNESIUM GLUCONATE 500 MG ORAL TABLET 400 MG: 500 TABLET ORAL at 08:29

## 2020-09-21 RX ADMIN — INSULIN GLARGINE 15 UNITS: 100 INJECTION, SOLUTION SUBCUTANEOUS at 08:31

## 2020-09-21 NOTE — PROGRESS NOTES
Pt given discharge instructions and all questions answered. Pt refusing to take soft boot home. Pt has all belongings and in no distress.

## 2020-09-21 NOTE — CARE COORDINATION
Discharge Planning    Received phone call from Yonis Oakley at [facility] stating they can not provide skilled services but will continue with waiver home health aide. Yonis Oakley spoke with pt by phone and informed him of denial of skillrf services services. Phone call to pt's  Benson Clinton at [phone] and she spoke with patient per phone and encouraged SNF but pt continues to refuse. Pt states he will take his sliding scale insulin he stopped it as he thought he did not need it. Benson Clinton faxed me list of contracted waiveraides/nurse providers with [facility] program.  Dr Rudine Nyhan has spoken with patient and states he has talked with patient and patient verbalized that he will take his sliding scale insulin. Pt is scheduled for outpt cateract surgery 9-22 and has transportation set up and he is anxious to get home to get the surgery as his vision is poor right now. Phone call to [facility] they can not accept the referral due to staffing issues. Phone call to [facility] and spoke with Grace Yanez and they can not accept referral    Phone call to [facility] and spoke with Venus Glass and she can accept the referral for skilled nurse daily wound care. Demographics, TATIANNA and progress note faxed to them at [phone]. Pt updated with new agency and in agreement. Pts  Benson Clinton informed of new home care agency. Phone call to Yonis Oakley at [address] and updated her on [facility] providing skilled nurse and she will work on resuming home health aide tonight. Informed her that Rashaad Carlton can now pick pt up at 1230.     Nurse Glass informed

## 2020-09-21 NOTE — PROGRESS NOTES
[facility]  Office: [address], DO, Shivam Ebbs, DO, Butch Montes, DO, Tim Hutton Blood, DO, Mekhi Patton MD, Giovanna Cardoza MD, Margarita Edmondson MD, Marium Whittington MD, Renetta Man MD, Alexsander Zapata MD, Linda Marks MD, Sarah Sandoval MD, Rosie Do MD, Jose Roberto Lewis, DO, Jose Mohr MD, Angel Lauren MD, Senait Montalvo, DO, Yadira Richey MD,  Anat Cagle, DO, Jolie Garcia MD, Ervin Acuña MD, Bessy Pickens, [facility], [facility] Trevor, CNP, Elidia Davila, CNP, Bart Hilton, CNS, Jesus Braxton, CNP, Taras Coates, CNP, Melodie Smith, CNP, Turner Keller, CNP, Velvet Bethea, CNP, He Perry PA-C, Sara Jackson, [facility], Gustavo Murry, CNP, Parker Perkins, CNP, Ector Faulkner, CNP, Rocio Doe, [facility], Milka Farley, [facility]    Progress Note    9/21/2020    11:27 AM    Name:   Gladis Flores  MRN:     [id number]     Acct:      [de-identified]   Room:   [address] [place] [address] Day:  5  Admit Date:  9/16/2020  4:47 PM    PCP:   Debi Zepeda  Code Status:  Full Code    Subjective:     C/C:   Chief Complaint   Patient presents with    Hyperglycemia     EMS reports that he had blood drawn yesterday and they received a call from \"a third party\" for report of hyperglycemia, pt denies complaints or concerns at present time, the patient states that he has not been taking his medications because he doesnt feel like he needs it, EMS report a \"high\" reading for blood sugar     Interval History Status: improved. Patient seen and examined this morning. Overnight, discharge was held secondary to lack of home care agency just take care of patient tonight. He is absolutely refusing SNF. Denies nausea, vomiting. Is tolerating his diet. He states that he is able to give himself insulin, he just has stopped secondary to [facility] good my sugars were. \" Vitals stable. Brief History:     Gladis Flores is a 79 y.o.  Non-/non  male who presents with Hyperglycemia (EMS reports that he had blood drawn yesterday and they received a call from \"a third party\" for report of hyperglycemia, pt denies complaints or concerns at present time, the patient states that he has not been taking his medications because he doesnt feel like he needs it, EMS report a \"high\" reading for blood sugar)   and is admitted to the hospital for the management of Diabetes mellitus type 2 with complications, uncontrolled ([facility] [address]    [facility] at the direction of his physician with a chief complaint of elevated blood sugar. He has a history of diabetes, dyslipidemia, peripheral vascular disease, tobacco dependence, hypertension, CKD, tobacco use, and noncompliance. He states that he had his blood drawn yesterday and that his blood sugar was noted high. He stated that his blood sugar was 138 at home, but over 600 initially in the emergency department. He is supposed to take insulin for management of his diabetes, but states that he has not taken it in months, as he was taking it at night and felt his blood sugar was low in the morning. He is asymptomatic upon assessment. He also has a diabetic foot wound on the left foot which is currently being managed by podiatry. Pertinent labs include initial glucose 738, creatinine 3.20, BUN 61, sodium 126, potassium 6.2, GFR 23, beta hydroxybutyrate 0.57. No evidence of DKA was present. He is being admitted for diabetes mellitus type 2 with complications.     Review of Systems:     Constitutional:  negative for chills, fevers, sweats  Respiratory:  negative for cough, dyspnea on exertion, shortness of breath, wheezing  Cardiovascular:  negative for chest pain, chest pressure/discomfort, lower extremity edema, palpitations  Gastrointestinal:  negative for abdominal pain, constipation, diarrhea, nausea, vomiting  Neurological:  negative for dizziness, Diabetes Sister        Vitals:  BP (!) 128/57   Pulse 62   Temp 98.4 °F (36.9 °C) (Oral)   Resp 20   Ht 6' (1.829 m)   Wt 204 lb 8 oz (92.8 kg)   SpO2 97%   BMI 27.74 kg/m²   Temp (24hrs), Av.2 °F (36.8 °C), Min:98.1 °F (36.7 °C), Max:98.4 °F (36.9 °C)    Recent Labs     20  0135 20  0547 20  0732   POCGLU 262* 159* 107 91       I/O (24Hr): Intake/Output Summary (Last 24 hours) at 2020 1127  Last data filed at 2020 1116  Gross per 24 hour   Intake --   Output 550 ml   Net -550 ml       Labs:  Hematology:  Recent Labs     20  07520  0601 20  0531   WBC 5.4 5.9 6.1   RBC 4.84 4.48 4.70   HGB 12.5* 11.4* 12.1*   HCT 39.0* 36.8* 38.4*   MCV 80.6* 82.1* 81.7*   MCH 25.8 25.4 25.7   MCHC 32.1 31.0 31.5   RDW 13.8 14.1 13.9   PLT 90* 98* 95*   MPV 9.4 9.5 9.5     Chemistry:  Recent Labs     20  1812 20  0756 20  0601    141 143   K 4.4 4.3 4.4   * 110* 115*   CO2 21 21 21   GLUCOSE 203* 124* 74   BUN 31* 21 19   CREATININE 1.70* 1.41* 1.36*   MG 1.7 1.5* 1.7   ANIONGAP 9 10 7*   LABGLOM 40* 50* 52*   GFRAA 49* >60 >60   CALCIUM 8.5* 9.0 8.6   PHOS 2.8 2.5 3.3     Recent Labs     20  1201 20  1655 20  0135 20  0547 20  0732   POCGLU 217* 234* 262* 159* 107 91     ABG:No results found for: POCPH, PHART, PH, POCPCO2, OEY4FOZ, PCO2, POCPO2, PO2ART, PO2, POCHCO3, XDC9TXU, HCO3, NBEA, PBEA, BEART, BE, THGBART, THB, KGV9CXB, NSXB2PKX, Y5DXPTAO, O2SAT, FIO2  Lab Results   Component Value Date/Time    SPECIAL RT AC 12ML 2020 10:21 PM     Lab Results   Component Value Date/Time    CULTURE NO GROWTH 6 DAYS 2020 10:21 PM       Radiology:  No results found.     Physical Examination:        General appearance:  alert, cooperative and no distress, elderly, -American gentleman who appears somewhat disheveled  HENT: oral thrush noted, white/brown plaques on tongue, sclera nonicterus  Mental Status:  oriented to person, place and time and normal affect  Lungs:  clear to auscultation bilaterally, normal effort  Heart:  regular rate and rhythm, no murmur  Abdomen:  soft, nontender, nondistended, protuberant, normal bowel sounds, no masses, hepatomegaly, splenomegaly  Extremities: Chronic venous stasis changes to the left lower extremity, left TMA amputation, right above-knee amputation  Skin:  no gross lesions, rashes, induration    Assessment:        Hospital Problems           Last Modified POA    * (Principal) Diabetes mellitus type 2 with complications, uncontrolled ([address] 9/20/2020 Yes    H/O noncompliance with medical treatment, presenting hazards to health 9/20/2020 Yes    Severe malnutrition ([address] 9/20/2020 Yes    PVD (peripheral vascular disease) ([address] 9/20/2020 Yes    Insomnia 9/20/2020 Yes    Dyslipidemia 9/20/2020 Yes    Tobacco dependence 9/20/2020 Yes    Diabetic ulcer of left heel associated with type 2 diabetes mellitus ([address] 9/20/2020 Yes    CKD (chronic kidney disease), stage III ([address] 9/20/2020 Yes    Oral thrush 9/21/2020 Yes    History of right below knee amputation ([address] 9/20/2020 Yes          Plan:        1. Uncontrolled type 2 diabetes mellitus secondary to noncompliance - glucoses are well controlled on current regimen. Continue Lantus 20 units in the morning 10 units at night   2. Hemoglobin A1c noted to be 16.6 on 9/16/2020; previously 6.4 while he was taking his insulin on 6/18/2020.  3. He is very capable of loading his insulin pen and given it to himself. He was previously not taking it secondary to [facility] good my sugars were. \"  He understands that he absolutely needs his Lantus and he states that he is able to give it to himself. 4. Patient refusing SNF    5.  Renal failure - resolved; urine studies reveal FENA of 2.87% - likely intrinsic cause of renal failure; continue to hold nephrotoxic agents, including NSAIDs, IV contrast; renal function improving daily (1.36 today)  6. For hypertension, decrease amlodipine to 5 mg daily, restart lisinopril at 10 mg daily, restart Lasix at 40 mg every other day  7. Appreciate podiatry recommendations  8. Appreciate wound care nurse  9. Will need wound care/dressing changes at home  10. Continue nystatin for oral thrush, improving  11. He has cataract surgery tomorrow, continue moxifloxacin in the right eye 4 times a day, ketorolac 4 times a day    12. PT/OT - recommending SNF; patient refusing; lives at home;  15. It was previously thought that the patient will need 3 times a day nursing visits for complaints of insulin administration. However, the patient states that he is very capable of giving himself insulin by adjusting his insulin pen. He understands that he needs to take his insulin and that that is what is making his glucoses well controlled. 14. He only needs daily nursing visits monitor blood pressure and medication compliance along with prn wound care    15. Disposition: OK for discharge today.      33 Fanny Bolaños DO  9/21/2020  11:27 AM

## 2020-09-21 NOTE — PLAN OF CARE
Problem: Falls - Risk of:  Goal: Will remain free from falls  Description: Will remain free from falls  Outcome: Ongoing  Note: Patient is fall risk per fall scale. Hourly rounding performed. Personal belongings and call light within reach. Bed in low position.    Goal: Absence of physical injury  Description: Absence of physical injury  Outcome: Ongoing

## 2020-12-08 NOTE — CONSULTS
Consultation Note  Podiatric Medicine and Surgery     Subjective     Chief Complaint: Left heel plantar heel wound    HPI:  Sherry Lloyd is a 79 y.o. male seen at [address] for a heel wound on the left foot. The patient states the wound has been getting progressively worse with increasing serous drainage and edema. The patient was previously cared for by Dr. Rebecca Colón, but states he does not currently follow with a podiatrist. 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 Their last HgbA1c was 7.5% in September 2019. Patient admitted to the hospital for further work up of his CHF and hypertension. The patient denies any nausea, vomiting, fever, chills, shortness of breath. PCP is No primary care provider on file. ROS:   Review of Systems   Constitutional: Negative for chills, fatigue and fever. HENT: Negative for congestion and sore throat. Respiratory: Negative for cough, shortness of breath and wheezing. Cardiovascular: Negative for chest pain and leg swelling. Gastrointestinal: Negative for diarrhea, nausea and vomiting. Endocrine: Negative for cold intolerance and heat intolerance. Genitourinary: Negative for difficulty urinating and dysuria. Musculoskeletal: Positive for arthralgias, gait problem and myalgias. Negative for neck stiffness. Skin: Positive for color change and wound. Negative for rash. Neurological: Positive for numbness. Negative for dizziness, weakness and headaches. Psychiatric/Behavioral: Negative for agitation and behavioral problems.        Past Medical History   has a past medical history of RISSA (acute kidney injury) ([address] Cerebrovascular disease, Erectile dysfunction, Hepatitis B infection, Hyperlipidemia, Hypertension, Kidney cysts, Liver cyst, MRSA (methicillin resistant staph aureus) culture positive, Neurotrophic ulcer of the foot ([address] Obesity, Osteomyelitis of ankle or foot, Peripheral vascular disease ([address] Tobacco abuse, and Type chest pain tablet Take 1 tablet by mouth daily. Historical Provider, MD   oxyCODONE-acetaminophen (PERCOCET)  MG per tablet  13   Historical Provider, MD   hydrochlorothiazide (MICROZIDE) 12.5 MG capsule Take 2 capsules by mouth daily. 13   Vicenta Contreras MD   amLODIPine (NORVASC) 10 MG tablet take 1 tablet by mouth once daily 13   Vicenta Contreras MD   Insulin Syringe-Needle U-100 (ACCUSURE INS SYR 1CC/30GX5/16\") 30G X 5/16\" 1 ML MISC by Does not apply route. 13   Vicenta Contreras MD   Blood Gluc Meter Disp-Strips (BLOOD GLUCOSE METER DISPOSABLE) DANIEL Use three to four times daily. Patient is insulin dependent 250.00 13   Vicenta Contreras MD   aspirin 81 MG EC tablet Take 81 mg by mouth daily. Historical Provider, MD   glucose blood VI test strips (TRUETRACK TEST) strip by In Vitro route 3 times daily. Historical Provider, MD    Scheduled Meds:  Continuous Infusions:  PRN Meds:. Allergies  has No Known Allergies. Family History  family history includes Diabetes in his brother, mother, sister, sister, sister, and sister. Social History   reports that he has been smoking cigarettes. He has a 30.00 pack-year smoking history. He has never used smokeless tobacco.   reports no history of alcohol use. reports no history of drug use.     Objective     Vitals:  Patient Vitals for the past 8 hrs:   BP Temp Temp src Pulse Resp SpO2 Height Weight   20 1619 (!) 198/70 98.2 °F (36.8 °C) Oral 62 16 99 % 6' (1.829 m) 275 lb (124.7 kg)     Average, Min, and Max for last 24 hours Vitals:  TEMPERATURE:  Temp  Av.2 °F (36.8 °C)  Min: 98.2 °F (36.8 °C)  Max: 98.2 °F (36.8 °C)    RESPIRATIONS RANGE: Resp  Av  Min: 16  Max: 16    PULSE RANGE: Pulse  Av  Min: 62  Max: 62    BLOOD PRESSURE RANGE:  Systolic (55LMS), LI , Min:198 , CQD:094   ; Diastolic (64TIA), FTB:68, Min:70, Max:70      PULSE OXIMETRY RANGE: SpO2  Av %  Min: 99 %  Max: 99 %  I&O:  No intake/output data recorded. CBC:  Recent Labs     06/17/20  1700   WBC 8.8   HGB 13.0   HCT 41.1           BMP:  No results for input(s): NA, K, CL, CO2, BUN, CREATININE, GLUCOSE, CALCIUM in the last 72 hours. Coags:  No results for input(s): APTT, PROT, INR in the last 72 hours. Lab Results   Component Value Date    LABA1C 7.5 (H) 09/27/2019     Lab Results   Component Value Date    SEDRATE 135 (H) 03/07/2014     Lab Results   Component Value Date    .6 (H) 03/07/2014       Left Lower Extremity Physical Exam:  Vascular: DP and PT pulses are Non-palpable but dopplerable. Neuro: Saph/sural/SP/DP/plantar sensation diminished to light touch. Musculoskeletal: Muscle strength is 4/5 to all lower extremity muscle groups on the left. Gross deformity is s/p right BKA. Dermatologic: Full thickness ulcer #1 located left plantar heel and measures approximately 4.5cm x 3.0cm x 0.8cm. The wound base is fibro-necrotic. Periwound skin is macerated. serous drainage noted with no associated mal odor. Erythema minimal with minimal associated increase in warmth. Does not probe to bone, sinus track, or undermine. No fluctuance, crepitus, or induration. Interdigital maceration absent. Clinical:          Imaging:   XR CHEST PORTABLE   Final Result   No acute cardiopulmonary disease. XR FOOT LEFT (MIN 3 VIEWS)   Final Result   Status post transmetatarsal amputation. Diffuse soft tissue edema. No convincing evidence for osteomyelitis. Cultures:  None. Assessment     Issa Nelson is a 79 y.o. male with   1. Montesinos grade 2 wound, left foot  2. S/p R BKA  3. Type II DM with secondary peripheral neuropathy  4. PAD    Active Problems:    * No active hospital problems. *  Resolved Problems:    * No resolved hospital problems. *        Plan     · Patient examined and evaluated at bedside. · Treatment options discussed in detail with the patient.   · Radiographs reviewed and

## 2021-11-08 ENCOUNTER — HOSPITAL ENCOUNTER (OUTPATIENT)
Age: 71
Setting detail: SPECIMEN
Discharge: HOME | End: 2021-11-08
Payer: COMMERCIAL

## 2021-11-08 LAB
ANION GAP SERPL CALCULATED.3IONS-SCNC: 10 MMOL/L (ref 9–17)
BUN BLDV-MCNC: 23 MG/DL (ref 8–23)
BUN/CREAT BLD: ABNORMAL (ref 9–20)
CALCIUM SERPL-MCNC: 8.7 MG/DL (ref 8.6–10.4)
CHLORIDE BLD-SCNC: 118 MMOL/L (ref 98–107)
CO2: 19 MMOL/L (ref 20–31)
CREAT SERPL-MCNC: 2.06 MG/DL (ref 0.7–1.2)
GFR AFRICAN AMERICAN: 39 ML/MIN
GFR NON-AFRICAN AMERICAN: 32 ML/MIN
GFR SERPL CREATININE-BSD FRML MDRD: ABNORMAL ML/MIN/{1.73_M2}
GFR SERPL CREATININE-BSD FRML MDRD: ABNORMAL ML/MIN/{1.73_M2}
GLUCOSE BLD-MCNC: 253 MG/DL (ref 70–99)
HCT VFR BLD CALC: 32.5 % (ref 40.7–50.3)
HEMOGLOBIN: 10.1 G/DL (ref 13–17)
MAGNESIUM: 1.7 MG/DL (ref 1.6–2.6)
MCH RBC QN AUTO: 26.6 PG (ref 25.2–33.5)
MCHC RBC AUTO-ENTMCNC: 31.1 G/DL (ref 28.4–34.8)
MCV RBC AUTO: 85.5 FL (ref 82.6–102.9)
NRBC AUTOMATED: 0.7 PER 100 WBC
PDW BLD-RTO: 17.6 % (ref 11.8–14.4)
PHOSPHORUS: 3.4 MG/DL (ref 2.5–4.5)
PLATELET # BLD: 93 K/UL (ref 138–453)
PMV BLD AUTO: 10 FL (ref 8.1–13.5)
POTASSIUM SERPL-SCNC: 5 MMOL/L (ref 3.7–5.3)
RBC # BLD: 3.8 M/UL (ref 4.21–5.77)
SODIUM BLD-SCNC: 147 MMOL/L (ref 135–144)
WBC # BLD: 9 K/UL (ref 3.5–11.3)

## 2021-11-08 PROCEDURE — 84100 ASSAY OF PHOSPHORUS: CPT

## 2021-11-08 PROCEDURE — 85027 COMPLETE CBC AUTOMATED: CPT

## 2021-11-08 PROCEDURE — P9603 ONE-WAY ALLOW PRORATED MILES: HCPCS

## 2021-11-08 PROCEDURE — 83735 ASSAY OF MAGNESIUM: CPT

## 2021-11-08 PROCEDURE — 80048 BASIC METABOLIC PNL TOTAL CA: CPT

## 2021-11-08 PROCEDURE — 36415 COLL VENOUS BLD VENIPUNCTURE: CPT

## 2022-02-23 ENCOUNTER — HOSPITAL ENCOUNTER (OUTPATIENT)
Age: 72
Setting detail: SPECIMEN
Discharge: HOME | End: 2022-02-23
Payer: COMMERCIAL

## 2022-02-23 PROCEDURE — 87086 URINE CULTURE/COLONY COUNT: CPT

## 2022-02-23 PROCEDURE — 81001 URINALYSIS AUTO W/SCOPE: CPT

## 2022-02-24 LAB
-: ABNORMAL
BACTERIA: ABNORMAL
BILIRUBIN URINE: NEGATIVE
COLOR: YELLOW
EPITHELIAL CELLS UA: ABNORMAL /HPF (ref 0–5)
GLUCOSE URINE: NEGATIVE
KETONES, URINE: NEGATIVE
LEUKOCYTE ESTERASE, URINE: ABNORMAL
NITRITE, URINE: NEGATIVE
PH UA: 5.5 (ref 5–8)
PROTEIN UA: ABNORMAL
RBC UA: ABNORMAL /HPF (ref 0–2)
SPECIFIC GRAVITY UA: 1.01 (ref 1–1.03)
TURBIDITY: ABNORMAL
URINE HGB: ABNORMAL
UROBILINOGEN, URINE: NORMAL
WBC UA: ABNORMAL /HPF (ref 0–5)
YEAST: ABNORMAL

## 2022-02-26 LAB
CULTURE: NORMAL
SPECIMEN DESCRIPTION: NORMAL

## 2022-04-11 ENCOUNTER — APPOINTMENT (OUTPATIENT)
Dept: GENERAL RADIOLOGY | Age: 72
DRG: 475 | End: 2022-04-11
Payer: COMMERCIAL

## 2022-04-11 ENCOUNTER — HOSPITAL ENCOUNTER (INPATIENT)
Age: 72
LOS: 16 days | Discharge: NURSING, REHAB OR LONG-TERM CARE | DRG: 475 | End: 2022-04-27
Attending: EMERGENCY MEDICINE
Payer: COMMERCIAL

## 2022-04-11 DIAGNOSIS — M72.6 NECROTIZING FASCIITIS OF ANKLE AND FOOT (HCC): Primary | ICD-10-CM

## 2022-04-11 DIAGNOSIS — M72.6 NECROTIZING FASCIITIS (HCC): ICD-10-CM

## 2022-04-11 DIAGNOSIS — N17.9 AKI (ACUTE KIDNEY INJURY) (HCC): ICD-10-CM

## 2022-04-11 LAB
ABSOLUTE EOS #: 0.18 K/UL (ref 0–0.44)
ABSOLUTE IMMATURE GRANULOCYTE: 0.4 K/UL (ref 0–0.3)
ABSOLUTE LYMPH #: 2.24 K/UL (ref 1.1–3.7)
ABSOLUTE MONO #: 0.85 K/UL (ref 0.1–1.2)
ANION GAP SERPL CALCULATED.3IONS-SCNC: 11 MMOL/L (ref 9–17)
BASOPHILS # BLD: 0 % (ref 0–2)
BASOPHILS ABSOLUTE: 0.08 K/UL (ref 0–0.2)
BUN BLDV-MCNC: 38 MG/DL (ref 8–23)
BUN/CREAT BLD: 16 (ref 9–20)
C-REACTIVE PROTEIN: 104.2 MG/L (ref 0–5)
CALCIUM SERPL-MCNC: 8.4 MG/DL (ref 8.6–10.4)
CHLORIDE BLD-SCNC: 110 MMOL/L (ref 98–107)
CO2: 16 MMOL/L (ref 20–31)
CREAT SERPL-MCNC: 2.44 MG/DL (ref 0.7–1.2)
EOSINOPHILS RELATIVE PERCENT: 1 % (ref 1–4)
GFR AFRICAN AMERICAN: 32 ML/MIN
GFR NON-AFRICAN AMERICAN: 26 ML/MIN
GFR SERPL CREATININE-BSD FRML MDRD: ABNORMAL ML/MIN/{1.73_M2}
GLUCOSE BLD-MCNC: 255 MG/DL (ref 70–99)
GLUCOSE BLD-MCNC: 292 MG/DL (ref 75–110)
HCT VFR BLD CALC: 29.8 % (ref 40.7–50.3)
HEMOGLOBIN: 9.6 G/DL (ref 13–17)
IMMATURE GRANULOCYTES: 2 %
LACTIC ACID, SEPSIS: 0.6 MMOL/L (ref 0.5–1.9)
LACTIC ACID, SEPSIS: 0.9 MMOL/L (ref 0.5–1.9)
LYMPHOCYTES # BLD: 12 % (ref 24–43)
MCH RBC QN AUTO: 25.3 PG (ref 25.2–33.5)
MCHC RBC AUTO-ENTMCNC: 32.2 G/DL (ref 28.4–34.8)
MCV RBC AUTO: 78.4 FL (ref 82.6–102.9)
MONOCYTES # BLD: 5 % (ref 3–12)
NRBC AUTOMATED: 0 PER 100 WBC
PDW BLD-RTO: 14.4 % (ref 11.8–14.4)
PLATELET # BLD: 217 K/UL (ref 138–453)
PMV BLD AUTO: 9.6 FL (ref 8.1–13.5)
POTASSIUM SERPL-SCNC: 4.7 MMOL/L (ref 3.7–5.3)
RBC # BLD: 3.8 M/UL (ref 4.21–5.77)
RBC # BLD: ABNORMAL 10*6/UL
REASON FOR REJECTION: NORMAL
SEDIMENTATION RATE, ERYTHROCYTE: >130 MM/HR (ref 0–20)
SEG NEUTROPHILS: 80 % (ref 36–65)
SEGMENTED NEUTROPHILS ABSOLUTE COUNT: 14.72 K/UL (ref 1.5–8.1)
SODIUM BLD-SCNC: 137 MMOL/L (ref 135–144)
WBC # BLD: 18.5 K/UL (ref 3.5–11.3)
ZZ NTE CLEAN UP: ORDERED TEST: NORMAL
ZZ NTE WITH NAME CLEAN UP: SPECIMEN SOURCE: NORMAL

## 2022-04-11 PROCEDURE — 6360000002 HC RX W HCPCS: Performed by: NURSE PRACTITIONER

## 2022-04-11 PROCEDURE — 85652 RBC SED RATE AUTOMATED: CPT

## 2022-04-11 PROCEDURE — 83605 ASSAY OF LACTIC ACID: CPT

## 2022-04-11 PROCEDURE — 87040 BLOOD CULTURE FOR BACTERIA: CPT

## 2022-04-11 PROCEDURE — 73590 X-RAY EXAM OF LOWER LEG: CPT

## 2022-04-11 PROCEDURE — 82947 ASSAY GLUCOSE BLOOD QUANT: CPT

## 2022-04-11 PROCEDURE — 96365 THER/PROPH/DIAG IV INF INIT: CPT

## 2022-04-11 PROCEDURE — 80048 BASIC METABOLIC PNL TOTAL CA: CPT

## 2022-04-11 PROCEDURE — 73630 X-RAY EXAM OF FOOT: CPT

## 2022-04-11 PROCEDURE — 85025 COMPLETE CBC W/AUTO DIFF WBC: CPT

## 2022-04-11 PROCEDURE — 99223 1ST HOSP IP/OBS HIGH 75: CPT | Performed by: NURSE PRACTITIONER

## 2022-04-11 PROCEDURE — 2580000003 HC RX 258: Performed by: NURSE PRACTITIONER

## 2022-04-11 PROCEDURE — 99283 EMERGENCY DEPT VISIT LOW MDM: CPT

## 2022-04-11 PROCEDURE — 2060000000 HC ICU INTERMEDIATE R&B

## 2022-04-11 PROCEDURE — 86140 C-REACTIVE PROTEIN: CPT

## 2022-04-11 RX ORDER — POLYETHYLENE GLYCOL 3350 17 G/17G
17 POWDER, FOR SOLUTION ORAL DAILY PRN
Status: DISCONTINUED | OUTPATIENT
Start: 2022-04-11 | End: 2022-04-27 | Stop reason: HOSPADM

## 2022-04-11 RX ORDER — DOCUSATE SODIUM 100 MG/1
100 CAPSULE, LIQUID FILLED ORAL 2 TIMES DAILY
Status: DISCONTINUED | OUTPATIENT
Start: 2022-04-11 | End: 2022-04-27 | Stop reason: HOSPADM

## 2022-04-11 RX ORDER — ONDANSETRON 2 MG/ML
4 INJECTION INTRAMUSCULAR; INTRAVENOUS EVERY 6 HOURS PRN
Status: DISCONTINUED | OUTPATIENT
Start: 2022-04-11 | End: 2022-04-27 | Stop reason: HOSPADM

## 2022-04-11 RX ORDER — FUROSEMIDE 40 MG/1
40 TABLET ORAL EVERY OTHER DAY
Status: DISCONTINUED | OUTPATIENT
Start: 2022-04-12 | End: 2022-04-27 | Stop reason: HOSPADM

## 2022-04-11 RX ORDER — SODIUM CHLORIDE 9 MG/ML
INJECTION, SOLUTION INTRAVENOUS CONTINUOUS
Status: DISCONTINUED | OUTPATIENT
Start: 2022-04-11 | End: 2022-04-12

## 2022-04-11 RX ORDER — SODIUM CHLORIDE 0.9 % (FLUSH) 0.9 %
5-40 SYRINGE (ML) INJECTION EVERY 12 HOURS SCHEDULED
Status: DISCONTINUED | OUTPATIENT
Start: 2022-04-11 | End: 2022-04-27 | Stop reason: HOSPADM

## 2022-04-11 RX ORDER — METOPROLOL TARTRATE 5 MG/5ML
5 INJECTION INTRAVENOUS ONCE
Status: COMPLETED | OUTPATIENT
Start: 2022-04-11 | End: 2022-04-12

## 2022-04-11 RX ORDER — POTASSIUM CHLORIDE 7.45 MG/ML
10 INJECTION INTRAVENOUS PRN
Status: DISCONTINUED | OUTPATIENT
Start: 2022-04-11 | End: 2022-04-27 | Stop reason: HOSPADM

## 2022-04-11 RX ORDER — ACETAMINOPHEN 325 MG/1
650 TABLET ORAL EVERY 6 HOURS PRN
Status: DISCONTINUED | OUTPATIENT
Start: 2022-04-11 | End: 2022-04-27 | Stop reason: HOSPADM

## 2022-04-11 RX ORDER — LANOLIN ALCOHOL/MO/W.PET/CERES
325 CREAM (GRAM) TOPICAL
Status: DISCONTINUED | OUTPATIENT
Start: 2022-04-12 | End: 2022-04-27 | Stop reason: HOSPADM

## 2022-04-11 RX ORDER — DEXTROSE MONOHYDRATE 50 MG/ML
100 INJECTION, SOLUTION INTRAVENOUS PRN
Status: DISCONTINUED | OUTPATIENT
Start: 2022-04-11 | End: 2022-04-27 | Stop reason: HOSPADM

## 2022-04-11 RX ORDER — LISINOPRIL 10 MG/1
10 TABLET ORAL DAILY
Status: DISCONTINUED | OUTPATIENT
Start: 2022-04-12 | End: 2022-04-11

## 2022-04-11 RX ORDER — HYDRALAZINE HYDROCHLORIDE 20 MG/ML
10 INJECTION INTRAMUSCULAR; INTRAVENOUS EVERY 4 HOURS PRN
Status: DISCONTINUED | OUTPATIENT
Start: 2022-04-11 | End: 2022-04-27 | Stop reason: HOSPADM

## 2022-04-11 RX ORDER — SODIUM CHLORIDE 9 MG/ML
INJECTION, SOLUTION INTRAVENOUS PRN
Status: DISCONTINUED | OUTPATIENT
Start: 2022-04-11 | End: 2022-04-27 | Stop reason: HOSPADM

## 2022-04-11 RX ORDER — MAGNESIUM SULFATE 1 G/100ML
1000 INJECTION INTRAVENOUS PRN
Status: DISCONTINUED | OUTPATIENT
Start: 2022-04-11 | End: 2022-04-27 | Stop reason: HOSPADM

## 2022-04-11 RX ORDER — ATORVASTATIN CALCIUM 10 MG/1
10 TABLET, FILM COATED ORAL DAILY
Status: DISCONTINUED | OUTPATIENT
Start: 2022-04-12 | End: 2022-04-27 | Stop reason: HOSPADM

## 2022-04-11 RX ORDER — AMLODIPINE BESYLATE 5 MG/1
5 TABLET ORAL DAILY
Status: DISCONTINUED | OUTPATIENT
Start: 2022-04-12 | End: 2022-04-27 | Stop reason: HOSPADM

## 2022-04-11 RX ORDER — CLOPIDOGREL BISULFATE 75 MG/1
75 TABLET ORAL DAILY
Status: DISCONTINUED | OUTPATIENT
Start: 2022-04-12 | End: 2022-04-27 | Stop reason: HOSPADM

## 2022-04-11 RX ORDER — NICOTINE POLACRILEX 4 MG
15 LOZENGE BUCCAL PRN
Status: DISCONTINUED | OUTPATIENT
Start: 2022-04-11 | End: 2022-04-11 | Stop reason: CLARIF

## 2022-04-11 RX ORDER — ONDANSETRON 4 MG/1
4 TABLET, ORALLY DISINTEGRATING ORAL EVERY 8 HOURS PRN
Status: DISCONTINUED | OUTPATIENT
Start: 2022-04-11 | End: 2022-04-27 | Stop reason: HOSPADM

## 2022-04-11 RX ORDER — ACETAMINOPHEN 650 MG/1
650 SUPPOSITORY RECTAL EVERY 6 HOURS PRN
Status: DISCONTINUED | OUTPATIENT
Start: 2022-04-11 | End: 2022-04-27 | Stop reason: HOSPADM

## 2022-04-11 RX ORDER — POTASSIUM CHLORIDE 20 MEQ/1
40 TABLET, EXTENDED RELEASE ORAL PRN
Status: DISCONTINUED | OUTPATIENT
Start: 2022-04-11 | End: 2022-04-27 | Stop reason: HOSPADM

## 2022-04-11 RX ORDER — DEXTROSE MONOHYDRATE 25 G/50ML
12.5 INJECTION, SOLUTION INTRAVENOUS PRN
Status: DISCONTINUED | OUTPATIENT
Start: 2022-04-11 | End: 2022-04-11 | Stop reason: CLARIF

## 2022-04-11 RX ORDER — SODIUM CHLORIDE 0.9 % (FLUSH) 0.9 %
10 SYRINGE (ML) INJECTION PRN
Status: DISCONTINUED | OUTPATIENT
Start: 2022-04-11 | End: 2022-04-27 | Stop reason: HOSPADM

## 2022-04-11 RX ADMIN — VANCOMYCIN HYDROCHLORIDE 2500 MG: 5 INJECTION, POWDER, LYOPHILIZED, FOR SOLUTION INTRAVENOUS at 21:06

## 2022-04-11 RX ADMIN — CEFEPIME HYDROCHLORIDE 2000 MG: 2 INJECTION, POWDER, FOR SOLUTION INTRAVENOUS at 20:11

## 2022-04-11 ASSESSMENT — ENCOUNTER SYMPTOMS
DIARRHEA: 0
SHORTNESS OF BREATH: 0
VOMITING: 0
COLOR CHANGE: 0
ABDOMINAL PAIN: 0
NAUSEA: 0

## 2022-04-11 ASSESSMENT — PAIN SCALES - GENERAL
PAINLEVEL_OUTOF10: 8
PAINLEVEL_OUTOF10: 0

## 2022-04-11 ASSESSMENT — PAIN - FUNCTIONAL ASSESSMENT: PAIN_FUNCTIONAL_ASSESSMENT: 0-10

## 2022-04-11 NOTE — ED PROVIDER NOTES
[facility] ED  eMERGENCY dEPARTMENT eNCOUnter      Pt Name: Layla Dickinosn  MRN: [id number]  [place] 1950  Date of evaluation: 4/11/2022  Provider: ALBERTO Shannon CNP    CHIEF COMPLAINT       Chief Complaint   Patient presents with    Wound Infection     Sent over by PCP for wound evaluation in foot (left)          HISTORY OF PRESENT ILLNESS  (Location/Symptom, Timing/Onset, Context/Setting, Quality, Duration, Modifying Factors, Severity.)   Layla Dickinson is a 67 y.o. male who presents to the emergency department via private auto for a wound to his left heel. Onset was months ago. It recently became worse. He was sent by his physician for an evaluation. Dr. Tamara Tee is his podiatrist. Denies fever, chills, recent injury. Denies recent antibiotic use. Rates his pain 8/10 at this time. He is diabetic. Nursing Notes were reviewed. ALLERGIES     Patient has no known allergies. CURRENT MEDICATIONS       Previous Medications    ACETAMINOPHEN (TYLENOL) 325 MG TABLET    Take 650 mg by mouth every 4 hours as needed for Pain.  Give 2 tabs = 650 MG by mouth every 4 hours prn    AMLODIPINE (NORVASC) 10 MG TABLET    Take 0.5 tablets by mouth daily    BUDESONIDE-FORMOTEROL (SYMBICORT) 160-4.5 MCG/ACT AERO    Inhale 2 puffs into the lungs 2 times daily    CLOPIDOGREL (PLAVIX) 75 MG TABLET    Take 1 tablet by mouth daily    DOCUSATE SODIUM (COLACE) 100 MG CAPSULE    Take 100 mg by mouth 2 times daily     FERROUS SULFATE (IRON 325) 325 (65 FE) MG TABLET    Take 325 mg by mouth daily (with breakfast)    FUROSEMIDE (LASIX) 40 MG TABLET    Take 1 tablet by mouth every other day    INSULIN ASPART (NOVOLOG FLEXPEN) 100 UNIT/ML INJECTION PEN    Inject 8-12 Units into the skin 2 times daily (before meals) 8 units AM and 12 units at supper + additional sliding scale    INSULIN GLARGINE (BASAGLAR KWIKPEN) 100 UNIT/ML INJECTION PEN    Inject 10-20 Units into the skin 2 times daily Injects 20 units AM in the morning and 10 units at night    INSULIN SYRINGE-NEEDLE U-100 (ACCUSURE INS SYR 1CC/30GX5/16\") 30G X 5/16\" 1 ML MISC    by Does not apply route. LISINOPRIL (PRINIVIL;ZESTRIL) 10 MG TABLET    Take 1 tablet by mouth daily    METOPROLOL SUCCINATE (TOPROL XL) 25 MG EXTENDED RELEASE TABLET    Take 25 mg by mouth 2 times daily    SIMVASTATIN (ZOCOR) 10 MG TABLET    Take 10 mg by mouth nightly    TRAZODONE (DESYREL) 150 MG TABLET    Take 150 mg by mouth nightly       PAST MEDICAL HISTORY         Diagnosis Date    [facility]SA [facility]) [facility] [facility]     Cerebrovascular disease     Erectile dysfunction     Hepatitis B infection     Hyperlipidemia     Hypertension     Kidney cysts     Liver cyst     MRSA (methicillin resistant staph aureus) culture positive 1/10/2014    right foot    Neurotrophic ulcer of the foot ([facility] [facility]     Obesity     Osteomyelitis of ankle or foot     Peripheral vascular disease ([facility] [facility]     Tobacco abuse     Type II or unspecified type diabetes mellitus without mention of complication, not stated as uncontrolled        SURGICAL HISTORY           Procedure Laterality Date    FOOT AMPUTATION      FOOT AMPUTATION THROUGH METATARSAL      pt can't give exact date of surg-- toes and part of lt foot removed    FOOT DEBRIDEMENT Left 2020    WOUND BED PREPARATION AND WOUND VAC APPLICATION performed by Chino White DPM at [place] [address] Right 3/2013         FAMILY HISTORY           Problem Relation Age of Onset    Diabetes Mother     Diabetes Sister     Diabetes Brother     Diabetes Sister     Diabetes Sister     Diabetes Sister      Family Status   Relation Name Status    Mother      Father          unknown    Sister     [facility] Brother  Alive    Sister      Sister  Alive    Sister  Alive    Brother  Alive    Brother  [address]      reports that he has been smoking cigarettes. He has a 30.00 pack-year smoking history.  He has never used smokeless tobacco. He reports that he does not drink alcohol and does not use drugs. REVIEW OF SYSTEMS    (2-9 systems for level 4, 10 or more for level 5)     Review of Systems   Constitutional: Negative for chills, diaphoresis, fatigue and fever. Respiratory: Negative for shortness of breath. Cardiovascular: Negative for chest pain. Gastrointestinal: Negative for abdominal pain, diarrhea, nausea and vomiting. Musculoskeletal: Positive for arthralgias and myalgias. Skin: Positive for wound. Negative for color change and rash. Neurological: Negative for weakness. Except as noted above the remainder of the review of systems was reviewed and negative. PHYSICAL EXAM    (up to 7 for level 4, 8 or more for level 5)     ED Triage Vitals [04/11/22 1613]   BP Temp Temp Source Pulse Resp SpO2 Height Weight   132/61 97.7 °F (36.5 °C) Oral 81 16 99 % 6' (1.829 m) 225 lb (102.1 kg)     Physical Exam  Vitals reviewed. Constitutional:       General: He is not in acute distress. Appearance: He is well-developed. He is not diaphoretic. Eyes:      General: No scleral icterus. Conjunctiva/sclera: Conjunctivae normal.   Cardiovascular:      Rate and Rhythm: Normal rate. Pulses: Normal pulses. Pulmonary:      Effort: Pulmonary effort is normal. No respiratory distress. Breath sounds: No stridor. Musculoskeletal:      Cervical back: Neck supple. Comments: Tenderness to left distal leg, foot, ankle. Skin:     General: Skin is warm and dry. Capillary Refill: Capillary refill takes 2 to 3 seconds. Findings: No rash. Comments: Ulceration noted to left heel. There is black discoloration to the area. Tan-colored drainage on dressing. Mild erythema to surrounding area. Distal foot/toes have been amputated. Right lower prosthetic device noted. Neurological:      Mental Status: He is alert and oriented to person, place, and time.    Psychiatric: Behavior: Behavior normal.         DIAGNOSTIC RESULTS     RADIOLOGY:   Non-plain film images such as CT, Ultrasound and MRI are read by the radiologist. Plain radiographic images are visualized and preliminarily interpreted by the emergency physician with the below findings:    Interpretation per the Radiologist below, if available at the time of this note:    XR TIBIA FIBULA LEFT (2 VIEWS)    Result Date: 4/11/2022  EXAMINATION: XRAY VIEWS OF THE LEFT TIBIA AND FIBULA 4/11/2022 6:55 pm COMPARISON: None. HISTORY: ORDERING SYSTEM PROVIDED HISTORY: infection TECHNOLOGIST PROVIDED HISTORY: infection Reason for Exam: Lower leg wound infection FINDINGS: Soft tissue swelling. There is no osseous destruction. There are severe degenerative changes noted of the ankle joint. There is no fracture identified or dislocation. Cellulitis. No evidence of osteomyelitis. XR FOOT LEFT (MIN 3 VIEWS)    Result Date: 4/11/2022  EXAMINATION: THREE XRAY VIEWS OF THE LEFT FOOT 4/11/2022 3:08 pm COMPARISON: September 17, 2020 HISTORY: ORDERING SYSTEM PROVIDED HISTORY: infection TECHNOLOGIST PROVIDED HISTORY: infection Reason for Exam: Wound Infection Additional signs and symptoms: Wound Infection FINDINGS: The patient is status post prior amputation at the level of the proximal metatarsals. Osteopenia is present. Soft tissue ulceration is present along the dorsal aspect of the calcaneus. Soft tissue emphysema is present within the distal, posterior lower leg region, and adjacent to the calcaneus. Vascular calcifications are noted. No evidence of an acute fracture is present. Cortical disruption is noted along the posterior margin of the calcaneus, best noted on the oblique view, consistent with osteomyelitis. Diffuse soft tissue swelling is now present. 1. Soft tissue emphysema within the posterior aspect of the distal left lower leg and adjacent to the calcaneus, concerning for necrotizing fasciitis 2.  Large soft tissue ulceration along the dorsal aspect of the calcaneus, with cortical destruction, consistent with osteomyelitis 3. Diffuse soft tissue swelling now present 4. Critical results were called by Dr. Joellen Saha to Gabby Marie on 4/11/2022 at 6:27 p.m. hours. LABS:  Labs Reviewed   CBC WITH AUTO DIFFERENTIAL - Abnormal; Notable for the following components:       Result Value    WBC 18.5 (*)     RBC 3.80 (*)     Hemoglobin 9.6 (*)     Hematocrit 29.8 (*)     MCV 78.4 (*)     Seg Neutrophils 80 (*)     Lymphocytes 12 (*)     Immature Granulocytes 2 (*)     Segs Absolute 14.72 (*)     Absolute Immature Granulocyte 0.40 (*)     All other components within normal limits   SEDIMENTATION RATE - Abnormal; Notable for the following components:    Sed Rate >130 (*)     All other components within normal limits   BASIC METABOLIC PANEL - Abnormal; Notable for the following components:    Glucose 255 (*)     BUN 38 (*)     CREATININE 2.44 (*)     Calcium 8.4 (*)     Chloride 110 (*)     CO2 16 (*)     GFR Non- 26 (*)     GFR  32 (*)     All other components within normal limits   CULTURE, BLOOD 1   CULTURE, BLOOD 1   LACTATE, SEPSIS   LACTATE, SEPSIS   SPECIMEN REJECTION   C-REACTIVE PROTEIN       All other labs were within normal range or not returned as of this dictation. EMERGENCY DEPARTMENT COURSE and DIFFERENTIAL DIAGNOSIS/MDM:   Vitals:    Vitals:    04/11/22 1613   BP: 132/61   Pulse: 81   Resp: 16   Temp: 97.7 °F (36.5 °C)   TempSrc: Oral   SpO2: 99%   Weight: 225 lb (102.1 kg)   Height: 6' (1.829 m)         MEDICATIONS GIVEN IN THE ED:  Medications   cefepime (MAXIPIME) 2000 mg IVPB minibag (0 mg IntraVENous Stopped 4/11/22 2042)   vancomycin (VANCOCIN) 2,500 mg in dextrose 5 % 500 mL IVPB (has no administration in time range)       CLINICAL DECISION MAKING:  The patient presented alert with a nontoxic appearance and was seen in conjunction with Dr. Antonina Valle.  Imaging showed concerns for necrotizing fasciitis. The patient will be admitted for further evaluation and treatment. The podiatry resident evaluated the patient here in the ED and applied a dressing to his wound. The plan from podiatry is surgery tomorrow evening. He was started on IV antibiotics here in the ED; cefepime and Vancomycin were ordered. I also spoke with S. Waterhouse CNP from [facility]. Care was provided during an unprecedented national emergency due to the novel coronavirus, Covid-19. CONSULTS:  IP CONSULT TO INTERNAL MEDICINE      FINAL IMPRESSION      1.  Necrotizing fasciitis of ankle and foot ([facility] [address]            Problem List  Patient Active Problem List   Diagnosis Code    Uncontrolled hypertension I10    PVD (peripheral vascular disease) ([facility] [address] I73.9    Type 2 diabetes mellitus with stage 3 chronic kidney disease, with long-term current use of insulin ([facility]) E11.22, N18.30, Z79.4    Insomnia G47.00    Erectile dysfunction N52.9    Partial traumatic amputation of left foot ([facility] [address] G68.748H    Obesity E66.9    Tobacco abuse Z72.0    Hepatitis B infection B19.10    Liver cyst K76.89    Kidney cysts N28.1    Osteomyelitis of ankle or foot M86.9    Neurotrophic ulcer of the foot ([facility] [address] L97.509    RISSA (acute kidney injury) ([facility] [address] N17.9    Dyslipidemia E78.5    Microcytic anemia D50.9    Diabetic foot infection (HCC) E11.628, L08.9    Tobacco dependence F17.200    Diabetic ulcer of left heel associated with type 2 diabetes mellitus (HCC) E11.621, L97.429    Hypomagnesemia E83.42    Hypokalemia E87.6    Diabetes mellitus type 2 with complications, uncontrolled (HCC) E11.8, E11.65    H/O noncompliance with medical treatment, presenting hazards to health Z91.19    Uncontrolled diabetes mellitus type 2 without complications [id number]    History of right below knee amputation ([facility]) Z89.511    Severe malnutrition (HCC) E43    CKD (chronic kidney disease), stage III (HCC) N18.30    Oral thrush B37.0  Cataract of right eye H26.9    Necrotizing fasciitis ([facility] [address] M72.6         DISPOSITION/PLAN   DISPOSITION  ADMIT      PATIENT REFERRED TO:   No follow-up provider specified.     DISCHARGE MEDICATIONS:     New Prescriptions    No medications on file           (Please note that portions of this note were completed with a voice recognition program.  Efforts were made to edit the dictations but occasionally words are mis-transcribed.)    ALBERTO Shannon CNP, APRN - CNP  04/11/22 8551

## 2022-04-12 ENCOUNTER — ANESTHESIA (OUTPATIENT)
Dept: OPERATING ROOM | Age: 72
DRG: 475 | End: 2022-04-12
Payer: COMMERCIAL

## 2022-04-12 ENCOUNTER — ANESTHESIA EVENT (OUTPATIENT)
Dept: OPERATING ROOM | Age: 72
DRG: 475 | End: 2022-04-12
Payer: COMMERCIAL

## 2022-04-12 ENCOUNTER — APPOINTMENT (OUTPATIENT)
Dept: ULTRASOUND IMAGING | Age: 72
DRG: 475 | End: 2022-04-12
Payer: COMMERCIAL

## 2022-04-12 VITALS — DIASTOLIC BLOOD PRESSURE: 56 MMHG | TEMPERATURE: 98.6 F | OXYGEN SATURATION: 97 % | SYSTOLIC BLOOD PRESSURE: 108 MMHG

## 2022-04-12 LAB
ANION GAP SERPL CALCULATED.3IONS-SCNC: 10 MMOL/L (ref 9–17)
BUN BLDV-MCNC: 33 MG/DL (ref 8–23)
BUN/CREAT BLD: 16 (ref 9–20)
CALCIUM SERPL-MCNC: 8.2 MG/DL (ref 8.6–10.4)
CHLORIDE BLD-SCNC: 112 MMOL/L (ref 98–107)
CO2: 17 MMOL/L (ref 20–31)
COMPLEMENT C3: 120 MG/DL (ref 90–180)
COMPLEMENT C4: 19 MG/DL (ref 10–40)
CREAT SERPL-MCNC: 2.05 MG/DL (ref 0.7–1.2)
CREATININE URINE: 66.7 MG/DL (ref 39–259)
EOSINOPHIL,URINE: NORMAL
GFR AFRICAN AMERICAN: 39 ML/MIN
GFR NON-AFRICAN AMERICAN: 32 ML/MIN
GFR SERPL CREATININE-BSD FRML MDRD: ABNORMAL ML/MIN/{1.73_M2}
GLUCOSE BLD-MCNC: 102 MG/DL (ref 75–110)
GLUCOSE BLD-MCNC: 120 MG/DL (ref 75–110)
GLUCOSE BLD-MCNC: 140 MG/DL (ref 75–110)
GLUCOSE BLD-MCNC: 150 MG/DL (ref 75–110)
GLUCOSE BLD-MCNC: 154 MG/DL (ref 70–99)
GLUCOSE BLD-MCNC: 99 MG/DL (ref 75–110)
HCT VFR BLD CALC: 29.4 % (ref 40.7–50.3)
HEMOGLOBIN: 9.2 G/DL (ref 13–17)
MCH RBC QN AUTO: 25.1 PG (ref 25.2–33.5)
MCHC RBC AUTO-ENTMCNC: 31.3 G/DL (ref 28.4–34.8)
MCV RBC AUTO: 80.3 FL (ref 82.6–102.9)
NRBC AUTOMATED: 0 PER 100 WBC
PDW BLD-RTO: 14.3 % (ref 11.8–14.4)
PLATELET # BLD: 211 K/UL (ref 138–453)
PMV BLD AUTO: 9 FL (ref 8.1–13.5)
POTASSIUM SERPL-SCNC: 4.4 MMOL/L (ref 3.7–5.3)
RBC # BLD: 3.66 M/UL (ref 4.21–5.77)
SODIUM BLD-SCNC: 139 MMOL/L (ref 135–144)
TOTAL PROTEIN, URINE: 120 MG/DL
WBC # BLD: 15.6 K/UL (ref 3.5–11.3)

## 2022-04-12 PROCEDURE — 87075 CULTR BACTERIA EXCEPT BLOOD: CPT

## 2022-04-12 PROCEDURE — 84156 ASSAY OF PROTEIN URINE: CPT

## 2022-04-12 PROCEDURE — 7100000001 HC PACU RECOVERY - ADDTL 15 MIN: Performed by: PODIATRIST

## 2022-04-12 PROCEDURE — 6370000000 HC RX 637 (ALT 250 FOR IP): Performed by: FAMILY MEDICINE

## 2022-04-12 PROCEDURE — 87176 TISSUE HOMOGENIZATION CULTR: CPT

## 2022-04-12 PROCEDURE — 3700000000 HC ANESTHESIA ATTENDED CARE: Performed by: PODIATRIST

## 2022-04-12 PROCEDURE — 2500000003 HC RX 250 WO HCPCS: Performed by: PODIATRIST

## 2022-04-12 PROCEDURE — 99223 1ST HOSP IP/OBS HIGH 75: CPT | Performed by: INTERNAL MEDICINE

## 2022-04-12 PROCEDURE — 3700000001 HC ADD 15 MINUTES (ANESTHESIA): Performed by: PODIATRIST

## 2022-04-12 PROCEDURE — 87106 FUNGI IDENTIFICATION YEAST: CPT

## 2022-04-12 PROCEDURE — 80048 BASIC METABOLIC PNL TOTAL CA: CPT

## 2022-04-12 PROCEDURE — 6360000002 HC RX W HCPCS: Performed by: ANESTHESIOLOGY

## 2022-04-12 PROCEDURE — 87102 FUNGUS ISOLATION CULTURE: CPT

## 2022-04-12 PROCEDURE — 87206 SMEAR FLUORESCENT/ACID STAI: CPT

## 2022-04-12 PROCEDURE — 87070 CULTURE OTHR SPECIMN AEROBIC: CPT

## 2022-04-12 PROCEDURE — 87077 CULTURE AEROBIC IDENTIFY: CPT

## 2022-04-12 PROCEDURE — 6370000000 HC RX 637 (ALT 250 FOR IP): Performed by: NURSE PRACTITIONER

## 2022-04-12 PROCEDURE — 2709999900 HC NON-CHARGEABLE SUPPLY: Performed by: PODIATRIST

## 2022-04-12 PROCEDURE — 7100000000 HC PACU RECOVERY - FIRST 15 MIN: Performed by: PODIATRIST

## 2022-04-12 PROCEDURE — 2060000000 HC ICU INTERMEDIATE R&B

## 2022-04-12 PROCEDURE — 86403 PARTICLE AGGLUT ANTBDY SCRN: CPT

## 2022-04-12 PROCEDURE — 83036 HEMOGLOBIN GLYCOSYLATED A1C: CPT

## 2022-04-12 PROCEDURE — 2580000003 HC RX 258: Performed by: INTERNAL MEDICINE

## 2022-04-12 PROCEDURE — 36415 COLL VENOUS BLD VENIPUNCTURE: CPT

## 2022-04-12 PROCEDURE — 99232 SBSQ HOSP IP/OBS MODERATE 35: CPT | Performed by: FAMILY MEDICINE

## 2022-04-12 PROCEDURE — 0Y980ZX DRAINAGE OF LEFT FEMORAL REGION, OPEN APPROACH, DIAGNOSTIC: ICD-10-PCS | Performed by: PODIATRIST

## 2022-04-12 PROCEDURE — 94761 N-INVAS EAR/PLS OXIMETRY MLT: CPT

## 2022-04-12 PROCEDURE — 82570 ASSAY OF URINE CREATININE: CPT

## 2022-04-12 PROCEDURE — 2500000003 HC RX 250 WO HCPCS: Performed by: INTERNAL MEDICINE

## 2022-04-12 PROCEDURE — 2500000003 HC RX 250 WO HCPCS: Performed by: NURSE PRACTITIONER

## 2022-04-12 PROCEDURE — 3600000012 HC SURGERY LEVEL 2 ADDTL 15MIN: Performed by: PODIATRIST

## 2022-04-12 PROCEDURE — 76770 US EXAM ABDO BACK WALL COMP: CPT

## 2022-04-12 PROCEDURE — 85027 COMPLETE CBC AUTOMATED: CPT

## 2022-04-12 PROCEDURE — 2580000003 HC RX 258: Performed by: PODIATRIST

## 2022-04-12 PROCEDURE — 6370000000 HC RX 637 (ALT 250 FOR IP): Performed by: PODIATRIST

## 2022-04-12 PROCEDURE — 3600000002 HC SURGERY LEVEL 2 BASE: Performed by: PODIATRIST

## 2022-04-12 PROCEDURE — 6360000002 HC RX W HCPCS: Performed by: PODIATRIST

## 2022-04-12 PROCEDURE — 87205 SMEAR GRAM STAIN: CPT

## 2022-04-12 PROCEDURE — 2580000003 HC RX 258: Performed by: NURSE PRACTITIONER

## 2022-04-12 PROCEDURE — 6360000002 HC RX W HCPCS: Performed by: NURSE PRACTITIONER

## 2022-04-12 PROCEDURE — 2500000003 HC RX 250 WO HCPCS: Performed by: ANESTHESIOLOGY

## 2022-04-12 PROCEDURE — 88311 DECALCIFY TISSUE: CPT

## 2022-04-12 PROCEDURE — 86160 COMPLEMENT ANTIGEN: CPT

## 2022-04-12 PROCEDURE — 82947 ASSAY GLUCOSE BLOOD QUANT: CPT

## 2022-04-12 PROCEDURE — 88307 TISSUE EXAM BY PATHOLOGIST: CPT

## 2022-04-12 PROCEDURE — 87186 SC STD MICRODIL/AGAR DIL: CPT

## 2022-04-12 RX ORDER — SODIUM CHLORIDE 0.9 % (FLUSH) 0.9 %
5-40 SYRINGE (ML) INJECTION PRN
Status: DISCONTINUED | OUTPATIENT
Start: 2022-04-12 | End: 2022-04-12 | Stop reason: HOSPADM

## 2022-04-12 RX ORDER — PROPOFOL 10 MG/ML
INJECTION, EMULSION INTRAVENOUS PRN
Status: DISCONTINUED | OUTPATIENT
Start: 2022-04-12 | End: 2022-04-12 | Stop reason: SDUPTHER

## 2022-04-12 RX ORDER — ONDANSETRON 2 MG/ML
4 INJECTION INTRAMUSCULAR; INTRAVENOUS
Status: DISCONTINUED | OUTPATIENT
Start: 2022-04-12 | End: 2022-04-12 | Stop reason: HOSPADM

## 2022-04-12 RX ORDER — SODIUM CHLORIDE 9 MG/ML
INJECTION, SOLUTION INTRAVENOUS PRN
Status: DISCONTINUED | OUTPATIENT
Start: 2022-04-12 | End: 2022-04-12 | Stop reason: HOSPADM

## 2022-04-12 RX ORDER — SODIUM CHLORIDE 0.9 % (FLUSH) 0.9 %
5-40 SYRINGE (ML) INJECTION EVERY 12 HOURS SCHEDULED
Status: DISCONTINUED | OUTPATIENT
Start: 2022-04-12 | End: 2022-04-12 | Stop reason: HOSPADM

## 2022-04-12 RX ORDER — HYDROCODONE BITARTRATE AND ACETAMINOPHEN 5; 325 MG/1; MG/1
2 TABLET ORAL EVERY 4 HOURS PRN
Status: DISCONTINUED | OUTPATIENT
Start: 2022-04-12 | End: 2022-04-13

## 2022-04-12 RX ORDER — HYDROCODONE BITARTRATE AND ACETAMINOPHEN 5; 325 MG/1; MG/1
1 TABLET ORAL EVERY 4 HOURS PRN
Status: DISCONTINUED | OUTPATIENT
Start: 2022-04-12 | End: 2022-04-13

## 2022-04-12 RX ORDER — LINEZOLID 2 MG/ML
600 INJECTION, SOLUTION INTRAVENOUS EVERY 12 HOURS
Status: DISCONTINUED | OUTPATIENT
Start: 2022-04-12 | End: 2022-04-14

## 2022-04-12 RX ORDER — LIDOCAINE HYDROCHLORIDE 20 MG/ML
INJECTION, SOLUTION EPIDURAL; INFILTRATION; INTRACAUDAL; PERINEURAL PRN
Status: DISCONTINUED | OUTPATIENT
Start: 2022-04-12 | End: 2022-04-12 | Stop reason: SDUPTHER

## 2022-04-12 RX ORDER — LIDOCAINE HYDROCHLORIDE 10 MG/ML
INJECTION, SOLUTION EPIDURAL; INFILTRATION; INTRACAUDAL; PERINEURAL PRN
Status: DISCONTINUED | OUTPATIENT
Start: 2022-04-12 | End: 2022-04-12 | Stop reason: HOSPADM

## 2022-04-12 RX ORDER — HYDROMORPHONE HYDROCHLORIDE 1 MG/ML
0.25 INJECTION, SOLUTION INTRAMUSCULAR; INTRAVENOUS; SUBCUTANEOUS EVERY 5 MIN PRN
Status: DISCONTINUED | OUTPATIENT
Start: 2022-04-12 | End: 2022-04-12 | Stop reason: HOSPADM

## 2022-04-12 RX ORDER — FENTANYL CITRATE 50 UG/ML
25 INJECTION, SOLUTION INTRAMUSCULAR; INTRAVENOUS EVERY 5 MIN PRN
Status: DISCONTINUED | OUTPATIENT
Start: 2022-04-12 | End: 2022-04-12 | Stop reason: HOSPADM

## 2022-04-12 RX ORDER — CLINDAMYCIN PHOSPHATE 600 MG/50ML
600 INJECTION INTRAVENOUS EVERY 8 HOURS
Status: DISCONTINUED | OUTPATIENT
Start: 2022-04-12 | End: 2022-04-21

## 2022-04-12 RX ADMIN — FERROUS SULFATE TAB EC 325 MG (65 MG FE EQUIVALENT) 325 MG: 325 (65 FE) TABLET DELAYED RESPONSE at 08:28

## 2022-04-12 RX ADMIN — HYDROCODONE BITARTRATE AND ACETAMINOPHEN 2 TABLET: 5; 325 TABLET ORAL at 10:11

## 2022-04-12 RX ADMIN — DOCUSATE SODIUM 100 MG: 100 CAPSULE, LIQUID FILLED ORAL at 20:17

## 2022-04-12 RX ADMIN — AMLODIPINE BESYLATE 5 MG: 5 TABLET ORAL at 08:28

## 2022-04-12 RX ADMIN — HYDROMORPHONE HYDROCHLORIDE 0.5 MG: 1 INJECTION, SOLUTION INTRAMUSCULAR; INTRAVENOUS; SUBCUTANEOUS at 23:20

## 2022-04-12 RX ADMIN — HYDROCODONE BITARTRATE AND ACETAMINOPHEN 1 TABLET: 5; 325 TABLET ORAL at 15:36

## 2022-04-12 RX ADMIN — HYDROCODONE BITARTRATE AND ACETAMINOPHEN 2 TABLET: 5; 325 TABLET ORAL at 20:17

## 2022-04-12 RX ADMIN — TRAZODONE HYDROCHLORIDE 150 MG: 100 TABLET ORAL at 00:06

## 2022-04-12 RX ADMIN — LIDOCAINE HYDROCHLORIDE 2 ML: 20 INJECTION, SOLUTION EPIDURAL; INFILTRATION; INTRACAUDAL; PERINEURAL at 17:14

## 2022-04-12 RX ADMIN — HYDROMORPHONE HYDROCHLORIDE 0.25 MG: 1 INJECTION, SOLUTION INTRAMUSCULAR; INTRAVENOUS; SUBCUTANEOUS at 18:21

## 2022-04-12 RX ADMIN — CLINDAMYCIN IN 5 PERCENT DEXTROSE 600 MG: 12 INJECTION, SOLUTION INTRAVENOUS at 15:55

## 2022-04-12 RX ADMIN — PROPOFOL 100 MCG/KG/MIN: 10 INJECTION, EMULSION INTRAVENOUS at 17:14

## 2022-04-12 RX ADMIN — LINEZOLID 600 MG: 600 INJECTION, SOLUTION INTRAVENOUS at 21:43

## 2022-04-12 RX ADMIN — SODIUM BICARBONATE: 84 INJECTION, SOLUTION INTRAVENOUS at 10:54

## 2022-04-12 RX ADMIN — HYDROMORPHONE HYDROCHLORIDE 0.25 MG: 1 INJECTION, SOLUTION INTRAMUSCULAR; INTRAVENOUS; SUBCUTANEOUS at 18:26

## 2022-04-12 RX ADMIN — CLINDAMYCIN IN 5 PERCENT DEXTROSE 600 MG: 12 INJECTION, SOLUTION INTRAVENOUS at 23:22

## 2022-04-12 RX ADMIN — METOPROLOL TARTRATE 25 MG: 25 TABLET, FILM COATED ORAL at 08:28

## 2022-04-12 RX ADMIN — CEFEPIME HYDROCHLORIDE 2000 MG: 2 INJECTION, POWDER, FOR SOLUTION INTRAVENOUS at 21:40

## 2022-04-12 RX ADMIN — METOPROLOL TARTRATE 5 MG: 1 INJECTION, SOLUTION INTRAVENOUS at 00:05

## 2022-04-12 RX ADMIN — INSULIN LISPRO 3 UNITS: 100 INJECTION, SOLUTION INTRAVENOUS; SUBCUTANEOUS at 00:05

## 2022-04-12 RX ADMIN — SODIUM CHLORIDE, PRESERVATIVE FREE 10 ML: 5 INJECTION INTRAVENOUS at 00:07

## 2022-04-12 RX ADMIN — DOCUSATE SODIUM 100 MG: 100 CAPSULE, LIQUID FILLED ORAL at 08:28

## 2022-04-12 RX ADMIN — DOCUSATE SODIUM 100 MG: 100 CAPSULE, LIQUID FILLED ORAL at 00:06

## 2022-04-12 RX ADMIN — SODIUM CHLORIDE: 9 INJECTION, SOLUTION INTRAVENOUS at 00:08

## 2022-04-12 RX ADMIN — ATORVASTATIN CALCIUM 10 MG: 10 TABLET, FILM COATED ORAL at 08:28

## 2022-04-12 RX ADMIN — SODIUM CHLORIDE: 9 INJECTION, SOLUTION INTRAVENOUS at 21:35

## 2022-04-12 ASSESSMENT — PULMONARY FUNCTION TESTS
PIF_VALUE: 1
PIF_VALUE: 0
PIF_VALUE: 1
PIF_VALUE: 0
PIF_VALUE: 1
PIF_VALUE: 0
PIF_VALUE: 1
PIF_VALUE: 0
PIF_VALUE: 1
PIF_VALUE: 0
PIF_VALUE: 1

## 2022-04-12 ASSESSMENT — ENCOUNTER SYMPTOMS
EYE DISCHARGE: 0
WHEEZING: 0
EYE PAIN: 0
ABDOMINAL PAIN: 0
BLOOD IN STOOL: 0
CHEST TIGHTNESS: 0
DIARRHEA: 0
SHORTNESS OF BREATH: 0
PHOTOPHOBIA: 0
NAUSEA: 0
BACK PAIN: 0
COLOR CHANGE: 1
VOMITING: 0
CONSTIPATION: 0
GASTROINTESTINAL NEGATIVE: 1
COUGH: 0
RESPIRATORY NEGATIVE: 1

## 2022-04-12 ASSESSMENT — PAIN SCALES - GENERAL
PAINLEVEL_OUTOF10: 8
PAINLEVEL_OUTOF10: 10
PAINLEVEL_OUTOF10: 4
PAINLEVEL_OUTOF10: 0
PAINLEVEL_OUTOF10: 9
PAINLEVEL_OUTOF10: 10
PAINLEVEL_OUTOF10: 10

## 2022-04-12 ASSESSMENT — PAIN DESCRIPTION - LOCATION
LOCATION: FOOT

## 2022-04-12 ASSESSMENT — PAIN DESCRIPTION - PAIN TYPE
TYPE: SURGICAL PAIN
TYPE: SURGICAL PAIN

## 2022-04-12 ASSESSMENT — PAIN DESCRIPTION - DESCRIPTORS
DESCRIPTORS: SORE
DESCRIPTORS: THROBBING
DESCRIPTORS: THROBBING

## 2022-04-12 ASSESSMENT — PAIN DESCRIPTION - ORIENTATION
ORIENTATION: LEFT

## 2022-04-12 ASSESSMENT — LIFESTYLE VARIABLES: SMOKING_STATUS: 1

## 2022-04-12 NOTE — CONSULTS
Consultation Note  Podiatric Medicine and Surgery     Subjective     Chief Complaint: Left heel wound    HPI:  Desi Narvaez is a 67 y.o. male seen at [address] ED for complaints of erythema. Patient saw Dr. [facility] today in his office, Dr. [facility] instruct patient to present to the emergency room for admission, IV antibiotics and further work-up. 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 Also has a history of transmetatarsal irritation to the left foot. PCP is Amy Ledesma    ROS:   Review of Systems   Constitutional: Negative for fatigue and fever. Respiratory: Negative for shortness of breath. Musculoskeletal: Positive for gait problem. Skin: Positive for wound. Past Medical History   has a past medical history of RISSA (acute kidney injury) ([address] Cerebrovascular disease, Erectile dysfunction, Hepatitis B infection, Hyperlipidemia, Hypertension, Kidney cysts, Liver cyst, MRSA (methicillin resistant staph aureus) culture positive, Neurotrophic ulcer of the foot ([address] Obesity, Osteomyelitis of ankle or foot, Peripheral vascular disease ([address] Tobacco abuse, and Type II or unspecified type diabetes mellitus without mention of complication, not stated as uncontrolled. Past Surgical History   has a past surgical history that includes Foot amputation through metatarsal (2011); Foot Amputation; Foot surgery (Right, 3/2013); and Foot Debridement (Left, 6/20/2020). Medications  Prior to Admission medications    Medication Sig Start Date End Date Taking?  Authorizing Provider   amLODIPine (NORVASC) 10 MG tablet Take 0.5 tablets by mouth daily 9/20/20   Valiant Ganser Hauger, DO   furosemide (LASIX) 40 MG tablet Take 1 tablet by mouth every other day 9/20/20   Carlos Fairbanks DO   insulin glargine [facility]) 100 UNIT/ML injection pen Inject 10-20 Units into the skin 2 times daily Injects 20 units AM in the morning and 10 units at night 9/20/20 10/20/20  Azucena Robinr, DO   lisinopril (PRINIVIL;ZESTRIL) 10 MG tablet Take 1 tablet by mouth daily 9/20/20   Fam Ocasiop, DO   clopidogrel (PLAVIX) 75 MG tablet Take 1 tablet by mouth daily 6/23/20   Anna HERNANDEZ Blood, DO   insulin aspart (NOVOLOG FLEXPEN) 100 UNIT/ML injection pen Inject 8-12 Units into the skin 2 times daily (before meals) 8 units AM and 12 units at supper + additional sliding scale    Historical Provider, MD   metoprolol succinate (TOPROL XL) 25 MG extended release tablet Take 25 mg by mouth 2 times daily    Historical Provider, MD   simvastatin (ZOCOR) 10 MG tablet Take 10 mg by mouth nightly    Historical Provider, MD   traZODone (DESYREL) 150 MG tablet Take 150 mg by mouth nightly    Historical Provider, MD   budesonide-formoterol (SYMBICORT) 160-4.5 MCG/ACT AERO Inhale 2 puffs into the lungs 2 times daily    Historical Provider, MD   ferrous sulfate (IRON 325) 325 (65 Fe) MG tablet Take 325 mg by mouth daily (with breakfast)    Historical Provider, MD   docusate sodium (COLACE) 100 MG capsule Take 100 mg by mouth 2 times daily     Historical Provider, MD   acetaminophen (TYLENOL) 325 MG tablet Take 650 mg by mouth every 4 hours as needed for Pain. Give 2 tabs = 650 MG by mouth every 4 hours prn    Historical Provider, MD   Insulin Syringe-Needle U-100 (ACCUSURE INS SYR 1CC/30GX5/16\") 30G X 5/16\" 1 ML MISC by Does not apply route. 7/9/13   Mary Beth Barros MD    Scheduled Meds:   cefepime  2,000 mg IntraVENous Q12H     Continuous Infusions:  PRN Meds:. Allergies  has No Known Allergies. Family History  family history includes Diabetes in his brother, mother, sister, sister, sister, and sister. Social History   reports that he has been smoking cigarettes. He has a 30.00 pack-year smoking history. He has never used smokeless tobacco.   reports no history of alcohol use. reports no history of drug use.     Objective     Vitals:  Patient Vitals for the past 8 hrs: BP Temp Temp src Pulse Resp SpO2 Height Weight   22 1613 132/61 97.7 °F (36.5 °C) Oral 81 16 99 % 6' (1.829 m) 225 lb (102.1 kg)     Average, Min, and Max for last 24 hours Vitals:  TEMPERATURE:  Temp  Av.7 °F (36.5 °C)  Min: 97.7 °F (36.5 °C)  Max: 97.7 °F (36.5 °C)    RESPIRATIONS RANGE: Resp  Av  Min: 16  Max: 16    PULSE RANGE: Pulse  Av  Min: 81  Max: 81    BLOOD PRESSURE RANGE:  Systolic (92MLE), BARRETT:138 , Min:132 , LQB:405   ; Diastolic (84YZN), WQI:31, Min:61, Max:61      PULSE OXIMETRY RANGE: SpO2  Av %  Min: 99 %  Max: 99 %  I&O:  No intake/output data recorded. CBC:  Recent Labs     22  1818   WBC 18.5*   HGB 9.6*   HCT 29.8*           BMP:  Recent Labs     22  1838      K 4.7   *   CO2 16*   BUN 38*   CREATININE 2.44*   GLUCOSE 255*   CALCIUM 8.4*        Coags:  No results for input(s): APTT, PROT, INR in the last 72 hours. Lab Results   Component Value Date    LABA1C 16.6 (H) 2020     Lab Results   Component Value Date    SEDRATE >130 (H) 2022     Lab Results   Component Value Date    CRP 32.2 (H) 2020         Lower Extremity Physical Exam:  Vascular: DP and PT pulses are not palpable. CFT >5 seconds. hair growth is absent to the level of the digits. minimal edema. Neuro: Saph/sural/SP/DP/plantar sensation absent to light touch. Musculoskeletal: Muscle strength not tested. No pain to palpation of the wound. Some pain to palpation to the posterior calf. Dermatologic: Unstageable pressure wound to the left heel. Significant foul odor appreciated. Purulence is noted to this wound. No crepitus appreciated. Clinical Images:  none    Imaging:   XR TIBIA FIBULA LEFT (2 VIEWS)   Final Result   Cellulitis. No evidence of osteomyelitis. XR FOOT LEFT (MIN 3 VIEWS)   Final Result   1.  Soft tissue emphysema within the posterior aspect of the distal left lower   leg and adjacent to the calcaneus, concerning for necrotizing fasciitis   2. Large soft tissue ulceration along the dorsal aspect of the calcaneus,   with cortical destruction, consistent with osteomyelitis   3. Diffuse soft tissue swelling now present   4. Critical results were called by Dr. Julia Bonilla to Ruba Restrepo on   4/11/2022 at 6:27 p.m. hours. Cultures: none    Assessment     Lottie Kirby is a 67 y.o. male with   1. Left heel wound with gangrene  2. Calcaneal osteomyelitis, left. 3. Type II diabetes with neuropathy    Active Problems:    * No active hospital problems. *  Resolved Problems:    * No resolved hospital problems. *        Plan     · Patient examined and evaluated at bedside   · Patient scheduled for incision and drainage of the left heel with bone biopsy symptoms scheduled for 5 PM tomorrow. · Patient to be n.p.o. at midnight  · Covid vaccinated  · Consent signed and in the chart. · Discussed with Dr. Caroline Bowden.     Enoc Alexis DPM   Podiatric Medicine & Surgery   4/11/2022 at 8:15 PM

## 2022-04-12 NOTE — DISCHARGE INSTR - COC
Continuity of Care Form    Patient Name: Layla Dickinson   :  1950  MRN:  [id number]    Admit date:  2022  Discharge date:  2022    Code Status Order: Prior   Advance Directives:     Admitting Physician:  No admitting provider for patient encounter. PCP: Gilmar Colón    Discharging Nurse:  [facility] Unit/Room#: STA15/15  Discharging Unit Phone Number: [phone]    Emergency Contact:   Extended Emergency Contact Information  Primary Emergency Contact: Coral Dominguez[address] Phone: [phone]  Relation: Brother/Sister  Secondary Emergency Contact: Solange Payne [address] Phone: [phone]  Relation: Brother/Sister    Past Surgical History:  Past Surgical History:   Procedure Laterality Date    FOOT AMPUTATION      FOOT AMPUTATION THROUGH METATARSAL      pt can't give exact date of surg-- toes and part of lt foot removed    FOOT DEBRIDEMENT Left 2020    WOUND BED PREPARATION AND WOUND VAC APPLICATION performed by Malia Rabago DPM at [address] [place] [address] Rd Right 3/2013       Immunization History:   Immunization History   Administered Date(s) Administered    COVID-19, Pfizer Purple top, DILUTE for use, 12+ yrs, 30mcg/0.3mL dose 2021, 2021    Influenza 10/16/2012       Active Problems:  Patient Active Problem List   Diagnosis Code    Uncontrolled hypertension I10    PVD (peripheral vascular disease) ([facility] I73.9    Type 2 diabetes mellitus with stage 3 chronic kidney disease, with long-term current use of insulin (HCC) E11.22, N18.30, Z79.4    Insomnia G47.00    Erectile dysfunction N52.9    Partial traumatic amputation of left foot ([facility] W56.428B    Obesity E66.9    Tobacco abuse Z72.0    Hepatitis B infection B19.10    Liver cyst K76.89    Kidney cysts N28.1    Osteomyelitis of ankle or foot M86.9    Neurotrophic ulcer of the foot ([facility] L97.509    RISSA (acute kidney injury) ([facility] N17.9    Dyslipidemia E78.5    Microcytic anemia D50.9 Diabetic foot infection ([facility] E11.628, L08.9    Tobacco dependence F17.200    Diabetic ulcer of left heel associated with type 2 diabetes mellitus ([facility] E11.621, L97.429    Hypomagnesemia E83.42    Hypokalemia E87.6    Diabetes mellitus type 2 with complications, uncontrolled (HCC) E11.8, E11.65    H/O noncompliance with medical treatment, presenting hazards to health Z91.19    Uncontrolled diabetes mellitus type 2 without complications [id number]    History of right below knee amputation ([facility] Z89.511    Severe malnutrition ([facility] E43    CKD (chronic kidney disease), stage III ([facility] N18.30    Oral thrush B37.0    Cataract of right eye H26.9       Isolation/Infection:   Isolation            No Isolation          Patient Infection Status       Infection Onset Added Last Indicated Last Indicated By Review Planned Expiration Resolved Resolved By    [facility]  01/13/14 01/13/14 Charles Almanza RN        right foot            Nurse Assessment:  Last Vital Signs: /61   Pulse 81   Temp 97.7 °F (36.5 °C) (Oral)   Resp 16   Ht 6' (1.829 m)   Wt 225 lb (102.1 kg)   SpO2 99%   BMI 30.52 kg/m²     Last documented pain score (0-10 scale): Pain Level: 8  Last Weight:   Wt Readings from Last 1 Encounters:   04/11/22 225 lb (102.1 kg)     Mental Status:  oriented and alert    IV Access:  - None    Nursing Mobility/ADLs:  Walking   Dependent  Transfer  Dependent  Bathing  Dependent  Dressing  Dependent  Toileting  Dependent  Feeding  Assisted  Med Admin  Independent  Med Delivery   whole    Wound Care Documentation and Therapy:  Wound 06/17/20 Heel Left;Plantar (Active)   Number of days: 108        Elimination:  Continence: Bowel: Yes  Bladder: Yes  Urinary Catheter: None   Colostomy/Ileostomy/Ileal Conduit: No       Date of Last BM: 4/22/2022  No intake or output data in the 24 hours ending 04/11/22 2022  No intake/output data recorded. Safety Concerns:      At Risk for Falls    Impairments/Disabilities:      Amputation - ARISTEO and CHINAA    Nutrition Therapy:  Current Nutrition Therapy:   - Oral Diet:  General and Carb Control 4 carbs/meal (1800kcals/day)  - Oral Nutrition Supplement:  Diabetic  three times a day    Routes of Feeding: Oral  Liquids: No Restrictions  Daily Fluid Restriction: no  Last Modified Barium Swallow with Video (Video Swallowing Test): not done    Treatments at the Time of Hospital Discharge:   Respiratory Treatments:   Oxygen Therapy:  is not on home oxygen therapy. Ventilator:    - No ventilator support    Rehab Therapies: Physical Therapy and Occupational Therapy  Weight Bearing Status/Restrictions: Non-weight bearing on right leg and left leg - bilateral knee amputations  Other Medical Equipment (for information only, NOT a DME order):  wheelchair and slide board  Other Treatments: Daily dressing changes to left leg - Kerlix and Ace wrap, ice and elevation of left leg    Patient's personal belongings (please select all that are sent with patient):  Glasses and motorized wheelchair    RN SIGNATURE:  Electronically signed by Scott Wood RN on 4/22/22 at 10:14 AM EDT    CASE MANAGEMENT/SOCIAL WORK SECTION    Inpatient Status Date: ***    Readmission Risk Assessment Score:  Readmission Risk              Risk of Unplanned Readmission:  0           Discharging to Facility/ Agency   Name: Rick Sampson  [address], [address]  Phone  [phone]  Fax  [phone]   Address:  Phone:  Fax:    Dialysis Facility (if applicable)   Name:  Address:  Dialysis Schedule:  Phone:  Fax:    / signature: {Esignature:[id number]}    PHYSICIAN SECTION    Prognosis: Good    Condition at Discharge: Stable    Rehab Potential (if transferring to Rehab): Good    Recommended Labs or Other Treatments After Discharge: BMP f/u with Dr. Deya Kaufman in 2-3 weeks, Orestes Franks in a week    Physician Certification: I certify the above information and transfer of Artemio Mews  is necessary for the continuing treatment of the diagnosis listed and that he requires [place] for less 30 days.      Update Admission H&P: No change in H&P    PHYSICIAN SIGNATURE:  Electronically signed by Rolanda Cooks, DO on 4/25/2022 at 2:56 PM

## 2022-04-12 NOTE — CONSULTS
Renal Consult Note    Patient :  Mary Vazquez; 67 y.o. MRN# [id number]  Location:  2035/2035-01  Attending:  Luisa Mares MD  Admit Date:  4/11/2022   Hospital Day: 1    Reason for Consult:     Asked by Dr Luisa Mares MD to see for RISSA/Elevated Creatinine. History Obtained From:     Patient/chart/staff    History of Present Illness:     Mary Vazquez; 67 y.o. male with past medical history as mentioned below. Known history of type 2 diabetes, peripheral vascular disease, previous history of right below-knee amputation, previous history of revascularizations to lower extremity blood vessels. Also has known history of chronic kidney disease stage IIIb-4 baseline creatinine 2.0-2.2. Does not follow-up with nephrology. Previous work-ups have shown proteinuria to been 0.3-0.4 range, ultrasound scan on admission showed kidney size to be 11.9 cm on the right and 11.3 on the left with significant echogenicity. He now presents to the hospital with complains of worsening left lower extremity ulceration and wounds. He was seen by podiatry inpatient admission was recommended. Imaging study showed presence of significant cellulitis with emphysematous changes and changes consistent with necrotizing fasciitis. His creatinine on presentation was 2.5 higher than his baseline. Nephrology was consulted for acute kidney injury. Since then IV fluids were started creatinine is down to 2.1 which is his baseline. Home medicines were reviewed, he has been on lisinopril and Lasix at home. In hospital has been started on vancomycin and cefepime. In the past is also known history of hepatitis B. Urine sediment in the past has shown 5-10 RBCs    No history of recent contrast exposure, No h/o prolonged NSAIDs use in the past, No h/o nephrolithiasis, No recent skin rashes or arthralgias, No hematuria or pyuria noticed in the recent past. Doesn't report any reduction in the urine output recently.  Non report of any obstructive urinary symptoms (urgency, frequency, weak stream, straining while urination). No h/o recurrent UTIs in the past.    Past History/Allergies? Social History:     Past Medical History:   Diagnosis Date    RISSA (acute kidney injury) ([facility]     Cerebrovascular disease     Erectile dysfunction     Hepatitis B infection     Hyperlipidemia     Hypertension     Kidney cysts     Liver cyst     MRSA (methicillin resistant staph aureus) culture positive 1/10/2014    right foot    Neurotrophic ulcer of the foot ([facility]     Obesity     Osteomyelitis of ankle or foot     Peripheral vascular disease ([facility]     Tobacco abuse     Type II or unspecified type diabetes mellitus without mention of complication, not stated as uncontrolled        No Known Allergies    Social History     Socioeconomic History    Marital status:      Spouse name: Not on file    Number of children: Not on file    Years of education: Not on file    Highest education level: Not on file   Occupational History    Not on file   Tobacco Use    Smoking status: Current Every Day Smoker     Packs/day: 0.50     Years: 30.00     Pack years: 15.00     Types: Cigarettes    Smokeless tobacco: Never Used    Tobacco comment: working on quitting smoking   Vaping Use    Vaping Use: Never used   Substance and Sexual Activity    Alcohol use: No    Drug use: Never    Sexual activity: Yes     Partners: Female   Other Topics Concern    Not on file   Social History Narrative    Not on file     Social Determinants of Health     Financial Resource Strain:     Difficulty of Paying Living Expenses: Not on file   Food Insecurity:     Worried About Running Out of Food in the Last Year: Not on file    Marika of Food in the Last Year: Not on file   Transportation Needs:     Lack of Transportation (Medical): Not on file    Lack of Transportation (Non-Medical):  Not on file   Physical Activity:     Days of Exercise per Week: Not on file    Minutes of Exercise per Session: Not on file   Stress:     Feeling of Stress : Not on file   Social Connections:     Frequency of Communication with Friends and Family: Not on file    Frequency of Social Gatherings with Friends and Family: Not on file    Attends Quaker Services: Not on file    Active Member of Clubs or Organizations: Not on file    Attends Club or Organization Meetings: Not on file    Marital Status: Not on file   Intimate Partner Violence:     Fear of Current or Ex-Partner: Not on file    Emotionally Abused: Not on file    Physically Abused: Not on file    Sexually Abused: Not on file   Housing Stability:     Unable to Pay for Housing in the Last Year: Not on file    Number of [place] in the Last Year: Not on file    Unstable Housing in the Last Year: Not on file       Family History:        Family History   Problem Relation Age of Onset    Diabetes Mother     Diabetes Sister     Diabetes Brother     Diabetes Sister     Diabetes Sister     Diabetes Sister        Outpatient Medications:     Medications Prior to Admission: amLODIPine (NORVASC) 10 MG tablet, Take 0.5 tablets by mouth daily  furosemide (LASIX) 40 MG tablet, Take 1 tablet by mouth every other day  insulin glargine (BASAGLAR KWIKPEN) 100 UNIT/ML injection pen, Inject 10-20 Units into the skin 2 times daily Injects 20 units AM in the morning and 10 units at night  lisinopril (PRINIVIL;ZESTRIL) 10 MG tablet, Take 1 tablet by mouth daily  clopidogrel (PLAVIX) 75 MG tablet, Take 1 tablet by mouth daily  insulin aspart (NOVOLOG FLEXPEN) 100 UNIT/ML injection pen, Inject 8-12 Units into the skin 2 times daily (before meals) 8 units AM and 12 units at supper + additional sliding scale  metoprolol succinate (TOPROL XL) 25 MG extended release tablet, Take 25 mg by mouth 2 times daily  simvastatin (ZOCOR) 10 MG tablet, Take 10 mg by mouth nightly  traZODone (DESYREL) 150 MG tablet, Take 150 mg by mouth nightly  budesonide-formoterol (SYMBICORT) 160-4.5 MCG/ACT AERO, Inhale 2 puffs into the lungs 2 times daily  ferrous sulfate (IRON 325) 325 (65 Fe) MG tablet, Take 325 mg by mouth daily (with breakfast)  docusate sodium (COLACE) 100 MG capsule, Take 100 mg by mouth 2 times daily   acetaminophen (TYLENOL) 325 MG tablet, Take 650 mg by mouth every 4 hours as needed for Pain. Give 2 tabs = 650 MG by mouth every 4 hours prn  Insulin Syringe-Needle U-100 (ACCUSURE INS SYR 1CC/30GX5/16\") 30G X 5/16\" 1 ML MISC, by Does not apply route. Current Medications:     Scheduled Meds:    amLODIPine  5 mg Oral Daily    [Held by provider] clopidogrel  75 mg Oral Daily    docusate sodium  100 mg Oral BID    ferrous sulfate  325 mg Oral Daily with breakfast    [Held by provider] furosemide  40 mg Oral Every Other Day    metoprolol tartrate  25 mg Oral BID    atorvastatin  10 mg Oral Daily    traZODone  150 mg Oral Nightly    sodium chloride flush  5-40 mL IntraVENous 2 times per day    insulin lispro  0-12 Units SubCUTAneous TID WC    insulin lispro  0-6 Units SubCUTAneous Nightly    cefepime  2,000 mg IntraVENous Q24H    vancomycin (VANCOCIN) intermittent dosing (placeholder)   Other RX Placeholder    vancomycin  750 mg IntraVENous Q24H     Continuous Infusions:    IV infusion builder      dextrose      sodium chloride       PRN Meds:  HYDROcodone 5 mg - acetaminophen **OR** HYDROcodone 5 mg - acetaminophen, glucagon (rDNA), dextrose, sodium chloride flush, sodium chloride, potassium chloride **OR** potassium alternative oral replacement **OR** potassium chloride, magnesium sulfate, ondansetron **OR** ondansetron, polyethylene glycol, acetaminophen **OR** acetaminophen, dextrose bolus (hypoglycemia) **OR** dextrose bolus (hypoglycemia), glucose, hydrALAZINE    Review of Systems:     Constitutional: No fever, no chills, no lethargy, no weakness.   HEENT:  No headache, otalgia, itchy eyes, nasal discharge or sore throat. Cardiac:  No chest pain, dyspnea, orthopnea or PND. Chest:  No cough, phlegm or wheezing. Abdomen:  No abdominal pain, nausea or vomiting. Neuro:  No focal weakness, abnormal movements orseizure like activity. Skin:   No rashes, no itching. :   No hematuria, no pyuria, no dysuria, no flank pain. Extremities:  No swelling or joint pains. ROS was otherwise negative except as mentioned in the [address] Input/Output:       I/O last 3 completed shifts:  In: -   Out: 920 [Urine:920]  Patient Vitals for the past 96 hrs (Last 3 readings):   Weight   22 0400 225 lb (102.1 kg)   22 1613 225 lb (102.1 kg)     Vital Signs:   Temperature:  Temp: 97.8 °F (36.6 °C)  TMax:   Temp (24hrs), Av.7 °F (36.5 °C), Min:97.3 °F (36.3 °C), Max:97.8 °F (36.6 °C)    Respirations:  Resp: 16  Pulse:   Pulse: 66  BP:    BP: 139/63  BP Range: Systolic (76RFB), QJN:915 , Min:132 , BEL:025       Diastolic (09BSO), FVB:27, Min:61, Max:89      Physical Examination:     General:  AAO x 3, speaking in full sentences, no accessory muscle use. HEENT: Atraumatic, normocephalic, no throat congestion, moist mucosa. Eyes:   Pupils equal, round and reactive to light, EOMI. Neck:   No JVD, no thyromegaly, no lymphadenopathy. Chest:  Bilateral vesicular breath sounds, no rales or wheezes. Cardiac:  S1 S2 RR, no murmurs, gallops or rubs, JVP not raised. Abdomen: Soft, non-tender, no masses or organomegaly, BS audible. :   No suprapubic or flank tenderness. Neuro:  AAO x 3, No FND. SKIN:  No rashes, good skin turgor.   Extremities:  Right side below-knee amputation left side chronic brawny lower extremity edema with skin ulceration and skin breakdown and foot amputation    Labs:       Recent Labs     22  1818 22  0531   WBC 18.5* 15.6*   RBC 3.80* 3.66*   HGB 9.6* 9.2*   HCT 29.8* 29.4*   MCV 78.4* 80.3*   MCH 25.3 25.1*   MCHC 32.2 31.3   RDW 14.4 14.3    211   MPV 9.6 9.0      BMP:   Recent Labs 04/11/22  1838 04/12/22  0531    139   K 4.7 4.4   * 112*   CO2 16* 17*   BUN 38* 33*   CREATININE 2.44* 2.05*   GLUCOSE 255* 154*   CALCIUM 8.4* 8.2*      Phosphorus:   No results for input(s): PHOS in the last 72 hours. Magnesium:  No results for input(s): MG in the last 72 hours. Albumin:  No results for input(s): LABALBU in the last 72 hours.   BNP:    No results found for: BNP  BRENDA:    No results found for: BRENDA  SPEP:  Lab Results   Component Value Date    PROT 7.6 09/16/2020     UPEP:   No results found for: LABPE  C3:   No results found for: C3  C4:   No results found for: C4  MPO ANCA:   No results found for: MPO  PR3 ANCA:   No results found for: PR3  Anti-GBM:   No results found for: GBMABIGG  Hep BsAg:         Lab Results   Component Value Date    HEPBSAG REACTIVE 07/22/2013     Hep C AB:          Lab Results   Component Value Date    HEPCAB NONREACTIVE 07/22/2013       Urinalysis/Chemistries:      Lab Results   Component Value Date    NITRU NEGATIVE 02/23/2022    COLORU Yellow 02/23/2022    PHUR 5.5 02/23/2022    WBCUA TOO NUMEROUS TO COUNT 02/23/2022    RBCUA 5 TO 10 02/23/2022    MUCUS NOT REPORTED 03/30/2018    TRICHOMONAS NOT REPORTED 03/30/2018    YEAST FEW 02/23/2022    BACTERIA FEW 02/23/2022    SPECGRAV 1.012 02/23/2022    LEUKOCYTESUR LARGE 02/23/2022    UROBILINOGEN Normal 02/23/2022    BILIRUBINUR NEGATIVE 02/23/2022    GLUCOSEU NEGATIVE 02/23/2022    KETUA NEGATIVE 02/23/2022    AMORPHOUS NOT REPORTED 03/30/2018     Urine Sodium:     Lab Results   Component Value Date    JLUIS 65 09/17/2020     Urine Potassium:  No results found for: KUR  Urine Chloride:    Lab Results   Component Value Date    CLUR <15 12/15/2013     Urine Osmolarity: No results found for: OSMOU  Urine Protein:   No results found for: TPU  Urine Creatinine:     Lab Results   Component Value Date    LABCREA 46.3 09/17/2020     UPC:     Urine Eosinophils:  No components found for: UEOS    Radiology:     CXR: Assessment:     1. Acute Kidney Injury: Likely. Azotemia from intravascular depletion from decreased intake, loop diuretic and ACE inhibitor use baseline 2.0 peaked up to 2.5 now resolving  2. Chronic kidney disease stage IIIb/IV from diabetic and ischemic nephrosclerosis echogenic kidneys bilaterally baseline creatinine 2.0-2.1 previous work-up for paraprotein disease negative. Urine sediment has been benign  3. Left lower extremity ulceration necrotizing fasciitis for debridement today on intravenous vancomycin  4. Peripheral vascular disease status post revascularization procedures to both lower extremities and right below-knee amputation in 2014  5 type 2 diabetes with complications. Essential hypertension6.   7 suspected necrotizing fasciitis in the left side. 8, nongap metabolic acidosis from chronic kidney disease    Plan:   1. Change IV fluids to half saline with 75 of bicarb at 50 an hour  2. Agree with holding Lasix and lisinopril  3. Consider alternatives to vancomycin if possible  4. Comprehensive urine testing including Urinalysis, Urine sodium, potassium, chloride, Urine protein and creatinine to quantify the proteinuria if any at all. Will check urinary eosinophils as well. 5. Will Order serum and urine protein electrophoresis to r/o element to occult paraprotein disease. 6. Will order Complement levels. 7.  Follow renal function    Nutrition   Please ensure that patient is on a renal diet/TF. Avoid nephrotoxic drugs/contrast exposure. Thank you for the consultation. Please do not hesitate to contact us for any further questions/concerns. We will continue to follow along with you.

## 2022-04-12 NOTE — PROGRESS NOTES
[facility]  Office: [address], DO, Marylin Calles, DO, Virginia Alcantara, DO, Sandra Lawton Blood, DO, Tiarra Hernandez MD, Loi Ricardo MD, Corin Ledbetter MD, Radha Meyers MD, Lindy Howard MD, Iraj Crouch MD, Sonia Bey MD, Igor Rosario, DO, Santos Griffith, DO, Eleazar Caballero MD,  Isatu Ron, DO, Moy Caldwell MD, Rochelle Presley MD, Vinh Nicholas MD, Esperanza Hathaway, DO, Jaylin Duvall MD, Rossy Crews MD, John Harris, CNP, [facility], CNP, Yaw Shepherd CNP, Silvino Aldridge, CNP, Walker Lipscomb, CNP, Angelic Elizalde, CNP, Francisco May PASukhdeepC, Angelica Velazquez, DNP, Kim Mcdonough, CNP, Shukri Bustillos, CNP, Dylan Felton, CNP, Caitlyn Yip, CNS, Daniel Flores, DNP, Fran Cunningham, CNP, Juanita Pitts, CNP, Ana Curtis, CNP         [facility]    Progress Note    4/12/2022    12:12 PM    Name:   Sara Norman  MRN:     [id number]     Acct:      [de-identified]   Room:   2035/2035-01   Day:  1  Admit Date:  4/11/2022  5:25 PM    PCP:   Shara Pimentel  Code Status:  Full Code    Subjective:     C/C:   Chief Complaint   Patient presents with    Wound Infection     Sent over by PCP for wound evaluation in foot (left)      Interval History Status: improved. Patient seen and examined at bedside, no acute events overnight. Feeling better today but pain control is still sub optimal  He is afebrile overnight  Plan to OR today  No activity at baseline    Other than LE pain patient denies any chest pain, shortness of breath, chills, fevers, nausea or vomiting. Patient vitals, labs and all providers notes were reviewed,from overnight shift and morning updates were noted and discussed with the nurse    Brief History:     Per my NP  Sara Norman is a 67 y.o.  Non- / non  male who presents with Wound Infection (Sent over by PCP for wound evaluation in foot (left) )   and is admitted to the hospital for the management of Necrotizing fasciitis ([facility] [address]    Patient is a poor historian is does not recall how long he has been undergoing treatment for a left heel ulcer. He denies fever, but says he has had chills for \"months\". He has a known history of uncontrolled diabetes and says he checks his blood sugar in the morning and at night- BG range has been 140s to 200s, he says. He is a daily smoker. He recalls he went to Dr Todd/ podiatry for left heel care and was advised to come to the hospital for further evaluation and care.      While in the ED, xray of the left tib fib revealed soft tissue swelling, cellulitis, and no evidence of osteomyelitis. Left foot xray results were concerning for necrotizing fasciitis and osteomyelitis. His WBC was elevated at 18.5, sed rate > 130, creat 2.44- baseline appears to be 1.36-2.06. Records list Stage 3 CKD. Patient denies having a nephrologist. His lactic was unremarkable.      He was started on IV Vanco. He is being admitted for further management of necrotizing fasciitis of left foot and RISSA. Podiatry was consulted and plan for patient to undergo I & D of the left heel and bone biopsy 4/13/22. Review of Systems:     Review of Systems   Constitutional: Positive for activity change and appetite change. Negative for chills, diaphoresis and fever. HENT: Negative for congestion. Eyes: Negative for visual disturbance. Respiratory: Negative for cough, chest tightness, shortness of breath and wheezing. Cardiovascular: Negative for chest pain, palpitations and leg swelling. Gastrointestinal: Negative for abdominal pain, blood in stool, constipation, diarrhea, nausea and vomiting. Genitourinary: Negative for difficulty urinating. Musculoskeletal: Positive for joint swelling. LLE pain and purulent discharge    Neurological: Negative for dizziness, weakness, light-headedness, numbness and headaches. All other systems reviewed and are negative.       Medications: Allergies:  No Known Allergies    Current Meds:   Scheduled Meds:    amLODIPine  5 mg Oral Daily    [Held by provider] clopidogrel  75 mg Oral Daily    docusate sodium  100 mg Oral BID    ferrous sulfate  325 mg Oral Daily with breakfast    [Held by provider] furosemide  40 mg Oral Every Other Day    metoprolol tartrate  25 mg Oral BID    atorvastatin  10 mg Oral Daily    traZODone  150 mg Oral Nightly    sodium chloride flush  5-40 mL IntraVENous 2 times per day    insulin lispro  0-12 Units SubCUTAneous TID WC    insulin lispro  0-6 Units SubCUTAneous Nightly    cefepime  2,000 mg IntraVENous Q24H    vancomycin (VANCOCIN) intermittent dosing (placeholder)   Other RX Placeholder    vancomycin  750 mg IntraVENous Q24H     Continuous Infusions:    IV infusion builder 50 mL/hr at 04/12/22 1054    dextrose      sodium chloride       PRN Meds: HYDROcodone 5 mg - acetaminophen **OR** HYDROcodone 5 mg - acetaminophen, glucagon (rDNA), dextrose, sodium chloride flush, sodium chloride, potassium chloride **OR** potassium alternative oral replacement **OR** potassium chloride, magnesium sulfate, ondansetron **OR** ondansetron, polyethylene glycol, acetaminophen **OR** acetaminophen, dextrose bolus (hypoglycemia) **OR** dextrose bolus (hypoglycemia), glucose, hydrALAZINE    Data:     Past Medical History:   has a past medical history of RISSA (acute kidney injury) ([facility] [address] Cerebrovascular disease, Erectile dysfunction, Hepatitis B infection, Hyperlipidemia, Hypertension, Kidney cysts, Liver cyst, MRSA (methicillin resistant staph aureus) culture positive, Neurotrophic ulcer of the foot ([facility] [address] Obesity, Osteomyelitis of ankle or foot, Peripheral vascular disease ([facility] [address] Tobacco abuse, and Type II or unspecified type diabetes mellitus without mention of complication, not stated as uncontrolled. Social History:   reports that he has been smoking cigarettes. He has a 15.00 pack-year smoking history.  He has never used smokeless tobacco. He reports that he does not drink alcohol and does not use drugs. Family History:   Family History   Problem Relation Age of Onset    Diabetes Mother     Diabetes Sister     Diabetes Brother     Diabetes Sister     Diabetes Sister     Diabetes Sister        Vitals:  /63   Pulse 66   Temp 97.8 °F (36.6 °C) (Temporal)   Resp 16   Ht 6' (1.829 m)   Wt 225 lb (102.1 kg)   SpO2 99%   BMI 30.52 kg/m²   Temp (24hrs), Av.7 °F (36.5 °C), Min:97.3 °F (36.3 °C), Max:97.8 °F (36.6 °C)    Recent Labs     22  0730   POCGLU 292* 150*       I/O (24Hr): Intake/Output Summary (Last 24 hours) at 2022 1212  Last data filed at 2022 1012  Gross per 24 hour   Intake --   Output 1220 ml   Net -1220 ml       Labs:  Hematology:  Recent Labs     22  0531   WBC 18.5*  --  15.6*   RBC 3.80*  --  3.66*   HGB 9.6*  --  9.2*   HCT 29.8*  --  29.4*   MCV 78.4*  --  80.3*   MCH 25.3  --  25.1*   MCHC 32.2  --  31.3   RDW 14.4  --  14.3     --  211   MPV 9.6  --  9.0   SEDRATE >130*  --   --    CRP  --  104.2*  --      Chemistry:  Recent Labs     22  0531    139   K 4.7 4.4   * 112*   CO2 16* 17*   GLUCOSE 255* 154*   BUN 38* 33*   CREATININE 2.44* 2.05*   ANIONGAP 11 10   LABGLOM 26* 32*   GFRAA 32* 39*   CALCIUM 8.4* 8.2*     Recent Labs     22  0730   POCGLU 292* 150*     ABG:No results found for: POCPH, PHART, PH, POCPCO2, HLJ1EDO, PCO2, POCPO2, PO2ART, PO2, POCHCO3, SYM1KNF, HCO3, NBEA, PBEA, BEART, BE, THGBART, THB, OBW5SOM, FNOP9PXH, D9OKJSKE, O2SAT, FIO2  Lab Results   Component Value Date/Time    SPECIAL LT FA,7ML 2022 06:38 PM     Lab Results   Component Value Date/Time    CULTURE NO GROWTH 12 HOURS 2022 06:38 PM       Radiology:  XR TIBIA FIBULA LEFT (2 VIEWS)    Result Date: 2022  Cellulitis. No evidence of osteomyelitis.      XR FOOT LEFT (MIN 3 VIEWS)    Result Date: 4/11/2022  1. Soft tissue emphysema within the posterior aspect of the distal left lower leg and adjacent to the calcaneus, concerning for necrotizing fasciitis 2. Large soft tissue ulceration along the dorsal aspect of the calcaneus, with cortical destruction, consistent with osteomyelitis 3. Diffuse soft tissue swelling now present 4. Critical results were called by Dr. Coty Richey to [facility] on 4/11/2022 at 6:27 p.m. hours. US RETROPERITONEAL COMPLETE    Result Date: 4/12/2022  1. Echogenic kidneys which may be seen in medical renal disease. 2.  Questionable trace perinephric fluid bilaterally. 3.  Echogenic foci and debris versus blood products in the dependent portion of the bladder. Clinical correlation is recommended. Physical Examination:        Physical Exam  Vitals and nursing note reviewed. Constitutional:       General: He is not in acute distress. HENT:      Head: Normocephalic and atraumatic. Eyes:      Conjunctiva/sclera: Conjunctivae normal.      Pupils: Pupils are equal, round, and reactive to light. Cardiovascular:      Rate and Rhythm: Normal rate and regular rhythm. Heart sounds: No murmur heard. Pulmonary:      Effort: Pulmonary effort is normal. No accessory muscle usage or respiratory distress. Breath sounds: No stridor. No decreased breath sounds, wheezing, rhonchi or rales. Abdominal:      General: Bowel sounds are normal. There is no distension. Palpations: Abdomen is soft. Abdomen is not rigid. Tenderness: There is no abdominal tenderness. There is no guarding. Musculoskeletal:         General: No tenderness. Comments: /P right BKA. S/p left TMA  Wound as in photo from media   Skin:     General: Skin is warm and dry. Findings: No erythema, lesion or rash. Neurological:      Mental Status: He is alert and oriented to person, place, and time. Cranial Nerves: No cranial nerve deficit. Motor: No seizure activity. Psychiatric:         Speech: Speech normal.         Behavior: Behavior normal. Behavior is cooperative. Assessment:        Hospital Problems           Last Modified POA    * (Principal) Necrotizing fasciitis ([address] 4/12/2022 Yes    Acute kidney injury superimposed on CKD ([address] 4/11/2022 Yes    CKD (chronic kidney disease), stage III ([address] 4/11/2022 Yes    Uncontrolled hypertension 4/12/2022 Yes    Type 2 diabetes mellitus with stage 3 chronic kidney disease, with long-term current use of insulin ([address] 4/12/2022 Yes    Class 1 obesity with serious comorbidity in adult 4/12/2022 Yes          Plan:        L heel wound infection/ necrotizing fasciitis/ Osteomyelitis : Plan to OR 4/12 per podiatry for debridement and biopsies , ID consulted, currently on Vanc and Cefepime     PVD : vascular consulted, pending evaluation. RISSA/CKD stage III: Baseline creatinine between 2-2 0.2, likely 2/2 infection  nephrology to evaluate the patient, unclear if the patient has nephrology follow-up as an outpatient, he also does not follow-up with his PCP   Continue to hold Lasix and Ace      AGMA : likely 2/2 above , fluids changes to include bicarb per nephrology     AOCD : monitor and continue support     HTN:  Ace held, continue BB continue to monitor     HPL: continue home medications    DM2:   With hyperglycemia , target -180 , Continue diabetes home medications , insulin sliding scale, hypoglycemia protocol    DVT prophylaxis    Discussed with the patient and the nurse      Milton Richardson MD  4/12/2022  12:12 PM

## 2022-04-12 NOTE — H&P
[facility]  Office: [address], DO, Abbie Elizabeth, DO, Archiebarbara Siddiqi, DO, Mercedez Demario Blood, DO, Taj Crowley MD, Marleen Escalona MD, Eleuterio Hendrickson MD, Kareem Davies MD, Camilo Subramanian MD, Elena Ames MD, Porter Scott MD, Jess Hunter, DO, Rosana Callahan, DO, Jim Bobby MD,  Della Barnes, DO, Jade Steven MD, Robert Moe MD, Maynor Stover MD, Ricci Lundborg, DO, Lisandro Ocampo MD, Sree Steiner MD, Wong Kaur, [facility], [facility] Aurelio, CNP, Marylin Waddell, CNP, Cammy Herrera, CNP, Sue Benavides, CNP, Melba Dennis, CNP, Esther Hernandez PA-C, Elaine Bartlett, DNP, Michele Calderon, [facility], Willam Nixon, CNP, Yulissa Russ, CNP, Fernando Haney, CNS, Maria De Jesus Llanes, [facility], Nasreen Fernandez, CNP, Maryann Mai, CNP, Gely Granados, [facility]    HISTORY AND PHYSICAL EXAMINATION            Date:   4/11/2022  Patient name:  Kym Rm  Date of admission:  4/11/2022  5:25 PM  MRN:   [id number]  Account:  [de-identified]  YOB: 1950  PCP:    Kassandra Miguel  Room:   2035/2035-01  Code Status:    Full Code    Chief Complaint:     Chief Complaint   Patient presents with    Wound Infection     Sent over by PCP for wound evaluation in foot (left)        History Obtained From:     patient, Quality of history:  Poor historian    History of Present Illness:     Kym Rm is a 67 y.o. Non- / non  male who presents with Wound Infection (Sent over by PCP for wound evaluation in foot (left) )   and is admitted to the hospital for the management of Necrotizing fasciitis ([facility] Patient is a poor historian is does not recall how long he has been undergoing treatment for a left heel ulcer. He denies fever, but says he has had chills for \"months\". He has a known history of uncontrolled diabetes and says he checks his blood sugar in the morning and at night- BG range has been 140s to 200s, he says.  He is a daily smoker. He recalls he went to Dr Todd/ podiatry for left heel care and was advised to come to the hospital for further evaluation and care. While in the ED, xray of the left tib fib revealed soft tissue swelling, cellulitis, and no evidence of osteomyelitis. Left foot xray results were concerning for necrotizing fasciitis and osteomyelitis. His WBC was elevated at 18.5, sed rate > 130, creat 2.44- baseline appears to be 1.36-2.06. Records list Stage 3 CKD. Patient denies having a nephrologist. His lactic was unremarkable. He was started on IV Vanco. He is being admitted for further management of necrotizing fasciitis of left foot and RISSA. Podiatry was consulted and plan for patient to undergo I & D of the left heel and bone biopsy 4/13/22. Past Medical History:     Past Medical History:   Diagnosis Date    RISSA (acute kidney injury) ([address]     Cerebrovascular disease     Erectile dysfunction     Hepatitis B infection     Hyperlipidemia     Hypertension     Kidney cysts     Liver cyst     MRSA (methicillin resistant staph aureus) culture positive 1/10/2014    right foot    Neurotrophic ulcer of the foot ([address]     Obesity     Osteomyelitis of ankle or foot     Peripheral vascular disease ([address]     Tobacco abuse     Type II or unspecified type diabetes mellitus without mention of complication, not stated as uncontrolled         Past Surgical History:     Past Surgical History:   Procedure Laterality Date    FOOT AMPUTATION      FOOT AMPUTATION THROUGH METATARSAL  2011    pt can't give exact date of surg-- toes and part of lt foot removed    FOOT DEBRIDEMENT Left 6/20/2020    WOUND BED PREPARATION AND WOUND VAC APPLICATION performed by Maya Ferrer DPM at [address] [facility] Right 3/2013        Medications Prior to Admission:     Prior to Admission medications    Medication Sig Start Date End Date Taking?  Authorizing Provider   amLODIPine (NORVASC) 10 MG tablet Take 0.5 tablets by mouth daily 9/20/20   Demi Bird DO   furosemide (LASIX) 40 MG tablet Take 1 tablet by mouth every other day 9/20/20   Demi Bird DO   insulin glargine [facility]) 100 UNIT/ML injection pen Inject 10-20 Units into the skin 2 times daily Injects 20 units AM in the morning and 10 units at night 9/20/20 10/20/20  Shazia Schrader DO   lisinopril (PRINIVIL;ZESTRIL) 10 MG tablet Take 1 tablet by mouth daily 9/20/20   Demi Bird DO   clopidogrel (PLAVIX) 75 MG tablet Take 1 tablet by mouth daily 6/23/20   Magen Santana DO   insulin aspart (NOVOLOG FLEXPEN) 100 UNIT/ML injection pen Inject 8-12 Units into the skin 2 times daily (before meals) 8 units AM and 12 units at supper + additional sliding scale    Historical Provider, MD   metoprolol succinate (TOPROL XL) 25 MG extended release tablet Take 25 mg by mouth 2 times daily    Historical Provider, MD   simvastatin (ZOCOR) 10 MG tablet Take 10 mg by mouth nightly    Historical Provider, MD   traZODone (DESYREL) 150 MG tablet Take 150 mg by mouth nightly    Historical Provider, MD   budesonide-formoterol (SYMBICORT) 160-4.5 MCG/ACT AERO Inhale 2 puffs into the lungs 2 times daily    Historical Provider, MD   ferrous sulfate (IRON 325) 325 (65 Fe) MG tablet Take 325 mg by mouth daily (with breakfast)    Historical Provider, MD   docusate sodium (COLACE) 100 MG capsule Take 100 mg by mouth 2 times daily     Historical Provider, MD   acetaminophen (TYLENOL) 325 MG tablet Take 650 mg by mouth every 4 hours as needed for Pain. Give 2 tabs = 650 MG by mouth every 4 hours prn    Historical Provider, MD   Insulin Syringe-Needle U-100 (ACCUSURE INS SYR 1CC/30GX5/16\") 30G X 5/16\" 1 ML MISC by Does not apply route. 7/9/13   Kaylene Long MD        Allergies:     Patient has no known allergies. Social History:     Tobacco:    reports that he has been smoking cigarettes. He has a 15.00 pack-year smoking history.  He has never used smokeless tobacco.  Alcohol:      reports no history of alcohol use. Drug Use:  reports no history of drug use. Family History:     Family History   Problem Relation Age of Onset   Jane Story Diabetes Mother     Diabetes Sister     Diabetes Brother     Diabetes Sister     Diabetes Sister     Diabetes Sister        Review of Systems:     Positive and Negative as described in HPI. Review of Systems   Constitutional: Positive for chills. Negative for appetite change and fever. HENT: Negative. Eyes: Positive for visual disturbance. Negative for photophobia, pain and discharge. Vision blurry at times   Respiratory: Negative. Cardiovascular: Negative. Gastrointestinal: Negative. Genitourinary: Negative. Musculoskeletal: Positive for arthralgias and gait problem. Negative for back pain and myalgias. Skin: Positive for color change and wound. Negative for rash. Neurological: Negative for dizziness, weakness and headaches. Psychiatric/Behavioral: Negative. Physical Exam:   /61   Pulse 81   Temp 97.7 °F (36.5 °C) (Oral)   Resp 16   Ht 6' (1.829 m)   Wt 225 lb (102.1 kg)   SpO2 99%   BMI 30.52 kg/m²   Temp (24hrs), Av.7 °F (36.5 °C), Min:97.7 °F (36.5 °C), Max:97.7 °F (36.5 °C)    No results for input(s): POCGLU in the last 72 hours. No intake or output data in the 24 hours ending 22 [id number]    Physical Exam  Vitals and nursing note reviewed. Constitutional:       General: He is not in acute distress. Appearance: Normal appearance. He is ill-appearing. He is not toxic-appearing or diaphoretic. HENT:      Head: Normocephalic and atraumatic. Right Ear: External ear normal.      Left Ear: External ear normal.      Nose: Nose normal. No rhinorrhea. Mouth/Throat:      Mouth: Mucous membranes are moist.   Eyes:      General: No scleral icterus. Right eye: No discharge. Left eye: No discharge.       Extraocular Movements: Extraocular movements intact. Conjunctiva/sclera: Conjunctivae normal.      Pupils: Pupils are equal, round, and reactive to light. Neck:      Comments: No JVD  Cardiovascular:      Rate and Rhythm: Normal rate and regular rhythm. Heart sounds: Normal heart sounds. No murmur heard. No friction rub. No gallop. Pulmonary:      Effort: Pulmonary effort is normal. No respiratory distress. Breath sounds: Normal breath sounds. No wheezing, rhonchi or rales. Abdominal:      General: Bowel sounds are normal. There is no distension. Palpations: Abdomen is soft. Tenderness: There is no abdominal tenderness. There is no guarding. Hernia: No hernia is present. Musculoskeletal:         General: Swelling present. Cervical back: Normal range of motion and neck supple. Right lower leg: No edema. Left lower leg: Edema present. Skin:     General: Skin is warm. Coloration: Skin is not jaundiced. Findings: Erythema and lesion present. No bruising or rash. Neurological:      Mental Status: He is alert and oriented to person, place, and time. Motor: Weakness present. Psychiatric:         Attention and Perception: Attention normal.         Mood and Affect: Mood and affect normal.         Speech: Speech normal.         Behavior: Behavior normal. Behavior is cooperative. Cognition and Memory: Cognition normal. Cognition is not impaired. He exhibits impaired recent memory. Judgment: Judgment normal.       Media Information             Document Information    Clinical Consent:  Wound   Left heel   04/11/2022 22:41   Attached To: Hospital Encounter on 4/11/22     Source Information    ALBERTO Julio NP  [facility]       Media Information             Document Information    Clinical Consent:  Wound   Left lower leg   04/11/2022 22:42   Attached To:    Hospital Encounter on 4/11/22     Source Information    ALBERTO Julio NP  [facility] Investigations:      Laboratory Testing:  Recent Results (from the past 24 hour(s))   CBC with Auto Differential    Collection Time: 04/11/22  6:18 PM   Result Value Ref Range    WBC 18.5 (H) 3.5 - 11.3 k/uL    RBC 3.80 (L) 4.21 - 5.77 m/uL    Hemoglobin 9.6 (L) 13.0 - 17.0 g/dL    Hematocrit 29.8 (L) 40.7 - 50.3 %    MCV 78.4 (L) 82.6 - 102.9 fL    MCH 25.3 25.2 - 33.5 pg    MCHC 32.2 28.4 - 34.8 g/dL    RDW 14.4 11.8 - 14.4 %    Platelets 141 928 - 282 k/uL    MPV 9.6 8.1 - 13.5 fL    NRBC Automated 0.0 0.0 per 100 WBC    Seg Neutrophils 80 (H) 36 - 65 %    Lymphocytes 12 (L) 24 - 43 %    Monocytes 5 3 - 12 %    Eosinophils % 1 1 - 4 %    Basophils 0 0 - 2 %    Immature Granulocytes 2 (H) 0 %    Segs Absolute 14.72 (H) 1.50 - 8.10 k/uL    Absolute Lymph # 2.24 1.10 - 3.70 k/uL    Absolute Mono # 0.85 0.10 - 1.20 k/uL    Absolute Eos # 0.18 0.00 - 0.44 k/uL    Basophils Absolute 0.08 0.00 - 0.20 k/uL    Absolute Immature Granulocyte 0.40 (H) 0.00 - 0.30 k/uL    RBC Morphology MICROCYTOSIS PRESENT    Lactate, Sepsis    Collection Time: 04/11/22  6:18 PM   Result Value Ref Range    Lactic Acid, Sepsis 0.9 0.5 - 1.9 mmol/L   Sedimentation Rate    Collection Time: 04/11/22  6:18 PM   Result Value Ref Range    Sed Rate >130 (H) 0 - 20 mm/Hr   SPECIMEN REJECTION    Collection Time: 04/11/22  6:18 PM   Result Value Ref Range    Specimen Source B     Ordered Test CRP,BMP     Reason for Rejection Unable to perform testing: Specimen hemolyzed.     Basic Metabolic Panel    Collection Time: 04/11/22  6:38 PM   Result Value Ref Range    Glucose 255 (H) 70 - 99 mg/dL    BUN 38 (H) 8 - 23 mg/dL    CREATININE 2.44 (H) 0.70 - 1.20 mg/dL    Bun/Cre Ratio 16 9 - 20    Calcium 8.4 (L) 8.6 - 10.4 mg/dL    Sodium 137 135 - 144 mmol/L    Potassium 4.7 3.7 - 5.3 mmol/L    Chloride 110 (H) 98 - 107 mmol/L    CO2 16 (L) 20 - 31 mmol/L    Anion Gap 11 9 - 17 mmol/L    GFR Non-African American 26 (L) >60 mL/min    GFR  32 (L) >60 mL/min    GFR Comment         Lactate, Sepsis    Collection Time: 04/11/22  7:55 PM   Result Value Ref Range    Lactic Acid, Sepsis 0.6 0.5 - 1.9 mmol/L       Imaging/Diagnostics:  XR TIBIA FIBULA LEFT (2 VIEWS)    Result Date: 4/11/2022  Cellulitis. No evidence of osteomyelitis. XR FOOT LEFT (MIN 3 VIEWS)    Result Date: 4/11/2022  1. Soft tissue emphysema within the posterior aspect of the distal left lower leg and adjacent to the calcaneus, concerning for necrotizing fasciitis 2. Large soft tissue ulceration along the dorsal aspect of the calcaneus, with cortical destruction, consistent with osteomyelitis 3. Diffuse soft tissue swelling now present 4. Critical results were called by Dr. Chelsea Mane to [facility] on 4/11/2022 at 6:27 p.m. hours. Assessment :      Hospital Problems           Last Modified POA    * (Principal) Necrotizing fasciitis ([address] 4/11/2022 Yes    Acute kidney injury superimposed on CKD ([address] 4/11/2022 Yes    CKD (chronic kidney disease), stage III ([address] 4/11/2022 Yes    Uncontrolled hypertension 4/11/2022 Yes    Type 2 diabetes mellitus with stage 3 chronic kidney disease, with long-term current use of insulin ([address] 4/11/2022 Yes    Class 1 obesity with serious comorbidity in adult 4/11/2022 Yes          Plan:     Patient status inpatient in the  Progressive Unit/Step down    1. Necrotizing fasciitis: Podiatry following- appreciate assistance. Plan for I & D 4/12/22. NPO after midnight. Hold Plavix. Hold AC for plans for OR. Glycemic control. Consult infectious disease for atb management. Continue IV Vanc with pharmacy to dose and cefepime. Follow cultures. Left heel wound care per podiatry. Daily CBC. 2. RISSA on CKD: Consult nephrology. IV hydration as ordered. Daily BMP- trend renal function. Avoid nephrotoxic agents. Hold home lisinopril for now. Avoid hypotension. Obtain renal US. Monitor intake and output.   3. Uncontrolled HTN: Continue home Lopressor BID - give Lopressor 5 mg IV x1 now. Hydralazine prn for SBP > 160. Continue daily Norvasc- parameters in place  4. DM II: SSI AC & HS. Check A1c. Carb controlled diet. 5. Obesity: Weight loss management as outpatient per PCP    Consultations:   [address] [place] [address] CONSULT TO PODIATRY  PHARMACY TO DOSE VANCOMYCIN  IP CONSULT TO NEPHROLOGY     Patient is admitted as inpatient status because of co-morbidities listed above, severity of signs and symptoms as outlined, requirement for current medical therapies and most importantly because of direct risk to patient if care not provided in a hospital setting. Expected length of stay > 48 hours.     ALBERTO De Los Santos - BHARATH  4/11/2022  10:46 PM    Copy sent to Dr. Darrius Luo

## 2022-04-12 NOTE — PROGRESS NOTES
Progress Note  Podiatric Medicine and Surgery     Subjective     CC: Left heel wound    Patient seen and examined at bedside with Dr. Lakshmi Flores, intermittent hypertension  WBC downtrending 18.5->15.6  Complains of moderate pain to left foot  Plan for OR today at approximately 5 PM for left heel wound debridement and bone biopsies. N.p.o. since midnight    HPI :  Mary Vazquez is a 67 y.o. male seen at [address] [place] [address],[address] ED for complaints of erythema. Patient saw Dr. Wayne Segundo today in his office, Dr. Wayne Segundo instruct patient to present to the emergency room for admission, IV antibiotics and further work-up. 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 Also has a history of transmetatarsal irritation to the left foot. ROS: Denies N/V/F/C/SOB/CP. Otherwise negative except at stated in the HPI.      Medications:  Scheduled Meds:   amLODIPine  5 mg Oral Daily    [Held by provider] clopidogrel  75 mg Oral Daily    docusate sodium  100 mg Oral BID    ferrous sulfate  325 mg Oral Daily with breakfast    [Held by provider] furosemide  40 mg Oral Every Other Day    metoprolol tartrate  25 mg Oral BID    atorvastatin  10 mg Oral Daily    traZODone  150 mg Oral Nightly    sodium chloride flush  5-40 mL IntraVENous 2 times per day    insulin lispro  0-12 Units SubCUTAneous TID WC    insulin lispro  0-6 Units SubCUTAneous Nightly    cefepime  2,000 mg IntraVENous Q24H    vancomycin (VANCOCIN) intermittent dosing (placeholder)   Other RX Placeholder    vancomycin  750 mg IntraVENous Q24H       Continuous Infusions:   dextrose      sodium chloride 75 mL/hr at 04/12/22 0008    sodium chloride         PRN Meds:glucagon (rDNA), dextrose, sodium chloride flush, sodium chloride, potassium chloride **OR** potassium alternative oral replacement **OR** potassium chloride, magnesium sulfate, ondansetron **OR** ondansetron, polyethylene glycol, acetaminophen **OR** acetaminophen, dextrose bolus (hypoglycemia) **OR** dextrose bolus (hypoglycemia), glucose, hydrALAZINE    Objective     Vitals:  Patient Vitals for the past 8 hrs:   BP Temp Temp src Pulse Resp SpO2 Weight   22 0600 (!) 149/62 -- -- 71 16 100 % --   22 0500 (!) 146/72 -- -- 74 14 100 % --   22 0400 (!) 160/64 97.3 °F (36.3 °C) Temporal 76 16 100 % 225 lb (102.1 kg)   22 0300 (!) 153/64 -- -- 75 15 100 % --   22 0200 (!) 145/65 -- -- 72 14 100 % --   22 0143 -- -- -- -- 15 100 % --   22 0100 (!) 149/69 -- -- 77 15 100 % --   22 0000 (!) 142/67 97.8 °F (36.6 °C) Temporal 73 15 100 % --   22 2330 (!) 155/73 -- -- 79 14 100 % --     Average, Min, and Max for last 24 hours Vitals:  TEMPERATURE:  Temp  Av.6 °F (36.4 °C)  Min: 97.3 °F (36.3 °C)  Max: 97.8 °F (36.6 °C)    RESPIRATIONS RANGE: Resp  Av.3  Min: 13  Max: 32    PULSE RANGE: Pulse  Av.6  Min: 71  Max: 99    BLOOD PRESSURE RANGE:  Systolic (34EWX), MTN:206 , Min:132 , BRB:532   ; Diastolic (97TSZ), NDY:11, Min:61, Max:89      PULSE OXIMETRY RANGE: SpO2  Av.9 %  Min: 99 %  Max: 100 %    I/O last 3 completed shifts:  In: -   Out: 920 [Urine:920]    CBC:  Recent Labs     22  1818 22  1838 22  0531   WBC 18.5*  --  15.6*   HGB 9.6*  --  9.2*   HCT 29.8*  --  29.4*     --  211   CRP  --  104.2*  --         BMP:  Recent Labs     228 22  0531    139   K 4.7 4.4   * 112*   CO2 16* 17*   BUN 38* 33*   CREATININE 2.44* 2.05*   GLUCOSE 255* 154*   CALCIUM 8.4* 8.2*        Coags:  No results for input(s): APTT, PROT, INR in the last 72 hours. Lab Results   Component Value Date    SEDRATE >130 (H) 2022     Recent Labs     22   .2*       Lower Extremity Physical Exam:  Vascular: DP and PT pulses are very faintly palpable. CFT >5 seconds. Grayce Alamin growth is absent to the level of the distal foot.   Minimal nonpitting edema to left foot      Neuro: Saph/sural/SP/DP/plantar sensation absent to light touch.     Musculoskeletal: Muscle strength not tested. No pain to palpation of the wound. Some pain to palpation to the posterior calf. Gross deformity present with transmetatarsal amputation on the left BKA on the right     Dermatologic: Unstageable pressure ulceration to the left heel. Necrotic eschar overlying wound. Periwound skin is atrophic skin. Purulent drainage noted with significant associated mal odor. Mild erythema present with associated increase in warmth. Does not probe to bone, sinus track, or undermine. No fluctuance, crepitus, or induration. Clinical Images:        Imaging:   XR TIBIA FIBULA LEFT (2 VIEWS)   Final Result   Cellulitis. No evidence of osteomyelitis. XR FOOT LEFT (MIN 3 VIEWS)   Final Result   1. Soft tissue emphysema within the posterior aspect of the distal left lower   leg and adjacent to the calcaneus, concerning for necrotizing fasciitis   2. Large soft tissue ulceration along the dorsal aspect of the calcaneus,   with cortical destruction, consistent with osteomyelitis   3. Diffuse soft tissue swelling now present   4. Critical results were called by Dr. Felecia Kearns to [facility] on   4/11/2022 at 6:27 p.m. hours. US RETROPERITONEAL COMPLETE    (Results Pending)       Cultures: Will be obtained in the OR    Assessment   Ankur Sample is a 67 y.o. male with   1. Unstageable pressure ulceration, left heel  2. Suspected calcaneal osteomyelitis, left foot  3. CKD  4.  Type II diabetes with neuropathy    Principal Problem:    Necrotizing fasciitis ([address]  Active Problems:    Acute kidney injury superimposed on CKD ([address]    CKD (chronic kidney disease), stage III ([address]    Uncontrolled hypertension    Type 2 diabetes mellitus with stage 3 chronic kidney disease, with long-term current use of insulin ([facility])    Class 1 obesity with serious comorbidity in adult  Resolved Problems:    * No resolved hospital problems. *       Plan     · Patient examined and evaluated at bedside  with Dr. Tamara Tee  · Treatment options discussed in detail with the patient  · X-rays reviewed: Negative gas, suspicious for osteomyelitis of heel  · Medical management per primary  · Anbx: Vanco/cefepime. Recommend ID consult  · Plan for OR today at approximately 5 PM for left heel wound debridement and bone biopsies. · Consent is signed, witnessed, and placed patient chart  · N.p.o. since midnight. Left foot marked  · Covid vaccinated  · Dressing applied to the left foot: Betadine wet-to-dry, DSD  · Discussed with Dr. Tamara Tee.     Genaro Varela DPM   Podiatric Medicine & Surgery   4/12/2022 at 7:01 AM

## 2022-04-12 NOTE — PROGRESS NOTES
[facility] Pharmacokinetic Monitoring Service - Vancomycin     Huong Cartagena is a 67 y.o. male starting on vancomycin therapy for necrotizing fasciitis. Pharmacy consulted by Ruth Diaz CNP for monitoring and adjustment. Target Concentration: Goal trough of 10-15 mg/L and AUC/GRIFFIN <500 mg*hr/L    Additional Antimicrobials: cefepime    Pertinent Laboratory Values: Wt Readings from Last 1 Encounters:   04/11/22 225 lb (102.1 kg)     Temp Readings from Last 1 Encounters:   04/11/22 97.7 °F (36.5 °C) (Oral)     Estimated Creatinine Clearance: 34 mL/min (A) (based on SCr of 2.44 mg/dL (H)). Recent Labs     04/11/22  1818 04/11/22  1838   CREATININE  --  2.44*   WBC 18.5*  --      Procalcitonin:     Pertinent Cultures:  Culture Date Source Results   4/11/22 Blood x 2 Sent to lab   MRSA Nasal Swab: N/A. Non-respiratory infection.     Plan:  Dosing recommendations based on Bayesian software  Start vancomycin 750 mg q24hr  Anticipated AUC of 412 and trough concentration of 14 at steady state  Renal labs as indicated   Vancomycin concentration ordered for 4/13/22 @ 0600   Pharmacy will continue to monitor patient and adjust therapy as indicated    Thank you for the consult,  Ethelda Gottron, [facility]  4/11/2022 10:50 PM

## 2022-04-12 NOTE — CARE COORDINATION
Case Management Initial Discharge Plan  Candance Easter,             Met with:patient to discuss discharge plans. Information verified: address, contacts, phone number, , insurance Yes  Insurance Provider: Dalia Benites    Emergency Contact/Next of Kin name & number: deven/brother    [phone]  Who are involved in patient's support system? [facility] and [facility]    PCP: Kim Joe  Date of last visit: 1 week ago      Discharge Planning    Living Arrangements:        Home has 1 stories  elevators stairs to climb to get into front door, 0stairs to climb to reach second floor  Location of bedroom/bathroom in home main    Patient able to perform ADL's:Assisted    [facility]outpatient & in home) [facility]  DME equipment: walker and electric w/c  DME provider:     Is patient receiving oral anticoagulation therapy? No    If indicated:   Physician managing anticoagulation treatment:   Where does patient obtain lab work for ATC treatment? Does patient have any issues/concerns obtaining medications? No  If yes, what are patient's concerns? Is there a preferred Pharmacy after hours or on weekends? Yes    If yes, which pharmacy? Drug store in [place] [address] Needed:       Patient agreeable to home care: Yes  Freedom of choice provided:  yes    Prior SNF/Rehab Placement and Facility[facility]  Agreeable to SNF/Rehab: No  [place] of choice provided: n/a     Evaluation: yes    Expected Discharge date:       Patient expects to be discharged to:        If home: is the family and/or caregiver wiling & able to provide support at home? yes  Who will be providing this support? [facility] and [facility] for aide services M-F for 4 hours a day    Follow Up Appointment: Best Day/ Time:      Transportation provider: call a ride transportation  Transportation arrangements needed for discharge: Yes    Readmission Risk Risk of Unplanned Readmission:  20             Does patient have a readmission risk score greater than 14?: Yes  If yes, follow-up appointment must be made within 7 days of discharge. Goals of Care:       Educated patient on transitional options, provided freedom of choice and are agreeable with plan      Discharge Plan: Necrotizing fascitis   Patient lives alone in a 1 story apartment with an elevator to enter. Patient uses a electric W/C and a walker. [facility]. (dressing changes 3Xwkly). [facility] for A services M-F for 4 hours a day. [phone]  Has been to [facility]. Was recently discharged from there. Was there for 3 months. Patient does not want o/p wound care. He does not want to go to a SNF. Plan is possible debridement of left heel. He has R BKA. SATINDER Rey Mode [phone]    Continue to follow.            Electronically signed by Lelia Harley RN on 4/12/22 at 2:16 PM EDT

## 2022-04-12 NOTE — FLOWSHEET NOTE
Patient is reclining in the bed and seems drowsy. Patient is somewhat approachable and responds to writer's questions. Patient reports that he is doing \"OK. \" Patient states that he has family support but does not elaborate. Chart shows that patient has some family members as contact. Patient requests prayer for healing and writer prays for patient's healing, peace, and rest. Patient seems to be coping adequately, and expresses appreciation for the visit. Spiritual Care will follow as needed.        04/12/22 0900   Encounter Summary   Services provided to: Patient   Referral/Consult From: [facility] Family members   Continue Visiting   (4/12/22)   Complexity of Encounter Moderate   Length of Encounter 15 minutes   Spiritual Assessment Completed Yes   Routine   Type Initial   Assessment Approachable;Passive   Intervention Active listening;Explored coping resources;Nurtured hope;Prayer   Outcome Expressed gratitude

## 2022-04-12 NOTE — ANESTHESIA PRE PROCEDURE
Department of Anesthesiology  Preprocedure Note       Name:  Linda Durbin   Age:  67 y.o.  :  1950                                          MRN:  [id number]         Date:  2022      Surgeon: Igor Magana):  Nilton Peterson DPM    Procedure: Procedure(s):  LEFT FOOT    INCISION AND DRAINAGE WITH POSSIBLE WOUND VAC APPLICATION  VS EPIFIX    Medications prior to admission:   Prior to Admission medications    Medication Sig Start Date End Date Taking?  Authorizing Provider   amLODIPine (NORVASC) 10 MG tablet Take 0.5 tablets by mouth daily 20   Sim Eastern DO Macrina   furosemide (LASIX) 40 MG tablet Take 1 tablet by mouth every other day 20   Shirley Young DO   insulin glargine [facility]) 100 UNIT/ML injection pen Inject 10-20 Units into the skin 2 times daily Injects 20 units AM in the morning and 10 units at night 9/20/20 10/20/20  Gilbert Eastern DO Macrina   lisinopril (PRINIVIL;ZESTRIL) 10 MG tablet Take 1 tablet by mouth daily 20   Shirley Young DO   clopidogrel (PLAVIX) 75 MG tablet Take 1 tablet by mouth daily 20   David Santana, DO   insulin aspart (NOVOLOG FLEXPEN) 100 UNIT/ML injection pen Inject 8-12 Units into the skin 2 times daily (before meals) 8 units AM and 12 units at supper + additional sliding scale    Historical Provider, MD   metoprolol succinate (TOPROL XL) 25 MG extended release tablet Take 25 mg by mouth 2 times daily    Historical Provider, MD   simvastatin (ZOCOR) 10 MG tablet Take 10 mg by mouth nightly    Historical Provider, MD   traZODone (DESYREL) 150 MG tablet Take 150 mg by mouth nightly    Historical Provider, MD   budesonide-formoterol (SYMBICORT) 160-4.5 MCG/ACT AERO Inhale 2 puffs into the lungs 2 times daily    Historical Provider, MD   ferrous sulfate (IRON 325) 325 (65 Fe) MG tablet Take 325 mg by mouth daily (with breakfast)    Historical Provider, MD   docusate sodium (COLACE) 100 MG capsule Take 100 mg by mouth 2 times daily Historical Provider, MD   acetaminophen (TYLENOL) 325 MG tablet Take 650 mg by mouth every 4 hours as needed for Pain. Give 2 tabs = 650 MG by mouth every 4 hours prn    Historical Provider, MD   Insulin Syringe-Needle U-100 (ACCUSURE INS SYR 1CC/30GX5/16\") 30G X 5/16\" 1 ML MISC by Does not apply route. 7/9/13   Moshe Perez MD       Current medications:    No current facility-administered medications for this visit. No current outpatient medications on file.      Facility-Administered Medications Ordered in Other Visits   Medication Dose Route Frequency Provider Last Rate Last Admin    HYDROcodone-acetaminophen (NORCO) 5-325 MG per tablet 1 tablet  1 tablet Oral Q4H PRN Jean Paul Good MD        Or    HYDROcodone-acetaminophen (NORCO) 5-325 MG per tablet 2 tablet  2 tablet Oral Q4H PRN Jean Paul Good MD   2 tablet at 04/12/22 1011    sodium bicarbonate 75 mEq in sodium chloride 0.45 % 1,000 mL infusion   IntraVENous Continuous Iban Lopes MD 50 mL/hr at 04/12/22 1054 New Bag at 04/12/22 1054    linezolid (ZYVOX) IVPB 600 mg  600 mg IntraVENous Q12H Sravan Cardenas MD        clindamycin (CLEOCIN) 600 mg in dextrose 5 % 50 mL IVPB  600 mg IntraVENous Q8H Sravan Cardenas MD        amLODIPine (NORVASC) tablet 5 mg  5 mg Oral Daily Lewayne Loop, APRN - NP   5 mg at 04/12/22 0828    [Held by provider] clopidogrel (PLAVIX) tablet 75 mg  75 mg Oral Daily Coates Lacrosse, APRN - CNP        docusate sodium (COLACE) capsule 100 mg  100 mg Oral BID Coates Lacrosse, APRN - CNP   100 mg at 04/12/22 3357    ferrous sulfate (FE TABS 325) EC tablet 325 mg  325 mg Oral Daily with breakfast Coates Lacrosse, APRN - CNP   325 mg at 04/12/22 0828    [Held by provider] furosemide (LASIX) tablet 40 mg  40 mg Oral Every Other Day Coates Lacrosse, APRN - CNP        metoprolol tartrate (LOPRESSOR) tablet 25 mg  25 mg Oral BID Lewayne Loop, APRN - NP   25 mg at 04/12/22 0828    atorvastatin (LIPITOR) tablet 10 mg  10 mg Oral Daily Alannah Oliver, APRN - CNP   10 mg at 04/12/22 1121    traZODone (DESYREL) tablet 150 mg  150 mg Oral Nightly Alannah Oliver, APRN - CNP   150 mg at 04/12/22 0006    glucagon (rDNA) injection 1 mg  1 mg IntraMUSCular PRN Alannah Oliver, APRN - CNP        dextrose 5 % solution  100 mL/hr IntraVENous PRN Alannah Oliver, APRN - CNP        sodium chloride flush 0.9 % injection 5-40 mL  5-40 mL IntraVENous 2 times per day Alannah Oliver, APRN - CNP   10 mL at 04/12/22 0007    sodium chloride flush 0.9 % injection 10 mL  10 mL IntraVENous PRN Alannah Oliver, APRN - CNP        0.9 % sodium chloride infusion   IntraVENous PRN Alannah Gareth, APRN - CNP        potassium chloride (KLOR-CON M) extended release tablet 40 mEq  40 mEq Oral PRN Alannah Oliver, APRN - CNP        Or    potassium bicarb-citric acid (EFFER-K) effervescent tablet 40 mEq  40 mEq Oral PRN Alannah Oliver, APRN - CNP        Or    potassium chloride 10 mEq/100 mL IVPB (Peripheral Line)  10 mEq IntraVENous PRN Alannah Oliver, APRN - CNP        magnesium sulfate 1000 mg in dextrose 5% 100 mL IVPB  1,000 mg IntraVENous PRN Alannah Oliver, APRN - CNP        ondansetron (ZOFRAN-ODT) disintegrating tablet 4 mg  4 mg Oral Q8H PRN Alannah Oliver, APRN - CNP        Or    ondansetron [facility]) injection 4 mg  4 mg IntraVENous Q6H PRN Alannah Oliver, APRN - CNP        polyethylene glycol (GLYCOLAX) packet 17 g  17 g Oral Daily PRN Alannah Oliver, APRN - CNP        acetaminophen (TYLENOL) tablet 650 mg  650 mg Oral Q6H PRN Alannah Oliver, APRN - CNP        Or    acetaminophen (TYLENOL) suppository 650 mg  650 mg Rectal Q6H PRN Alannah Oliver, APRN - CNP        insulin lispro (HUMALOG) injection vial 0-12 Units  0-12 Units SubCUTAneous TID  Alannah Gareth, APRN - CNP        insulin lispro (HUMALOG) injection vial 0-6 Units  0-6 Units SubCUTAneous Nightly Prentis Moustapha, APRN - CNP   3 Units at 04/12/22 0005    cefepime (MAXIPIME) 2000 mg IVPB minibag  2,000 mg IntraVENous Q24H Prentis Moustapha, APRN - CNP        dextrose bolus (hypoglycemia) 10% 125 mL  125 mL IntraVENous PRN Prentis Moustapha, APRN - CNP        Or    dextrose bolus (hypoglycemia) 10% 250 mL  250 mL IntraVENous PRN Prentis Moustapha, APRN - CNP        glucose chewable tablet 4 each  4 each Oral PRN Prentis Moustapha, APRN - CNP        hydrALAZINE (APRESOLINE) injection 10 mg  10 mg IntraVENous Q4H PRN Daylene Meo, APRN - NP           Allergies:  No Known Allergies    Problem List:    Patient Active Problem List   Diagnosis Code    Uncontrolled hypertension I10    PVD (peripheral vascular disease) ([facility] I73.9    Type 2 diabetes mellitus with stage 3 chronic kidney disease, with long-term current use of insulin (HCC) E11.22, N18.30, Z79.4    Insomnia G47.00    Erectile dysfunction N52.9    Partial traumatic amputation of left foot ([address] S14.992K    Class 1 obesity with serious comorbidity in adult E66.9    Tobacco abuse Z72.0    Hepatitis B infection B19.10    Liver cyst K76.89    Kidney cysts N28.1    Osteomyelitis of ankle or foot M86.9    Neurotrophic ulcer of the foot ([address] L97.509    RISSA (acute kidney injury) ([address] N17.9    Dyslipidemia E78.5    Microcytic anemia D50.9    Diabetic foot infection ([address] E11.628, L08.9    Tobacco dependence F17.200    Diabetic ulcer of left heel associated with type 2 diabetes mellitus ([address] E11.621, L97.429    Hypomagnesemia E83.42    Hypokalemia E87.6    Diabetes mellitus type 2 with complications, uncontrolled ([address] E11.8, E11.65    Acute kidney injury superimposed on CKD ([address] N17.9, N18.9    H/O noncompliance with medical treatment, presenting hazards to health Z91.19    Uncontrolled diabetes mellitus type 2 without complications [id number]    [facility] of right below knee [facility] Z89.511    Severe malnutrition ([facility] E43    CKD (chronic kidney disease), stage III (HCC) N18.30    Oral thrush B37.0    Cataract of right eye H26.9    Necrotizing fasciitis ([facility] M72.6       Past Medical History:        Diagnosis Date    RISSA (acute kidney injury) ([facility]     Cerebrovascular disease     Erectile dysfunction     Hepatitis B infection     Hyperlipidemia     Hypertension     Kidney cysts     Liver cyst     MRSA (methicillin resistant staph aureus) culture positive 1/10/2014    right foot    Neurotrophic ulcer of the foot ([facility]     Obesity     Osteomyelitis of ankle or foot     Peripheral vascular disease ([facility]     Tobacco abuse     Type II or unspecified type diabetes mellitus without mention of complication, not stated as uncontrolled        Past Surgical History:        Procedure Laterality Date    FOOT AMPUTATION      FOOT AMPUTATION THROUGH METATARSAL  2011    pt can't give exact date of surg-- toes and part of lt foot removed    FOOT DEBRIDEMENT Left 6/20/2020    WOUND BED PREPARATION AND WOUND VAC APPLICATION performed by Kimberly Luo DPM at [address] [place] [address] Right 3/2013       Social History:    Social History     Tobacco Use    Smoking status: Current Every Day Smoker     Packs/day: 0.50     Years: 30.00     Pack years: 15.00     Types: Cigarettes    Smokeless tobacco: Never Used    Tobacco comment: working on quitting smoking   Substance Use Topics    Alcohol use: No                                Ready to quit: Not Answered  Counseling given: Not Answered  Comment: working on quitting smoking      Vital Signs (Current): There were no vitals filed for this visit.                                            BP Readings from Last 3 Encounters:   04/12/22 (!) 130/56   09/21/20 (!) 134/51   06/22/20 (!) 175/65       NPO Status: BMI:   Wt Readings from Last 3 Encounters:   04/12/22 225 lb (102.1 kg)   09/21/20 204 lb 8 oz (92.8 kg)   06/22/20 270 lb 6.4 oz (122.7 kg)     There is no height or weight on file to calculate BMI.    CBC:   Lab Results   Component Value Date    WBC 15.6 04/12/2022    RBC 3.66 04/12/2022    RBC 4.20 10/10/2011    HGB 9.2 04/12/2022    HCT 29.4 04/12/2022    MCV 80.3 04/12/2022    RDW 14.3 04/12/2022     04/12/2022     10/10/2011       CMP:   Lab Results   Component Value Date     04/12/2022    K 4.4 04/12/2022     04/12/2022    CO2 17 04/12/2022    BUN 33 04/12/2022    CREATININE 2.05 04/12/2022    GFRAA 39 04/12/2022    LABGLOM 32 04/12/2022    GLUCOSE 154 04/12/2022    GLUCOSE 133 10/18/2011    PROT 7.6 09/16/2020    CALCIUM 8.2 04/12/2022    BILITOT 0.40 09/16/2020    ALKPHOS 79 09/16/2020    AST 15 09/16/2020    ALT 11 09/16/2020       POC Tests:   Recent Labs     04/12/22  1207   POCGLU 140*       Coags:   Lab Results   Component Value Date    PROTIME 10.3 07/11/2013    INR 1.0 07/11/2013    APTT 26.8 07/11/2013       HCG (If Applicable): No results found for: PREGTESTUR, PREGSERUM, HCG, HCGQUANT     ABGs: No results found for: PHART, PO2ART, LAA9KIV, RZH8MJT, BEART, E6KPKWMI     Type & Screen (If Applicable):  No results found for: LABABO, LABRH    Drug/Infectious Status (If Applicable):  Lab Results   Component Value Date    HEPCAB NONREACTIVE 07/22/2013       COVID-19 Screening (If Applicable):   Lab Results   Component Value Date    COVID19 Not Detected 06/19/2020         Anesthesia Evaluation   no history of anesthetic complications:   Airway: Mallampati: II  TM distance: >3 FB   Neck ROM: full  Mouth opening: > = 3 FB Dental:    (+) edentulous      Pulmonary:   (+) current smoker                           Cardiovascular:    (+) hypertension:, hyperlipidemia    (-)  angina                Neuro/Psych:   (+) CVA: residual symptoms,             GI/Hepatic/Renal:   (+) hepatitis: B, renal disease: CRI,           Endo/Other:    (+) DiabetesType II DM, poorly controlled, , blood dyscrasia::., .                 Abdominal:             Vascular:   + PVD, aortic or cerebral, . Other Findings:             Anesthesia Plan      general and TIVA     ASA 3       Induction: intravenous. MIPS: Postoperative opioids intended and Prophylactic antiemetics administered. Anesthetic plan and risks discussed with patient. Plan discussed with attending.     Attending anesthesiologist reviewed and agrees with Lin Ledezma MD   4/12/2022

## 2022-04-12 NOTE — CONSULTS
[facility] -   Infectious diseases evaluation  admission date 4/11/2022    reason for consultation:   Left foot infection    Impression :   Current:  · Diabetic left foot infection with suspected necrotizing fasciitis and calcaneal osteomyelitis. · Peripheral vascular disease  · Diabetes mellitus  · Chronic renal disease  · History of right below-knee amputation      Recommendations   · Discontinue IV vancomycin   · Continue IV cefepime  · Add clindamycin and Zyvox  · Follow blood and wound cultures  · Surgical debridement and bone biopsy planned later today  · Follow CBC and renal function        History of Present Illness:   Initial history:  Laurence Pastor is a 67y.o.-year-old male was sent to hospital from Dr. Suma Edwards office for worsening left heel pain associated with left heel wound with surrounding dark discoloration and redness. The pain is severe, intermittent, no alleviating or aggravating factors. He denied fever or chills, denied nausea or vomiting, no diarrhea no other complaints. Left foot x-ray showed soft tissue emphysema within the posterior aspect of the distal left lower leg concerning for necrotizing fasciitis and possible calcaneal osteomyelitis. Initial WBC 18.5, creatinine 2.44, C-reactive protein more than 130, C-reactive protein 104.2, lactic acid 0 point  Afebrile  History of right below-knee amputation  Interval changes  4/12/2022   Patient Vitals for the past 8 hrs:   BP Temp Temp src Pulse Resp SpO2   04/12/22 1200 -- 98.5 °F (36.9 °C) Temporal -- -- --   04/12/22 1000 139/63 -- -- 66 16 99 %   04/12/22 0900 (!) 149/63 -- -- 68 18 --   04/12/22 0800 (!) 145/61 97.8 °F (36.6 °C) Temporal 71 17 --   04/12/22 0600 (!) 149/62 -- -- 71 16 100 %   04/12/22 0500 (!) 146/72 -- -- 74 14 100 %     .       I have personally reviewed the past medical history, past surgical history, medications, social history, and family history, and I haveupdated the database accordingly. Allergies:   Patient has no known allergies. Review of Systems:     Review of Systems  As per history present illness, other than above 12 system review was negative  Physical Examination :       Physical Exam  Constitutional:       General: He is not in acute distress. Appearance: He is ill-appearing. HENT:      Head: Normocephalic and atraumatic. Right Ear: External ear normal.      Left Ear: External ear normal.   Eyes:      General: No scleral icterus. Conjunctiva/sclera: Conjunctivae normal.   Cardiovascular:      Rate and Rhythm: Normal rate and regular rhythm. Pulses: Normal pulses. Pulmonary:      Effort: Pulmonary effort is normal. No respiratory distress. Breath sounds: Normal breath sounds. Abdominal:      General: Bowel sounds are normal. There is no distension. Palpations: Abdomen is soft. Musculoskeletal:      Comments: Unstageable left heel ulcer with necrotic eschar, purulent drainage and surrounding erythema, no crepitance or fluctuation. Left TMA  Right below-knee amputation   Skin:     General: Skin is warm. Coloration: Skin is not jaundiced. Neurological:      General: No focal deficit present. Mental Status: He is oriented to person, place, and time.          Past Medical History:     Past Medical History:   Diagnosis Date    RISSA (acute kidney injury) ([address]     Cerebrovascular disease     Erectile dysfunction     Hepatitis B infection     Hyperlipidemia     Hypertension     Kidney cysts     Liver cyst     MRSA (methicillin resistant staph aureus) culture positive 1/10/2014    right foot    Neurotrophic ulcer of the foot ([address]     Obesity     Osteomyelitis of ankle or foot     Peripheral vascular disease ([address]     Tobacco abuse     Type II or unspecified type diabetes mellitus without mention of complication, not stated as uncontrolled        Past Surgical  History:     Past Surgical History: Procedure Laterality Date    FOOT AMPUTATION      FOOT AMPUTATION THROUGH METATARSAL  2011    pt can't give exact date of surg-- toes and part of lt foot removed    FOOT DEBRIDEMENT Left 6/20/2020    WOUND BED PREPARATION AND WOUND VAC APPLICATION performed by Bashir Buenrostro DPM at [address] [place] [address] [facility]       Medications:      amLODIPine  5 mg Oral Daily    [Held by provider] clopidogrel  75 mg Oral Daily    docusate sodium  100 mg Oral BID    ferrous sulfate  325 mg Oral Daily with breakfast    [Held by provider] furosemide  40 mg Oral Every Other Day    metoprolol tartrate  25 mg Oral BID    atorvastatin  10 mg Oral Daily    traZODone  150 mg Oral Nightly    sodium chloride flush  5-40 mL IntraVENous 2 times per day    insulin lispro  0-12 Units SubCUTAneous TID WC    insulin lispro  0-6 Units SubCUTAneous Nightly    cefepime  2,000 mg IntraVENous Q24H    vancomycin (VANCOCIN) intermittent dosing (placeholder)   Other RX Placeholder    vancomycin  750 mg IntraVENous Q24H       Social History:     Social History     Socioeconomic History    Marital status:      Spouse name: Not on file    Number of children: Not on file    Years of education: Not on file    Highest education level: Not on file   Occupational History    Not on file   Tobacco Use    Smoking status: Current Every Day Smoker     Packs/day: 0.50     Years: 30.00     Pack years: 15.00     Types: Cigarettes    Smokeless tobacco: Never Used    Tobacco comment: working on quitting smoking   Vaping Use    Vaping Use: Never used   Substance and Sexual Activity    Alcohol use: No    Drug use: Never    Sexual activity: Yes     Partners: Female   Other Topics Concern    Not on file   Social History Narrative    Not on file     Social Determinants of Health     Financial Resource Strain:     Difficulty of Paying Living Expenses: Not on file   Food Insecurity:     Worried About Running Out of Food in the Last Year: Not on file    Ran Out of Food in the Last Year: Not on file   Transportation Needs:     Lack of Transportation (Medical): Not on file    Lack of Transportation (Non-Medical): Not on file   Physical Activity:     Days of Exercise per Week: Not on file    Minutes of Exercise per Session: Not on file   Stress:     Feeling of Stress : Not on file   Social Connections:     Frequency of Communication with Friends and Family: Not on file    Frequency of Social Gatherings with Friends and Family: Not on file    Attends Advent Services: Not on file    Active Member of Clubs or Organizations: Not on file    Attends Club or Organization Meetings: Not on file    Marital Status: Not on file   Intimate Partner Violence:     Fear of Current or Ex-Partner: Not on file    Emotionally Abused: Not on file    Physically Abused: Not on file    Sexually Abused: Not on file   Housing Stability:     Unable to Pay for Housing in the Last Year: Not on file    Number of [place] in the Last Year: Not on file    Unstable Housing in the Last Year: Not on file       Family History:     Family History   Problem Relation Age of Onset    Diabetes Mother     Diabetes Sister     Diabetes Brother     Diabetes Sister     Diabetes Sister     Diabetes Sister       Medical Decision Making:   I have independently reviewed/ordered the following labs:    CBC with Differential:   Recent Labs     04/11/22  1818 04/12/22  0531   WBC 18.5* 15.6*   HGB 9.6* 9.2*   HCT 29.8* 29.4*    211   LYMPHOPCT 12*  --    MONOPCT 5  --      BMP:  Recent Labs     04/11/22  1838 04/12/22  0531    139   K 4.7 4.4   * 112*   CO2 16* 17*   BUN 38* 33*   CREATININE 2.44* 2.05*     Hepatic Function Panel: No results for input(s): PROT, LABALBU, BILIDIR, IBILI, BILITOT, ALKPHOS, ALT, AST in the last 72 hours. No results for input(s): RPR in the last 72 hours. No results for input(s): HIV in the last 72 hours.   No results for input(s): BC in the last 72 hours. Lab Results   Component Value Date    CREATININE 2.05 04/12/2022    GLUCOSE 154 04/12/2022    GLUCOSE 133 10/18/2011       Detailed results: Thank you for allowing us to participate in the care of this patient. Please call with questions. This note is created with the assistance of a speech recognition program.  While intending to generate adocument that actually reflects the content of the visit, the document can still have some errors including those of syntax and sound a like substitutions which may escape proof reading. It such instances, actual meaningcan be extrapolated by contextual diversion.     Nick Ramsey MD  Office: [phone]  Perfect serve / office [phone]

## 2022-04-12 NOTE — BRIEF OP NOTE
PODIATRY BRIEF OP NOTE    PATIENT NAME: Laurence Pastor  YOB: 1950  -  67 y.o. male  MRN: [id number]  DATE: 4/12/2022  BILLING #: [id number]    Surgeon(s):  Radha Go DPM     ASSISTANTS: Dulce Mcneal DPM PGY-1    PRE-OP DIAGNOSIS:   Decubitus ulcer down to bone, left foot  Osteomyelitis, left foot  Type 2 diabetes with peripheral neuropathy  CKD    POST-OP DIAGNOSIS: Same as above. PROCEDURE:   Incision bone cortex left heel    ANESTHESIA: MAC    HEMOSTASIS:  Direct pressure. ESTIMATED BLOOD LOSS: Less than 15cc. MATERIALS:   * No implants in log *    INJECTABLES: none    SPECIMEN:   ID Type Source Tests Collected by Time Destination   1 : Left Calcaneal bone cuulture Bone Foot CULTURE, FUNGUS, FUNGAL STAIN, CULTURE, ANAEROBIC AND AEROBIC Radha Go DPM 4/12/2022 1736    A : Left Calcaneal bone Tissue Foot SURGICAL PATHOLOGY Radha Go DPM 4/12/2022 3965        COMPLICATIONS: none    FINDINGS: Soft and necrotic bone was found to the left calcaneus. Heel ulcer was noted to track proximally to achilles tendon sheath. Please see full op note for details. The patient was counseled at length about the risks of ruben Covid-19 during their perioperative period and any recovery window from their procedure. The patient was made aware that ruben Covid-19  may worsen their prognosis for recovering from their procedure  and lend to a higher morbidity and/or mortality risk. All material risks, benefits, and reasonable alternatives including postponing the procedure were discussed. The patient does wish to proceed with the procedure at this time.     Dulce Mcneal DPM   Podiatric Medicine & Surgery   4/12/2022 at 6:03 PM

## 2022-04-13 LAB
ANION GAP SERPL CALCULATED.3IONS-SCNC: 10 MMOL/L (ref 9–17)
BUN BLDV-MCNC: 34 MG/DL (ref 8–23)
BUN/CREAT BLD: 15 (ref 9–20)
CALCIUM SERPL-MCNC: 7.9 MG/DL (ref 8.6–10.4)
CHLORIDE BLD-SCNC: 111 MMOL/L (ref 98–107)
CO2: 17 MMOL/L (ref 20–31)
CREAT SERPL-MCNC: 2.29 MG/DL (ref 0.7–1.2)
DIRECT EXAM: NORMAL
GFR AFRICAN AMERICAN: 34 ML/MIN
GFR NON-AFRICAN AMERICAN: 28 ML/MIN
GFR SERPL CREATININE-BSD FRML MDRD: ABNORMAL ML/MIN/{1.73_M2}
GLUCOSE BLD-MCNC: 121 MG/DL (ref 75–110)
GLUCOSE BLD-MCNC: 137 MG/DL (ref 70–99)
GLUCOSE BLD-MCNC: 208 MG/DL (ref 75–110)
GLUCOSE BLD-MCNC: 219 MG/DL (ref 75–110)
GLUCOSE BLD-MCNC: 222 MG/DL (ref 75–110)
HCT VFR BLD CALC: 29.1 % (ref 40.7–50.3)
HEMOGLOBIN: 8.9 G/DL (ref 13–17)
MCH RBC QN AUTO: 24.6 PG (ref 25.2–33.5)
MCHC RBC AUTO-ENTMCNC: 30.6 G/DL (ref 28.4–34.8)
MCV RBC AUTO: 80.4 FL (ref 82.6–102.9)
NRBC AUTOMATED: 0 PER 100 WBC
PDW BLD-RTO: 14.8 % (ref 11.8–14.4)
PLATELET # BLD: 222 K/UL (ref 138–453)
PMV BLD AUTO: 8.9 FL (ref 8.1–13.5)
POTASSIUM SERPL-SCNC: 4.6 MMOL/L (ref 3.7–5.3)
RBC # BLD: 3.62 M/UL (ref 4.21–5.77)
SODIUM BLD-SCNC: 138 MMOL/L (ref 135–144)
SPECIMEN DESCRIPTION: NORMAL
WBC # BLD: 21.3 K/UL (ref 3.5–11.3)

## 2022-04-13 PROCEDURE — 2580000003 HC RX 258: Performed by: FAMILY MEDICINE

## 2022-04-13 PROCEDURE — 80048 BASIC METABOLIC PNL TOTAL CA: CPT

## 2022-04-13 PROCEDURE — 6370000000 HC RX 637 (ALT 250 FOR IP): Performed by: PODIATRIST

## 2022-04-13 PROCEDURE — 99232 SBSQ HOSP IP/OBS MODERATE 35: CPT | Performed by: INTERNAL MEDICINE

## 2022-04-13 PROCEDURE — 6370000000 HC RX 637 (ALT 250 FOR IP): Performed by: FAMILY MEDICINE

## 2022-04-13 PROCEDURE — 6360000002 HC RX W HCPCS: Performed by: PODIATRIST

## 2022-04-13 PROCEDURE — 36415 COLL VENOUS BLD VENIPUNCTURE: CPT

## 2022-04-13 PROCEDURE — 85027 COMPLETE CBC AUTOMATED: CPT

## 2022-04-13 PROCEDURE — 2500000003 HC RX 250 WO HCPCS: Performed by: PODIATRIST

## 2022-04-13 PROCEDURE — 2580000003 HC RX 258: Performed by: PODIATRIST

## 2022-04-13 PROCEDURE — 99232 SBSQ HOSP IP/OBS MODERATE 35: CPT | Performed by: FAMILY MEDICINE

## 2022-04-13 PROCEDURE — 82947 ASSAY GLUCOSE BLOOD QUANT: CPT

## 2022-04-13 PROCEDURE — 93923 UPR/LXTR ART STDY 3+ LVLS: CPT

## 2022-04-13 PROCEDURE — 2060000000 HC ICU INTERMEDIATE R&B

## 2022-04-13 RX ORDER — TRAMADOL HYDROCHLORIDE 50 MG/1
50 TABLET ORAL EVERY 6 HOURS PRN
Status: DISCONTINUED | OUTPATIENT
Start: 2022-04-13 | End: 2022-04-27 | Stop reason: HOSPADM

## 2022-04-13 RX ORDER — 0.9 % SODIUM CHLORIDE 0.9 %
250 INTRAVENOUS SOLUTION INTRAVENOUS ONCE
Status: COMPLETED | OUTPATIENT
Start: 2022-04-13 | End: 2022-04-13

## 2022-04-13 RX ORDER — OXYCODONE HYDROCHLORIDE AND ACETAMINOPHEN 5; 325 MG/1; MG/1
1 TABLET ORAL EVERY 4 HOURS PRN
Status: DISCONTINUED | OUTPATIENT
Start: 2022-04-13 | End: 2022-04-27 | Stop reason: HOSPADM

## 2022-04-13 RX ORDER — QUETIAPINE FUMARATE 25 MG/1
25 TABLET, FILM COATED ORAL NIGHTLY
COMMUNITY

## 2022-04-13 RX ORDER — OXYCODONE HYDROCHLORIDE AND ACETAMINOPHEN 5; 325 MG/1; MG/1
2 TABLET ORAL EVERY 4 HOURS PRN
Status: DISCONTINUED | OUTPATIENT
Start: 2022-04-13 | End: 2022-04-27 | Stop reason: HOSPADM

## 2022-04-13 RX ADMIN — FERROUS SULFATE TAB EC 325 MG (65 MG FE EQUIVALENT) 325 MG: 325 (65 FE) TABLET DELAYED RESPONSE at 08:30

## 2022-04-13 RX ADMIN — CLINDAMYCIN IN 5 PERCENT DEXTROSE 600 MG: 12 INJECTION, SOLUTION INTRAVENOUS at 06:44

## 2022-04-13 RX ADMIN — INSULIN LISPRO 4 UNITS: 100 INJECTION, SOLUTION INTRAVENOUS; SUBCUTANEOUS at 12:16

## 2022-04-13 RX ADMIN — ATORVASTATIN CALCIUM 10 MG: 10 TABLET, FILM COATED ORAL at 08:31

## 2022-04-13 RX ADMIN — INSULIN LISPRO 2 UNITS: 100 INJECTION, SOLUTION INTRAVENOUS; SUBCUTANEOUS at 22:46

## 2022-04-13 RX ADMIN — HYDROCODONE BITARTRATE AND ACETAMINOPHEN 2 TABLET: 5; 325 TABLET ORAL at 08:56

## 2022-04-13 RX ADMIN — LINEZOLID 600 MG: 600 INJECTION, SOLUTION INTRAVENOUS at 08:36

## 2022-04-13 RX ADMIN — METOPROLOL TARTRATE 25 MG: 25 TABLET, FILM COATED ORAL at 17:22

## 2022-04-13 RX ADMIN — CLINDAMYCIN IN 5 PERCENT DEXTROSE 600 MG: 12 INJECTION, SOLUTION INTRAVENOUS at 22:38

## 2022-04-13 RX ADMIN — INSULIN LISPRO 4 UNITS: 100 INJECTION, SOLUTION INTRAVENOUS; SUBCUTANEOUS at 17:17

## 2022-04-13 RX ADMIN — LINEZOLID 600 MG: 600 INJECTION, SOLUTION INTRAVENOUS at 19:45

## 2022-04-13 RX ADMIN — METOPROLOL TARTRATE 25 MG: 25 TABLET, FILM COATED ORAL at 08:31

## 2022-04-13 RX ADMIN — TRAMADOL HYDROCHLORIDE 50 MG: 50 TABLET, COATED ORAL at 11:06

## 2022-04-13 RX ADMIN — DOCUSATE SODIUM 100 MG: 100 CAPSULE, LIQUID FILLED ORAL at 08:31

## 2022-04-13 RX ADMIN — CLINDAMYCIN IN 5 PERCENT DEXTROSE 600 MG: 12 INJECTION, SOLUTION INTRAVENOUS at 15:21

## 2022-04-13 RX ADMIN — CEFEPIME HYDROCHLORIDE 2000 MG: 2 INJECTION, POWDER, FOR SOLUTION INTRAVENOUS at 21:10

## 2022-04-13 RX ADMIN — AMLODIPINE BESYLATE 5 MG: 5 TABLET ORAL at 08:30

## 2022-04-13 RX ADMIN — HYDROCODONE BITARTRATE AND ACETAMINOPHEN 2 TABLET: 5; 325 TABLET ORAL at 02:59

## 2022-04-13 RX ADMIN — SODIUM CHLORIDE 250 ML: 9 INJECTION, SOLUTION INTRAVENOUS at 11:02

## 2022-04-13 RX ADMIN — SODIUM CHLORIDE, PRESERVATIVE FREE 10 ML: 5 INJECTION INTRAVENOUS at 22:41

## 2022-04-13 RX ADMIN — SODIUM BICARBONATE: 84 INJECTION, SOLUTION INTRAVENOUS at 11:35

## 2022-04-13 RX ADMIN — OXYCODONE AND ACETAMINOPHEN 2 TABLET: 5; 325 TABLET ORAL at 19:47

## 2022-04-13 RX ADMIN — OXYCODONE AND ACETAMINOPHEN 2 TABLET: 5; 325 TABLET ORAL at 15:21

## 2022-04-13 ASSESSMENT — PAIN DESCRIPTION - DIRECTION
RADIATING_TOWARDS: KNEE

## 2022-04-13 ASSESSMENT — PAIN DESCRIPTION - PAIN TYPE
TYPE: ACUTE PAIN
TYPE: SURGICAL PAIN
TYPE: ACUTE PAIN
TYPE: ACUTE PAIN

## 2022-04-13 ASSESSMENT — ENCOUNTER SYMPTOMS
WHEEZING: 0
CHEST TIGHTNESS: 0
DIARRHEA: 0
NAUSEA: 0
ABDOMINAL PAIN: 0
COUGH: 0
SHORTNESS OF BREATH: 0
CONSTIPATION: 0
BLOOD IN STOOL: 0
VOMITING: 0

## 2022-04-13 ASSESSMENT — PAIN DESCRIPTION - PROGRESSION
CLINICAL_PROGRESSION: GRADUALLY WORSENING

## 2022-04-13 ASSESSMENT — PAIN DESCRIPTION - ORIENTATION
ORIENTATION: LEFT

## 2022-04-13 ASSESSMENT — PAIN DESCRIPTION - LOCATION
LOCATION: FOOT

## 2022-04-13 ASSESSMENT — PAIN SCALES - GENERAL
PAINLEVEL_OUTOF10: 8
PAINLEVEL_OUTOF10: 10
PAINLEVEL_OUTOF10: 8
PAINLEVEL_OUTOF10: 5
PAINLEVEL_OUTOF10: 9
PAINLEVEL_OUTOF10: 8
PAINLEVEL_OUTOF10: 9
PAINLEVEL_OUTOF10: 8

## 2022-04-13 ASSESSMENT — PAIN DESCRIPTION - DESCRIPTORS
DESCRIPTORS: SHARP;THROBBING
DESCRIPTORS: THROBBING
DESCRIPTORS: THROBBING;SHARP
DESCRIPTORS: THROBBING;SHARP

## 2022-04-13 ASSESSMENT — PAIN DESCRIPTION - FREQUENCY
FREQUENCY: CONTINUOUS

## 2022-04-13 ASSESSMENT — PAIN DESCRIPTION - ONSET
ONSET: AWAKENED FROM SLEEP
ONSET: AWAKENED FROM SLEEP
ONSET: ON-GOING

## 2022-04-13 ASSESSMENT — PAIN - FUNCTIONAL ASSESSMENT
PAIN_FUNCTIONAL_ASSESSMENT: PREVENTS OR INTERFERES SOME ACTIVE ACTIVITIES AND ADLS

## 2022-04-13 NOTE — PROGRESS NOTES
[facility]  Office: [address], DO, Jose Gear, DO, Jackie Field, DO, Alpesh Spaulding Blood, DO, Bryant Leger MD, Francoise Ennis MD, Renetta Gerard MD, Horacio Rodgers MD, Alissa Lloyd MD, Jolie Rayo MD, Brooklynn Jimenez MD, Sondra Fragmin, DO, Denisa Rubin, DO, Griselda Huitron MD,  Tressa Buckley, DO, Ness Delgado MD, Denice Hunter MD, Amairani Reyes MD, Aleksandra Mccall, DO, Linda Simental MD, Lisha Schroeder MD, Kenia Roger, CNP, [facility] Murray, CNP, Tricia Coburn, CNP, Melia Forbes, CNP, Vicenta Nina, CNP, Odilon Stewart, CNP, Lendel Nissen, PASukhdeepC, Pau Santana, DNP, Cele Valentino, CNP, Sarmad Punch, CNP, Krama Fly, CNP, Lary Mask, CNS, Shama Collazo, DNP, Aracelis Postal, CNP, Ro Hernández, CNP, Neyda Muhammad, CNP         [facility]    Progress Note    4/13/2022    8:13 AM    Name:   Jd Sylvester  MRN:     [id number]     Acct:      [de-identified]   Room:   2020/2020-01   Day:  2  Admit Date:  4/11/2022  5:25 PM    PCP:   Michael Sparks  Code Status:  Full Code    Subjective:     C/C:   Chief Complaint   Patient presents with    Wound Infection     Sent over by PCP for wound evaluation in foot (left)      Interval History Status: improved. Patient seen and examined at bedside,  Had his I&D  His pain is uncontrolled  Looking and feeling okay, tolerated the surgery well  Patient vitals, labs and all providers notes were reviewed,from overnight shift and morning updates were noted and discussed with the nurse    Brief History:     Per my NP  Jd Sylvester is a 67 y.o. Non- / non  male who presents with Wound Infection (Sent over by PCP for wound evaluation in foot (left) )   and is admitted to the hospital for the management of Necrotizing fasciitis ([facility]    Patient is a poor historian is does not recall how long he has been undergoing treatment for a left heel ulcer.  He denies fever, but says he has had chills for \"months\". He has a known history of uncontrolled diabetes and says he checks his blood sugar in the morning and at night- BG range has been 140s to 200s, he says. He is a daily smoker. He recalls he went to Dr Todd/ podiatry for left heel care and was advised to come to the hospital for further evaluation and care.      While in the ED, xray of the left tib fib revealed soft tissue swelling, cellulitis, and no evidence of osteomyelitis. Left foot xray results were concerning for necrotizing fasciitis and osteomyelitis. His WBC was elevated at 18.5, sed rate > 130, creat 2.44- baseline appears to be 1.36-2.06. Records list Stage 3 CKD. Patient denies having a nephrologist. His lactic was unremarkable.      He was started on IV Vanco. He is being admitted for further management of necrotizing fasciitis of left foot and RISSA. Podiatry was consulted and plan for patient to undergo I & D of the left heel and bone biopsy 4/13/22. Review of Systems:     Review of Systems   Constitutional: Positive for activity change and appetite change. Negative for chills, diaphoresis and fever. HENT: Negative for congestion. Eyes: Negative for visual disturbance. Respiratory: Negative for cough, chest tightness, shortness of breath and wheezing. Cardiovascular: Negative for chest pain, palpitations and leg swelling. Gastrointestinal: Negative for abdominal pain, blood in stool, constipation, diarrhea, nausea and vomiting. Genitourinary: Negative for difficulty urinating. Musculoskeletal: Positive for joint swelling. LLE pain and purulent discharge    Neurological: Negative for dizziness, weakness, light-headedness, numbness and headaches. All other systems reviewed and are negative. Medications:      Allergies:  No Known Allergies    Current Meds:   Scheduled Meds:    linezolid  600 mg IntraVENous Q12H    clindamycin (CLEOCIN) IV  600 mg IntraVENous Q8H    amLODIPine  5 mg Oral Daily    [Held by provider] clopidogrel  75 mg Oral Daily    docusate sodium  100 mg Oral BID    ferrous sulfate  325 mg Oral Daily with breakfast    [Held by provider] furosemide  40 mg Oral Every Other Day    metoprolol tartrate  25 mg Oral BID    atorvastatin  10 mg Oral Daily    sodium chloride flush  5-40 mL IntraVENous 2 times per day    insulin lispro  0-12 Units SubCUTAneous TID WC    insulin lispro  0-6 Units SubCUTAneous Nightly    cefepime  2,000 mg IntraVENous Q24H     Continuous Infusions:    IV infusion builder 50 mL/hr at 04/12/22 1054    dextrose      sodium chloride 5 mL/hr at 04/12/22 2135     PRN Meds: HYDROcodone 5 mg - acetaminophen **OR** HYDROcodone 5 mg - acetaminophen, glucagon (rDNA), dextrose, sodium chloride flush, sodium chloride, potassium chloride **OR** potassium alternative oral replacement **OR** potassium chloride, magnesium sulfate, ondansetron **OR** ondansetron, polyethylene glycol, acetaminophen **OR** acetaminophen, dextrose bolus (hypoglycemia) **OR** dextrose bolus (hypoglycemia), glucose, hydrALAZINE    Data:     Past Medical History:   has a past medical history of RISSA (acute kidney injury) ([facility] Cerebrovascular disease, Erectile dysfunction, Hepatitis B infection, Hyperlipidemia, Hypertension, Kidney cysts, Liver cyst, MRSA (methicillin resistant staph aureus) culture positive, Neurotrophic ulcer of the foot ([facility] Obesity, Osteomyelitis of ankle or foot, Peripheral vascular disease ([facility] Tobacco abuse, and Type II or unspecified type diabetes mellitus without mention of complication, not stated as uncontrolled. Social History:   reports that he has been smoking cigarettes. He has a 15.00 pack-year smoking history. He has never used smokeless tobacco. He reports that he does not drink alcohol and does not use drugs.      Family History:   Family History   Problem Relation Age of Onset    Diabetes Mother     Diabetes Sister     Diabetes Brother  Diabetes Sister     Diabetes Sister     Diabetes Sister        Vitals:  BP (!) 121/46   Pulse 65   Temp 98 °F (36.7 °C) (Oral)   Resp 16   Ht 6' (1.829 m)   Wt 175 lb (79.4 kg)   SpO2 95%   BMI 23.73 kg/m²   Temp (24hrs), Av.8 °F (36.6 °C), Min:97 °F (36.1 °C), Max:98.6 °F (37 °C)    Recent Labs     22  1650 22  1804 22  0608   POCGLU 120* 102 99 121*       I/O (24Hr): Intake/Output Summary (Last 24 hours) at 2022 0813  Last data filed at 2022 0606  Gross per 24 hour   Intake --   Output 820 ml   Net -820 ml       Labs:  Hematology:  Recent Labs     22  18122  18322  0531 22  0508   WBC 18.5*  --  15.6* 21.3*   RBC 3.80*  --  3.66* 3.62*   HGB 9.6*  --  9.2* 8.9*   HCT 29.8*  --  29.4* 29.1*   MCV 78.4*  --  80.3* 80.4*   MCH 25.3  --  25.1* 24.6*   MCHC 32.2  --  31.3 30.6   RDW 14.4  --  14.3 14.8*     --  211 222   MPV 9.6  --  9.0 8.9   SEDRATE >130*  --   --   --    CRP  --  104.2*  --   --      Chemistry:  Recent Labs     22  18322  0531 22  0508    139 138   K 4.7 4.4 4.6   * 112* 111*   CO2 16* 17* 17*   GLUCOSE 255* 154* 137*   BUN 38* 33* 34*   CREATININE 2.44* 2.05* 2.29*   ANIONGAP 11 10 10   LABGLOM 26* 32* 28*   GFRAA 32* 39* 34*   CALCIUM 8.4* 8.2* 7.9*     Recent Labs     22  0730 22  1207 22  1650 22  1804 22  2019 22  0608   POCGLU 150* 140* 120* 102 99 121*     ABG:No results found for: POCPH, PHART, PH, POCPCO2, QIV1KOG, PCO2, POCPO2, PO2ART, PO2, POCHCO3, ROS2HXL, HCO3, NBEA, PBEA, BEART, BE, THGBART, THB, GYR2YDU, MKPU6QZF, F9WCXAOB, O2SAT, FIO2  Lab Results   Component Value Date/Time    SPECIAL LT FA,7ML 2022 06:38 PM     Lab Results   Component Value Date/Time    CULTURE PENDING 2022 05:36 PM       Radiology:  XR TIBIA FIBULA LEFT (2 VIEWS)    Result Date: 2022  Cellulitis. No evidence of osteomyelitis.      XR FOOT LEFT (MIN 3 VIEWS)    Result Date: 4/11/2022  1. Soft tissue emphysema within the posterior aspect of the distal left lower leg and adjacent to the calcaneus, concerning for necrotizing fasciitis 2. Large soft tissue ulceration along the dorsal aspect of the calcaneus, with cortical destruction, consistent with osteomyelitis 3. Diffuse soft tissue swelling now present 4. Critical results were called by Dr. Quentin Flaherty to Gato Roth on 4/11/2022 at 6:27 p.m. hours. US RETROPERITONEAL COMPLETE    Result Date: 4/12/2022  1. Echogenic kidneys which may be seen in medical renal disease. 2.  Questionable trace perinephric fluid bilaterally. 3.  Echogenic foci and debris versus blood products in the dependent portion of the bladder. Clinical correlation is recommended. Physical Examination:        Physical Exam  Vitals and nursing note reviewed. Constitutional:       General: He is not in acute distress. HENT:      Head: Normocephalic and atraumatic. Eyes:      Conjunctiva/sclera: Conjunctivae normal.      Pupils: Pupils are equal, round, and reactive to light. Cardiovascular:      Rate and Rhythm: Normal rate and regular rhythm. Heart sounds: No murmur heard. Pulmonary:      Effort: Pulmonary effort is normal. No accessory muscle usage or respiratory distress. Breath sounds: No stridor. No decreased breath sounds, wheezing, rhonchi or rales. Abdominal:      General: Bowel sounds are normal. There is no distension. Palpations: Abdomen is soft. Abdomen is not rigid. Tenderness: There is no abdominal tenderness. There is no guarding. Musculoskeletal:         General: No tenderness. Comments: /P right BKA. S/p left TMA  Wound S/P I&D is in dressing    Skin:     General: Skin is warm and dry. Findings: No erythema, lesion or rash. Neurological:      Mental Status: He is alert and oriented to person, place, and time.       Cranial Nerves: No cranial nerve deficit. Motor: No seizure activity. Psychiatric:         Speech: Speech normal.         Behavior: Behavior normal. Behavior is cooperative. Assessment:        Hospital Problems           Last Modified POA    * (Principal) Necrotizing fasciitis ([address] 4/12/2022 Yes    Acute kidney injury superimposed on CKD ([address] 4/11/2022 Yes    CKD (chronic kidney disease), stage III ([address] 4/11/2022 Yes    Uncontrolled hypertension 4/12/2022 Yes    Type 2 diabetes mellitus with stage 3 chronic kidney disease, with long-term current use of insulin ([address] 4/12/2022 Yes    Class 1 obesity with serious comorbidity in adult 4/12/2022 Yes          Plan:         L heel wound infection/ necrotizing fasciitis  with possible underlying Osteomyelitis : he ie S/P extensive I&D and biopsy sent on 4/12, post op management  per podiatry, ID consulted, currently on zyvox, clindamycin and  Cefepime   Adjust pain meds for better pain control    PVD : vascular consulted, pending evaluation. RISSA/CKD stage III: Baseline creatinine between 2-2 0.2, likely 2/2 infection  nephrology to evaluate the patient, unclear if the patient has nephrology follow-up as an outpatient, he also does not follow-up with his PCP   Continue to hold Lasix and Ace      AGMA : likely 2/2 above , fluids changes to include bicarb per nephrology     AOCD : monitor and continue support     HTN:  Ace held, continue BB continue to monitor     HPL: continue home medications    DM2:   With hyperglycemia , target -180 , Continue diabetes home medications , insulin sliding scale, hypoglycemia protocol    DVT prophylaxis    Discussed with the patient and the nurse      Archie Spence MD  4/13/2022  8:13 AM

## 2022-04-13 NOTE — PROGRESS NOTES
Nephrology Progress Note      SUBJECTIVE      Patient seen and examined this morning. He is in some pain following his surgery yesterday. Otherwise denies any nausea vomiting. There is no fever reported from overnight. Blood pressures are stable in the 948K systolic range. He has history of stage IV chronic kidney disease, creatinine is stable at 2.2. His baseline creatinine runs around 2.1-2.3 range. This likely from underlying diabetic nephrosclerosis. Proteinuria previously quantified less than 0.5 g  He has an Ace wrap over his left lower extremity. He had undergone debridement of this extremity yesterday. Patient currently on IV antibiotics  Patient does not have a Olivia, voiding by self    OBJECTIVE      CURRENT TEMPERATURE:  Temp: 98 °F (36.7 °C)  MAXIMUM TEMPERATURE OVER 24HRS:  Temp (24hrs), Av.8 °F (36.6 °C), Min:97 °F (36.1 °C), Max:98.6 °F (37 °C)    CURRENT RESPIRATORY RATE:  Resp: 16  CURRENT PULSE:  Pulse: 65  CURRENT BLOOD PRESSURE:  BP: (!) 121/46  24HR BLOOD PRESSURE RANGE:  Systolic (26QOV), RBE:685 , Min:108 , VOR:331   ; Diastolic (78MEM), BMX:36, Min:45, Max:72    24HR INTAKE/OUTPUT:      Intake/Output Summary (Last 24 hours) at 2022 1034  Last data filed at 2022 0606  Gross per 24 hour   Intake --   Output 520 ml   Net -520 ml     WEIGHT :  Patient Vitals for the past 96 hrs (Last 3 readings):   Weight   22 0545 175 lb (79.4 kg)   22 0400 225 lb (102.1 kg)   22 1613 225 lb (102.1 kg)     PHYSICAL EXAM      GENERAL APPEARANCE:Awake, alert, having some left leg pain  SKIN: warm and dry, no rash or erythema  EYES: conjunctivae normal and sclera anicteric  ENT: no thrush no pharyngeal congestion   NECK:   No JVD. No carotid bruits and no carotid lymphadenopathy . PULMONARY: lungs are clear to auscultation. No Wheezing, no ronchi . CADRDIOVASCULAR: S1 and S2 normal NO S3 and NO S4 . No rubs , no murmur.    ABDOMEN: soft nontender, bowel sounds present, no organomegaly, no ascites.      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, Ace wrap on the left, TMA left    CURRENT MEDICATIONS      HYDROcodone-acetaminophen (NORCO) 5-325 MG per tablet 1 tablet, Q4H PRN   Or  HYDROcodone-acetaminophen (NORCO) 5-325 MG per tablet 2 tablet, Q4H PRN  sodium bicarbonate 75 mEq in sodium chloride 0.45 % 1,000 mL infusion, Continuous  linezolid (ZYVOX) IVPB 600 mg, Q12H  clindamycin (CLEOCIN) 600 mg in dextrose 5 % 50 mL IVPB, Q8H  amLODIPine (NORVASC) tablet 5 mg, Daily  [Held by provider] clopidogrel (PLAVIX) tablet 75 mg, Daily  docusate sodium (COLACE) capsule 100 mg, BID  ferrous sulfate (FE TABS 325) EC tablet 325 mg, Daily with breakfast  [Held by provider] furosemide (LASIX) tablet 40 mg, Every Other Day  metoprolol tartrate (LOPRESSOR) tablet 25 mg, BID  atorvastatin (LIPITOR) tablet 10 mg, Daily  glucagon (rDNA) injection 1 mg, PRN  dextrose 5 % solution, PRN  sodium chloride flush 0.9 % injection 5-40 mL, 2 times per day  sodium chloride flush 0.9 % injection 10 mL, PRN  0.9 % sodium chloride infusion, PRN  potassium chloride (KLOR-CON M) extended release tablet 40 mEq, PRN   Or  potassium bicarb-citric acid (EFFER-K) effervescent tablet 40 mEq, PRN   Or  potassium chloride 10 mEq/100 mL IVPB (Peripheral Line), PRN  magnesium sulfate 1000 mg in dextrose 5% 100 mL IVPB, PRN  ondansetron (ZOFRAN-ODT) disintegrating tablet 4 mg, Q8H PRN   Or  ondansetron (ZOFRAN) injection 4 mg, Q6H PRN  polyethylene glycol (GLYCOLAX) packet 17 g, Daily PRN  acetaminophen (TYLENOL) tablet 650 mg, Q6H PRN   Or  acetaminophen (TYLENOL) suppository 650 mg, Q6H PRN  insulin lispro (HUMALOG) injection vial 0-12 Units, TID WC  insulin lispro (HUMALOG) injection vial 0-6 Units, Nightly  cefepime (MAXIPIME) 2000 mg IVPB minibag, Q24H  dextrose bolus (hypoglycemia) 10% 125 mL, PRN   Or  dextrose bolus (hypoglycemia) 10% 250 mL, PRN  glucose chewable tablet 4 each, PRN  hydrALAZINE (APRESOLINE) injection 10 mg, Q4H PRN          LABS      CBC:   Recent Labs     04/11/22  1818 04/12/22  0531 04/13/22  0508   WBC 18.5* 15.6* 21.3*   RBC 3.80* 3.66* 3.62*   HGB 9.6* 9.2* 8.9*   HCT 29.8* 29.4* 29.1*   MCV 78.4* 80.3* 80.4*   MCH 25.3 25.1* 24.6*   MCHC 32.2 31.3 30.6   RDW 14.4 14.3 14.8*    211 222   MPV 9.6 9.0 8.9      BMP:   Recent Labs     04/11/22  1838 04/12/22  0531 04/13/22  0508    139 138   K 4.7 4.4 4.6   * 112* 111*   CO2 16* 17* 17*   BUN 38* 33* 34*   CREATININE 2.44* 2.05* 2.29*   GLUCOSE 255* 154* 137*   CALCIUM 8.4* 8.2* 7.9*      HEPBSAG:  Lab Results   Component Value Date    HEPBSAG REACTIVE 07/22/2013       C3:   Lab Results   Component Value Date    C3 120 04/12/2022     C4:   Lab Results   Component Value Date    C4 19 04/12/2022     URINE SODIUM:    Lab Results   Component Value Date    JLUIS 65 09/17/2020      URINE CREATININE:    Lab Results   Component Value Date    LABCREA 66.7 04/12/2022     URINE EOSINOPHILS:   Lab Results   Component Value Date    UREO NONE SEEN 04/12/2022         RADIOLOGY      Reviewed as available. ASSESSMENT      1. RISSA on top of chronic kidney disease stage IV likely from prerenal azotemia, intravascular volume depletion with decreased oral intake while he was still taking loop diuretics and ACE inhibitors. Creatinine had gone up to 2.5, down to 2.2.  2. HTN:  3. She of CKD stage IV disease with a creatinine that runs around 2.1-2.3 baseline. This likely to underlying diabetic nephrosclerosis. Previous proteinuria quantified less than 0.5 g  4. History of diabetes mellitus type 2  5. Status post incision and drainage of the left foot and lower extremity yesterday by podiatry team  6. History of essential hypertension  7. Probable osteomyelitis involving left calcaneum. PLAN      1. I would leave him on his current IV fluids for now. Avoid any NSAIDs or Toradol. 2.  Antibiotics  3. Follow up labs ordered.    4. Following along       Please do not hesitate to call with questions.     This note is created with the assistance of a speech-recognition program. While intending to generate a document that actually reflects the content of the visit, no guarantees can be provided that every mistake has been identified and corrected by editing    Electronically signed by Louise Tellez MD on 4/13/2022 at 10:34 AM

## 2022-04-13 NOTE — PLAN OF CARE
Problem: Infection:  Goal: Will remain free from infection  Description: Will remain free from infection  Outcome: Ongoing     Problem: Safety:  Goal: Free from accidental physical injury  Description: Free from accidental physical injury  Outcome: Ongoing     Problem: Daily Care:  Goal: Daily care needs are met  Description: Daily care needs are met  Outcome: Ongoing     Problem: Pain:  Goal: Patient's pain/discomfort is manageable  Description: Patient's pain/discomfort is manageable  Outcome: Ongoing     Problem: Skin Integrity:  Goal: Skin integrity will stabilize  Description: Skin integrity will stabilize  Outcome: Ongoing     Problem: Discharge Planning:  Goal: Patients continuum of care needs are met  Description: Patients continuum of care needs are met  Outcome: Ongoing     Problem: Skin Integrity:  Goal: Will show no infection signs and symptoms  Description: Will show no infection signs and symptoms  Outcome: Ongoing  Goal: Absence of new skin breakdown  Description: Absence of new skin breakdown  Outcome: Ongoing     Problem: Falls - Risk of:  Goal: Will remain free from falls  Description: Will remain free from falls  Outcome: Ongoing  Goal: Absence of physical injury  Description: Absence of physical injury  Outcome: Ongoing

## 2022-04-13 NOTE — ANESTHESIA POSTPROCEDURE EVALUATION
Department of Anesthesiology  Postprocedure Note    Patient: Linda Durbin  MRN: [id number]  YOB: 1950  Date of evaluation: 4/12/2022  Time:  8:07 PM     Procedure Summary     Date: 04/12/22 Room / Location: On license of [facility] OR  / [facility] - INPATIENT    Anesthesia Start: [address] Anesthesia Stop: 1803    Procedure: FOOT DEBRIDEMENT INCISION AND DRAINAGE WITH BX (Left Foot) Diagnosis: (foot infection)    Surgeons: Nilton Peterson DPM Responsible Provider: Alissa Salgado MD    Anesthesia Type: general ASA Status: 3          Anesthesia Type: general    Sunshine Phase I: Sunshine Score: 9    Sunshine Phase II:      Last vitals: Reviewed and per EMR flowsheets.        Anesthesia Post Evaluation    Patient location during evaluation: PACU  Patient participation: complete - patient participated  Level of consciousness: awake  Airway patency: patent  Nausea & Vomiting: no nausea  Complications: no  Cardiovascular status: blood pressure returned to baseline  Respiratory status: acceptable  Hydration status: euvolemic  Comments: Multimodal analgesia pain management as indicated by procedure  Multimodal analgesia pain management approach

## 2022-04-13 NOTE — PROGRESS NOTES
Transitions of Care Pharmacy Service   Medication Review    The patient's list of current home medications has been reviewed. Source(s) of information: spoke to patient, [facility] and [facility] pill pack medication list     Based on information provided by the above source(s), I have updated the patient's home med list as described below. I changed or updated the following medications on the patient's home medication list:  Discontinued Symbicort 160-4.5 mcg  Ferrous sulfate 325 mg   Furosemide 40 mg   Lisinopril 10 mg   Metoprolol XL 25 mg      Added Metoprolol 25 mg- 1 tablet bid   Quetiapine 25 mg- 1 tablet nightly  Acetaminophen 325 mg- 1 tablet prn for pain       Adjusted   Novolog 100 units/ ml- 0-10 units tid prn per sliding scale   Basaglar 100 units/ ml- 12 units nightly      Other Notes Patient last filled Furosemide 40 mg 6/2/20 and Ferrous sulfate 325 mg 10/29/21. Please feel free to call me with any questions about this encounter. Thank you. This note will be reviewed and co-signed by the Transitions of Care Pharmacist. The pharmacist will review inpatient orders and contact the physician about any discrepancies. Edith Hays, pharmacy technician  Transitions of Care Pharmacy Service  Phone:  [phone]  Fax: [phone]      Electronically signed by Edith Hays on 4/13/2022 at 1:32 PM         Prior to Admission medications    Medication Sig Start Date End Date Taking?  Authorizing Provider   metoprolol tartrate (LOPRESSOR) 25 MG tablet Take 25 mg by mouth 2 times daily       QUEtiapine (SEROQUEL) 25 MG tablet Take 25 mg by mouth at bedtime       amLODIPine (NORVASC) 10 MG tablet Take 10 mg by mouth daily        insulin glargine (BASAGLAR KWIKPEN) 100 UNIT/ML injection pen Inject 12 Units into the skin nightly        clopidogrel (PLAVIX) 75 MG tablet Take 1 tablet by mouth daily       insulin aspart (NOVOLOG FLEXPEN) 100 UNIT/ML injection pen Inject 0-10 Units into the skin 3 times daily as needed Per sliding scale       simvastatin (ZOCOR) 10 MG tablet Take 10 mg by mouth nightly       traZODone (DESYREL) 150 MG tablet Take 150 mg by mouth nightly       docusate sodium (COLACE) 100 MG capsule Take 100 mg by mouth 2 times daily        acetaminophen (TYLENOL) 325 MG tablet Take 650 mg by mouth as needed for Pain

## 2022-04-13 NOTE — PROGRESS NOTES
Progress Note  Podiatric Medicine and Surgery     Subjective     CC: Left heel wound    Patient seen and examined at bedside with Dr. Ramonita Hays, VSS  WBC uptrending 15.6-->21.3  Complains of increased pain to left foot  POD #1 s/p I&D of left foot (DOS: 4/12/2022)    HPI :  Austin Polanco is a 67 y.o. male seen at [address] [place] [address],[address] ED for complaints of erythema. Patient saw Dr. Mcmahon today in his office, Dr. Mcmahon instruct patient to present to the emergency room for admission, IV antibiotics and further work-up. 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 Also has a history of transmetatarsal irritation to the left foot. ROS: Denies N/V/F/C/SOB/CP. Otherwise negative except at stated in the HPI.      Medications:  Scheduled Meds:   linezolid  600 mg IntraVENous Q12H    clindamycin (CLEOCIN) IV  600 mg IntraVENous Q8H    amLODIPine  5 mg Oral Daily    [Held by provider] clopidogrel  75 mg Oral Daily    docusate sodium  100 mg Oral BID    ferrous sulfate  325 mg Oral Daily with breakfast    [Held by provider] furosemide  40 mg Oral Every Other Day    metoprolol tartrate  25 mg Oral BID    atorvastatin  10 mg Oral Daily    sodium chloride flush  5-40 mL IntraVENous 2 times per day    insulin lispro  0-12 Units SubCUTAneous TID WC    insulin lispro  0-6 Units SubCUTAneous Nightly    cefepime  2,000 mg IntraVENous Q24H       Continuous Infusions:   IV infusion builder 50 mL/hr at 04/12/22 1054    dextrose      sodium chloride 5 mL/hr at 04/12/22 2135       PRN Meds:HYDROcodone 5 mg - acetaminophen **OR** HYDROcodone 5 mg - acetaminophen, glucagon (rDNA), dextrose, sodium chloride flush, sodium chloride, potassium chloride **OR** potassium alternative oral replacement **OR** potassium chloride, magnesium sulfate, ondansetron **OR** ondansetron, polyethylene glycol, acetaminophen **OR** acetaminophen, dextrose bolus (hypoglycemia) **OR** dextrose bolus (hypoglycemia), glucose, hydrALAZINE    Objective     Vitals:  Patient Vitals for the past 8 hrs:   BP Temp Temp src Pulse Resp SpO2 Weight   22 0630 (!) 121/46 98 °F (36.7 °C) Oral 65 16 95 % --   22 0545 -- -- -- -- -- -- 175 lb (79.4 kg)   22 0433 (!) 121/45 98.6 °F (37 °C) Oral 66 16 95 % --   22 0044 (!) 124/50 97.5 °F (36.4 °C) Oral 72 16 94 % --     Average, Min, and Max for last 24 hours Vitals:  TEMPERATURE:  Temp  Av.8 °F (36.6 °C)  Min: 97 °F (36.1 °C)  Max: 98.6 °F (37 °C)    RESPIRATIONS RANGE: Resp  Av.2  Min: 0  Max: 18    PULSE RANGE: Pulse  Av.8  Min: 52  Max: 72    BLOOD PRESSURE RANGE:  Systolic (79LSF), NDI:976 , Min:108 , XGO:382   ; Diastolic (59VGZ), YOP:58, Min:45, Max:72      PULSE OXIMETRY RANGE: SpO2  Av %  Min: 91 %  Max: 100 %    I/O last 3 completed shifts:  In: -   Out: 1740 [[id number]]    CBC:  Recent Labs     22  0531 22  0508   WBC 18.5*  --  15.6* 21.3*   HGB 9.6*  --  9.2* 8.9*   HCT 29.8*  --  29.4* 29.1*     --  211 222   CRP  --  104.2*  --   --         BMP:  Recent Labs     22  0531 22  0508    139 138   K 4.7 4.4 4.6   * 112* 111*   CO2 16* 17* 17*   BUN 38* 33* 34*   CREATININE 2.44* 2.05* 2.29*   GLUCOSE 255* 154* 137*   CALCIUM 8.4* 8.2* 7.9*        Coags:  No results for input(s): APTT, PROT, INR in the last 72 hours. Lab Results   Component Value Date    SEDRATE >130 (H) 2022     Recent Labs     22  1838   .2*       Lower Extremity Physical Exam:  Vascular: DP and PT pulses are very faintly palpable. CFT >5 seconds. Hair growth is absent to the level of the distal foot. Minimal nonpitting edema to left foot      Neuro: Saph/sural/SP/DP/plantar sensation absent to light touch.     Musculoskeletal: Muscle strength not tested. Significant pain to palpation of the wound. Some pain to palpation to the posterior calf.   Gross deformity present with transmetatarsal amputation on the left and BKA on the right     Dermatologic:  Full-thickness open surgical wound noted at the posterior aspect of the left heel up to proximal to the ankle joint. Wound measures approximately 11 x 6 x 3 cm. Wound base is of exposed calcaneus and Achilles tendon along with fibronecrotic tissue. Sanguinous drainage with mild purulence noted. Erythema present surrounding surgical site with associated increase in warmth. Wound does probe to posterior calcaneus. Sinus tracking noted at the proximal aspect of the incision. No undermining, crepitus, fluctuance, or induration noted. Clinical Images:            Imaging:   US RETROPERITONEAL COMPLETE   Final Result   1.  Echogenic kidneys which may be seen in medical renal disease. 2.  Questionable trace perinephric fluid bilaterally. 3.  Echogenic foci and debris versus blood products in the dependent portion   of the bladder. Clinical correlation is recommended. XR TIBIA FIBULA LEFT (2 VIEWS)   Final Result   Cellulitis. No evidence of osteomyelitis. XR FOOT LEFT (MIN 3 VIEWS)   Final Result   1. Soft tissue emphysema within the posterior aspect of the distal left lower   leg and adjacent to the calcaneus, concerning for necrotizing fasciitis   2. Large soft tissue ulceration along the dorsal aspect of the calcaneus,   with cortical destruction, consistent with osteomyelitis   3. Diffuse soft tissue swelling now present   4. Critical results were called by Dr. Lindsey Graves to [facility] on   4/11/2022 at 6:27 p.m. hours. Cultures:   Bone 4/12: GPC, GNR  Surgical path: pending    Assessment   Laurence Pastor is a 67 y.o. male with   1. Status post extensive incision and drainage, left lower extremity (DOS: 4/12/2022)  2. History of TMA, left foot  3. History of BKA, right lower extremity  4. Unstageable pressure ulceration, left heel  5.  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 CKD  7. Type II diabetes with neuropathy    Principal Problem:    Necrotizing fasciitis ([facility] [address]  Active Problems:    Acute kidney injury superimposed on CKD ([address]    CKD (chronic kidney disease), stage III ([facility] [address]    Uncontrolled hypertension    Type 2 diabetes mellitus with stage 3 chronic kidney disease, with long-term current use of insulin ([facility])    Class 1 obesity with serious comorbidity in adult  Resolved Problems:    * No resolved hospital problems. *       Plan     · Patient examined and evaluated at bedside  with Dr. Trish Fernandez  · Treatment options discussed in detail with the patient  · X-rays reviewed: Negative gas, suspicious for osteomyelitis of heel  · PVRs ordered  · Medical management per primary  · Anbx: IV cefepime, clindamycin, Zyvox. ID following, appreciate recommendations  · Vascular consulted-recommend repeat PVRs prior to further intervention  · Discussed with patient in detail due to the extent of infection along with significant peripheral vascular disease he is at an increased risk for proximal intervention. At this time, there is not sufficient healthy soft tissue and bone to save his the left lower extremity. Discussed with patient that due to this infection it has limited his quality of life. Patient confirms understanding and would like to discuss it further with vascular surgery but does understand the benefits and risks. · Dressing applied to the left foot: 1/4 inch iodoform packing, DSD, light Ace  · Discussed with Dr. Trish Fernandez.     Monico Amaya DPM   Podiatric Medicine & Surgery   4/13/2022 at 8:17 AM

## 2022-04-13 NOTE — ED PROVIDER NOTES
[facility]  EMERGENCY DEPARTMENT ENCOUNTER   ATTENDING ATTESTATION     Pt Name: Austin Polanco  MRN: [id number]  [place] 1950  Date of evaluation: 4/12/22       Austin Polanco is a 67 y.o. male who presents with Wound Infection (Sent over by PCP for wound evaluation in foot (left) )      MDM:     43-year-old male presents with a left heel wound, concerning for necrotizing fasciitis, patient being evaluated by the podiatry service. Plan is admission. Vitals:   Vitals:    04/12/22 1930 04/12/22 1945 04/12/22 1958 04/12/22 2011   BP: 126/60 (!) 130/59  139/62   Pulse: 55 54 56 66   Resp: 10 9 11 18   Temp:   97.3 °F (36.3 °C) 97.5 °F (36.4 °C)   TempSrc:    Oral   SpO2: 97% 97% 95% 100%   Weight:       Height: This visit was performed by both a physician and an APC. I personally evaluated and examined the patient.  I performed all aspects of the MDM as documented     Fan Osorio MD  Attending Emergency  Physician                  Rio Evans MD  04/12/22 2041

## 2022-04-13 NOTE — PROGRESS NOTES
[facility] -   Infectious diseases evaluation  admission date 4/11/2022    reason for consultation:   Left foot infection    Impression :   Current:  · Diabetic left foot infection with suspected necrotizing fasciitis and calcaneal osteomyelitis. Status post incision and debridement for 1222  · Peripheral vascular disease  · Diabetes mellitus  · Chronic renal disease  · History of right below-knee amputation      Recommendations      · Continue IV cefepime,clindamycin and Zyvox  · Follow blood and wound cultures  · Follow CBC and renal function        History of Present Illness:   Initial history:  Marisa Sanchez is a 67y.o.-year-old male was sent to hospital from Dr. Rose Ship office for worsening left heel pain associated with left heel wound with surrounding dark discoloration and redness. The pain is severe, intermittent, no alleviating or aggravating factors. Left foot x-ray showed soft tissue emphysema within the posterior aspect of the distal left lower leg concerning for necrotizing fasciitis and possible calcaneal osteomyelitis. Initial WBC 18.5, creatinine 2.44, C-reactive protein more than 130, C-reactive protein 104.2, lactic acid 0 point  Afebrile  History of right below-knee amputation  Interval changes  4/13/2022   He is complaining of left foot pain, denied nausea or vomiting, denied cough or shortness of breath, no other complaints  Patient Vitals for the past 8 hrs:   BP Temp Temp src Pulse Resp SpO2   04/13/22 1513 (!) 125/52 98.2 °F (36.8 °C) Oral 64 17 96 %   04/13/22 1145 (!) 133/54 97.6 °F (36.4 °C) Oral 63 16 99 %     . I have personally reviewed the past medical history, past surgical history, medications, social history, and family history, and I haveupdated the database accordingly. Allergies:   Patient has no known allergies.      Review of Systems:     Review of Systems  As per history present illness, other than above 12 system review was negative  Physical Examination :       Physical Exam  Constitutional:       General: He is not in acute distress. Appearance: He is ill-appearing. HENT:      Head: Normocephalic and atraumatic. Right Ear: External ear normal.      Left Ear: External ear normal.   Eyes:      General: No scleral icterus. Conjunctiva/sclera: Conjunctivae normal.   Cardiovascular:      Rate and Rhythm: Normal rate and regular rhythm. Pulses: Normal pulses. Pulmonary:      Effort: Pulmonary effort is normal. No respiratory distress. Breath sounds: Normal breath sounds. Abdominal:      General: Bowel sounds are normal. There is no distension. Palpations: Abdomen is soft. Musculoskeletal:      Comments: Left foot dressing  Right below-knee amputation   Skin:     General: Skin is warm. Coloration: Skin is not jaundiced. Neurological:      General: No focal deficit present. Mental Status: He is oriented to person, place, and time.          Past Medical History:     Past Medical History:   Diagnosis Date    RISSA (acute kidney injury) ([address]     Cerebrovascular disease     Erectile dysfunction     Hepatitis B infection     Hyperlipidemia     Hypertension     Kidney cysts     Liver cyst     MRSA (methicillin resistant staph aureus) culture positive 1/10/2014    right foot    Neurotrophic ulcer of the foot ([address]     Obesity     Osteomyelitis of ankle or foot     Peripheral vascular disease ([address]     Tobacco abuse     Type II or unspecified type diabetes mellitus without mention of complication, not stated as uncontrolled        Past Surgical  History:     Past Surgical History:   Procedure Laterality Date    FOOT AMPUTATION      FOOT AMPUTATION THROUGH METATARSAL  2011    pt can't give exact date of surg-- toes and part of lt foot removed    FOOT DEBRIDEMENT Left 6/20/2020    WOUND BED PREPARATION AND WOUND VAC APPLICATION performed by Varsha Howard DPM at [address] [place] [address] DEBRIDEMENT Left 4/12/2022    FOOT DEBRIDEMENT INCISION AND DRAINAGE WITH BX performed by Radha Go DPM at [address] Right 3/2013       Medications:      linezolid  600 mg IntraVENous Q12H    clindamycin (CLEOCIN) IV  600 mg IntraVENous Q8H    amLODIPine  5 mg Oral Daily    [Held by provider] clopidogrel  75 mg Oral Daily    docusate sodium  100 mg Oral BID    ferrous sulfate  325 mg Oral Daily with breakfast    [Held by provider] furosemide  40 mg Oral Every Other Day    metoprolol tartrate  25 mg Oral BID    atorvastatin  10 mg Oral Daily    sodium chloride flush  5-40 mL IntraVENous 2 times per day    insulin lispro  0-12 Units SubCUTAneous TID WC    insulin lispro  0-6 Units SubCUTAneous Nightly    cefepime  2,000 mg IntraVENous Q24H       Social History:     Social History     Socioeconomic History    Marital status:      Spouse name: Not on file    Number of children: Not on file    Years of education: Not on file    Highest education level: Not on file   Occupational History    Not on file   Tobacco Use    Smoking status: Current Every Day Smoker     Packs/day: 0.50     Years: 30.00     Pack years: 15.00     Types: Cigarettes    Smokeless tobacco: Never Used    Tobacco comment: working on quitting smoking   Vaping Use    Vaping Use: Never used   Substance and Sexual Activity    Alcohol use: No    Drug use: Never    Sexual activity: Yes     Partners: Female   Other Topics Concern    Not on file   Social History Narrative    Not on file     Social Determinants of Health     Financial Resource Strain:     Difficulty of Paying Living Expenses: Not on file   Food Insecurity:     Worried About Running Out of Food in the Last Year: Not on file    Marika of Food in the Last Year: Not on file   Transportation Needs:     Lack of Transportation (Medical): Not on file    Lack of Transportation (Non-Medical):  Not on file   Physical Activity:     Days of Exercise per Week: Not on file    Minutes of Exercise per Session: Not on file   Stress:     Feeling of Stress : Not on file   Social Connections:     Frequency of Communication with Friends and Family: Not on file    Frequency of Social Gatherings with Friends and Family: Not on file    Attends [employer] Services: Not on file    Active Member of Clubs or Organizations: Not on file    Attends Club or Organization Meetings: Not on file    Marital Status: Not on file   Intimate Partner Violence:     Fear of Current or Ex-Partner: Not on file    Emotionally Abused: Not on file    Physically Abused: Not on file    Sexually Abused: Not on file   Housing Stability:     Unable to Pay for Housing in the Last Year: Not on file    Number of [place] in the Last Year: Not on file    Unstable Housing in the Last Year: Not on file       Family History:     Family History   Problem Relation Age of Onset    Diabetes Mother     Diabetes Sister     Diabetes Brother     Diabetes Sister     Diabetes Sister     Diabetes Sister       Medical Decision Making:   I have independently reviewed/ordered the following labs:    CBC with Differential:   Recent Labs     04/11/22  1818 04/11/22  1818 04/12/22  0531 04/13/22  0508   WBC 18.5*   < > 15.6* 21.3*   HGB 9.6*   < > 9.2* 8.9*   HCT 29.8*   < > 29.4* 29.1*      < > 211 222   LYMPHOPCT 12*  --   --   --    MONOPCT 5  --   --   --     < > = values in this interval not displayed. BMP:  Recent Labs     04/12/22  0531 04/13/22  0508    138   K 4.4 4.6   * 111*   CO2 17* 17*   BUN 33* 34*   CREATININE 2.05* 2.29*     Hepatic Function Panel: No results for input(s): PROT, LABALBU, BILIDIR, IBILI, BILITOT, ALKPHOS, ALT, AST in the last 72 hours. No results for input(s): RPR in the last 72 hours. No results for input(s): HIV in the last 72 hours. No results for input(s): BC in the last 72 hours.   Lab Results   Component Value Date    CREATININE 2.29

## 2022-04-13 NOTE — PROGRESS NOTES
Patient arrived from pacu in a bed. He was oriented to the room and given the call light.   Bed rails up x2, bed locked and lowered, and bed alarm on

## 2022-04-14 LAB
ANION GAP SERPL CALCULATED.3IONS-SCNC: 10 MMOL/L (ref 9–17)
BUN BLDV-MCNC: 36 MG/DL (ref 8–23)
BUN/CREAT BLD: 14 (ref 9–20)
C-REACTIVE PROTEIN: 130.1 MG/L (ref 0–5)
CALCIUM SERPL-MCNC: 7.6 MG/DL (ref 8.6–10.4)
CHLORIDE BLD-SCNC: 109 MMOL/L (ref 98–107)
CO2: 18 MMOL/L (ref 20–31)
CREAT SERPL-MCNC: 2.59 MG/DL (ref 0.7–1.2)
ESTIMATED AVERAGE GLUCOSE: 186 MG/DL
GFR AFRICAN AMERICAN: 30 ML/MIN
GFR NON-AFRICAN AMERICAN: 24 ML/MIN
GFR SERPL CREATININE-BSD FRML MDRD: ABNORMAL ML/MIN/{1.73_M2}
GLUCOSE BLD-MCNC: 138 MG/DL (ref 75–110)
GLUCOSE BLD-MCNC: 145 MG/DL (ref 75–110)
GLUCOSE BLD-MCNC: 163 MG/DL (ref 70–99)
GLUCOSE BLD-MCNC: 175 MG/DL (ref 75–110)
GLUCOSE BLD-MCNC: 188 MG/DL (ref 75–110)
HBA1C MFR BLD: 8.1 % (ref 4–6)
HCT VFR BLD CALC: 27.9 % (ref 40.7–50.3)
HEMOGLOBIN: 8.8 G/DL (ref 13–17)
MCH RBC QN AUTO: 25.3 PG (ref 25.2–33.5)
MCHC RBC AUTO-ENTMCNC: 31.5 G/DL (ref 28.4–34.8)
MCV RBC AUTO: 80.2 FL (ref 82.6–102.9)
NRBC AUTOMATED: 0 PER 100 WBC
PDW BLD-RTO: 14.6 % (ref 11.8–14.4)
PLATELET # BLD: 201 K/UL (ref 138–453)
PMV BLD AUTO: 9.1 FL (ref 8.1–13.5)
POTASSIUM SERPL-SCNC: 4.5 MMOL/L (ref 3.7–5.3)
RBC # BLD: 3.48 M/UL (ref 4.21–5.77)
SODIUM BLD-SCNC: 137 MMOL/L (ref 135–144)
SURGICAL PATHOLOGY REPORT: NORMAL
WBC # BLD: 18.7 K/UL (ref 3.5–11.3)

## 2022-04-14 PROCEDURE — 85027 COMPLETE CBC AUTOMATED: CPT

## 2022-04-14 PROCEDURE — 6370000000 HC RX 637 (ALT 250 FOR IP): Performed by: PODIATRIST

## 2022-04-14 PROCEDURE — 2580000003 HC RX 258: Performed by: PODIATRIST

## 2022-04-14 PROCEDURE — 99232 SBSQ HOSP IP/OBS MODERATE 35: CPT | Performed by: INTERNAL MEDICINE

## 2022-04-14 PROCEDURE — 82947 ASSAY GLUCOSE BLOOD QUANT: CPT

## 2022-04-14 PROCEDURE — 2060000000 HC ICU INTERMEDIATE R&B

## 2022-04-14 PROCEDURE — 99232 SBSQ HOSP IP/OBS MODERATE 35: CPT | Performed by: FAMILY MEDICINE

## 2022-04-14 PROCEDURE — 97163 PT EVAL HIGH COMPLEX 45 MIN: CPT

## 2022-04-14 PROCEDURE — 36415 COLL VENOUS BLD VENIPUNCTURE: CPT

## 2022-04-14 PROCEDURE — 97167 OT EVAL HIGH COMPLEX 60 MIN: CPT

## 2022-04-14 PROCEDURE — 2500000003 HC RX 250 WO HCPCS: Performed by: PODIATRIST

## 2022-04-14 PROCEDURE — 97530 THERAPEUTIC ACTIVITIES: CPT

## 2022-04-14 PROCEDURE — 97535 SELF CARE MNGMENT TRAINING: CPT

## 2022-04-14 PROCEDURE — 80048 BASIC METABOLIC PNL TOTAL CA: CPT

## 2022-04-14 PROCEDURE — 86140 C-REACTIVE PROTEIN: CPT

## 2022-04-14 PROCEDURE — 6370000000 HC RX 637 (ALT 250 FOR IP): Performed by: FAMILY MEDICINE

## 2022-04-14 PROCEDURE — 6360000002 HC RX W HCPCS: Performed by: PODIATRIST

## 2022-04-14 RX ADMIN — TRAMADOL HYDROCHLORIDE 50 MG: 50 TABLET, COATED ORAL at 14:51

## 2022-04-14 RX ADMIN — INSULIN LISPRO 2 UNITS: 100 INJECTION, SOLUTION INTRAVENOUS; SUBCUTANEOUS at 17:05

## 2022-04-14 RX ADMIN — FERROUS SULFATE TAB EC 325 MG (65 MG FE EQUIVALENT) 325 MG: 325 (65 FE) TABLET DELAYED RESPONSE at 08:24

## 2022-04-14 RX ADMIN — METOPROLOL TARTRATE 25 MG: 25 TABLET, FILM COATED ORAL at 08:24

## 2022-04-14 RX ADMIN — AMLODIPINE BESYLATE 5 MG: 5 TABLET ORAL at 08:24

## 2022-04-14 RX ADMIN — TRAMADOL HYDROCHLORIDE 50 MG: 50 TABLET, COATED ORAL at 08:23

## 2022-04-14 RX ADMIN — ATORVASTATIN CALCIUM 10 MG: 10 TABLET, FILM COATED ORAL at 08:24

## 2022-04-14 RX ADMIN — OXYCODONE AND ACETAMINOPHEN 2 TABLET: 5; 325 TABLET ORAL at 11:45

## 2022-04-14 RX ADMIN — OXYCODONE AND ACETAMINOPHEN 2 TABLET: 5; 325 TABLET ORAL at 04:03

## 2022-04-14 RX ADMIN — CEFEPIME HYDROCHLORIDE 2000 MG: 2 INJECTION, POWDER, FOR SOLUTION INTRAVENOUS at 19:59

## 2022-04-14 RX ADMIN — METOPROLOL TARTRATE 25 MG: 25 TABLET, FILM COATED ORAL at 17:06

## 2022-04-14 RX ADMIN — DOCUSATE SODIUM 100 MG: 100 CAPSULE, LIQUID FILLED ORAL at 08:23

## 2022-04-14 RX ADMIN — CLINDAMYCIN IN 5 PERCENT DEXTROSE 600 MG: 12 INJECTION, SOLUTION INTRAVENOUS at 22:36

## 2022-04-14 RX ADMIN — CLINDAMYCIN IN 5 PERCENT DEXTROSE 600 MG: 12 INJECTION, SOLUTION INTRAVENOUS at 06:36

## 2022-04-14 RX ADMIN — DOCUSATE SODIUM 100 MG: 100 CAPSULE, LIQUID FILLED ORAL at 19:43

## 2022-04-14 RX ADMIN — CLINDAMYCIN IN 5 PERCENT DEXTROSE 600 MG: 12 INJECTION, SOLUTION INTRAVENOUS at 14:53

## 2022-04-14 RX ADMIN — INSULIN LISPRO 1 UNITS: 100 INJECTION, SOLUTION INTRAVENOUS; SUBCUTANEOUS at 20:53

## 2022-04-14 RX ADMIN — LINEZOLID 600 MG: 600 INJECTION, SOLUTION INTRAVENOUS at 08:27

## 2022-04-14 RX ADMIN — INSULIN LISPRO 2 UNITS: 100 INJECTION, SOLUTION INTRAVENOUS; SUBCUTANEOUS at 12:14

## 2022-04-14 RX ADMIN — SODIUM BICARBONATE: 84 INJECTION, SOLUTION INTRAVENOUS at 11:44

## 2022-04-14 ASSESSMENT — PAIN DESCRIPTION - DESCRIPTORS
DESCRIPTORS: THROBBING;SHARP
DESCRIPTORS: SHARP;THROBBING
DESCRIPTORS: THROBBING;TENDER
DESCRIPTORS: THROBBING;SHARP

## 2022-04-14 ASSESSMENT — PAIN DESCRIPTION - PROGRESSION
CLINICAL_PROGRESSION: GRADUALLY WORSENING

## 2022-04-14 ASSESSMENT — PAIN DESCRIPTION - DIRECTION
RADIATING_TOWARDS: KNEE

## 2022-04-14 ASSESSMENT — PAIN SCALES - GENERAL
PAINLEVEL_OUTOF10: 6
PAINLEVEL_OUTOF10: 8
PAINLEVEL_OUTOF10: 10
PAINLEVEL_OUTOF10: 9
PAINLEVEL_OUTOF10: 5
PAINLEVEL_OUTOF10: 8
PAINLEVEL_OUTOF10: 0

## 2022-04-14 ASSESSMENT — PAIN DESCRIPTION - ONSET
ONSET: ON-GOING
ONSET: ON-GOING
ONSET: PROGRESSIVE

## 2022-04-14 ASSESSMENT — PAIN DESCRIPTION - PAIN TYPE
TYPE: ACUTE PAIN

## 2022-04-14 ASSESSMENT — PAIN DESCRIPTION - LOCATION
LOCATION: FOOT

## 2022-04-14 ASSESSMENT — PAIN DESCRIPTION - FREQUENCY
FREQUENCY: CONTINUOUS

## 2022-04-14 ASSESSMENT — PAIN - FUNCTIONAL ASSESSMENT
PAIN_FUNCTIONAL_ASSESSMENT: PREVENTS OR INTERFERES SOME ACTIVE ACTIVITIES AND ADLS

## 2022-04-14 ASSESSMENT — ENCOUNTER SYMPTOMS
COUGH: 0
WHEEZING: 0
NAUSEA: 0
ABDOMINAL PAIN: 0
DIARRHEA: 0
SHORTNESS OF BREATH: 0
VOMITING: 0
CHEST TIGHTNESS: 0
CONSTIPATION: 0
BLOOD IN STOOL: 0

## 2022-04-14 ASSESSMENT — PAIN DESCRIPTION - ORIENTATION
ORIENTATION: LEFT

## 2022-04-14 NOTE — ACP (ADVANCE CARE PLANNING)
Advance Care Planning     Advance Care Planning Activator (Inpatient)  Conversation Note      Date of ACP Conversation: 4/14/2022     Conversation Conducted with: Patient with Decision Making Capacity    ACP Activator: Hebert Pollard RN        Health Care Decision Maker:   Patient Linda Durbin is current decision maker. He does not have a DPOAH established. He relates that he would like his sister Oliver Puente to be his [address] if needed. This is different then what [address] [place] [address] is so I offered to have [facility] see patient to do a [address]. Current Designated Health Care Decision Maker:  Spoke with Linda Durbin today. He is alert and oriented and we discussed his medical situation, possible options of treatment, we discussed code status and [address]. Mr. Patrick Messina is appropriate and relates understanding. Care Preferences    Ventilation: \"If you were in your present state of health and suddenly became very ill and were unable to breathe on your own, what would your preference be about the use of a ventilator (breathing machine) if it were available to you? \"      Would the patient desire the use of ventilator (breathing machine)?: yes    \"If your health worsens and it becomes clear that your chance of recovery is unlikely, what would your preference be about the use of a ventilator (breathing machine) if it were available to you? \"     Would the patient desire the use of ventilator (breathing machine)?: Yes      Resuscitation  \"CPR works best to restart the heart when there is a sudden event, like a heart attack, in someone who is otherwise healthy. Unfortunately, CPR does not typically restart the heart for people who have serious health conditions or who are very sick. \"    \"In the event your heart stopped as a result of an underlying serious health condition, would you want attempts to be made to restart your heart (answer \"yes\" for attempt to resuscitate) or would you prefer a natural death (answer \"no\" for do not attempt to resuscitate)? \" yes       [] Yes   [] No   Educated Patient / Melyssa Glen regarding differences between Advance Directives and portable DNR orders.     Length of ACP Conversation in minutes:      Conversation Outcomes:  [x] ACP discussion completed  [] Existing advance directive reviewed with patient; no changes to patient's previously recorded wishes  [] New Advance Directive completed  [] Portable Do Not Rescitate prepared for Provider review and signature  [] POLST/POST/MOLST/MOST prepared for Provider review and signature      Follow-up plan:    [x] Schedule follow-up conversation to continue planning  [] Referred individual to Provider for additional questions/concerns   [] Advised patient/agent/surrogate to review completed ACP document and update if needed with changes in condition, patient preferences or care setting    [] This note routed to one or more involved healthcare providers

## 2022-04-14 NOTE — PROGRESS NOTES
Occupational Therapy   Occupational Therapy Initial Assessment  Date: 2022   Patient Name: Joby Ramsey  MRN: [id number]     : 1950    Date of Service: 2022   Discharge Recommendation:  Patient would benefit from continued therapy after discharge Pt currently functioning below baseline. Would suggest additional therapy at time of discharge to maximize long term outcomes and prevent re-admission. Please refer to AM-PAC score for current level of function. RN reports patient is medically stable for therapy treatment this date. Chart reviewed prior to treatment and patient is agreeable for therapy. All lines intact and patient positioned comfortably at end of treatment. All patient needs addressed prior to ending therapy session. Joby Ramsey is a 67 y.o. male seen at [facility] [address] ED for complaints of erythema. Patient saw Dr. Alexandru Beck today in his office, Dr. Alexandru Beck instruct patient to present to the emergency room for admission, IV antibiotics and further work-up. 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 Also has a history of transmetatarsal irritation to the left foot. Assessment   Performance deficits / Impairments: Decreased strength;Decreased ADL status; Decreased ROM; Decreased endurance;Decreased sensation;Decreased cognition;Decreased safe awareness;Decreased balance;Decreased coordination  Assessment: Skilled OT indicated for deficits with overall safety awareness, balance, endurance, strength and safety with ADL transfer techs to increase overall function and independence. Prognosis: Good  Decision Making: High Complexity  OT Education: OT Role;Home Exercise Program;Precautions; ADL Adaptive Strategies;Transfer Training;Equipment; Family Education; Energy Conservation  Patient Education: EC/WS, ADL transfer techs, fall prevention techs, B UE HEP, possible equip needs, use of DME/AD as needed, pressure relief, d/c reccommendations and surgery precautions. REQUIRES OT FOLLOW UP: Yes  Activity Tolerance  Activity Tolerance: Patient Tolerated treatment well;Patient limited by fatigue;Patient limited by pain;Treatment limited secondary to decreased cognition  Activity Tolerance: Pt tolerated slide board transfer to and from [facility] with one 5 min rest break. Safety Devices  Safety Devices in place: Yes  Type of devices: All fall risk precautions in place; Bed alarm in place;Call light within reach;Gait belt;Left in bed;Nurse notified  Restraints  Initially in place: No           Patient Diagnosis(es): The encounter diagnosis was Necrotizing fasciitis of ankle and foot ([address] has a past medical history of RISSA (acute kidney injury) ([address] Cerebrovascular disease, Erectile dysfunction, Hepatitis B infection, Hyperlipidemia, Hypertension, Kidney cysts, Liver cyst, MRSA (methicillin resistant staph aureus) culture positive, Neurotrophic ulcer of the foot ([address] Obesity, Osteomyelitis of ankle or foot, Peripheral vascular disease ([address] Tobacco abuse, and Type II or unspecified type diabetes mellitus without mention of complication, not stated as uncontrolled. has a past surgical history that includes Foot amputation through metatarsal (2011); Foot Amputation; Foot surgery (Right, 3/2013); Foot Debridement (Left, 6/20/2020); and Foot Debridement (Left, 4/12/2022). Restrictions  Restrictions/Precautions  Restrictions/Precautions: Weight Bearing,General Precautions,Fall Risk  Required Braces or Orthoses?: No  Lower Extremity Weight Bearing Restrictions  Right Lower Extremity Weight Bearing: Non Weight Bearing (No clarification in notes but pt states he cannot bear weight)  Left Lower Extremity Weight Bearing: Non Weight Bearing  Partial Weight Bearing Percentage Or Pounds: No WB orders, but had I&D of left heel 4/12 and states he cannot put weight on it.   Position Activity Restriction  Other position/activity restrictions: IV, telemetry, Right BKA with prosthesis, left transmetatarsal amp with wound on heel    Subjective   General  Chart Reviewed: Yes  Patient assessed for rehabilitation services?: Yes  Family / Caregiver Present: No  Pain Assessment  Pain Level: 8  Response to Pain Intervention: Patient Satisfied  Vital Signs  Temp: 97.9 °F (36.6 °C)  Temp Source: Oral  Pulse: 64  Heart Rate Source: Monitor  Resp: 18  BP: (!) 130/51  BP Location: Right Arm  MAP (mmHg): 70  Oxygen Therapy  SpO2: 98 %  O2 Device: None (Room air)  Social/Functional History  Social/Functional History  Lives With: Alone  Type of Home: Apartment (senior apt; Liable?)  Home Layout: Multi-level (3rd floor; elevator)  Home Access: Elevator  Bathroom Shower/Tub: Tub/Shower unit,Shower chair with back (sponge bathes; sometimes showers when aide is there)  Bathroom Toilet: Standard (grab bars by toilet)  Home Equipment: xiao qu wu you walker,Lift chair  Receives Help From: Home health (5 days a week; 4 hours/day)  ADL Assistance: Needs assistance (aid helps with shower; gets dressed independently)  Homemaking Assistance: Needs assistance (meals delivered)  Homemaking Responsibilities: Yes (does some cooking; does his own laundry; aid does cleaning)  Ambulation Assistance:  (Patient does not amb, uses power w/c for all mobility.)  Transfer Assistance: Independent (stand pivots from power chair; uses prosthetic leg during transfers)  Active : No  Occupation: Retired  Type of occupation: factory work  Leisure & Hobbies: TV  Additional Comments: 2 or 3 falls in last month while transferring; doesn't get out of house much       Objective        Orientation  Overall Orientation Status: Within Functional Limits  Observation/Palpation  Posture: Fair  Observation: Dressing on Left foot s/p I&D, HX left transmetatarsal amp and R BKA  Balance  Sitting Balance: Stand by assistance  Standing Balance: Unable to assess(comment) (Pt did not stand; used slide board to transfer to w/c)  Functional Mobility  Functional - Mobility Device: Wheelchair  Assist Level:  (Pt does not typically ambulate, uses power chair for all mobility)  ADL  Feeding: Independent;Setup  Grooming: Setup; Independent (If grooming at sink, pt needing mod x2 for transferring to chair. Independent with set up once in chair.)  UE Bathing: Contact guard assistance;Minimal assistance  LE Bathing: Moderate assistance;Maximum assistance  UE Dressing: Contact guard assistance;Minimal assistance  LE Dressing: Moderate assistance;Maximum assistance (Pt required cues to scoot forward to be able to stan prosthesis and physical assist for doffing prosthesis.)  Toileting: Moderate assistance;Maximum assistance (Pt needing mod x2 for transfers using slide board or staff could use milton Cartesiandy.)  Tone RUE  RUE Tone: Normotonic  Tone LUE  LUE Tone: Normotonic  Coordination  Movements Are Fluid And Coordinated: No     Bed mobility  Supine to Sit: Contact guard assistance  Sit to Supine: Minimal assistance (Assistance for lifting legs into bed)  Scooting: Minimal assistance (Pt verbal requiring cues for scooting forward in bed, scooting back all the way into chair, and scooting across slide board)  Transfers  Slide Board: 2 Person assistance; Moderate assistance (Pt requiring verbal cues for hand placement on board and bed for pushing off, as well as for weight bearing through arms and scooting across board.)  Pt able to drive w/c around bed to transfer to strong side to get back into bed. Vision - Basic Assessment  Prior Vision: No visual deficits  Cognition  Overall Cognitive Status: WFL  Arousal/Alertness: Appropriate responses to stimuli  Following Commands:  Follows multistep commands consistently  Attention Span: Appears intact  Memory: Appears intact  Safety Judgement: Decreased awareness of need for assistance  Problem Solving: Assistance required to generate solutions  Insights: Decreased awareness of deficits  Initiation: Requires cues for some  Sequencing: Requires cues for some  Cognition Comment: Requires verbal cues for safe transfer techs with slide board. Isn't receptive to reccommendations for SNF or need for additional assistance. Perception  Overall Perceptual Status: WFL     Sensation  Overall Sensation Status: Impaired (neuropathy)        LUE AROM (degrees)  LUE AROM : WFL  RUE AROM (degrees)  RUE AROM : WFL  LUE Strength  Gross LUE Strength: WFL  LUE Strength Comment: 4-/5  RUE Strength  Gross RUE Strength: WFL  RUE Strength Comment: 3+/5                   Plan   Plan  Times per week: 4-5x a week, 1-2x per day  Current Treatment Recommendations: Strengthening,ROM,Balance Training,Endurance Training,Positioning,Safety Education & Training,Patient/Caregiver Education & Training,Self-Care / ADL                                     AM-PAC Score        AM-[facility] Inpatient Daily Activity Raw Score: 16 (04/14/22 1555)  AM-PAC Inpatient ADL T-Scale Score : 35.96 (04/14/22 1555)  ADL Inpatient CMS 0-100% Score: 53.32 (04/14/22 1555)  ADL Inpatient CMS G-Code Modifier : CK (04/14/22 1555)    Goals  Short term goals  Time Frame for Short term goals: By discharge pt will  Short term goal 1: demo min A x1 for ADL transfers with appropriate device/technique and good safety/pacing. Short term goal 2: demo SBA for UB ADLs and min A x1 for LB ADLs with good safety/pacing and use of AD/DME as needed. Short term goal 3: demo min A x1 for toileting tasks with good safety/pacing and use of AD/DME as needed. Short term goal 4: Increase B UE strength by 1/2 grade to assist with ADL tasks and functional transfers. Short term goal 5: demo and verb good understanding of EC/WS, ADL transfer techs, fall prevention techs, B UE HEP, possible equip needs, use of DME/AD as needed, pressure relief, d/c reccommendations and surgery precautions.        Therapy Time   Individual Concurrent Group Co-treatment   Time In  1402        Time Out 1447         Minutes  45          treatment min: 27    Co-treatment with PT warranted secondary to decreased safety and independence requiring 2 skilled therapy professionals to address individual discipline's goals. OT addressing preparation for ADL transfer, sitting/activity tolerance, environmental safety/scanning, fall prevention, functional mobility for ADL transfers, ability to sequence and follow directions and functional UE strength.     Mendel Rowell, OT/S

## 2022-04-14 NOTE — PROGRESS NOTES
Nephrology Progress Note      SUBJECTIVE    Patient was seen and examined. Patient was hemodynamically stable. Patient states that his pain is under good control. Urine output is averaging 500 to 800 mL/day. Creatinine 2.59 today and gradually worsening. Receiving IV fluids with bicarbonate and most recent bicarbonate is 18  Denies any nausea or vomiting next      OBJECTIVE      CURRENT TEMPERATURE:  Temp: 97.9 °F (36.6 °C)  MAXIMUM TEMPERATURE OVER 24HRS:  Temp (24hrs), Av °F (36.7 °C), Min:97.9 °F (36.6 °C), Max:98.2 °F (36.8 °C)    CURRENT RESPIRATORY RATE:  Resp: 18  CURRENT PULSE:  Pulse: 64  CURRENT BLOOD PRESSURE:  BP: (!) 130/51  24HR BLOOD PRESSURE RANGE:  Systolic (34XUH), UEL:622 , Min:120 , NPP:514   ; Diastolic (68ZFA), ERV:65, Min:48, Max:60    24HR INTAKE/OUTPUT:      Intake/Output Summary (Last 24 hours) at 2022 1602  Last data filed at 2022 1445  Gross per 24 hour   Intake 540 ml   Output 600 ml   Net -60 ml     WEIGHT :  Patient Vitals for the past 96 hrs (Last 3 readings):   Weight   22 0504 175 lb (79.4 kg)   22 0545 175 lb (79.4 kg)   22 0400 225 lb (102.1 kg)     PHYSICAL EXAM      GENERAL APPEARANCE: Awake and alert x3  SKIN: Warm to touch  EYES: conjunctivae normal and sclera anicteric  ENT: No thrush or pharyngeal congestion  NECK: No JVD or lymphadenopathy. PULMONARY: lungs are clear to auscultation. No Wheezing, no ronchi . CADRDIOVASCULAR: S1 and S2 audible no S3.   ABDOMEN: soft nontender, bowel sounds present, no organomegaly, no ascites.      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, Ace wrap on the left, TMA left    CURRENT MEDICATIONS      oxyCODONE-acetaminophen (PERCOCET) 5-325 MG per tablet 1 tablet, Q4H PRN   Or  oxyCODONE-acetaminophen (PERCOCET) 5-325 MG per tablet 2 tablet, Q4H PRN  traMADol (ULTRAM) tablet 50 mg, Q6H PRN  sodium bicarbonate 75 mEq in sodium chloride 0.45 % 1,000 mL infusion, Continuous  clindamycin (CLEOCIN) 600 mg in dextrose 5 % 50 mL IVPB, Q8H  amLODIPine (NORVASC) tablet 5 mg, Daily  clopidogrel (PLAVIX) tablet 75 mg, Daily  docusate sodium (COLACE) capsule 100 mg, BID  ferrous sulfate (FE TABS 325) EC tablet 325 mg, Daily with breakfast  [Held by provider] furosemide (LASIX) tablet 40 mg, Every Other Day  metoprolol tartrate (LOPRESSOR) tablet 25 mg, BID  atorvastatin (LIPITOR) tablet 10 mg, Daily  glucagon (rDNA) injection 1 mg, PRN  dextrose 5 % solution, PRN  sodium chloride flush 0.9 % injection 5-40 mL, 2 times per day  sodium chloride flush 0.9 % injection 10 mL, PRN  0.9 % sodium chloride infusion, PRN  potassium chloride (KLOR-CON M) extended release tablet 40 mEq, PRN   Or  potassium bicarb-citric acid (EFFER-K) effervescent tablet 40 mEq, PRN   Or  potassium chloride 10 mEq/100 mL IVPB (Peripheral Line), PRN  magnesium sulfate 1000 mg in dextrose 5% 100 mL IVPB, PRN  ondansetron (ZOFRAN-ODT) disintegrating tablet 4 mg, Q8H PRN   Or  ondansetron (ZOFRAN) injection 4 mg, Q6H PRN  polyethylene glycol (GLYCOLAX) packet 17 g, Daily PRN  acetaminophen (TYLENOL) tablet 650 mg, Q6H PRN   Or  acetaminophen (TYLENOL) suppository 650 mg, Q6H PRN  insulin lispro (HUMALOG) injection vial 0-12 Units, TID WC  insulin lispro (HUMALOG) injection vial 0-6 Units, Nightly  cefepime (MAXIPIME) 2000 mg IVPB minibag, Q24H  dextrose bolus (hypoglycemia) 10% 125 mL, PRN   Or  dextrose bolus (hypoglycemia) 10% 250 mL, PRN  glucose chewable tablet 4 each, PRN  hydrALAZINE (APRESOLINE) injection 10 mg, Q4H PRN          LABS      CBC:   Recent Labs     04/12/22  0531 04/13/22  0508 04/14/22  0527   WBC 15.6* 21.3* 18.7*   RBC 3.66* 3.62* 3.48*   HGB 9.2* 8.9* 8.8*   HCT 29.4* 29.1* 27.9*   MCV 80.3* 80.4* 80.2*   MCH 25.1* 24.6* 25.3   MCHC 31.3 30.6 31.5   RDW 14.3 14.8* 14.6*    222 201   MPV 9.0 8.9 9.1      BMP:   Recent Labs     04/12/22  0531 04/13/22  0508 04/14/22  0527    138 137   K 4.4 4.6 4.5   * 111* 109* CO2 17* 17* 18*   BUN 33* 34* 36*   CREATININE 2.05* 2.29* 2.59*   GLUCOSE 154* 137* 163*   CALCIUM 8.2* 7.9* 7.6*      HEPBSAG:  Lab Results   Component Value Date    HEPBSAG REACTIVE 07/22/2013       C3:   Lab Results   Component Value Date    C3 120 04/12/2022     C4:   Lab Results   Component Value Date    C4 19 04/12/2022     URINE SODIUM:    Lab Results   Component Value Date    JLUIS 65 09/17/2020      URINE CREATININE:    Lab Results   Component Value Date    LABCREA 66.7 04/12/2022     URINE EOSINOPHILS:   Lab Results   Component Value Date    UREO NONE SEEN 04/12/2022         RADIOLOGY      Reviewed as available. ASSESSMENT      1. RISSA on top of chronic kidney disease stage IV likely from prerenal azotemia, intravascular volume depletion with decreased oral intake while he was still taking loop diuretics and ACE inhibitors. Creatinine is worsening and acute renal failure is still evolving. 2.  Hypertension blood pressure is under good control   3. She of CKD stage IV disease with a creatinine that runs around 2.1-2.3 baseline. This likely to underlying diabetic nephrosclerosis. Previous proteinuria quantified less than 0.5 g  4. History of diabetes mellitus type 2  5. Status post incision and drainage of the left foot and lower extremity yesterday by podiatry team  6. History of essential hypertension  7. Probable osteomyelitis involving left calcaneum. 8.  Metabolic acidosis  PLAN      1. Increase IV fluid with bicarbonate to 75 mill per hour  2. Antibiotic as per primary service  3. We will do bladder scan with next void. Please do not hesitate to call with questions.     This note is created with the assistance of a speech-recognition program. While intending to generate a document that actually reflects the content of the visit, no guarantees can be provided that every mistake has been identified and corrected by editing    Electronically signed by Merissa Rader MD on 4/14/2022 at 4:02 PM

## 2022-04-14 NOTE — PROGRESS NOTES
[facility]  Office: [address], DO, Rosy Smoker, DO, Juanita [facility], DO, Milana Doylen Blood, DO, Colonel Kristina MD, Nathaniel Holden MD, Erin Cortez MD, Georgia Epps MD, Melany You MD, Charisma Rodriguez MD, Barbara Monique MD, Gregoria Pollock, DO, Brando Martinez, DO, Lizette Yost MD,  Brian Bass, DO, Denny Funez MD, Heather Cuellar MD, Lorena Roth MD, Hiral Ramírez, DO, Joyce Taylor MD, Gema Restrepo MD, Evan Brody [facility], [facility]IE Murray, CNP, Elvia Knowles, CNP, Jaswant Sherman, CNP, Salvatore Prince, CNP, Eyal Damian, CNP, Safia Presley PASukhdeepC, Valerie Olsen, DNP, Isaías Laura, CNP, Magui Baca, CNP, Brennan Hanson, CNP, Apple Britt, CNS, Clarice Salas, [facility], Arik Berry, CNP, Rossi Barragan, CNP, Fernando Reeves, [facility]    Progress Note    4/14/2022    7:52 AM    Name:   J Luis Plascencia  MRN:     [id number]     Acct:      [de-identified]   Room:   2020/2020-01  IP Day:  3  Admit Date:  4/11/2022  5:25 PM    PCP:   Willis Jorge  Code Status:  Full Code    Subjective:     C/C:   Chief Complaint   Patient presents with    Wound Infection     Sent over by PCP for wound evaluation in foot (left)      Interval History Status: improved. Patient seen and examined at bedside,  Feeling better today  AAfebrile overnight  His pain is controlled  Not sure what are his wishes regarding amputation  Patient vitals, labs and all providers notes were reviewed,from overnight shift and morning updates were noted and discussed with the nurse    Brief History:     Per my NP  J Luis Plascencia is a 67 y.o. Non- / non  male who presents with Wound Infection (Sent over by PCP for wound evaluation in foot (left) )   and is admitted to the hospital for the management of Necrotizing fasciitis ([facility] [address]      Patient is a poor historian is does not recall how long he has been undergoing treatment for a left heel ulcer. He denies fever, but says he has had chills for \"months\". He has a known history of uncontrolled diabetes and says he checks his blood sugar in the morning and at night- BG range has been 140s to 200s, he says. He is a daily smoker. He recalls he went to Dr Todd/ podiatry for left heel care and was advised to come to the hospital for further evaluation and care.      While in the ED, xray of the left tib fib revealed soft tissue swelling, cellulitis, and no evidence of osteomyelitis. Left foot xray results were concerning for necrotizing fasciitis and osteomyelitis. His WBC was elevated at 18.5, sed rate > 130, creat 2.44- baseline appears to be 1.36-2.06. Records list Stage 3 CKD. Patient denies having a nephrologist. His lactic was unremarkable.      He was started on IV Vanco. He is being admitted for further management of necrotizing fasciitis of left foot and RISSA. Podiatry was consulted and plan for patient to undergo I & D of the left heel and bone biopsy 4/13/22. Review of Systems:     Review of Systems   Constitutional: Positive for activity change and appetite change. Negative for chills, diaphoresis and fever. HENT: Negative for congestion. Eyes: Negative for visual disturbance. Respiratory: Negative for cough, chest tightness, shortness of breath and wheezing. Cardiovascular: Negative for chest pain, palpitations and leg swelling. Gastrointestinal: Negative for abdominal pain, blood in stool, constipation, diarrhea, nausea and vomiting. Genitourinary: Negative for difficulty urinating. Musculoskeletal: Positive for arthralgias and joint swelling. LLE pain and purulent discharge    Neurological: Negative for dizziness, weakness, light-headedness, numbness and headaches. All other systems reviewed and are negative. Medications:      Allergies:  No Known Allergies    Current Meds:   Scheduled Meds:    linezolid  600 mg IntraVENous Q12H    clindamycin (CLEOCIN) IV 600 mg IntraVENous Q8H    amLODIPine  5 mg Oral Daily    clopidogrel  75 mg Oral Daily    docusate sodium  100 mg Oral BID    ferrous sulfate  325 mg Oral Daily with breakfast    [Held by provider] furosemide  40 mg Oral Every Other Day    metoprolol tartrate  25 mg Oral BID    atorvastatin  10 mg Oral Daily    sodium chloride flush  5-40 mL IntraVENous 2 times per day    insulin lispro  0-12 Units SubCUTAneous TID WC    insulin lispro  0-6 Units SubCUTAneous Nightly    cefepime  2,000 mg IntraVENous Q24H     Continuous Infusions:    IV infusion builder 50 mL/hr at 04/13/22 1135    dextrose      sodium chloride 5 mL/hr at 04/12/22 2135     PRN Meds: oxyCODONE-acetaminophen **OR** oxyCODONE-acetaminophen, traMADol, glucagon (rDNA), dextrose, sodium chloride flush, sodium chloride, potassium chloride **OR** potassium alternative oral replacement **OR** potassium chloride, magnesium sulfate, ondansetron **OR** ondansetron, polyethylene glycol, acetaminophen **OR** acetaminophen, dextrose bolus (hypoglycemia) **OR** dextrose bolus (hypoglycemia), glucose, hydrALAZINE    Data:     Past Medical History:   has a past medical history of RISSA (acute kidney injury) ([facility] [address] Cerebrovascular disease, Erectile dysfunction, Hepatitis B infection, Hyperlipidemia, Hypertension, Kidney cysts, Liver cyst, MRSA (methicillin resistant staph aureus) culture positive, Neurotrophic ulcer of the foot ([facility] [address] Obesity, Osteomyelitis of ankle or foot, Peripheral vascular disease ([facility] [address] Tobacco abuse, and Type II or unspecified type diabetes mellitus without mention of complication, not stated as uncontrolled. Social History:   reports that he has been smoking cigarettes. He has a 15.00 pack-year smoking history. He has never used smokeless tobacco. He reports that he does not drink alcohol and does not use drugs.      Family History:   Family History   Problem Relation Age of Onset    Diabetes Mother     Diabetes Sister    Richa Diabetes Brother     Diabetes Sister     Diabetes Sister     Diabetes Sister        Vitals:  BP (!) 120/49   Pulse 62   Temp 98.2 °F (36.8 °C) (Oral)   Resp 16   Ht 6' (1.829 m)   Wt 175 lb (79.4 kg)   SpO2 96%   BMI 23.73 kg/m²   Temp (24hrs), Av °F (36.7 °C), Min:97.6 °F (36.4 °C), Max:98.2 °F (36.8 °C)    Recent Labs     22  1146 22  1627 22  0627   POCGLU 219* 222* 208* 138*       I/O (24Hr): Intake/Output Summary (Last 24 hours) at 2022 0752  Last data filed at 2022 0542  Gross per 24 hour   Intake 780 ml   Output 550 ml   Net 230 ml       Labs:  Hematology:  Recent Labs     22  1818 22  1818 22  1838 22  0531 22  0508 22  0527   WBC 18.5*   < >  --  15.6* 21.3* 18.7*   RBC 3.80*   < >  --  3.66* 3.62* 3.48*   HGB 9.6*   < >  --  9.2* 8.9* 8.8*   HCT 29.8*   < >  --  29.4* 29.1* 27.9*   MCV 78.4*   < >  --  80.3* 80.4* 80.2*   MCH 25.3   < >  --  25.1* 24.6* 25.3   MCHC 32.2   < >  --  31.3 30.6 31.5   RDW 14.4   < >  --  14.3 14.8* 14.6*      < >  --  211 222 201   MPV 9.6   < >  --  9.0 8.9 9.1   SEDRATE >130*  --   --   --   --   --    CRP  --   --  104.2*  --   --   --     < > = values in this interval not displayed.      Chemistry:  Recent Labs     22  0531 22  0508 22  0527    138 137   K 4.4 4.6 4.5   * 111* 109*   CO2 17* 17* 18*   GLUCOSE 154* 137* 163*   BUN 33* 34* 36*   CREATININE 2.05* 2.29* 2.59*   ANIONGAP 10 10 10   LABGLOM 32* 28* 24*   GFRAA 39* 34* 30*   CALCIUM 8.2* 7.9* 7.6*     Recent Labs     22  2019 22  0608 22  1146 22  1627 225 22  0627   POCGLU 99 121* 219* 222* 208* 138*     ABG:No results found for: POCPH, PHART, PH, POCPCO2, AGP8ZUP, PCO2, POCPO2, PO2ART, PO2, POCHCO3, YEM5OWG, HCO3, NBEA, PBEA, BEART, BE, THGBART, THB, EOE6IRS, CBZV9LKU, K8ZAREYQ, O2SAT, FIO2  Lab Results   Component Value Date/Time    SPECIAL LT FA,7ML 04/11/2022 06:38 PM     Lab Results   Component Value Date/Time    CULTURE CULTURE IN PROGRESS 04/12/2022 05:36 PM       Radiology:  XR TIBIA FIBULA LEFT (2 VIEWS)    Result Date: 4/11/2022  Cellulitis. No evidence of osteomyelitis. XR FOOT LEFT (MIN 3 VIEWS)    Result Date: 4/11/2022  1. Soft tissue emphysema within the posterior aspect of the distal left lower leg and adjacent to the calcaneus, concerning for necrotizing fasciitis 2. Large soft tissue ulceration along the dorsal aspect of the calcaneus, with cortical destruction, consistent with osteomyelitis 3. Diffuse soft tissue swelling now present 4. Critical results were called by Dr. Aleta Goode to [place] on 4/11/2022 at 6:27 p.m. hours. US RETROPERITONEAL COMPLETE    Result Date: 4/12/2022  1. Echogenic kidneys which may be seen in medical renal disease. 2.  Questionable trace perinephric fluid bilaterally. 3.  Echogenic foci and debris versus blood products in the dependent portion of the bladder. Clinical correlation is recommended. Physical Examination:        Physical Exam  Vitals and nursing note reviewed. Constitutional:       General: He is not in acute distress. HENT:      Head: Normocephalic and atraumatic. Eyes:      Conjunctiva/sclera: Conjunctivae normal.      Pupils: Pupils are equal, round, and reactive to light. Cardiovascular:      Rate and Rhythm: Normal rate and regular rhythm. Heart sounds: No murmur heard. Pulmonary:      Effort: Pulmonary effort is normal. No accessory muscle usage or respiratory distress. Breath sounds: No stridor. No decreased breath sounds, wheezing, rhonchi or rales. Abdominal:      General: Bowel sounds are normal. There is no distension. Palpations: Abdomen is soft. Abdomen is not rigid. Tenderness: There is no abdominal tenderness. There is no guarding. Musculoskeletal:         General: No tenderness. Comments: /P right BKA.   S/p left TMA  Wound S/P I&D is in dressing    Skin:     General: Skin is warm and dry. Findings: No erythema, lesion or rash. Neurological:      Mental Status: He is alert and oriented to person, place, and time. Cranial Nerves: No cranial nerve deficit. Motor: No seizure activity. Psychiatric:         Speech: Speech normal.         Behavior: Behavior normal. Behavior is cooperative. Assessment:        Hospital Problems           Last Modified POA    * (Principal) Necrotizing fasciitis ([address] 4/12/2022 Yes    Acute kidney injury superimposed on CKD ([address] 4/11/2022 Yes    CKD (chronic kidney disease), stage III ([address] 4/11/2022 Yes    Uncontrolled hypertension 4/12/2022 Yes    Type 2 diabetes mellitus with stage 3 chronic kidney disease, with long-term current use of insulin ([address] 4/12/2022 Yes    Class 1 obesity with serious comorbidity in adult 4/12/2022 Yes          Plan:         L heel wound infection/ necrotizing fasciitis  with possible underlying Osteomyelitis : he ie S/P extensive I&D and biopsy sent on 4/12, post op management  per podiatry, ID consulted, currently on zyvox, clindamycin and  Cefepime   Adjust pain meds for better pain control    PVD : vascular consulted, they recommended palliative care to discuss quality of life if limb left amputated vs not , I discussed with the patient and given that he lives by himself he is hesitant to proceed and wants to think more about it     RISSA/CKD stage III: Baseline creatinine between 2-2 0.2, likely 2/2 infection  nephrology to evaluate the patient, unclear if the patient has nephrology follow-up as an outpatient, he also does not follow-up with his PCP   Continue to hold Lasix and Ace      AGMA : likely 2/2 above , fluids changes to include bicarb per nephrology, he is still currently on bicarb drip.      AOCD : monitor and continue support     HTN:  Ace held, continue BB continue to monitor     HPL: continue home medications    DM2: With hyperglycemia , target -180 , Continue diabetes home medications , insulin sliding scale, hypoglycemia protocol    DVT prophylaxis    Discussed with the patient and the nurse       Brit Edwards MD  4/14/2022  7:52 AM

## 2022-04-14 NOTE — CONSULTS
Palliative Care Inpatient Consult    NAME:  Elodia Garvey  MEDICAL RECORD NUMBER:  [id number]  AGE: 67 y.o. GENDER: male  : 1950  TODAY'S DATE:  2022    Reasons for Consultation:    Provision of information regarding PC and/or hospice philosophies  Complex, time-intensive communication and interdisciplinary psychosocial support  Clarification of goals of care and/or assistance with difficult decision-making  Guidance in regards to resources and transition(s)    Code Status: Full Code    Members of PC team contributing to this consultation are :  Delbert Mandel Rn    History of Present Illness     The patient is a 67 y.o. Non- / non  male who presents with Wound Infection (Sent over by PCP for wound evaluation in foot (left) )    Referred to Palliative Care by   [x] Physician   [] Nursing  [] Family Request   [] Other:       He was admitted to the primary service for Necrotizing fasciitis ([address] [M72.6]  Necrotizing fasciitis of ankle and foot ([address] [M72.6]. His hospital course has been associated with Necrotizing fasciitis ([address]  The patient has a complicated medical history and has been hospitalized since 2022  5:25 PM.    Active Hospital Problems    Diagnosis Date Noted    Acute kidney injury superimposed on CKD ([address] [N17.9, N18.9] 2020     Priority: High    CKD (chronic kidney disease), stage III ([address] [N18.30] 2020     Priority: Medium    Necrotizing fasciitis ([address] [M72.6] 2022    Class 1 obesity with serious comorbidity in adult [E66.9] 2013    Uncontrolled hypertension [I10]     Type 2 diabetes mellitus with stage 3 chronic kidney disease, with long-term current use of insulin ([address] [E11.22, N18.30, Z79.4]        Data        Code Status: Full Code     ADVANCED CARE PLANNING:  Patient has capacity for medical decisions: yes  [address]: no - would like to fill out forms for his sister Juhi Carpenter to be [facility]  Living Will: no Personal, Social, and Family History  Marital Status: single  Living situation:alone, apartment second floor with elevator  Peg/Spiritual History:   Not addressed  Psychological Distress: mild  Does patient understand diagnosis/treatment? yes  Does family/caregiver understand diagnosis/treatment? unknown. pt has 3 living brothers and 1 sister that are his support. Assessment        Palliative Performance Scale:    ___100% Full ambulation; normal activity and work; no evidence of disease; able to do own self care; normal intake; fully conscious  ___90% Full ambulation; normal activity and work; some evidence of disease; able to do own self care; normal intake; fully conscious  ___80% Full ambulation; normal activity with effort; some evidence of disease; able to do own self care; normal or reduced intake; fully conscious  ___70% Ambulation reduced; unable to perform normal job/work; significant disease; able to do own self care; normal or reduced intake; fully conscious  ___60%  Ambulation reduced; cannot do hobbies/housework; significant disease; occasional assist; intake normal or reduced; fully conscious/some confusion  ___50%  Mainly sit/lie; can't do any work; extensive disease; considerable assist; intake normal or reduced; fully conscious/some confusion  __x_40%  Mainly in bed; extensive disease; mainly assist; intake normal or reduced; fully conscious/ some confusion   ___30%  Bed bound; extensive disease; total care; intake reduced; fully conscious/some confusion  ___20%  Bed bound; extensive disease; total care; intake minimal; drowsy/coma  ___10%  Bed bound; extensive disease; total care; mouth care only; drowsy/coma  ___0       Death       Risk Assessments:    Rebecca Risk Score: [unfilled]    Readmission Risk Score: 17 ( )        1 Year Mortality Risk Score: @1YEARMORTALITYRISK@     Plan        Palliative Interaction: Consultation received.   Reviewed history and course of care in electronic record. Update received from bedside nurse     Met with patient at bedside. I introduced myself and my role in palliative care with patient. I asked if I could sit and talk with him for a little bit. He is agreeable. I let him know that there are some things that I would like to get his input on. Negrita Santiago is alert and oriented. He carries on conversation with writer without difficulty. Negrita Santiago is slow to respond, but will respond appropriate if given a few seconds. Nurse confirms that patient is oriented, is slow to respond but that is his baseline. He is sitting up in bed when I arrived for visit with his lunch. I told him to go ahead and finish eating. He relates that he has been done. I helped him arrange his gown and pillow for comfort. Adjusted the head of bed for comfort. Negrita Santiago is aware he has gangrene in his left leg, he relates he had right amputation about 7 or so years ago, he lives independently in an apartment and cares for himself. He has an electric wheelchair. Negrita Santiago is aware that they are talking about taking off his left leg because of infection. We talked about an above the knee amputation on the left leg. While that would hopefully take care of the infection, that would leave him limited. We talked that he might have issues with wound healing and unsure if he would recover enough to be independent and live on his own. We also talked about what the future would look like if he did not have surgery. We discussed that we would continue to try to treat the infection with antibiotics. This may look like hospitalizations for treatment then back home for a time until infection flares again and requires hospitalization. Discussed that sometimes infection gets into the blood stream and can be hard to heal from. He relates he wants to talk to his family about what to do. Negrita Santiago relates he has 3 living brothers and 1 sister, he does not have a [address].   When asked he relates he would like his sister David Zapata to be his [address] [place] [address]. I let him know that we could help him fill out the [address] [place] [address] paperwork so that his wishes could be honored. I explained that [place] law is followed if no DPOAH paperwork is filled out. Vikash Zimmerman relates he has two children but does not have a relationship nor would he be able to reach them. I explained that according to [address] his children would be considered his decision makers if he does not fill out a form indicating his sister to be [address] [place] [address]. I let him know that our Reinaldo's can help him fill that out if he would like while he is here. He is agreeable with that. I discussed code status with Vikash Elsie. Explained the three code status' [address] [place] [address], [facility]. After discussing these patient relates that at this time with his current condition he would want to be a full code. I let him know that that is what he is currently and will stay that unless he requests otherwise. Assisted patient with bedding and turned off lights for patient. I let him know I will reach out to his sister and will have reinaldo come around to do [address] [place] [address] paperwork. I updated patient on our discussion. Will continue to follow. I called and updated brother August Lehman and sister David Zapata. I let them know what options the doctors were giving him. I encouraged them to contact Vikash Zimmerman and let them know that he is relating to us that he wants to talk to family regarding what to do. Romona Bumpers both thanked me. David Zapata relates she has been trying to call him. I let her know I will go to his room and call her from his phone so that they can talk. Salas Colón also know that Vikash Elsie related that he would like her to be his [address] [place] [address]. I explained the decision maker hierarchy if there is no DPOAH and that would leave his children as his decision makers. Vikash Zimmerman related to me that he does not have a relationship with them and he would not want them to be decision makers.   David Zapata is agreeable to be Abraham Yovani      Called spiritual care and asked them to see patient for [facility] paperwork. Education/support to staff  Education/support to family  Education/support to patient  Discharge planning/helping to coordinate care  Communications with primary service  Providing support for coping/adaptation/distress of family  Providing support for coping/adaptation/distress of patient  Discussing meaning/purpose   Continue with current plan of care  Clarification of medical condition to patient and family  Code status clarified: Full Code  Code status clarified: [facility]  Code status clarified: [facility]  Palliative care orders introduced  Validating patient/family distress  Continued communication updates  Recognizing, reflecting, and empathizing with family members' anticipatory grief  Recognizing, reflecting, and empathizing with the patient's anticipatory grief  Met with patient and discussed current health condition. We discussed that he has the option of aka or leave the leg and continue with antibiotics and in and out of hospital for treatment of infection. Acknowledged along with Shilpa Echevarria that each option has its disadvantages. Shilpa Echevarria relates he does not know what to do and wants to talk to his family. I asked Shilpa Echevarria if i can reach out to his sister Sonia Lees and update her on what is going on. He relates he would like me to do that. We also talked about his wishes for who his decision maker would be if he was unable to make decisions for himself. He relates he would like his sister to be his [facility]. I let him know that we can help him get that set up. I will contact Spiritual Care to help him fill out [facility] paperwork. I explained the various code status[facility]lechrissy [facility] with patient. Shilpa Echevarria relates to me after finding out what the code status' included that he would want to be a Full Code. ACP note filled out.   Will continue to follow with patient do further discuss options of care, how palliative care can help and let him know about hospice care. Principle Problem/Diagnosis:  Necrotizing fasciitis ([address] [M72.6]  Necrotizing fasciitis of ankle and foot (HCC) [M72.6]    Goals of care evaluation:  The patient goals of care are live longer, improve or maintain function/quality of life, remain at home and preserve independence/autonomy/control   Goals of care discussed with:    [x] Patient independently    [x] Patient and Family    [] Family or Healthcare DPOA independently    [] Unable to discuss with patient, family/DPOA not present    Code Status  Full Code    Other recommendations:  Please call with any palliative questions or concerns. Palliative Care Team is available via perfect serve or via phone - [phone]. Palliative Care will continue to follow Mr. Mena Heber care as needed. Thank you for allowing Palliative Care to participate in the care of Mr. Carly Escobar .     Electronically signed by   Madeleine Solis RN  Palliative Care Team  on 4/14/2022 at 12:34 PM    Palliative care office: [phone]

## 2022-04-14 NOTE — PROGRESS NOTES
[facility] -   Infectious diseases evaluation  admission date 4/11/2022    reason for consultation:   Left foot infection    Impression :   Current:  · Diabetic left foot infection with suspected necrotizing fasciitis and calcaneal osteomyelitis. Status post incision and debridement 4/12/22  · Peripheral vascular disease  · Diabetes mellitus  · Chronic renal disease  · History of right below-knee amputation      Recommendations      · Continue IV cefepime and clindamycin   · Discontinue Zyvox  · Vascular surgery recommended left above-knee amputation/patient is not agreeable. · Follow blood and wound cultures  · Follow CBC and renal function        History of Present Illness:   Initial history:  Marisa Sanchez is a 67y.o.-year-old male was sent to hospital from Dr. Rose Ship office for worsening left heel pain associated with left heel wound with surrounding dark discoloration and redness. The pain is severe, intermittent, no alleviating or aggravating factors. Left foot x-ray showed soft tissue emphysema within the posterior aspect of the distal left lower leg concerning for necrotizing fasciitis and possible calcaneal osteomyelitis. Initial WBC 18.5, creatinine 2.44, C-reactive protein more than 130, C-reactive protein 104.2, lactic acid 0 point  Afebrile  History of right below-knee amputation  Interval changes  4/14/2022   He is complaining of foot pain,  Gram-negative rods and group B streptococcus growth on blood culture from 04/12/2022. No growth on blood cultures from 4/11/2022  Patient Vitals for the past 8 hrs:   BP Temp Temp src Pulse Resp SpO2   04/14/22 1158 133/60 98 °F (36.7 °C) Oral 64 19 98 %   04/14/22 0724 (!) 120/49 98.2 °F (36.8 °C) Oral 62 16 96 %     . I have personally reviewed the past medical history, past surgical history, medications, social history, and family history, and I haveupdated the database accordingly.       Allergies: Patient has no known allergies. Review of Systems:     Review of Systems  As per history present illness, other than above 12 system review was negative  Physical Examination :       Physical Exam  Constitutional:       General: He is not in acute distress. Appearance: He is ill-appearing. HENT:      Head: Normocephalic and atraumatic. Right Ear: External ear normal.      Left Ear: External ear normal.   Eyes:      General: No scleral icterus. Conjunctiva/sclera: Conjunctivae normal.   Cardiovascular:      Rate and Rhythm: Normal rate and regular rhythm. Pulses: Normal pulses. Pulmonary:      Effort: Pulmonary effort is normal. No respiratory distress. Breath sounds: Normal breath sounds. Abdominal:      General: Bowel sounds are normal. There is no distension. Palpations: Abdomen is soft. Musculoskeletal:      Comments: Left foot dressing/podiatry examined photos from earlier today noted. Right below-knee amputation   Skin:     General: Skin is warm. Coloration: Skin is not jaundiced. Neurological:      General: No focal deficit present. Mental Status: He is oriented to person, place, and time.          Past Medical History:     Past Medical History:   Diagnosis Date    RISSA (acute kidney injury) ([address]     Cerebrovascular disease     Erectile dysfunction     Hepatitis B infection     Hyperlipidemia     Hypertension     Kidney cysts     Liver cyst     MRSA (methicillin resistant staph aureus) culture positive 1/10/2014    right foot    Neurotrophic ulcer of the foot ([address]     Obesity     Osteomyelitis of ankle or foot     Peripheral vascular disease ([address]     Tobacco abuse     Type II or unspecified type diabetes mellitus without mention of complication, not stated as uncontrolled        Past Surgical  History:     Past Surgical History:   Procedure Laterality Date    FOOT AMPUTATION      FOOT AMPUTATION THROUGH METATARSAL  2011    pt can't give exact date of surg-- toes and part of lt foot removed    FOOT DEBRIDEMENT Left 6/20/2020    WOUND BED PREPARATION AND WOUND VAC APPLICATION performed by Blair Harrison DPM at [address] Left 4/12/2022    FOOT DEBRIDEMENT INCISION AND DRAINAGE WITH BX performed by Blair Harrison DPM at [address] Right 3/2013       Medications:      linezolid  600 mg IntraVENous Q12H    clindamycin (CLEOCIN) IV  600 mg IntraVENous Q8H    amLODIPine  5 mg Oral Daily    clopidogrel  75 mg Oral Daily    docusate sodium  100 mg Oral BID    ferrous sulfate  325 mg Oral Daily with breakfast    [Held by provider] furosemide  40 mg Oral Every Other Day    metoprolol tartrate  25 mg Oral BID    atorvastatin  10 mg Oral Daily    sodium chloride flush  5-40 mL IntraVENous 2 times per day    insulin lispro  0-12 Units SubCUTAneous TID WC    insulin lispro  0-6 Units SubCUTAneous Nightly    cefepime  2,000 mg IntraVENous Q24H       Social History:     Social History     Socioeconomic History    Marital status:      Spouse name: Not on file    Number of children: Not on file    Years of education: Not on file    Highest education level: Not on file   Occupational History    Not on file   Tobacco Use    Smoking status: Current Every Day Smoker     Packs/day: 0.50     Years: 30.00     Pack years: 15.00     Types: Cigarettes    Smokeless tobacco: Never Used    Tobacco comment: working on quitting smoking   Vaping Use    Vaping Use: Never used   Substance and Sexual Activity    Alcohol use: No    Drug use: Never    Sexual activity: Yes     Partners: Female   Other Topics Concern    Not on file   Social History Narrative    Not on file     Social Determinants of Health     Financial Resource Strain:     Difficulty of Paying Living Expenses: Not on file   Food Insecurity:     Worried About Running Out of Food in the Last Year: Not on file    Marika of Food in the Last Year: Not on file Transportation Needs:     Lack of Transportation (Medical): Not on file    Lack of Transportation (Non-Medical): Not on file   Physical Activity:     Days of Exercise per Week: Not on file    Minutes of Exercise per Session: Not on file   Stress:     Feeling of Stress : Not on file   Social Connections:     Frequency of Communication with Friends and Family: Not on file    Frequency of Social Gatherings with Friends and Family: Not on file    Attends Confucianist Services: Not on file    Active Member of Clubs or Organizations: Not on file    Attends Club or Organization Meetings: Not on file    Marital Status: Not on file   Intimate Partner Violence:     Fear of Current or Ex-Partner: Not on file    Emotionally Abused: Not on file    Physically Abused: Not on file    Sexually Abused: Not on file   Housing Stability:     Unable to Pay for Housing in the Last Year: Not on file    Number of [place] in the Last Year: Not on file    Unstable Housing in the Last Year: Not on file       Family History:     Family History   Problem Relation Age of Onset    Diabetes Mother     Diabetes Sister     Diabetes Brother     Diabetes Sister     Diabetes Sister     Diabetes Sister       Medical Decision Making:   I have independently reviewed/ordered the following labs:    CBC with Differential:   Recent Labs     04/11/22  1818 04/12/22  0531 04/13/22  0508 04/14/22  0527   WBC 18.5*   < > 21.3* 18.7*   HGB 9.6*   < > 8.9* 8.8*   HCT 29.8*   < > 29.1* 27.9*      < > 222 201   LYMPHOPCT 12*  --   --   --    MONOPCT 5  --   --   --     < > = values in this interval not displayed. BMP:  Recent Labs     04/13/22  0508 04/14/22  0527    137   K 4.6 4.5   * 109*   CO2 17* 18*   BUN 34* 36*   CREATININE 2.29* 2.59*     Hepatic Function Panel: No results for input(s): PROT, LABALBU, BILIDIR, IBILI, BILITOT, ALKPHOS, ALT, AST in the last 72 hours.   No results for input(s): RPR in the last 72 hours. No results for input(s): HIV in the last 72 hours. No results for input(s): BC in the last 72 hours. Lab Results   Component Value Date    CREATININE 2.59 04/14/2022    GLUCOSE 163 04/14/2022    GLUCOSE 133 10/18/2011       Detailed results: Thank you for allowing us to participate in the care of this patient. Please call with questions. This note is created with the assistance of a speech recognition program.  While intending to generate adocument that actually reflects the content of the visit, the document can still have some errors including those of syntax and sound a like substitutions which may escape proof reading. It such instances, actual meaningcan be extrapolated by contextual diversion.     Diana Juan MD  Office: [phone]  Perfect serve / office [phone]

## 2022-04-14 NOTE — PROGRESS NOTES
Physical Therapy    Facility/Department: [facility]  Initial Assessment    NAME: Huong Cartagena  : 1950  MRN: [id number]    Date of Service: 2022    Discharge Recommendations:  Patient would benefit from continued therapy after discharge   Pt currently functioning below baseline. Would suggest additional therapy at time of discharge to maximize long term outcomes and prevent re-admission. Please refer to AM-PAC score for current level of function. PT Equipment Recommendations  Equipment Needed: Yes  Other: Slide board  HPI (per vascular note in chart): Huong Cartagena is a  67 y.o.  male who presents with Wound Infection (Sent over by PCP for wound evaluation in foot (left) ). He has extensive soft tissue infection had thought that he will need an amputation. On one hand he is agreeable to having the procedure my overall sense is that he does not fully realize of the scope of how this will change his life. Currently he lives at home in an apartment alone. He has been using his left leg to transfer and to me he appears extremely weak and I told him he likely will have to live in a nursing home for the rest of his life. I do not see him getting a prosthesis and he states it has been several years since he is walked. He has a history of right below-knee amputation which she has done 7 to 8 years ago. I think in the grand scheme of things an amputation which would need to be above the knee may not significantly prolong his life. I think it poses additional challenges to his living situation and will likely worsen his decubitus ulcer. To discuss these issues I think a palliative care consult is appropriate. Assessment   Assessment: Patient is typically indep with pivot transfers, but has now had an I&D on L heel and is unable to WB through heel, patient is now mod x 2 for slide board transfers. Patient demo decreased endurance with transfers.  Patient will benefit from additional education and practice to improve transfers. Prognosis: Good  Decision Making: High Complexity  PT Education: Goals;PT Role;Plan of Care;General Safety;Transfer Training  Patient Education: Patient educated on slide board transfers, needs additional education  REQUIRES PT FOLLOW UP: Yes  Activity Tolerance  Activity Tolerance: Patient Tolerated treatment well       Patient Diagnosis(es): The encounter diagnosis was Necrotizing fasciitis of ankle and foot ([address] has a past medical history of RISSA (acute kidney injury) ([address] Cerebrovascular disease, Erectile dysfunction, Hepatitis B infection, Hyperlipidemia, Hypertension, Kidney cysts, Liver cyst, MRSA (methicillin resistant staph aureus) culture positive, Neurotrophic ulcer of the foot ([address] Obesity, Osteomyelitis of ankle or foot, Peripheral vascular disease ([address] Tobacco abuse, and Type II or unspecified type diabetes mellitus without mention of complication, not stated as uncontrolled. has a past surgical history that includes Foot amputation through metatarsal (2011); Foot Amputation; Foot surgery (Right, 3/2013); Foot Debridement (Left, 6/20/2020); and Foot Debridement (Left, 4/12/2022). Restrictions  Restrictions/Precautions  Restrictions/Precautions: Weight Bearing,General Precautions,Fall Risk  Required Braces or Orthoses?: No  Lower Extremity Weight Bearing Restrictions  Right Lower Extremity Weight Bearing: Non Weight Bearing (No clarification in notes but pt states he cannot bear weight)  Left Lower Extremity Weight Bearing: Non Weight Bearing  Partial Weight Bearing Percentage Or Pounds: No WB orders, but had I&D of left heel 4/12 and states he cannot put weight on it.   Position Activity Restriction  Other position/activity restrictions: IV, telemetry, Right BKA with prosthesis, left transmetatarsal amp with wound on heel  Vision/Hearing  Hearing: Within functional limits     Subjective  General  Chart Reviewed: Yes  Patient assessed for rehabilitation services?: Yes  Additional Pertinent Hx: HTN, DM  Response To Previous Treatment: Not applicable  Family / Caregiver Present: No  Diagnosis: Necrotizing Fasciatis left foot, I&D 4/12  Follows Commands: Within Functional Limits  General Comment  Comments: RN reports patient is medically appropriate for PT. Subjective  Subjective: Patient pleasant and agreeable to PT. Patient reports he typically performs stand pivot transfer (with prosthesis on Right) from bed to w/c. Patient reports he has never used a slide board before and patient reports he cannot put weight on LLE.      Pre Treatment Pain Screening  Intervention List: Patient able to continue with treatment    Orientation  Orientation  Overall Orientation Status: Within Functional Limits  Social/Functional History  Social/Functional History  Lives With: Alone  Type of Home: Apartment (senior apt; Liable?)  Home Layout: Multi-level (3rd floor; elevator)  Home Access: Elevator  Bathroom Shower/Tub: Tub/Shower unit,Shower chair with back (sponge bathes; sometimes showers when aide is there)  Bathroom Toilet: Standard (grab bars by toilet)  Home Equipment: [employer] walker,Lift chair  Receives Help From: Home health (5 days a week; 4 hours/day)  ADL Assistance: Needs assistance (aid helps with shower; gets dressed independently)  Homemaking Assistance: Needs assistance (meals delivered)  Homemaking Responsibilities: Yes (does some cooking; does his own laundry; aid does cleaning)  Ambulation Assistance:  (Patient does not amb, uses power w/c for all mobility.)  Transfer Assistance: Independent (stand pivots from power chair; uses prosthetic leg during transfers)  Active : No  Occupation: Retired  Type of occupation: factory work  Leisure & Hobbies: TV  Additional Comments: 2 or 3 falls in last month while transferring; doesn't get out of house much  [employer]  Arousal/Alertness: Appropriate responses to stimuli  Following Commands: Follows multistep commands with repitition; Follows multistep commands with increased time  Attention Span: Appears intact  Memory: Appears intact  Safety Judgement: Decreased awareness of need for assistance;Decreased awareness of need for safety  Problem Solving: Assistance required to generate solutions;Assistance required to implement solutions;Assistance required to correct errors made;Assistance required to identify errors made;Decreased awareness of errors  Insights: Decreased awareness of deficits  Initiation: Requires cues for some  Sequencing: Requires cues for some    Objective     Observation/Palpation  Posture: Fair  Observation: Dressing on Left foot s/p I&D, HX left transmetatarsal amp and R BKA    AROM RLE (degrees)  RLE General AROM: hip and knee WFL, but demo hamstring tightness  AROM LLE (degrees)  LLE General AROM: hip and knee WFL, but demo hamstring tightness  Strength RLE  Comment: hip 4-/5, knee 3+/5  Strength LLE  Comment: hip 3+/5, knee 3+/5  Tone RLE  RLE Tone: Normotonic  Motor Control  Gross Motor?: WFL  Sensation  Overall Sensation Status: Impaired (neuropathy)  Bed mobility  Rolling to Left: Modified independent  Rolling to Right: Modified independent  Supine to Sit: Contact guard assistance  Sit to Supine: Minimal assistance (min assist to guide trunk and for safety)  Transfers  Comment: Patient performed slide board transfer from bed <-> w/c with mod x 2. Patient requires manual assist and education on placing slideboard and scooting over.   Ambulation  Ambulation?: No (Previously non-ambulatory)     Balance  Sitting - Static: Good  Sitting - Dynamic: Good;-  Standing - Static:  (NA)  Standing - Dynamic:  (NA)        Plan   Plan  Times per week: 1-2x/d, 5-6 d/wk  Current Treatment Recommendations: Strengthening,Balance Training,Functional Mobility Training,Transfer Training,Endurance Training,Patient/Caregiver Education & Training,Home Exercise Program,Safety Education & Training  Safety Devices  Type of devices: All fall risk precautions in place,Gait belt,Nurse notified,Left in bed,Call light within reach,Bed alarm in place    OutComes Score    AM-PAC Score  AM-PAC Inpatient Mobility Raw Score : 11 (04/14/22 1629)  AM-PAC Inpatient T-Scale Score : 33.86 (04/14/22 1629)  Mobility Inpatient CMS 0-100% Score: 72.57 (04/14/22 1629)  Mobility Inpatient CMS G-Code Modifier : CL (04/14/22 1629)          Goals  Short term goals  Time Frame for Short term goals: 12 visits  Short term goal 1: Patient will be indep with bed mobility. Short term goal 2: Patient will be min x 1 with slide board transfers. Short term goal 3: Patient will have good dynamic seated balance. Short term goal 4: Patient will tolerate 30 minutes of ther-ex an dther-act. Short term goal 5: Patient will be indep with HEP. Patient Goals   Patient goals : return home     Co-treatment with OT warranted secondary to decreased safety and independence requiring 2 skilled therapy professionals to address individual discipline's goals. PT addressing weight shifting prior to transfers and transfer training.   Therapy Time   Individual Concurrent Group Co-treatment   Time In 1400         Time Out 1443         Minutes 43         Timed Code Treatment Minutes: [address] [facility] [address], PT

## 2022-04-14 NOTE — CARE COORDINATION
Social Work-Met with patient to discuss plans. He would like  to contact his sister, Mariana Hwang. Her phone number is [phone]. Left message.  Rashmi Clubs

## 2022-04-14 NOTE — PLAN OF CARE
Checked for incontinence every 2 hours and prn. Pericare as needed. Assisted to reposition off back frequently. On waffle mattress. Heels off bed with pillows. Pain level assessment complete. Patient educated on pain scale and control interventions  PRN pain medication given per patient request  Patient instructed to call out with new onset of pain or unrelieved pain    Siderails up x 2  Hourly rounding  Call light in reach  Instructed to call for assist before attempting out of bed.   Remains free from falls and accidental injury at this time   Floor free from obstacles  Bed is locked and in lowest position  Adequate lighting provided  Bed alarm on, [place] star and Stay with Me signs posted

## 2022-04-14 NOTE — PROGRESS NOTES
Vascular Surgery   Progress Note    4/14/2022 12:00 PM  Subjective:   Admit Date: 4/11/2022  PCP: Rae Castro  Interval History: Discussed left AKA again with patient. He tells me that multiple people live in his apartment complex with bilateral amptuations and that he feels that I am rushing him into a decision about amputation. He clearly voiced his desire to go home. If possible I am agreeable to this. I do not think he has discussed this with his family. I also think palliative care should be considered. He clearly says he does not want an amputation as I believe he will need a left AKA. Diet: ADULT DIET; Regular; 4 carb choices (60 gm/meal)    Medications:   Scheduled Meds:   linezolid  600 mg IntraVENous Q12H    clindamycin (CLEOCIN) IV  600 mg IntraVENous Q8H    amLODIPine  5 mg Oral Daily    clopidogrel  75 mg Oral Daily    docusate sodium  100 mg Oral BID    ferrous sulfate  325 mg Oral Daily with breakfast    [Held by provider] furosemide  40 mg Oral Every Other Day    metoprolol tartrate  25 mg Oral BID    atorvastatin  10 mg Oral Daily    sodium chloride flush  5-40 mL IntraVENous 2 times per day    insulin lispro  0-12 Units SubCUTAneous TID WC    insulin lispro  0-6 Units SubCUTAneous Nightly    cefepime  2,000 mg IntraVENous Q24H     Continuous Infusions:   IV infusion builder 50 mL/hr at 04/14/22 1144    dextrose      sodium chloride 5 mL/hr at 04/12/22 2135         Labs:   CBC:   Recent Labs     04/12/22  0531 04/13/22  0508 04/14/22  0527   WBC 15.6* 21.3* 18.7*   HGB 9.2* 8.9* 8.8*    222 201     BMP:    Recent Labs     04/12/22  0531 04/13/22  0508 04/14/22  0527    138 137   K 4.4 4.6 4.5   * 111* 109*   CO2 17* 17* 18*   BUN 33* 34* 36*   CREATININE 2.05* 2.29* 2.59*   GLUCOSE 154* 137* 163*     Hepatic: No results for input(s): AST, ALT, ALB, BILITOT, ALKPHOS in the last 72 hours.   Troponin: Invalid input(s): TROPONIN  BNP: No results for input(s): BNP in the last 72 hours. Lipids: No results for input(s): CHOL, HDL in the last 72 hours. Invalid input(s): LDLCALCU  INR: No results for input(s): INR in the last 72 hours.     Objective:   Vitals: /60   Pulse 64   Temp 98 °F (36.7 °C) (Oral)   Resp 19   Ht 6' (1.829 m)   Wt 175 lb (79.4 kg)   SpO2 98%   BMI 23.73 kg/m²   General appearance: alert, cooperative and no distress  Mental Status: oriented to person, place and time with normal affect  Neck: good carotid pulses, no JVD  Lungs: clear to auscultation bilaterally, normal effort  Heart: regular rate and rhythm, no murmur,  Abdomen: soft, non-tender, non-distended, bowel sounds present all four quadrants, no masses, hepatomegaly, splenomegaly or aortic enlargement  Extremities: no edema, erythema or tenderness in the calves  Skin: left foot dressing change deferred    Assessment:   1. 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)     Class 1 obesity with serious comorbidity in adult     Tobacco abuse     Hepatitis B infection     Liver cyst     Kidney cysts     Osteomyelitis of ankle or foot     Neurotrophic ulcer of the foot (HCC)     RISSA (acute kidney injury) (HCC)     Dyslipidemia     Microcytic anemia     Diabetic foot infection (HCC)     Tobacco dependence     Diabetic ulcer of left heel associated with type 2 diabetes mellitus ([address]     Hypomagnesemia     Hypokalemia     Diabetes mellitus type 2 with complications, uncontrolled (HCC)     Acute kidney injury superimposed on CKD ([address]     H/O noncompliance with medical treatment, presenting hazards to health     Uncontrolled diabetes mellitus type 2 without complications     History of right below knee amputation (HCC)     Severe malnutrition ([address]     CKD (chronic kidney disease), stage III (HCC)     Oral thrush     Cataract of right eye     Necrotizing fasciitis ([address]      Plan:   1. Patient states he does not want the amputation and feels I am rushing him into a decision. 2. I think a palliative care consultation should be made to clarify what he wants. Because he is non ambulatory I do not see how he can continue to live at home alone if he has a left AKA. 3. No immediate plans for amputation.   4. I will be covered by Dr. Tracy Patel for the week I will be away    Electronically signed by Trish Villalobos MD on 4/14/2022 at 12:00 PM

## 2022-04-14 NOTE — PROGRESS NOTES
Progress Note  Podiatric Medicine and Surgery     Subjective     CC: Left heel wound    Patient seen and examined at bedside. Afebrile, VSS  WBC stabilizing 15.6-->21.3 --> 18  Complains of increased pain to left foot  POD #2 s/p I&D of left foot (DOS: 4/12/2022)    HPI :  Desmond Aldridge is a 67 y.o. male seen at [address] ED for complaints of erythema. Patient saw Dr. [facility] today in his [facility],  [facility] instruct patient to present to the emergency room for admission, IV antibiotics and further work-up. 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 Also has a history of transmetatarsal irritation to the left foot. ROS: Denies N/V/F/C/SOB/CP. Otherwise negative except at stated in the HPI.      Medications:  Scheduled Meds:   linezolid  600 mg IntraVENous Q12H    clindamycin (CLEOCIN) IV  600 mg IntraVENous Q8H    amLODIPine  5 mg Oral Daily    clopidogrel  75 mg Oral Daily    docusate sodium  100 mg Oral BID    ferrous sulfate  325 mg Oral Daily with breakfast    [Held by provider] furosemide  40 mg Oral Every Other Day    metoprolol tartrate  25 mg Oral BID    atorvastatin  10 mg Oral Daily    sodium chloride flush  5-40 mL IntraVENous 2 times per day    insulin lispro  0-12 Units SubCUTAneous TID WC    insulin lispro  0-6 Units SubCUTAneous Nightly    cefepime  2,000 mg IntraVENous Q24H       Continuous Infusions:   IV infusion builder 50 mL/hr at 04/14/22 1144    dextrose      sodium chloride 5 mL/hr at 04/12/22 2135       PRN Meds:oxyCODONE-acetaminophen **OR** oxyCODONE-acetaminophen, traMADol, glucagon (rDNA), dextrose, sodium chloride flush, sodium chloride, potassium chloride **OR** potassium alternative oral replacement **OR** potassium chloride, magnesium sulfate, ondansetron **OR** ondansetron, polyethylene glycol, acetaminophen **OR** acetaminophen, dextrose bolus (hypoglycemia) **OR** dextrose bolus (hypoglycemia), glucose, hydrALAZINE    Objective Vitals:  Patient Vitals for the past 8 hrs:   BP Temp Temp src Pulse Resp SpO2 Weight   22 1158 133/60 98 °F (36.7 °C) Oral 64 19 98 % --   22 0724 (!) 120/49 98.2 °F (36.8 °C) Oral 62 16 96 % --   22 0504 -- -- -- -- -- -- 175 lb (79.4 kg)     Average, Min, and Max for last 24 hours Vitals:  TEMPERATURE:  Temp  Av.1 °F (36.7 °C)  Min: 98 °F (36.7 °C)  Max: 98.2 °F (36.8 °C)    RESPIRATIONS RANGE: Resp  Av.5  Min: 16  Max: 19    PULSE RANGE: Pulse  Av.3  Min: 62  Max: 64    BLOOD PRESSURE RANGE:  Systolic (92EUA), XPU:516 , Min:120 , XDS:463   ; Diastolic (52FXQ), QZT:80, Min:48, Max:60      PULSE OXIMETRY RANGE: SpO2  Av.3 %  Min: 95 %  Max: 98 %    I/O last 3 completed shifts: In: 780 [P.O.:780]  Out: 770 [Urine:770]    CBC:  Recent Labs     22  1818 22  1838 22  0508 22   WBC   < >  --  15.6* 21.3* 18.7*   HGB   < >  --  9.2* 8.9* 8.8*   HCT   < >  --  29.4* 29.1* 27.9*   PLT   < >  --  211 222 201   CRP  --  104.2*  --   --   --     < > = values in this interval not displayed. BMP:  Recent Labs     22  0531 22  05022  05    138 137   K 4.4 4.6 4.5   * 111* 109*   CO2 17* 17* 18*   BUN 33* 34* 36*   CREATININE 2.05* 2.29* 2.59*   GLUCOSE 154* 137* 163*   CALCIUM 8.2* 7.9* 7.6*        Coags:  No results for input(s): APTT, PROT, INR in the last 72 hours. Lab Results   Component Value Date    SEDRATE >130 (H) 2022     Recent Labs     22  1838   .2*       Lower Extremity Physical Exam:  Vascular: DP and PT pulses are very faintly palpable. CFT >5 seconds. Hair growth is absent to the level of the distal foot. Minimal nonpitting edema to left foot      Neuro: Saph/sural/SP/DP/plantar sensation absent to light touch.     Musculoskeletal: Muscle strength not tested. Significant pain to palpation of the wound. Some pain to palpation to the posterior calf.   Hollis Moran deformity present with transmetatarsal amputation on the left and BKA on the right     Dermatologic:  Full-thickness open surgical wound noted at the posterior aspect of the left heel up to proximal to the ankle joint. Wound measures approximately 11 x 6 x 3 cm. Wound base is of exposed calcaneus and Achilles tendon along with fibronecrotic tissue. Sanguinous drainage with mild purulence noted. Erythema present surrounding surgical site with associated increase in warmth. Wound does probe to posterior calcaneus. Sinus tracking noted at the proximal aspect of the incision. No undermining, crepitus, fluctuance, or induration noted. Clinical Images:            Imaging:   VL LOWER EXTREMITY ARTERIAL SEGMENTAL PRESSURES W PPG   Final Result      US RETROPERITONEAL COMPLETE   Final Result   1.  Echogenic kidneys which may be seen in medical renal disease. 2.  Questionable trace perinephric fluid bilaterally. 3.  Echogenic foci and debris versus blood products in the dependent portion   of the bladder. Clinical correlation is recommended. XR TIBIA FIBULA LEFT (2 VIEWS)   Final Result   Cellulitis. No evidence of osteomyelitis. XR FOOT LEFT (MIN 3 VIEWS)   Final Result   1. Soft tissue emphysema within the posterior aspect of the distal left lower   leg and adjacent to the calcaneus, concerning for necrotizing fasciitis   2. Large soft tissue ulceration along the dorsal aspect of the calcaneus,   with cortical destruction, consistent with osteomyelitis   3. Diffuse soft tissue swelling now present   4. Critical results were called by Dr. Kary Kim to [facility] on   4/11/2022 at 6:27 p.m. hours. Cultures:   Bone 4/12: GPC, GNR  Surgical path: pending    Assessment   Quinton Campos is a 67 y.o. male with   1. Status post extensive incision and drainage, left lower extremity (DOS: 4/12/2022)  2. History of TMA, left foot  3.  History of BKA, right lower extremity  4. Unstageable pressure ulceration, left heel  5. 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 CKD  7. Type II diabetes with neuropathy    Principal Problem:    Necrotizing fasciitis ([facility]  Active Problems:    Acute kidney injury superimposed on CKD ([facility]    CKD (chronic kidney disease), stage III ([facility]    Uncontrolled hypertension    Type 2 diabetes mellitus with stage 3 chronic kidney disease, with long-term current use of insulin ([facility])    Class 1 obesity with serious comorbidity in adult  Resolved Problems:    * No resolved hospital problems. *       Plan     · Patient examined and evaluated at bedside  with Dr. Nolberto Stephens  · Treatment options discussed in detail with the patient  · X-rays reviewed: Negative gas, suspicious for osteomyelitis of heel  · Medical management per primary  · Anbx: IV cefepime, clindamycin, Zyvox. ID following, appreciate recommendations  · Vascular following  · PVRs - calcific disease   · Planning AKA  · Palliative consult given poor prognosis  · Discussed with patient in detail due to the extent of infection along with significant peripheral vascular disease he is at an increased risk for proximal amputation. At this time, there is not sufficient healthy soft tissue and bone to save his the left lower extremity. Discussed with patient that due to this infection it has limited his quality of life. Patient confirms understanding and would like to discuss it further with vascular surgery but does understand the benefits and risks. · Dressing applied to the left foot: 1/4 inch iodoform packing, DSD, light Ace  · Discussed with Dr. Nolberto Stephens.     Rosario Veronica DPM   Podiatric Medicine & Surgery   4/14/2022 at 12:17 PM

## 2022-04-14 NOTE — CARE COORDINATION
Social Work-Phone call to patient's sister, Liz Sawant. Discussed SNF for care at [place]. Offered choice of SNF covered under [employer]. Her first choice is [facility] and 2 choice is [facility]. Sent referral to [facility].  Joon Chow

## 2022-04-15 LAB
ANION GAP SERPL CALCULATED.3IONS-SCNC: 11 MMOL/L (ref 9–17)
BUN BLDV-MCNC: 36 MG/DL (ref 8–23)
BUN/CREAT BLD: 13 (ref 9–20)
CALCIUM SERPL-MCNC: 7.5 MG/DL (ref 8.6–10.4)
CHLORIDE BLD-SCNC: 105 MMOL/L (ref 98–107)
CO2: 18 MMOL/L (ref 20–31)
CREAT SERPL-MCNC: 2.88 MG/DL (ref 0.7–1.2)
CULTURE: ABNORMAL
DIRECT EXAM: ABNORMAL
GFR AFRICAN AMERICAN: 26 ML/MIN
GFR NON-AFRICAN AMERICAN: 22 ML/MIN
GFR SERPL CREATININE-BSD FRML MDRD: ABNORMAL ML/MIN/{1.73_M2}
GLUCOSE BLD-MCNC: 117 MG/DL (ref 70–99)
GLUCOSE BLD-MCNC: 135 MG/DL (ref 75–110)
GLUCOSE BLD-MCNC: 148 MG/DL (ref 75–110)
GLUCOSE BLD-MCNC: 148 MG/DL (ref 75–110)
GLUCOSE BLD-MCNC: 99 MG/DL (ref 75–110)
POTASSIUM SERPL-SCNC: 4.4 MMOL/L (ref 3.7–5.3)
SODIUM BLD-SCNC: 134 MMOL/L (ref 135–144)
SPECIMEN DESCRIPTION: ABNORMAL

## 2022-04-15 PROCEDURE — 2580000003 HC RX 258: Performed by: INTERNAL MEDICINE

## 2022-04-15 PROCEDURE — 2500000003 HC RX 250 WO HCPCS: Performed by: PODIATRIST

## 2022-04-15 PROCEDURE — 6360000002 HC RX W HCPCS: Performed by: INTERNAL MEDICINE

## 2022-04-15 PROCEDURE — 97535 SELF CARE MNGMENT TRAINING: CPT

## 2022-04-15 PROCEDURE — 36415 COLL VENOUS BLD VENIPUNCTURE: CPT

## 2022-04-15 PROCEDURE — 6370000000 HC RX 637 (ALT 250 FOR IP): Performed by: FAMILY MEDICINE

## 2022-04-15 PROCEDURE — 82947 ASSAY GLUCOSE BLOOD QUANT: CPT

## 2022-04-15 PROCEDURE — 97530 THERAPEUTIC ACTIVITIES: CPT

## 2022-04-15 PROCEDURE — 2500000003 HC RX 250 WO HCPCS: Performed by: INTERNAL MEDICINE

## 2022-04-15 PROCEDURE — 6370000000 HC RX 637 (ALT 250 FOR IP): Performed by: PODIATRIST

## 2022-04-15 PROCEDURE — 51798 US URINE CAPACITY MEASURE: CPT

## 2022-04-15 PROCEDURE — 99232 SBSQ HOSP IP/OBS MODERATE 35: CPT | Performed by: FAMILY MEDICINE

## 2022-04-15 PROCEDURE — 2060000000 HC ICU INTERMEDIATE R&B

## 2022-04-15 PROCEDURE — 80048 BASIC METABOLIC PNL TOTAL CA: CPT

## 2022-04-15 PROCEDURE — 99232 SBSQ HOSP IP/OBS MODERATE 35: CPT | Performed by: INTERNAL MEDICINE

## 2022-04-15 PROCEDURE — 99223 1ST HOSP IP/OBS HIGH 75: CPT | Performed by: NURSE PRACTITIONER

## 2022-04-15 PROCEDURE — 2580000003 HC RX 258: Performed by: PODIATRIST

## 2022-04-15 PROCEDURE — 97110 THERAPEUTIC EXERCISES: CPT

## 2022-04-15 RX ORDER — FUROSEMIDE 10 MG/ML
20 INJECTION INTRAMUSCULAR; INTRAVENOUS ONCE
Status: COMPLETED | OUTPATIENT
Start: 2022-04-15 | End: 2022-04-15

## 2022-04-15 RX ORDER — 0.9 % SODIUM CHLORIDE 0.9 %
250 INTRAVENOUS SOLUTION INTRAVENOUS ONCE
Status: COMPLETED | OUTPATIENT
Start: 2022-04-15 | End: 2022-04-15

## 2022-04-15 RX ADMIN — OXYCODONE AND ACETAMINOPHEN 2 TABLET: 5; 325 TABLET ORAL at 00:12

## 2022-04-15 RX ADMIN — ATORVASTATIN CALCIUM 10 MG: 10 TABLET, FILM COATED ORAL at 08:43

## 2022-04-15 RX ADMIN — FERROUS SULFATE TAB EC 325 MG (65 MG FE EQUIVALENT) 325 MG: 325 (65 FE) TABLET DELAYED RESPONSE at 08:44

## 2022-04-15 RX ADMIN — FUROSEMIDE 20 MG: 10 INJECTION, SOLUTION INTRAMUSCULAR; INTRAVENOUS at 18:05

## 2022-04-15 RX ADMIN — DOCUSATE SODIUM 100 MG: 100 CAPSULE, LIQUID FILLED ORAL at 21:44

## 2022-04-15 RX ADMIN — OXYCODONE AND ACETAMINOPHEN 2 TABLET: 5; 325 TABLET ORAL at 04:49

## 2022-04-15 RX ADMIN — CLINDAMYCIN IN 5 PERCENT DEXTROSE 600 MG: 12 INJECTION, SOLUTION INTRAVENOUS at 21:58

## 2022-04-15 RX ADMIN — INSULIN LISPRO 1 UNITS: 100 INJECTION, SOLUTION INTRAVENOUS; SUBCUTANEOUS at 21:45

## 2022-04-15 RX ADMIN — SODIUM BICARBONATE: 84 INJECTION, SOLUTION INTRAVENOUS at 02:17

## 2022-04-15 RX ADMIN — SODIUM BICARBONATE: 84 INJECTION, SOLUTION INTRAVENOUS at 20:43

## 2022-04-15 RX ADMIN — CEFTRIAXONE 2000 MG: 2 INJECTION, POWDER, FOR SOLUTION INTRAMUSCULAR; INTRAVENOUS at 18:13

## 2022-04-15 RX ADMIN — METOPROLOL TARTRATE 25 MG: 25 TABLET, FILM COATED ORAL at 16:30

## 2022-04-15 RX ADMIN — TRAMADOL HYDROCHLORIDE 50 MG: 50 TABLET, COATED ORAL at 18:35

## 2022-04-15 RX ADMIN — INSULIN LISPRO 2 UNITS: 100 INJECTION, SOLUTION INTRAVENOUS; SUBCUTANEOUS at 18:34

## 2022-04-15 RX ADMIN — CLINDAMYCIN IN 5 PERCENT DEXTROSE 600 MG: 12 INJECTION, SOLUTION INTRAVENOUS at 16:24

## 2022-04-15 RX ADMIN — DOCUSATE SODIUM 100 MG: 100 CAPSULE, LIQUID FILLED ORAL at 08:43

## 2022-04-15 RX ADMIN — SODIUM CHLORIDE 250 ML: 9 INJECTION, SOLUTION INTRAVENOUS at 16:42

## 2022-04-15 RX ADMIN — CLINDAMYCIN IN 5 PERCENT DEXTROSE 600 MG: 12 INJECTION, SOLUTION INTRAVENOUS at 06:31

## 2022-04-15 RX ADMIN — AMLODIPINE BESYLATE 5 MG: 5 TABLET ORAL at 08:44

## 2022-04-15 RX ADMIN — CLOPIDOGREL BISULFATE 75 MG: 75 TABLET ORAL at 12:33

## 2022-04-15 RX ADMIN — OXYCODONE AND ACETAMINOPHEN 2 TABLET: 5; 325 TABLET ORAL at 21:44

## 2022-04-15 RX ADMIN — OXYCODONE AND ACETAMINOPHEN 2 TABLET: 5; 325 TABLET ORAL at 08:46

## 2022-04-15 RX ADMIN — SODIUM CHLORIDE: 9 INJECTION, SOLUTION INTRAVENOUS at 21:57

## 2022-04-15 RX ADMIN — SODIUM BICARBONATE: 84 INJECTION, SOLUTION INTRAVENOUS at 03:19

## 2022-04-15 RX ADMIN — OXYCODONE AND ACETAMINOPHEN 2 TABLET: 5; 325 TABLET ORAL at 16:30

## 2022-04-15 RX ADMIN — METOPROLOL TARTRATE 25 MG: 25 TABLET, FILM COATED ORAL at 08:43

## 2022-04-15 ASSESSMENT — ENCOUNTER SYMPTOMS
COUGH: 0
WHEEZING: 0
ABDOMINAL PAIN: 0
BLOOD IN STOOL: 0
CONSTIPATION: 0
SHORTNESS OF BREATH: 0
DIARRHEA: 0
CHEST TIGHTNESS: 0
VOMITING: 0
NAUSEA: 0

## 2022-04-15 ASSESSMENT — PAIN DESCRIPTION - ORIENTATION
ORIENTATION: LEFT

## 2022-04-15 ASSESSMENT — PAIN SCALES - GENERAL
PAINLEVEL_OUTOF10: 8
PAINLEVEL_OUTOF10: 5
PAINLEVEL_OUTOF10: 8
PAINLEVEL_OUTOF10: 10
PAINLEVEL_OUTOF10: 9
PAINLEVEL_OUTOF10: 0
PAINLEVEL_OUTOF10: 10
PAINLEVEL_OUTOF10: 8
PAINLEVEL_OUTOF10: 8
PAINLEVEL_OUTOF10: 0
PAINLEVEL_OUTOF10: 8

## 2022-04-15 ASSESSMENT — PAIN DESCRIPTION - PROGRESSION
CLINICAL_PROGRESSION: NOT CHANGED
CLINICAL_PROGRESSION: NOT CHANGED

## 2022-04-15 ASSESSMENT — PAIN DESCRIPTION - DESCRIPTORS
DESCRIPTORS: DISCOMFORT;THROBBING
DESCRIPTORS: DISCOMFORT;THROBBING

## 2022-04-15 ASSESSMENT — PAIN DESCRIPTION - LOCATION
LOCATION: FOOT
LOCATION: LEG;FOOT
LOCATION: FOOT
LOCATION: FOOT;LEG
LOCATION: LEG

## 2022-04-15 ASSESSMENT — PAIN DESCRIPTION - PAIN TYPE
TYPE: ACUTE PAIN

## 2022-04-15 ASSESSMENT — PAIN DESCRIPTION - ONSET
ONSET: ON-GOING
ONSET: ON-GOING

## 2022-04-15 ASSESSMENT — PAIN DESCRIPTION - DIRECTION
RADIATING_TOWARDS: KNEE
RADIATING_TOWARDS: KNEE

## 2022-04-15 ASSESSMENT — PAIN DESCRIPTION - FREQUENCY
FREQUENCY: CONTINUOUS
FREQUENCY: CONTINUOUS

## 2022-04-15 ASSESSMENT — PAIN - FUNCTIONAL ASSESSMENT
PAIN_FUNCTIONAL_ASSESSMENT: PREVENTS OR INTERFERES WITH MANY ACTIVE NOT PASSIVE ACTIVITIES
PAIN_FUNCTIONAL_ASSESSMENT: PREVENTS OR INTERFERES SOME ACTIVE ACTIVITIES AND ADLS

## 2022-04-15 NOTE — PLAN OF CARE
Problem: Infection:  Goal: Will remain free from infection  Description: Will remain free from infection  4/14/2022 2257 by Kristi Salas RN  Outcome: Ongoing     Problem: Safety:  Goal: Free from accidental physical injury  Description: Free from accidental physical injury  4/14/2022 2257 by Kristi Salas RN  Outcome: Ongoing     Problem: Daily Care:  Goal: Daily care needs are met  Description: Daily care needs are met  4/14/2022 2257 by Kristi Salas RN  Outcome: Ongoing     Problem: Pain:  Goal: Patient's pain/discomfort is manageable  Description: Patient's pain/discomfort is manageable  4/14/2022 2257 by Kristi Salas RN  Outcome: Ongoing    Problem: Discharge Planning:  Goal: Patients continuum of care needs are met  Description: Patients continuum of care needs are met  4/14/2022 2257 by Kristi Salas RN  Outcome: Ongoing     Problem: Skin Integrity:  Goal: Will show no infection signs and symptoms  Description: Will show no infection signs and symptoms  4/14/2022 2257 by Kristi Salas RN  Outcome: Ongoing    Problem: Falls - Risk of:  Goal: Will remain free from falls  Description: Will remain free from falls  4/14/2022 2257 by Kristi Salas RN  Outcome: Ongoing  Goal: Absence of physical injury  Description: Absence of physical injury  4/14/2022 2257 by Kristi Salas RN  Outcome: Ongoing     Goal: Absence of new skin breakdown  Description: Absence of new skin breakdown  4/14/2022 2257 by Kristi Salas RN  Outcome: Ongoing     Problem: Skin Integrity:  Goal: Skin integrity will stabilize  Description: Skin integrity will stabilize  4/14/2022 2257 by Kristi Salas RN  Outcome: Ongoing

## 2022-04-15 NOTE — PROGRESS NOTES
Nephrology Progress Note      SUBJECTIVE    Patient was seen and examined. Patient was lying flat. She was alert and awake. Patient voiced no complaint. He has good intake in last 24 hours but his urine output was only 275 mL. Since this morning he only put out 50 mL of urine. Bladder ultrasound shows volume less than 200 mL  Patient denies any lightheadedness or dizziness  No nausea vomiting or diarrhea.     OBJECTIVE      CURRENT TEMPERATURE:  Temp: 97.8 °F (36.6 °C)  MAXIMUM TEMPERATURE OVER 24HRS:  Temp (24hrs), Av °F (36.7 °C), Min:97.8 °F (36.6 °C), Max:98.2 °F (36.8 °C)    CURRENT RESPIRATORY RATE:  Resp: 18  CURRENT PULSE:  Pulse: 63  CURRENT BLOOD PRESSURE:  BP: (!) 122/49  24HR BLOOD PRESSURE RANGE:  Systolic (18LMK), EDN:304 , Min:120 , JST:938   ; Diastolic (25FHP), IBB:40, Min:49, Max:53    24HR INTAKE/OUTPUT:      Intake/Output Summary (Last 24 hours) at 4/15/2022 1528  Last data filed at 4/15/2022 0840  Gross per 24 hour   Intake 2794.2 ml   Output 275 ml   Net 2519.2 ml     WEIGHT :  Patient Vitals for the past 96 hrs (Last 3 readings):   Weight   04/15/22 0044 180 lb (81.6 kg)   22 0504 175 lb (79.4 kg)   22 0545 175 lb (79.4 kg)     PHYSICAL EXAM      GENERAL APPEARANCE: Alert and awake x3  SKIN: Warm to touch  EYES: conjunctivae normal and sclera anicteric  ENT: No thrush or pharyngeal congestion  NECK: Flat JVD and no lymphadenopathy  PULMONARY: Lateral air entry and clear  CADRDIOVASCULAR: S1 and S2 audible no S3.   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, Ace wrap on the left, TMA left    CURRENT MEDICATIONS      oxyCODONE-acetaminophen (PERCOCET) 5-325 MG per tablet 1 tablet, Q4H PRN   Or  oxyCODONE-acetaminophen (PERCOCET) 5-325 MG per tablet 2 tablet, Q4H PRN  traMADol (ULTRAM) tablet 50 mg, Q6H PRN  sodium bicarbonate 75 mEq in sodium chloride 0.45 % 1,000 mL infusion, Continuous  clindamycin (CLEOCIN) 600 mg in dextrose 5 % 50 mL IVPB, Q8H  amLODIPine (NORVASC) tablet 5 mg, Daily  clopidogrel (PLAVIX) tablet 75 mg, Daily  docusate sodium (COLACE) capsule 100 mg, BID  ferrous sulfate (FE TABS 325) EC tablet 325 mg, Daily with breakfast  [Held by provider] furosemide (LASIX) tablet 40 mg, Every Other Day  metoprolol tartrate (LOPRESSOR) tablet 25 mg, BID  atorvastatin (LIPITOR) tablet 10 mg, Daily  glucagon (rDNA) injection 1 mg, PRN  dextrose 5 % solution, PRN  sodium chloride flush 0.9 % injection 5-40 mL, 2 times per day  sodium chloride flush 0.9 % injection 10 mL, PRN  0.9 % sodium chloride infusion, PRN  potassium chloride (KLOR-CON M) extended release tablet 40 mEq, PRN   Or  potassium bicarb-citric acid (EFFER-K) effervescent tablet 40 mEq, PRN   Or  potassium chloride 10 mEq/100 mL IVPB (Peripheral Line), PRN  magnesium sulfate 1000 mg in dextrose 5% 100 mL IVPB, PRN  ondansetron (ZOFRAN-ODT) disintegrating tablet 4 mg, Q8H PRN   Or  ondansetron (ZOFRAN) injection 4 mg, Q6H PRN  polyethylene glycol (GLYCOLAX) packet 17 g, Daily PRN  acetaminophen (TYLENOL) tablet 650 mg, Q6H PRN   Or  acetaminophen (TYLENOL) suppository 650 mg, Q6H PRN  insulin lispro (HUMALOG) injection vial 0-12 Units, TID WC  insulin lispro (HUMALOG) injection vial 0-6 Units, Nightly  cefepime (MAXIPIME) 2000 mg IVPB minibag, Q24H  dextrose bolus (hypoglycemia) 10% 125 mL, PRN   Or  dextrose bolus (hypoglycemia) 10% 250 mL, PRN  glucose chewable tablet 4 each, PRN  hydrALAZINE (APRESOLINE) injection 10 mg, Q4H PRN          LABS      CBC:   Recent Labs     04/13/22  0508 04/14/22  0527   WBC 21.3* 18.7*   RBC 3.62* 3.48*   HGB 8.9* 8.8*   HCT 29.1* 27.9*   MCV 80.4* 80.2*   MCH 24.6* 25.3   MCHC 30.6 31.5   RDW 14.8* 14.6*    201   MPV 8.9 9.1      BMP:   Recent Labs     04/13/22  0508 04/14/22  0527 04/15/22  0610    137 134*   K 4.6 4.5 4.4   * 109* 105   CO2 17* 18* 18*   BUN 34* 36* 36*   CREATININE 2.29* 2.59* 2.88*   GLUCOSE 137* 163* 117*   CALCIUM 7.9* 7.6* 7.5*      HEPBSAG:  Lab Results   Component Value Date    HEPBSAG REACTIVE 07/22/2013       C3:   Lab Results   Component Value Date    C3 120 04/12/2022     C4:   Lab Results   Component Value Date    C4 19 04/12/2022     URINE SODIUM:    Lab Results   Component Value Date    JLUIS 65 09/17/2020      URINE CREATININE:    Lab Results   Component Value Date    LABCREA 66.7 04/12/2022     URINE EOSINOPHILS:   Lab Results   Component Value Date    UREO NONE SEEN 04/12/2022         RADIOLOGY      Reviewed as available. ASSESSMENT      1. RISSA on top of chronic kidney disease stage IV likely from prerenal azotemia, intravascular volume depletion with decreased oral intake while he was still taking loop diuretics and ACE inhibitors. Acute renal failure continue to evolve now patient has drop in urine output. 2.  Hypertension blood pressure is under good control   3. She of CKD stage IV disease with a creatinine that runs around 2.1-2.3 baseline. This likely to underlying diabetic nephrosclerosis. Previous proteinuria quantified less than 0.5 g  4. History of diabetes mellitus type 2  5. Status post incision and drainage of the left foot and lower extremity yesterday by podiatry team  6. History of essential hypertension  7. Probable osteomyelitis involving left calcaneum. 8.  Metabolic acidosis  PLAN      1. We will give 250 mL of normal saline bolus followed by 20 mg of Lasix  2. We will decrease the dose of calcium channel blocker  3 Keep blood pressure above 889 systolic      Please do not hesitate to call with questions.     This note is created with the assistance of a speech-recognition program. While intending to generate a document that actually reflects the content of the visit, no guarantees can be provided that every mistake has been identified and corrected by editing    Electronically signed by Thong Tripp MD on 4/15/2022 at 3:28 PM

## 2022-04-15 NOTE — PLAN OF CARE
Problem: Infection:  Goal: Will remain free from infection  Description: Will remain free from infection  Outcome: Met This Shift     Problem: Safety:  Goal: Free from accidental physical injury  Description: Free from accidental physical injury  Outcome: Met This Shift     Problem: Falls - Risk of:  Goal: Will remain free from falls  Description: Will remain free from falls  Outcome: Met This Shift     Problem: Pain:  Goal: Patient's pain/discomfort is manageable  Description: Patient's pain/discomfort is manageable  Outcome: Ongoing     Problem: Pain:  Goal: Pain level will decrease  Description: Pain level will decrease  Outcome: Not Met This Shift

## 2022-04-15 NOTE — PROGRESS NOTES
Occupational Therapy  Facility/Department: [facility]  Daily Treatment Note  NAME: Renuka Hernandez  : 1950  MRN: [id number]    Date of Service: 4/15/2022     RN Oliver Huizar reports patient is medically stable for therapy treatment this date. Chart reviewed prior to treatment and patient is agreeable for therapy. All lines intact and patient positioned comfortably at end of treatment. All patient needs addressed prior to ending therapy session. Discharge Recommendations:  Patient would benefit from continued therapy after discharge  OT Equipment Recommendations  Equipment Needed: Yes   Pt currently functioning below baseline. Would suggest additional therapy at time of discharge to maximize long term outcomes and prevent re-admission. Please refer to AM-PAC score for current level of function. Assessment   Performance deficits / Impairments: Decreased strength;Decreased ADL status; Decreased ROM; Decreased endurance;Decreased sensation;Decreased cognition;Decreased safe awareness;Decreased balance;Decreased coordination  Assessment: Pt not wanting to progress from sitting EOB at this time. Pt performed fair with scooting and UB exercises. Skilled OT indicated for deficits with overall safety awareness, balance, endurance, strength and safety with ADL transfer techs to increase overall function and independence. Prognosis: Good  Decision Making: High Complexity  OT Education: OT Role;Home Exercise Program;Energy Conservation  Patient Education: pursed lip breathing, proper bed mobility, benefits of participation in therapy, benefits of UB exercises and completion on own in room, postural control  REQUIRES OT FOLLOW UP: Yes  Activity Tolerance  Activity Tolerance: Patient Tolerated treatment well;Patient limited by fatigue;Patient limited by pain;Treatment limited secondary to decreased cognition  Safety Devices  Safety Devices in place: Yes  Type of devices:  All fall risk precautions in place;Call light within reach;Gait belt;Nurse notified; Left in bed;Bed alarm in place         Patient Diagnosis(es): The encounter diagnosis was Necrotizing fasciitis of ankle and foot ([facility] [address] has a past medical history of RISSA (acute kidney injury) ([address] Cerebrovascular disease, Erectile dysfunction, Hepatitis B infection, Hyperlipidemia, Hypertension, Kidney cysts, Liver cyst, MRSA (methicillin resistant staph aureus) culture positive, Neurotrophic ulcer of the foot ([address] Obesity, Osteomyelitis of ankle or foot, Peripheral vascular disease ([facility] [address] Tobacco abuse, and Type II or unspecified type diabetes mellitus without mention of complication, not stated as uncontrolled. has a past surgical history that includes Foot amputation through metatarsal (2011); Foot Amputation; Foot surgery (Right, 3/2013); Foot Debridement (Left, 6/20/2020); and Foot Debridement (Left, 4/12/2022). Restrictions  Restrictions/Precautions  Restrictions/Precautions: Weight Bearing,General Precautions,Fall Risk  Required Braces or Orthoses?: No  Lower Extremity Weight Bearing Restrictions  Right Lower Extremity Weight Bearing: Non Weight Bearing  Left Lower Extremity Weight Bearing: Non Weight Bearing  Partial Weight Bearing Percentage Or Pounds: No WB orders, but had I&D of left heel 4/12 and states he cannot put weight on it. Position Activity Restriction  Other position/activity restrictions: IV, telemetry, Right BKA with prosthesis, left transmetatarsal amp with wound on heel  Subjective   General  Chart Reviewed: Yes  Patient assessed for rehabilitation services?: Yes  Response to previous treatment: Patient with no complaints from previous session  Family / Caregiver Present: No  Subjective  Subjective: \"Oh those days are behind me\"  General Comment  Comments: Pt resting in bed agreeable to therapy.       Orientation  Orientation  Overall Orientation Status: Within Functional Limits  Objective                Bed mobility  Supine to Sit: Minimal assistance; Moderate assistance;2 Person assistance  Sit to Supine: Minimal assistance; Moderate assistance;2 Person assistance  Scooting: Minimal assistance  Comment: Pt required increased time/effort and Min-Mod A x2 for progression of trunk/BLEs in/out of bed. Pt performs better with supine to sit and requires more assistance with sit <> supine. Pt not willing to transfer to chair and would only sit EOB. Pt performed lateral scooting in bed from middle of bed to lower end and back toward HOB with SBA. Min vc's for pursed lip breathing, proper bed mobility, use of bed rail, pacing, and awareness/assist with IV line/pole for increased safety. Cognition  Overall Cognitive Status: WFL  Arousal/Alertness: Appropriate responses to stimuli  Following Commands: Follows multistep commands with repitition; Follows multistep commands with increased time  Attention Span: Appears intact  Memory: Appears intact  Safety Judgement: Decreased awareness of need for assistance;Decreased awareness of need for safety  Problem Solving: Assistance required to generate solutions;Assistance required to implement solutions;Assistance required to correct errors made;Assistance required to identify errors made;Decreased awareness of errors  Insights: Decreased awareness of deficits  Initiation: Requires cues for some  Sequencing: Requires cues for some                    Type of ROM/Therapeutic Exercise  Type of ROM/Therapeutic Exercise: Resistive Bands  Exercises  Horizontal ABduction: x10  Horizontal ADduction: x10  Elbow Flexion: x10  Elbow Extension: x10                    Plan   Plan  Times per week: 4-5x a week, 1-2x per day  Current Treatment Recommendations: Strengthening,ROM,Balance Training,Endurance Training,Positioning,Safety Education & Training,Patient/Caregiver Education & Training,Self-Care / ADL                                                  AM-PAC Score        AM-PAC Inpatient Daily Activity Raw Score: 16 (04/15/22 1414)  AM-PAC Inpatient ADL T-Scale Score : 35.96 (04/15/22 1414)  ADL Inpatient CMS 0-100% Score: 53.32 (04/15/22 1414)  ADL Inpatient CMS G-Code Modifier : CK (04/15/22 1414)    Goals  Short term goals  Time Frame for Short term goals: By discharge pt will  Short term goal 1: demo min Ax1 for ADL transfers with appropriate device/technique and good safety/pacing. Short term goal 2: demo SBA for UB ADLs and min A x1 for LB ADLs with good safety/pacing and use of AD/DME as needed. Short term goal 3: demo min A x1 for toileting tasks with good safety/pacing and use of AD/DME as needed. Short term goal 4: Increase B UE strength by 1/2 grade to assist with ADL tasks and functional transfers  Short term goal 5: demo and verb good understanding of EC/WS, ADL transfer techs, fall prevention techs, B UE HEP, possible equip needs, use of DME/AD as needed, pressure relief, d/c reccommendations and surgery precautions. Therapy Time   Co-Treatment Concurrent Group Co-treatment   Time In 1120         Time Out 1146         Minutes 32               Co-treatment with PT warranted secondary to decreased safety and independence requiring 2 skilled therapy professionals to address individual discipline's goals. OT addressing preparation for ADL transfer, sitting balance for increased ADL performance, sitting/activity tolerance, functional reaching, environmental safety/scanning, fall prevention, functional mobility for ADL transfers, ability to sequence and follow directions and functional UE strength.     OLIVER Zapien

## 2022-04-15 NOTE — CONSULTS
Palliative Care Inpatient Consult    NAME:  Mary Vazquez  MEDICAL RECORD NUMBER:  [id number]  AGE: 67 y.o. GENDER: male  : 1950  TODAY'S DATE:  4/15/2022    Reasons for Consultation:    Symptom management   Goals of care     Members of PC team contributing to this consultation are :  Kory Hoover CNP  Palliative care   Plan      Palliative Interaction:  I went to [facility] bedside and introduced myself and my palliative care role to him. Patient states he is  and has 2 biological children that he does not know anything about. He states he wants his sister to be his POA and help him make decisions. Spiritual care consulted to help patient complete that paperwork. I discussed code status levels and explained each level completely to patient and he states he wants everything to be done to save his life. Patient will remain a full code status. Patient lunch came and I helped set him up to eat. I did ask about palliative care services at home and he was not sure about this. He asked me to call his sister Viraj Spence. I called Viraj Spence and provided her an update on the conversation above. I explained that when vascular came they will probably reach out to her so she knows the options for care for her brother. I also discussed Palliative care services at discharge for symptom management for patient and she stated she would discuss this with her brother. Palliative care will continue to follow. Education/support to family  Education/support to patient  Discharge planning/helping to coordinate care  Communications with primary service  Providing support for coping/adaptation/distress of family  Providing support for coping/adaptation/distress of patient  Discussing meaning/purpose   Caregiver support/education  Continue with current plan of care  Code status clarified: Full Code      History of Present Illness     The patient is a 67 y.o.   Non- / non  male who presents with Wound Infection (Sent over by PCP for wound evaluation in foot (left) )      Referred to Palliative Care by   [x] Physician   [] Nursing  [] Family Request   [] Other:       He was admitted to the Medical Surgical service for Necrotizing fasciitis [facility]) [M72.6]  Necrotizing fasciitis of ankle and foot ([facility] [M72.6]. His hospital course has been associated with Necrotizing fasciitis ([facility] [address] The patient has a complicated medical history and has been hospitalized since 4/11/2022  5:25 PM.    Patient was sent to ER by podiatry due to left foot wound that appeared infected. Patient has past medical history of CKD, hypertension, diabetes, obesity, CVA, hyperlipidemia,  PVD, smoker. Lives alone in an apartment on the second floor with an elevator. While in the ED, xray of the left tib fib revealed soft tissue swelling, cellulitis, and no evidence of osteomyelitis. Left foot xray results were concerning for necrotizing fasciitis and osteomyelitis. His WBC was elevated at 18.5, sed rate > 130, creat 2.44- baseline appears to be 1.36-2.06. Records list Stage 3 CKD. Patient denies having a nephrologist. His lactic was unremarkable.      He was started on IV Vanco. He is being admitted for further management of necrotizing fasciitis of left foot and RISSA. Podiatry was consulted and plan for patient to undergo I & D of the left heel and bone biopsy 4/13/22.      Patient is currently getting Tylenol, Percocet, Ultram for comfort management. He is on Maxipime and clindamycin IV to treat his infection. Patient is on sodium bicarb due to decreased kidney function. Patient's labs from today include glucose 117, BUN 36, creatinine 2.88, calcium 7.5, sodium 134, potassium 4.4, chloride 105.   Patient had the following scans venous Doppler, retroperitoneal ultrasound, left fibula and tibia x-ray, left foot x-ray and results are listed below patient has the following consults palliative care for symptom management and goals of care, podiatry for left heel ulcer, vascular due to extensive infection tracking to the Achilles tendon and necrotic calcaneus, infectious disease due to left foot gangrene osteomyelitis, nephrology due to RISSA on CKD. Palliative care will continue to follow patient and reach out to patient and family to provide emotional support and updates as needed. Venous Doppler  Right BKA. Noncompressible vessels bon the left suggestive of calcific vascular disease. Good amplitude waveforms noted on the left. Retroperitoneal ultrasound  1.  Echogenic kidneys which may be seen in medical renal disease.       2.  Questionable trace perinephric fluid bilaterally.       3.  Echogenic foci and debris versus blood products in the dependent portion   of the bladder.  Clinical correlation is recommended. Left fibula fibula x-ray  Cellulitis.  No evidence of osteomyelitis. Left foot x-ray  1. Soft tissue emphysema within the posterior aspect of the distal left lower   leg and adjacent to the calcaneus, concerning for necrotizing fasciitis   2. Large soft tissue ulceration along the dorsal aspect of the calcaneus,   with cortical destruction, consistent with osteomyelitis   3. Diffuse soft tissue swelling now present   4.  Critical results were called by Dr. Montana Rankin to Arsalan Vicente on   4/11/2022 at 6:27 p.m. hours.        Active Hospital Problems    Diagnosis Date Noted    Acute kidney injury superimposed on CKD ([address] [N17.9, N18.9] 09/16/2020     Priority: High    CKD (chronic kidney disease), stage III ([address] [N18.30] 09/20/2020     Priority: Medium    Necrotizing fasciitis ([address] [M72.6] 04/11/2022    Class 1 obesity with serious comorbidity in adult [E66.9] 01/22/2013    Uncontrolled hypertension [I10]     Type 2 diabetes mellitus with stage 3 chronic kidney disease, with long-term current use of insulin (HCC) [E11.22, N18.30, Z79.4]        PAST MEDICAL HISTORY      Diagnosis Date    RISSA (acute kidney injury) ([facility] [address] [facility]     Cerebrovascular disease     Erectile dysfunction     Hepatitis B infection     Hyperlipidemia     Hypertension     Kidney cysts     Liver cyst     MRSA (methicillin resistant staph aureus) culture positive 1/10/2014    right foot    Neurotrophic ulcer of the foot ([facility] [address] [facility]     Obesity     Osteomyelitis of ankle or foot     Peripheral vascular disease ([facility] [address] [facility]     Tobacco abuse     Type II or unspecified type diabetes mellitus without mention of complication, not stated as uncontrolled        PAST SURGICAL HISTORY  Past Surgical History:   Procedure Laterality Date    FOOT AMPUTATION      FOOT AMPUTATION THROUGH METATARSAL  2011    pt can't give exact date of surg-- toes and part of lt foot removed    FOOT DEBRIDEMENT Left 6/20/2020    WOUND BED PREPARATION AND WOUND VAC APPLICATION performed by Italia Ta DPM at [address] Left 4/12/2022    FOOT DEBRIDEMENT INCISION AND DRAINAGE WITH BX performed by Italia Ta DPM at [address] [place] [address] Right 3/2013       SOCIAL HISTORY  Social History     Tobacco Use    Smoking status: Current Every Day Smoker     Packs/day: 0.50     Years: 30.00     Pack years: 15.00     Types: Cigarettes    Smokeless tobacco: Never Used    Tobacco comment: working on quitting smoking   Vaping Use    Vaping Use: Never used   Substance Use Topics    Alcohol use: No    Drug use: Never       ALLERGIES  No Known Allergies      MEDICATIONS  Current Medications    clindamycin (CLEOCIN) IV  600 mg IntraVENous Q8H    amLODIPine  5 mg Oral Daily    clopidogrel  75 mg Oral Daily    docusate sodium  100 mg Oral BID    ferrous sulfate  325 mg Oral Daily with breakfast    [Held by provider] furosemide  40 mg Oral Every Other Day    metoprolol tartrate  25 mg Oral BID    atorvastatin  10 mg Oral Daily    sodium chloride flush  5-40 mL IntraVENous 2 times per day    insulin lispro  0-12 Units SubCUTAneous TID     insulin lispro  0-6 Units SubCUTAneous Nightly    cefepime  2,000 mg IntraVENous Q24H     oxyCODONE-acetaminophen **OR** oxyCODONE-acetaminophen, traMADol, glucagon (rDNA), dextrose, sodium chloride flush, sodium chloride, potassium chloride **OR** potassium alternative oral replacement **OR** potassium chloride, magnesium sulfate, ondansetron **OR** ondansetron, polyethylene glycol, acetaminophen **OR** acetaminophen, dextrose bolus (hypoglycemia) **OR** dextrose bolus (hypoglycemia), glucose, hydrALAZINE  IV Drips/Infusions   IV infusion builder 75 mL/hr at 04/15/22 0319    dextrose      sodium chloride Stopped (04/15/22 0451)     Home Medications  No current facility-administered medications on file prior to encounter.      Current Outpatient Medications on File Prior to Encounter   Medication Sig Dispense Refill    metoprolol tartrate (LOPRESSOR) 25 MG tablet Take 25 mg by mouth 2 times daily      QUEtiapine (SEROQUEL) 25 MG tablet Take 25 mg by mouth at bedtime      amLODIPine (NORVASC) 10 MG tablet Take 0.5 tablets by mouth daily (Patient taking differently: Take 10 mg by mouth daily ) 30 tablet 0    insulin glargine (BASAGLAR KWIKPEN) 100 UNIT/ML injection pen Inject 10-20 Units into the skin 2 times daily Injects 20 units AM in the morning and 10 units at night (Patient taking differently: Inject 12 Units into the skin nightly ) 12 mL 0    clopidogrel (PLAVIX) 75 MG tablet Take 1 tablet by mouth daily 30 tablet 3    insulin aspart (NOVOLOG FLEXPEN) 100 UNIT/ML injection pen Inject 0-10 Units into the skin 3 times daily as needed Per sliding scale      simvastatin (ZOCOR) 10 MG tablet Take 10 mg by mouth nightly      traZODone (DESYREL) 150 MG tablet Take 150 mg by mouth nightly      docusate sodium (COLACE) 100 MG capsule Take 100 mg by mouth 2 times daily       acetaminophen (TYLENOL) 325 MG tablet Take 650 mg by mouth as needed for Pain Give 2 tabs = 650 MG by mouth every 4 hours prn       Insulin Syringe-Needle U-100 (ACCUSURE INS SYR 1CC/30GX5/16\") 30G X 5/16\" 1 ML MISC by Does not apply route. 120 each 11       Data         BP (!) 122/49   Pulse 63   Temp 97.8 °F (36.6 °C) (Oral)   Resp 18   Ht 6' (1.829 m)   Wt 180 lb (81.6 kg)   SpO2 99%   BMI 24.41 kg/m²     Wt Readings from Last 3 Encounters:   04/15/22 180 lb (81.6 kg)   09/21/20 204 lb 8 oz (92.8 kg)   06/22/20 270 lb 6.4 oz (122.7 kg)        Code Status: Full Code     ADVANCED CARE PLANNING:  Patient has capacity for medical decisions: yes  Health Care Power of : Consulted spiritual care to complete  Living Will: no     Personal, Social, and Family History  Marital Status:   Living situation: alone  Importance of lindsay/Restorationism/spiritual beliefs: [] Very [] Somewhat [] Not   Psychological Distress: moderate  Does patient understand diagnosis/treatment? yes  Does caregiver understand diagnosis/treatment?  yes    Past Medical History:   Diagnosis Date    [facility]ISSA [facility] kidney [facility]) ([facility]     Cerebrovascular disease     Erectile dysfunction     Hepatitis B infection     Hyperlipidemia     Hypertension     Kidney cysts     Liver cyst     MRSA (methicillin resistant staph aureus) culture positive 1/10/2014    right foot    Neurotrophic ulcer of the foot ([facility] [facility]     Obesity     Osteomyelitis of ankle or foot     Peripheral vascular disease ([facility] [address] [facility]     Tobacco abuse     Type II or unspecified type diabetes mellitus without mention of complication, not stated as uncontrolled          Family History   Problem Relation Age of Onset    Diabetes Mother     Diabetes Sister     Diabetes Brother     Diabetes Sister     Diabetes Sister     Diabetes Sister        Social History     Tobacco Use    Smoking status: Current Every Day Smoker     Packs/day: 0.50     Years: 30.00     Pack years: 15.00     Types: Cigarettes    Smokeless tobacco: Never Used    Tobacco comment: working on quitting smoking   Vaping Use    Vaping Use: Never used   Substance Use Topics    Alcohol use: No    Drug use: Never           Assessment        REVIEW OF SYSTEMS  Constitutional: Patient alert and oriented slow to respond to questions follow commands   Eyes: no eye pain or blurred vision  ENT: no hearing loss, congestion, or difficulty swallowing   Respiratory:respirations relaxed no distress noted   Cardiovascular: no chest pain or pressure, no palpitations, no diaphoresis   Gastrointestinal: no nausea, vomiting,abdominal pain, diarrhea or constipation, no melena   Genitourinary: no dysuria, frequency, hematuria, or nocturia   Musculoskeletal: generalized weakness and discomfort   Skin: Right BKA, Left TMA, ace wrap on left leg  Neurological: Patient alert and oriented slow to respond to questions follow commands     PHYSICAL ASSESSMENT:  Constitutional: Patient alert and oriented slow to respond to questions follow commands   Head: Normocephalic and atraumatic. Eyes: EOM are normal. Pupils are equal, round. Neck: Normal range of motion. Neck supple. No tracheal deviation present. Cardiovascular: Normal rate and regular rhythm, S1, S2, no murmur. Pulmonary/Chest: Lungs diminished respirations relaxed   Abdomen: Soft. No tenderness, not distended, no ascites, no organomegaly. Musculoskeletal: generalized weakness and discomfort   Neurological: Patient alert and oriented slow to respond to questions follow commands   Skin: Right BKA, Left TMA, ace wrap on left leg    Palliative Performance Scale:  ___60%  Ambulation reduced; Significant disease; Can't do hobbies/housework; intake normal or reduced; occasional assist; LOC full/confusion  ___50%  Mainly sit/lie; Extensive disease; Can't do any work; Considerable assist; intake normal or reduced; LOC full/confusion  _x__40%  Mainly in bed; Extensive disease; Mainly assist; intake normal or reduced; LOC full/confusion   ___30%  Bed Bound; Extensive disease;  Total care; intake reduced; LOC full/confusion  ___20%  Bed Bound; Extensive disease; Total care; intake minimal; Drowsy/coma  ___10%  Bed Bound; Extensive disease; Total care; Mouth care only; Drowsy/coma  ___0       Death      Principle Problem/Diagnosis:  Necrotizing fasciitis ([facility] [address]    Additional Assessments:   Principal Problem:    Necrotizing fasciitis ([facility] [address]  Active Problems:    Acute kidney injury superimposed on CKD ([facility] [address]    CKD (chronic kidney disease), stage III ([facility] [address]    Uncontrolled hypertension    Type 2 diabetes mellitus with stage 3 chronic kidney disease, with long-term current use of insulin ([facility])    Class 1 obesity with serious comorbidity in adult  Resolved Problems:    * No resolved hospital problems. *    1- Symptom management/ pain control     Pain Assessment:  Tylenol percocet and ultram                Anxiety:  none                          Dyspnea:  Respirations relaxed                           Fatigue:  generalized weakness and discomfort     Other:      2- Goals of care evaluation   The patient goals of care are improve or maintain function/quality of life   Goals of care discussed with:    [] Patient independently    [x] Patient and Family    [] Family or Healthcare DPOA independently    [] Unable to discuss with patient, family/DPOA not present    3- Code Status  Full Code    4- Other recommendations   - We will continue to provide comfort and support to the patient and the family  Palliative Care will continue to follow Mr. Navya Buchanan care as needed. Thank you for allowing Palliative Care to participate in the care of Mr. Elyse Carbajal . This note has been dictated by dragon, typing errors may be a possibility.     The total time I spent in seeing the patient, discussing goals of care, advanced directives, code status, greater than 50% time in counseling and other major issues was more than 60 minutes      Electronically signed by   ALBERTO Child NP  Palliative Care Team  on 4/15/2022 at 12:43 PM    Palliative care office: [phone]    Please call with any palliative questions or concerns. Palliative Care Team is available via perfect serve or via phone.

## 2022-04-15 NOTE — PROGRESS NOTES
[facility]  Office: [address], DO, Rl Elizabeth, DO, Jodi Koo, DO, Leandro Rodrigo Blood, DO, Ju Oliva MD, Lena Haque MD, Fernando Marie MD, Florinda Mireles MD, Destinee Reinoso MD, Opal Jiménez MD, Jose M Jarvis MD, Torres Carlisle, DO, Media Sensor, DO, Iraj Bliss MD,  Kim Hartley, DO, Nima Hernández MD, Sina Bedoya MD, Sera Dudley MD, Daisha Francois DO, Isaac Soler MD, Dipika Snowden MD, Altagracia Ellison, CNP, Parnassus campusKTAIE Murray, CNP, Marquita Martinez CNP, Cece Stewart, CNP, Nayana Lujan, CNP, Ana Kothari, CNP, HAM JacksonC, Elena Dallas, DNP, Genaro Forbes, CNP, Arminda Thomas, CNP, Parviz Rosado, CNP, Argelia Schaeffer, CNS, Anne Chawla, KAI, Evelyn Witt, CNP, Good Browne, CNP, Edward Aquino, [facility]    Progress Note    4/15/2022    3:38 PM    Name:   Candance Easter  MRN:     [id number]     Acct:      [de-identified]   Room:   2020/2020-01   Day:  4  Admit Date:  4/11/2022  5:25 PM    PCP:   Kim Joe  Code Status:  Full Code    Subjective:     C/C:   Chief Complaint   Patient presents with    Wound Infection     Sent over by PCP for wound evaluation in foot (left)      Interval History Status: improved. Patient seen and examined at bedside,  Feeling better today  AAfebrile overnight  His pain is controlled  Not sure what are his wishes regarding amputation  Patient vitals, labs and all providers notes were reviewed,from overnight shift and morning updates were noted and discussed with the nurse    Brief History:     Per my NP  Candance Easter is a 67 y.o. Non- / non  male who presents with Wound Infection (Sent over by PCP for wound evaluation in foot (left) )   and is admitted to the hospital for the management of Necrotizing fasciitis ([facility] [address]      Patient is a poor historian is does not recall how long he has been undergoing treatment for a left heel ulcer. He denies fever, but says he has had chills for \"months\". He has a known history of uncontrolled diabetes and says he checks his blood sugar in the morning and at night- BG range has been 140s to 200s, he says. He is a daily smoker. He recalls he went to Dr Todd/ podiatry for left heel care and was advised to come to the hospital for further evaluation and care.      While in the ED, xray of the left tib fib revealed soft tissue swelling, cellulitis, and no evidence of osteomyelitis. Left foot xray results were concerning for necrotizing fasciitis and osteomyelitis. His WBC was elevated at 18.5, sed rate > 130, creat 2.44- baseline appears to be 1.36-2.06. Records list Stage 3 CKD. Patient denies having a nephrologist. His lactic was unremarkable.      He was started on IV Vanco. He is being admitted for further management of necrotizing fasciitis of left foot and RISSA. Podiatry was consulted and plan for patient to undergo I & D of the left heel and bone biopsy 4/13/22. Review of Systems:     Review of Systems   Constitutional: Positive for activity change and appetite change. Negative for chills, diaphoresis and fever. HENT: Negative for congestion. Eyes: Negative for visual disturbance. Respiratory: Negative for cough, chest tightness, shortness of breath and wheezing. Cardiovascular: Negative for chest pain, palpitations and leg swelling. Gastrointestinal: Negative for abdominal pain, blood in stool, constipation, diarrhea, nausea and vomiting. Genitourinary: Negative for difficulty urinating. Musculoskeletal: Positive for arthralgias and joint swelling. LLE pain and purulent discharge    Neurological: Negative for dizziness, weakness, light-headedness, numbness and headaches. All other systems reviewed and are negative. Medications:      Allergies:  No Known Allergies    Current Meds:   Scheduled Meds:    sodium chloride  250 mL IntraVENous Once    furosemide  20 mg IntraVENous Once    clindamycin (CLEOCIN) IV  600 mg IntraVENous Q8H    [Held by provider] amLODIPine  5 mg Oral Daily    clopidogrel  75 mg Oral Daily    docusate sodium  100 mg Oral BID    ferrous sulfate  325 mg Oral Daily with breakfast    [Held by provider] furosemide  40 mg Oral Every Other Day    metoprolol tartrate  25 mg Oral BID    atorvastatin  10 mg Oral Daily    sodium chloride flush  5-40 mL IntraVENous 2 times per day    insulin lispro  0-12 Units SubCUTAneous TID WC    insulin lispro  0-6 Units SubCUTAneous Nightly    cefepime  2,000 mg IntraVENous Q24H     Continuous Infusions:    IV infusion builder 75 mL/hr at 04/15/22 0319    dextrose      sodium chloride Stopped (04/15/22 0451)     PRN Meds: oxyCODONE-acetaminophen **OR** oxyCODONE-acetaminophen, traMADol, glucagon (rDNA), dextrose, sodium chloride flush, sodium chloride, potassium chloride **OR** potassium alternative oral replacement **OR** potassium chloride, magnesium sulfate, ondansetron **OR** ondansetron, polyethylene glycol, acetaminophen **OR** acetaminophen, dextrose bolus (hypoglycemia) **OR** dextrose bolus (hypoglycemia), glucose, hydrALAZINE    Data:     Past Medical History:   has a past medical history of RISSA (acute kidney injury) ([facility] [address] Cerebrovascular disease, Erectile dysfunction, Hepatitis B infection, Hyperlipidemia, Hypertension, Kidney cysts, Liver cyst, MRSA (methicillin resistant staph aureus) culture positive, Neurotrophic ulcer of the foot ([facility] [address] Obesity, Osteomyelitis of ankle or foot, Peripheral vascular disease ([facility] [address] Tobacco abuse, and Type II or unspecified type diabetes mellitus without mention of complication, not stated as uncontrolled. Social History:   reports that he has been smoking cigarettes. He has a 15.00 pack-year smoking history. He has never used smokeless tobacco. He reports that he does not drink alcohol and does not use drugs.      Family History:   Family History   Problem Relation Age of Onset    Diabetes Mother     Diabetes Sister     Diabetes Brother     Diabetes Sister     Diabetes Sister     Diabetes Sister        Vitals:  BP (!) 122/49   Pulse 63   Temp 97.8 °F (36.6 °C) (Oral)   Resp 18   Ht 6' (1.829 m)   Wt 180 lb (81.6 kg)   SpO2 99%   BMI 24.41 kg/m²   Temp (24hrs), Av °F (36.7 °C), Min:97.8 °F (36.6 °C), Max:98.2 °F (36.8 °C)    Recent Labs     22  1639 04/14/22  2008 04/15/22  0611 04/15/22  1120   POCGLU 188* 145* 99 135*       I/O (24Hr):     Intake/Output Summary (Last 24 hours) at 4/15/2022 1538  Last data filed at 4/15/2022 0840  Gross per 24 hour   Intake 2794.2 ml   Output 275 ml   Net 2519.2 ml       Labs:  Hematology:  Recent Labs     22  0508 22  0527   WBC 21.3* 18.7*   RBC 3.62* 3.48*   HGB 8.9* 8.8*   HCT 29.1* 27.9*   MCV 80.4* 80.2*   MCH 24.6* 25.3   MCHC 30.6 31.5   RDW 14.8* 14.6*    201   MPV 8.9 9.1   CRP  --  130.1*     Chemistry:  Recent Labs     22  0508 22  0527 04/15/22  0610    137 134*   K 4.6 4.5 4.4   * 109* 105   CO2 17* 18* 18*   GLUCOSE 137* 163* 117*   BUN 34* 36* 36*   CREATININE 2.29* 2.59* 2.88*   ANIONGAP 10 10 11   LABGLOM 28* 24* 22*   GFRAA 34* 30* 26*   CALCIUM 7.9* 7.6* 7.5*     Recent Labs     22  0627 22  1154 22  1639 04/14/22  2008 04/15/22  0611 04/15/22  1120   POCGLU 138* 175* 188* 145* 99 135*     ABG:No results found for: POCPH, PHART, PH, POCPCO2, DTD9JFP, PCO2, POCPO2, PO2ART, PO2, POCHCO3, SVP1FIC, HCO3, NBEA, PBEA, BEART, BE, THGBART, THB, CCY8JDK, VZUW2XFU, I4HDVJLZ, O2SAT, FIO2  Lab Results   Component Value Date/Time    SPECIAL LT FA,7ML 2022 06:38 PM     Lab Results   Component Value Date/Time    CULTURE PROTEUS MIRABILIS HEAVY GROWTH (A) 2022 05:36 PM    CULTURE (A) 2022 05:36 PM     STREPTOCOCCI, BETA HEMOLYTIC GROUP B MODERATE GROWTH    CULTURE Mixed skin tara 2022 05:36 PM    CULTURE  2022 05:36 PM Unable to determine the presence or absence of anaerobes due to swarming proteus. Radiology:  XR TIBIA FIBULA LEFT (2 VIEWS)    Result Date: 4/11/2022  Cellulitis. No evidence of osteomyelitis. XR FOOT LEFT (MIN 3 VIEWS)    Result Date: 4/11/2022  1. Soft tissue emphysema within the posterior aspect of the distal left lower leg and adjacent to the calcaneus, concerning for necrotizing fasciitis 2. Large soft tissue ulceration along the dorsal aspect of the calcaneus, with cortical destruction, consistent with osteomyelitis 3. Diffuse soft tissue swelling now present 4. Critical results were called by Dr. Coty Rcihey to Lalita Gaytan on 4/11/2022 at 6:27 p.m. hours. US RETROPERITONEAL COMPLETE    Result Date: 4/12/2022  1. Echogenic kidneys which may be seen in medical renal disease. 2.  Questionable trace perinephric fluid bilaterally. 3.  Echogenic foci and debris versus blood products in the dependent portion of the bladder. Clinical correlation is recommended. Physical Examination:        Physical Exam  Vitals and nursing note reviewed. Constitutional:       General: He is not in acute distress. HENT:      Head: Normocephalic and atraumatic. Eyes:      Conjunctiva/sclera: Conjunctivae normal.      Pupils: Pupils are equal, round, and reactive to light. Cardiovascular:      Rate and Rhythm: Normal rate and regular rhythm. Heart sounds: No murmur heard. Pulmonary:      Effort: Pulmonary effort is normal. No accessory muscle usage or respiratory distress. Breath sounds: No stridor. No decreased breath sounds, wheezing, rhonchi or rales. Abdominal:      General: Bowel sounds are normal. There is no distension. Palpations: Abdomen is soft. Abdomen is not rigid. Tenderness: There is no abdominal tenderness. There is no guarding. Musculoskeletal:         General: No tenderness. Comments: /P right BKA.   S/p left TMA  Wound S/P I&D is in dressing Skin:     General: Skin is warm and dry. Findings: No erythema, lesion or rash. Neurological:      Mental Status: He is alert and oriented to person, place, and time. Cranial Nerves: No cranial nerve deficit. Motor: No seizure activity. Psychiatric:         Speech: Speech normal.         Behavior: Behavior normal. Behavior is cooperative. Assessment:        Hospital Problems           Last Modified POA    * (Principal) Necrotizing fasciitis ([address] 4/12/2022 Yes    Acute kidney injury superimposed on CKD ([address] 4/11/2022 Yes    CKD (chronic kidney disease), stage III ([address] 4/11/2022 Yes    Uncontrolled hypertension 4/12/2022 Yes    Type 2 diabetes mellitus with stage 3 chronic kidney disease, with long-term current use of insulin ([address] 4/12/2022 Yes    Class 1 obesity with serious comorbidity in adult 4/12/2022 Yes          Plan:         L heel wound infection/ necrotizing fasciitis  with possible underlying Osteomyelitis : he ie S/P extensive I&D and biopsy sent on 4/12, post op management  per podiatry, ID consulted, currently on rocephin and clindamycin . Adjust pain meds for better pain control    PVD : vascular consulted, they recommended palliative care to discuss quality of life if limb left amputated vs not , I discussed with the patient and given that he lives by himself he is still not sure but now seem more leaning toward amputation    RISSA/CKD stage III: Baseline creatinine between 2-2 0.2, likely 2/2 infection  nephrology to evaluate the patient, unclear if the patient has nephrology follow-up as an outpatient, he also does not follow-up with his PCP   Continue to hold Lasix and Ace      AGMA : likely 2/2 above , fluids changes to include bicarb per nephrology, he is still currently on bicarb drip. AOCD : monitor and continue support     HTN:  Ace held, continue BB continue to monitor     HPL: continue home medications    DM2:   With hyperglycemia , target -180 , Continue diabetes home medications , insulin sliding scale, hypoglycemia protocol    DVT prophylaxis    Discussed with the patient and the nurse     Discussed with podiatry resident regarding final plan.     Jorje Holden MD  4/15/2022  3:38 PM

## 2022-04-15 NOTE — PROGRESS NOTES
Physical Therapy  Facility/Department: [facility]  Daily Treatment Note  NAME: Jamal Dai  : 1950  MRN: [id number]    Date of Service: 4/15/2022    Discharge Recommendations:  Patient would benefit from continued therapy after discharge        Assessment   Body structures, Functions, Activity limitations: Decreased functional mobility ; Decreased strength;Decreased cognition;Decreased safe awareness;Decreased endurance  Assessment: pt progressing toward goals   Activity Tolerance  Activity Tolerance: Patient Tolerated treatment well     Patient Diagnosis(es): The encounter diagnosis was Necrotizing fasciitis of ankle and foot ([address] has a past medical history of RISSA (acute kidney injury) ([address] Cerebrovascular disease, Erectile dysfunction, Hepatitis B infection, Hyperlipidemia, Hypertension, Kidney cysts, Liver cyst, MRSA (methicillin resistant staph aureus) culture positive, Neurotrophic ulcer of the foot ([address] Obesity, Osteomyelitis of ankle or foot, Peripheral vascular disease ([address] Tobacco abuse, and Type II or unspecified type diabetes mellitus without mention of complication, not stated as uncontrolled. has a past surgical history that includes Foot amputation through metatarsal (); Foot Amputation; Foot surgery (Right, 3/2013); Foot Debridement (Left, 2020); and Foot Debridement (Left, 2022). Restrictions  Restrictions/Precautions  Restrictions/Precautions: Weight Bearing,General Precautions,Fall Risk  Required Braces or Orthoses?: No  Lower Extremity Weight Bearing Restrictions  Right Lower Extremity Weight Bearing: Non Weight Bearing  Left Lower Extremity Weight Bearing: Non Weight Bearing  Partial Weight Bearing Percentage Or Pounds: No WB orders, but had I&D of left heel  and states he cannot put weight on it.   Position Activity Restriction  Other position/activity restrictions: IV, telemetry, Right BKA with prosthesis, left transmetatarsal amp with wound on heel  Subjective   General  Chart Reviewed: Yes  Additional Pertinent Hx: HTN, DM  Family / Caregiver Present: No  Subjective  Subjective: pt agreeable to PT  Pain Screening  Patient Currently in Pain: Yes  Pain Assessment  Pain Assessment: 0-10  Pain Level: 8  Pain Location: Leg  Pain Orientation: Left  Vital Signs  Patient Currently in Pain: Yes  Pre Treatment Pain Screening  Intervention List: Patient able to continue with treatment    Orientation     Cognition      Objective   Bed mobility    Min/mod A x 2 for assit to edge of bed, pt able to sit edge of bed for approx, 15 min during treatment session    Scooting: Minimal assistance  Transfers  Comment: pt declined transfer to chair due to fatigue  Ambulation  Ambulation?: No     Balance  Posture: Good  Sitting - Static: Good  Sitting - Dynamic: Good;-  Exercises  Comments: seated edge of bed L LE AROm x 15 reps         Comment: assisted pt back to supine position at concusion of treatment session               G-Code     OutComes Score                                                     AM-PAC Score   AM-PAC inpatient T-scale score 38. 1  AM-[facility] inpatient mobility Raw score 14  Mobility inpatient CMS 0-100% score 61.29           Goals  Short term goals  Time Frame for Short term goals: 12 visits  Short term goal 1: Patient will be indep with bed mobility. Short term goal 2: Patient will be min x 1 with slide board transfers. Short term goal 3: Patient will have good dynamic seated balance. Short term goal 4: Patient will tolerate 30 minutes of ther-ex an dther-act. Short term goal 5: Patient will be indep with HEP.   Patient Goals   Patient goals : return home    Plan    Plan  Times per week: 1-2x/d, 5-6 d/wk  Current Treatment Recommendations: Strengthening,Balance Training,Functional Mobility Training,Transfer Training,Endurance Training,Patient/Caregiver Education & [employer] Exercise Program,Safety Education & Training  Safety Devices  Type of devices: All fall risk precautions in place,Gait belt,Nurse notified,Left in bed,Call light within reach,Bed alarm in place     Therapy Time   cotreat Concurrent Group Co-treatment   Time In  1120         Time Out  1146         Minutes  32               Co-treatment with OT warranted secondary to decreased safety and independence requiring 2 skilled therapy professionals to address individual discipline's goals. PT addressing preparation for  Transfers, gait and pre gait activities,  sitting balance for increased  sitting/activity tolerance, functional mobility, environmental safety/scanning, fall prevention, functional mobility  transfers and functional LE strength. Upon writer exit, call light within reach, pt retired to bed. All lines intact and patient positioned comfortably. All patient needs addressed prior to ending therapy session. Chart reviewed prior to treatment and patient is agreeable for therapy. RN reports patient is medically stable for therapy treatment this date.        GEORGE GARCIA, PTA

## 2022-04-15 NOTE — FLOWSHEET NOTE
Writer visits per consult for [address]. Patient is awake and alert but requests that we do the [address] later. Patient requests help in getting his cell phone charged. Writer loans the patient a  and connects his phone to it. Patient expresses appreciation for the visit.  Spiritual will follow later to complete the [address].       04/15/22 0945   Encounter Summary   Services provided to: Patient   Referral/Consult From: Nurse   Support System Family members   Continue Visiting   (4/15/22)   Complexity of Encounter Low   Length of Encounter 15 minutes   Routine   Type Follow up   Assessment Approachable   Intervention Active listening;Explored coping resources   Outcome Expressed gratitude

## 2022-04-15 NOTE — H&P
[id number] [facility] [address]                [address] [place] [address]                              HISTORY AND PHYSICAL    PATIENT NAME: Patrick Henry                       :        1950  MED REC NO:   [id number]                             ROOM:       2020  ACCOUNT NO:   [de-identified]                           ADMIT DATE: 2022  PROVIDER:     Sita Valerio    SUBJECTIVE:  The patient was seen in followup of osteomyelitis abscess  and necrotizing fasciitis, left lower extremity. He is postop day 3  after incision and drainage of bone. Pathology is positive for  osteomyelitis. The patient was seen at bedside. He reports that he is  having minimal-to-moderate pain in left lower extremity. We had a  discussion yesterday regarding lower extremity amputation. The patient  notes that he is not sure what to do. He wishes to discuss with family. He denies any constitutional symptoms of infection at that time. OBJECTIVE:  GENERAL:  The patient was alert and oriented x3, in no apparent  distress. LOWER EXTREMITY EXAM:  Nonpalpable pedal pulses as previously described. Packing with serosanguineous and purulent drainage. There is active  purulent drainage from the proximal, medial, and lateral aspect of the  Achilles tendon incision. The calcaneus was exposed and necrotic. He  had pain with palpation at the surgical site. There does not appear to  be any signs of ascending cellulitis or crepitus at this time. ASSESSMENT:  Necrotizing fasciitis, osteomyelitis of left calcaneus, and  abscess of left lower extremity. PLAN:  I had a lengthy discussion with the patient today. We had a  phone call with the patient's sister, Mary Linares, approximately at  10:15. I discussed the situation with the patient and his family  member.   I have discussed that even with further debridement or incision  and drainage procedures, I doubt that this is a nonsalvageable  extremity. The patient indicates that he was utilizing this lower  extremity for transfers only at this time. I discussed the  recommendations of Vascular Surgery for above-the-knee amputation. I  would agree with this at this time. Palliative Care has been consulted. I appreciate Infectious Disease input. At this point, I do not feel  that there is anything further that I can do for him. We hope to assist  the patient with guiding his decision making. I again discussed with  the patient that I do not feel that his extremity is salvageable at this  point. The patient states his understanding. We will continue to  follow the patient periodically.         Domenic Lo    D: 04/15/2022 10:32:56       T: 04/15/2022 12:10:02     [email or web address]  Job#: [id number]     Doc#: [id number]    CC:

## 2022-04-15 NOTE — PROGRESS NOTES
[facility] -   Infectious diseases evaluation  admission date 4/11/2022    reason for consultation:   Left foot infection    Impression :   Current:  · Diabetic left foot infection with suspected necrotizing fasciitis and calcaneal osteomyelitis. Status post incision and debridement 4/12/22  · Peripheral vascular disease  · Diabetes mellitus  · Chronic renal disease  · History of right below-knee amputation      Recommendations      · D/C IV cefepime   · IV ceftriaxone and clindamycin   · Vascular surgery recommended left above-knee amputation  · The patient is trying to make a decision /palliative care consulted  · Follow blood and wound cultures  · Follow CBC and renal function        History of Present Illness:   Initial history:  Desi Narvaez is a 67y.o.-year-old male was sent to hospital from Dr. Sheyla Rush office for worsening left heel pain associated with left heel wound with surrounding dark discoloration and redness. The pain is severe, intermittent, no alleviating or aggravating factors. Left foot x-ray showed soft tissue emphysema within the posterior aspect of the distal left lower leg concerning for necrotizing fasciitis and possible calcaneal osteomyelitis. Initial WBC 18.5, creatinine 2.44, C-reactive protein more than 130, C-reactive protein 104.2, lactic acid 0 point  Afebrile  History of right below-knee amputation  Interval changes  4/15/2022   He is complaining of foot pain,  Proteus mirabilis pansensitive and group B streptococcus growth on bone culture from 04/12/2022. No growth on blood cultures from 4/11/2022  Patient Vitals for the past 8 hrs:   BP Temp Temp src Pulse Resp SpO2   04/15/22 0750 (!) 122/49 97.8 °F (36.6 °C) Oral 63 18 99 %     . I have personally reviewed the past medical history, past surgical history, medications, social history, and family history, and I haveupdated the database accordingly.       Allergies:   Patient has no known allergies. Review of Systems:     Review of Systems  As per history present illness, other than above 12 system review was negative  Physical Examination :       Physical Exam  Constitutional:       General: He is not in acute distress. HENT:      Head: Normocephalic and atraumatic. Right Ear: External ear normal.      Left Ear: External ear normal.   Eyes:      General: No scleral icterus. Conjunctiva/sclera: Conjunctivae normal.   Cardiovascular:      Rate and Rhythm: Normal rate and regular rhythm. Pulses: Normal pulses. Pulmonary:      Effort: Pulmonary effort is normal. No respiratory distress. Breath sounds: Normal breath sounds. Abdominal:      General: Bowel sounds are normal. There is no distension. Palpations: Abdomen is soft. Musculoskeletal:      Comments: Left foot dressing  Right below-knee amputation   Skin:     General: Skin is warm. Coloration: Skin is not jaundiced. Neurological:      General: No focal deficit present. Mental Status: He is oriented to person, place, and time.          Past Medical History:     Past Medical History:   Diagnosis Date    RISSA (acute kidney injury) ([address]     Cerebrovascular disease     Erectile dysfunction     Hepatitis B infection     Hyperlipidemia     Hypertension     Kidney cysts     Liver cyst     MRSA (methicillin resistant staph aureus) culture positive 1/10/2014    right foot    Neurotrophic ulcer of the foot ([address]     Obesity     Osteomyelitis of ankle or foot     Peripheral vascular disease ([address]     Tobacco abuse     Type II or unspecified type diabetes mellitus without mention of complication, not stated as uncontrolled        Past Surgical  History:     Past Surgical History:   Procedure Laterality Date    FOOT AMPUTATION      FOOT AMPUTATION THROUGH METATARSAL  2011    pt can't give exact date of surg-- toes and part of lt foot removed    FOOT DEBRIDEMENT Left 6/20/2020    WOUND BED PREPARATION AND WOUND VAC APPLICATION performed by Luiz Rico DPM at [facility] Left 4/12/2022    FOOT DEBRIDEMENT INCISION AND DRAINAGE WITH BX performed by Luiz Rico DPM at [address] [place] [address] Right 3/2013       Medications:      sodium chloride  250 mL IntraVENous Once    furosemide  20 mg IntraVENous Once    clindamycin (CLEOCIN) IV  600 mg IntraVENous Q8H    [Held by provider] amLODIPine  5 mg Oral Daily    clopidogrel  75 mg Oral Daily    docusate sodium  100 mg Oral BID    ferrous sulfate  325 mg Oral Daily with breakfast    [Held by provider] furosemide  40 mg Oral Every Other Day    metoprolol tartrate  25 mg Oral BID    atorvastatin  10 mg Oral Daily    sodium chloride flush  5-40 mL IntraVENous 2 times per day    insulin lispro  0-12 Units SubCUTAneous TID WC    insulin lispro  0-6 Units SubCUTAneous Nightly    cefepime  2,000 mg IntraVENous Q24H       Social History:     Social History     Socioeconomic History    Marital status:      Spouse name: Not on file    Number of children: Not on file    Years of education: Not on file    Highest education level: Not on file   Occupational History    Not on file   Tobacco Use    Smoking status: Current Every Day Smoker     Packs/day: 0.50     Years: 30.00     Pack years: 15.00     Types: Cigarettes    Smokeless tobacco: Never Used    Tobacco comment: working on quitting smoking   Vaping Use    Vaping Use: Never used   Substance and Sexual Activity    Alcohol use: No    Drug use: Never    Sexual activity: Yes     Partners: Female   Other Topics Concern    Not on file   Social History Narrative    Not on file     Social Determinants of Health     Financial Resource Strain:     Difficulty of Paying Living Expenses: Not on file   Food Insecurity:     Worried About Running Out of Food in the Last Year: Not on file    Marika of Food in the Last Year: Not on file   Transportation Needs:     Lack of Transportation (Medical): Not on file    Lack of Transportation (Non-Medical): Not on file   Physical Activity:     Days of Exercise per Week: Not on file    Minutes of Exercise per Session: Not on file   Stress:     Feeling of Stress : Not on file   Social Connections:     Frequency of Communication with Friends and Family: Not on file    Frequency of Social Gatherings with Friends and Family: Not on file    Attends Gnosticist Services: Not on file    Active Member of Clubs or Organizations: Not on file    Attends Club or Organization Meetings: Not on file    Marital Status: Not on file   Intimate Partner Violence:     Fear of Current or Ex-Partner: Not on file    Emotionally Abused: Not on file    Physically Abused: Not on file    Sexually Abused: Not on file   Housing Stability:     Unable to Pay for Housing in the Last Year: Not on file    Number of [place] in the Last Year: Not on file    Unstable Housing in the Last Year: Not on file       Family History:     Family History   Problem Relation Age of Onset    Diabetes Mother     Diabetes Sister     Diabetes Brother     Diabetes Sister     Diabetes Sister     Diabetes Sister       Medical Decision Making:   I have independently reviewed/ordered the following labs:    CBC with Differential:   Recent Labs     04/13/22  0508 04/14/22  0527   WBC 21.3* 18.7*   HGB 8.9* 8.8*   HCT 29.1* 27.9*    201     BMP:  Recent Labs     04/14/22  0527 04/15/22  0610    134*   K 4.5 4.4   * 105   CO2 18* 18*   BUN 36* 36*   CREATININE 2.59* 2.88*     Hepatic Function Panel: No results for input(s): PROT, LABALBU, BILIDIR, IBILI, BILITOT, ALKPHOS, ALT, AST in the last 72 hours. No results for input(s): RPR in the last 72 hours. No results for input(s): HIV in the last 72 hours. No results for input(s): BC in the last 72 hours.   Lab Results   Component Value Date    CREATININE 2.88 04/15/2022    GLUCOSE 117 04/15/2022    GLUCOSE 133 10/18/2011       Detailed results: Thank you for allowing us to participate in the care of this patient. Please call with questions. This note is created with the assistance of a speech recognition program.  While intending to generate adocument that actually reflects the content of the visit, the document can still have some errors including those of syntax and sound a like substitutions which may escape proof reading. It such instances, actual meaningcan be extrapolated by contextual diversion.     Santiago Marino MD  Office: [phone]  Perfect serve / office [phone]

## 2022-04-16 LAB
ABSOLUTE EOS #: 0.19 K/UL (ref 0–0.44)
ABSOLUTE IMMATURE GRANULOCYTE: 0.2 K/UL (ref 0–0.3)
ABSOLUTE LYMPH #: 1.69 K/UL (ref 1.1–3.7)
ABSOLUTE MONO #: 0.96 K/UL (ref 0.1–1.2)
ANION GAP SERPL CALCULATED.3IONS-SCNC: 12 MMOL/L (ref 9–17)
BASOPHILS # BLD: 1 % (ref 0–2)
BASOPHILS ABSOLUTE: 0.09 K/UL (ref 0–0.2)
BUN BLDV-MCNC: 36 MG/DL (ref 8–23)
BUN/CREAT BLD: 12 (ref 9–20)
CALCIUM SERPL-MCNC: 7.6 MG/DL (ref 8.6–10.4)
CHLORIDE BLD-SCNC: 103 MMOL/L (ref 98–107)
CO2: 19 MMOL/L (ref 20–31)
CREAT SERPL-MCNC: 3.07 MG/DL (ref 0.7–1.2)
CULTURE: NORMAL
CULTURE: NORMAL
EOSINOPHILS RELATIVE PERCENT: 1 % (ref 1–4)
GFR AFRICAN AMERICAN: 24 ML/MIN
GFR NON-AFRICAN AMERICAN: 20 ML/MIN
GFR SERPL CREATININE-BSD FRML MDRD: ABNORMAL ML/MIN/{1.73_M2}
GLUCOSE BLD-MCNC: 101 MG/DL (ref 75–110)
GLUCOSE BLD-MCNC: 131 MG/DL (ref 75–110)
GLUCOSE BLD-MCNC: 156 MG/DL (ref 75–110)
GLUCOSE BLD-MCNC: 85 MG/DL (ref 75–110)
GLUCOSE BLD-MCNC: 92 MG/DL (ref 70–99)
HCT VFR BLD CALC: 29.6 % (ref 40.7–50.3)
HEMOGLOBIN: 9.5 G/DL (ref 13–17)
IMMATURE GRANULOCYTES: 1 %
LYMPHOCYTES # BLD: 9 % (ref 24–43)
Lab: NORMAL
Lab: NORMAL
MCH RBC QN AUTO: 25.3 PG (ref 25.2–33.5)
MCHC RBC AUTO-ENTMCNC: 32.1 G/DL (ref 28.4–34.8)
MCV RBC AUTO: 78.7 FL (ref 82.6–102.9)
MONOCYTES # BLD: 5 % (ref 3–12)
NRBC AUTOMATED: 0 PER 100 WBC
PDW BLD-RTO: 14.6 % (ref 11.8–14.4)
PLATELET # BLD: 210 K/UL (ref 138–453)
PMV BLD AUTO: 8.5 FL (ref 8.1–13.5)
POTASSIUM SERPL-SCNC: 4.3 MMOL/L (ref 3.7–5.3)
RBC # BLD: 3.76 M/UL (ref 4.21–5.77)
RBC # BLD: ABNORMAL 10*6/UL
SEG NEUTROPHILS: 83 % (ref 36–65)
SEGMENTED NEUTROPHILS ABSOLUTE COUNT: 14.99 K/UL (ref 1.5–8.1)
SODIUM BLD-SCNC: 134 MMOL/L (ref 135–144)
SPECIMEN DESCRIPTION: NORMAL
SPECIMEN DESCRIPTION: NORMAL
WBC # BLD: 18.1 K/UL (ref 3.5–11.3)

## 2022-04-16 PROCEDURE — 2580000003 HC RX 258: Performed by: INTERNAL MEDICINE

## 2022-04-16 PROCEDURE — 6370000000 HC RX 637 (ALT 250 FOR IP): Performed by: PODIATRIST

## 2022-04-16 PROCEDURE — 2060000000 HC ICU INTERMEDIATE R&B

## 2022-04-16 PROCEDURE — 2500000003 HC RX 250 WO HCPCS: Performed by: PODIATRIST

## 2022-04-16 PROCEDURE — 2580000003 HC RX 258: Performed by: PODIATRIST

## 2022-04-16 PROCEDURE — 6360000002 HC RX W HCPCS: Performed by: INTERNAL MEDICINE

## 2022-04-16 PROCEDURE — 6370000000 HC RX 637 (ALT 250 FOR IP): Performed by: FAMILY MEDICINE

## 2022-04-16 PROCEDURE — 85025 COMPLETE CBC W/AUTO DIFF WBC: CPT

## 2022-04-16 PROCEDURE — 82947 ASSAY GLUCOSE BLOOD QUANT: CPT

## 2022-04-16 PROCEDURE — 97530 THERAPEUTIC ACTIVITIES: CPT

## 2022-04-16 PROCEDURE — 80048 BASIC METABOLIC PNL TOTAL CA: CPT

## 2022-04-16 PROCEDURE — 36415 COLL VENOUS BLD VENIPUNCTURE: CPT

## 2022-04-16 PROCEDURE — 99232 SBSQ HOSP IP/OBS MODERATE 35: CPT | Performed by: INTERNAL MEDICINE

## 2022-04-16 PROCEDURE — 99232 SBSQ HOSP IP/OBS MODERATE 35: CPT | Performed by: FAMILY MEDICINE

## 2022-04-16 PROCEDURE — 6360000002 HC RX W HCPCS: Performed by: PODIATRIST

## 2022-04-16 PROCEDURE — 2500000003 HC RX 250 WO HCPCS: Performed by: INTERNAL MEDICINE

## 2022-04-16 RX ADMIN — DOCUSATE SODIUM 100 MG: 100 CAPSULE, LIQUID FILLED ORAL at 08:15

## 2022-04-16 RX ADMIN — CLINDAMYCIN IN 5 PERCENT DEXTROSE 600 MG: 12 INJECTION, SOLUTION INTRAVENOUS at 22:44

## 2022-04-16 RX ADMIN — METOPROLOL TARTRATE 25 MG: 25 TABLET, FILM COATED ORAL at 08:16

## 2022-04-16 RX ADMIN — ATORVASTATIN CALCIUM 10 MG: 10 TABLET, FILM COATED ORAL at 08:15

## 2022-04-16 RX ADMIN — CLOPIDOGREL BISULFATE 75 MG: 75 TABLET ORAL at 08:15

## 2022-04-16 RX ADMIN — DOCUSATE SODIUM 100 MG: 100 CAPSULE, LIQUID FILLED ORAL at 21:42

## 2022-04-16 RX ADMIN — ONDANSETRON 4 MG: 2 INJECTION INTRAMUSCULAR; INTRAVENOUS at 06:01

## 2022-04-16 RX ADMIN — OXYCODONE AND ACETAMINOPHEN 2 TABLET: 5; 325 TABLET ORAL at 17:59

## 2022-04-16 RX ADMIN — CLINDAMYCIN IN 5 PERCENT DEXTROSE 600 MG: 12 INJECTION, SOLUTION INTRAVENOUS at 15:00

## 2022-04-16 RX ADMIN — OXYCODONE AND ACETAMINOPHEN 2 TABLET: 5; 325 TABLET ORAL at 04:17

## 2022-04-16 RX ADMIN — FERROUS SULFATE TAB EC 325 MG (65 MG FE EQUIVALENT) 325 MG: 325 (65 FE) TABLET DELAYED RESPONSE at 08:15

## 2022-04-16 RX ADMIN — CEFTRIAXONE 2000 MG: 2 INJECTION, POWDER, FOR SOLUTION INTRAMUSCULAR; INTRAVENOUS at 17:50

## 2022-04-16 RX ADMIN — SODIUM BICARBONATE: 84 INJECTION, SOLUTION INTRAVENOUS at 12:17

## 2022-04-16 RX ADMIN — OXYCODONE AND ACETAMINOPHEN 2 TABLET: 5; 325 TABLET ORAL at 12:16

## 2022-04-16 RX ADMIN — INSULIN LISPRO 2 UNITS: 100 INJECTION, SOLUTION INTRAVENOUS; SUBCUTANEOUS at 17:53

## 2022-04-16 RX ADMIN — OXYCODONE AND ACETAMINOPHEN 2 TABLET: 5; 325 TABLET ORAL at 00:14

## 2022-04-16 RX ADMIN — OXYCODONE AND ACETAMINOPHEN 2 TABLET: 5; 325 TABLET ORAL at 21:41

## 2022-04-16 RX ADMIN — METOPROLOL TARTRATE 25 MG: 25 TABLET, FILM COATED ORAL at 17:49

## 2022-04-16 RX ADMIN — SODIUM CHLORIDE: 9 INJECTION, SOLUTION INTRAVENOUS at 06:05

## 2022-04-16 RX ADMIN — SODIUM CHLORIDE, PRESERVATIVE FREE 10 ML: 5 INJECTION INTRAVENOUS at 21:38

## 2022-04-16 RX ADMIN — TRAMADOL HYDROCHLORIDE 50 MG: 50 TABLET, COATED ORAL at 06:01

## 2022-04-16 RX ADMIN — CLINDAMYCIN IN 5 PERCENT DEXTROSE 600 MG: 12 INJECTION, SOLUTION INTRAVENOUS at 06:05

## 2022-04-16 ASSESSMENT — PAIN SCALES - GENERAL
PAINLEVEL_OUTOF10: 4
PAINLEVEL_OUTOF10: 8
PAINLEVEL_OUTOF10: 6
PAINLEVEL_OUTOF10: 7
PAINLEVEL_OUTOF10: 5
PAINLEVEL_OUTOF10: 6
PAINLEVEL_OUTOF10: 7
PAINLEVEL_OUTOF10: 8
PAINLEVEL_OUTOF10: 7
PAINLEVEL_OUTOF10: 7
PAINLEVEL_OUTOF10: 3

## 2022-04-16 ASSESSMENT — PAIN DESCRIPTION - FREQUENCY
FREQUENCY: CONTINUOUS
FREQUENCY: CONTINUOUS

## 2022-04-16 ASSESSMENT — PAIN DESCRIPTION - ONSET
ONSET: ON-GOING
ONSET: ON-GOING

## 2022-04-16 ASSESSMENT — PAIN - FUNCTIONAL ASSESSMENT
PAIN_FUNCTIONAL_ASSESSMENT: PREVENTS OR INTERFERES WITH MANY ACTIVE NOT PASSIVE ACTIVITIES
PAIN_FUNCTIONAL_ASSESSMENT: PREVENTS OR INTERFERES WITH MANY ACTIVE NOT PASSIVE ACTIVITIES

## 2022-04-16 ASSESSMENT — ENCOUNTER SYMPTOMS
COUGH: 0
CONSTIPATION: 0
NAUSEA: 0
BLOOD IN STOOL: 0
SHORTNESS OF BREATH: 0
DIARRHEA: 0
VOMITING: 0
CHEST TIGHTNESS: 0
WHEEZING: 0
ABDOMINAL PAIN: 0

## 2022-04-16 ASSESSMENT — PAIN DESCRIPTION - DESCRIPTORS
DESCRIPTORS: ACHING;DISCOMFORT;THROBBING
DESCRIPTORS: ACHING;DISCOMFORT

## 2022-04-16 ASSESSMENT — PAIN DESCRIPTION - PAIN TYPE
TYPE: ACUTE PAIN

## 2022-04-16 ASSESSMENT — PAIN DESCRIPTION - ORIENTATION
ORIENTATION: UPPER
ORIENTATION: LEFT

## 2022-04-16 ASSESSMENT — PAIN DESCRIPTION - LOCATION
LOCATION: LEG
LOCATION: ABDOMEN
LOCATION: LEG;ABDOMEN

## 2022-04-16 ASSESSMENT — PAIN DESCRIPTION - PROGRESSION
CLINICAL_PROGRESSION: GRADUALLY WORSENING
CLINICAL_PROGRESSION: GRADUALLY WORSENING

## 2022-04-16 NOTE — PROGRESS NOTES
Comprehensive Nutrition Assessment    Type and Reason for Visit:  Initial,RD Nutrition Re-Screen/LOS    Nutrition Recommendations/Plan:   1. Continue Adult Diet; Regular; 4 carb choices (60 gm/meal)   2. Added Glucerna (strawberry) BID   3. Monitor po intakes, ONS acceptance, wound status, weights and labs     Nutrition Assessment:  Patient admission r/t Diabetic left foot infection with suspected necrotizing fasciitis and calcaneal osteomyelitis. Status post incision and debridement 4/12/22. History of right below-knee amputation. Patient is now planning for amputation. Pt with variable po intakes (didn't like lunch today- consumed 50% grilled cheese and 100% apple sauce). Pt reports eating ok PTA (question this as pt reports weighing 225# <3 months ago). EHR shows pt with 10% wt loss over 1 year. No one at bedside to confirm information. ADA malnutrition. Pt reports drinking Ensure supplements at home -- was going to provide Ensure High Protein BID however, pt requesting only strawberry flavor -- will send Glucerna BID. Monitor po intakes, ONS acceptance, weights and labs. Malnutrition Assessment:  Malnutrition Status: At risk for malnutrition (Comment)    Context:  Chronic Illness     Findings of the 6 clinical characteristics of malnutrition:  Energy Intake:  Mild decrease in energy intake (Comment)  Weight Loss:  1 - Mild weight loss (specify amount and time period) (10% weight loss over 1 year per EHR)     Body Fat Loss:  Unable to assess     Muscle Mass Loss:  Unable to assess    Fluid Accumulation:  No significant fluid accumulation     Strength:  Not Performed    Estimated Daily Nutrient Needs:  Energy (kcal):  7624-8286 kcal (22-24 kcal/kg); Weight Used for Energy Requirements:  Adjusted     Protein (g):  89 gm protein (1.0 g/kg); Weight Used for Protein Requirements:  Adjusted          Nutrition Related Findings:  Stage 3 CKD. GI: WDL. No edema.       Wounds:  Skin Tears,Diabetic Ulcer (skin tear coccyx)       Current Nutrition Therapies:    ADULT DIET; Regular; 4 carb choices (60 gm/meal)  ADULT ORAL NUTRITION SUPPLEMENT; Breakfast, Dinner; Diabetic Oral Supplement    Anthropometric Measures:  · Height: 6' (182.9 cm)  · Current Body Weight: 184 lb (83.5 kg)   · Admission Body Weight: 225 lb (102.1 kg)    · Usual Body Weight: 205 lb (93 kg) (1 year ago)     · Ideal Body Weight: 178 lbs; % Ideal Body Weight 103.4 %   · BMI: 24.9  · Adjusted Body Weight: 194.9; Amputation   · Adjusted BMI: 26.4    · BMI Categories: Normal Weight (BMI 22.0 to 24.9) age over 72       Nutrition Diagnosis:   · Increased nutrient needs related to increase demand for energy/nutrients as evidenced by wounds    · Inadequate protein-energy intake related to inadequate protein-energy intake as evidenced by intake 26-50%,intake 0-25%,weight loss      Nutrition Interventions:   Food and/or Nutrient Delivery:  Continue Current Diet,Start Oral Nutrition Supplement  Nutrition Education/Counseling:  Education not indicated   Coordination of Nutrition Care:  Continue to monitor while inpatient    Goals:  PO intakes to provide >75% of estimated nutritional needs       Nutrition Monitoring and Evaluation:   Behavioral-Environmental Outcomes:  None Identified   Food/Nutrient Intake Outcomes:  Food and Nutrient Intake,Supplement Intake  Physical Signs/Symptoms Outcomes:  Biochemical Data,Fluid Status or Edema,Skin,Weight     Discharge Planning:     Too soon to determine,Continue current diet     Gerry New RD, LD  Office Number: [phone]

## 2022-04-16 NOTE — FLOWSHEET NOTE
[facility]  PROGRESS NOTE    Room # 2020/2020-01   Name: Ninfa Saldana            Yarsanism: None     Reason for visit: Advanced Directives Consult    I visited the patient. Admit Date & Time: 4/11/2022  5:25 PM    Assessment:  Ninfa Saldana is a 67 y.o. male in the hospital; patient will undergo a foot amputation. Upon entering the room the patient was on the phone talking to his sister. Intervention:  I introduced myself and my title as spiritual care provider and stated to the patient, my visit was to deliver POA paperwork per his request. Patient was aware of the request but peacefully declined to complete or fill out the document. Patient states if he changes his mind he will notify his RN of his decision. Writer inquired about his injury and the patient stated he is scheduled to have an amputation within the next few days. Writer was able to provide words of hope and encouragement, and was also able to leave a prayer card. Writer stated he will pray for continued healing, comfort, and rest during his stay, and also after he's discharged. Outcome: The patient was thankful for the visit, card, and continued prayers. Plan:  Chaplains will remain available to offer spiritual and emotional support as needed. Electronically signed by Suni Raman on 4/16/2022 at 8:57 AM.  Ca     04/16/22 4437   Encounter Summary   Services provided to: Patient   Referral/Consult From: Nurse; Other    Support System Children;Family members   Place of Sikh None   Continue Visiting   (4/16/2022)   Complexity of Encounter Low   Length of Encounter 15 minutes   Spiritual Assessment Completed Yes   Routine   Type Follow up   Assessment Calm; Approachable;Coping;Peaceful   Intervention Active listening;Nurtured hope;Provided reading materials/devotional materials;Sustaining presence/ Ministry of presence; Discussed illness/injury and it's impact   Outcome Expressed gratitude;Engaged in conversation

## 2022-04-16 NOTE — PROGRESS NOTES
Progress Note  Podiatric Medicine and Surgery     Subjective     CC: Left heel wound    POD #4 s/p I&D of left foot (DOS: 4/12/2022)  Patient seen at bedside. He appears to be in good spirits, states he continues to be agreeable to an AKA. No new complaints overnight. Afebrile, VSS    HPI :  Lottie Kirby is a 67 y.o. male seen at [facility] ED for complaints of erythema. Patient saw Dr. Caroline Bowden today in his office, Dr. Caroline Bowden instruct patient to present to the emergency room for admission, IV antibiotics and further work-up. 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 Also has a history of transmetatarsal irritation to the left foot. ROS: Denies N/V/F/C/SOB/CP. Otherwise negative except at stated in the HPI.      Medications:  Scheduled Meds:   cefTRIAXone (ROCEPHIN) IV  2,000 mg IntraVENous Q24H    clindamycin (CLEOCIN) IV  600 mg IntraVENous Q8H    [Held by provider] amLODIPine  5 mg Oral Daily    clopidogrel  75 mg Oral Daily    docusate sodium  100 mg Oral BID    ferrous sulfate  325 mg Oral Daily with breakfast    [Held by provider] furosemide  40 mg Oral Every Other Day    metoprolol tartrate  25 mg Oral BID    atorvastatin  10 mg Oral Daily    sodium chloride flush  5-40 mL IntraVENous 2 times per day    insulin lispro  0-12 Units SubCUTAneous TID WC    insulin lispro  0-6 Units SubCUTAneous Nightly       Continuous Infusions:   IV infusion builder 50 mL/hr at 04/16/22 1458    dextrose      sodium chloride Stopped (04/16/22 1500)       PRN Meds:oxyCODONE-acetaminophen **OR** oxyCODONE-acetaminophen, traMADol, glucagon (rDNA), dextrose, sodium chloride flush, sodium chloride, potassium chloride **OR** potassium alternative oral replacement **OR** potassium chloride, magnesium sulfate, ondansetron **OR** ondansetron, polyethylene glycol, acetaminophen **OR** acetaminophen, dextrose bolus (hypoglycemia) **OR** dextrose bolus (hypoglycemia), glucose, hydrALAZINE    Objective     Vitals:  Patient Vitals for the past 8 hrs:   BP Temp Temp src Pulse Resp SpO2 Height   22 1315 -- -- -- -- -- -- 6' (1.829 m)   22 1219 (!) 152/53 98.3 °F (36.8 °C) Oral 72 16 97 % --   22 0736 120/62 97.8 °F (36.6 °C) Oral 60 16 97 % --     Average, Min, and Max for last 24 hours Vitals:  TEMPERATURE:  Temp  Av.1 °F (36.7 °C)  Min: 97.8 °F (36.6 °C)  Max: 98.6 °F (37 °C)    RESPIRATIONS RANGE: Resp  Av  Min: 16  Max: 16    PULSE RANGE: Pulse  Av  Min: 60  Max: 72    BLOOD PRESSURE RANGE:  Systolic (14QOB), XVP:614 , Min:120 , MARIELLE:289   ; Diastolic (66ZPW), UJJ:89, Min:52, Max:62      PULSE OXIMETRY RANGE: SpO2  Av %  Min: 97 %  Max: 97 %    I/O last 3 completed shifts: In:  [P.O.:450; I.V.:999.9; IV Piggyback:627.1]  Out: 1025 [Urine:1025]    CBC:  Recent Labs     22  1254   WBC 18.7* 18.1*   HGB 8.8* 9.5*   HCT 27.9* 29.6*    210   .1*  --         BMP:  Recent Labs     22  0527 04/15/22  0610 22  0554    134* 134*   K 4.5 4.4 4.3   * 105 103   CO2 18* 18* 19*   BUN 36* 36* 36*   CREATININE 2.59* 2.88* 3.07*   GLUCOSE 163* 117* 92   CALCIUM 7.6* 7.5* 7.6*        Coags:  No results for input(s): APTT, PROT, INR in the last 72 hours. Lab Results   Component Value Date    SEDRATE >130 (H) 2022     Recent Labs     22  0527   .1*       Lower Extremity Physical Exam: Dressings intact. No change to exposed digits. Exam from 4/15/2022  Vascular: DP and PT pulses are very faintly palpable. CFT >5 seconds. Hair growth is absent to the level of the distal foot. Minimal nonpitting edema to left foot      Neuro: Saph/sural/SP/DP/plantar sensation absent to light touch.     Musculoskeletal: Muscle strength not tested. Significant pain to palpation of the wound. Some pain to palpation to the posterior calf.   Gross deformity present with transmetatarsal amputation on the left and BKA on the right     Dermatologic:  Full-thickness open surgical wound noted at the posterior aspect of the left heel up to proximal to the ankle joint. Wound measures approximately 11 x 6 x 3 cm. Wound base is of exposed calcaneus and Achilles tendon along with fibronecrotic tissue. Sanguinous drainage with mild purulence noted. Erythema present surrounding surgical site with associated increase in warmth. Wound does probe to posterior calcaneus. Sinus tracking noted at the proximal aspect of the incision. No undermining, crepitus, fluctuance, or induration noted. Clinical Images:  See media tab. Imaging:   VL LOWER EXTREMITY ARTERIAL SEGMENTAL PRESSURES W PPG   Final Result      US RETROPERITONEAL COMPLETE   Final Result   1.  Echogenic kidneys which may be seen in medical renal disease. 2.  Questionable trace perinephric fluid bilaterally. 3.  Echogenic foci and debris versus blood products in the dependent portion   of the bladder. Clinical correlation is recommended. XR TIBIA FIBULA LEFT (2 VIEWS)   Final Result   Cellulitis. No evidence of osteomyelitis. XR FOOT LEFT (MIN 3 VIEWS)   Final Result   1. Soft tissue emphysema within the posterior aspect of the distal left lower   leg and adjacent to the calcaneus, concerning for necrotizing fasciitis   2. Large soft tissue ulceration along the dorsal aspect of the calcaneus,   with cortical destruction, consistent with osteomyelitis   3. Diffuse soft tissue swelling now present   4. Critical results were called by Dr. Arpit Chun to Olu Armijo on   4/11/2022 at 6:27 p.m. hours. Cultures:   Bone 4/12: Strep B  Surgical path: pending    Assessment   Ike Rush is a 67 y.o. male with   1. Status post extensive incision and drainage, left lower extremity (DOS: 4/12/2022)  2. History of TMA, left foot  3. History of BKA, right lower extremity  4.  Unstageable pressure ulceration, left heel  5. 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 CKD  7. Type II diabetes with neuropathy    Principal Problem:    Necrotizing fasciitis ([facility] [address]  Active Problems:    Acute kidney injury superimposed on CKD ([facility]    CKD (chronic kidney disease), stage III ([facility]    Uncontrolled hypertension    Type 2 diabetes mellitus with stage 3 chronic kidney disease, with long-term current use of insulin ([facility])    Class 1 obesity with serious comorbidity in adult  Resolved Problems:    * No resolved hospital problems. *       Plan     · Patient seen at bedside and revisited plan for AKA of the left, he is still agreeable. · Continue medical management per primary  · Continue anbx per ID: Rocephin, Clindamycin  · Vascular following--plan to see patient this weekend and will book for OR next week for AKA. · PVRs - calcific disease   · Dressing intact to the left foot, to be changed by nursing team: 1/4 inch iodoform packing, DSD, light Ace. · No surgical intervention in the meantime by podiatry. · Will discuss with Dr. Derik Jones.     Vivienne Halsted, DPM   Podiatric Medicine & Surgery   4/16/2022 at 3:15 PM

## 2022-04-16 NOTE — PLAN OF CARE
Nutrition Problem #1: Increased nutrient needs  Intervention: Food and/or Nutrient Delivery: Continue Current Diet,Start Oral Nutrition Supplement  Nutritional Goals: PO intakes to provide >75% of estimated nutritional needs

## 2022-04-16 NOTE — PLAN OF CARE
Problem: Infection:  Goal: Will remain free from infection  Description: Will remain free from infection  4/16/2022 1336 by Evelin Grayson RN  Outcome: Ongoing  4/16/2022 0822 by Doreen Mcpherson RN  Outcome: Ongoing  4/16/2022 0025 by Iliana Hernandez RN  Outcome: Ongoing     Problem: Safety:  Goal: Free from accidental physical injury  Description: Free from accidental physical injury  4/16/2022 1336 by Evelin Grayson RN  Outcome: Ongoing  4/16/2022 0822 by Doreen Mcpherson RN  Outcome: Ongoing  4/16/2022 0025 by Iliana Hernandez RN  Outcome: Ongoing     Problem: Daily Care:  Goal: Daily care needs are met  Description: Daily care needs are met  4/16/2022 1336 by Evelin Grayson RN  Outcome: Ongoing  4/16/2022 0822 by Doreen Mcpherson RN  Outcome: Ongoing  4/16/2022 0025 by Iliana Hernandez RN  Outcome: Ongoing     Problem: Pain:  Description: Pain management should include both nonpharmacologic and pharmacologic interventions. Goal: Patient's pain/discomfort is manageable  Description: Patient's pain/discomfort is manageable  4/16/2022 1336 by Evelin Grayson RN  Outcome: Ongoing  Note: Pain level assessment complete. Patient educated on pain scale and control interventions  PRN pain medication given per patient request  Patient instructed to call out with new onset of pain or unrelieved pain    4/16/2022 0822 by Doreen Mcpherson RN  Outcome: Ongoing  4/16/2022 0025 by Iliana Hernandez RN  Outcome: Ongoing  Note: Pt medicated with pain medication prn. Assessed all pain characteristics including level, type, location, frequency, and onset. Non-pharmacologic interventions offered to pt as well. Pt states pain is tolerable at this time.  Will continue to monitor   Goal: Pain level will decrease  Description: Pain level will decrease  4/16/2022 1336 by Evelin Grayson RN  Outcome: Ongoing  4/16/2022 0822 by Doreen Mcpherson RN  Outcome: Ongoing  4/16/2022 0025 by Hina Rosales COREY Meneses  Outcome: Ongoing  Goal: Control of acute pain  Description: Control of acute pain  4/16/2022 1336 by Noris Caban RN  Outcome: Ongoing  4/16/2022 0822 by Virginia Dubin, RN  Outcome: Ongoing  4/16/2022 0025 by Jen Corral RN  Outcome: Ongoing  Goal: Control of chronic pain  Description: Control of chronic pain  4/16/2022 1336 by Noris Caban RN  Outcome: Ongoing  4/16/2022 0822 by Virginia Dubin, RN  Outcome: Ongoing  4/16/2022 0025 by Jen Corral RN  Outcome: Ongoing     Problem: Skin Integrity:  Goal: Skin integrity will stabilize  Description: Skin integrity will stabilize  4/16/2022 1336 by Noris Caban RN  Outcome: Ongoing  4/16/2022 0822 by Virginia Dubin, RN  Outcome: Ongoing  4/16/2022 0025 by Jen Corral RN  Outcome: Ongoing  Note: Skin integrity maintained, no new skin abnormalities assessed.  Appropriate measures remain in place      Problem: Discharge Planning:  Goal: Patients continuum of care needs are met  Description: Patients continuum of care needs are met  4/16/2022 1336 by Noris Caban RN  Outcome: Ongoing  4/16/2022 0822 by Virginia Dubin, RN  Outcome: Ongoing  4/16/2022 0025 by Jen Corral RN  Outcome: Ongoing     Problem: Skin Integrity:  Goal: Will show no infection signs and symptoms  Description: Will show no infection signs and symptoms  4/16/2022 1336 by Noris Caban RN  Outcome: Ongoing  4/16/2022 0822 by Virginia Dubin, RN  Outcome: Ongoing  4/16/2022 0025 by Jen Corral RN  Outcome: Ongoing  Goal: Absence of new skin breakdown  Description: Absence of new skin breakdown  4/16/2022 1336 by Noris Caban RN  Outcome: Ongoing  4/16/2022 0822 by Virginia Dubin, RN  Outcome: Ongoing  4/16/2022 0025 by Jen Corral RN  Outcome: Ongoing     Problem: Falls - Risk of:  Goal: Will remain free from falls  Description: Will remain free from falls  4/16/2022 1336 by [facility] COREY [place]  Outcome: Ongoing  Note: Siderails up x 2  Hourly rounding  Call light in reach  Instructed to call for assist before attempting out of bed. Remains free from falls and accidental injury at this time   Floor free from obstacles  Bed is locked and in lowest position  Adequate lighting provided  Bed alarm on, [place] star and Stay with Me signs posted      4/16/2022 6814 by Katt Buchanan RN  Outcome: Ongoing  4/16/2022 0025 by Daria Fernandez RN  Outcome: Ongoing  Note: Patient is a fall risk during this admission. Fall risk assessment was performed. Patient is absent of falls. Bed is in the lowest position. Wheels on the bed are locked. Call light and bed side table are within reach. Clutter is removed. Patient was educated to call out when needing assistance or wanting to get out of bed. Patient offered toileting assistance during rounding. Hourly rounds have been performed.     Goal: Absence of physical injury  Description: Absence of physical injury  4/16/2022 1336 by Natty Santos RN  Outcome: Ongoing  4/16/2022 0822 by Katt Buchanan RN  Outcome: Ongoing  4/16/2022 0025 by Daria Fernandez RN  Outcome: Ongoing

## 2022-04-16 NOTE — PROGRESS NOTES
Physical Therapy  Facility/Department: [facility] MED SURG  Daily Treatment Note  NAME: Ike Rush  : 1950  MRN: [id number]    Date of Service: 2022    Discharge Recommendations:  Patient would benefit from continued therapy after discharge        Assessment   Body structures, Functions, Activity limitations: Decreased functional mobility ; Decreased strength;Decreased cognition;Decreased safe awareness;Decreased endurance  Assessment: pt progressing toward goals   Activity Tolerance  Activity Tolerance: Patient limited by fatigue;Patient limited by endurance     Patient Diagnosis(es): The encounter diagnosis was Necrotizing fasciitis of ankle and foot ([address] has a past medical history of RISSA (acute kidney injury) ([address] Cerebrovascular disease, Erectile dysfunction, Hepatitis B infection, Hyperlipidemia, Hypertension, Kidney cysts, Liver cyst, MRSA (methicillin resistant staph aureus) culture positive, Neurotrophic ulcer of the foot ([address] Obesity, Osteomyelitis of ankle or foot, Peripheral vascular disease ([facility] [address] Tobacco abuse, and Type II or unspecified type diabetes mellitus without mention of complication, not stated as uncontrolled. has a past surgical history that includes Foot amputation through metatarsal (); Foot Amputation; Foot surgery (Right, 3/2013); Foot Debridement (Left, 2020); and Foot Debridement (Left, 2022). Restrictions  Restrictions/Precautions  Restrictions/Precautions: Weight Bearing,General Precautions,Fall Risk  Required Braces or Orthoses?: No  Lower Extremity Weight Bearing Restrictions  Right Lower Extremity Weight Bearing: Non Weight Bearing  Left Lower Extremity Weight Bearing: Non Weight Bearing  Partial Weight Bearing Percentage Or Pounds: No WB orders, but had I&D of left heel  and states he cannot put weight on it.   Position Activity Restriction  Other position/activity restrictions: IV, telemetry, Right BKA with prosthesis, left transmetatarsal amp with wound on heel  Subjective   General  Chart Reviewed: Yes  Additional Pertinent Hx: HTN, DM  Subjective  Subjective: pt requesting help using urinal  General Comment  Comments: RN reports patient is medically appropriate for PT. Pain Screening  Patient Currently in Pain: Denies  Vital Signs  Patient Currently in Pain: Denies  Pre Treatment Pain Screening  Intervention List: Patient able to continue with treatment    Orientation     Cognition      Objective   Bed mobility  Scooting: Minimal assistance; Moderate assistance  Transfers  Comment: pt refused OOB or edge of bed  Ambulation  Ambulation?: No     Balance  Posture: Good  Sitting - Static: Good  Sitting - Dynamic: Good;-            Comment: pt refused OOB or edge of bed ex, required MIN A to place urinal and help with repositioning in bed              G-Code     OutComes Score                                                     AM-PAC Score  AM-PAC Inpatient Mobility Raw Score : 14 (04/16/22 1259)  AM-PAC Inpatient T-Scale Score : 38.1 (04/16/22 1259)  Mobility Inpatient CMS 0-100% Score: 61.29 (04/16/22 1259)  Mobility Inpatient CMS G-Code Modifier : CL (04/16/22 1259)          Goals  Short term goals  Time Frame for Short term goals: 12 visits  Short term goal 1: Patient will be indep with bed mobility. Short term goal 2: Patient will be min x 1 with slide board transfers. Short term goal 3: Patient will have good dynamic seated balance. Short term goal 4: Patient will tolerate 30 minutes of ther-ex an dther-act. Short term goal 5: Patient will be indep with HEP. Patient Goals   Patient goals : return home    Plan    Plan  Times per week: 1-2x/d, 5-6 d/wk  Current Treatment Recommendations: Strengthening,Balance Training,Functional Mobility Training,Transfer Training,Endurance Training,Patient/Caregiver Education & [employer] Exercise Program,Safety Education & Training  Safety Devices  Type of devices:  All fall risk precautions in place,Gait belt,Nurse notified,Left in bed,Call light within reach,Bed alarm in place     Therapy Time   Individual Concurrent Group Co-treatment   Time In  1243         Time Out  1252         Minutes  9                 GEORGE GARCIA, PTA

## 2022-04-16 NOTE — PROGRESS NOTES
[facility]  Office: [address], DO, Simona Jamilase, DO, Hakanshaheed Jerez, DO, Wilfredo Cole Max, DO, Dawson Call MD, Pradeep Dahl MD, Olegario Restrepo MD, Jorje Holden MD, Lesley Alcaraz MD, Neil Aldridge MD, Elvin Barrett MD, Angel Weiss, DO, Lm Bee, DO, Angel Blackmon MD,  Dusty Tang DO, Bobby Sharp MD, Livia Garcia MD, Anatoliy Sun MD, Irving [place] DO, Amada Pereyra MD, Juana Armstrong MD, Bartolo Reed CNP, [facility] Carey, CNP, Saundra Carranza, CNP, Rubina Gutierrez, CNP, Marilee Bueno, CNP, Gill Kothari, CNP, HAM HernandezC, Lynn Almaraz, DNP, Raymundo Abbott, CNP, Josefina Mckeon, CNP, Halley Goins, CNP, Malachi Kim, CNS, Irving Nixon, DNP, Rossy Lara, CNP, Shalini Graham, CNP, Maureen Randall, CNP         [facility]    Progress Note    4/16/2022    12:07 PM    Name:   Krystin Layne  MRN:     [id number]     Acct:      [de-identified]   Room:   2020/2020-01  IP Day:  5  Admit Date:  4/11/2022  5:25 PM    PCP:   Patricia Lorenzana  Code Status:  Full Code    Subjective:     C/C:   Chief Complaint   Patient presents with    Wound Infection     Sent over by PCP for wound evaluation in foot (left)      Interval History Status: improved. Patient seen and examined at bedside,  Feeling better today  AAfebrile overnight  His pain is controlled  Not sure what are his wishes regarding amputation  Patient vitals, labs and all providers notes were reviewed,from overnight shift and morning updates were noted and discussed with the nurse    Brief History:     Per my NP  Krystin Layne is a 67 y.o. Non- / non  male who presents with Wound Infection (Sent over by PCP for wound evaluation in foot (left) )   and is admitted to the hospital for the management of Necrotizing fasciitis ([facility] [address]      Patient is a poor historian is does not recall how long he has been undergoing treatment for a left heel ulcer. He denies fever, but says he has had chills for \"months\". He has a known history of uncontrolled diabetes and says he checks his blood sugar in the morning and at night- BG range has been 140s to 200s, he says. He is a daily smoker. He recalls he went to Dr Todd/ podiatry for left heel care and was advised to come to the hospital for further evaluation and care.      While in the ED, xray of the left tib fib revealed soft tissue swelling, cellulitis, and no evidence of osteomyelitis. Left foot xray results were concerning for necrotizing fasciitis and osteomyelitis. His WBC was elevated at 18.5, sed rate > 130, creat 2.44- baseline appears to be 1.36-2.06. Records list Stage 3 CKD. Patient denies having a nephrologist. His lactic was unremarkable.      He was started on IV Vanco. He is being admitted for further management of necrotizing fasciitis of left foot and RISSA. Podiatry was consulted and plan for patient to undergo I & D of the left heel and bone biopsy 4/13/22. Review of Systems:     Review of Systems   Constitutional: Positive for activity change and appetite change. Negative for chills, diaphoresis and fever. HENT: Negative for congestion. Eyes: Negative for visual disturbance. Respiratory: Negative for cough, chest tightness, shortness of breath and wheezing. Cardiovascular: Negative for chest pain, palpitations and leg swelling. Gastrointestinal: Negative for abdominal pain, blood in stool, constipation, diarrhea, nausea and vomiting. Genitourinary: Negative for difficulty urinating. Musculoskeletal: Positive for arthralgias and joint swelling. LLE pain and purulent discharge    Neurological: Negative for dizziness, weakness, light-headedness, numbness and headaches. All other systems reviewed and are negative. Medications:      Allergies:  No Known Allergies    Current Meds:   Scheduled Meds:    cefTRIAXone (ROCEPHIN) IV  2,000 mg IntraVENous Q24H    clindamycin (CLEOCIN) IV  600 mg IntraVENous Q8H    [Held by provider] amLODIPine  5 mg Oral Daily    clopidogrel  75 mg Oral Daily    docusate sodium  100 mg Oral BID    ferrous sulfate  325 mg Oral Daily with breakfast    [Held by provider] furosemide  40 mg Oral Every Other Day    metoprolol tartrate  25 mg Oral BID    atorvastatin  10 mg Oral Daily    sodium chloride flush  5-40 mL IntraVENous 2 times per day    insulin lispro  0-12 Units SubCUTAneous TID WC    insulin lispro  0-6 Units SubCUTAneous Nightly     Continuous Infusions:    IV infusion builder 75 mL/hr at 04/15/22 2259    dextrose      sodium chloride 100 mL/hr at 04/16/22 0647     PRN Meds: oxyCODONE-acetaminophen **OR** oxyCODONE-acetaminophen, traMADol, glucagon (rDNA), dextrose, sodium chloride flush, sodium chloride, potassium chloride **OR** potassium alternative oral replacement **OR** potassium chloride, magnesium sulfate, ondansetron **OR** ondansetron, polyethylene glycol, acetaminophen **OR** acetaminophen, dextrose bolus (hypoglycemia) **OR** dextrose bolus (hypoglycemia), glucose, hydrALAZINE    Data:     Past Medical History:   has a past medical history of RISSA (acute kidney injury) ([facility] [address] Cerebrovascular disease, Erectile dysfunction, Hepatitis B infection, Hyperlipidemia, Hypertension, Kidney cysts, Liver cyst, MRSA (methicillin resistant staph aureus) culture positive, Neurotrophic ulcer of the foot ([facility] [address] Obesity, Osteomyelitis of ankle or foot, Peripheral vascular disease ([facility] [address] Tobacco abuse, and Type II or unspecified type diabetes mellitus without mention of complication, not stated as uncontrolled. Social History:   reports that he has been smoking cigarettes. He has a 15.00 pack-year smoking history. He has never used smokeless tobacco. He reports that he does not drink alcohol and does not use drugs.      Family History:   Family History   Problem Relation Age of Onset    Diabetes Mother     Diabetes Sister  Diabetes Brother     Diabetes Sister     Diabetes Sister     Diabetes Sister        Vitals:  /62   Pulse 60   Temp 97.8 °F (36.6 °C) (Oral)   Resp 16   Ht 6' (1.829 m)   Wt 184 lb (83.5 kg)   SpO2 97%   BMI 24.95 kg/m²   Temp (24hrs), Av °F (36.7 °C), Min:97.8 °F (36.6 °C), Max:98.6 °F (37 °C)    Recent Labs     04/15/22  1619 04/15/22  2036 04/16/22  0621 04/16/22  1135   POCGLU 148* 148* 85 101       I/O (24Hr): Intake/Output Summary (Last 24 hours) at 2022 1207  Last data filed at 2022 0647  Gross per 24 hour   Intake 1178.96 ml   Output 750 ml   Net 428.96 ml       Labs:  Hematology:  Recent Labs     22  0527   WBC 18.7*   RBC 3.48*   HGB 8.8*   HCT 27.9*   MCV 80.2*   MCH 25.3   MCHC 31.5   RDW 14.6*      MPV 9.1   .1*     Chemistry:  Recent Labs     22  0527 04/15/22  0610 22  0554    134* 134*   K 4.5 4.4 4.3   * 105 103   CO2 18* 18* 19*   GLUCOSE 163* 117* 92   BUN 36* 36* 36*   CREATININE 2.59* 2.88* 3.07*   ANIONGAP 10 11 12   LABGLOM 24* 22* 20*   GFRAA 30* 26* 24*   CALCIUM 7.6* 7.5* 7.6*     Recent Labs     04/15/22  0611 04/15/22  1120 04/15/22  1619 04/15/22  2036 04/16/22  0621 04/16/22  1135   POCGLU 99 135* 148* 148* 85 101     ABG:No results found for: POCPH, PHART, PH, POCPCO2, KFT9EOS, PCO2, POCPO2, PO2ART, PO2, POCHCO3, UPD0KUS, HCO3, NBEA, PBEA, BEART, BE, THGBART, THB, DEN1TYT, BTKZ1JYS, J4HNEUMP, O2SAT, FIO2  Lab Results   Component Value Date/Time    SPECIAL LT FA,7ML 2022 06:38 PM     Lab Results   Component Value Date/Time    CULTURE PROTEUS MIRABILIS HEAVY GROWTH (A) 2022 05:36 PM    CULTURE (A) 2022 05:36 PM     STREPTOCOCCI, BETA HEMOLYTIC GROUP B MODERATE GROWTH    CULTURE Mixed skin tara 2022 05:36 PM    CULTURE  2022 05:36 PM     Unable to determine the presence or absence of anaerobes due to swarming proteus.        Radiology:  XR TIBIA FIBULA LEFT (2 VIEWS)    Result Date: 4/11/2022  Cellulitis. No evidence of osteomyelitis. XR FOOT LEFT (MIN 3 VIEWS)    Result Date: 4/11/2022  1. Soft tissue emphysema within the posterior aspect of the distal left lower leg and adjacent to the calcaneus, concerning for necrotizing fasciitis 2. Large soft tissue ulceration along the dorsal aspect of the calcaneus, with cortical destruction, consistent with osteomyelitis 3. Diffuse soft tissue swelling now present 4. Critical results were called by Dr. Karina Uribe to Alex Lindvers on 4/11/2022 at 6:27 p.m. hours. US RETROPERITONEAL COMPLETE    Result Date: 4/12/2022  1. Echogenic kidneys which may be seen in medical renal disease. 2.  Questionable trace perinephric fluid bilaterally. 3.  Echogenic foci and debris versus blood products in the dependent portion of the bladder. Clinical correlation is recommended. Physical Examination:        Physical Exam  Vitals and nursing note reviewed. Constitutional:       General: He is not in acute distress. HENT:      Head: Normocephalic and atraumatic. Eyes:      Conjunctiva/sclera: Conjunctivae normal.      Pupils: Pupils are equal, round, and reactive to light. Cardiovascular:      Rate and Rhythm: Normal rate and regular rhythm. Heart sounds: No murmur heard. Pulmonary:      Effort: Pulmonary effort is normal. No accessory muscle usage or respiratory distress. Breath sounds: No stridor. No decreased breath sounds, wheezing, rhonchi or rales. Abdominal:      General: Bowel sounds are normal. There is no distension. Palpations: Abdomen is soft. Abdomen is not rigid. Tenderness: There is no abdominal tenderness. There is no guarding. Musculoskeletal:         General: No tenderness. Comments: /P right BKA. S/p left TMA  Wound S/P I&D is in dressing    Skin:     General: Skin is warm and dry. Findings: No erythema, lesion or rash.    Neurological:      Mental Status: He is alert and oriented to person, place, and time. Cranial Nerves: No cranial nerve deficit. Motor: No seizure activity. Psychiatric:         Speech: Speech normal.         Behavior: Behavior normal. Behavior is cooperative. Assessment:        Hospital Problems           Last Modified POA    * (Principal) Necrotizing fasciitis ([address] 4/12/2022 Yes    Acute kidney injury superimposed on CKD ([address] 4/11/2022 Yes    CKD (chronic kidney disease), stage III ([address] 4/11/2022 Yes    Uncontrolled hypertension 4/12/2022 Yes    Type 2 diabetes mellitus with stage 3 chronic kidney disease, with long-term current use of insulin ([address] 4/12/2022 Yes    Class 1 obesity with serious comorbidity in adult 4/12/2022 Yes          Plan:         L heel wound infection/ necrotizing fasciitis  with possible underlying Osteomyelitis : he ie S/P extensive I&D and biopsy sent on 4/12, post op management  per podiatry, ID consulted, currently on rocephin and clindamycin . Adjust pain meds for better pain control    PVD : vascular consulted, they recommended palliative care to discuss quality of life if limb left amputated vs not , I discussed with the patient and given that he lives by himself he is still not sure but now seem more leaning toward amputation    RISSA/CKD stage III: Baseline creatinine between 2-2 0.2, it is worsening today ,likely 2/2 infection  nephrology recommendations , unclear if the patient has nephrology follow-up as an outpatient, he also does not follow-up with his PCP   Continue to hold Lasix and Ace      AGMA : likely 2/2 above , fluids changes to include bicarb per nephrology, he is still currently on bicarb drip. AOCD : monitor and continue support     HTN:  Ace held, continue BB continue to monitor     HPL: continue home medications    DM2:   With hyperglycemia , target -180 , Continue diabetes home medications , insulin sliding scale, hypoglycemia protocol    DVT prophylaxis    Discussed with the patient and the nurse     Discussed with podiatry resident regarding final plan.     Best Hodge MD  4/16/2022  12:07 PM

## 2022-04-16 NOTE — PROGRESS NOTES
Renal Progress Note    Patient :  Pily Sosa; 67 y.o. MRN# [id number]  Location:  2020/2020-01  Attending:  Allegra Damon MD  Admit Date:  4/11/2022   Hospital Day: 5      Subjective:     Patient was seen and examined. No new issues reported overnight. Blood pressure better. Has positive left diabetic foot infection with suspected necrotizing fasciitis and calcaneal osteomyelitis status post I&D 4/12/2022 infectious disease on board on antibiotics. Urine output documented as about 775 cc and x1 more in the last 24 hours. Serum creatinine did increase to 3.07 mg/dl today with BUN of 36. Sodium 134, potassium 4.3, chloride 103, bicarb 19, calcium 7.6. Currently on bicarb drip 75 mEq with half-normal saline at 75 cc an hour.   Outpatient Medications:     Medications Prior to Admission: metoprolol tartrate (LOPRESSOR) 25 MG tablet, Take 25 mg by mouth 2 times daily  QUEtiapine (SEROQUEL) 25 MG tablet, Take 25 mg by mouth at bedtime  amLODIPine (NORVASC) 10 MG tablet, Take 0.5 tablets by mouth daily (Patient taking differently: Take 10 mg by mouth daily )  insulin glargine (BASAGLAR KWIKPEN) 100 UNIT/ML injection pen, Inject 10-20 Units into the skin 2 times daily Injects 20 units AM in the morning and 10 units at night (Patient taking differently: Inject 12 Units into the skin nightly )  [DISCONTINUED] furosemide (LASIX) 40 MG tablet, Take 1 tablet by mouth every other day  [DISCONTINUED] lisinopril (PRINIVIL;ZESTRIL) 10 MG tablet, Take 1 tablet by mouth daily  clopidogrel (PLAVIX) 75 MG tablet, Take 1 tablet by mouth daily  insulin aspart (NOVOLOG FLEXPEN) 100 UNIT/ML injection pen, Inject 0-10 Units into the skin 3 times daily as needed Per sliding scale  simvastatin (ZOCOR) 10 MG tablet, Take 10 mg by mouth nightly  traZODone (DESYREL) 150 MG tablet, Take 150 mg by mouth nightly  [DISCONTINUED] metoprolol succinate (TOPROL XL) 25 MG extended release tablet, Take 25 mg by mouth 2 times daily  [DISCONTINUED] budesonide-formoterol (SYMBICORT) 160-4.5 MCG/ACT AERO, Inhale 2 puffs into the lungs 2 times daily  [DISCONTINUED] ferrous sulfate (IRON 325) 325 (65 Fe) MG tablet, Take 325 mg by mouth daily (with breakfast)  docusate sodium (COLACE) 100 MG capsule, Take 100 mg by mouth 2 times daily   acetaminophen (TYLENOL) 325 MG tablet, Take 650 mg by mouth as needed for Pain Give 2 tabs = 650 MG by mouth every 4 hours prn   Insulin Syringe-Needle U-100 (ACCUSURE INS SYR 1CC/30GX5/16\") 30G X 5/16\" 1 ML MISC, by Does not apply route. Current Medications:     Scheduled Meds:    cefTRIAXone (ROCEPHIN) IV  2,000 mg IntraVENous Q24H    clindamycin (CLEOCIN) IV  600 mg IntraVENous Q8H    [Held by provider] amLODIPine  5 mg Oral Daily    clopidogrel  75 mg Oral Daily    docusate sodium  100 mg Oral BID    ferrous sulfate  325 mg Oral Daily with breakfast    [Held by provider] furosemide  40 mg Oral Every Other Day    metoprolol tartrate  25 mg Oral BID    atorvastatin  10 mg Oral Daily    sodium chloride flush  5-40 mL IntraVENous 2 times per day    insulin lispro  0-12 Units SubCUTAneous TID WC    insulin lispro  0-6 Units SubCUTAneous Nightly     Continuous Infusions:    IV infusion builder 75 mL/hr at 04/16/22 1217    dextrose      sodium chloride Stopped (04/16/22 0653)     PRN Meds:  oxyCODONE-acetaminophen **OR** oxyCODONE-acetaminophen, traMADol, glucagon (rDNA), dextrose, sodium chloride flush, sodium chloride, potassium chloride **OR** potassium alternative oral replacement **OR** potassium chloride, magnesium sulfate, ondansetron **OR** ondansetron, polyethylene glycol, acetaminophen **OR** acetaminophen, dextrose bolus (hypoglycemia) **OR** dextrose bolus (hypoglycemia), glucose, hydrALAZINE    Input/Output:       I/O last 3 completed shifts: In: 2077 [P.O.:450; I.V.:999.9; IV Piggyback:627.1]  Out: 1025 [Urine:1025].       Patient Vitals for the past 96 hrs (Last 3 readings):   Weight   04/16/22 0029 184 lb (83.5 kg)   04/15/22 0044 180 lb (81.6 kg)   22 0504 175 lb (79.4 kg)       Vital Signs:   Temperature:  Temp: 98.3 °F (36.8 °C)  TMax:   Temp (24hrs), Av.1 °F (36.7 °C), Min:97.8 °F (36.6 °C), Max:98.6 °F (37 °C)    Respirations:  Resp: 16  Pulse:   Pulse: 72  BP:    BP: (!) 152/53  BP Range: Systolic (30YWY), MPV:222 , Min:120 , OYE:511       Diastolic (34EZM), XCV:13, Min:52, Max:62      Physical Examination:     General:  AAO x 3, speaking in full sentences, no accessory muscle use. HEENT: Atraumatic, normocephalic, no throat congestion, moist mucosa. Eyes:   Pupils equal, round and reactive to light, EOMI. Neck:   Supple  Chest:   Bilateral vesicular breath sounds, no rales or wheezes. Cardiac:  S1 S2 RR, no murmurs, gallops or rubs. Abdomen: Soft, non-tender, no masses or organomegaly, BS audible. :   No suprapubic or flank tenderness. Neuro:  AAO x 3, No FND. SKIN:  No rashes, good skin turgor. Extremities:  +ve trace LLEedema. Right lower extremity amputation.     Labs:       Recent Labs     22  0527 22  1254   WBC 18.7* 18.1*   RBC 3.48* 3.76*   HGB 8.8* 9.5*   HCT 27.9* 29.6*   MCV 80.2* 78.7*   MCH 25.3 25.3   MCHC 31.5 32.1   RDW 14.6* 14.6*    210   MPV 9.1 8.5      BMP:   Recent Labs     22  0527 04/15/22  0610 22  0554    134* 134*   K 4.5 4.4 4.3   * 105 103   CO2 18* 18* 19*   BUN 36* 36* 36*   CREATININE 2.59* 2.88* 3.07*   GLUCOSE 163* 117* 92   CALCIUM 7.6* 7.5* 7.6*      SPEP:  Lab Results   Component Value Date    PROT 7.6 2020     C3:     Lab Results   Component Value Date    C3 120 2022     C4:     Lab Results   Component Value Date    C4 19 2022     Hep BsAg:         Lab Results   Component Value Date    HEPBSAG REACTIVE 2013     Hep C AB:          Lab Results   Component Value Date    HEPCAB NONREACTIVE 2013     Urinalysis/Chemistries:      Lab Results   Component Value Date    NITRU NEGATIVE 02/23/2022    COLORU Yellow 02/23/2022    PHUR 5.5 02/23/2022    WBCUA TOO NUMEROUS TO COUNT 02/23/2022    RBCUA 5 TO 10 02/23/2022    MUCUS NOT REPORTED 03/30/2018    TRICHOMONAS NOT REPORTED 03/30/2018    YEAST FEW 02/23/2022    BACTERIA FEW 02/23/2022    SPECGRAV 1.012 02/23/2022    LEUKOCYTESUR LARGE 02/23/2022    UROBILINOGEN Normal 02/23/2022    BILIRUBINUR NEGATIVE 02/23/2022    GLUCOSEU NEGATIVE 02/23/2022    KETUA NEGATIVE 02/23/2022    AMORPHOUS NOT REPORTED 03/30/2018     Urine Sodium:     Lab Results   Component Value Date    JLUIS 65 09/17/2020     Urine Chloride:    Lab Results   Component Value Date    CLUR <15 12/15/2013      Urine Creatinine:     Lab Results   Component Value Date    LABCREA 66.7 04/12/2022     Radiology:     Reviewed. Assessment:     1. Acute Kidney Injury likely ATN due to underlying infection and also could have some azotemia for intravascular volume depletion from decreased intake, loop diuretics and ACE inhibitor usage with baseline creatinine of 2.0-2.1 mg/dl and creatinine still evolving. 2.  Left diabetic foot infection with suspected necrotizing fasciitis and calcaneal osteomyelitis status post I&D 4/12/2022 infectious disease on board on antibiotics. 3.  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 4.  Peripheral vascular disease status post right lower Covid amputation in 2014.  5.  Essential hypertension. 6.  Diabetes type 2.  7.  Non-anion gap metabolic acidosis. Plan:   1. Change IVF to bicarb drip 75 mEq with half-normal saline at 50 cc an hour. 2.  Continue monitor strict I's and O's and renal function. 3.  Antibiotics per infectious disease per renal dosing. 4. BMP in AM.  5.  Will follow. Nutrition   Please ensure that patient is on a renal diet/TF. Avoid nephrotoxic drugs/contrast exposure. We will continue to follow along with you. Markos King MD  [facility]     This note is created with the assistance of a speech-recognition program. While intending to generate a document that actually reflects the content of the visit, no guarantees can be provided that every mistake has been identified and corrected by editing.

## 2022-04-16 NOTE — PLAN OF CARE
Problem: Infection:  Goal: Will remain free from infection  Description: Will remain free from infection  4/16/2022 0025 by Jose Millard RN  Outcome: Ongoing     Problem: Safety:  Goal: Free from accidental physical injury  Description: Free from accidental physical injury  4/16/2022 0025 by Jose Millard RN  Outcome: Ongoing     Problem: Daily Care:  Goal: Daily care needs are met  Description: Daily care needs are met  Outcome: Ongoing     Problem: Pain:  Goal: Patient's pain/discomfort is manageable  Description: Patient's pain/discomfort is manageable  4/16/2022 0025 by Jose Millard RN  Outcome: Ongoing  Note: Pt medicated with pain medication prn. Assessed all pain characteristics including level, type, location, frequency, and onset. Non-pharmacologic interventions offered to pt as well. Pt states pain is tolerable at this time. Will continue to monitor      Problem: Skin Integrity:  Goal: Skin integrity will stabilize  Description: Skin integrity will stabilize  Outcome: Ongoing  Note: Skin integrity maintained, no new skin abnormalities assessed. Appropriate measures remain in place      Problem: Discharge Planning:  Goal: Patients continuum of care needs are met  Description: Patients continuum of care needs are met  Outcome: Ongoing     Problem: Falls - Risk of:  Goal: Will remain free from falls  Description: Will remain free from falls  4/16/2022 0025 by Jose Millard RN  Outcome: Ongoing  Note: Patient is a fall risk during this admission. Fall risk assessment was performed. Patient is absent of falls. Bed is in the lowest position. Wheels on the bed are locked. Call light and bed side table are within reach. Clutter is removed. Patient was educated to call out when needing assistance or wanting to get out of bed. Patient offered toileting assistance during rounding. Hourly rounds have been performed.

## 2022-04-16 NOTE — PROGRESS NOTES
[facility] -   Infectious diseases evaluation  admission date 4/11/2022    reason for consultation:   Left foot infection    Impression :   Current:  · Diabetic left foot infection with suspected necrotizing fasciitis and calcaneal osteomyelitis. Status post incision and debridement 4/12/22  · Peripheral vascular disease  · Diabetes mellitus  · Chronic renal disease  · History of right below-knee amputation      Recommendations        · IV ceftriaxone and clindamycin   · The patient is agreeable for amputation  · Follow final blood and wound cultures  · Likely oral antibiotics on discharge if amputation is done. · Follow CBC and renal function        History of Present Illness:   Initial history:  Sai Ho is a 67y.o.-year-old male was sent to hospital from Dr. Parker Hernandez office for worsening left heel pain associated with left heel wound with surrounding dark discoloration and redness. The pain is severe, intermittent, no alleviating or aggravating factors. Left foot x-ray showed soft tissue emphysema within the posterior aspect of the distal left lower leg concerning for necrotizing fasciitis and possible calcaneal osteomyelitis. Initial WBC 18.5, creatinine 2.44, C-reactive protein more than 130, C-reactive protein 104.2, lactic acid 0 point  Afebrile  History of right below-knee amputation  Interval changes  4/16/2022   He is complaining of foot pain, denied fever or chills, denied nausea or vomiting, no other complaints  Proteus mirabilis pansensitive and group B streptococcus growth on bone culture from 04/12/2022. No growth on blood cultures from 4/11/2022  Patient Vitals for the past 8 hrs:   BP Temp Temp src Pulse Resp SpO2   04/16/22 0736 120/62 97.8 °F (36.6 °C) Oral 60 16 97 %     .       I have personally reviewed the past medical history, past surgical history, medications, social history, and family history, and I haveupdated the database accordingly. Allergies:   Patient has no known allergies. Review of Systems:     Review of Systems  As per history present illness, other than above 12 system review was negative  Physical Examination :       Physical Exam  Constitutional:       General: He is not in acute distress. HENT:      Head: Normocephalic and atraumatic. Right Ear: External ear normal.      Left Ear: External ear normal.   Eyes:      General: No scleral icterus. Conjunctiva/sclera: Conjunctivae normal.   Cardiovascular:      Rate and Rhythm: Normal rate and regular rhythm. Pulses: Normal pulses. Pulmonary:      Effort: Pulmonary effort is normal. No respiratory distress. Breath sounds: Normal breath sounds. Abdominal:      General: Bowel sounds are normal. There is no distension. Palpations: Abdomen is soft. Musculoskeletal:      Comments: Left foot dressing  Right below-knee amputation   Skin:     General: Skin is warm. Coloration: Skin is not jaundiced. Neurological:      General: No focal deficit present. Mental Status: He is oriented to person, place, and time.          Past Medical History:     Past Medical History:   Diagnosis Date    RISSA (acute kidney injury) ([address]     Cerebrovascular disease     Erectile dysfunction     Hepatitis B infection     Hyperlipidemia     Hypertension     Kidney cysts     Liver cyst     MRSA (methicillin resistant staph aureus) culture positive 1/10/2014    right foot    Neurotrophic ulcer of the foot ([address]     Obesity     Osteomyelitis of ankle or foot     Peripheral vascular disease ([address]     Tobacco abuse     Type II or unspecified type diabetes mellitus without mention of complication, not stated as uncontrolled        Past Surgical  History:     Past Surgical History:   Procedure Laterality Date    FOOT AMPUTATION      FOOT AMPUTATION THROUGH METATARSAL  2011    pt can't give exact date of surg-- toes and part of lt foot removed    FOOT DEBRIDEMENT Left 6/20/2020    WOUND BED PREPARATION AND WOUND VAC APPLICATION performed by Haritha Harp DPM at [place] [address] Left 4/12/2022    FOOT DEBRIDEMENT INCISION AND DRAINAGE WITH BX performed by Haritha Harp DPM at [facility] Right 3/2013       Medications:      cefTRIAXone (ROCEPHIN) IV  2,000 mg IntraVENous Q24H    clindamycin (CLEOCIN) IV  600 mg IntraVENous Q8H    [Held by provider] amLODIPine  5 mg Oral Daily    clopidogrel  75 mg Oral Daily    docusate sodium  100 mg Oral BID    ferrous sulfate  325 mg Oral Daily with breakfast    [Held by provider] furosemide  40 mg Oral Every Other Day    metoprolol tartrate  25 mg Oral BID    atorvastatin  10 mg Oral Daily    sodium chloride flush  5-40 mL IntraVENous 2 times per day    insulin lispro  0-12 Units SubCUTAneous TID WC    insulin lispro  0-6 Units SubCUTAneous Nightly       Social History:     Social History     Socioeconomic History    Marital status:      Spouse name: Not on file    Number of children: Not on file    Years of education: Not on file    Highest education level: Not on file   Occupational History    Not on file   Tobacco Use    Smoking status: Current Every Day Smoker     Packs/day: 0.50     Years: 30.00     Pack years: 15.00     Types: Cigarettes    Smokeless tobacco: Never Used    Tobacco comment: working on quitting smoking   Vaping Use    Vaping Use: Never used   Substance and Sexual Activity    Alcohol use: No    Drug use: Never    Sexual activity: Yes     Partners: Female   Other Topics Concern    Not on file   Social History Narrative    Not on file     Social Determinants of Health     Financial Resource Strain:     Difficulty of Paying Living Expenses: Not on file   Food Insecurity:     Worried About Running Out of Food in the Last Year: Not on file    Marika of Food in the Last Year: Not on file   Transportation Needs:     Lack of Transportation for allowing us to participate in the care of this patient. Please call with questions. This note is created with the assistance of a speech recognition program.  While intending to generate adocument that actually reflects the content of the visit, the document can still have some errors including those of syntax and sound a like substitutions which may escape proof reading. It such instances, actual meaningcan be extrapolated by contextual diversion.     Tonya West MD  Office: [phone]  Perfect serve / office [phone]

## 2022-04-16 NOTE — PLAN OF CARE
Problem: Infection:  Goal: Will remain free from infection  Description: Will remain free from infection  4/16/2022 0147 by Humza Shell RN  Outcome: Ongoing  4/16/2022 0025 by Oscar Juan RN  Outcome: Ongoing  4/15/2022 1905 by Dami Way RN  Outcome: Met This Shift     Problem: Safety:  Goal: Free from accidental physical injury  Description: Free from accidental physical injury  4/16/2022 0822 by Humza Shell RN  Outcome: Ongoing  4/16/2022 0025 by Oscar Juan RN  Outcome: Ongoing  4/15/2022 1905 by Dami Way RN  Outcome: Met This Shift     Problem: Daily Care:  Goal: Daily care needs are met  Description: Daily care needs are met  4/16/2022 0822 by Humza Shell RN  Outcome: Ongoing  4/16/2022 0025 by Oscar Juan RN  Outcome: Ongoing     Problem: Pain:  Goal: Patient's pain/discomfort is manageable  Description: Patient's pain/discomfort is manageable  4/16/2022 0822 by Humza Shell RN  Outcome: Ongoing  4/16/2022 0025 by Oscar Juan RN  Outcome: Ongoing  Note: Pt medicated with pain medication prn. Assessed all pain characteristics including level, type, location, frequency, and onset. Non-pharmacologic interventions offered to pt as well. Pt states pain is tolerable at this time.  Will continue to monitor   4/15/2022 1905 by Dami Way RN  Outcome: Ongoing  Goal: Pain level will decrease  Description: Pain level will decrease  4/16/2022 0822 by Humza Shell RN  Outcome: Ongoing  4/16/2022 0025 by Oscar Juan RN  Outcome: Ongoing  4/15/2022 1905 by Dami Way RN  Outcome: Not Met This Shift  Goal: Control of acute pain  Description: Control of acute pain  4/16/2022 0822 by Humza Shell RN  Outcome: Ongoing  4/16/2022 0025 by Oscar Juan RN  Outcome: Ongoing  Goal: Control of chronic pain  Description: Control of chronic pain  4/16/2022 0822 by Humza Shell RN  Outcome: Ongoing  4/16/2022 0025 by Glenis Strong RN  Outcome: Ongoing     Problem: Skin Integrity:  Goal: Skin integrity will stabilize  Description: Skin integrity will stabilize  4/16/2022 0822 by Cyndee Mccormick RN  Outcome: Ongoing  4/16/2022 0025 by Glenis Strong RN  Outcome: Ongoing  Note: Skin integrity maintained, no new skin abnormalities assessed. Appropriate measures remain in place      Problem: Discharge Planning:  Goal: Patients continuum of care needs are met  Description: Patients continuum of care needs are met  4/16/2022 7304 by Cyndee Mccormick RN  Outcome: Ongoing  4/16/2022 0025 by Glenis Strong RN  Outcome: Ongoing     Problem: Skin Integrity:  Goal: Will show no infection signs and symptoms  Description: Will show no infection signs and symptoms  4/16/2022 0822 by Cyndee Mccormick RN  Outcome: Ongoing  4/16/2022 0025 by Glenis Strong RN  Outcome: Ongoing  Goal: Absence of new skin breakdown  Description: Absence of new skin breakdown  4/16/2022 0822 by Cyndee Mccormick RN  Outcome: Ongoing  4/16/2022 0025 by Glenis Strong RN  Outcome: Ongoing     Problem: Falls - Risk of:  Goal: Will remain free from falls  Description: Will remain free from falls  4/16/2022 0822 by Cyndee Mccormick RN  Outcome: Ongoing  4/16/2022 0025 by Glenis Strong RN  Outcome: Ongoing  Note: Patient is a fall risk during this admission. Fall risk assessment was performed. Patient is absent of falls. Bed is in the lowest position. Wheels on the bed are locked. Call light and bed side table are within reach. Clutter is removed. Patient was educated to call out when needing assistance or wanting to get out of bed. Patient offered toileting assistance during rounding. Hourly rounds have been performed.     4/15/2022 1905 by Britt Britt RN  Outcome: Met This Shift  Goal: Absence of physical injury  Description: Absence of physical injury  4/16/2022 0822 by Katarina Ortiz RN  Outcome: Ongoing  4/16/2022 0025 by Mansoor Lugo RN  Outcome: Ongoing

## 2022-04-17 LAB
ANION GAP SERPL CALCULATED.3IONS-SCNC: 10 MMOL/L (ref 9–17)
BUN BLDV-MCNC: 31 MG/DL (ref 8–23)
BUN/CREAT BLD: 12 (ref 9–20)
CALCIUM SERPL-MCNC: 7.6 MG/DL (ref 8.6–10.4)
CHLORIDE BLD-SCNC: 104 MMOL/L (ref 98–107)
CO2: 22 MMOL/L (ref 20–31)
CREAT SERPL-MCNC: 2.55 MG/DL (ref 0.7–1.2)
GFR AFRICAN AMERICAN: 30 ML/MIN
GFR NON-AFRICAN AMERICAN: 25 ML/MIN
GFR SERPL CREATININE-BSD FRML MDRD: ABNORMAL ML/MIN/{1.73_M2}
GLUCOSE BLD-MCNC: 103 MG/DL (ref 75–110)
GLUCOSE BLD-MCNC: 122 MG/DL (ref 75–110)
GLUCOSE BLD-MCNC: 135 MG/DL (ref 70–99)
GLUCOSE BLD-MCNC: 138 MG/DL (ref 75–110)
GLUCOSE BLD-MCNC: 159 MG/DL (ref 75–110)
POTASSIUM SERPL-SCNC: 4.6 MMOL/L (ref 3.7–5.3)
SODIUM BLD-SCNC: 136 MMOL/L (ref 135–144)

## 2022-04-17 PROCEDURE — 6370000000 HC RX 637 (ALT 250 FOR IP): Performed by: PODIATRIST

## 2022-04-17 PROCEDURE — 80048 BASIC METABOLIC PNL TOTAL CA: CPT

## 2022-04-17 PROCEDURE — 2060000000 HC ICU INTERMEDIATE R&B

## 2022-04-17 PROCEDURE — 99232 SBSQ HOSP IP/OBS MODERATE 35: CPT | Performed by: FAMILY MEDICINE

## 2022-04-17 PROCEDURE — 6360000002 HC RX W HCPCS: Performed by: PODIATRIST

## 2022-04-17 PROCEDURE — 2500000003 HC RX 250 WO HCPCS: Performed by: PODIATRIST

## 2022-04-17 PROCEDURE — 6360000002 HC RX W HCPCS: Performed by: INTERNAL MEDICINE

## 2022-04-17 PROCEDURE — 2500000003 HC RX 250 WO HCPCS: Performed by: INTERNAL MEDICINE

## 2022-04-17 PROCEDURE — 36415 COLL VENOUS BLD VENIPUNCTURE: CPT

## 2022-04-17 PROCEDURE — 99232 SBSQ HOSP IP/OBS MODERATE 35: CPT | Performed by: INTERNAL MEDICINE

## 2022-04-17 PROCEDURE — 82947 ASSAY GLUCOSE BLOOD QUANT: CPT

## 2022-04-17 PROCEDURE — 2580000003 HC RX 258: Performed by: INTERNAL MEDICINE

## 2022-04-17 PROCEDURE — 6370000000 HC RX 637 (ALT 250 FOR IP): Performed by: FAMILY MEDICINE

## 2022-04-17 RX ORDER — SODIUM CHLORIDE 9 MG/ML
INJECTION, SOLUTION INTRAVENOUS CONTINUOUS
Status: DISCONTINUED | OUTPATIENT
Start: 2022-04-17 | End: 2022-04-21

## 2022-04-17 RX ADMIN — CEFTRIAXONE 2000 MG: 2 INJECTION, POWDER, FOR SOLUTION INTRAMUSCULAR; INTRAVENOUS at 16:13

## 2022-04-17 RX ADMIN — OXYCODONE AND ACETAMINOPHEN 2 TABLET: 5; 325 TABLET ORAL at 02:19

## 2022-04-17 RX ADMIN — ONDANSETRON 4 MG: 2 INJECTION INTRAMUSCULAR; INTRAVENOUS at 08:21

## 2022-04-17 RX ADMIN — OXYCODONE AND ACETAMINOPHEN 2 TABLET: 5; 325 TABLET ORAL at 06:16

## 2022-04-17 RX ADMIN — CLINDAMYCIN IN 5 PERCENT DEXTROSE 600 MG: 12 INJECTION, SOLUTION INTRAVENOUS at 06:20

## 2022-04-17 RX ADMIN — POLYETHYLENE GLYCOL 3350 17 G: 17 POWDER, FOR SOLUTION ORAL at 21:06

## 2022-04-17 RX ADMIN — TRAMADOL HYDROCHLORIDE 50 MG: 50 TABLET, COATED ORAL at 23:14

## 2022-04-17 RX ADMIN — CLINDAMYCIN IN 5 PERCENT DEXTROSE 600 MG: 12 INJECTION, SOLUTION INTRAVENOUS at 14:46

## 2022-04-17 RX ADMIN — HYDRALAZINE HYDROCHLORIDE 10 MG: 20 INJECTION INTRAMUSCULAR; INTRAVENOUS at 11:46

## 2022-04-17 RX ADMIN — SODIUM CHLORIDE: 9 INJECTION, SOLUTION INTRAVENOUS at 16:12

## 2022-04-17 RX ADMIN — TRAMADOL HYDROCHLORIDE 50 MG: 50 TABLET, COATED ORAL at 01:39

## 2022-04-17 RX ADMIN — METOPROLOL TARTRATE 25 MG: 25 TABLET, FILM COATED ORAL at 10:03

## 2022-04-17 RX ADMIN — METOPROLOL TARTRATE 25 MG: 25 TABLET, FILM COATED ORAL at 16:50

## 2022-04-17 RX ADMIN — SODIUM BICARBONATE: 84 INJECTION, SOLUTION INTRAVENOUS at 05:00

## 2022-04-17 RX ADMIN — TRAMADOL HYDROCHLORIDE 50 MG: 50 TABLET, COATED ORAL at 14:39

## 2022-04-17 RX ADMIN — OXYCODONE AND ACETAMINOPHEN 2 TABLET: 5; 325 TABLET ORAL at 20:59

## 2022-04-17 RX ADMIN — CLINDAMYCIN IN 5 PERCENT DEXTROSE 600 MG: 12 INJECTION, SOLUTION INTRAVENOUS at 23:13

## 2022-04-17 RX ADMIN — DOCUSATE SODIUM 100 MG: 100 CAPSULE, LIQUID FILLED ORAL at 10:03

## 2022-04-17 RX ADMIN — ONDANSETRON 4 MG: 2 INJECTION INTRAMUSCULAR; INTRAVENOUS at 21:00

## 2022-04-17 RX ADMIN — FERROUS SULFATE TAB EC 325 MG (65 MG FE EQUIVALENT) 325 MG: 325 (65 FE) TABLET DELAYED RESPONSE at 10:07

## 2022-04-17 RX ADMIN — INSULIN LISPRO 2 UNITS: 100 INJECTION, SOLUTION INTRAVENOUS; SUBCUTANEOUS at 16:50

## 2022-04-17 RX ADMIN — CLOPIDOGREL BISULFATE 75 MG: 75 TABLET ORAL at 10:03

## 2022-04-17 RX ADMIN — ATORVASTATIN CALCIUM 10 MG: 10 TABLET, FILM COATED ORAL at 10:03

## 2022-04-17 RX ADMIN — DOCUSATE SODIUM 100 MG: 100 CAPSULE, LIQUID FILLED ORAL at 21:00

## 2022-04-17 ASSESSMENT — PAIN SCALES - GENERAL
PAINLEVEL_OUTOF10: 0
PAINLEVEL_OUTOF10: 7
PAINLEVEL_OUTOF10: 8
PAINLEVEL_OUTOF10: 4
PAINLEVEL_OUTOF10: 8
PAINLEVEL_OUTOF10: 4

## 2022-04-17 ASSESSMENT — PAIN DESCRIPTION - ORIENTATION
ORIENTATION: LEFT

## 2022-04-17 ASSESSMENT — ENCOUNTER SYMPTOMS
NAUSEA: 0
BLOOD IN STOOL: 0
WHEEZING: 0
COUGH: 0
VOMITING: 0
CHEST TIGHTNESS: 0
CONSTIPATION: 0
ABDOMINAL PAIN: 0
SHORTNESS OF BREATH: 0
DIARRHEA: 0

## 2022-04-17 ASSESSMENT — PAIN DESCRIPTION - LOCATION
LOCATION: LEG

## 2022-04-17 ASSESSMENT — PAIN DESCRIPTION - PAIN TYPE
TYPE: ACUTE PAIN

## 2022-04-17 NOTE — PROGRESS NOTES
glucose, hydrALAZINE    Objective     Vitals:  Patient Vitals for the past 8 hrs:   BP Temp Temp src Pulse Resp SpO2   22 1122 (!) 171/65 97.7 °F (36.5 °C) Oral 73 16 96 %   22 0811 (!) 162/57 97.7 °F (36.5 °C) Oral 70 16 98 %     Average, Min, and Max for last 24 hours Vitals:  TEMPERATURE:  Temp  Av.9 °F (36.6 °C)  Min: 97.7 °F (36.5 °C)  Max: 98.4 °F (36.9 °C)    RESPIRATIONS RANGE: Resp  Av.5  Min: 16  Max: 18    PULSE RANGE: Pulse  Av.5  Min: 69  Max: 73    BLOOD PRESSURE RANGE:  Systolic (47MFL), TYR:608 , Min:142 , LWZ:833   ; Diastolic (17NEL), PPO:59, Min:52, Max:65      PULSE OXIMETRY RANGE: SpO2  Av.3 %  Min: 96 %  Max: 98 %    I/O last 3 completed shifts: In: 2120.5 [P.O.:925; I.V.:506.6; IV Piggyback:688.9]  Out:  [Urine:]    CBC:  Recent Labs     22  1254   WBC 18.1*   HGB 9.5*   HCT 29.6*           BMP:  Recent Labs     04/15/22  0610 22  0554 22  0614   * 134* 136   K 4.4 4.3 4.6    103 104   CO2 18* 19* 22   BUN 36* 36* 31*   CREATININE 2.88* 3.07* 2.55*   GLUCOSE 117* 92 135*   CALCIUM 7.5* 7.6* 7.6*        Coags:  No results for input(s): APTT, PROT, INR in the last 72 hours. Lab Results   Component Value Date    SEDRATE >130 (H) 2022     No results for input(s): CRP in the last 72 hours. Lower Extremity Physical Exam: Dressings intact. No change to exposed digits. Exam from 4/15/2022  Vascular: DP and PT pulses are very faintly palpable. CFT >5 seconds. Hair growth is absent to the level of the distal foot. Minimal nonpitting edema to left foot      Neuro: Saph/sural/SP/DP/plantar sensation absent to light touch.     Musculoskeletal: Muscle strength not tested. Significant pain to palpation of the wound. Some pain to palpation to the posterior calf.   Gross deformity present with transmetatarsal amputation on the left and BKA on the right     Dermatologic:  Full-thickness open surgical wound noted at the posterior aspect of the left heel up to proximal to the ankle joint. Wound measures approximately 11 x 6 x 3 cm. Wound base is of exposed calcaneus and Achilles tendon along with fibronecrotic tissue. Sanguinous drainage with mild purulence noted. Erythema present surrounding surgical site with associated increase in warmth. Wound does probe to posterior calcaneus. Sinus tracking noted at the proximal aspect of the incision. No undermining, crepitus, fluctuance, or induration noted. Clinical Images:  See media tab. Imaging:   VL LOWER EXTREMITY ARTERIAL SEGMENTAL PRESSURES W PPG   Final Result      US RETROPERITONEAL COMPLETE   Final Result   1.  Echogenic kidneys which may be seen in medical renal disease. 2.  Questionable trace perinephric fluid bilaterally. 3.  Echogenic foci and debris versus blood products in the dependent portion   of the bladder. Clinical correlation is recommended. XR TIBIA FIBULA LEFT (2 VIEWS)   Final Result   Cellulitis. No evidence of osteomyelitis. XR FOOT LEFT (MIN 3 VIEWS)   Final Result   1. Soft tissue emphysema within the posterior aspect of the distal left lower   leg and adjacent to the calcaneus, concerning for necrotizing fasciitis   2. Large soft tissue ulceration along the dorsal aspect of the calcaneus,   with cortical destruction, consistent with osteomyelitis   3. Diffuse soft tissue swelling now present   4. Critical results were called by Dr. Zachery Kelly to [facility] on   4/11/2022 at 6:27 p.m. hours. Cultures:   Bone 4/12: Strep B  Surgical path: pending    Assessment   Yonas Guzman is a 67 y.o. male with   1. Status post extensive incision and drainage, left lower extremity (DOS: 4/12/2022)  2. History of TMA, left foot  3. History of BKA, right lower extremity  4. Unstageable pressure ulceration, left heel  5. 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 CKD  7.  Type II diabetes with neuropathy    Principal Problem:    Necrotizing fasciitis ([facility] [address]  Active Problems:    Acute kidney injury superimposed on CKD ([facility] [address]    CKD (chronic kidney disease), stage III ([facility] [address]    Uncontrolled hypertension    Type 2 diabetes mellitus with stage 3 chronic kidney disease, with long-term current use of insulin ([facility])    Class 1 obesity with serious comorbidity in adult  Resolved Problems:    * No resolved hospital problems. *       Plan     · Patient seen at bedside and revisited plan for AKA of the left, he is still agreeable. · Continue medical management per primary  · Continue anbx per ID: Rocephin, Clindamycin  · Vascular following--plan is a trip to the OR this week for AKA. · PVRs - calcific disease   · Dressing intact to the left foot, to be changed by nursing team: 1/4 inch iodoform packing, DSD, light Ace. · No surgical intervention in the meantime by podiatry. · Will discuss with Dr. Katharina Williamson.     Juana Castañeda DPM   Podiatric Medicine & Surgery   4/17/2022 at 2:02 PM

## 2022-04-17 NOTE — PROGRESS NOTES
[facility]  Office: [address], DO, Isabel Monteiro, DO, Leny Martins, DO, Tari Captain Santana, DO, Alicia Bermeo MD, Marci Kaur MD, Marychuy Rico MD, Brit Edwards MD, Kathe Ring MD, Rosy Hinojosa MD, Kaylnee Sumner MD, David Deng, DO, Margarette Sarmiento, DO, Car Shelby MD,  Rome Luciano DO, Charisse Holman MD, Franco Vee MD, Nieves Caldwell MD, Melinda Hurtado DO, Joli Angelucci, MD, Braxton Manuel MD, Charisma Otero, [facility], [facility], [facility], Paolo Paige, CNP, Katerina Wallace, CNP, Erma Roman, CNP, Cara Amos, CNP, Guerda Spence, PA-C, Debby Gilmore, DNP, Casi Mckeon, CNP, Robel Leonard, CNP, Nery Davila, CNP, Ozzy Mantilla, CNS, Michelleolysherry Nails, DNP, Arnold Clapper, CNP, Bruno Proper, CNP, Chelsea Serum, CNP         [facility]    Progress Note    4/17/2022    7:36 AM    Name:   Rufina Scott  MRN:     [id number]     Acct:      [de-identified]   Room:   2020/2020-01  IP Day:  6  Admit Date:  4/11/2022  5:25 PM    PCP:   Lay Lovelace  Code Status:  Full Code    Subjective:     C/C:   Chief Complaint   Patient presents with    Wound Infection     Sent over by PCP for wound evaluation in foot (left)      Interval History Status: stable    Patient seen and examined at bedside,  Feeling better today, sitting eating BKFST, Creat is slightly better  Afebrile overnight  His pain is controlled  Plan for amputation  Patient vitals, labs and all providers notes were reviewed,from overnight shift and morning updates were noted and discussed with the nurse    Brief History:     Per my NP  Rufina Scott is a 67 y.o. Non- / non  male who presents with Wound Infection (Sent over by PCP for wound evaluation in foot (left) )   and is admitted to the hospital for the management of Necrotizing fasciitis ([facility] [address]      Patient is a poor historian is does not recall how long he has been undergoing treatment for a left heel ulcer. He denies fever, but says he has had chills for \"months\". He has a known history of uncontrolled diabetes and says he checks his blood sugar in the morning and at night- BG range has been 140s to 200s, he says. He is a daily smoker. He recalls he went to Dr Todd/ podiatry for left heel care and was advised to come to the hospital for further evaluation and care.      While in the ED, xray of the left tib fib revealed soft tissue swelling, cellulitis, and no evidence of osteomyelitis. Left foot xray results were concerning for necrotizing fasciitis and osteomyelitis. His WBC was elevated at 18.5, sed rate > 130, creat 2.44- baseline appears to be 1.36-2.06. Records list Stage 3 CKD. Patient denies having a nephrologist. His lactic was unremarkable.      He was started on IV Vanco. He is being admitted for further management of necrotizing fasciitis of left foot and RISSA. Podiatry was consulted and plan for patient to undergo I & D of the left heel and bone biopsy 4/13/22. Review of Systems:     Review of Systems   Constitutional: Positive for activity change. Negative for chills, diaphoresis and fever. HENT: Negative for congestion. Eyes: Negative for visual disturbance. Respiratory: Negative for cough, chest tightness, shortness of breath and wheezing. Cardiovascular: Negative for chest pain, palpitations and leg swelling. Gastrointestinal: Negative for abdominal pain, blood in stool, constipation, diarrhea, nausea and vomiting. Genitourinary: Negative for difficulty urinating. Musculoskeletal:        LLE pain   Neurological: Negative for dizziness, weakness, light-headedness, numbness and headaches. All other systems reviewed and are negative. Medications:      Allergies:  No Known Allergies    Current Meds:   Scheduled Meds:    cefTRIAXone (ROCEPHIN) IV  2,000 mg IntraVENous Q24H    clindamycin (CLEOCIN) IV  600 mg IntraVENous Q8H    [Held by provider] amLODIPine  5 mg Oral Daily    clopidogrel  75 mg Oral Daily    docusate sodium  100 mg Oral BID    ferrous sulfate  325 mg Oral Daily with breakfast    [Held by provider] furosemide  40 mg Oral Every Other Day    metoprolol tartrate  25 mg Oral BID    atorvastatin  10 mg Oral Daily    sodium chloride flush  5-40 mL IntraVENous 2 times per day    insulin lispro  0-12 Units SubCUTAneous TID WC    insulin lispro  0-6 Units SubCUTAneous Nightly     Continuous Infusions:    IV infusion builder 50 mL/hr at 04/17/22 0500    dextrose      sodium chloride Stopped (04/16/22 1740)     PRN Meds: oxyCODONE-acetaminophen **OR** oxyCODONE-acetaminophen, traMADol, glucagon (rDNA), dextrose, sodium chloride flush, sodium chloride, potassium chloride **OR** potassium alternative oral replacement **OR** potassium chloride, magnesium sulfate, ondansetron **OR** ondansetron, polyethylene glycol, acetaminophen **OR** acetaminophen, dextrose bolus (hypoglycemia) **OR** dextrose bolus (hypoglycemia), glucose, hydrALAZINE    Data:     Past Medical History:   has a past medical history of RISSA (acute kidney injury) ([facility] [address] Cerebrovascular disease, Erectile dysfunction, Hepatitis B infection, Hyperlipidemia, Hypertension, Kidney cysts, Liver cyst, MRSA (methicillin resistant staph aureus) culture positive, Neurotrophic ulcer of the foot ([facility] [address] Obesity, Osteomyelitis of ankle or foot, Peripheral vascular disease ([facility] [address] Tobacco abuse, and Type II or unspecified type diabetes mellitus without mention of complication, not stated as uncontrolled. Social History:   reports that he has been smoking cigarettes. He has a 15.00 pack-year smoking history. He has never used smokeless tobacco. He reports that he does not drink alcohol and does not use drugs.      Family History:   Family History   Problem Relation Age of Onset    Diabetes Mother     Diabetes Sister     Diabetes Brother     Diabetes Sister     Diabetes Sister     Diabetes Sister Vitals:  BP (!) 150/54   Pulse 70   Temp 97.9 °F (36.6 °C) (Oral)   Resp 18   Ht 6' (1.829 m)   Wt 184 lb 0.7 oz (83.5 kg)   SpO2 98%   BMI 24.96 kg/m²   Temp (24hrs), Av.2 °F (36.8 °C), Min:97.9 °F (36.6 °C), Max:98.4 °F (36.9 °C)    Recent Labs     22  11322  0623   POCGLU 101 156* 131* 103       I/O (24Hr): Intake/Output Summary (Last 24 hours) at 2022 0736  Last data filed at 2022 3035  Gross per 24 hour   Intake 941.5 ml   Output 1350 ml   Net -408.5 ml       Labs:  Hematology:  Recent Labs     22  1254   WBC 18.1*   RBC 3.76*   HGB 9.5*   HCT 29.6*   MCV 78.7*   MCH 25.3   MCHC 32.1   RDW 14.6*      MPV 8.5     Chemistry:  Recent Labs     04/15/22  0610 22  0554 22  0614   * 134* 136   K 4.4 4.3 4.6    103 104   CO2 18* 19* 22   GLUCOSE 117* 92 135*   BUN 36* 36* 31*   CREATININE 2.88* 3.07* 2.55*   ANIONGAP 11 12 10   LABGLOM 22* 20* 25*   GFRAA 26* 24* 30*   CALCIUM 7.5* 7.6* 7.6*     Recent Labs     04/15/22  2036 22  0621 22  11322  0623   POCGLU 148* 85 101 156* 131* 103     ABG:No results found for: POCPH, PHART, PH, POCPCO2, NNJ0PUP, PCO2, POCPO2, PO2ART, PO2, POCHCO3, BDA2PPC, HCO3, NBEA, PBEA, BEART, BE, THGBART, THB, MSJ1LSO, HYTG1GBL, T6UGUZTA, O2SAT, FIO2  Lab Results   Component Value Date/Time    SPECIAL LT FA,7ML 2022 06:38 PM     Lab Results   Component Value Date/Time    CULTURE PROTEUS MIRABILIS HEAVY GROWTH (A) 2022 05:36 PM    CULTURE (A) 2022 05:36 PM     STREPTOCOCCI, BETA HEMOLYTIC GROUP B MODERATE GROWTH    CULTURE Mixed skin tara 2022 05:36 PM    CULTURE  2022 05:36 PM     Unable to determine the presence or absence of anaerobes due to swarming proteus. Radiology:  XR TIBIA FIBULA LEFT (2 VIEWS)    Result Date: 2022  Cellulitis. No evidence of osteomyelitis.      XR FOOT LEFT (MIN 3 VIEWS)    Result Date: 4/11/2022  1. Soft tissue emphysema within the posterior aspect of the distal left lower leg and adjacent to the calcaneus, concerning for necrotizing fasciitis 2. Large soft tissue ulceration along the dorsal aspect of the calcaneus, with cortical destruction, consistent with osteomyelitis 3. Diffuse soft tissue swelling now present 4. Critical results were called by Dr. Navin De La Fuente to [facility] on 4/11/2022 at 6:27 p.m. hours. US RETROPERITONEAL COMPLETE    Result Date: 4/12/2022  1. Echogenic kidneys which may be seen in medical renal disease. 2.  Questionable trace perinephric fluid bilaterally. 3.  Echogenic foci and debris versus blood products in the dependent portion of the bladder. Clinical correlation is recommended. Physical Examination:        Physical Exam  Vitals and nursing note reviewed. Constitutional:       General: He is not in acute distress. HENT:      Head: Normocephalic and atraumatic. Eyes:      Conjunctiva/sclera: Conjunctivae normal.      Pupils: Pupils are equal, round, and reactive to light. Cardiovascular:      Rate and Rhythm: Normal rate and regular rhythm. Heart sounds: No murmur heard. Pulmonary:      Effort: Pulmonary effort is normal. No accessory muscle usage or respiratory distress. Breath sounds: No stridor. No decreased breath sounds, wheezing, rhonchi or rales. Abdominal:      General: Bowel sounds are normal. There is no distension. Palpations: Abdomen is soft. Abdomen is not rigid. Tenderness: There is no abdominal tenderness. There is no guarding. Musculoskeletal:         General: No tenderness. Comments: /P right BKA. S/p left TMA  Wound S/P I&D is in dressing    Skin:     General: Skin is warm and dry. Findings: No erythema, lesion or rash. Neurological:      Mental Status: He is alert and oriented to person, place, and time. Cranial Nerves: No cranial nerve deficit.       Motor: No seizure activity. Psychiatric:         Speech: Speech normal.         Behavior: Behavior normal. Behavior is cooperative. Assessment:        Hospital Problems           Last Modified POA    * (Principal) Necrotizing fasciitis ([address] 4/12/2022 Yes    Acute kidney injury superimposed on CKD ([address] 4/11/2022 Yes    CKD (chronic kidney disease), stage III ([address] 4/11/2022 Yes    Uncontrolled hypertension 4/12/2022 Yes    Type 2 diabetes mellitus with stage 3 chronic kidney disease, with long-term current use of insulin ([address] 4/12/2022 Yes    Class 1 obesity with serious comorbidity in adult 4/12/2022 Yes          Plan:         L heel wound infection/ necrotizing fasciitis  with possible underlying Osteomyelitis : he ie S/P extensive I&D and biopsy sent on 4/12, post op management  per podiatry, ID consulted, currently on rocephin and clindamycin . Adjust pain meds for better pain control    PVD : vascular consulted,plan for amputation, patient is in agreement      H/O R BKA    RISSA/CKD stage III: Baseline creatinine between 2-2 0.2, it is worsening today ,likely 2/2 infection  nephrology recommendations , unclear if the patient has nephrology follow-up as an outpatient, he also does not follow-up with his PCP   Continue to hold Lasix and Ace      AGMA : likely 2/2 above , fluids changes to include bicarb per nephrology, he is still currently on bicarb drip. AOCD : monitor and continue support     HTN:  Ace held, continue BB continue to monitor     HPL: continue home medications    DM2:   With hyperglycemia , target -180 , Continue diabetes home medications , insulin sliding scale, hypoglycemia protocol    DVT prophylaxis    Discussed with vascular surgery resident yesterday, plan is for Dr Lucrecia Barthel to round and see the patient today / discuss surgery and likely determine surgery timing    Discussed with the patient and the nurse     Marium Whittington MD  4/17/2022  7:36 AM

## 2022-04-17 NOTE — PROGRESS NOTES
[facility] -   Infectious diseases evaluation  admission date 4/11/2022    reason for consultation:   Left foot infection    Impression :   Current:  · Diabetic left foot infection with suspected necrotizing fasciitis and calcaneal osteomyelitis. Status post incision and debridement 4/12/22  · Peripheral vascular disease  · Diabetes mellitus  · Chronic renal disease  · History of right below-knee amputation      Recommendations        · IV ceftriaxone and clindamycin   · The patient is agreeable for above-knee amputation  · Follow final cultures  · Follow CBC and renal function        History of Present Illness:   Initial history:  Marisa Sanchez is a 67y.o.-year-old male was sent to hospital from Dr. Rose Ship office for worsening left heel pain associated with left heel wound with surrounding dark discoloration and redness. The pain is severe, intermittent, no alleviating or aggravating factors. Left foot x-ray showed soft tissue emphysema within the posterior aspect of the distal left lower leg concerning for necrotizing fasciitis and possible calcaneal osteomyelitis. Initial WBC 18.5, creatinine 2.44, C-reactive protein more than 130, C-reactive protein 104.2, lactic acid 0 point  Afebrile  History of right below-knee amputation  Interval changes  4/17/2022   He is afebrile, pain under better control, no new events  Proteus mirabilis pansensitive and group B streptococcus growth on bone culture from 04/12/2022. No growth on blood cultures from 4/11/2022  Patient Vitals for the past 8 hrs:   BP Temp Temp src Pulse Resp SpO2   04/17/22 1600 (!) 145/56 98.4 °F (36.9 °C) Oral 71 16 96 %   04/17/22 1122 (!) 171/65 97.7 °F (36.5 °C) Oral 73 16 96 %     . I have personally reviewed the past medical history, past surgical history, medications, social history, and family history, and I haveupdated the database accordingly.       Allergies:   Patient has no known allergies. Review of Systems:     Review of Systems  As per history present illness, other than above 12 system review was negative  Physical Examination :       Physical Exam  Constitutional:       General: He is not in acute distress. HENT:      Head: Normocephalic and atraumatic. Right Ear: External ear normal.      Left Ear: External ear normal.   Eyes:      General: No scleral icterus. Conjunctiva/sclera: Conjunctivae normal.   Cardiovascular:      Rate and Rhythm: Normal rate and regular rhythm. Pulses: Normal pulses. Pulmonary:      Effort: Pulmonary effort is normal. No respiratory distress. Breath sounds: Normal breath sounds. Abdominal:      General: Bowel sounds are normal. There is no distension. Palpations: Abdomen is soft. Musculoskeletal:      Comments: Left foot dressing  Right below-knee amputation   Skin:     General: Skin is warm. Coloration: Skin is not jaundiced. Neurological:      General: No focal deficit present. Mental Status: He is oriented to person, place, and time.          Past Medical History:     Past Medical History:   Diagnosis Date    RISSA (acute kidney injury) ([address]     Cerebrovascular disease     Erectile dysfunction     Hepatitis B infection     Hyperlipidemia     Hypertension     Kidney cysts     Liver cyst     MRSA (methicillin resistant staph aureus) culture positive 1/10/2014    right foot    Neurotrophic ulcer of the foot ([address]     Obesity     Osteomyelitis of ankle or foot     Peripheral vascular disease ([address]     Tobacco abuse     Type II or unspecified type diabetes mellitus without mention of complication, not stated as uncontrolled        Past Surgical  History:     Past Surgical History:   Procedure Laterality Date    FOOT AMPUTATION      FOOT AMPUTATION THROUGH METATARSAL  2011    pt can't give exact date of surg-- toes and part of lt foot removed    FOOT DEBRIDEMENT Left 6/20/2020    WOUND BED PREPARATION AND WOUND VAC APPLICATION performed by Malia Rabago DPM at [facility] Left 4/12/2022    FOOT DEBRIDEMENT INCISION AND DRAINAGE WITH BX performed by Malia Rabago DPM at [address] [place] [address] Right 3/2013       Medications:      cefTRIAXone (ROCEPHIN) IV  2,000 mg IntraVENous Q24H    clindamycin (CLEOCIN) IV  600 mg IntraVENous Q8H    amLODIPine  5 mg Oral Daily    clopidogrel  75 mg Oral Daily    docusate sodium  100 mg Oral BID    ferrous sulfate  325 mg Oral Daily with breakfast    [Held by provider] furosemide  40 mg Oral Every Other Day    metoprolol tartrate  25 mg Oral BID    atorvastatin  10 mg Oral Daily    sodium chloride flush  5-40 mL IntraVENous 2 times per day    insulin lispro  0-12 Units SubCUTAneous TID WC    insulin lispro  0-6 Units SubCUTAneous Nightly       Social History:     Social History     Socioeconomic History    Marital status:      Spouse name: Not on file    Number of children: Not on file    Years of education: Not on file    Highest education level: Not on file   Occupational History    Not on file   Tobacco Use    Smoking status: Current Every Day Smoker     Packs/day: 0.50     Years: 30.00     Pack years: 15.00     Types: Cigarettes    Smokeless tobacco: Never Used    Tobacco comment: working on quitting smoking   Vaping Use    Vaping Use: Never used   Substance and Sexual Activity    Alcohol use: No    Drug use: Never    Sexual activity: Yes     Partners: Female   Other Topics Concern    Not on file   Social History Narrative    Not on file     Social Determinants of Health     Financial Resource Strain:     Difficulty of Paying Living Expenses: Not on file   Food Insecurity:     Worried About Running Out of Food in the Last Year: Not on file    Marika of Food in the Last Year: Not on file   Transportation Needs:     Lack of Transportation (Medical): Not on file    Lack of Transportation (Non-Medical):  Not on file   Physical Activity:     Days of Exercise per Week: Not on file    Minutes of Exercise per Session: Not on file   Stress:     Feeling of Stress : Not on file   Social Connections:     Frequency of Communication with Friends and Family: Not on file    Frequency of Social Gatherings with Friends and Family: Not on file    Attends Druze Services: Not on file    Active Member of Clubs or Organizations: Not on file    Attends Club or Organization Meetings: Not on file    Marital Status: Not on file   Intimate Partner Violence:     Fear of Current or Ex-Partner: Not on file    Emotionally Abused: Not on file    Physically Abused: Not on file    Sexually Abused: Not on file   Housing Stability:     Unable to Pay for Housing in the Last Year: Not on file    Number of [place] in the Last Year: Not on file    Unstable Housing in the Last Year: Not on file       Family History:     Family History   Problem Relation Age of Onset    Diabetes Mother     Diabetes Sister     Diabetes Brother     Diabetes Sister     Diabetes Sister     Diabetes Sister       Medical Decision Making:   I have independently reviewed/ordered the following labs:    CBC with Differential:   Recent Labs     04/16/22  1254   WBC 18.1*   HGB 9.5*   HCT 29.6*      LYMPHOPCT 9*   MONOPCT 5     BMP:  Recent Labs     04/16/22  0554 04/17/22  0614   * 136   K 4.3 4.6    104   CO2 19* 22   BUN 36* 31*   CREATININE 3.07* 2.55*     Hepatic Function Panel: No results for input(s): PROT, LABALBU, BILIDIR, IBILI, BILITOT, ALKPHOS, ALT, AST in the last 72 hours. No results for input(s): RPR in the last 72 hours. No results for input(s): HIV in the last 72 hours. No results for input(s): BC in the last 72 hours. Lab Results   Component Value Date    CREATININE 2.55 04/17/2022    GLUCOSE 135 04/17/2022    GLUCOSE 133 10/18/2011       Detailed results:         Thank you for allowing us to participate in the care of

## 2022-04-17 NOTE — PLAN OF CARE
Problem: Pain:  Goal: Patient's pain/discomfort is manageable  Description: Patient's pain/discomfort is manageable  4/17/2022 0445 by Jen Corral RN  Outcome: Ongoing  Note: Pt medicated with pain medication prn. Assessed all pain characteristics including level, type, location, frequency, and onset. Non-pharmacologic interventions offered to pt as well. Pt states pain is tolerable at this time.  Will continue to monitor

## 2022-04-17 NOTE — PROGRESS NOTES
Renal Progress Note    Patient :  Ninfa Saldana; 67 y.o. MRN# [id number]  Location:  2020/2020-01  Attending:  Carrol Correa MD  Admit Date:  4/11/2022   Hospital Day: 6      Subjective:     Patient was seen and examined. No new issues reported overnight. Has been having some hypertension, amlodipine is currently on hold. Has positive left diabetic foot infection with suspected necrotizing fasciitis and calcaneal osteomyelitis status post I&D 4/12/2022 infectious disease on board on antibiotics. Urine output documented as about 1.3 L in the last 24 hours. Serum creatinine did show some improvement with creatinine of 2.55 mg/dl today, peak creatinine 3.07 mg/dl. Electrolytes stable with potassium 4.6 and bicarb 22. Currently on bicarb drip 75 mEq with half-normal saline at 50 cc an hour.     Outpatient Medications:     Medications Prior to Admission: metoprolol tartrate (LOPRESSOR) 25 MG tablet, Take 25 mg by mouth 2 times daily  QUEtiapine (SEROQUEL) 25 MG tablet, Take 25 mg by mouth at bedtime  amLODIPine (NORVASC) 10 MG tablet, Take 0.5 tablets by mouth daily (Patient taking differently: Take 10 mg by mouth daily )  insulin glargine (BASAGLAR KWIKPEN) 100 UNIT/ML injection pen, Inject 10-20 Units into the skin 2 times daily Injects 20 units AM in the morning and 10 units at night (Patient taking differently: Inject 12 Units into the skin nightly )  [DISCONTINUED] furosemide (LASIX) 40 MG tablet, Take 1 tablet by mouth every other day  [DISCONTINUED] lisinopril (PRINIVIL;ZESTRIL) 10 MG tablet, Take 1 tablet by mouth daily  clopidogrel (PLAVIX) 75 MG tablet, Take 1 tablet by mouth daily  insulin aspart (NOVOLOG FLEXPEN) 100 UNIT/ML injection pen, Inject 0-10 Units into the skin 3 times daily as needed Per sliding scale  simvastatin (ZOCOR) 10 MG tablet, Take 10 mg by mouth nightly  traZODone (DESYREL) 150 MG tablet, Take 150 mg by mouth nightly  [DISCONTINUED] metoprolol succinate (TOPROL XL) 25 MG extended release tablet, Take 25 mg by mouth 2 times daily  [DISCONTINUED] budesonide-formoterol (SYMBICORT) 160-4.5 MCG/ACT AERO, Inhale 2 puffs into the lungs 2 times daily  [DISCONTINUED] ferrous sulfate (IRON 325) 325 (65 Fe) MG tablet, Take 325 mg by mouth daily (with breakfast)  docusate sodium (COLACE) 100 MG capsule, Take 100 mg by mouth 2 times daily   acetaminophen (TYLENOL) 325 MG tablet, Take 650 mg by mouth as needed for Pain Give 2 tabs = 650 MG by mouth every 4 hours prn   Insulin Syringe-Needle U-100 (ACCUSURE INS SYR 1CC/30GX5/16\") 30G X 5/16\" 1 ML MISC, by Does not apply route. Current Medications:     Scheduled Meds:    cefTRIAXone (ROCEPHIN) IV  2,000 mg IntraVENous Q24H    clindamycin (CLEOCIN) IV  600 mg IntraVENous Q8H    [Held by provider] amLODIPine  5 mg Oral Daily    clopidogrel  75 mg Oral Daily    docusate sodium  100 mg Oral BID    ferrous sulfate  325 mg Oral Daily with breakfast    [Held by provider] furosemide  40 mg Oral Every Other Day    metoprolol tartrate  25 mg Oral BID    atorvastatin  10 mg Oral Daily    sodium chloride flush  5-40 mL IntraVENous 2 times per day    insulin lispro  0-12 Units SubCUTAneous TID WC    insulin lispro  0-6 Units SubCUTAneous Nightly     Continuous Infusions:    IV infusion builder Stopped (04/17/22 1441)    dextrose      sodium chloride Stopped (04/16/22 1740)     PRN Meds:  oxyCODONE-acetaminophen **OR** oxyCODONE-acetaminophen, traMADol, glucagon (rDNA), dextrose, sodium chloride flush, sodium chloride, potassium chloride **OR** potassium alternative oral replacement **OR** potassium chloride, magnesium sulfate, ondansetron **OR** ondansetron, polyethylene glycol, acetaminophen **OR** acetaminophen, dextrose bolus (hypoglycemia) **OR** dextrose bolus (hypoglycemia), glucose, hydrALAZINE    Input/Output:       I/O last 3 completed shifts: In: 2120.5 [P.O.:925; I.V.:506.6; IV Piggyback:688.9]  Out: 2000 [Urine:2000].       Patient Vitals for the past 96 hrs (Last 3 readings):   Weight   22 0346 184 lb 0.7 oz (83.5 kg)   22 0029 184 lb (83.5 kg)   04/15/22 0044 180 lb (81.6 kg)       Vital Signs:   Temperature:  Temp: 97.7 °F (36.5 °C)  TMax:   Temp (24hrs), Av.9 °F (36.6 °C), Min:97.7 °F (36.5 °C), Max:98.4 °F (36.9 °C)    Respirations:  Resp: 16  Pulse:   Pulse: 73  BP:    BP: (!) 171/65  BP Range: Systolic (75URI), DHF:059 , Min:142 , YQK:122       Diastolic (40FEM), IUP:19, Min:52, Max:65      Physical Examination:     General:  AAO x 3, speaking in full sentences, no accessory muscle use. HEENT: Atraumatic, normocephalic, no throat congestion, moist mucosa. Eyes:   Pupils equal, round and reactive to light, EOMI. Neck:   Supple  Chest:   Bilateral vesicular breath sounds, no rales or wheezes. Cardiac:  S1 S2 RR, no murmurs, gallops or rubs. Abdomen: Soft, non-tender, no masses or organomegaly, BS audible. :   No suprapubic or flank tenderness. Neuro:  AAO x 3, No FND. SKIN:  No rashes, good skin turgor. Extremities:  +ve trace LLEedema. Right lower extremity amputation.     Labs:       Recent Labs     22  1254   WBC 18.1*   RBC 3.76*   HGB 9.5*   HCT 29.6*   MCV 78.7*   MCH 25.3   MCHC 32.1   RDW 14.6*      MPV 8.5      BMP:   Recent Labs     04/15/22  0610 22  0554 22  0614   * 134* 136   K 4.4 4.3 4.6    103 104   CO2 18* 19* 22   BUN 36* 36* 31*   CREATININE 2.88* 3.07* 2.55*   GLUCOSE 117* 92 135*   CALCIUM 7.5* 7.6* 7.6*      SPEP:  Lab Results   Component Value Date    PROT 7.6 2020     C3:     Lab Results   Component Value Date    C3 120 2022     C4:     Lab Results   Component Value Date    C4 19 2022     Hep BsAg:         Lab Results   Component Value Date    HEPBSAG REACTIVE 2013     Hep C AB:          Lab Results   Component Value Date    HEPCAB NONREACTIVE 2013     Urinalysis/Chemistries:      Lab Results   Component Value Date    NITRU NEGATIVE 02/23/2022    COLORU Yellow 02/23/2022    PHUR 5.5 02/23/2022    WBCUA TOO NUMEROUS TO COUNT 02/23/2022    RBCUA 5 TO 10 02/23/2022    MUCUS NOT REPORTED 03/30/2018    TRICHOMONAS NOT REPORTED 03/30/2018    YEAST FEW 02/23/2022    BACTERIA FEW 02/23/2022    SPECGRAV 1.012 02/23/2022    LEUKOCYTESUR LARGE 02/23/2022    UROBILINOGEN Normal 02/23/2022    BILIRUBINUR NEGATIVE 02/23/2022    GLUCOSEU NEGATIVE 02/23/2022    KETUA NEGATIVE 02/23/2022    AMORPHOUS NOT REPORTED 03/30/2018     Urine Sodium:     Lab Results   Component Value Date    JLUIS 65 09/17/2020     Urine Chloride:    Lab Results   Component Value Date    CLUR <15 12/15/2013      Urine Creatinine:     Lab Results   Component Value Date    LABCREA 66.7 04/12/2022     Radiology:     Reviewed. Assessment:     1. Acute Kidney Injury likely ATN due to underlying infection and also could have some azotemia for intravascular volume depletion from decreased intake, loop diuretics and ACE inhibitor usage with baseline creatinine of 2.0-2.1 mg/dl and creatinine still evolving. 2.  Left diabetic foot infection with suspected necrotizing fasciitis and calcaneal osteomyelitis status post I&D 4/12/2022 infectious disease on board on antibiotics. 3.  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 4.  Peripheral vascular disease status post right lower Covid amputation in 2014.  5.  Essential hypertension. 6.  Diabetes type 2.  7.  Non-anion gap metabolic acidosis. Plan:   1. Change IV fluids to normal saline at 50 cc an hour. 2.  Continue monitor strict I's and O's and renal function. 3.  Antibiotics per infectious disease per renal dosing. 4.  Restart amlodipine. 5.  BMP in AM.  6.  Will follow. Nutrition   Please ensure that patient is on a renal diet/TF. Avoid nephrotoxic drugs/contrast exposure. We will continue to follow along with you. Markos Tan, MD  [facility]     This note is created with the assistance of a speech-recognition program. While intending to generate a document that actually reflects the content of the visit, no guarantees can be provided that every mistake has been identified and corrected by editing.

## 2022-04-17 NOTE — FLOWSHEET NOTE
[facility] NOTE    Room # 2020/2020-01   Name: Laurence Pastor               Reason for visit: Palliative Care    I visited the patient. Admit Date & Time: 4/11/2022  5:25 PM    Assessment:  Laurence Pastor is a 67 y.o. male in the hospital do to a foot infection. Upon entering the room the patient was sleeping. No family members present. Intervention:  Writer provided a silent prayer of healing, comfort, and rest.    Plan:  Chaplains will remain available to offer spiritual and emotional support as needed.     Electronically signed by Mary Ureña, on 4/17/2022 at 10:39 AM.  Ca     04/17/22 1038   Encounter Summary   Services provided to: Patient   Referral/Consult From: Palliative Care   Continue Visiting   (4/17/2022)   Complexity of Encounter Low   Length of Encounter 15 minutes   Routine   Type Follow up   Assessment Sleeping   Intervention Prayer;Sustaining presence/ Ministry of presence

## 2022-04-17 NOTE — PLAN OF CARE
Problem: Infection:  Goal: Will remain free from infection  Description: Will remain free from infection  Outcome: Ongoing     Problem: Safety:  Goal: Free from accidental physical injury  Description: Free from accidental physical injury  Outcome: Ongoing     Problem: Daily Care:  Goal: Daily care needs are met  Description: Daily care needs are met  Outcome: Ongoing     Problem: Pain:  Goal: Patient's pain/discomfort is manageable  Description: Patient's pain/discomfort is manageable  Outcome: Ongoing  Note: Pt medicated with pain medication prn. Assessed all pain characteristics including level, type, location, frequency, and onset. Non-pharmacologic interventions offered to pt as well. Pt states pain is tolerable at this time. Will continue to monitor      Problem: Skin Integrity:  Goal: Skin integrity will stabilize  Description: Skin integrity will stabilize  Outcome: Ongoing  Note: Skin integrity maintained, no new skin abnormalities assessed. Appropriate measures remain in place      Problem: Skin Integrity:  Goal: Will show no infection signs and symptoms  Description: Will show no infection signs and symptoms  Outcome: Ongoing  Note: Skin integrity intact. Mucous membranes pink, moist and intact. Patient turned every two hours. Skin and pressure ulcer assessment performed. Problem: Falls - Risk of:  Goal: Will remain free from falls  Description: Will remain free from falls  Outcome: Ongoing  Note: Patient is a fall risk during this admission. Fall risk assessment was performed. Patient is absent of falls. Bed is in the lowest position. Wheels on the bed are locked. Call light and bed side table are within reach. Clutter is removed. Patient was educated to call out when needing assistance or wanting to get out of bed. Patient offered toileting assistance during rounding. Hourly rounds have been performed.

## 2022-04-18 ENCOUNTER — ANESTHESIA EVENT (OUTPATIENT)
Dept: OPERATING ROOM | Age: 72
DRG: 475 | End: 2022-04-18
Payer: COMMERCIAL

## 2022-04-18 LAB
-: ABNORMAL
AMORPHOUS: ABNORMAL
BILIRUBIN URINE: NEGATIVE
COLOR: YELLOW
EPITHELIAL CELLS UA: ABNORMAL /HPF (ref 0–5)
GLUCOSE BLD-MCNC: 161 MG/DL (ref 75–110)
GLUCOSE BLD-MCNC: 169 MG/DL (ref 75–110)
GLUCOSE BLD-MCNC: 198 MG/DL (ref 75–110)
GLUCOSE BLD-MCNC: 99 MG/DL (ref 75–110)
GLUCOSE URINE: NEGATIVE
KETONES, URINE: NEGATIVE
LEUKOCYTE ESTERASE, URINE: ABNORMAL
NITRITE, URINE: NEGATIVE
PH UA: 5.5 (ref 5–8)
PROTEIN UA: ABNORMAL
RBC UA: ABNORMAL /HPF (ref 0–2)
SPECIFIC GRAVITY UA: 1.02 (ref 1–1.03)
TURBIDITY: ABNORMAL
URINE HGB: ABNORMAL
UROBILINOGEN, URINE: NORMAL
WBC UA: ABNORMAL /HPF (ref 0–5)

## 2022-04-18 PROCEDURE — 6370000000 HC RX 637 (ALT 250 FOR IP): Performed by: PODIATRIST

## 2022-04-18 PROCEDURE — 2580000003 HC RX 258: Performed by: INTERNAL MEDICINE

## 2022-04-18 PROCEDURE — 87086 URINE CULTURE/COLONY COUNT: CPT

## 2022-04-18 PROCEDURE — 97530 THERAPEUTIC ACTIVITIES: CPT

## 2022-04-18 PROCEDURE — 99232 SBSQ HOSP IP/OBS MODERATE 35: CPT | Performed by: INTERNAL MEDICINE

## 2022-04-18 PROCEDURE — 99232 SBSQ HOSP IP/OBS MODERATE 35: CPT | Performed by: FAMILY MEDICINE

## 2022-04-18 PROCEDURE — 2060000000 HC ICU INTERMEDIATE R&B

## 2022-04-18 PROCEDURE — 6370000000 HC RX 637 (ALT 250 FOR IP): Performed by: FAMILY MEDICINE

## 2022-04-18 PROCEDURE — 82947 ASSAY GLUCOSE BLOOD QUANT: CPT

## 2022-04-18 PROCEDURE — 81001 URINALYSIS AUTO W/SCOPE: CPT

## 2022-04-18 PROCEDURE — 97535 SELF CARE MNGMENT TRAINING: CPT

## 2022-04-18 PROCEDURE — 2500000003 HC RX 250 WO HCPCS: Performed by: PODIATRIST

## 2022-04-18 PROCEDURE — 6360000002 HC RX W HCPCS: Performed by: INTERNAL MEDICINE

## 2022-04-18 RX ADMIN — OXYCODONE AND ACETAMINOPHEN 2 TABLET: 5; 325 TABLET ORAL at 00:41

## 2022-04-18 RX ADMIN — OXYCODONE AND ACETAMINOPHEN 2 TABLET: 5; 325 TABLET ORAL at 06:25

## 2022-04-18 RX ADMIN — AMLODIPINE BESYLATE 5 MG: 5 TABLET ORAL at 08:19

## 2022-04-18 RX ADMIN — CLINDAMYCIN IN 5 PERCENT DEXTROSE 600 MG: 12 INJECTION, SOLUTION INTRAVENOUS at 06:34

## 2022-04-18 RX ADMIN — INSULIN LISPRO 2 UNITS: 100 INJECTION, SOLUTION INTRAVENOUS; SUBCUTANEOUS at 12:06

## 2022-04-18 RX ADMIN — INSULIN LISPRO 2 UNITS: 100 INJECTION, SOLUTION INTRAVENOUS; SUBCUTANEOUS at 17:10

## 2022-04-18 RX ADMIN — FERROUS SULFATE TAB EC 325 MG (65 MG FE EQUIVALENT) 325 MG: 325 (65 FE) TABLET DELAYED RESPONSE at 08:19

## 2022-04-18 RX ADMIN — SODIUM CHLORIDE: 9 INJECTION, SOLUTION INTRAVENOUS at 14:17

## 2022-04-18 RX ADMIN — METOPROLOL TARTRATE 25 MG: 25 TABLET, FILM COATED ORAL at 08:19

## 2022-04-18 RX ADMIN — INSULIN LISPRO 1 UNITS: 100 INJECTION, SOLUTION INTRAVENOUS; SUBCUTANEOUS at 21:05

## 2022-04-18 RX ADMIN — CLOPIDOGREL BISULFATE 75 MG: 75 TABLET ORAL at 08:19

## 2022-04-18 RX ADMIN — METOPROLOL TARTRATE 25 MG: 25 TABLET, FILM COATED ORAL at 17:11

## 2022-04-18 RX ADMIN — DOCUSATE SODIUM 100 MG: 100 CAPSULE, LIQUID FILLED ORAL at 21:05

## 2022-04-18 RX ADMIN — CLINDAMYCIN IN 5 PERCENT DEXTROSE 600 MG: 12 INJECTION, SOLUTION INTRAVENOUS at 16:37

## 2022-04-18 RX ADMIN — OXYCODONE AND ACETAMINOPHEN 2 TABLET: 5; 325 TABLET ORAL at 12:05

## 2022-04-18 RX ADMIN — ATORVASTATIN CALCIUM 10 MG: 10 TABLET, FILM COATED ORAL at 08:19

## 2022-04-18 RX ADMIN — CEFTRIAXONE 2000 MG: 2 INJECTION, POWDER, FOR SOLUTION INTRAMUSCULAR; INTRAVENOUS at 17:10

## 2022-04-18 RX ADMIN — OXYCODONE AND ACETAMINOPHEN 2 TABLET: 5; 325 TABLET ORAL at 21:05

## 2022-04-18 RX ADMIN — DOCUSATE SODIUM 100 MG: 100 CAPSULE, LIQUID FILLED ORAL at 08:20

## 2022-04-18 ASSESSMENT — ENCOUNTER SYMPTOMS
CHEST TIGHTNESS: 0
BLOOD IN STOOL: 0
NAUSEA: 0
WHEEZING: 0
DIARRHEA: 0
SHORTNESS OF BREATH: 0
ABDOMINAL PAIN: 0
CONSTIPATION: 0
COUGH: 0
VOMITING: 0

## 2022-04-18 ASSESSMENT — PAIN DESCRIPTION - PAIN TYPE
TYPE: ACUTE PAIN
TYPE: ACUTE PAIN

## 2022-04-18 ASSESSMENT — PAIN SCALES - GENERAL
PAINLEVEL_OUTOF10: 8
PAINLEVEL_OUTOF10: 4
PAINLEVEL_OUTOF10: 7
PAINLEVEL_OUTOF10: 8
PAINLEVEL_OUTOF10: 8

## 2022-04-18 ASSESSMENT — PAIN DESCRIPTION - ORIENTATION
ORIENTATION: LEFT
ORIENTATION: LEFT

## 2022-04-18 ASSESSMENT — PAIN DESCRIPTION - LOCATION
LOCATION: LEG
LOCATION: LEG

## 2022-04-18 NOTE — PROGRESS NOTES
Dr. Thornton Marrow office called re plan for OR. Plan is for AKA 4/19 at 1300.  Pt will be NPO after mn

## 2022-04-18 NOTE — PROGRESS NOTES
Progress Note  Podiatric Medicine and Surgery     Subjective     CC: Left heel wound    POD #6 s/p I&D of left foot (DOS: 4/12/2022)  Patient seen at bedside. He spoke with the vascular surgery team this morning and continues to be agreeable to an AKA. No new complaints overnight. Afebrile, VSS    HPI :  Jane Burton is a 67 y.o. male seen at [facility] [address] ED for complaints of erythema. Patient saw Dr. Ed Nina today in his office, Dr. Ed Nina instruct patient to present to the emergency room for admission, IV antibiotics and further work-up. 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 Also has a history of transmetatarsal irritation to the left foot. ROS: Denies N/V/F/C/SOB/CP. Otherwise negative except at stated in the HPI.      Medications:  Scheduled Meds:   cefTRIAXone (ROCEPHIN) IV  2,000 mg IntraVENous Q24H    clindamycin (CLEOCIN) IV  600 mg IntraVENous Q8H    amLODIPine  5 mg Oral Daily    clopidogrel  75 mg Oral Daily    docusate sodium  100 mg Oral BID    ferrous sulfate  325 mg Oral Daily with breakfast    [Held by provider] furosemide  40 mg Oral Every Other Day    metoprolol tartrate  25 mg Oral BID    atorvastatin  10 mg Oral Daily    sodium chloride flush  5-40 mL IntraVENous 2 times per day    insulin lispro  0-12 Units SubCUTAneous TID WC    insulin lispro  0-6 Units SubCUTAneous Nightly       Continuous Infusions:   sodium chloride Stopped (04/18/22 0634)    dextrose      sodium chloride Stopped (04/16/22 1740)       PRN Meds:oxyCODONE-acetaminophen **OR** oxyCODONE-acetaminophen, traMADol, glucagon (rDNA), dextrose, sodium chloride flush, sodium chloride, potassium chloride **OR** potassium alternative oral replacement **OR** potassium chloride, magnesium sulfate, ondansetron **OR** ondansetron, polyethylene glycol, acetaminophen **OR** acetaminophen, dextrose bolus (hypoglycemia) **OR** dextrose bolus (hypoglycemia), glucose, hydrALAZINE    Objective     Vitals:  Patient Vitals for the past 8 hrs:   BP Temp Temp src Pulse Resp SpO2 Weight   22 0717 (!) 149/49 98.8 °F (37.1 °C) Oral 71 16 95 % --   22 0420 -- -- -- -- -- -- 184 lb (83.5 kg)     Average, Min, and Max for last 24 hours Vitals:  TEMPERATURE:  Temp  Av.4 °F (36.9 °C)  Min: 97.7 °F (36.5 °C)  Max: 98.8 °F (37.1 °C)    RESPIRATIONS RANGE: Resp  Av  Min: 16  Max: 16    PULSE RANGE: Pulse  Av.3  Min: 66  Max: 73    BLOOD PRESSURE RANGE:  Systolic (91NTI), VHC:886 , Min:144 , WNH:682   ; Diastolic (67ZUK), ASQ:00, Min:49, Max:65      PULSE OXIMETRY RANGE: SpO2  Av %  Min: 95 %  Max: 97 %    I/O last 3 completed shifts: In: 2983.5 [P.O.:1235; I.V.:1424.3; IV Piggyback:324.2]  Out: 2725 [Urine:2725]    CBC:  Recent Labs     22  1254   WBC 18.1*   HGB 9.5*   HCT 29.6*           BMP:  Recent Labs     22  0554 22  0614   * 136   K 4.3 4.6    104   CO2 19* 22   BUN 36* 31*   CREATININE 3.07* 2.55*   GLUCOSE 92 135*   CALCIUM 7.6* 7.6*        Coags:  No results for input(s): APTT, PROT, INR in the last 72 hours. Lab Results   Component Value Date    SEDRATE >130 (H) 2022     No results for input(s): CRP in the last 72 hours. Lower Extremity Physical Exam: Dressings intact. No change to exposed digits. Exam from 4/15/2022  Vascular: DP and PT pulses are very faintly palpable. CFT >5 seconds. Hair growth is absent to the level of the distal foot. Minimal nonpitting edema to left foot      Neuro: Saph/sural/SP/DP/plantar sensation absent to light touch.     Musculoskeletal: Muscle strength not tested. Significant pain to palpation of the wound. Some pain to palpation to the posterior calf.   Gross deformity present with transmetatarsal amputation on the left and BKA on the right     Dermatologic:  Full-thickness open surgical wound noted at the posterior aspect of the left heel up to proximal to the ankle joint. Wound measures approximately 11 x 6 x 3 cm. Wound base is of exposed calcaneus and Achilles tendon along with fibronecrotic tissue. Sanguinous drainage with mild purulence noted. Erythema present surrounding surgical site with associated increase in warmth. Wound does probe to posterior calcaneus. Sinus tracking noted at the proximal aspect of the incision. No undermining, crepitus, fluctuance, or induration noted. Clinical Images:  See media tab. Imaging:   VL LOWER EXTREMITY ARTERIAL SEGMENTAL PRESSURES W PPG   Final Result      US RETROPERITONEAL COMPLETE   Final Result   1.  Echogenic kidneys which may be seen in medical renal disease. 2.  Questionable trace perinephric fluid bilaterally. 3.  Echogenic foci and debris versus blood products in the dependent portion   of the bladder. Clinical correlation is recommended. XR TIBIA FIBULA LEFT (2 VIEWS)   Final Result   Cellulitis. No evidence of osteomyelitis. XR FOOT LEFT (MIN 3 VIEWS)   Final Result   1. Soft tissue emphysema within the posterior aspect of the distal left lower   leg and adjacent to the calcaneus, concerning for necrotizing fasciitis   2. Large soft tissue ulceration along the dorsal aspect of the calcaneus,   with cortical destruction, consistent with osteomyelitis   3. Diffuse soft tissue swelling now present   4. Critical results were called by Dr. Jaquelin Booth to [place] on   4/11/2022 at 6:27 p.m. hours. Cultures:   Bone 4/12: Strep B  Surgical path: pending    Assessment   Rufina Scott is a 67 y.o. male with   1. Status post extensive I&D, left lower extremity (DOS: 4/12/2022)  2. History of TMA, left foot  3. History of BKA, right lower extremity  4. Unstageable pressure ulceration, left heel  5. 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 CKD  7.  Type II diabetes with neuropathy    Principal Problem:    Necrotizing fasciitis ([address]  Active Problems:    Acute kidney injury superimposed on CKD ([facility]    CKD (chronic kidney disease), stage III ([facility] [address]    Uncontrolled hypertension    Type 2 diabetes mellitus with stage 3 chronic kidney disease, with long-term current use of insulin ([facility])    Class 1 obesity with serious comorbidity in adult  Resolved Problems:    * No resolved hospital problems. *       Plan     · Patient seen at bedside and revisited plan for AKA of the left, he is still  agreeable. · Continue medical management per primary  · Continue anbx per ID: Rocephin, Clindamycin  · Vascular following--plan is a trip to the OR this week for BKA. · PVRs - calcific disease   · Dressing intact to the left foot, to be changed by nursing team: 1/4 inch  iodoform packing, DSD, light Ace. · No surgical intervention in the meantime by podiatry. · Discussed with Dr. Airam Cope.     Erin Carlton DPM   Podiatric Medicine & Surgery   4/18/2022 at 8:26 AM

## 2022-04-18 NOTE — PROGRESS NOTES
Renal Progress Note    Patient :  Caroline Lobe; 67 y.o. MRN# [id number]  Location:  2020/2020-01  Attending:  Shabbir Dow MD  Admit Date:  4/11/2022   Hospital Day: 7      Subjective:     Patient was seen and examined. No new issues reported overnight. BMP results from today not available, creatinine from yesterday 2.55 electrolytes stable. Has positive left diabetic foot infection with suspected necrotizing fasciitis and calcaneal osteomyelitis status post I&D 4/12/2022 infectious disease on board on antibiotics. Urine output documented as about 1.8 L in the last 24 hours. Currently on normal saline at 50 cc an hour. Patient to go for left AKA likely tomorrow.   Outpatient Medications:     Medications Prior to Admission: metoprolol tartrate (LOPRESSOR) 25 MG tablet, Take 25 mg by mouth 2 times daily  QUEtiapine (SEROQUEL) 25 MG tablet, Take 25 mg by mouth at bedtime  amLODIPine (NORVASC) 10 MG tablet, Take 0.5 tablets by mouth daily (Patient taking differently: Take 10 mg by mouth daily )  insulin glargine (BASAGLAR KWIKPEN) 100 UNIT/ML injection pen, Inject 10-20 Units into the skin 2 times daily Injects 20 units AM in the morning and 10 units at night (Patient taking differently: Inject 12 Units into the skin nightly )  [DISCONTINUED] furosemide (LASIX) 40 MG tablet, Take 1 tablet by mouth every other day  [DISCONTINUED] lisinopril (PRINIVIL;ZESTRIL) 10 MG tablet, Take 1 tablet by mouth daily  clopidogrel (PLAVIX) 75 MG tablet, Take 1 tablet by mouth daily  insulin aspart (NOVOLOG FLEXPEN) 100 UNIT/ML injection pen, Inject 0-10 Units into the skin 3 times daily as needed Per sliding scale  simvastatin (ZOCOR) 10 MG tablet, Take 10 mg by mouth nightly  traZODone (DESYREL) 150 MG tablet, Take 150 mg by mouth nightly  [DISCONTINUED] metoprolol succinate (TOPROL XL) 25 MG extended release tablet, Take 25 mg by mouth 2 times daily  [DISCONTINUED] budesonide-formoterol (SYMBICORT) 160-4.5 MCG/ACT AERO, Inhale 2 puffs into the lungs 2 times daily  [DISCONTINUED] ferrous sulfate (IRON 325) 325 (65 Fe) MG tablet, Take 325 mg by mouth daily (with breakfast)  docusate sodium (COLACE) 100 MG capsule, Take 100 mg by mouth 2 times daily   acetaminophen (TYLENOL) 325 MG tablet, Take 650 mg by mouth as needed for Pain Give 2 tabs = 650 MG by mouth every 4 hours prn   Insulin Syringe-Needle U-100 (ACCUSURE INS SYR 1CC/30GX5/16\") 30G X 5/16\" 1 ML MISC, by Does not apply route. Current Medications:     Scheduled Meds:    cefTRIAXone (ROCEPHIN) IV  2,000 mg IntraVENous Q24H    clindamycin (CLEOCIN) IV  600 mg IntraVENous Q8H    amLODIPine  5 mg Oral Daily    clopidogrel  75 mg Oral Daily    docusate sodium  100 mg Oral BID    ferrous sulfate  325 mg Oral Daily with breakfast    [Held by provider] furosemide  40 mg Oral Every Other Day    metoprolol tartrate  25 mg Oral BID    atorvastatin  10 mg Oral Daily    sodium chloride flush  5-40 mL IntraVENous 2 times per day    insulin lispro  0-12 Units SubCUTAneous TID WC    insulin lispro  0-6 Units SubCUTAneous Nightly     Continuous Infusions:    sodium chloride 50 mL/hr at 04/18/22 1122    dextrose      sodium chloride Stopped (04/16/22 1740)     PRN Meds:  oxyCODONE-acetaminophen **OR** oxyCODONE-acetaminophen, traMADol, glucagon (rDNA), dextrose, sodium chloride flush, sodium chloride, potassium chloride **OR** potassium alternative oral replacement **OR** potassium chloride, magnesium sulfate, ondansetron **OR** ondansetron, polyethylene glycol, acetaminophen **OR** acetaminophen, dextrose bolus (hypoglycemia) **OR** dextrose bolus (hypoglycemia), glucose, hydrALAZINE    Input/Output:       I/O last 3 completed shifts: In: 2983.5 [P.O.:1235; I.V.:1424.3; IV Piggyback:324.2]  Out: 2725 [Urine:2725].       Patient Vitals for the past 96 hrs (Last 3 readings):   Weight   04/18/22 0420 184 lb (83.5 kg)   04/17/22 0346 184 lb 0.7 oz (83.5 kg)   04/16/22 0029 184 lb (83.5 kg)       Vital Signs:   Temperature:  Temp: 97.9 °F (36.6 °C)  TMax:   Temp (24hrs), Av.4 °F (36.9 °C), Min:97.9 °F (36.6 °C), Max:98.8 °F (37.1 °C)    Respirations:  Resp: 16  Pulse:   Pulse: 67  BP:    BP: (!) 153/55  BP Range: Systolic (27PEF), PHX:199 , Min:144 , JZZ:147       Diastolic (35IJQ), LMK:98, Min:49, Max:56      Physical Examination:     General:  AAO x 3, speaking in full sentences, no accessory muscle use. HEENT: Atraumatic, normocephalic, no throat congestion, moist mucosa. Eyes:   Pupils equal, round and reactive to light, EOMI. Neck:   Supple  Chest:   Bilateral vesicular breath sounds, no rales or wheezes. Cardiac:  S1 S2 RR, no murmurs, gallops or rubs. Abdomen: Soft, non-tender, no masses or organomegaly, BS audible. :   No suprapubic or flank tenderness. Neuro:  AAO x 3, No FND. SKIN:  No rashes, good skin turgor. Extremities:  +ve trace LLEedema. Right lower extremity amputation.     Labs:       Recent Labs     22  1254   WBC 18.1*   RBC 3.76*   HGB 9.5*   HCT 29.6*   MCV 78.7*   MCH 25.3   MCHC 32.1   RDW 14.6*      MPV 8.5      BMP:   Recent Labs     22  0554 22  0614   * 136   K 4.3 4.6    104   CO2 19* 22   BUN 36* 31*   CREATININE 3.07* 2.55*   GLUCOSE 92 135*   CALCIUM 7.6* 7.6*      SPEP:  Lab Results   Component Value Date    PROT 7.6 2020     C3:     Lab Results   Component Value Date    C3 120 2022     C4:     Lab Results   Component Value Date    C4 19 2022     Hep BsAg:         Lab Results   Component Value Date    HEPBSAG REACTIVE 2013     Hep C AB:          Lab Results   Component Value Date    HEPCAB NONREACTIVE 2013     Urinalysis/Chemistries:      Lab Results   Component Value Date    NITRU NEGATIVE 2022    COLORU Yellow 2022    PHUR 5.5 2022    WBCUA TOO NUMEROUS TO COUNT 2022    RBCUA 5 TO 10 2022    MUCUS NOT REPORTED 2018    TRICHOMONAS NOT REPORTED 03/30/2018    YEAST FEW 02/23/2022    BACTERIA FEW 02/23/2022    SPECGRAV 1.012 02/23/2022    LEUKOCYTESUR LARGE 02/23/2022    UROBILINOGEN Normal 02/23/2022    BILIRUBINUR NEGATIVE 02/23/2022    GLUCOSEU NEGATIVE 02/23/2022    KETUA NEGATIVE 02/23/2022    AMORPHOUS NOT REPORTED 03/30/2018     Urine Sodium:     Lab Results   Component Value Date    JLUIS 65 09/17/2020     Urine Chloride:    Lab Results   Component Value Date    CLUR <15 12/15/2013      Urine Creatinine:     Lab Results   Component Value Date    LABCREA 66.7 04/12/2022     Radiology:     Reviewed. Assessment:     1. Acute Kidney Injury likely ATN due to underlying infection and also could have some azotemia for intravascular volume depletion from decreased intake, loop diuretics and ACE inhibitor usage with baseline creatinine of 2.0-2.1 mg/dl and creatinine still evolving. 2.  Left diabetic foot infection with suspected necrotizing fasciitis and calcaneal osteomyelitis status post I&D 4/12/2022 infectious disease on board on antibiotics. 3.  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 4.  Peripheral vascular disease status post right lower Covid amputation in 2014.  5.  Essential hypertension. 6.  Diabetes type 2.  7.  Non-anion gap metabolic acidosis. Plan:   1. Continue IV fluids with normal saline 50 cc an hour. 2.  Continue monitor strict I's and O's and renal function. 3.  Antibiotics per infectious disease per renal dosing. 4.  Likely OR in a.m. for left AKA. 5.  BMP in AM.  6.  Will follow. Nutrition   Please ensure that patient is on a renal diet/TF. Avoid nephrotoxic drugs/contrast exposure. We will continue to follow along with you. Markos Phelan MD  [facility]     This note is created with the assistance of a speech-recognition program. While intending to generate a document that actually reflects the content of the visit, no guarantees can be provided that every mistake has been identified and corrected by editing.

## 2022-04-18 NOTE — PROGRESS NOTES
Occupational Therapy  Facility/Department: [facility] MED SURG  Daily Treatment Note  NAME: Sara Norman  : 1950  MRN: [id number]    Date of Service: 2022    Discharge Recommendations:  Patient would benefit from continued therapy after discharge   Pt currently functioning below baseline. Would suggest additional therapy at time of discharge to maximize long term outcomes and prevent re-admission. Please refer to AM-PAC score for current level of function. Assessment   Performance deficits / Impairments: Decreased strength;Decreased ADL status; Decreased ROM; Decreased endurance;Decreased sensation;Decreased cognition;Decreased safe awareness;Decreased balance;Decreased coordination  Assessment: Pt slowly progressing towards goals but is limited by overall decreased strength/endurance. Pt req's 2 staff assist at this time for all functional tasks. Skilled OT indicated for deficits with overall safety awareness, balance, endurance, strength and safety with ADL transfer techs to increase overall function and independence. Prognosis: Fair;Good  REQUIRES OT FOLLOW UP: Yes  Activity Tolerance  Activity Tolerance: Patient Tolerated treatment well  Activity Tolerance: P+  Safety Devices  Safety Devices in place: Yes  Type of devices: All fall risk precautions in place; Left in chair;Nurse notified;Call light within reach; Chair alarm in place;Gait belt;Patient at risk for falls         Patient Diagnosis(es): The encounter diagnosis was Necrotizing fasciitis of ankle and foot ([address]       has a past medical history of RISSA (acute kidney injury) ([address] Cerebrovascular disease, Erectile dysfunction, Hepatitis B infection, Hyperlipidemia, Hypertension, Kidney cysts, Liver cyst, MRSA (methicillin resistant staph aureus) culture positive, Neurotrophic ulcer of the foot ([address] Obesity, Osteomyelitis of ankle or foot, Peripheral vascular disease ([address] Tobacco abuse, and Type II or unspecified type diabetes mellitus without mention of complication, not stated as uncontrolled. has a past surgical history that includes Foot amputation through metatarsal (2011); Foot Amputation; Foot surgery (Right, 3/2013); Foot Debridement (Left, 6/20/2020); and Foot Debridement (Left, 4/12/2022). Restrictions  Restrictions/Precautions  Restrictions/Precautions: Weight Bearing,General Precautions,Fall Risk  Required Braces or Orthoses?: No  Lower Extremity Weight Bearing Restrictions  Right Lower Extremity Weight Bearing: Non Weight Bearing  Left Lower Extremity Weight Bearing: Non Weight Bearing  Partial Weight Bearing Percentage Or Pounds: No WB orders, but had I&D of left heel 4/12 and states he cannot put weight on it. Position Activity Restriction  Other position/activity restrictions: IV, telemetry, Right BKA with prosthesis, left transmetatarsal amp with wound on heel  Subjective   General  Chart Reviewed: Yes  Patient assessed for rehabilitation services?: Yes  Response to previous treatment: Patient with no complaints from previous session  Family / Caregiver Present: No  Subjective  Subjective: Pt in bed upon arrival and agreeable to therapy. General Comment  Comments: RN ok'd pt for therapy. Orientation  Orientation  Overall Orientation Status: Within Functional Limits  Objective    ADL  LE Dressing: Maximum assistance;Dependent/Total        Balance  Sitting Balance: Stand by assistance  Standing Balance: Moderate assistance (x2)  Bed mobility  Supine to Sit: Minimal assistance; Moderate assistance;2 Person assistance (Verbal/tactile cues for sequencing movements, use of bed rail and overall safety.)  Transfers  Stand Step Transfers: Moderate assistance;2 Person assistance  Sit to stand: Moderate assistance;2 Person assistance  Stand to sit: Moderate assistance;2 Person assistance  Transfer Comments: Max verbal cues for hand placement, nose over toes, upright posture, safety with walker and WB prec. Cognition  Overall Cognitive Status: Exceptions  Arousal/Alertness: Appropriate responses to stimuli  Following Commands: Follows one step commands with increased time; Follows one step commands with repetition  Attention Span: Appears intact; Attends with cues to redirect  Memory: Decreased short term memory  Safety Judgement: Decreased awareness of need for assistance;Decreased awareness of need for safety  Problem Solving: Assistance required to generate solutions;Assistance required to implement solutions;Assistance required to correct errors made;Assistance required to identify errors made;Decreased awareness of errors  Insights: Decreased awareness of deficits  Initiation: Requires cues for some  Sequencing: Requires cues for some     Perception  Overall Perceptual Status: [facility]                                   Plan   Plan  Times per week: 4-5x a week, 1-2x per day  Current Treatment Recommendations: Strengthening,ROM,Balance Training,Endurance Training,Positioning,Safety Education & Training,Patient/Caregiver Education & Training,Self-Care / ADL                                   AM-PAC Score        AM-[facility] Inpatient Daily Activity Raw Score: 16 (04/18/22 1310)  AM-PAC Inpatient ADL T-Scale Score : 35.96 (04/18/22 1310)  ADL Inpatient CMS 0-100% Score: 53.32 (04/18/22 1310)  ADL Inpatient CMS G-Code Modifier : CK (04/18/22 1310)    Goals  Short term goals  Time Frame for Short term goals: By discharge pt will  Short term goal 1: demo min Ax1 for ADL transfers with appropriate device/technique and good safety/pacing. Short term goal 2: demo SBA for UB ADLs and min A x1 for LB ADLs with good safety/pacing and use of AD/DME as needed. Short term goal 3: demo min A x1 for toileting tasks with good safety/pacing and use of AD/DME as needed. Short term goal 4:  Increase B UE strength by 1/2 grade to assist with ADL tasks and functional transfers  Short term goal 5: demo and verb good understanding of EC/WS, ADL transfer techs, fall prevention techs, B UE HEP, possible equip needs, use of DME/AD as needed, pressure relief, d/c reccommendations and surgery precautions. Therapy Time   Individual Concurrent Group Co-treatment   Time In       3980   Time Out       0922   Minutes       32        Co-treatment with PT warranted secondary to decreased safety and independence requiring 2 skilled therapy professionals to address individual discipline's goals. OT addressing \"preparation for ADL transfer\",\"sitting balance for increased ADL performance\",\"sitting/activity tolerance\",\"functional reaching\",\"environmental safety/scanning\",\"fall prevention\",\"functional mobility for ADL transfers\",\"ability to sequence and follow directions\",\"functional UE strength\".       Stanislaw Wellington OLIVER

## 2022-04-18 NOTE — PLAN OF CARE
Problem: Infection:  Goal: Will remain free from infection  Description: Will remain free from infection  4/18/2022 1457 by Nick Rae RN  Outcome: Ongoing  4/18/2022 0124 by Glenis Strong RN  Outcome: Ongoing     Problem: Safety:  Goal: Free from accidental physical injury  Description: Free from accidental physical injury  4/18/2022 1457 by Nick Rae RN  Outcome: Ongoing  4/18/2022 0124 by Glenis Strong RN  Outcome: Ongoing     Problem: Daily Care:  Goal: Daily care needs are met  Description: Daily care needs are met  4/18/2022 1457 by Nick Rae RN  Outcome: Ongoing  4/18/2022 0124 by Glenis Strong RN  Outcome: Ongoing     Problem: Pain:  Description: Pain management should include both nonpharmacologic and pharmacologic interventions. Goal: Patient's pain/discomfort is manageable  Description: Patient's pain/discomfort is manageable  4/18/2022 1457 by Nick Rae RN  Outcome: Ongoing  4/18/2022 0124 by Glenis Strong RN  Outcome: Ongoing  Note: Pt medicated with pain medication prn. Assessed all pain characteristics including level, type, location, frequency, and onset. Non-pharmacologic interventions offered to pt as well. Pt states pain is tolerable at this time.  Will continue to monitor   Goal: Pain level will decrease  Description: Pain level will decrease  4/18/2022 1457 by Nick Rae RN  Outcome: Ongoing  4/18/2022 0124 by Glenis Strong RN  Outcome: Ongoing  Goal: Control of acute pain  Description: Control of acute pain  4/18/2022 1457 by Nick Rae RN  Outcome: Ongoing  4/18/2022 0124 by Glenis Strong RN  Outcome: Ongoing  Goal: Control of chronic pain  Description: Control of chronic pain  4/18/2022 1457 by Nick Rae RN  Outcome: Ongoing  4/18/2022 0124 by Glenis Strong RN  Outcome: Ongoing     Problem: Skin Integrity:  Goal: Skin integrity will stabilize  Description: Skin integrity will stabilize  4/18/2022 1457 by Carito Osuna RN  Outcome: Ongoing  4/18/2022 0124 by Leodan Gunn RN  Outcome: Ongoing  Note: Skin integrity maintained, no new skin abnormalities assessed. Appropriate measures remain in place Skin assessment ongoing. Pressure ulcer preventions being practiced - see charting for details. Patient turned every two hours. Problem: Discharge Planning:  Goal: Patients continuum of care needs are met  Description: Patients continuum of care needs are met  4/18/2022 1457 by Carito Osuna RN  Outcome: Ongoing  4/18/2022 0124 by Leodan Gunn RN  Outcome: Ongoing     Problem: Skin Integrity:  Goal: Will show no infection signs and symptoms  Description: Will show no infection signs and symptoms  4/18/2022 1457 by Carito Osuna RN  Outcome: Ongoing  4/18/2022 0124 by Leodan Gunn RN  Outcome: Ongoing  Goal: Absence of new skin breakdown  Description: Absence of new skin breakdown  4/18/2022 1457 by Carito Osuna RN  Outcome: Ongoing  4/18/2022 0124 by Leodan Gunn RN  Outcome: Ongoing  Goal: Risk for impaired skin integrity will decrease  Description: Risk for impaired skin integrity will decrease  4/18/2022 1457 by Carito Osuna RN  Outcome: Ongoing  4/18/2022 0124 by Leodan Gunn RN  Outcome: Ongoing     Problem: Falls - Risk of:  Goal: Will remain free from falls  Description: Will remain free from falls  4/18/2022 1457 by Carito Osuna RN  Outcome: Ongoing  4/18/2022 0124 by Leodan Gunn RN  Outcome: Ongoing  Note: Patient is a fall risk during this admission. Fall risk assessment was performed. Patient is absent of falls. Bed is in the lowest position. Wheels on the bed are locked. Call light and bed side table are within reach. Clutter is removed.  Patient was educated to call out when needing assistance or wanting to get out of bed. Patient offered toileting assistance during rounding. Hourly rounds have been performed. Goal: Absence of physical injury  Description: Absence of physical injury  4/18/2022 1457 by Lucinda Hardy RN  Outcome: Ongoing  4/18/2022 0124 by Cristóbal Ovalles RN  Outcome: Ongoing     Problem: Nutrition  Goal: Optimal nutrition therapy  4/18/2022 1457 by Lucinda Hardy RN  Outcome: Ongoing  4/18/2022 0124 by Cristóbal Ovalles RN  Outcome: Ongoing     Problem:  Activity:  Goal: Risk for activity intolerance will decrease  Description: Risk for activity intolerance will decrease  4/18/2022 1457 by Lucinda Hardy RN  Outcome: Ongoing  4/18/2022 0124 by Cristóbal Ovalles RN  Outcome: Ongoing     Problem: Coping:  Goal: Ability to adjust to condition or change in health will improve  Description: Ability to adjust to condition or change in health will improve  4/18/2022 1457 by Lucinda Hardy RN  Outcome: Ongoing  4/18/2022 0124 by Cristóbal Ovalles RN  Outcome: Ongoing     Problem: Fluid Volume:  Goal: Ability to maintain a balanced intake and output will improve  Description: Ability to maintain a balanced intake and output will improve  4/18/2022 1457 by Lucinda Hardy RN  Outcome: Ongoing  4/18/2022 0124 by Cristóbal Ovalles RN  Outcome: Ongoing     Problem: Health Behavior:  Goal: Ability to identify and utilize available resources and services will improve  Description: Ability to identify and utilize available resources and services will improve  4/18/2022 1457 by Lucinda Hardy RN  Outcome: Ongoing  4/18/2022 0124 by Cristóbal Ovalles RN  Outcome: Ongoing  Goal: Ability to manage health-related needs will improve  Description: Ability to manage health-related needs will improve  4/18/2022 1457 by Lucinda Hardy RN  Outcome: Ongoing  4/18/2022 0124 by Cristóbal Ovalles RN  Outcome: Ongoing     Problem: Metabolic:  Goal: star and Stay with Me signs posted    Checked for incontinence every 2 hours and prn. Pericare as needed. Assisted to reposition off back frequently. On waffle mattress. Heels off bed with pillows.

## 2022-04-18 NOTE — PROGRESS NOTES
[facility]  Office: [address], DO, Dexter Palacios, DO, Mariam Loja, DO, Amber Santana, DO, Mandeep Campbell MD, Alicia Lombardi MD, Darryle So, MD, Belle Guerra MD, Alexx Carrasquillo MD, Jaime Naranjo MD, June Tineo MD, Jayce Hays, DO, Herberth Cheatham DO, Alicia Caraballo MD,  Julianne Amaya DO, Sameer Tan MD, Jimenez Cervantes MD, Will Frye MD, Mat Orona DO, Ramin Gutierrez MD, Rosanna Colon MD, Lydia Quintanilla, CNP, [facility], CNP, Clint Whitmore, CNP, Abdoulaye Schultz, CNP, Siddharth Fung, CNP, Juanis Bangura, CNP, Shaye Choi, PASukhdeepC, Teresa Nelson, DNP, Reyes Pearson, CNP, Katie Bingham, CNP, Wilfred Gonzalez, CNP, Rylan Verdugo, CNS, Юлия Bird, KAI, Felecia June, CNP, Divina Almeida, CNP, Stephenie Bey, CNP         [facility]    Progress Note    4/18/2022    8:04 AM    Name:   Sai Ho  MRN:     [id number]     Acct:      [de-identified]   Room:   2020/2020-01   Day:  7  Admit Date:  4/11/2022  5:25 PM    PCP:   Jose Astudillo  Code Status:  Full Code    Subjective:     C/C:   Chief Complaint   Patient presents with    Wound Infection     Sent over by PCP for wound evaluation in foot (left)      Interval History Status: stable    Patient seen and examined at bedside,  Feeling better today, sitting eating BKFST, await amputation  Afebrile overnight  His pain is controlled  Patient vitals, labs and all providers notes were reviewed,from overnight shift and morning updates were noted and discussed with the nurse    Brief History:     Per my NP  Sai Ho is a 67 y.o. Non- / non  male who presents with Wound Infection (Sent over by PCP for wound evaluation in foot (left) )   and is admitted to the hospital for the management of Necrotizing fasciitis ([facility] [address]    Patient is a poor historian is does not recall how long he has been undergoing treatment for a left heel ulcer.  He denies fever, but says he has had chills for \"months\". He has a known history of uncontrolled diabetes and says he checks his blood sugar in the morning and at night- BG range has been 140s to 200s, he says. He is a daily smoker. He recalls he went to Dr Todd/ podiatry for left heel care and was advised to come to the hospital for further evaluation and care.      While in the ED, xray of the left tib fib revealed soft tissue swelling, cellulitis, and no evidence of osteomyelitis. Left foot xray results were concerning for necrotizing fasciitis and osteomyelitis. His WBC was elevated at 18.5, sed rate > 130, creat 2.44- baseline appears to be 1.36-2.06. Records list Stage 3 CKD. Patient denies having a nephrologist. His lactic was unremarkable.      He was started on IV Vanco. He is being admitted for further management of necrotizing fasciitis of left foot and RISSA. Podiatry was consulted and plan for patient to undergo I & D of the left heel and bone biopsy 4/13/22. Review of Systems:     Review of Systems   Constitutional: Positive for activity change. Negative for chills, diaphoresis and fever. HENT: Negative for congestion. Eyes: Negative for visual disturbance. Respiratory: Negative for cough, chest tightness, shortness of breath and wheezing. Cardiovascular: Negative for chest pain, palpitations and leg swelling. Gastrointestinal: Negative for abdominal pain, blood in stool, constipation, diarrhea, nausea and vomiting. Genitourinary: Negative for difficulty urinating. Musculoskeletal:        LLE pain   Neurological: Positive for weakness. Negative for dizziness, light-headedness, numbness and headaches. All other systems reviewed and are negative. Medications:      Allergies:  No Known Allergies    Current Meds:   Scheduled Meds:    cefTRIAXone (ROCEPHIN) IV  2,000 mg IntraVENous Q24H    clindamycin (CLEOCIN) IV  600 mg IntraVENous Q8H    amLODIPine  5 mg Oral Daily    clopidogrel  75 mg Oral Daily    docusate sodium  100 mg Oral BID    ferrous sulfate  325 mg Oral Daily with breakfast    [Held by provider] furosemide  40 mg Oral Every Other Day    metoprolol tartrate  25 mg Oral BID    atorvastatin  10 mg Oral Daily    sodium chloride flush  5-40 mL IntraVENous 2 times per day    insulin lispro  0-12 Units SubCUTAneous TID WC    insulin lispro  0-6 Units SubCUTAneous Nightly     Continuous Infusions:    sodium chloride Stopped (04/18/22 3840)    dextrose      sodium chloride Stopped (04/16/22 1740)     PRN Meds: oxyCODONE-acetaminophen **OR** oxyCODONE-acetaminophen, traMADol, glucagon (rDNA), dextrose, sodium chloride flush, sodium chloride, potassium chloride **OR** potassium alternative oral replacement **OR** potassium chloride, magnesium sulfate, ondansetron **OR** ondansetron, polyethylene glycol, acetaminophen **OR** acetaminophen, dextrose bolus (hypoglycemia) **OR** dextrose bolus (hypoglycemia), glucose, hydrALAZINE    Data:     Past Medical History:   has a past medical history of RISSA (acute kidney injury) ([facility] [address] Cerebrovascular disease, Erectile dysfunction, Hepatitis B infection, Hyperlipidemia, Hypertension, Kidney cysts, Liver cyst, MRSA (methicillin resistant staph aureus) culture positive, Neurotrophic ulcer of the foot ([facility] [address] Obesity, Osteomyelitis of ankle or foot, Peripheral vascular disease ([facility] [address] Tobacco abuse, and Type II or unspecified type diabetes mellitus without mention of complication, not stated as uncontrolled. Social History:   reports that he has been smoking cigarettes. He has a 15.00 pack-year smoking history. He has never used smokeless tobacco. He reports that he does not drink alcohol and does not use drugs.      Family History:   Family History   Problem Relation Age of Onset    Diabetes Mother     Diabetes Sister     Diabetes Brother     Diabetes Sister     Diabetes Sister     Diabetes Sister        Vitals:  BP (!) 149/49   Pulse 71 is cooperative. Assessment:        Hospital Problems           Last Modified POA    * (Principal) Necrotizing fasciitis ([facility] [address] 4/12/2022 Yes    Acute kidney injury superimposed on CKD ([address] 4/11/2022 Yes    CKD (chronic kidney disease), stage III ([address] 4/11/2022 Yes    Uncontrolled hypertension 4/12/2022 Yes    Type 2 diabetes mellitus with stage 3 chronic kidney disease, with long-term current use of insulin ([address] 4/12/2022 Yes    Class 1 obesity with serious comorbidity in adult 4/12/2022 Yes          Plan:         L heel wound infection/ necrotizing fasciitis  with possible underlying Osteomyelitis : he ie S/P extensive I&D and biopsy sent on 4/12, post op management  per podiatry, ID consulted, currently on rocephin and clindamycin . Adjust pain meds for better pain control    PVD : vascular consulted,plan for amputation, patient is in agreement      H/O R BKA    RISSA/CKD stage III: Baseline creatinine between 2-2 0.2, it is worsening today ,likely 2/2 infection  nephrology recommendations , unclear if the patient has nephrology follow-up as an outpatient, he also does not follow-up with his PCP   Continue to hold Lasix and Ace      AGMA : likely 2/2 above , resolved, currently off  changes to include bicarb per nephrology, he is still currently on bicarb drip. AOCD : monitor and continue support     HTN:  Ace held, continue BB continue to monitor     HPL: continue home medications    DM2: With hyperglycemia , target -180 , Continue diabetes home medications , insulin sliding scale, hypoglycemia protocol    DVT prophylaxis    Discussed with vascular surgery resident/ nurse , plan so far is  Dr Marquita Pineda to proceed with L sided AKA tomorrow in [address] [address] [facility].     Discussed with the patient and the nurse      Addendum : urine is turbid and foul: ok to send for culture     Allegra Damon MD  4/18/2022  8:04 AM

## 2022-04-18 NOTE — PROGRESS NOTES
Physical Therapy  Facility/Department: [facility]  Daily Treatment Note  NAME: Mary Vazquez  : 1950  MRN: [id number]    Date of Service: 2022   RN Prabhu Koch reports patient is medically stable for therapy treatment this date. Chart reviewed prior to treatment and patient is agreeable for therapy. All lines intact and patient positioned comfortably at end of treatment. All patient needs addressed prior to ending therapy session. Discharge Recommendations:  Pt currently functioning below baseline. Would suggest additional therapy at time of discharge to maximize long term outcomes and prevent re-admission. Please refer to AM-PAC score for current level of function. Patient would benefit from continued therapy after discharge        Assessment   Body structures, Functions, Activity limitations: Decreased functional mobility ; Decreased strength;Decreased cognition;Decreased safe awareness;Decreased endurance  Assessment: Pt tolerated session fair. Pt progressing towards STGs this date and agreeable to get OOB to bedside recliner. Pt continues to require skilled 2 person assist for safe functional mobility at this time given strength, ROM, and balance deficits. Pt would benefit from continued skilled PT to address deficits in order to maximize independence w/ functional mobility. Prognosis: Good  Decision Making: High Complexity  PT Education: Goals;PT Role;Plan of Care;General Safety;Transfer Training;Pressure Relief  Patient Education: Pt educated on: importance of continued mobility throughout admission, general safety awareness, safe transfers w/ RW, fall risk prevention, importance of pressure relief techniques to maintain skin integrity. Pt w/ poor return demo. Pt requires continued reinforcement of education.   REQUIRES PT FOLLOW UP: Yes  Activity Tolerance  Activity Tolerance: Patient limited by endurance     Patient Diagnosis(es): The encounter diagnosis was Necrotizing fasciitis of repetition  Attention Span: Appears intact; Attends with cues to redirect  Memory: Decreased short term memory  Safety Judgement: Decreased awareness of need for assistance;Decreased awareness of need for safety  Problem Solving: Assistance required to generate solutions;Assistance required to implement solutions;Assistance required to correct errors made;Assistance required to identify errors made;Decreased awareness of errors  Insights: Decreased awareness of deficits  Initiation: Requires cues for some  Sequencing: Requires cues for some  Objective   Bed mobility  Supine to Sit: Minimal assistance; Moderate assistance;2 Person assistance  Sit to Supine:  (Not assessed this date. Pt retired in bedside chair upon writer's exit.)  Comment: Pt w/ difficulty throughout bed mobility this date requiring assist w/ progression of trunk and BLE. Pt requiring increased time and effort to perform tasks throughout. Upon sitting EOB, pt requiring SBA for safe sitting balance. Transfers  Sit to Stand: Moderate Assistance;2 Person Assistance  Stand to sit: Moderate Assistance;2 Person Assistance  Stand Pivot Transfers: Moderate Assistance;2 Person Assistance  Comment: Pt performed 1 STS transfer for stand-pivot transfer to bedside recliner. Pt requiring skilled 2 person assist w/ RW for safety throughout. Pt able to stand using R prosthesis w/ heavy reliance on RW for UE assist noted upon standing. Pt standing w/ forward flexed trunk and requiring max verbal cueing to correct w/ poor return demo. Assist for safe line mgmt required throughout.   Ambulation  Ambulation?: No (Pt reports stand-pivoting to electric WC at baseline)     Balance  Posture: Fair  Sitting - Static: Good  Sitting - Dynamic: Good;-  Standing - Static: Fair;-  Standing - Dynamic: Poor;+  Single Leg Stance R Le  Single Leg Stance L Le  Comments: Standing balance assessed w/ RW  Exercises  Hip Flexion: Seated marches x 10  Knee Long Arc Quad: x 10  Upper Extremity: Shoulder rows x 5  Comments: Pt requiring max verbal cueing to maintain attention to task w/ poor return demo. Comment: Assisted w/ use of urinal while sitting EOB        AM-PAC Score  AM-PAC Inpatient Mobility Raw Score : 15 (04/18/22 1350)  AM-PAC Inpatient T-Scale Score : 39.45 (04/18/22 1350)  Mobility Inpatient CMS 0-100% Score: 57.7 (04/18/22 1350)  Mobility Inpatient CMS G-Code Modifier : CK (04/18/22 1350)       Functional Outcome Measure-   Single Leg Stance Test:  0 sec. (<5 sec.= fall risk)    Goals  Short term goals  Time Frame for Short term goals: 12 visits  Short term goal 1: Patient will be indep with bed mobility. Short term goal 2: Patient will be min x 1 with slide board transfers. Short term goal 3: Patient will have good dynamic seated balance. Short term goal 4: Patient will tolerate 30 minutes of ther-ex an dther-act. Short term goal 5: Patient will be indep with HEP. Patient Goals   Patient goals : return home    Plan    Plan  Times per week: 1-2x/d, 5-6 d/wk  Current Treatment Recommendations: Strengthening,Balance Training,Functional Mobility Training,Transfer Training,Endurance Training,Patient/Caregiver Education & Training,Home Exercise Program,Safety Education & Training  Safety Devices  Type of devices: Call light within reach,Chair alarm in place,Gait belt,Nurse notified,Left in chair  Restraints  Initially in place: No     Therapy Time   Individual Concurrent Group Co-treatment   Time In 0855         Time Out 0923         Minutes 29                Co-treatment with OT warranted secondary to decreased safety and independence requiring 2 skilled therapy professionals to address individual discipline's goals.      Asiya Reyes, PT

## 2022-04-18 NOTE — PROGRESS NOTES
[facility] -   Infectious diseases evaluation  admission date 4/11/2022    reason for consultation:   Left foot infection    Impression :   Current:  · Diabetic left foot infection with suspected necrotizing fasciitis and calcaneal osteomyelitis. Status post incision and debridement 4/12/22  · Peripheral vascular disease  · Diabetes mellitus  · Chronic renal disease  · History of right below-knee amputation      Recommendations        · IV ceftriaxone and clindamycin   · Plan for amputation per vascular surgery  · Follow CBC and renal function        History of Present Illness:   Initial history:  Caroline Harper is a 67y.o.-year-old male was sent to hospital from Dr. Lizabeth Bonilla office for worsening left heel pain associated with left heel wound with surrounding dark discoloration and redness. The pain is severe, intermittent, no alleviating or aggravating factors. Left foot x-ray showed soft tissue emphysema within the posterior aspect of the distal left lower leg concerning for necrotizing fasciitis and possible calcaneal osteomyelitis. Initial WBC 18.5, creatinine 2.44, C-reactive protein more than 130, C-reactive protein 104.2, lactic acid 0 point  Afebrile  History of right below-knee amputation  Interval changes  4/18/2022   He is afebrile, denied increased pain, denied fever or chills, denied nausea or vomiting, no other complaints  Proteus mirabilis pansensitive and group B streptococcus growth on bone culture from 04/12/2022. No growth on blood cultures from 4/11/2022  Patient Vitals for the past 8 hrs:   BP Temp Temp src Pulse Resp SpO2   04/18/22 1125 (!) 153/55 97.9 °F (36.6 °C) Oral 67 16 98 %   04/18/22 0717 (!) 149/49 98.8 °F (37.1 °C) Oral 71 16 95 %     . I have personally reviewed the past medical history, past surgical history, medications, social history, and family history, and I haveupdated the database accordingly.       Allergies:   Patient has no known allergies. Review of Systems:     Review of Systems  As per history present illness, other than above 12 system review was negative  Physical Examination :       Physical Exam  Constitutional:       General: He is not in acute distress. HENT:      Head: Normocephalic and atraumatic. Right Ear: External ear normal.      Left Ear: External ear normal.   Eyes:      General: No scleral icterus. Conjunctiva/sclera: Conjunctivae normal.   Cardiovascular:      Rate and Rhythm: Normal rate and regular rhythm. Pulses: Normal pulses. Pulmonary:      Effort: Pulmonary effort is normal. No respiratory distress. Breath sounds: Normal breath sounds. Abdominal:      General: Bowel sounds are normal. There is no distension. Palpations: Abdomen is soft. Musculoskeletal:      Comments: Left foot dressing  Right below-knee amputation   Skin:     General: Skin is warm. Coloration: Skin is not jaundiced. Neurological:      General: No focal deficit present. Mental Status: He is oriented to person, place, and time.          Past Medical History:     Past Medical History:   Diagnosis Date    RISSA (acute kidney injury) ([address]     Cerebrovascular disease     Erectile dysfunction     Hepatitis B infection     Hyperlipidemia     Hypertension     Kidney cysts     Liver cyst     MRSA (methicillin resistant staph aureus) culture positive 1/10/2014    right foot    Neurotrophic ulcer of the foot ([address]     Obesity     Osteomyelitis of ankle or foot     Peripheral vascular disease ([address]     Tobacco abuse     Type II or unspecified type diabetes mellitus without mention of complication, not stated as uncontrolled        Past Surgical  History:     Past Surgical History:   Procedure Laterality Date    FOOT AMPUTATION      FOOT AMPUTATION THROUGH METATARSAL  2011    pt can't give exact date of surg-- toes and part of lt foot removed    FOOT DEBRIDEMENT Left 6/20/2020    WOUND BED PREPARATION AND WOUND VAC APPLICATION performed by Roberto Bach DPM at [address] Left 4/12/2022    FOOT DEBRIDEMENT INCISION AND DRAINAGE WITH BX performed by Roberto Bach DPM at [address] Right 3/2013       Medications:      cefTRIAXone (ROCEPHIN) IV  2,000 mg IntraVENous Q24H    clindamycin (CLEOCIN) IV  600 mg IntraVENous Q8H    amLODIPine  5 mg Oral Daily    clopidogrel  75 mg Oral Daily    docusate sodium  100 mg Oral BID    ferrous sulfate  325 mg Oral Daily with breakfast    [Held by provider] furosemide  40 mg Oral Every Other Day    metoprolol tartrate  25 mg Oral BID    atorvastatin  10 mg Oral Daily    sodium chloride flush  5-40 mL IntraVENous 2 times per day    insulin lispro  0-12 Units SubCUTAneous TID WC    insulin lispro  0-6 Units SubCUTAneous Nightly       Social History:     Social History     Socioeconomic History    Marital status:      Spouse name: Not on file    Number of children: Not on file    Years of education: Not on file    Highest education level: Not on file   Occupational History    Not on file   Tobacco Use    Smoking status: Current Every Day Smoker     Packs/day: 0.50     Years: 30.00     Pack years: 15.00     Types: Cigarettes    Smokeless tobacco: Never Used    Tobacco comment: working on quitting smoking   Vaping Use    Vaping Use: Never used   Substance and Sexual Activity    Alcohol use: No    Drug use: Never    Sexual activity: Yes     Partners: Female   Other Topics Concern    Not on file   Social History Narrative    Not on file     Social Determinants of Health     Financial Resource Strain:     Difficulty of Paying Living Expenses: Not on file   Food Insecurity:     Worried About Running Out of Food in the Last Year: Not on file    Marika of Food in the Last Year: Not on file   Transportation Needs:     Lack of Transportation (Medical): Not on file    Lack of Transportation (Non-Medical):  Not on file   Physical Activity:     Days of Exercise per Week: Not on file    Minutes of Exercise per Session: Not on file   Stress:     Feeling of Stress : Not on file   Social Connections:     Frequency of Communication with Friends and Family: Not on file    Frequency of Social Gatherings with Friends and Family: Not on file    Attends [employer] Services: Not on file    Active Member of Clubs or Organizations: Not on file    Attends Club or Organization Meetings: Not on file    Marital Status: Not on file   Intimate Partner Violence:     Fear of Current or Ex-Partner: Not on file    Emotionally Abused: Not on file    Physically Abused: Not on file    Sexually Abused: Not on file   Housing Stability:     Unable to Pay for Housing in the Last Year: Not on file    Number of [place] in the Last Year: Not on file    Unstable Housing in the Last Year: Not on file       Family History:     Family History   Problem Relation Age of Onset    Diabetes Mother     Diabetes Sister     Diabetes Brother     Diabetes Sister     Diabetes Sister     Diabetes Sister       Medical Decision Making:   I have independently reviewed/ordered the following labs:    CBC with Differential:   Recent Labs     04/16/22  1254   WBC 18.1*   HGB 9.5*   HCT 29.6*      LYMPHOPCT 9*   MONOPCT 5     BMP:  Recent Labs     04/16/22  0554 04/17/22  0614   * 136   K 4.3 4.6    104   CO2 19* 22   BUN 36* 31*   CREATININE 3.07* 2.55*     Hepatic Function Panel: No results for input(s): PROT, LABALBU, BILIDIR, IBILI, BILITOT, ALKPHOS, ALT, AST in the last 72 hours. No results for input(s): RPR in the last 72 hours. No results for input(s): HIV in the last 72 hours. No results for input(s): BC in the last 72 hours. Lab Results   Component Value Date    CREATININE 2.55 04/17/2022    GLUCOSE 135 04/17/2022    GLUCOSE 133 10/18/2011       Detailed results:         Thank you for allowing us to participate in the care of this patient. Please call with questions. This note is created with the assistance of a speech recognition program.  While intending to generate adocument that actually reflects the content of the visit, the document can still have some errors including those of syntax and sound a like substitutions which may escape proof reading. It such instances, actual meaningcan be extrapolated by contextual diversion.     Uriel Rodriguez MD  Office: [phone]  Perfect serve / office [phone]

## 2022-04-18 NOTE — PLAN OF CARE
Problem: Infection:  Goal: Will remain free from infection  Description: Will remain free from infection  Outcome: Ongoing     Problem: Safety:  Goal: Free from accidental physical injury  Description: Free from accidental physical injury  Outcome: Ongoing     Problem: Daily Care:  Goal: Daily care needs are met  Description: Daily care needs are met  Outcome: Ongoing     Problem: Pain:  Goal: Patient's pain/discomfort is manageable  Description: Patient's pain/discomfort is manageable  Outcome: Ongoing  Note: Pt medicated with pain medication prn. Assessed all pain characteristics including level, type, location, frequency, and onset. Non-pharmacologic interventions offered to pt as well. Pt states pain is tolerable at this time. Will continue to monitor      Problem: Skin Integrity:  Goal: Skin integrity will stabilize  Description: Skin integrity will stabilize  Outcome: Ongoing  Note: Skin integrity maintained, no new skin abnormalities assessed. Appropriate measures remain in place Skin assessment ongoing. Pressure ulcer preventions being practiced - see charting for details. Patient turned every two hours. Problem: Falls - Risk of:  Goal: Will remain free from falls  Description: Will remain free from falls  4/18/2022 0124 by Leodan Gunn RN  Outcome: Ongoing  Note: Patient is a fall risk during this admission. Fall risk assessment was performed. Patient is absent of falls. Bed is in the lowest position. Wheels on the bed are locked. Call light and bed side table are within reach. Clutter is removed. Patient was educated to call out when needing assistance or wanting to get out of bed. Patient offered toileting assistance during rounding. Hourly rounds have been performed.        Problem: Falls - Risk of:  Goal: Absence of physical injury  Description: Absence of physical injury  Outcome: Ongoing     Problem: Coping:  Goal: Ability to adjust to condition or change in health will improve  Description: Ability to adjust to condition or change in health will improve  Outcome: Ongoing     Problem: Fluid Volume:  Goal: Ability to maintain a balanced intake and output will improve  Description: Ability to maintain a balanced intake and output will improve  Outcome: Ongoing

## 2022-04-19 ENCOUNTER — ANESTHESIA (OUTPATIENT)
Dept: OPERATING ROOM | Age: 72
DRG: 475 | End: 2022-04-19
Payer: COMMERCIAL

## 2022-04-19 VITALS — OXYGEN SATURATION: 100 % | DIASTOLIC BLOOD PRESSURE: 72 MMHG | SYSTOLIC BLOOD PRESSURE: 152 MMHG | TEMPERATURE: 97.3 F

## 2022-04-19 LAB
ABSOLUTE EOS #: <0.03 K/UL (ref 0–0.44)
ABSOLUTE IMMATURE GRANULOCYTE: 0.18 K/UL (ref 0–0.3)
ABSOLUTE LYMPH #: 0.88 K/UL (ref 1.1–3.7)
ABSOLUTE MONO #: 0.11 K/UL (ref 0.1–1.2)
ANION GAP SERPL CALCULATED.3IONS-SCNC: 12 MMOL/L (ref 9–17)
ANION GAP SERPL CALCULATED.3IONS-SCNC: 9 MMOL/L (ref 9–17)
BASOPHILS # BLD: 1 % (ref 0–2)
BASOPHILS ABSOLUTE: 0.06 K/UL (ref 0–0.2)
BUN BLDV-MCNC: 20 MG/DL (ref 8–23)
BUN BLDV-MCNC: 22 MG/DL (ref 8–23)
BUN/CREAT BLD: 11 (ref 9–20)
BUN/CREAT BLD: 11 (ref 9–20)
CALCIUM SERPL-MCNC: 7.8 MG/DL (ref 8.6–10.4)
CALCIUM SERPL-MCNC: 7.9 MG/DL (ref 8.6–10.4)
CHLORIDE BLD-SCNC: 107 MMOL/L (ref 98–107)
CHLORIDE BLD-SCNC: 108 MMOL/L (ref 98–107)
CO2: 20 MMOL/L (ref 20–31)
CO2: 23 MMOL/L (ref 20–31)
CREAT SERPL-MCNC: 1.82 MG/DL (ref 0.7–1.2)
CREAT SERPL-MCNC: 2.03 MG/DL (ref 0.7–1.2)
CULTURE: NORMAL
EOSINOPHILS RELATIVE PERCENT: 0 % (ref 1–4)
GFR AFRICAN AMERICAN: 39 ML/MIN
GFR AFRICAN AMERICAN: 45 ML/MIN
GFR NON-AFRICAN AMERICAN: 32 ML/MIN
GFR NON-AFRICAN AMERICAN: 37 ML/MIN
GFR SERPL CREATININE-BSD FRML MDRD: ABNORMAL ML/MIN/{1.73_M2}
GFR SERPL CREATININE-BSD FRML MDRD: ABNORMAL ML/MIN/{1.73_M2}
GLUCOSE BLD-MCNC: 109 MG/DL (ref 75–110)
GLUCOSE BLD-MCNC: 120 MG/DL (ref 70–99)
GLUCOSE BLD-MCNC: 151 MG/DL (ref 75–110)
GLUCOSE BLD-MCNC: 163 MG/DL (ref 75–110)
GLUCOSE BLD-MCNC: 172 MG/DL (ref 70–99)
HCT VFR BLD CALC: 28.6 % (ref 40.7–50.3)
HEMOGLOBIN: 8.6 G/DL (ref 13–17)
IMMATURE GRANULOCYTES: 2 %
LYMPHOCYTES # BLD: 8 % (ref 24–43)
MCH RBC QN AUTO: 24.7 PG (ref 25.2–33.5)
MCHC RBC AUTO-ENTMCNC: 30.1 G/DL (ref 28.4–34.8)
MCV RBC AUTO: 82.2 FL (ref 82.6–102.9)
MONOCYTES # BLD: 1 % (ref 3–12)
NRBC AUTOMATED: 0 PER 100 WBC
PDW BLD-RTO: 14.8 % (ref 11.8–14.4)
PLATELET # BLD: 149 K/UL (ref 138–453)
PMV BLD AUTO: 8.5 FL (ref 8.1–13.5)
POTASSIUM SERPL-SCNC: 4.3 MMOL/L (ref 3.7–5.3)
POTASSIUM SERPL-SCNC: 5 MMOL/L (ref 3.7–5.3)
RBC # BLD: 3.48 M/UL (ref 4.21–5.77)
RBC # BLD: ABNORMAL 10*6/UL
SEG NEUTROPHILS: 88 % (ref 36–65)
SEGMENTED NEUTROPHILS ABSOLUTE COUNT: 9.5 K/UL (ref 1.5–8.1)
SODIUM BLD-SCNC: 139 MMOL/L (ref 135–144)
SODIUM BLD-SCNC: 140 MMOL/L (ref 135–144)
SPECIMEN DESCRIPTION: NORMAL
SPECIMEN DESCRIPTION: NORMAL
WBC # BLD: 10.8 K/UL (ref 3.5–11.3)

## 2022-04-19 PROCEDURE — 85025 COMPLETE CBC W/AUTO DIFF WBC: CPT

## 2022-04-19 PROCEDURE — 2580000003 HC RX 258: Performed by: INTERNAL MEDICINE

## 2022-04-19 PROCEDURE — 2500000003 HC RX 250 WO HCPCS: Performed by: STUDENT IN AN ORGANIZED HEALTH CARE EDUCATION/TRAINING PROGRAM

## 2022-04-19 PROCEDURE — 3600000012 HC SURGERY LEVEL 2 ADDTL 15MIN: Performed by: SURGERY

## 2022-04-19 PROCEDURE — 88311 DECALCIFY TISSUE: CPT

## 2022-04-19 PROCEDURE — 97530 THERAPEUTIC ACTIVITIES: CPT

## 2022-04-19 PROCEDURE — 2709999900 HC NON-CHARGEABLE SUPPLY: Performed by: SURGERY

## 2022-04-19 PROCEDURE — 7100000000 HC PACU RECOVERY - FIRST 15 MIN: Performed by: SURGERY

## 2022-04-19 PROCEDURE — 88307 TISSUE EXAM BY PATHOLOGIST: CPT

## 2022-04-19 PROCEDURE — 6370000000 HC RX 637 (ALT 250 FOR IP): Performed by: PODIATRIST

## 2022-04-19 PROCEDURE — 97110 THERAPEUTIC EXERCISES: CPT

## 2022-04-19 PROCEDURE — 64447 NJX AA&/STRD FEMORAL NRV IMG: CPT | Performed by: ANESTHESIOLOGY

## 2022-04-19 PROCEDURE — 82947 ASSAY GLUCOSE BLOOD QUANT: CPT

## 2022-04-19 PROCEDURE — 6360000002 HC RX W HCPCS: Performed by: ANESTHESIOLOGY

## 2022-04-19 PROCEDURE — 86920 COMPATIBILITY TEST SPIN: CPT

## 2022-04-19 PROCEDURE — 3700000000 HC ANESTHESIA ATTENDED CARE: Performed by: SURGERY

## 2022-04-19 PROCEDURE — 3700000001 HC ADD 15 MINUTES (ANESTHESIA): Performed by: SURGERY

## 2022-04-19 PROCEDURE — 27590 AMPUTATE LEG AT THIGH: CPT | Performed by: SURGERY

## 2022-04-19 PROCEDURE — 6360000002 HC RX W HCPCS: Performed by: INTERNAL MEDICINE

## 2022-04-19 PROCEDURE — 6360000002 HC RX W HCPCS: Performed by: STUDENT IN AN ORGANIZED HEALTH CARE EDUCATION/TRAINING PROGRAM

## 2022-04-19 PROCEDURE — 86900 BLOOD TYPING SEROLOGIC ABO: CPT

## 2022-04-19 PROCEDURE — 0Y6D0Z3 DETACHMENT AT LEFT UPPER LEG, LOW, OPEN APPROACH: ICD-10-PCS | Performed by: SURGERY

## 2022-04-19 PROCEDURE — 6370000000 HC RX 637 (ALT 250 FOR IP): Performed by: STUDENT IN AN ORGANIZED HEALTH CARE EDUCATION/TRAINING PROGRAM

## 2022-04-19 PROCEDURE — 6360000002 HC RX W HCPCS

## 2022-04-19 PROCEDURE — 7100000001 HC PACU RECOVERY - ADDTL 15 MIN: Performed by: SURGERY

## 2022-04-19 PROCEDURE — 86850 RBC ANTIBODY SCREEN: CPT

## 2022-04-19 PROCEDURE — 2060000000 HC ICU INTERMEDIATE R&B

## 2022-04-19 PROCEDURE — 97535 SELF CARE MNGMENT TRAINING: CPT

## 2022-04-19 PROCEDURE — 86901 BLOOD TYPING SEROLOGIC RH(D): CPT

## 2022-04-19 PROCEDURE — 99232 SBSQ HOSP IP/OBS MODERATE 35: CPT | Performed by: HOSPITALIST

## 2022-04-19 PROCEDURE — 3600000002 HC SURGERY LEVEL 2 BASE: Performed by: SURGERY

## 2022-04-19 PROCEDURE — 2580000003 HC RX 258: Performed by: ANESTHESIOLOGY

## 2022-04-19 PROCEDURE — 99232 SBSQ HOSP IP/OBS MODERATE 35: CPT | Performed by: INTERNAL MEDICINE

## 2022-04-19 PROCEDURE — 97112 NEUROMUSCULAR REEDUCATION: CPT

## 2022-04-19 PROCEDURE — 2500000003 HC RX 250 WO HCPCS: Performed by: PODIATRIST

## 2022-04-19 PROCEDURE — 36415 COLL VENOUS BLD VENIPUNCTURE: CPT

## 2022-04-19 PROCEDURE — 80048 BASIC METABOLIC PNL TOTAL CA: CPT

## 2022-04-19 PROCEDURE — 2500000003 HC RX 250 WO HCPCS: Performed by: ANESTHESIOLOGY

## 2022-04-19 RX ORDER — LIDOCAINE HYDROCHLORIDE 20 MG/ML
INJECTION, SOLUTION EPIDURAL; INFILTRATION; INTRACAUDAL; PERINEURAL PRN
Status: DISCONTINUED | OUTPATIENT
Start: 2022-04-19 | End: 2022-04-19 | Stop reason: SDUPTHER

## 2022-04-19 RX ORDER — ONDANSETRON 2 MG/ML
INJECTION INTRAMUSCULAR; INTRAVENOUS PRN
Status: DISCONTINUED | OUTPATIENT
Start: 2022-04-19 | End: 2022-04-19 | Stop reason: SDUPTHER

## 2022-04-19 RX ORDER — KETAMINE HCL IN NACL, ISO-OSM 100MG/10ML
SYRINGE (ML) INJECTION PRN
Status: DISCONTINUED | OUTPATIENT
Start: 2022-04-19 | End: 2022-04-19 | Stop reason: SDUPTHER

## 2022-04-19 RX ORDER — DEXAMETHASONE SODIUM PHOSPHATE 10 MG/ML
INJECTION, SOLUTION INTRAMUSCULAR; INTRAVENOUS PRN
Status: DISCONTINUED | OUTPATIENT
Start: 2022-04-19 | End: 2022-04-19 | Stop reason: SDUPTHER

## 2022-04-19 RX ORDER — BUPIVACAINE HYDROCHLORIDE 5 MG/ML
INJECTION, SOLUTION EPIDURAL; INTRACAUDAL
Status: COMPLETED | OUTPATIENT
Start: 2022-04-19 | End: 2022-04-19

## 2022-04-19 RX ORDER — SODIUM CHLORIDE 0.9 % (FLUSH) 0.9 %
5-40 SYRINGE (ML) INJECTION EVERY 12 HOURS SCHEDULED
Status: DISCONTINUED | OUTPATIENT
Start: 2022-04-19 | End: 2022-04-19 | Stop reason: HOSPADM

## 2022-04-19 RX ORDER — PROPOFOL 10 MG/ML
INJECTION, EMULSION INTRAVENOUS PRN
Status: DISCONTINUED | OUTPATIENT
Start: 2022-04-19 | End: 2022-04-19 | Stop reason: SDUPTHER

## 2022-04-19 RX ORDER — ONDANSETRON 2 MG/ML
4 INJECTION INTRAMUSCULAR; INTRAVENOUS
Status: DISCONTINUED | OUTPATIENT
Start: 2022-04-19 | End: 2022-04-19 | Stop reason: HOSPADM

## 2022-04-19 RX ORDER — FENTANYL CITRATE 50 UG/ML
25 INJECTION, SOLUTION INTRAMUSCULAR; INTRAVENOUS
Status: COMPLETED | OUTPATIENT
Start: 2022-04-19 | End: 2022-04-21

## 2022-04-19 RX ORDER — SODIUM CHLORIDE 9 MG/ML
INJECTION, SOLUTION INTRAVENOUS PRN
Status: DISCONTINUED | OUTPATIENT
Start: 2022-04-19 | End: 2022-04-19 | Stop reason: HOSPADM

## 2022-04-19 RX ORDER — FENTANYL CITRATE 50 UG/ML
25 INJECTION, SOLUTION INTRAMUSCULAR; INTRAVENOUS EVERY 5 MIN PRN
Status: COMPLETED | OUTPATIENT
Start: 2022-04-19 | End: 2022-04-19

## 2022-04-19 RX ORDER — SODIUM CHLORIDE 9 MG/ML
INJECTION, SOLUTION INTRAVENOUS PRN
Status: DISCONTINUED | OUTPATIENT
Start: 2022-04-19 | End: 2022-04-27 | Stop reason: HOSPADM

## 2022-04-19 RX ORDER — SODIUM CHLORIDE 9 MG/ML
INJECTION, SOLUTION INTRAVENOUS CONTINUOUS PRN
Status: DISCONTINUED | OUTPATIENT
Start: 2022-04-19 | End: 2022-04-19 | Stop reason: SDUPTHER

## 2022-04-19 RX ORDER — SODIUM CHLORIDE 0.9 % (FLUSH) 0.9 %
5-40 SYRINGE (ML) INJECTION PRN
Status: DISCONTINUED | OUTPATIENT
Start: 2022-04-19 | End: 2022-04-19 | Stop reason: HOSPADM

## 2022-04-19 RX ORDER — FENTANYL CITRATE 50 UG/ML
INJECTION, SOLUTION INTRAMUSCULAR; INTRAVENOUS PRN
Status: DISCONTINUED | OUTPATIENT
Start: 2022-04-19 | End: 2022-04-19 | Stop reason: SDUPTHER

## 2022-04-19 RX ADMIN — ATORVASTATIN CALCIUM 10 MG: 10 TABLET, FILM COATED ORAL at 08:01

## 2022-04-19 RX ADMIN — DOCUSATE SODIUM 100 MG: 100 CAPSULE, LIQUID FILLED ORAL at 08:01

## 2022-04-19 RX ADMIN — CLINDAMYCIN IN 5 PERCENT DEXTROSE 600 MG: 12 INJECTION, SOLUTION INTRAVENOUS at 23:51

## 2022-04-19 RX ADMIN — FENTANYL CITRATE 25 MCG: 50 INJECTION, SOLUTION INTRAMUSCULAR; INTRAVENOUS at 21:20

## 2022-04-19 RX ADMIN — CLINDAMYCIN IN 5 PERCENT DEXTROSE 600 MG: 12 INJECTION, SOLUTION INTRAVENOUS at 01:31

## 2022-04-19 RX ADMIN — BUPIVACAINE HYDROCHLORIDE 40 ML: 5 INJECTION, SOLUTION EPIDURAL; INTRACAUDAL; PERINEURAL at 17:37

## 2022-04-19 RX ADMIN — Medication 20 MG: at 19:49

## 2022-04-19 RX ADMIN — FLUCONAZOLE 200 MG: 2 INJECTION, SOLUTION INTRAVENOUS at 22:28

## 2022-04-19 RX ADMIN — FENTANYL CITRATE 25 MCG: 50 INJECTION, SOLUTION INTRAMUSCULAR; INTRAVENOUS at 21:25

## 2022-04-19 RX ADMIN — LIDOCAINE HYDROCHLORIDE 5 ML: 20 INJECTION, SOLUTION EPIDURAL; INFILTRATION; INTRACAUDAL; PERINEURAL at 18:50

## 2022-04-19 RX ADMIN — Medication 10 MG: at 20:05

## 2022-04-19 RX ADMIN — Medication 25 MCG: at 20:06

## 2022-04-19 RX ADMIN — HYDROMORPHONE HYDROCHLORIDE 0.25 MG: 1 INJECTION, SOLUTION INTRAMUSCULAR; INTRAVENOUS; SUBCUTANEOUS at 21:45

## 2022-04-19 RX ADMIN — DEXAMETHASONE SODIUM PHOSPHATE 5 MG: 10 INJECTION, SOLUTION INTRAMUSCULAR; INTRAVENOUS at 20:04

## 2022-04-19 RX ADMIN — AMLODIPINE BESYLATE 5 MG: 5 TABLET ORAL at 08:01

## 2022-04-19 RX ADMIN — METOPROLOL TARTRATE 25 MG: 25 TABLET, FILM COATED ORAL at 08:01

## 2022-04-19 RX ADMIN — PROPOFOL 50 MCG/KG/MIN: 10 INJECTION, EMULSION INTRAVENOUS at 18:50

## 2022-04-19 RX ADMIN — PROPOFOL 110 MG: 10 INJECTION, EMULSION INTRAVENOUS at 19:46

## 2022-04-19 RX ADMIN — HYDROMORPHONE HYDROCHLORIDE 0.25 MG: 1 INJECTION, SOLUTION INTRAMUSCULAR; INTRAVENOUS; SUBCUTANEOUS at 21:40

## 2022-04-19 RX ADMIN — FENTANYL CITRATE 25 MCG: 50 INJECTION, SOLUTION INTRAMUSCULAR; INTRAVENOUS at 21:30

## 2022-04-19 RX ADMIN — FENTANYL CITRATE 25 MCG: 50 INJECTION, SOLUTION INTRAMUSCULAR; INTRAVENOUS at 21:35

## 2022-04-19 RX ADMIN — CLINDAMYCIN IN 5 PERCENT DEXTROSE 600 MG: 12 INJECTION, SOLUTION INTRAVENOUS at 06:41

## 2022-04-19 RX ADMIN — ONDANSETRON 4 MG: 2 INJECTION INTRAMUSCULAR; INTRAVENOUS at 20:05

## 2022-04-19 RX ADMIN — SODIUM CHLORIDE: 9 INJECTION, SOLUTION INTRAVENOUS at 21:56

## 2022-04-19 RX ADMIN — CEFTRIAXONE 2000 MG: 2 INJECTION, POWDER, FOR SOLUTION INTRAMUSCULAR; INTRAVENOUS at 15:52

## 2022-04-19 RX ADMIN — OXYCODONE AND ACETAMINOPHEN 2 TABLET: 5; 325 TABLET ORAL at 22:24

## 2022-04-19 RX ADMIN — SODIUM CHLORIDE: 9 INJECTION, SOLUTION INTRAVENOUS at 12:26

## 2022-04-19 RX ADMIN — SODIUM CHLORIDE: 9 INJECTION, SOLUTION INTRAVENOUS at 18:45

## 2022-04-19 RX ADMIN — Medication 25 MCG: at 20:46

## 2022-04-19 RX ADMIN — CLINDAMYCIN IN 5 PERCENT DEXTROSE 600 MG: 12 INJECTION, SOLUTION INTRAVENOUS at 14:41

## 2022-04-19 RX ADMIN — FERROUS SULFATE TAB EC 325 MG (65 MG FE EQUIVALENT) 325 MG: 325 (65 FE) TABLET DELAYED RESPONSE at 08:01

## 2022-04-19 ASSESSMENT — PULMONARY FUNCTION TESTS
PIF_VALUE: 12
PIF_VALUE: 12
PIF_VALUE: 1
PIF_VALUE: 12
PIF_VALUE: 12
PIF_VALUE: 1
PIF_VALUE: 1
PIF_VALUE: 12
PIF_VALUE: 1
PIF_VALUE: 14
PIF_VALUE: 3
PIF_VALUE: 1
PIF_VALUE: 1
PIF_VALUE: 13
PIF_VALUE: 1
PIF_VALUE: 1
PIF_VALUE: 12
PIF_VALUE: 3
PIF_VALUE: 12
PIF_VALUE: 2
PIF_VALUE: 4
PIF_VALUE: 1
PIF_VALUE: 12
PIF_VALUE: 1
PIF_VALUE: 12
PIF_VALUE: 16
PIF_VALUE: 4
PIF_VALUE: 12
PIF_VALUE: 1
PIF_VALUE: 12
PIF_VALUE: 1
PIF_VALUE: 12
PIF_VALUE: 12
PIF_VALUE: 1
PIF_VALUE: 1
PIF_VALUE: 12
PIF_VALUE: 1
PIF_VALUE: 3
PIF_VALUE: 5
PIF_VALUE: 1
PIF_VALUE: 12
PIF_VALUE: 11
PIF_VALUE: 12
PIF_VALUE: 1
PIF_VALUE: 0
PIF_VALUE: 1
PIF_VALUE: 12
PIF_VALUE: 1
PIF_VALUE: 3
PIF_VALUE: 12
PIF_VALUE: 4
PIF_VALUE: 1
PIF_VALUE: 12
PIF_VALUE: 1
PIF_VALUE: 12
PIF_VALUE: 12
PIF_VALUE: 14
PIF_VALUE: 4
PIF_VALUE: 1
PIF_VALUE: 11
PIF_VALUE: 12
PIF_VALUE: 12
PIF_VALUE: 1
PIF_VALUE: 12
PIF_VALUE: 1
PIF_VALUE: 12
PIF_VALUE: 1
PIF_VALUE: 4
PIF_VALUE: 3
PIF_VALUE: 11
PIF_VALUE: 1
PIF_VALUE: 1
PIF_VALUE: 12
PIF_VALUE: 1
PIF_VALUE: 1
PIF_VALUE: 12
PIF_VALUE: 12
PIF_VALUE: 3
PIF_VALUE: 1
PIF_VALUE: 12
PIF_VALUE: 12
PIF_VALUE: 1
PIF_VALUE: 12
PIF_VALUE: 4
PIF_VALUE: 12
PIF_VALUE: 13
PIF_VALUE: 1
PIF_VALUE: 1
PIF_VALUE: 3
PIF_VALUE: 12
PIF_VALUE: 1
PIF_VALUE: 12
PIF_VALUE: 3
PIF_VALUE: 1
PIF_VALUE: 1
PIF_VALUE: 13
PIF_VALUE: 12
PIF_VALUE: 1
PIF_VALUE: 12
PIF_VALUE: 14
PIF_VALUE: 13

## 2022-04-19 ASSESSMENT — PAIN SCALES - GENERAL
PAINLEVEL_OUTOF10: 6
PAINLEVEL_OUTOF10: 6
PAINLEVEL_OUTOF10: 0
PAINLEVEL_OUTOF10: 2
PAINLEVEL_OUTOF10: 6
PAINLEVEL_OUTOF10: 9
PAINLEVEL_OUTOF10: 0
PAINLEVEL_OUTOF10: 6
PAINLEVEL_OUTOF10: 9
PAINLEVEL_OUTOF10: 9
PAINLEVEL_OUTOF10: 6
PAINLEVEL_OUTOF10: 0

## 2022-04-19 ASSESSMENT — PAIN DESCRIPTION - ORIENTATION
ORIENTATION: LEFT
ORIENTATION: LEFT

## 2022-04-19 ASSESSMENT — PAIN DESCRIPTION - LOCATION
LOCATION: LEG
LOCATION: LEG

## 2022-04-19 ASSESSMENT — PAIN DESCRIPTION - PAIN TYPE: TYPE: ACUTE PAIN;SURGICAL PAIN

## 2022-04-19 ASSESSMENT — PAIN DESCRIPTION - ONSET: ONSET: ON-GOING

## 2022-04-19 ASSESSMENT — PAIN DESCRIPTION - FREQUENCY: FREQUENCY: CONTINUOUS

## 2022-04-19 ASSESSMENT — PAIN DESCRIPTION - DESCRIPTORS
DESCRIPTORS: TIGHTNESS
DESCRIPTORS: TIGHTNESS

## 2022-04-19 ASSESSMENT — LIFESTYLE VARIABLES: SMOKING_STATUS: 1

## 2022-04-19 ASSESSMENT — PAIN DESCRIPTION - PROGRESSION: CLINICAL_PROGRESSION: NOT CHANGED

## 2022-04-19 ASSESSMENT — PAIN - FUNCTIONAL ASSESSMENT: PAIN_FUNCTIONAL_ASSESSMENT: ACTIVITIES ARE NOT PREVENTED

## 2022-04-19 NOTE — PLAN OF CARE
Problem: Infection:  Goal: Will remain free from infection  Description: Will remain free from infection  Outcome: Ongoing  Note: Patient does not show any signs or symptoms of infection at this time. Vitals:    04/19/22 1057   BP: (!) 142/60   Pulse: 70   Resp: 16   Temp: 97.6 °F (36.4 °C)   SpO2: 95%         Problem: Safety:  Goal: Free from accidental physical injury  Description: Free from accidental physical injury  Outcome: Ongoing  Note: Patient is free from accidental injury at this time. Will continue to monitor. Problem: Daily Care:  Goal: Daily care needs are met  Description: Daily care needs are met  Outcome: Ongoing  Note: Patient able to verbalize needs and calls out appropriately for assist. Patient assists with personal daily care needs as tolerated. OT continues to work with patient. Problem: Pain:  Goal: Patient's pain/discomfort is manageable  Description: Patient's pain/discomfort is manageable  Outcome: Ongoing  Note: Pt medicated with pain medication prn. Assessed all pain characteristics including level, type, location, frequency, and onset. Non-pharmacologic interventions offered to pt as well. Pt states pain is tolerable at this time.  Will continue to monitor      Problem: Pain:  Goal: Pain level will decrease  Description: Pain level will decrease  Outcome: Ongoing     Problem: Pain:  Goal: Control of acute pain  Description: Control of acute pain  Outcome: Ongoing     Problem: Pain:  Goal: Control of chronic pain  Description: Control of chronic pain  Outcome: Ongoing     Problem: Skin Integrity:  Goal: Skin integrity will stabilize  Description: Skin integrity will stabilize  Outcome: Ongoing     Problem: Discharge Planning:  Goal: Patients continuum of care needs are met  Description: Patients continuum of care needs are met  Outcome: Ongoing  Note:   Identify potential discharge needs/barriers on admission  Involve family/patient/significant other in discharge process  Collaborate with Case Management/ for discharge needs/concerns      Problem: Skin Integrity:  Goal: Will show no infection signs and symptoms  Description: Will show no infection signs and symptoms  Outcome: Ongoing     Problem: Skin Integrity:  Goal: Absence of new skin breakdown  Description: Absence of new skin breakdown  Outcome: Ongoing  Note: No new skin breakdown noted throughout the shift. Will continue to monitor      Problem: Skin Integrity:  Goal: Risk for impaired skin integrity will decrease  Description: Risk for impaired skin integrity will decrease  Outcome: Ongoing     Problem: Falls - Risk of:  Goal: Will remain free from falls  Description: Will remain free from falls  Outcome: Ongoing  Note: Patient remains free from falls at this time. Bed in lowest position with wheels locked. Tray table and call light within reach. Hourly rounding. Problem: Falls - Risk of:  Goal: Absence of physical injury  Description: Absence of physical injury  Outcome: Ongoing     Problem: Nutrition  Goal: Optimal nutrition therapy  Outcome: Ongoing     Problem:  Activity:  Goal: Risk for activity intolerance will decrease  Description: Risk for activity intolerance will decrease  Outcome: Ongoing     Problem: Coping:  Goal: Ability to adjust to condition or change in health will improve  Description: Ability to adjust to condition or change in health will improve  Outcome: Ongoing     Problem: Fluid Volume:  Goal: Ability to maintain a balanced intake and output will improve  Description: Ability to maintain a balanced intake and output will improve  Outcome: Ongoing     Problem: Health Behavior:  Goal: Ability to identify and utilize available resources and services will improve  Description: Ability to identify and utilize available resources and services will improve  Outcome: Ongoing     Problem: Health Behavior:  Goal: Ability to manage health-related needs will improve  Description: Ability to manage health-related needs will improve  Outcome: Ongoing     Problem: Metabolic:  Goal: Ability to maintain appropriate glucose levels will improve  Description: Ability to maintain appropriate glucose levels will improve  Outcome: Ongoing     Problem: Nutritional:  Goal: Maintenance of adequate nutrition will improve  Description: Maintenance of adequate nutrition will improve  Outcome: Ongoing     Problem: Nutritional:  Goal: Progress toward achieving an optimal weight will improve  Description: Progress toward achieving an optimal weight will improve  Outcome: Ongoing     Problem: Physical Regulation:  Goal: Complications related to the disease process, condition or treatment will be avoided or minimized  Description: Complications related to the disease process, condition or treatment will be avoided or minimized  Outcome: Ongoing     Problem: Physical Regulation:  Goal: Diagnostic test results will improve  Description: Diagnostic test results will improve  Outcome: Ongoing     Problem: Tissue Perfusion:  Goal: Adequacy of tissue perfusion will improve  Description: Adequacy of tissue perfusion will improve  Outcome: Ongoing

## 2022-04-19 NOTE — PROGRESS NOTES
Renal Progress Note    Patient :  Jamal Dai; 67 y.o. MRN# [id number]  Location:  2020/2020-01  Attending:  Yue Ellison DO  Admit Date:  4/11/2022   Hospital Day: 8      Subjective:     Patient was seen and examined. Tells me he had milky urine yesterday, urinalysis showed too numerous to count WBCs, culture pending. Rocephin started, also on clindamycin. Creatinine down to 2.0 IV fluids continue. Plan for amputation of left leg at 4 PM today. Previously has had right below-knee amputation. Has positive left diabetic foot infection with suspected necrotizing fasciitis and calcaneal osteomyelitis status post I&D 4/12/2022 infectious disease on board on antibiotics. Urine output documented fair not being measured accurately    Currently on normal saline at 50 cc an hour.     Outpatient Medications:     Medications Prior to Admission: metoprolol tartrate (LOPRESSOR) 25 MG tablet, Take 25 mg by mouth 2 times daily  QUEtiapine (SEROQUEL) 25 MG tablet, Take 25 mg by mouth at bedtime  amLODIPine (NORVASC) 10 MG tablet, Take 0.5 tablets by mouth daily (Patient taking differently: Take 10 mg by mouth daily )  insulin glargine (BASAGLAR KWIKPEN) 100 UNIT/ML injection pen, Inject 10-20 Units into the skin 2 times daily Injects 20 units AM in the morning and 10 units at night (Patient taking differently: Inject 12 Units into the skin nightly )  [DISCONTINUED] furosemide (LASIX) 40 MG tablet, Take 1 tablet by mouth every other day  [DISCONTINUED] lisinopril (PRINIVIL;ZESTRIL) 10 MG tablet, Take 1 tablet by mouth daily  clopidogrel (PLAVIX) 75 MG tablet, Take 1 tablet by mouth daily  insulin aspart (NOVOLOG FLEXPEN) 100 UNIT/ML injection pen, Inject 0-10 Units into the skin 3 times daily as needed Per sliding scale  simvastatin (ZOCOR) 10 MG tablet, Take 10 mg by mouth nightly  traZODone (DESYREL) 150 MG tablet, Take 150 mg by mouth nightly  [DISCONTINUED] metoprolol succinate (TOPROL XL) 25 MG extended release tablet, Take 25 mg by mouth 2 times daily  [DISCONTINUED] budesonide-formoterol (SYMBICORT) 160-4.5 MCG/ACT AERO, Inhale 2 puffs into the lungs 2 times daily  [DISCONTINUED] ferrous sulfate (IRON 325) 325 (65 Fe) MG tablet, Take 325 mg by mouth daily (with breakfast)  docusate sodium (COLACE) 100 MG capsule, Take 100 mg by mouth 2 times daily   acetaminophen (TYLENOL) 325 MG tablet, Take 650 mg by mouth as needed for Pain Give 2 tabs = 650 MG by mouth every 4 hours prn   Insulin Syringe-Needle U-100 (ACCUSURE INS SYR 1CC/30GX5/16\") 30G X 5/16\" 1 ML MISC, by Does not apply route. Current Medications:     Scheduled Meds:    cefTRIAXone (ROCEPHIN) IV  2,000 mg IntraVENous Q24H    clindamycin (CLEOCIN) IV  600 mg IntraVENous Q8H    amLODIPine  5 mg Oral Daily    clopidogrel  75 mg Oral Daily    docusate sodium  100 mg Oral BID    ferrous sulfate  325 mg Oral Daily with breakfast    [Held by provider] furosemide  40 mg Oral Every Other Day    metoprolol tartrate  25 mg Oral BID    atorvastatin  10 mg Oral Daily    sodium chloride flush  5-40 mL IntraVENous 2 times per day    insulin lispro  0-12 Units SubCUTAneous TID WC    insulin lispro  0-6 Units SubCUTAneous Nightly     Continuous Infusions:    sodium chloride      sodium chloride 50 mL/hr at 04/18/22 1836    dextrose      sodium chloride Stopped (04/16/22 1740)     PRN Meds:  sodium chloride, oxyCODONE-acetaminophen **OR** oxyCODONE-acetaminophen, traMADol, glucagon (rDNA), dextrose, sodium chloride flush, sodium chloride, potassium chloride **OR** potassium alternative oral replacement **OR** potassium chloride, magnesium sulfate, ondansetron **OR** ondansetron, polyethylene glycol, acetaminophen **OR** acetaminophen, dextrose bolus (hypoglycemia) **OR** dextrose bolus (hypoglycemia), glucose, hydrALAZINE    Input/Output:       I/O last 3 completed shifts: In: 1770.4 [P.O.:400;  I.V.:1173.8; IV Piggyback:196.6]  Out: 1950 [[facility]]. Patient Vitals for the past 96 hrs (Last 3 readings):   Weight   22 0054 187 lb (84.8 kg)   22 0420 184 lb (83.5 kg)   22 0346 184 lb 0.7 oz (83.5 kg)       Vital Signs:   Temperature:  Temp: 97.6 °F (36.4 °C)  TMax:   Temp (24hrs), Av.1 °F (36.7 °C), Min:97.6 °F (36.4 °C), Max:98.6 °F (37 °C)    Respirations:  Resp: 16  Pulse:   Pulse: 70  BP:    BP: (!) 142/60  BP Range: Systolic (71FHJ), BNE:042 , Min:142 , MLU:556       Diastolic (74DLW), WKV:26, Min:54, Max:61      Physical Examination:     General:  AAO x 3, speaking in full sentences, no accessory muscle use. HEENT: Atraumatic, normocephalic, no throat congestion, moist mucosa. Eyes:   Pupils equal, round and reactive to light, EOMI. Neck:   Supple  Chest:   Bilateral vesicular breath sounds, no rales or wheezes. Cardiac:  S1 S2 RR, no murmurs, gallops or rubs. Abdomen: Soft, non-tender, no masses or organomegaly, BS audible. :   No suprapubic or flank tenderness. Neuro:  AAO x 3, No FND. SKIN:  No rashes, good skin turgor. Extremities:  +3 LLEedema. Right lower extremity amputation.     Labs:       Recent Labs     22  1254   WBC 18.1*   RBC 3.76*   HGB 9.5*   HCT 29.6*   MCV 78.7*   MCH 25.3   MCHC 32.1   RDW 14.6*      MPV 8.5      BMP:   Recent Labs     22  0614 22  0617    140   K 4.6 4.3    108*   CO2 22 23   BUN 31* 22   CREATININE 2.55* 2.03*   GLUCOSE 135* 120*   CALCIUM 7.6* 7.8*      SPEP:  Lab Results   Component Value Date    PROT 7.6 2020     C3:     Lab Results   Component Value Date    C3 120 2022     C4:     Lab Results   Component Value Date    C4 19 2022     Hep BsAg:         Lab Results   Component Value Date    HEPBSAG REACTIVE 2013     Hep C AB:          Lab Results   Component Value Date    HEPCAB NONREACTIVE 2013     Urinalysis/Chemistries:      Lab Results   Component Value Date    NITRU NEGATIVE 2022    COLORU Yellow 04/18/2022    PHUR 5.5 04/18/2022    WBCUA TOO NUMEROUS TO COUNT 04/18/2022    RBCUA 20 TO 50 04/18/2022    MUCUS NOT REPORTED 03/30/2018    TRICHOMONAS NOT REPORTED 03/30/2018    YEAST FEW 02/23/2022    BACTERIA FEW 02/23/2022    SPECGRAV 1.025 04/18/2022    LEUKOCYTESUR LARGE 04/18/2022    UROBILINOGEN Normal 04/18/2022    BILIRUBINUR NEGATIVE 04/18/2022    GLUCOSEU NEGATIVE 04/18/2022    KETUA NEGATIVE 04/18/2022    AMORPHOUS 4+ 04/18/2022     Urine Sodium:     Lab Results   Component Value Date    JLUIS 65 09/17/2020     Urine Chloride:    Lab Results   Component Value Date    CLUR <15 12/15/2013      Urine Creatinine:     Lab Results   Component Value Date    LABCREA 66.7 04/12/2022     Radiology:     Reviewed. Assessment:     1. Acute Kidney Injury likely ATN due to underlying infection/SIRS, additional element from intravascular depletion from loop diuretics and ACE inhibitor usage with baseline creatinine of 2.0-2.1 peaked at 3.1 now back to baseline  2. Left diabetic foot infection with suspected necrotizing fasciitis and calcaneal osteomyelitis status post I&D 4/12/2022 infectious disease on board on antibiotics. Plan for amputation of left lower extremity today  3. 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 4.  Peripheral vascular disease status post right lower Covid amputation in 2014.  5.  Essential hypertension. 6.  Diabetes type 2.  7.  Non-anion gap metabolic acidosis. 8.  Pyuria suspected UTI on antibiotics cultures pending    Plan:   1. Continue IV fluids with normal saline 50 cc an hour. 2.  Continue monitor strict I's and O's and renal function. 3.  Antibiotics per infectious disease per renal dosing. 4.  Cleared for amputation of left lower extremity by vascular today  5. BMP daily  6. Will follow. Nutrition   Please ensure that patient is on a renal diet/TF.  Avoid nephrotoxic drugs/contrast exposure. We will continue to follow along with you.

## 2022-04-19 NOTE — PROGRESS NOTES
Mr. Nadya Good is a patient of Dr. Irvin Bailey who has decided to go ahead with below-knee amputation. This decision was made shortly after that physician started his vacation time. He has a heel ulceration with tibioperoneal disease that is not reconstructable. He has an amputation on the contralateral side. He is agreeable to amputation on the left below the level of the knee. We will be bringing him to the operating room today for that procedure. On examination he has a palpable femoral and popliteal pulse on the left. He has edema in the left lower extremity at and below the level of the knee which is mild to moderate. He has the heel ulceration as stated above which extends onto the Achilles tendon. There are no distal pulses. The patient understands the risks benefits and alternatives and understands that the biggest issue with below-knee amputation is nonhealing wounds necessitating higher level amputation or revision of that amputation. He agrees to proceed and we will be seeing him in the operating room today.

## 2022-04-19 NOTE — PROGRESS NOTES
[facility]  Office: [address], DO, Isabel Monteiro, DO, Peterkeisha Martins, DO, Tari Hadley Blood, DO, Alicia Bermeo MD, Marci Kaur MD, Marychuy Rico MD, Brit Edwards MD, Kathe Ring MD, Rosy Hinojosa MD, Kaylene Sumner MD, David Deng, DO, Margarette Sarmiento, DO, Car Shelby MD,  Rome Luciano DO, Charisse Holman MD, Franco Vee MD, Nieves Caldwell MD, Melinda Hrutado DO, Joli Angelucci, MD, Braxton Manuel MD, Charisma Otero, [facility], [facility], [facility], Paolo Paige, CNP, Katerina Wallace, CNP, Erma Roman, CNP, Cara Amos, CNP, Guerda Spence, PA-C, Debby Gilmore, DNP, Casi Mckeon, CNP, Robel Leonard, CNP, Nery Davila, CNP, Ozzy Mantilla, CNS, Michelleolysherry Nails, DNP, Arnold Clapper, CNP, Bruno Proper, CNP, Chelsea Serum, CNP         [facility]    Progress Note    4/19/2022    4:38 PM    Name:   Rufina Scott  MRN:     [id number]     Acct:      [de-identified]   Room:   2020/2020-01  IP Day:  8  Admit Date:  4/11/2022  5:25 PM    PCP:   Lay Lovelace  Code Status:  Full Code    Subjective:     C/C:   Chief Complaint   Patient presents with    Wound Infection     Sent over by PCP for wound evaluation in foot (left)      Patient seen in follow-up for left lower extremity osteomyelitis, patient states \"I am doing okay\"    Interval History Status: not changed. Patient scheduled for BKA later today. Family at the bedside, multiple questions answered. He has had a below the knee amputation on the right so he is quite familiar with surgery as well as postoperative expectations. He denies any chest pain or shortness of breath. Long discussion with him regarding peripheral arterial disease and the etiology of poor wound healing. Both he and his family appreciates the information, they deny any questions or concerns at this point in time. Brief History:      This is a very pleasant 77-year-old male who presents to the hospital with a left lower extremity wound infection. He has osteomyelitis and peripheral arterial disease. Subsequently he is undergoing below the knee amputation today. Review of Systems:     Constitutional:  negative for chills, fevers, sweats  Respiratory:  negative for cough, dyspnea on exertion, shortness of breath, wheezing  Cardiovascular:  negative for chest pain, chest pressure/discomfort, lower extremity edema, palpitations  Gastrointestinal:  negative for abdominal pain, constipation, diarrhea, nausea, vomiting  Neurological:  negative for dizziness, headache    Medications:      Allergies:  No Known Allergies    Current Meds:   Scheduled Meds:    fluconazole  200 mg IntraVENous Q24H    cefTRIAXone (ROCEPHIN) IV  2,000 mg IntraVENous Q24H    clindamycin (CLEOCIN) IV  600 mg IntraVENous Q8H    amLODIPine  5 mg Oral Daily    clopidogrel  75 mg Oral Daily    docusate sodium  100 mg Oral BID    ferrous sulfate  325 mg Oral Daily with breakfast    [Held by provider] furosemide  40 mg Oral Every Other Day    metoprolol tartrate  25 mg Oral BID    atorvastatin  10 mg Oral Daily    sodium chloride flush  5-40 mL IntraVENous 2 times per day    insulin lispro  0-12 Units SubCUTAneous TID WC    insulin lispro  0-6 Units SubCUTAneous Nightly     Continuous Infusions:    sodium chloride      sodium chloride 50 mL/hr (04/19/22 1441)    dextrose      sodium chloride Stopped (04/16/22 1740)     PRN Meds: sodium chloride, oxyCODONE-acetaminophen **OR** oxyCODONE-acetaminophen, traMADol, glucagon (rDNA), dextrose, sodium chloride flush, sodium chloride, potassium chloride **OR** potassium alternative oral replacement **OR** potassium chloride, magnesium sulfate, ondansetron **OR** ondansetron, polyethylene glycol, acetaminophen **OR** acetaminophen, dextrose bolus (hypoglycemia) **OR** dextrose bolus (hypoglycemia), glucose, hydrALAZINE    Data:     Past Medical History:   has a past medical history of RISSA (acute kidney injury) ([facility] Cerebrovascular disease, Erectile dysfunction, Hepatitis B infection, Hyperlipidemia, Hypertension, Kidney cysts, Liver cyst, MRSA (methicillin resistant staph aureus) culture positive, Neurotrophic ulcer of the foot ([facility] Obesity, Osteomyelitis of ankle or foot, Peripheral vascular disease ([facility] Tobacco abuse, and Type II or unspecified type diabetes mellitus without mention of complication, not stated as uncontrolled. Social History:   reports that he has been smoking cigarettes. He has a 15.00 pack-year smoking history. He has never used smokeless tobacco. He reports that he does not drink alcohol and does not use drugs. Family History:   Family History   Problem Relation Age of Onset    Diabetes Mother     Diabetes Sister     Diabetes Brother     Diabetes Sister     Diabetes Sister     Diabetes Sister        Vitals:  BP (!) 140/62   Pulse 68   Temp 97.6 °F (36.4 °C) (Oral)   Resp 16   Ht 6' (1.829 m)   Wt 187 lb (84.8 kg)   SpO2 96%   BMI 25.36 kg/m²   Temp (24hrs), Av.8 °F (36.6 °C), Min:97.6 °F (36.4 °C), Max:98.2 °F (36.8 °C)    Recent Labs     22  1617 22  2052 22  0612 22  1617   POCGLU 198* 161* 109 151*       I/O (24Hr): Intake/Output Summary (Last 24 hours) at 2022 1638  Last data filed at 2022 1526  Gross per 24 hour   Intake 1458.7 ml   Output 1175 ml   Net 283.7 ml       Labs:  Hematology:No results for input(s): WBC, RBC, HGB, HCT, MCV, MCH, MCHC, RDW, PLT, MPV, SEDRATE, CRP, INR, DDIMER, DY0RUGAC, LABABSO in the last 72 hours.     Invalid input(s): PT  Chemistry:  Recent Labs     22  0614 22  0617    140   K 4.6 4.3    108*   CO2 22 23   GLUCOSE 135* 120*   BUN 31* 22   CREATININE 2.55* 2.03*   ANIONGAP 10 9   LABGLOM 25* 32*   GFRAA 30* 39*   CALCIUM 7.6* 7.8*     Recent Labs     22  0608 22  1122 22  1617 22   POCGLU 99 169* 198* 161* 109 151*     ABG:No results found for: POCPH, PHART, PH, POCPCO2, DNJ6NNF, PCO2, POCPO2, PO2ART, PO2, POCHCO3, SOF3WHH, HCO3, NBEA, PBEA, BEART, BE, THGBART, THB, RPN8QKU, ZIRU9CNS, U5OOMEMU, O2SAT, FIO2  Lab Results   Component Value Date/Time    SPECIAL LT FA,7ML 04/11/2022 06:38 PM     Lab Results   Component Value Date/Time    CULTURE YEAST >013645 CFU/ML 04/18/2022 11:15 AM       Radiology:  No results found. Physical Examination:        General appearance:  alert, cooperative and no distress  Mental Status:  oriented to person, place and time and normal affect  Lungs:  clear to auscultation bilaterally, normal effort  Heart:  regular rate and rhythm, no murmur  Abdomen:  soft, nontender, nondistended, normal bowel sounds, no masses, hepatomegaly, splenomegaly  Extremities:  no edema, redness, tenderness in the calves. Left lower extremity wound bandaged  Skin:  no gross lesions, rashes, induration    Assessment:        Hospital Problems           Last Modified POA    * (Principal) Necrotizing fasciitis ([address] 4/12/2022 Yes    Acute kidney injury superimposed on CKD ([address] 4/11/2022 Yes    CKD (chronic kidney disease), stage III ([address] 4/11/2022 Yes    Uncontrolled hypertension 4/12/2022 Yes    Type 2 diabetes mellitus with stage 3 chronic kidney disease, with long-term current use of insulin ([address] 4/12/2022 Yes    Class 1 obesity with serious comorbidity in adult 4/12/2022 Yes          Plan:        1. Necrotizing fasciitis/osteomyelitis  1. Continue antibiotic  2. Low the knee amputation scheduled for later today  2. Diabetes mellitus  1. Blood sugar trend noted  1. Stable no changes  3. Essential hypertension  1. Blood pressure under reasonable control  1. Continue to monitor other 24 hours prior to titrating medications  4. Stage III chronic kidney disease  1. No function stable  2. Patient did have an element of acute kidney injury which is improved  3.  Nephrology following    Kevin Solis [address], DO  4/19/2022  4:38 PM

## 2022-04-19 NOTE — PROGRESS NOTES
[facility] -   Infectious diseases evaluation  admission date 4/11/2022    reason for consultation:   Left foot infection    Impression :   Current:  · Diabetic left foot infection with suspected necrotizing fasciitis and calcaneal osteomyelitis. Status post incision and debridement 4/12/22  · Peripheral vascular disease  · Fungal UTI  · Diabetes mellitus  · Chronic renal disease  · History of right below-knee amputation      Recommendations        · Continue IV ceftriaxone and clindamycin   · Add Diflucan  · Plan for left below-knee amputation later today  · Follow CBC and renal function        History of Present Illness:   Initial history:  Lottie Kirby is a 67y.o.-year-old male was sent to hospital from Dr. Malik Zheng office for worsening left heel pain associated with left heel wound with surrounding dark discoloration and redness. The pain is severe, intermittent, no alleviating or aggravating factors. Left foot x-ray showed soft tissue emphysema within the posterior aspect of the distal left lower leg concerning for necrotizing fasciitis and possible calcaneal osteomyelitis. Initial WBC 18.5, creatinine 2.44, C-reactive protein more than 130, C-reactive protein 104.2, lactic acid 0 point  Afebrile  History of right below-knee amputation  Interval changes  4/19/2022   He is afebrile, no new events  The patient was noted to have cloudy urine yesterday that was sent for urinalysis that showed too numerous to count WBC, large leukocyte esterase, urine culture grew yeast  Proteus mirabilis pansensitive and group B streptococcus growth on bone culture from 04/12/2022. No growth on blood cultures from 4/11/2022  Patient Vitals for the past 8 hrs:   BP Temp Temp src Pulse Resp SpO2   04/19/22 1057 (!) 142/60 97.6 °F (36.4 °C) Oral 70 16 95 %   04/19/22 0730 (!) 146/58 97.8 °F (36.6 °C) Oral 68 16 95 %     .       I have personally reviewed the past medical history, past surgical history, medications, social history, and family history, and I haveupdated the database accordingly. Allergies:   Patient has no known allergies. Review of Systems:     Review of Systems  As per history present illness, other than above 12 system review was negative  Physical Examination :       Physical Exam  Constitutional:       General: He is not in acute distress. HENT:      Head: Normocephalic and atraumatic. Right Ear: External ear normal.      Left Ear: External ear normal.   Eyes:      General: No scleral icterus. Conjunctiva/sclera: Conjunctivae normal.   Cardiovascular:      Rate and Rhythm: Normal rate and regular rhythm. Pulses: Normal pulses. Pulmonary:      Effort: Pulmonary effort is normal. No respiratory distress. Breath sounds: Normal breath sounds. Abdominal:      General: Bowel sounds are normal. There is no distension. Palpations: Abdomen is soft. Musculoskeletal:      Comments: Left foot dressing  Right below-knee amputation   Skin:     General: Skin is warm. Coloration: Skin is not jaundiced. Neurological:      General: No focal deficit present. Mental Status: He is oriented to person, place, and time.          Past Medical History:     Past Medical History:   Diagnosis Date    RISSA (acute kidney injury) ([address]     Cerebrovascular disease     Erectile dysfunction     Hepatitis B infection     Hyperlipidemia     Hypertension     Kidney cysts     Liver cyst     MRSA (methicillin resistant staph aureus) culture positive 1/10/2014    right foot    Neurotrophic ulcer of the foot ([address]     Obesity     Osteomyelitis of ankle or foot     Peripheral vascular disease ([address]     Tobacco abuse     Type II or unspecified type diabetes mellitus without mention of complication, not stated as uncontrolled        Past Surgical  History:     Past Surgical History:   Procedure Laterality Date    FOOT AMPUTATION      FOOT AMPUTATION THROUGH METATARSAL  2011    pt can't give exact date of surg-- toes and part of lt foot removed    FOOT DEBRIDEMENT Left 6/20/2020    WOUND BED PREPARATION AND WOUND VAC APPLICATION performed by Billy Cordoba DPM at [address] Left 4/12/2022    FOOT DEBRIDEMENT INCISION AND DRAINAGE WITH BX performed by Billy Cordoba DPM at [address] Right 3/2013       Medications:      cefTRIAXone (ROCEPHIN) IV  2,000 mg IntraVENous Q24H    clindamycin (CLEOCIN) IV  600 mg IntraVENous Q8H    amLODIPine  5 mg Oral Daily    clopidogrel  75 mg Oral Daily    docusate sodium  100 mg Oral BID    ferrous sulfate  325 mg Oral Daily with breakfast    [Held by provider] furosemide  40 mg Oral Every Other Day    metoprolol tartrate  25 mg Oral BID    atorvastatin  10 mg Oral Daily    sodium chloride flush  5-40 mL IntraVENous 2 times per day    insulin lispro  0-12 Units SubCUTAneous TID WC    insulin lispro  0-6 Units SubCUTAneous Nightly       Social History:     Social History     Socioeconomic History    Marital status:      Spouse name: Not on file    Number of children: Not on file    Years of education: Not on file    Highest education level: Not on file   Occupational History    Not on file   Tobacco Use    Smoking status: Current Every Day Smoker     Packs/day: 0.50     Years: 30.00     Pack years: 15.00     Types: Cigarettes    Smokeless tobacco: Never Used    Tobacco comment: working on quitting smoking   Vaping Use    Vaping Use: Never used   Substance and Sexual Activity    Alcohol use: No    Drug use: Never    Sexual activity: Yes     Partners: Female   Other Topics Concern    Not on file   Social History Narrative    Not on file     Social Determinants of Health     Financial Resource Strain:     Difficulty of Paying Living Expenses: Not on file   Food Insecurity:     Worried About Running Out of Food in the Last Year: Not on file    Marika of Food in the Last Year: Not on file   Transportation Needs:     Lack of Transportation (Medical): Not on file    Lack of Transportation (Non-Medical): Not on file   Physical Activity:     Days of Exercise per Week: Not on file    Minutes of Exercise per Session: Not on file   Stress:     Feeling of Stress : Not on file   Social Connections:     Frequency of Communication with Friends and Family: Not on file    Frequency of Social Gatherings with Friends and Family: Not on file    Attends Zoroastrianism Services: Not on file    Active Member of Clubs or Organizations: Not on file    Attends Club or Organization Meetings: Not on file    Marital Status: Not on file   Intimate Partner Violence:     Fear of Current or Ex-Partner: Not on file    Emotionally Abused: Not on file    Physically Abused: Not on file    Sexually Abused: Not on file   Housing Stability:     Unable to Pay for Housing in the Last Year: Not on file    Number of [place] in the Last Year: Not on file    Unstable Housing in the Last Year: Not on file       Family History:     Family History   Problem Relation Age of Onset    Diabetes Mother     Diabetes Sister     Diabetes Brother     Diabetes Sister     Diabetes Sister     Diabetes Sister       Medical Decision Making:   I have independently reviewed/ordered the following labs:    CBC with Differential:   Recent Labs     04/16/22  1254   WBC 18.1*   HGB 9.5*   HCT 29.6*      LYMPHOPCT 9*   MONOPCT 5     BMP:  Recent Labs     04/17/22  0614 04/19/22  0617    140   K 4.6 4.3    108*   CO2 22 23   BUN 31* 22   CREATININE 2.55* 2.03*     Hepatic Function Panel: No results for input(s): PROT, LABALBU, BILIDIR, IBILI, BILITOT, ALKPHOS, ALT, AST in the last 72 hours. No results for input(s): RPR in the last 72 hours. No results for input(s): HIV in the last 72 hours. No results for input(s): BC in the last 72 hours.   Lab Results   Component Value Date    CREATININE 2.03 04/19/2022    GLUCOSE 120 04/19/2022    GLUCOSE 133 10/18/2011       Detailed results: Thank you for allowing us to participate in the care of this patient. Please call with questions. This note is created with the assistance of a speech recognition program.  While intending to generate adocument that actually reflects the content of the visit, the document can still have some errors including those of syntax and sound a like substitutions which may escape proof reading. It such instances, actual meaningcan be extrapolated by contextual diversion.     Vilma Tellez MD  Office: [phone]  Perfect serve / office [phone]

## 2022-04-19 NOTE — ANESTHESIA PRE PROCEDURE
Department of Anesthesiology  Preprocedure Note       Name:  Mary Vazquez   Age:  67 y.o.  :  1950                                          MRN:  [id number]         Date:  2022      Surgeon: Alexandra Lewis):  Mata Dudley MD    Procedure: Procedure(s):  LEFT FOOT    INCISION AND DRAINAGE WITH POSSIBLE WOUND VAC APPLICATION  VS EPIFIX    Medications prior to admission:   Prior to Admission medications    Medication Sig Start Date End Date Taking? Authorizing Provider   metoprolol tartrate (LOPRESSOR) 25 MG tablet Take 25 mg by mouth 2 times daily    Historical Provider, MD   QUEtiapine (SEROQUEL) 25 MG tablet Take 25 mg by mouth at bedtime    Historical Provider, MD   amLODIPine (NORVASC) 10 MG tablet Take 0.5 tablets by mouth daily  Patient taking differently: Take 10 mg by mouth daily  20   Nery Thorpe,    insulin glargine (BASAGLAR KWIKPEN) 100 UNIT/ML injection pen Inject 10-20 Units into the skin 2 times daily Injects 20 units AM in the morning and 10 units at night  Patient taking differently: Inject 12 Units into the skin nightly  9/20/20 10/20/20  Nery Thorpe DO   clopidogrel (PLAVIX) 75 MG tablet Take 1 tablet by mouth daily 20   Catana End P Blood, DO   insulin aspart (NOVOLOG FLEXPEN) 100 UNIT/ML injection pen Inject 0-10 Units into the skin 3 times daily as needed Per sliding scale    Historical Provider, MD   simvastatin (ZOCOR) 10 MG tablet Take 10 mg by mouth nightly    Historical Provider, MD   traZODone (DESYREL) 150 MG tablet Take 150 mg by mouth nightly    Historical Provider, MD   docusate sodium (COLACE) 100 MG capsule Take 100 mg by mouth 2 times daily     Historical Provider, MD   acetaminophen (TYLENOL) 325 MG tablet Take 650 mg by mouth as needed for Pain Give 2 tabs = 650 MG by mouth every 4 hours prn     Historical Provider, MD   Insulin Syringe-Needle U-100 (ACCUSURE INS SYR 1CC/30GX5/16\") 30G X 5/16\" 1 ML MISC by Does not apply route.  13   Naz Kelley Thelma Yanez MD       Current medications:    No current facility-administered medications for this visit. No current outpatient medications on file.      Facility-Administered Medications Ordered in Other Visits   Medication Dose Route Frequency Provider Last Rate Last Admin    0.9 % sodium chloride infusion   IntraVENous PRN Mukul Mccullough MD        fluconazole (DIFLUCAN) 200 mg IVPB  200 mg IntraVENous Q24H Magnolia King MD        0.9 % sodium chloride infusion   IntraVENous Continuous James Harvey MD 50 mL/hr at 04/19/22 1441 50 mL/hr at 04/19/22 1441    cefTRIAXone (ROCEPHIN) 2000 mg IVPB in D5W 50ml minibag  2,000 mg IntraVENous Q24H Magnolia King  mL/hr at 04/19/22 1552 2,000 mg at 04/19/22 1552    oxyCODONE-acetaminophen (PERCOCET) 5-325 MG per tablet 1 tablet  1 tablet Oral Q4H PRN Stephany Longo MD        Or    oxyCODONE-acetaminophen (PERCOCET) 5-325 MG per tablet 2 tablet  2 tablet Oral Q4H PRN Stephany Longo MD   2 tablet at 04/18/22 2105    traMADol (ULTRAM) tablet 50 mg  50 mg Oral Q6H PRN Stephany Longo MD   50 mg at 04/17/22 2314    clindamycin (CLEOCIN) 600 mg in dextrose 5 % 50 mL IVPB  600 mg IntraVENous Q8H Kay Vargas DPM   Stopped at 04/19/22 1522    amLODIPine (NORVASC) tablet 5 mg  5 mg Oral Daily NEELIMA LambertM   5 mg at 04/19/22 0801    clopidogrel (PLAVIX) tablet 75 mg  75 mg Oral Daily Kay Vargas, DPM   75 mg at 04/18/22 0819    docusate sodium (COLACE) capsule 100 mg  100 mg Oral BID Kay Vargas DPM   100 mg at 04/19/22 0801    ferrous sulfate (FE TABS 325) EC tablet 325 mg  325 mg Oral Daily with breakfast NEELIMA LambertM   325 mg at 04/19/22 0801    [Held by provider] furosemide (LASIX) tablet 40 mg  40 mg Oral Every Other Day Kay Vargas DPM        metoprolol tartrate (LOPRESSOR) tablet 25 mg  25 mg Oral BID Kay Vargas DPM   25 mg at 04/19/22 0801    atorvastatin (LIPITOR) tablet 10 mg  10 mg Oral Daily Kay , DPM   10 mg at 04/19/22 0801    glucagon (rDNA) injection 1 mg  1 mg IntraMUSCular PRN Frutoso Fine, DPM        dextrose 5 % solution  100 mL/hr IntraVENous PRN Frutoso Fine, DPM        sodium chloride flush 0.9 % injection 5-40 mL  5-40 mL IntraVENous 2 times per day Frutoso Fine, DPM   10 mL at 04/16/22 2138    sodium chloride flush 0.9 % injection 10 mL  10 mL IntraVENous PRN Frutoso Fine, DPM        0.9 % sodium chloride infusion   IntraVENous PRN Frutoso Fine, DPM   Paused at 04/16/22 1740    potassium chloride (KLOR-CON M) extended release tablet 40 mEq  40 mEq Oral PRN Frutoso Fine, DPM        Or    potassium bicarb-citric acid (EFFER-K) effervescent tablet 40 mEq  40 mEq Oral PRN Frutoso Fine, DPM        Or    potassium chloride 10 mEq/100 mL IVPB (Peripheral Line)  10 mEq IntraVENous PRN Frutoso Fine, DPM        magnesium sulfate 1000 mg in dextrose 5% 100 mL IVPB  1,000 mg IntraVENous PRN Frutoso Fine, DPM        ondansetron (ZOFRAN-ODT) disintegrating tablet 4 mg  4 mg Oral Q8H PRN Frutoso Fine, DPM        Or    ondansetron (ZOFRAN) injection 4 mg  4 mg IntraVENous Q6H PRN Frutoso Fine, DPM   4 mg at 04/17/22 2100    polyethylene glycol (GLYCOLAX) packet 17 g  17 g Oral Daily PRN Frutoso Fine, DPM   17 g at 04/17/22 2106    acetaminophen (TYLENOL) tablet 650 mg  650 mg Oral Q6H PRN Frutoso Fine, DPM        Or    acetaminophen (TYLENOL) suppository 650 mg  650 mg Rectal Q6H PRN Frutoso Fine, DPM        insulin lispro (HUMALOG) injection vial 0-12 Units  0-12 Units SubCUTAneous TID WC Frutoso Fine, DPM   2 Units at 04/18/22 1710    insulin lispro (HUMALOG) injection vial 0-6 Units  0-6 Units SubCUTAneous Nightly Frutoso Fine, DPM   1 Units at 04/18/22 2105    dextrose bolus (hypoglycemia) 10% 125 mL  125 mL IntraVENous PRN Frutoso Fine, DPM        Or    dextrose bolus (hypoglycemia) 10% 250 mL  250 mL IntraVENous PRN Frutoso Fine, DPM        glucose chewable tablet 4 each  4 each Oral PRN Frutoso Fine, DPM        hydrALAZINE (APRESOLINE) injection 10 mg  10 mg IntraVENous Q4H PRN Frutoso Fine, DPM   10 mg at 04/17/22 1146       Allergies:  No Known Allergies    Problem List:    Patient Active Problem List   Diagnosis Code    Uncontrolled hypertension I10    PVD (peripheral vascular disease) ([address] I73.9    Type 2 diabetes mellitus with stage 3 chronic kidney disease, with long-term current use of insulin (HCC) E11.22, N18.30, Z79.4    Insomnia G47.00    Erectile dysfunction N52.9    Partial traumatic amputation of left foot ([address] L52.145Y    Class 1 obesity with serious comorbidity in adult E66.9    Tobacco abuse Z72.0    Hepatitis B infection B19.10    Liver cyst K76.89    Kidney cysts N28.1    Osteomyelitis of ankle or foot M86.9    Neurotrophic ulcer of the foot ([address] L97.509    RISSA (acute kidney injury) ([address] N17.9    Dyslipidemia E78.5    Microcytic anemia D50.9    Diabetic foot infection ([address] E11.628, L08.9    Tobacco dependence F17.200    Diabetic ulcer of left heel associated with type 2 diabetes mellitus (HCC) E11.621, L97.429    Hypomagnesemia E83.42    Hypokalemia E87.6    Diabetes mellitus type 2 with complications, uncontrolled (HCC) E11.8, E11.65    Acute kidney injury superimposed on CKD (HCC) N17.9, N18.9    H/O noncompliance with medical treatment, presenting hazards to health Z91.19    Uncontrolled diabetes mellitus type 2 without complications [id number]    History of right below knee amputation ([address] Z89.511    Severe malnutrition (HCC) E43    CKD (chronic kidney disease), stage III (HCC) N18.30    Oral thrush B37.0    Cataract of right eye H26.9    Necrotizing fasciitis ([address] M72.6       Past Medical History:        Diagnosis Date    RISSA (acute kidney injury) ([address]     Cerebrovascular disease     Erectile dysfunction     Hepatitis B infection     Hyperlipidemia     Hypertension     Kidney cysts     Liver cyst     MRSA (methicillin resistant staph aureus) culture positive 1/10/2014    right foot    Neurotrophic ulcer of the foot ([address]     Obesity  Osteomyelitis of ankle or foot     Peripheral vascular disease (HCC)     Tobacco abuse     Type II or unspecified type diabetes mellitus without mention of complication, not stated as uncontrolled        Past Surgical History:        Procedure Laterality Date    FOOT AMPUTATION      FOOT AMPUTATION THROUGH METATARSAL  2011    pt can't give exact date of surg-- toes and part of lt foot removed    FOOT DEBRIDEMENT Left 6/20/2020    WOUND BED PREPARATION AND WOUND VAC APPLICATION performed by Michele Hernandez DPM at [facility] Left 4/12/2022    FOOT DEBRIDEMENT INCISION AND DRAINAGE WITH BX performed by Michele Hernandez DPM at [address] [place] [address] Right 3/2013       Social History:    Social History     Tobacco Use    Smoking status: Current Every Day Smoker     Packs/day: 0.50     Years: 30.00     Pack years: 15.00     Types: Cigarettes    Smokeless tobacco: Never Used    Tobacco comment: working on quitting smoking   Substance Use Topics    Alcohol use: No                                Ready to quit: Not Answered  Counseling given: Not Answered  Comment: working on quitting smoking      Vital Signs (Current): There were no vitals filed for this visit.                                            BP Readings from Last 3 Encounters:   04/19/22 (!) 140/62   04/12/22 (!) 108/56   09/21/20 (!) 134/51       NPO Status:                                                                                 BMI:   Wt Readings from Last 3 Encounters:   04/19/22 187 lb (84.8 kg)   09/21/20 204 lb 8 oz (92.8 kg)   06/22/20 270 lb 6.4 oz (122.7 kg)     There is no height or weight on file to calculate BMI.    CBC:   Lab Results   Component Value Date    WBC 18.1 04/16/2022    RBC 3.76 04/16/2022    RBC 4.20 10/10/2011    HGB 9.5 04/16/2022    HCT 29.6 04/16/2022    MCV 78.7 04/16/2022    RDW 14.6 04/16/2022     04/16/2022     10/10/2011       CMP:   Lab Results   Component Value Date     04/19/2022    K 4.3 04/19/2022     04/19/2022    CO2 23 04/19/2022    BUN 22 04/19/2022    CREATININE 2.03 04/19/2022    GFRAA 39 04/19/2022    LABGLOM 32 04/19/2022    GLUCOSE 120 04/19/2022    GLUCOSE 133 10/18/2011    PROT 7.6 09/16/2020    CALCIUM 7.8 04/19/2022    BILITOT 0.40 09/16/2020    ALKPHOS 79 09/16/2020    AST 15 09/16/2020    ALT 11 09/16/2020       POC Tests:   Recent Labs     04/19/22  0612   POCGLU 109       Coags:   Lab Results   Component Value Date    PROTIME 10.3 07/11/2013    INR 1.0 07/11/2013    APTT 26.8 07/11/2013       HCG (If Applicable): No results found for: PREGTESTUR, PREGSERUM, HCG, HCGQUANT     ABGs: No results found for: PHART, PO2ART, WOX5JQQ, QRY4FYW, BEART, R7YBUKVG     Type & Screen (If Applicable):  No results found for: LABABO, LABRH    Drug/Infectious Status (If Applicable):  Lab Results   Component Value Date    HEPCAB NONREACTIVE 07/22/2013       COVID-19 Screening (If Applicable):   Lab Results   Component Value Date    COVID19 Not Detected 06/19/2020         Anesthesia Evaluation   no history of anesthetic complications:   Airway: Mallampati: II  TM distance: >3 FB   Neck ROM: full  Mouth opening: > = 3 FB Dental:    (+) edentulous      Pulmonary:normal exam    (+) current smoker                           Cardiovascular:    (+) hypertension:, hyperlipidemia    (-)  angina                Neuro/Psych:   (+) CVA: residual symptoms,             GI/Hepatic/Renal:   (+) hepatitis: B, renal disease: CRI,           Endo/Other:    (+) DiabetesType II DM, poorly controlled, , blood dyscrasia::., .                 Abdominal:             Vascular:   + PVD, aortic or cerebral, . Other Findings:               Anesthesia Plan      general, TIVA and regional     ASA 3       Induction: intravenous. MIPS: Postoperative opioids intended and Prophylactic antiemetics administered. Anesthetic plan and risks discussed with patient.       Plan discussed with attending.     Attending anesthesiologist reviewed and agrees with Preprocedure content              Tye Smith MD   4/19/2022

## 2022-04-19 NOTE — PROGRESS NOTES
Patient transferred off the floor for surgery by bed. Report given prior to transfer. Patient stable at time of transfer and family present.

## 2022-04-19 NOTE — PROGRESS NOTES
Physical Therapy  Facility/Department: [facility]  Daily Treatment Note  NAME: Albin Hills  : 1950  MRN: [id number]    Date of Service: 2022    Discharge Recommendations:  Patient would benefit from continued therapy after discharge   Pt currently functioning below baseline. Would suggest additional therapy at time of discharge to maximize long term outcomes and prevent re-admission. Please refer to AM-PAC score for current level of function. PT Equipment Recommendations  Equipment Needed: Yes  Other: Slide board    Assessment   Body structures, Functions, Activity limitations: Decreased functional mobility ; Decreased strength;Decreased cognition;Decreased safe awareness;Decreased endurance  Assessment: Pt tolerated session fair. Pt progressing towards STGs this date and agreeable to get OOB to bedside recliner. Pt continues to require skilled 2 person assist for safe functional mobility at this time given strength, ROM, and balance deficits. Pt would benefit from continued skilled PT to address deficits in order to maximize independence w/ functional mobility. Prognosis: Good  Decision Making: High Complexity  PT Education: Goals;PT Role;Plan of Care;General Safety;Transfer Training;Pressure Relief  Patient Education: Pt educated on: importance of continued mobility throughout admission, sliding board transfer, general safety awareness, safe transfers w/ RW, fall risk prevention, importance of pressure relief techniques to maintain skin integrity. Pt w/ poor return demo. Pt requires continued reinforcement of education. REQUIRES PT FOLLOW UP: Yes  Activity Tolerance  Activity Tolerance: Patient limited by fatigue;Patient limited by endurance     Patient Diagnosis(es): The encounter diagnosis was Necrotizing fasciitis of ankle and foot ([facility]      has a past medical history of RISSA (acute kidney injury) ([address] Cerebrovascular disease, Erectile dysfunction, Hepatitis B infection, Hyperlipidemia, Hypertension, Kidney cysts, Liver cyst, MRSA (methicillin resistant staph aureus) culture positive, Neurotrophic ulcer of the foot ([facility] Obesity, Osteomyelitis of ankle or foot, Peripheral vascular disease ([facility] Tobacco abuse, and Type II or unspecified type diabetes mellitus without mention of complication, not stated as uncontrolled. has a past surgical history that includes Foot amputation through metatarsal (2011); Foot Amputation; Foot surgery (Right, 3/2013); Foot Debridement (Left, 6/20/2020); and Foot Debridement (Left, 4/12/2022). Restrictions  Restrictions/Precautions  Restrictions/Precautions: General Precautions,Fall Risk,Up as Tolerated  Required Braces or Orthoses?: No  Lower Extremity Weight Bearing Restrictions  Right Lower Extremity Weight Bearing: Non Weight Bearing  Left Lower Extremity Weight Bearing: Non Weight Bearing  Partial Weight Bearing Percentage Or Pounds: No WB orders, but had I&D of left heel 4/12 and states he cannot put weight on it. Position Activity Restriction  Other position/activity restrictions: IV, telemetry, Right BKA with prosthesis, left transmetatarsal amp with wound on heel  Subjective   General  Chart Reviewed: Yes  Additional Pertinent Hx: HTN, DM  Response To Previous Treatment: Patient with no complaints from previous session. Family / Caregiver Present: No  Subjective  Subjective: Pt reports Thersijasper Diaz are going to give me a bath soon. \"  General Comment  Comments: RN and pt agreeable to therapy. Pt supine in bed upon arrival.  Pt agreeable for PT treatement with encouragement. Orientation  Orientation  Overall Orientation Status: Within Functional Limits  Cognition   Cognition  Overall Cognitive Status: Exceptions  Arousal/Alertness: Appropriate responses to stimuli  Following Commands: Follows one step commands with increased time; Follows one step commands with repetition  Attention Span: Appears intact; Attends with cues to redirect  Memory: Decreased short term memory  Safety Judgement: Decreased awareness of need for assistance;Decreased awareness of need for safety  Problem Solving: Assistance required to generate solutions;Assistance required to implement solutions;Assistance required to correct errors made;Assistance required to identify errors made;Decreased awareness of errors  Insights: Decreased awareness of deficits  Initiation: Requires cues for some  Sequencing: Requires cues for some  Cognition Comment: Pt has difficulty processing/following commands. Objective   Bed mobility  Supine to Sit: Minimal assistance  Scooting: Minimal assistance  Comment: Patient demo'd improved bed mobility with VCs for tech and safety with increased time and effort. Transfers  Sit to Stand: Maximum Assistance;2 Person Assistance  Stand to sit: Maximum Assistance;2 Person Assistance  Comment: Patient performed STS x 1 reps MAX x 2 A and unable to maintain [address] on Lt LE. Agreeable to try slide board transfer to his personal electric W/C. OT staff brought electric W/C up to EOB, Patient leaned away and OT placed slide board. Patient was able to use carson UE to scoot self over to chair with use of Rt prosthetic and maintaining NWBing on Lt LE. Increased difficutlies using sliding board back to bed as it was more of an incline. Ambulation  Ambulation?: No  Stairs/Curb  Stairs?: No     Balance  Posture: Fair  Sitting - Static: Good  Sitting - Dynamic: Good;-  Standing - Static: Fair;-  Standing - Dynamic: Poor;+  Comments: Standing balance assessed w/ RW  Exercises  Comments: Returned for supine carson LE AROM x 10 reps with VCs to tech and some assist for completion. Pt requiring max verbal cueing to maintain attention to task w/ poor return demo.          Comment: Assisted w/ use of urinal while sitting EOB       AM-PAC Score  AM-PAC Inpatient Mobility Raw Score : 15 (04/19/22 1303)  AM-PAC Inpatient T-Scale Score : 39.45 (04/19/22 1303)  Mobility Inpatient CMS 0-100% Score: 57.7 (04/19/22 1303)  Mobility Inpatient CMS G-Code Modifier : CK (04/19/22 1303)          Goals  Short term goals  Time Frame for Short term goals: 12 visits  Short term goal 1: Patient will be indep with bed mobility. Short term goal 2: Patient will be min x 1 with slide board transfers. Short term goal 3: Patient will have good dynamic seated balance. Short term goal 4: Patient will tolerate 30 minutes of ther-ex an dther-act. Short term goal 5: Patient will be indep with HEP. Patient Goals   Patient goals : return home    Plan    Plan  Times per week: 1-2x/d, 5-6 d/wk  Current Treatment Recommendations: Strengthening,Balance Training,Functional Mobility Training,Transfer Training,Endurance Training,Patient/Caregiver Education & Training,Home Exercise Program,Safety Education & Training  Safety Devices  Type of devices: Call light within reach,Gait belt,Nurse notified,All fall risk precautions in place,Bed alarm in place,Left in bed  Restraints  Initially in place: No     Therapy Time   Individual Concurrent Group Co-treatment   Time In [phone]   Time Out [phone]   Minutes 32     10        Co-treatment with OT warranted secondary to decreased safety and independence requiring 2 skilled therapy professionals to address individual discipline's goals. PT addressing pre gait trunk strengthening, weight shifting prior to transfers, transfer training and postural control in sitting, sliding board transfer.     Isrrael Mari, PTA

## 2022-04-19 NOTE — ANESTHESIA PROCEDURE NOTES
Peripheral Block    Patient location during procedure: holding area  Start time: 4/19/2022 5:25 PM  End time: 4/19/2022 5:35 PM  Staffing  Performed: anesthesiologist   Anesthesiologist: Cari Cooks, MD  Preanesthetic Checklist  Completed: patient identified, IV checked, site marked, risks and benefits discussed, surgical consent, monitors and equipment checked, pre-op evaluation, timeout performed, anesthesia consent given, oxygen available and patient being monitored  Peripheral Block  Patient position: supine  Prep: ChloraPrep  Patient monitoring: cardiac monitor, continuous pulse ox, continuous capnometry, frequent blood pressure checks and IV access  Block type: Femoral and Sciatic  Laterality: left  Injection technique: single-shot  Guidance: ultrasound guided  Local infiltration: lidocaine (8mg decadron)  Infiltration strength: 1 %  Dose: 2 mL  Provider prep: mask and sterile gloves  Local infiltration: lidocaine (8mg decadron)  Needle  Needle type: pencil-tip   Needle gauge: 20 G  Needle localization: ultrasound guidance  Assessment  Injection assessment: negative aspiration for heme, no paresthesia on injection and local visualized surrounding nerve on ultrasound  Paresthesia pain: none  Slow fractionated injection: yes  Hemodynamics: stable  Additional Notes  Holding area, ultrasound    Medications Administered  Bupivacaine (MARCAINE) PF injection 0.5%, 40 mL  Reason for block: procedure for pain, post-op pain management and at surgeon's request

## 2022-04-19 NOTE — PLAN OF CARE
Problem: Pain:  Goal: Pain level will decrease  Description: Pain level will decrease  4/18/2022 2136 by Ted King RN  Outcome: Ongoing  Note: Pain level assessment complete. Patient educated on pain scale and control interventions  PRN pain medication given per patient request  Patient instructed to call out with new onset of pain or unrelieved pain       Problem: Skin Integrity:  Goal: Will show no infection signs and symptoms  Description: Will show no infection signs and symptoms  4/18/2022 2136 by Ted King RN  Outcome: Ongoing     Problem: Falls - Risk of:  Goal: Will remain free from falls  Description: Will remain free from falls  4/18/2022 2136 by Ted King RN  Outcome: Ongoing  Siderails up x 2  Hourly rounding  Call light in reach  Instructed to call for assist before attempting out of bed.   Remains free from falls and accidental injury at this time   Floor free from obstacles  Bed is locked and in lowest position  Adequate lighting provided  Bed alarm on, Red [place]alling star and Stay with Me signs posted

## 2022-04-19 NOTE — PROGRESS NOTES
Occupational Therapy  Facility/Department: [facility] MED SURG  Daily Treatment Note  NAME: Krystin Layne  : 1950  MRN: [id number]    Date of Service: 2022    Discharge Recommendations:  Patient would benefit from continued therapy after discharge   Pt currently functioning below baseline. Would suggest additional therapy at time of discharge to maximize long term outcomes and prevent re-admission. Please refer to AM-PAC score for current level of function. Assessment   Performance deficits / Impairments: Decreased strength;Decreased ADL status; Decreased ROM; Decreased endurance;Decreased sensation;Decreased cognition;Decreased safe awareness;Decreased balance;Decreased coordination  Assessment: Pt slowly progressing towards goals but is limited by overall decreased strength/endurance. Pt req's 2 staff assist at this time for all functional tasks. Skilled OT indicated for deficits with overall safety awareness, balance, endurance, strength and safety with ADL transfer techs to increase overall function and independence. Prognosis: Fair  OT Education: Transfer Training;Energy Conservation;Equipment  Barriers to Learning: Cognitive deficits  REQUIRES OT FOLLOW UP: Yes  Activity Tolerance  Activity Tolerance: Patient Tolerated treatment well;Treatment limited secondary to decreased cognition  Activity Tolerance: P+  Safety Devices  Type of devices: All fall risk precautions in place; Left in bed;Bed alarm in place;Call light within reach;Nurse notified;Gait belt;Patient at risk for falls         Patient Diagnosis(es): The encounter diagnosis was Necrotizing fasciitis of ankle and foot ([address]       has a past medical history of RISSA (acute kidney injury) ([address] Cerebrovascular disease, Erectile dysfunction, Hepatitis B infection, Hyperlipidemia, Hypertension, Kidney cysts, Liver cyst, MRSA (methicillin resistant staph aureus) culture positive, Neurotrophic ulcer of the foot ([address] Obesity, Osteomyelitis of ankle or foot, Peripheral vascular disease ([address] Tobacco abuse, and Type II or unspecified type diabetes mellitus without mention of complication, not stated as uncontrolled. has a past surgical history that includes Foot amputation through metatarsal (2011); Foot Amputation; Foot surgery (Right, 3/2013); Foot Debridement (Left, 6/20/2020); and Foot Debridement (Left, 4/12/2022). Restrictions  Restrictions/Precautions  Restrictions/Precautions: General Precautions,Fall Risk,Up as Tolerated  Required Braces or Orthoses?: No  Lower Extremity Weight Bearing Restrictions  Right Lower Extremity Weight Bearing: Non Weight Bearing  Left Lower Extremity Weight Bearing: Non Weight Bearing  Partial Weight Bearing Percentage Or Pounds: No WB orders, but had I&D of left heel 4/12 and states he cannot put weight on it. Position Activity Restriction  Other position/activity restrictions: IV, telemetry, Right BKA with prosthesis, left transmetatarsal amp with wound on heel  Subjective   General  Chart Reviewed: Yes  Patient assessed for rehabilitation services?: Yes  Response to previous treatment: Patient with no complaints from previous session  Family / Caregiver Present: No  Subjective  Subjective: Pt in bed upon arrival and agreeable to therapy. General Comment  Comments: RN ok'd pt for therapy. Orientation  Orientation  Overall Orientation Status: Within Functional Limits  Objective             Balance  Sitting Balance: Supervision  Standing Balance: Maximum assistance (x2 with walker but unable to maintain NWB L LE)  Bed mobility  Supine to Sit: Stand by assistance  Sit to Supine: Stand by assistance  Scooting: Stand by assistance  Comment: Verbal cues for safety, technique and use of bed rail. Transfers  Slide Board: Moderate assistance;2 Person assistance (Pt practiced slide board transfers from bed to/from power w/c.  Pt req'd MAX verbal cues/assist for positioning of w/c, turning chair off prior to transfer, placing board and for sequencing movements during transfer. Extensive training needed for safety.)                       Cognition  Overall Cognitive Status: Exceptions  Arousal/Alertness: Appropriate responses to stimuli  Following Commands: Follows one step commands with increased time; Follows one step commands with repetition  Attention Span: Appears intact; Attends with cues to redirect  Memory: Decreased short term memory  Safety Judgement: Decreased awareness of need for assistance;Decreased awareness of need for safety  Problem Solving: Assistance required to generate solutions;Assistance required to implement solutions;Assistance required to correct errors made;Assistance required to identify errors made;Decreased awareness of errors  Insights: Decreased awareness of deficits  Initiation: Requires cues for some  Sequencing: Requires cues for some  Cognition Comment: Pt has difficulty processing/following commands. Perception  Overall Perceptual Status: [facility]                                   Plan   Plan  Times per week: 4-5x a week, 1-2x per day  Specific instructions for Next Treatment: REASSESS s/p L BKA today. Current Treatment Recommendations: Strengthening,ROM,Balance Training,Endurance Training,Positioning,Safety Education & Training,Patient/Caregiver Education & Training,Self-Care / ADL                                                  AM-PAC Score        AM-[facility] Inpatient Daily Activity Raw Score: 16 (04/19/22 1243)  AM-PAC Inpatient ADL T-Scale Score : 35.96 (04/19/22 1243)  ADL Inpatient CMS 0-100% Score: 53.32 (04/19/22 1243)  ADL Inpatient CMS G-Code Modifier : CK (04/19/22 1243)    Goals  Short term goals  Time Frame for Short term goals: By discharge pt will  Short term goal 1: demo min Ax1 for ADL transfers with appropriate device/technique and good safety/pacing.   Short term goal 2: demo SBA for UB ADLs and min A x1 for LB ADLs with good safety/pacing and use of AD/DME as needed. Short term goal 3: demo min A x1 for toileting tasks with good safety/pacing and use of AD/DME as needed. Short term goal 4: Increase B UE strength by 1/2 grade to assist with ADL tasks and functional transfers  Short term goal 5: demo and verb good understanding of EC/WS, ADL transfer techs, fall prevention techs, B UE HEP, possible equip needs, use of DME/AD as needed, pressure relief, d/c reccommendations and surgery precautions. Therapy Time   Individual Concurrent Group Co-treatment   Time In       9526   Time Out       56   Minutes       27     Co-treatment with PT warranted secondary to decreased safety and independence requiring 2 skilled therapy professionals to address individual discipline's goals. OT addressing \"preparation for ADL transfer\",\"sitting balance for increased ADL performance\",\"sitting/activity tolerance\",\"functional reaching\",\"environmental safety/scanning\",\"fall prevention\",\"functional mobility for ADL transfers\",\"ability to sequence and follow directions\",\"functional UE strength\".          OLIVER Collins

## 2022-04-20 LAB
ANION GAP SERPL CALCULATED.3IONS-SCNC: 18 MMOL/L (ref 9–17)
ANION GAP SERPL CALCULATED.3IONS-SCNC: 18 MMOL/L (ref 9–17)
BUN BLDV-MCNC: 24 MG/DL (ref 8–23)
BUN BLDV-MCNC: 28 MG/DL (ref 8–23)
BUN/CREAT BLD: 12 (ref 9–20)
BUN/CREAT BLD: 13 (ref 9–20)
CALCIUM SERPL-MCNC: 8.1 MG/DL (ref 8.6–10.4)
CALCIUM SERPL-MCNC: 8.1 MG/DL (ref 8.6–10.4)
CHLORIDE BLD-SCNC: 103 MMOL/L (ref 98–107)
CHLORIDE BLD-SCNC: 103 MMOL/L (ref 98–107)
CO2: 15 MMOL/L (ref 20–31)
CO2: 16 MMOL/L (ref 20–31)
CREAT SERPL-MCNC: 2.02 MG/DL (ref 0.7–1.2)
CREAT SERPL-MCNC: 2.16 MG/DL (ref 0.7–1.2)
GFR AFRICAN AMERICAN: 37 ML/MIN
GFR AFRICAN AMERICAN: 40 ML/MIN
GFR NON-AFRICAN AMERICAN: 30 ML/MIN
GFR NON-AFRICAN AMERICAN: 33 ML/MIN
GFR SERPL CREATININE-BSD FRML MDRD: ABNORMAL ML/MIN/{1.73_M2}
GFR SERPL CREATININE-BSD FRML MDRD: ABNORMAL ML/MIN/{1.73_M2}
GLUCOSE BLD-MCNC: 135 MG/DL (ref 75–110)
GLUCOSE BLD-MCNC: 229 MG/DL (ref 70–99)
GLUCOSE BLD-MCNC: 290 MG/DL (ref 75–110)
GLUCOSE BLD-MCNC: 333 MG/DL (ref 70–99)
GLUCOSE BLD-MCNC: 361 MG/DL (ref 75–110)
GLUCOSE BLD-MCNC: 412 MG/DL (ref 75–110)
POTASSIUM SERPL-SCNC: 5.1 MMOL/L (ref 3.7–5.3)
POTASSIUM SERPL-SCNC: 5.4 MMOL/L (ref 3.7–5.3)
SODIUM BLD-SCNC: 136 MMOL/L (ref 135–144)
SODIUM BLD-SCNC: 137 MMOL/L (ref 135–144)

## 2022-04-20 PROCEDURE — 36415 COLL VENOUS BLD VENIPUNCTURE: CPT

## 2022-04-20 PROCEDURE — 6370000000 HC RX 637 (ALT 250 FOR IP): Performed by: STUDENT IN AN ORGANIZED HEALTH CARE EDUCATION/TRAINING PROGRAM

## 2022-04-20 PROCEDURE — 2580000003 HC RX 258: Performed by: STUDENT IN AN ORGANIZED HEALTH CARE EDUCATION/TRAINING PROGRAM

## 2022-04-20 PROCEDURE — 99232 SBSQ HOSP IP/OBS MODERATE 35: CPT | Performed by: INTERNAL MEDICINE

## 2022-04-20 PROCEDURE — 2500000003 HC RX 250 WO HCPCS: Performed by: STUDENT IN AN ORGANIZED HEALTH CARE EDUCATION/TRAINING PROGRAM

## 2022-04-20 PROCEDURE — 2060000000 HC ICU INTERMEDIATE R&B

## 2022-04-20 PROCEDURE — 6360000002 HC RX W HCPCS: Performed by: STUDENT IN AN ORGANIZED HEALTH CARE EDUCATION/TRAINING PROGRAM

## 2022-04-20 PROCEDURE — 80048 BASIC METABOLIC PNL TOTAL CA: CPT

## 2022-04-20 PROCEDURE — 6370000000 HC RX 637 (ALT 250 FOR IP): Performed by: HOSPITALIST

## 2022-04-20 PROCEDURE — 97535 SELF CARE MNGMENT TRAINING: CPT

## 2022-04-20 PROCEDURE — 6370000000 HC RX 637 (ALT 250 FOR IP): Performed by: INTERNAL MEDICINE

## 2022-04-20 PROCEDURE — 97530 THERAPEUTIC ACTIVITIES: CPT

## 2022-04-20 PROCEDURE — 97168 OT RE-EVAL EST PLAN CARE: CPT

## 2022-04-20 PROCEDURE — 99232 SBSQ HOSP IP/OBS MODERATE 35: CPT | Performed by: HOSPITALIST

## 2022-04-20 PROCEDURE — 97164 PT RE-EVAL EST PLAN CARE: CPT

## 2022-04-20 RX ORDER — SODIUM BICARBONATE 650 MG/1
650 TABLET ORAL 2 TIMES DAILY
Status: DISCONTINUED | OUTPATIENT
Start: 2022-04-20 | End: 2022-04-27 | Stop reason: HOSPADM

## 2022-04-20 RX ADMIN — CLOPIDOGREL BISULFATE 75 MG: 75 TABLET ORAL at 08:48

## 2022-04-20 RX ADMIN — METOPROLOL TARTRATE 25 MG: 25 TABLET, FILM COATED ORAL at 08:48

## 2022-04-20 RX ADMIN — OXYCODONE AND ACETAMINOPHEN 2 TABLET: 5; 325 TABLET ORAL at 19:57

## 2022-04-20 RX ADMIN — METOPROLOL TARTRATE 25 MG: 25 TABLET, FILM COATED ORAL at 16:01

## 2022-04-20 RX ADMIN — DOCUSATE SODIUM 100 MG: 100 CAPSULE, LIQUID FILLED ORAL at 22:09

## 2022-04-20 RX ADMIN — CEFTRIAXONE 2000 MG: 2 INJECTION, POWDER, FOR SOLUTION INTRAMUSCULAR; INTRAVENOUS at 16:00

## 2022-04-20 RX ADMIN — SODIUM ZIRCONIUM CYCLOSILICATE 5 G: 5 POWDER, FOR SUSPENSION ORAL at 22:09

## 2022-04-20 RX ADMIN — INSULIN LISPRO 10 UNITS: 100 INJECTION, SOLUTION INTRAVENOUS; SUBCUTANEOUS at 16:07

## 2022-04-20 RX ADMIN — CLINDAMYCIN IN 5 PERCENT DEXTROSE 600 MG: 12 INJECTION, SOLUTION INTRAVENOUS at 06:14

## 2022-04-20 RX ADMIN — TRAMADOL HYDROCHLORIDE 50 MG: 50 TABLET, COATED ORAL at 22:09

## 2022-04-20 RX ADMIN — SODIUM ZIRCONIUM CYCLOSILICATE 5 G: 5 POWDER, FOR SUSPENSION ORAL at 13:42

## 2022-04-20 RX ADMIN — INSULIN LISPRO 6 UNITS: 100 INJECTION, SOLUTION INTRAVENOUS; SUBCUTANEOUS at 22:11

## 2022-04-20 RX ADMIN — INSULIN LISPRO 2 UNITS: 100 INJECTION, SOLUTION INTRAVENOUS; SUBCUTANEOUS at 08:49

## 2022-04-20 RX ADMIN — SODIUM ZIRCONIUM CYCLOSILICATE 5 G: 5 POWDER, FOR SUSPENSION ORAL at 09:41

## 2022-04-20 RX ADMIN — FLUCONAZOLE 200 MG: 2 INJECTION, SOLUTION INTRAVENOUS at 23:22

## 2022-04-20 RX ADMIN — DOCUSATE SODIUM 100 MG: 100 CAPSULE, LIQUID FILLED ORAL at 08:48

## 2022-04-20 RX ADMIN — FENTANYL CITRATE 25 MCG: 50 INJECTION, SOLUTION INTRAMUSCULAR; INTRAVENOUS at 00:01

## 2022-04-20 RX ADMIN — SODIUM CHLORIDE, PRESERVATIVE FREE 10 ML: 5 INJECTION INTRAVENOUS at 08:48

## 2022-04-20 RX ADMIN — CLINDAMYCIN IN 5 PERCENT DEXTROSE 600 MG: 12 INJECTION, SOLUTION INTRAVENOUS at 15:01

## 2022-04-20 RX ADMIN — ATORVASTATIN CALCIUM 10 MG: 10 TABLET, FILM COATED ORAL at 08:48

## 2022-04-20 RX ADMIN — OXYCODONE AND ACETAMINOPHEN 2 TABLET: 5; 325 TABLET ORAL at 11:14

## 2022-04-20 RX ADMIN — OXYCODONE AND ACETAMINOPHEN 2 TABLET: 5; 325 TABLET ORAL at 03:12

## 2022-04-20 RX ADMIN — OXYCODONE AND ACETAMINOPHEN 2 TABLET: 5; 325 TABLET ORAL at 15:09

## 2022-04-20 RX ADMIN — FERROUS SULFATE TAB EC 325 MG (65 MG FE EQUIVALENT) 325 MG: 325 (65 FE) TABLET DELAYED RESPONSE at 08:48

## 2022-04-20 RX ADMIN — FENTANYL CITRATE 25 MCG: 50 INJECTION, SOLUTION INTRAMUSCULAR; INTRAVENOUS at 23:51

## 2022-04-20 RX ADMIN — SODIUM BICARBONATE 650 MG: 650 TABLET ORAL at 13:41

## 2022-04-20 RX ADMIN — SODIUM BICARBONATE 650 MG: 650 TABLET ORAL at 22:09

## 2022-04-20 RX ADMIN — AMLODIPINE BESYLATE 5 MG: 5 TABLET ORAL at 08:48

## 2022-04-20 RX ADMIN — SODIUM CHLORIDE: 9 INJECTION, SOLUTION INTRAVENOUS at 15:56

## 2022-04-20 RX ADMIN — INSULIN LISPRO 4 UNITS: 100 INJECTION, SOLUTION INTRAVENOUS; SUBCUTANEOUS at 11:15

## 2022-04-20 ASSESSMENT — PAIN SCALES - GENERAL
PAINLEVEL_OUTOF10: 0
PAINLEVEL_OUTOF10: 9
PAINLEVEL_OUTOF10: 8
PAINLEVEL_OUTOF10: 9
PAINLEVEL_OUTOF10: 6
PAINLEVEL_OUTOF10: 7
PAINLEVEL_OUTOF10: 9
PAINLEVEL_OUTOF10: 8
PAINLEVEL_OUTOF10: 8
PAINLEVEL_OUTOF10: 6

## 2022-04-20 ASSESSMENT — PAIN DESCRIPTION - ORIENTATION
ORIENTATION: LEFT

## 2022-04-20 ASSESSMENT — PAIN - FUNCTIONAL ASSESSMENT
PAIN_FUNCTIONAL_ASSESSMENT: PREVENTS OR INTERFERES SOME ACTIVE ACTIVITIES AND ADLS
PAIN_FUNCTIONAL_ASSESSMENT: PREVENTS OR INTERFERES WITH ALL ACTIVE AND SOME PASSIVE ACTIVITIES
PAIN_FUNCTIONAL_ASSESSMENT: ACTIVITIES ARE NOT PREVENTED
PAIN_FUNCTIONAL_ASSESSMENT: ACTIVITIES ARE NOT PREVENTED
PAIN_FUNCTIONAL_ASSESSMENT: PREVENTS OR INTERFERES WITH ALL ACTIVE AND SOME PASSIVE ACTIVITIES

## 2022-04-20 ASSESSMENT — PAIN DESCRIPTION - LOCATION
LOCATION: LEG

## 2022-04-20 ASSESSMENT — PAIN DESCRIPTION - ONSET
ONSET: ON-GOING

## 2022-04-20 ASSESSMENT — PAIN DESCRIPTION - PAIN TYPE
TYPE: ACUTE PAIN;SURGICAL PAIN
TYPE: SURGICAL PAIN
TYPE: ACUTE PAIN;SURGICAL PAIN

## 2022-04-20 ASSESSMENT — PAIN DESCRIPTION - PROGRESSION
CLINICAL_PROGRESSION: GRADUALLY IMPROVING
CLINICAL_PROGRESSION: NOT CHANGED

## 2022-04-20 ASSESSMENT — PAIN DESCRIPTION - DESCRIPTORS
DESCRIPTORS: DISCOMFORT;TIGHTNESS
DESCRIPTORS: TIGHTNESS;DISCOMFORT
DESCRIPTORS: SHARP
DESCRIPTORS: TIGHTNESS
DESCRIPTORS: TIGHTNESS

## 2022-04-20 ASSESSMENT — PAIN DESCRIPTION - FREQUENCY
FREQUENCY: CONTINUOUS

## 2022-04-20 NOTE — PROGRESS NOTES
Occupational Therapy  Facility/Department: [facility] MED SURG  Occupational Therapy Re-Assessment    Name: Mary Vazquez  : 1950  MRN: [id number]  Date of Service: 2022    COREY Fregoso reports patient is medically stable for therapy treatment this date. Chart reviewed prior to treatment and patient is agreeable for therapy. All lines intact and patient positioned comfortably at end of treatment. All patient needs addressed prior to ending therapy session. Discharge Recommendations:  Patient would benefit from continued therapy after discharge   Pt is currently functional below baseline and would suggest intense therapy post acute care. Would expect patient to be able to tolerate 3 hours of therapy per day and able to tolerate at least one hour up in chair. Please refer to AM-PAC score for current mobility/adl level. OT Equipment Recommendations  Equipment Needed: Yes  Mobility Devices: ADL Assistive Devices  ADL Assistive Devices: Long-handled Shoe Horn;Long-handled Sponge;Reacher;Sock-Aid Hard       Patient Diagnosis(es): The encounter diagnosis was Necrotizing fasciitis of ankle and foot ([address] Past Medical History:  has a past medical history of RISSA (acute kidney injury) ([address] Cerebrovascular disease, Erectile dysfunction, Hepatitis B infection, Hyperlipidemia, Hypertension, Kidney cysts, Liver cyst, MRSA (methicillin resistant staph aureus) culture positive, Neurotrophic ulcer of the foot ([address] Obesity, Osteomyelitis of ankle or foot, Peripheral vascular disease ([address] Tobacco abuse, and Type II or unspecified type diabetes mellitus without mention of complication, not stated as uncontrolled. 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 PER H&P: Caroline Harper is a 67 y.o. Non- / non  male who presents with Wound Infection (Sent over by PCP for wound evaluation in foot (left) )   and is admitted to the hospital for the management of Necrotizing fasciitis ([facility] [address]    Patient is a poor historian is does not recall how long he has been undergoing treatment for a left heel ulcer. He denies fever, but says he has had chills for \"months\". He has a known history of uncontrolled diabetes and says he checks his blood sugar in the morning and at night- BG range has been 140s to 200s, he says. He is a daily smoker. He recalls he went to Dr Todd/ podiatry for left heel care and was advised to come to the hospital for further evaluation and care.      While in the ED, xray of the left tib fib revealed soft tissue swelling, cellulitis, and no evidence of osteomyelitis. Left foot xray results were concerning for necrotizing fasciitis and osteomyelitis. His WBC was elevated at 18.5, sed rate > 130, creat 2.44- baseline appears to be 1.36-2.06. Records list Stage 3 CKD. Patient denies having a nephrologist. His lactic was unremarkable. Procedure: LEFT  LEG AMPUTATION ABOVE KNEE      Assessment   Performance deficits / Impairments: Decreased strength;Decreased ADL status; Decreased ROM; Decreased endurance;Decreased sensation;Decreased cognition;Decreased safe awareness;Decreased balance;Decreased coordination  Assessment: Reassessment completed this date after L AKA on 4/19. Skilled OT indicated for deficits with overall safety awareness, balance, endurance, strength and safety with ADL transfer techs to increase overall function and independence.   REQUIRES OT FOLLOW-UP: Yes  Activity Tolerance  Activity Tolerance: Patient Tolerated treatment well;Treatment limited secondary to decreased cognition  Activity Tolerance: fair        Plan   Plan  Times per Week: 4-5x a week, 1-2x per day as hyacinth  Specific Instructions for Next Treatment: REASSESS s/p L HARPER today. Current Treatment Recommendations: Strengthening,ROM,Endurance training,Functional mobility training,Patient/Caregiver education & training,Equipment evaluation, education, & procurement,Safety education & training,Neuromuscular re-education,Self-Care / ADL,Home management training     Restrictions  Restrictions/Precautions  Restrictions/Precautions: General Precautions,Fall Risk,Up as Tolerated  Required Braces or Orthoses?: No  Lower Extremity Weight Bearing Restrictions  Right Lower Extremity Weight Bearing: Non Weight Bearing  Left Lower Extremity Weight Bearing: Non Weight Bearing  Partial Weight Bearing Percentage Or Pounds: No WB orders, but had I&D of left heel 4/12 and states he cannot put weight on it. Position Activity Restriction  Other position/activity restrictions: new L AKA, R BKA with prosthesis, R UE IV, up with assist    Subjective   General  Chart Reviewed: Yes  Patient assessed for rehabilitation services?: Yes  Response to previous treatment: Patient with no complaints from previous session  Family / Caregiver Present: No  Subjective  Subjective: Pt resting in bed, agreeable to OT reassessment post L AKA. General Comment  Comments: RN ok'd pt for therapy.   Pre Treatment Pain Screening  Comments / Details: Pt denies any current pain       Social/Functional History  Social/Functional History  Lives With: Alone  Type of Home: Apartment (senior apt; Liable?)  Home Layout: Multi-level (3rd floor; elevator)  Home Access: Elevator  Bathroom Shower/Tub: Tub/Shower unit,Shower chair with back (sponge bathes; sometimes showers when aide is there)  Bathroom Toilet: Standard (grab bars by toilet)  Home Equipment: [place] walker,Lift chair  Receives Help From: Home health (5 days a week; 4 hours/day)  ADL Assistance: Needs assistance (aid helps with shower; gets dressed independently)  Homemaking Assistance: Needs bedrails, pacing self and pursed lip breathing all to increase ease/safety. Transfers  Sit to stand: Maximum assistance;2 Person assistance (with RW)  Stand to sit: Maximum assistance;2 Person assistance  Transfer Comments: Pt unable to get in full standing position only able to tolerate standing for <15 seconds and then impulsivly would sit down. MAX verbal cues given for proper foot placement, upright posture, controlled stand to sit, slowing down, pacnig self, RW safety, reaching back for surface prior to sitting all to increase safety. Cognition  Overall Cognitive Status: Exceptions  Arousal/Alertness: Appropriate responses to stimuli  Following Commands: Follows one step commands with increased time; Follows one step commands with repetition  Attention Span: Attends with cues to redirect  Memory: Decreased short term memory  Safety Judgement: Decreased awareness of need for assistance;Decreased awareness of need for safety  Problem Solving: Decreased awareness of errors;Assistance required to generate solutions;Assistance required to implement solutions  Insights: Decreased awareness of deficits  Initiation: Requires cues for some  Sequencing: Requires cues for some                  LUE AROM (degrees)  LUE AROM : WFL  RUE AROM (degrees)  RUE AROM : WFL  LUE Strength  Gross LUE Strength: WFL  LUE Strength Comment: 4-/5  RUE Strength  Gross RUE Strength: WFL  RUE Strength Comment: 3+/5                                   AM-PAC Score        AM-[facility] Inpatient Daily Activity Raw Score: 16 (04/20/22 1440)  AM-PAC Inpatient ADL T-Scale Score : 35.96 (04/20/22 1440)  ADL Inpatient CMS 0-100% Score: 53.32 (04/20/22 1440)  ADL Inpatient CMS G-Code Modifier : CK (04/20/22 1440)        Goals  Short Term Goals  Time Frame for Short term goals: By discharge pt will  Short Term Goal 1: demo mod A of 1 for ADL transfers with appropriate device/technique and good safety/pacing. (modified at reassessment)  Short Term Goal 2: demo SBA for UB ADLs and min A x1 for LB ADLs with good safety/pacing and use of AD/DME as needed. Short Term Goal 3: demo min A x1 for toileting tasks with good safety/pacing and use of AD/DME as needed. Short Term Goal 4: Increase B UE strength by 1/2 grade to assist with ADL tasks and functional transfers  Short Term Goal 5: demo and verb good understanding of EC/WS, ADL transfer techs, fall prevention techs, B UE HEP, possible equip needs, use of DME/AD as needed, pressure relief, d/c reccommendations and surgery precautions. Therapy Time   Individual Concurrent Group Co-treatment   Time In 1039 (Plus 10 min for chart review/RN communication)         Time Out 1108         Minutes 29+ 10 =39          tx time: 25 min    Co-treatment with PT warranted secondary to decreased safety and independence requiring 2 skilled therapy professionals to address individual discipline's goals.           Susanne Barraza, OT

## 2022-04-20 NOTE — CARE COORDINATION
Social Work-Lenka Smith anticipates bed for patient and will begin precert after therapy haleigh Ames

## 2022-04-20 NOTE — PLAN OF CARE
Problem: Infection:  Goal: Will remain free from infection  Description: Will remain free from infection  4/20/2022 1006 by Jimmy Harmon RN  Outcome: Progressing  4/20/2022 0140 by Uzma Morataya RN  Outcome: Ongoing     Problem: Safety:  Goal: Free from accidental physical injury  Description: Free from accidental physical injury  4/20/2022 1006 by Jimmy Harmon RN  Outcome: Progressing  4/20/2022 0140 by Uzma Morataya RN  Outcome: Ongoing     Problem: Daily Care:  Goal: Daily care needs are met  Description: Daily care needs are met  4/20/2022 1006 by Jimmy Harmon RN  Outcome: Progressing  4/20/2022 0140 by Uzma Morataya RN  Outcome: Ongoing     Problem: Pain:  Description: Pain management should include both nonpharmacologic and pharmacologic interventions.   Goal: Patient's pain/discomfort is manageable  Description: Patient's pain/discomfort is manageable  4/20/2022 1006 by Jimmy Harmon RN  Outcome: Progressing  4/20/2022 0140 by Uzma Morataya RN  Outcome: Ongoing  Goal: Pain level will decrease  Description: Pain level will decrease  4/20/2022 1006 by Jimmy Harmon RN  Outcome: Progressing  4/20/2022 0140 by Uzma Morataya RN  Outcome: Ongoing  Goal: Control of acute pain  Description: Control of acute pain  4/20/2022 1006 by Jimmy Harmon RN  Outcome: Progressing  4/20/2022 0140 by Uzma Morataya RN  Outcome: Ongoing  Goal: Control of chronic pain  Description: Control of chronic pain  4/20/2022 1006 by Jimmy Harmon RN  Outcome: Progressing  4/20/2022 0140 by Uzma Morataya RN  Outcome: Ongoing     Problem: Skin Integrity:  Goal: Skin integrity will stabilize  Description: Skin integrity will stabilize  4/20/2022 1006 by Jimmy Harmon RN  Outcome: Progressing  4/20/2022 0140 by Uzma Morataya RN  Outcome: Ongoing     Problem: Discharge Planning:  Goal: Patients continuum of care needs are met  Description: Patients continuum of care needs are met  4/20/2022 1006 by Jimmy Harmon RN  Outcome: Progressing  4/20/2022 0140 by Jayce Amaya RN  Outcome: Ongoing     Problem: Skin Integrity:  Goal: Will show no infection signs and symptoms  Description: Will show no infection signs and symptoms  4/20/2022 1006 by Vane Ball RN  Outcome: Progressing  4/20/2022 0140 by Jayce Amaya RN  Outcome: Ongoing  Goal: Absence of new skin breakdown  Description: Absence of new skin breakdown  4/20/2022 1006 by Vane Ball RN  Outcome: Progressing  4/20/2022 0140 by Jayce Amaya RN  Outcome: Ongoing  Goal: Risk for impaired skin integrity will decrease  Description: Risk for impaired skin integrity will decrease  4/20/2022 1006 by Vane Ball RN  Outcome: Progressing  4/20/2022 0140 by Jayce Amaya RN  Outcome: Ongoing     Problem: Falls - Risk of:  Goal: Will remain free from falls  Description: Will remain free from falls  4/20/2022 1006 by Vane Ball RN  Outcome: Progressing  Note: Siderails up x 2  Hourly rounding  Call light in reach  Instructed to call for assist before attempting out of bed. Remains free from falls and accidental injury at this time   Floor free from obstacles  Bed is locked and in lowest position  Adequate lighting provided  Bed alarm on, [place] star and Stay with Me signs posted      4/20/2022 0140 by Jayce Amaya RN  Outcome: Ongoing  Goal: Absence of physical injury  Description: Absence of physical injury  4/20/2022 1006 by Vane Ball RN  Outcome: Progressing  4/20/2022 0140 by Jayce Amaya RN  Outcome: Ongoing     Problem: Nutrition  Goal: Optimal nutrition therapy  4/20/2022 1006 by Vane Ball RN  Outcome: Progressing  4/20/2022 0140 by Jayce Amaya RN  Outcome: Ongoing     Problem:  Activity:  Goal: Risk for activity intolerance will decrease  Description: Risk for activity intolerance will decrease  4/20/2022 1006 by Vane Ball RN  Outcome: Progressing  4/20/2022 0140 by Jayce Amaya RN  Outcome: Ongoing     Problem: Coping:  Goal: Ability to adjust to condition or change in health will improve  Description: Ability to adjust to condition or change in health will improve  4/20/2022 1006 by Pino Orozco RN  Outcome: Progressing  4/20/2022 0140 by Maged Rucker RN  Outcome: Ongoing     Problem: Fluid Volume:  Goal: Ability to maintain a balanced intake and output will improve  Description: Ability to maintain a balanced intake and output will improve  4/20/2022 1006 by Pino Orozco RN  Outcome: Progressing  4/20/2022 0140 by Maged Rucker RN  Outcome: Ongoing     Problem: Health Behavior:  Goal: Ability to identify and utilize available resources and services will improve  Description: Ability to identify and utilize available resources and services will improve  4/20/2022 1006 by Pino Orozco RN  Outcome: Progressing  4/20/2022 0140 by Maged Rucker RN  Outcome: Ongoing  Goal: Ability to manage health-related needs will improve  Description: Ability to manage health-related needs will improve  4/20/2022 1006 by Pino Orozco RN  Outcome: Progressing  4/20/2022 0140 by Maged Rucker RN  Outcome: Ongoing     Problem: Metabolic:  Goal: Ability to maintain appropriate glucose levels will improve  Description: Ability to maintain appropriate glucose levels will improve  4/20/2022 1006 by Pino Orozco RN  Outcome: Progressing  4/20/2022 0140 by Maged Rucker RN  Outcome: Ongoing     Problem: Nutritional:  Goal: Maintenance of adequate nutrition will improve  Description: Maintenance of adequate nutrition will improve  4/20/2022 1006 by Pino Orozco RN  Outcome: Progressing  4/20/2022 0140 by Maged Rucker RN  Outcome: Ongoing  Goal: Progress toward achieving an optimal weight will improve  Description: Progress toward achieving an optimal weight will improve  4/20/2022 1006 by Pino Orozco RN  Outcome: Progressing  4/20/2022 0140 by Maged Rucker RN  Outcome: Ongoing     Problem: Physical Regulation:  Goal: Complications related to the disease process, condition or treatment will be avoided or minimized  Description: Complications related to the disease process, condition or treatment will be avoided or minimized  4/20/2022 1006 by Vane Ball RN  Outcome: Progressing  4/20/2022 0140 by Jyace Amaya RN  Outcome: Ongoing  Goal: Diagnostic test results will improve  Description: Diagnostic test results will improve  4/20/2022 1006 by Vane Ball RN  Outcome: Progressing  4/20/2022 0140 by Jayce Amaya RN  Outcome: Ongoing     Problem: Tissue Perfusion:  Goal: Adequacy of tissue perfusion will improve  Description: Adequacy of tissue perfusion will improve  4/20/2022 1006 by Vane Ball RN  Outcome: Progressing  4/20/2022 0140 by Jayce Amaya RN  Outcome: Ongoing     Problem: Discharge Planning  Goal: Discharge to home or other facility with appropriate resources  Outcome: Progressing

## 2022-04-20 NOTE — PLAN OF CARE
Problem: Infection:  Goal: Will remain free from infection  Description: Will remain free from infection  4/20/2022 0140 by Patricio Lomeli RN  Outcome: Ongoing  4/19/2022 1342 by Ya Abreu RN  Outcome: Ongoing  Note: Patient does not show any signs or symptoms of infection at this time. Vitals:    04/19/22 1057   BP: (!) 142/60   Pulse: 70   Resp: 16   Temp: 97.6 °F (36.4 °C)   SpO2: 95%         Problem: Safety:  Goal: Free from accidental physical injury  Description: Free from accidental physical injury  4/20/2022 0140 by Patricio Lomeli RN  Outcome: Ongoing  4/19/2022 1342 by Ya Abreu RN  Outcome: Ongoing  Note: Patient is free from accidental injury at this time. Will continue to monitor. Problem: Daily Care:  Goal: Daily care needs are met  Description: Daily care needs are met  4/20/2022 0140 by Patricio Lomeli RN  Outcome: Ongoing  4/19/2022 1342 by Ya Abreu RN  Outcome: Ongoing  Note: Patient able to verbalize needs and calls out appropriately for assist. Patient assists with personal daily care needs as tolerated. OT continues to work with patient. Problem: Pain:  Goal: Patient's pain/discomfort is manageable  Description: Patient's pain/discomfort is manageable  4/20/2022 0140 by Patricio Lomeli RN  Outcome: Ongoing  4/19/2022 1342 by Ya Abreu RN  Outcome: Ongoing  Note: Pt medicated with pain medication prn. Assessed all pain characteristics including level, type, location, frequency, and onset. Non-pharmacologic interventions offered to pt as well. Pt states pain is tolerable at this time.  Will continue to monitor   Goal: Pain level will decrease  Description: Pain level will decrease  4/20/2022 0140 by Patricio Lomeli RN  Outcome: Ongoing  4/19/2022 1342 by Ya Abreu RN  Outcome: Ongoing  Goal: Control of acute pain  Description: Control of acute pain  4/20/2022 0140 by Patricio Lomeli RN  Outcome: Ongoing  4/19/2022 1342 by Ya Abreu RN  Outcome: Ongoing  Goal: Control of chronic pain  Description: Control of chronic pain  4/20/2022 0140 by Uzma Morataya RN  Outcome: Ongoing  4/19/2022 1342 by Cathy Diaz RN  Outcome: Ongoing     Problem: Skin Integrity:  Goal: Skin integrity will stabilize  Description: Skin integrity will stabilize  4/20/2022 0140 by Uzma Morataya RN  Outcome: Ongoing  4/19/2022 1342 by Cathy Diaz RN  Outcome: Ongoing     Problem: Discharge Planning:  Goal: Patients continuum of care needs are met  Description: Patients continuum of care needs are met  4/20/2022 0140 by Uzma Morataya RN  Outcome: Ongoing  4/19/2022 1342 by Cathy Diaz RN  Outcome: Ongoing  Note:   Identify potential discharge needs/barriers on admission  Involve family/patient/significant other in discharge process  Collaborate with Case Management/ for discharge needs/concerns      Problem: Skin Integrity:  Goal: Will show no infection signs and symptoms  Description: Will show no infection signs and symptoms  4/20/2022 0140 by Uzma Morataya RN  Outcome: Ongoing  4/19/2022 1342 by Cathy Diaz RN  Outcome: Ongoing  Goal: Absence of new skin breakdown  Description: Absence of new skin breakdown  4/20/2022 0140 by Uzma Morataya RN  Outcome: Ongoing  4/19/2022 1342 by Cathy Diaz RN  Outcome: Ongoing  Note: No new skin breakdown noted throughout the shift. Will continue to monitor   Goal: Risk for impaired skin integrity will decrease  Description: Risk for impaired skin integrity will decrease  4/20/2022 0140 by Uzma Morataya RN  Outcome: Ongoing  4/19/2022 1342 by Cathy Diaz RN  Outcome: Ongoing     Problem: Falls - Risk of:  Goal: Will remain free from falls  Description: Will remain free from falls  4/20/2022 0140 by Uzma Morataya RN  Outcome: Ongoing  4/19/2022 1342 by Cathy Diaz RN  Outcome: Ongoing  Note: Patient remains free from falls at this time.  Bed in lowest position with wheels locked. Tray table and call light within reach. Hourly rounding. Goal: Absence of physical injury  Description: Absence of physical injury  4/20/2022 0140 by Artem Lane RN  Outcome: Ongoing  4/19/2022 1342 by Chon Bryant RN  Outcome: Ongoing     Problem: Nutrition  Goal: Optimal nutrition therapy  4/20/2022 0140 by Artem Lane RN  Outcome: Ongoing  4/19/2022 1342 by Chon Bryant RN  Outcome: Ongoing     Problem:  Activity:  Goal: Risk for activity intolerance will decrease  Description: Risk for activity intolerance will decrease  4/20/2022 0140 by Artem Lane RN  Outcome: Ongoing  4/19/2022 1342 by Chon Bryant RN  Outcome: Ongoing     Problem: Coping:  Goal: Ability to adjust to condition or change in health will improve  Description: Ability to adjust to condition or change in health will improve  4/20/2022 0140 by Artem Lane RN  Outcome: Ongoing  4/19/2022 1342 by Chon Bryant RN  Outcome: Ongoing     Problem: Fluid Volume:  Goal: Ability to maintain a balanced intake and output will improve  Description: Ability to maintain a balanced intake and output will improve  4/20/2022 0140 by Artem Lane RN  Outcome: Ongoing  4/19/2022 1342 by Chon Bryant RN  Outcome: Ongoing     Problem: Health Behavior:  Goal: Ability to identify and utilize available resources and services will improve  Description: Ability to identify and utilize available resources and services will improve  4/20/2022 0140 by Artem Lane RN  Outcome: Ongoing  4/19/2022 1342 by Chon Bryant RN  Outcome: Ongoing  Goal: Ability to manage health-related needs will improve  Description: Ability to manage health-related needs will improve  4/20/2022 0140 by Artem Lane RN  Outcome: Ongoing  4/19/2022 1342 by Chon Bryant RN  Outcome: Ongoing     Problem: Metabolic:  Goal: Ability to maintain appropriate glucose levels will improve  Description: Ability to maintain appropriate glucose levels will improve  4/20/2022 0140 by Archie Simomns RN  Outcome: Ongoing  4/19/2022 1342 by Clarence May RN  Outcome: Ongoing     Problem: Nutritional:  Goal: Maintenance of adequate nutrition will improve  Description: Maintenance of adequate nutrition will improve  4/20/2022 0140 by Archie Simmons RN  Outcome: Ongoing  4/19/2022 1342 by Clarence May RN  Outcome: Ongoing  Goal: Progress toward achieving an optimal weight will improve  Description: Progress toward achieving an optimal weight will improve  4/20/2022 0140 by Archie Simmons RN  Outcome: Ongoing  4/19/2022 1342 by Clarence aMy RN  Outcome: Ongoing     Problem: Physical Regulation:  Goal: Complications related to the disease process, condition or treatment will be avoided or minimized  Description: Complications related to the disease process, condition or treatment will be avoided or minimized  4/20/2022 0140 by Archie Simmons RN  Outcome: Ongoing  4/19/2022 1342 by Clarence May RN  Outcome: Ongoing  Goal: Diagnostic test results will improve  Description: Diagnostic test results will improve  4/20/2022 0140 by Archie Simmons RN  Outcome: Ongoing  4/19/2022 1342 by Clarence May RN  Outcome: Ongoing     Problem: Tissue Perfusion:  Goal: Adequacy of tissue perfusion will improve  Description: Adequacy of tissue perfusion will improve  4/20/2022 0140 by Archie Simmons RN  Outcome: Ongoing  4/19/2022 1342 by Clarence May RN  Outcome: Ongoing

## 2022-04-20 NOTE — PROGRESS NOTES
Renal Progress Note    Patient :  Laurence Pastor; 67 y.o. MRN# [id number]  Location:  2102/2102-01  Attending:  Lokesh Saldana DO  Admit Date:  4/11/2022   Hospital Day: 9      Subjective:     Patient was seen and examined. No new issues reported overnight. Patient is status post left AKA which was done on 4/19/2022. Does not report any shortness of breath, no nausea vomiting diarrhea, no chest pain reported. BMP results from today morning showed sodium 136 and potassium 5.4, chloride 103, bicarb 15, BUN 24, creatinine 2.02 mg/dl, calcium 8.1. Patient is currently on normal saline at 50 cc an hour. Urine output documented as above 1.2 L in last 20 hours. Has positive left diabetic foot infection with suspected necrotizing fasciitis and calcaneal osteomyelitis status post I&D 4/12/2022 infectious disease on board on antibiotics and now s/p AKA 4/19/2022.     Outpatient Medications:     Medications Prior to Admission: metoprolol tartrate (LOPRESSOR) 25 MG tablet, Take 25 mg by mouth 2 times daily  QUEtiapine (SEROQUEL) 25 MG tablet, Take 25 mg by mouth at bedtime  amLODIPine (NORVASC) 10 MG tablet, Take 0.5 tablets by mouth daily (Patient taking differently: Take 10 mg by mouth daily )  insulin glargine (BASAGLAR KWIKPEN) 100 UNIT/ML injection pen, Inject 10-20 Units into the skin 2 times daily Injects 20 units AM in the morning and 10 units at night (Patient taking differently: Inject 12 Units into the skin nightly )  [DISCONTINUED] furosemide (LASIX) 40 MG tablet, Take 1 tablet by mouth every other day  [DISCONTINUED] lisinopril (PRINIVIL;ZESTRIL) 10 MG tablet, Take 1 tablet by mouth daily  clopidogrel (PLAVIX) 75 MG tablet, Take 1 tablet by mouth daily  insulin aspart (NOVOLOG FLEXPEN) 100 UNIT/ML injection pen, Inject 0-10 Units into the skin 3 times daily as needed Per sliding scale  simvastatin (ZOCOR) 10 MG tablet, Take 10 mg by mouth nightly  traZODone (DESYREL) 150 MG tablet, Take 150 mg by mouth nightly  [DISCONTINUED] metoprolol succinate (TOPROL XL) 25 MG extended release tablet, Take 25 mg by mouth 2 times daily  [DISCONTINUED] budesonide-formoterol (SYMBICORT) 160-4.5 MCG/ACT AERO, Inhale 2 puffs into the lungs 2 times daily  [DISCONTINUED] ferrous sulfate (IRON 325) 325 (65 Fe) MG tablet, Take 325 mg by mouth daily (with breakfast)  docusate sodium (COLACE) 100 MG capsule, Take 100 mg by mouth 2 times daily   acetaminophen (TYLENOL) 325 MG tablet, Take 650 mg by mouth as needed for Pain Give 2 tabs = 650 MG by mouth every 4 hours prn   Insulin Syringe-Needle U-100 (ACCUSURE INS SYR 1CC/30GX5/16\") 30G X 5/16\" 1 ML MISC, by Does not apply route.     Current Medications:     Scheduled Meds:    sodium zirconium cyclosilicate  5 g Oral TID    fluconazole  200 mg IntraVENous Q24H    cefTRIAXone (ROCEPHIN) IV  2,000 mg IntraVENous Q24H    clindamycin (CLEOCIN) IV  600 mg IntraVENous Q8H    amLODIPine  5 mg Oral Daily    clopidogrel  75 mg Oral Daily    docusate sodium  100 mg Oral BID    ferrous sulfate  325 mg Oral Daily with breakfast    [Held by provider] furosemide  40 mg Oral Every Other Day    metoprolol tartrate  25 mg Oral BID    atorvastatin  10 mg Oral Daily    sodium chloride flush  5-40 mL IntraVENous 2 times per day    insulin lispro  0-12 Units SubCUTAneous TID WC    insulin lispro  0-6 Units SubCUTAneous Nightly     Continuous Infusions:    sodium chloride      sodium chloride 50 mL/hr at 04/19/22 2156    dextrose      sodium chloride Stopped (04/16/22 1740)     PRN Meds:  sodium chloride, fentanNYL, oxyCODONE-acetaminophen **OR** oxyCODONE-acetaminophen, traMADol, glucagon (rDNA), dextrose, sodium chloride flush, sodium chloride, potassium chloride **OR** potassium alternative oral replacement **OR** potassium chloride, magnesium sulfate, ondansetron **OR** ondansetron, polyethylene glycol, acetaminophen **OR** acetaminophen, dextrose bolus (hypoglycemia) **OR** dextrose bolus (hypoglycemia), glucose, hydrALAZINE    Input/Output:       I/O last 3 completed shifts: In: 2622.8 [P.O.:325; I.V.:1949.3; IV Piggyback:348.6]  Out: 2245 [Urine:1795; Blood:450]. Patient Vitals for the past 96 hrs (Last 3 readings):   Weight   22 0625 176 lb 1.6 oz (79.9 kg)   22 0054 187 lb (84.8 kg)   22 0420 184 lb (83.5 kg)       Vital Signs:   Temperature:  Temp: 98 °F (36.7 °C)  TMax:   Temp (24hrs), Av.2 °F (36.2 °C), Min:92.1 °F (33.4 °C), Max:98.5 °F (36.9 °C)    Respirations:  Resp: 18  Pulse:   Pulse: 82  BP:    BP: (!) 169/70  BP Range: Systolic (72NES), MKT:379 , Min:110 , FZE:585       Diastolic (78GOF), THK:92, Min:56, Max:96      Physical Examination:     General:  AAO x 3, speaking in full sentences, no accessory muscle use. HEENT: Atraumatic, normocephalic, no throat congestion, moist mucosa. Eyes:   Pupils equal, round and reactive to light, EOMI. Neck:   Supple  Chest:   Bilateral vesicular breath sounds, no rales or wheezes. Cardiac:  S1 S2 RR, no murmurs, gallops or rubs. Abdomen: Soft, non-tender, no masses or organomegaly, BS audible. :   No suprapubic or flank tenderness. Neuro:  AAO x 3, No FND. SKIN:  No rashes, good skin turgor. Extremities:  History of right lower extremity amputation and now status post left AKA.     Labs:       Recent Labs     22   WBC 10.8   RBC 3.48*   HGB 8.6*   HCT 28.6*   MCV 82.2*   MCH 24.7*   MCHC 30.1   RDW 14.8*      MPV 8.5      BMP:   Recent Labs     22  0617 22  0600    139 136   K 4.3 5.0 5.4*   * 107 103   CO2 23 20 15*   BUN 22 20 24*   CREATININE 2.03* 1.82* 2.02*   GLUCOSE 120* 172* 229*   CALCIUM 7.8* 7.9* 8.1*      SPEP:  Lab Results   Component Value Date    PROT 7.6 2020     C3:     Lab Results   Component Value Date    C3 120 2022     C4:     Lab Results   Component Value Date    C4 19 2022     Hep BsAg:         Lab Results Component Value Date    HEPBSAG REACTIVE 07/22/2013     Hep C AB:          Lab Results   Component Value Date    HEPCAB NONREACTIVE 07/22/2013     Urinalysis/Chemistries:      Lab Results   Component Value Date    NITRU NEGATIVE 04/18/2022    COLORU Yellow 04/18/2022    PHUR 5.5 04/18/2022    WBCUA TOO NUMEROUS TO COUNT 04/18/2022    RBCUA 20 TO 50 04/18/2022    MUCUS NOT REPORTED 03/30/2018    TRICHOMONAS NOT REPORTED 03/30/2018    YEAST FEW 02/23/2022    BACTERIA FEW 02/23/2022    SPECGRAV 1.025 04/18/2022    LEUKOCYTESUR LARGE 04/18/2022    UROBILINOGEN Normal 04/18/2022    BILIRUBINUR NEGATIVE 04/18/2022    GLUCOSEU NEGATIVE 04/18/2022    KETUA NEGATIVE 04/18/2022    AMORPHOUS 4+ 04/18/2022     Urine Sodium:     Lab Results   Component Value Date    JLUIS 65 09/17/2020     Urine Chloride:    Lab Results   Component Value Date    CLUR <15 12/15/2013      Urine Creatinine:     Lab Results   Component Value Date    LABCREA 66.7 04/12/2022     Radiology:     Reviewed. Assessment:     1. Acute Kidney Injury likely ATN due to underlying infection and also could have some azotemia for intravascular volume depletion from decreased intake, loop diuretics and ACE inhibitor usage with baseline creatinine of 2.0-2.1 mg/dl and creatinine now improving. 2.  Left diabetic foot infection with suspected necrotizing fasciitis and calcaneal osteomyelitis status post I&D 4/12/2022 infectious disease on board on antibiotics. 3.  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 4.  Peripheral vascular disease status post right lower Covid amputation in 2014.  5.  Essential hypertension. 6.  Diabetes type 2.  7.  Anion gap metabolic acidosis. 8.  Hyperkalemia. Plan:   1. Continue IV fluids with normal saline 50 cc an hour. 2.  Will repeat stat BMP to make sure potassium levels are stable. Ramiro Schroeder was ordered by primary.   3.  Continue to monitor strict I's and O's and renal function. 4.  Antibiotics per infectious disease per renal dosing. 5.  Start sodium bicarbonate 60 mg twice daily. 6.  BMP in AM.  7.  Will follow. Nutrition   Please ensure that patient is on a renal diet/TF. Avoid nephrotoxic drugs/contrast exposure. We will continue to follow along with you. Markos Rasmussen MD  [facility]     This note is created with the assistance of a speech-recognition program. While intending to generate a document that actually reflects the content of the visit, no guarantees can be provided that every mistake has been identified and corrected by editing.

## 2022-04-20 NOTE — PROGRESS NOTES
[facility] -   Infectious diseases evaluation  admission date 4/11/2022    reason for consultation:   Left foot infection    Impression :   Current:  · Diabetic left foot infection with suspected necrotizing fasciitis and calcaneal osteomyelitis. Status post incision and debridement 4/12/22 status above-knee amputation 4/19/2020  · Peripheral vascular disease  · Fungal UTI  · Diabetes mellitus  · Chronic renal disease  · History of right below-knee amputation      Recommendations        · Continue IV ceftriaxone , clindamycin and Diflucan  · Likely change to oral antibiotics tomorrow  · Follow CBC and renal function        History of Present Illness:   Initial history:  Marisa Sanchez is a 67y.o.-year-old male was sent to hospital from Dr. Rose Ship office for worsening left heel pain associated with left heel wound with surrounding dark discoloration and redness. The pain is severe, intermittent, no alleviating or aggravating factors. Left foot x-ray showed soft tissue emphysema within the posterior aspect of the distal left lower leg concerning for necrotizing fasciitis and possible calcaneal osteomyelitis. Initial WBC 18.5, creatinine 2.44, C-reactive protein more than 130, C-reactive protein 104.2, lactic acid 0 point  Afebrile  History of right below-knee amputation  Interval changes  4/20/2022   He is afebrile, status post above-knee amputation yesterday, postop pain controlled, denied cough or shortness of breath, no other complaints  The patient was noted to have cloudy urine on 4/18/2022 that was sent for urinalysis that showed too numerous to count WBC, large leukocyte esterase, urine culture grew yeast  Proteus mirabilis pansensitive and group B streptococcus growth on bone culture from 04/12/2022.   No growth on blood cultures from 4/11/2022  Patient Vitals for the past 8 hrs:   BP Temp Temp src Pulse Resp SpO2 Weight   04/20/22 1128 (!) 131/55 98.2 °F (36.8 °C) Oral 67 17 100 % --   04/20/22 0625 -- -- -- -- -- -- 176 lb 1.6 oz (79.9 kg)     . I have personally reviewed the past medical history, past surgical history, medications, social history, and family history, and I haveupdated the database accordingly. Allergies:   Patient has no known allergies. Review of Systems:     Review of Systems  As per history present illness, other than above 12 system review was negative  Physical Examination :       Physical Exam  Constitutional:       General: He is not in acute distress. HENT:      Head: Normocephalic and atraumatic. Right Ear: External ear normal.      Left Ear: External ear normal.   Eyes:      General: No scleral icterus. Conjunctiva/sclera: Conjunctivae normal.   Pulmonary:      Effort: Pulmonary effort is normal. No respiratory distress. Abdominal:      General: There is no distension. Palpations: Abdomen is soft. Musculoskeletal:      Comments: Left above-knee amputation dressing was not removed  Right below-knee amputation   Skin:     General: Skin is warm. Coloration: Skin is not jaundiced. Neurological:      General: No focal deficit present. Mental Status: He is oriented to person, place, and time.          Past Medical History:     Past Medical History:   Diagnosis Date    RISSA (acute kidney injury) ([address]     Cerebrovascular disease     Erectile dysfunction     Hepatitis B infection     Hyperlipidemia     Hypertension     Kidney cysts     Liver cyst     MRSA (methicillin resistant staph aureus) culture positive 1/10/2014    right foot    Neurotrophic ulcer of the foot ([address]     Obesity     Osteomyelitis of ankle or foot     Peripheral vascular disease ([address]     Tobacco abuse     Type II or unspecified type diabetes mellitus without mention of complication, not stated as uncontrolled        Past Surgical  History:     Past Surgical History:   Procedure Laterality Date    ABOVE KNEE AMPUTATION Left 04/19/2022    by Dr. Nataly Chavira  01/01/2011    pt can't give exact date of surg-- toes and part of lt foot removed    FOOT DEBRIDEMENT Left 06/20/2020    WOUND BED PREPARATION AND WOUND VAC APPLICATION performed by Yamileth Jimenez DPM at [facility] Left 04/12/2022    FOOT DEBRIDEMENT INCISION AND DRAINAGE WITH BX performed by Yamileth Jimenez DPM at [address] [facility] Right 03/01/2013    LEG AMPUTATION BELOW KNEE Left 4/19/2022    LEFT  LEG AMPUTATION ABOVE KNEE  WITH NERVE BLOCK performed by Suleiman Schreiber MD at [facility]       Medications:      sodium zirconium cyclosilicate  5 g Oral TID    sodium bicarbonate  650 mg Oral BID    fluconazole  200 mg IntraVENous Q24H    cefTRIAXone (ROCEPHIN) IV  2,000 mg IntraVENous Q24H    clindamycin (CLEOCIN) IV  600 mg IntraVENous Q8H    amLODIPine  5 mg Oral Daily    clopidogrel  75 mg Oral Daily    docusate sodium  100 mg Oral BID    ferrous sulfate  325 mg Oral Daily with breakfast    [Held by provider] furosemide  40 mg Oral Every Other Day    metoprolol tartrate  25 mg Oral BID    atorvastatin  10 mg Oral Daily    sodium chloride flush  5-40 mL IntraVENous 2 times per day    insulin lispro  0-12 Units SubCUTAneous TID WC    insulin lispro  0-6 Units SubCUTAneous Nightly       Social History:     Social History     Socioeconomic History    Marital status:      Spouse name: Not on file    Number of children: Not on file    Years of education: Not on file    Highest education level: Not on file   Occupational History    Not on file   Tobacco Use    Smoking status: Current Every Day Smoker     Packs/day: 0.50     Years: 30.00     Pack years: 15.00     Types: Cigarettes    Smokeless tobacco: Never Used    Tobacco comment: working on quitting smoking   Vaping Use    Vaping Use: Never used   Substance and Sexual Activity    Alcohol use:  No  Drug use: Never    Sexual activity: Yes     Partners: Female   Other Topics Concern    Not on file   Social History Narrative    Not on file     Social Determinants of Health     Financial Resource Strain:     Difficulty of Paying Living Expenses: Not on file   Food Insecurity:     Worried About Running Out of Food in the Last Year: Not on file    Marika of Food in the Last Year: Not on file   Transportation Needs:     Lack of Transportation (Medical): Not on file    Lack of Transportation (Non-Medical):  Not on file   Physical Activity:     Days of Exercise per Week: Not on file    Minutes of Exercise per Session: Not on file   Stress:     Feeling of Stress : Not on file   Social Connections:     Frequency of Communication with Friends and Family: Not on file    Frequency of Social Gatherings with Friends and Family: Not on file    Attends [employer] Services: Not on file    Active Member of Clubs or Organizations: Not on file    Attends Club or Organization Meetings: Not on file    Marital Status: Not on file   Intimate Partner Violence:     Fear of Current or Ex-Partner: Not on file    Emotionally Abused: Not on file    Physically Abused: Not on file    Sexually Abused: Not on file   Housing Stability:     Unable to Pay for Housing in the Last Year: Not on file    Number of [place] in the Last Year: Not on file    Unstable Housing in the Last Year: Not on file       Family History:     Family History   Problem Relation Age of Onset    Diabetes Mother     Diabetes Sister     Diabetes Brother     Diabetes Sister     Diabetes Sister     Diabetes Sister       Medical Decision Making:   I have independently reviewed/ordered the following labs:    CBC with Differential:   Recent Labs     04/19/22  2223   WBC 10.8   HGB 8.6*   HCT 28.6*      LYMPHOPCT 8*   MONOPCT 1*     BMP:  Recent Labs     04/20/22  0600 04/20/22  1115    137   K 5.4* 5.1    103   CO2 15* 16* BUN 24* 28*   CREATININE 2.02* 2.16*     Hepatic Function Panel: No results for input(s): PROT, LABALBU, BILIDIR, IBILI, BILITOT, ALKPHOS, ALT, AST in the last 72 hours. No results for input(s): RPR in the last 72 hours. No results for input(s): HIV in the last 72 hours. No results for input(s): BC in the last 72 hours. Lab Results   Component Value Date    CREATININE 2.16 04/20/2022    GLUCOSE 333 04/20/2022    GLUCOSE 133 10/18/2011       Detailed results: Thank you for allowing us to participate in the care of this patient. Please call with questions. This note is created with the assistance of a speech recognition program.  While intending to generate adocument that actually reflects the content of the visit, the document can still have some errors including those of syntax and sound a like substitutions which may escape proof reading. It such instances, actual meaningcan be extrapolated by contextual diversion.     Tonya West MD  Office: [phone]  Perfect serve / office [phone]

## 2022-04-20 NOTE — PROGRESS NOTES
Pt transferred to room 2102 from PACU  Oriented to room and call light/tv controls. Bed in lowest position, wheels locked, 2/4 side rails up  Call light in reach, room free of clutter, adequate lighting provided.

## 2022-04-20 NOTE — PLAN OF CARE
Nutrition Problem #1: Increased nutrient needs  Intervention: Food and/or Nutrient Delivery: Continue Current Diet,Continue Oral Nutrition Supplement  Nutritional Goals: PO intakes to provide >75% of estimated nutritional needs

## 2022-04-20 NOTE — PROGRESS NOTES
Signs/Symptoms Outcomes: Biochemical Data,Fluid Status or Edema,Skin,Weight,GI Status    Discharge Planning:    Continue current diet,Continue Oral Nutrition Supplement         Fiorella RITCHIEN, RDN, LDN  Lead Clinical Dietitian  RD Office Phone [phone]

## 2022-04-20 NOTE — ANESTHESIA POSTPROCEDURE EVALUATION
Department of Anesthesiology  Postprocedure Note    Patient: Krystin Layne  MRN: [id number]  YOB: 1950  Date of evaluation: 4/19/2022  Time:  9:02 PM     Procedure Summary     Date: 04/19/22 Room / Location: Christophe Mahajan OR 01 / [facility] [place] - INPATIENT    Anesthesia Start: [address] Anesthesia Stop: 2100    Procedure: LEFT  LEG AMPUTATION ABOVE KNEE  WITH NERVE BLOCK (Left Leg Lower) Diagnosis: (DX GANGRENE)    Surgeons: Starla Matamoros MD Responsible Provider: Beatris Garcia MD    Anesthesia Type: general, regional ASA Status: 3          Anesthesia Type: general, regional    Sunshine Phase I: Sunshine Score: 10    Sunshine Phase II:      Last vitals: Reviewed and per EMR flowsheets.        Anesthesia Post Evaluation    Patient location during evaluation: PACU  Patient participation: complete - patient participated  Level of consciousness: awake  Airway patency: patent  Nausea & Vomiting: no nausea  Complications: no  Cardiovascular status: blood pressure returned to baseline  Respiratory status: acceptable  Hydration status: euvolemic  Comments: Multimodal analgesia pain management as indicated by procedure  Multimodal analgesia pain management approach

## 2022-04-20 NOTE — PROGRESS NOTES
[facility]  Office: [address], DO, Juliano Perla, DO, Joseph Bhandari, DO, Rey Santana, DO, Dimitri Pollock MD, Kiko Mckeon MD, Marybel Floyd MD, Esme Flowers MD, Michelet Riggins MD, Carlos Mauro MD, Felicia Clemente MD, Abelardo Interiano, DO, Ninfa Jimenes DO, Esequiel Puga MD,  Tyler Bai DO, Samir Lizarraga MD, Nathan Macias MD, Ekaterina Patterson MD, Allan Crespo DO, Yves Ashrfa MD, Carla Lamb MD, Sergei Winters, [facility], [facility], [facility], Kelliekd Ramirez, CNP, Claude Leep, CNP, Monico Moss, CNP, Maite Ritter, CNP, Dada Nicole PA-C, Anca Huber, [facility], Jenni Joseph, CNP, Mu Holly, CNP, More Hale, CNP, Raghu Govea, CNS, Lakshmi Perez, [facility], Troy Vaca, CNP, Ravinder Saunders, CNP, Ashlee Mcghee, [facility]    Progress Note    4/20/2022    10:04 AM    Name:   Alma Gandara  MRN:     [id number]     Acct:      [de-identified]   Room:   2102/2102-01   Day:  9  Admit Date:  4/11/2022  5:25 PM    PCP:   Barbara Worthy  Code Status:  Full Code    Subjective:     C/C:   Chief Complaint   Patient presents with    Wound Infection     Sent over by PCP for wound evaluation in foot (left)      Patient seen in follow-up for lower extremity wound infectionpatient states \"I am doing okay\". Interval History Status: improved. Patient underwent left lower extremity amputation. Originally plans for below the knee made however ultimately he has been converted to an above-the-knee amputation. He is postop day #1 and doing reasonably well. He is minimally hyperkalemic and I have initiated event for this. We will check repeat labs in the morning. He denies any chest pain or shortness of breath. He denies any nausea, vomiting, diarrhea. He denies any fevers or chills. Brief History:      This is a very pleasant 77-year-old male who presents to the hospital with a left lower extremity wound infection. He has osteomyelitis and peripheral arterial disease. He underwent below the knee amputation with conversion to above-the-knee amputation yesterday. Review of Systems:     Constitutional:  negative for chills, fevers, sweats  Respiratory:  negative for cough, dyspnea on exertion, shortness of breath, wheezing  Cardiovascular:  negative for chest pain, chest pressure/discomfort, lower extremity edema, palpitations  Gastrointestinal:  negative for abdominal pain, constipation, diarrhea, nausea, vomiting  Neurological:  negative for dizziness, headache    Medications:      Allergies:  No Known Allergies    Current Meds:   Scheduled Meds:    sodium zirconium cyclosilicate  5 g Oral TID    fluconazole  200 mg IntraVENous Q24H    cefTRIAXone (ROCEPHIN) IV  2,000 mg IntraVENous Q24H    clindamycin (CLEOCIN) IV  600 mg IntraVENous Q8H    amLODIPine  5 mg Oral Daily    clopidogrel  75 mg Oral Daily    docusate sodium  100 mg Oral BID    ferrous sulfate  325 mg Oral Daily with breakfast    [Held by provider] furosemide  40 mg Oral Every Other Day    metoprolol tartrate  25 mg Oral BID    atorvastatin  10 mg Oral Daily    sodium chloride flush  5-40 mL IntraVENous 2 times per day    insulin lispro  0-12 Units SubCUTAneous TID WC    insulin lispro  0-6 Units SubCUTAneous Nightly     Continuous Infusions:    sodium chloride      sodium chloride 50 mL/hr at 04/19/22 2156    dextrose      sodium chloride Stopped (04/16/22 1740)     PRN Meds: sodium chloride, fentanNYL, oxyCODONE-acetaminophen **OR** oxyCODONE-acetaminophen, traMADol, glucagon (rDNA), dextrose, sodium chloride flush, sodium chloride, potassium chloride **OR** potassium alternative oral replacement **OR** potassium chloride, magnesium sulfate, ondansetron **OR** ondansetron, polyethylene glycol, acetaminophen **OR** acetaminophen, dextrose bolus (hypoglycemia) **OR** dextrose bolus (hypoglycemia), glucose, hydrALAZINE    Data: Past Medical History:   has a past medical history of RISSA (acute kidney injury) ([facility] Cerebrovascular disease, Erectile dysfunction, Hepatitis B infection, Hyperlipidemia, Hypertension, Kidney cysts, Liver cyst, MRSA (methicillin resistant staph aureus) culture positive, Neurotrophic ulcer of the foot ([facility] Obesity, Osteomyelitis of ankle or foot, Peripheral vascular disease ([facility] Tobacco abuse, and Type II or unspecified type diabetes mellitus without mention of complication, not stated as uncontrolled. Social History:   reports that he has been smoking cigarettes. He has a 15.00 pack-year smoking history. He has never used smokeless tobacco. He reports that he does not drink alcohol and does not use drugs. Family History:   Family History   Problem Relation Age of Onset    Diabetes Mother     Diabetes Sister     Diabetes Brother     Diabetes Sister     Diabetes Sister     Diabetes Sister        Vitals:  BP (!) 169/70   Pulse 82   Temp 98 °F (36.7 °C) (Oral)   Resp 18   Ht 6' (1.829 m)   Wt 176 lb 1.6 oz (79.9 kg) Comment: After above Lt Knee amputation. SpO2 100%   BMI 23.88 kg/m²   Temp (24hrs), Av.2 °F (36.2 °C), Min:92.1 °F (33.4 °C), Max:98.5 °F (36.9 °C)    Recent Labs     22  0612 22  1617 22  2213   POCGLU 161* 109 151* 163*       I/O (24Hr):     Intake/Output Summary (Last 24 hours) at 2022 1004  Last data filed at 2022 0616  Gross per 24 hour   Intake 2622.83 ml   Output 1495 ml   Net 1127.83 ml       Labs:  Hematology:  Recent Labs     223   WBC 10.8   RBC 3.48*   HGB 8.6*   HCT 28.6*   MCV 82.2*   MCH 24.7*   MCHC 30.1   RDW 14.8*      MPV 8.5     Chemistry:  Recent Labs     22  0617 22  2223 22  0600    139 136   K 4.3 5.0 5.4*   * 107 103   CO2 23 20 15*   GLUCOSE 120* 172* 229*   BUN 22 20 24*   CREATININE 2.03* 1.82* 2.02*   ANIONGAP 9 12 18*   LABGLOM 32* 37* 33*   GFRAA 39* 45* 40*   CALCIUM 7.8* 7.9* 8.1*     Recent Labs     04/18/22  1122 04/18/22  1617 04/18/22 2052 04/19/22  0612 04/19/22  1617 04/19/22  2213   POCGLU 169* 198* 161* 109 151* 163*     ABG:No results found for: POCPH, PHART, PH, POCPCO2, CHF6JSU, PCO2, POCPO2, PO2ART, PO2, POCHCO3, QXR8XAT, HCO3, NBEA, PBEA, BEART, BE, THGBART, THB, HCJ0UEI, ASSM3RHY, K5TUOPLV, O2SAT, FIO2  Lab Results   Component Value Date/Time    SPECIAL LT FA,7ML 04/11/2022 06:38 PM     Lab Results   Component Value Date/Time    CULTURE YEAST >593840 CFU/ML 04/18/2022 11:15 AM       Radiology:  No results found. Physical Examination:        General appearance:  alert, cooperative and no distress  Mental Status:  oriented to person, place and time and normal affect  Lungs:  clear to auscultation bilaterally, normal effort  Heart:  regular rate and rhythm, no murmur  Abdomen:  soft, nontender, nondistended, normal bowel sounds, no masses, hepatomegaly, splenomegaly  Extremities:  no edema, redness, tenderness in the calves. Right low the knee amputation noted, new left above-the-knee amputation bandaged  Skin:  no gross lesions, rashes, induration    Assessment:        Hospital Problems           Last Modified POA    * (Principal) Necrotizing fasciitis ([address] 4/12/2022 Yes    Acute kidney injury superimposed on CKD ([address] 4/11/2022 Yes    CKD (chronic kidney disease), stage III ([address] 4/11/2022 Yes    Uncontrolled hypertension 4/12/2022 Yes    Type 2 diabetes mellitus with stage 3 chronic kidney disease, with long-term current use of insulin ([address] 4/12/2022 Yes    Class 1 obesity with serious comorbidity in adult 4/12/2022 Yes          Plan:        1. Necrotizing fasciitis/osteomyelitis  1. Continue antibiotics at the discretion of infectious disease  1. Antibiotic course likely to be shorter due to amputation of the nidus of infection  2. Status post above-the-knee amputation (4/20)  2. Diabetes mellitus  1. Blood sugar trend noted  1.  Stable no changes  3. Essential hypertension  1. Blood pressure under reasonable control  1. Continue to monitor other 24 hours prior to titrating medications  4. Hyperkalemia  1. Initiate Lokelma  5. Stage III chronic kidney disease  1. Renal function slightly elevated but stable  2.  Nephrology following      Loida Ansari DO  4/20/2022  10:04 AM

## 2022-04-20 NOTE — OP NOTE
Operative Note      Patient: Jamal Dai  YOB: 1950  MRN: [id number]    Date of Procedure: 4/19/2022    Pre-Op Diagnosis: Left lower extremity tibioperoneal atherosclerotic disease with heel ulceration and gangrene    Post-Op Diagnosis: Same and purulence in the space between the soleus muscle and the gastrocnemius muscles affecting the muscle flap. Procedure:  Below-knee amputation converted to above-knee amputation on the left    Surgeon(s):  Tatum Baxter MD    Assistant:   Resident: Kristin Black MD    Anesthesia: General    Estimated Blood Loss (mL): 613     Complications: None    Specimens:   ID Type Source Tests Collected by Time Destination   A : ABOVE THE KNEE LEFT LEG SPECIMEN Tissue Leg SURGICAL PATHOLOGY Tatum Baxter MD 4/19/2022 2017        Implants:  None  Drains: None    Findings: There was purulence between the gastrocnemius muscle flap and the deeper tissues prompting an above-knee amputation. I discussed this with the family intraoperatively and they agreed. Detailed Description of Procedure: The patient was brought to the operating room and placed in a supine position with his arms tucked at his sides. He was identified as Jamal Dai and he was given a nerve block prior to the case. The left lower extremity was prepped and draped. He already had antibiotics and timeout was held confirming the side and site of operation as well as the patient identification. The margins of incisions were marked out and Bovie cautery was used after 15 blade was used to incise the skin. Bovie cautery was used to dissect down to and through the fascia. The anterior compartment was transected and the anterior compartment vessels were clamped ligated and transected. The superficial and deep posterior compartments were entered through the medial space and at that point we encountered purulence between the gastrocnemius and soleus muscles at the distal end of the flap.   For this reason I decided the below-knee amputation would not be possible. In addition he had extreme inflammation and scarring in those muscle tissues rendering them almost unrecognizable to the subcutaneous tissues. I discussed the situation with the family and they agreed to move ahead with above-knee amputation on the same side. The margins of incisions were marked out in a [place] fashion and the 15 blade was used to incise through the skin. Bovie cautery was used to dissect down through all of the muscle tissue was to encountered the femoral bone which was dissected circumferentially and the periosteum was raised with the Bovie for approximately 5 cm proximal to the transection point of the muscles. The superficial femoral artery and femoral vein were then encountered clamped transected and suture-ligated. The femoral bone was then transected with a Gigli saw as far proximally as possible. The deep muscular tissues were inspected for any bleeding of which there was none. The deep muscular tissue was then closed over the bone in interrupted style using 2-0 Vicryl. The fascia of the circumference of the leg was then closed in the [place] direction using interrupted 2-0 Vicryl. The skin was closed with 4-0 nylon in an interrupted vertical mattress style. He tolerated this quite well and there were no complications. Although it started as a nerve block he had to convert to general anesthetic with laryngeal mask airway as the nerve block was set up for the tibial region and not the thigh. He will be placed in the MedSurg unit and he will of course need rehab and nursing home care.     Electronically signed by Rosy Varela MD on 4/19/2022 at 8:38 PM

## 2022-04-20 NOTE — PROGRESS NOTES
Physical Therapy  Facility/Department: [facility] MED SURG  RE-assessment    NAME: Albin Hills  : 1950 (67 y.o.)  MRN: [id number]  CODE STATUS: Full Code    Date of Service: 22    Pt is currently functional below baseline and would suggest intense therapy post acute care. Would expect patient to be able to tolerate 3 hours of therapy per day and able to tolerate at least one hour up in chair. Please refer to AM-PAC score for current mobility/adl level. Restrictions:  Restrictions/Precautions: General Precautions; Fall Risk;Up as Tolerated  Position Activity Restriction  Other position/activity restrictions: new L AKA, R BKA with prosthesis     SUBJECTIVE  Subjective  Subjective: patient agreeable to work with therapy, but did not want to sit up in chair, underwent L AKA         Post Treatment Pain Screening         OBJECTIVE  Cognition  Overall Cognitive Status: Exceptions  Arousal/Alertness: Appropriate responses to stimuli  Following Commands: Follows one step commands with increased time; Follows one step commands with repetition  Attention Span: Attends with cues to redirect  Memory: Decreased short term memory  Safety Judgement: Decreased awareness of need for assistance;Decreased awareness of need for safety  Problem Solving: Decreased awareness of errors;Assistance required to generate solutions;Assistance required to implement solutions  Insights: Decreased awareness of deficits  Initiation: Requires cues for some  Sequencing: Requires cues for some  Orientation  Overall Orientation Status: Within Functional Limits    Functional Mobility  Supine to Sit  Assistance Level: Moderate assistance;Maximum assistance; Requires x 2 assistance  Skilled Clinical Factors: increased assist to get fully sitting due to patient having trouble pushing up with R UE  Scooting  Assistance Level: Moderate assistance;Minimal assistance; Requires x 2 assistance  Balance  Sitting Balance: Minimal assistance (patient did have major loss of balance posteriorly when attempting to scoot to EOB)  Standing Balance: Maximum assistance  Sit to Stand  Assistance Level: Maximum assistance; Requires x 2 assistance  Skilled Clinical Factors: multiple sit<>stands performed, difficulty getting patient completely upright due to only could tolerate standing for a few seconds. Able to don/doff R prosthesis with min A. Stand to Sit  Assistance Level: Maximum assistance; Requires x 2 assistance  Skilled Clinical Factors: patient would sit impulsively when finished standing      Environmental Mobility                       ASSESSMENT/PROGRESS TOWARDS GOALS       Assessment  Assessment: Re-assessment performed due to undergoing L AKA 4/19. Patient needed 2 assist with bed mobility and standing, limited standing tolerance and refused to sit in chair. Recommend further therapy following treatment  Activity Tolerance: Patient limited by fatigue;Patient limited by endurance; Patient limited by pain  Discharge Recommendations: Patient would benefit from continued therapy after discharge    Goals  Patient Goals   Patient goals : return home  Short Term Goals  Time Frame for Short term goals: 12 visits  Short term goal 1: Patient will be indep with bed mobility. Short term goal 2: Patient will be min x 1 with slide board transfers. Short term goal 3: Patient will have good dynamic seated balance. Short term goal 4: Patient to perform sit<>stand with Min A  Short term goal 5: Patient will tolerate 30 minutes of ther act    PLAN OF CARE/SAFETY  Plan  Current Treatment Recommendations: Strengthening;Balance training;Functional mobility training;Transfer training; Endurance training;Patient/Caregiver education & training; Safety education & training;Home exercise program;Therapeutic activities  Safety Devices  Type of Devices: Call light within reach; Bed alarm in place; All fall risk precautions in place;Gait belt;Patient at risk for falls; Left in bed      Therapy Time   Individual Concurrent Group Co-treatment   Time In       1038   Time Out       1106 (additional 10 minutes for chart review)   Minutes       28+10=38       Co-treatment with OT warranted secondary to decreased safety and independence requiring 2 skilled therapy professionals to address individual discipline's goals. PT addressing lower body positioning, OT addressing trunk control.          Jovana Krishnamurthy, PT, 04/20/22 at 1:13 PM

## 2022-04-21 LAB
ANION GAP SERPL CALCULATED.3IONS-SCNC: 10 MMOL/L (ref 9–17)
BUN BLDV-MCNC: 39 MG/DL (ref 8–23)
BUN/CREAT BLD: 17 (ref 9–20)
CALCIUM SERPL-MCNC: 8 MG/DL (ref 8.6–10.4)
CHLORIDE BLD-SCNC: 106 MMOL/L (ref 98–107)
CO2: 23 MMOL/L (ref 20–31)
CREAT SERPL-MCNC: 2.32 MG/DL (ref 0.7–1.2)
GFR AFRICAN AMERICAN: 34 ML/MIN
GFR NON-AFRICAN AMERICAN: 28 ML/MIN
GFR SERPL CREATININE-BSD FRML MDRD: ABNORMAL ML/MIN/{1.73_M2}
GLUCOSE BLD-MCNC: 219 MG/DL (ref 75–110)
GLUCOSE BLD-MCNC: 306 MG/DL (ref 75–110)
GLUCOSE BLD-MCNC: 362 MG/DL (ref 75–110)
GLUCOSE BLD-MCNC: 413 MG/DL (ref 70–99)
POTASSIUM SERPL-SCNC: 4.8 MMOL/L (ref 3.7–5.3)
SODIUM BLD-SCNC: 139 MMOL/L (ref 135–144)
SURGICAL PATHOLOGY REPORT: NORMAL

## 2022-04-21 PROCEDURE — 99024 POSTOP FOLLOW-UP VISIT: CPT | Performed by: SURGERY

## 2022-04-21 PROCEDURE — 97535 SELF CARE MNGMENT TRAINING: CPT

## 2022-04-21 PROCEDURE — 6370000000 HC RX 637 (ALT 250 FOR IP): Performed by: STUDENT IN AN ORGANIZED HEALTH CARE EDUCATION/TRAINING PROGRAM

## 2022-04-21 PROCEDURE — 6370000000 HC RX 637 (ALT 250 FOR IP): Performed by: HOSPITALIST

## 2022-04-21 PROCEDURE — 36415 COLL VENOUS BLD VENIPUNCTURE: CPT

## 2022-04-21 PROCEDURE — 99232 SBSQ HOSP IP/OBS MODERATE 35: CPT | Performed by: INTERNAL MEDICINE

## 2022-04-21 PROCEDURE — 82947 ASSAY GLUCOSE BLOOD QUANT: CPT

## 2022-04-21 PROCEDURE — 2580000003 HC RX 258: Performed by: STUDENT IN AN ORGANIZED HEALTH CARE EDUCATION/TRAINING PROGRAM

## 2022-04-21 PROCEDURE — 6360000002 HC RX W HCPCS: Performed by: STUDENT IN AN ORGANIZED HEALTH CARE EDUCATION/TRAINING PROGRAM

## 2022-04-21 PROCEDURE — 6370000000 HC RX 637 (ALT 250 FOR IP): Performed by: INTERNAL MEDICINE

## 2022-04-21 PROCEDURE — 80048 BASIC METABOLIC PNL TOTAL CA: CPT

## 2022-04-21 PROCEDURE — 97530 THERAPEUTIC ACTIVITIES: CPT

## 2022-04-21 PROCEDURE — 2500000003 HC RX 250 WO HCPCS: Performed by: STUDENT IN AN ORGANIZED HEALTH CARE EDUCATION/TRAINING PROGRAM

## 2022-04-21 PROCEDURE — 2060000000 HC ICU INTERMEDIATE R&B

## 2022-04-21 PROCEDURE — 99232 SBSQ HOSP IP/OBS MODERATE 35: CPT | Performed by: HOSPITALIST

## 2022-04-21 RX ORDER — INSULIN GLARGINE 100 [IU]/ML
20 INJECTION, SOLUTION SUBCUTANEOUS DAILY
Status: DISCONTINUED | OUTPATIENT
Start: 2022-04-21 | End: 2022-04-27 | Stop reason: HOSPADM

## 2022-04-21 RX ORDER — INSULIN GLARGINE 100 [IU]/ML
10 INJECTION, SOLUTION SUBCUTANEOUS NIGHTLY
Status: DISCONTINUED | OUTPATIENT
Start: 2022-04-21 | End: 2022-04-27 | Stop reason: HOSPADM

## 2022-04-21 RX ORDER — FLUCONAZOLE 100 MG/1
200 TABLET ORAL DAILY
Status: COMPLETED | OUTPATIENT
Start: 2022-04-21 | End: 2022-04-23

## 2022-04-21 RX ADMIN — FENTANYL CITRATE 25 MCG: 50 INJECTION, SOLUTION INTRAMUSCULAR; INTRAVENOUS at 22:11

## 2022-04-21 RX ADMIN — INSULIN LISPRO 6 UNITS: 100 INJECTION, SOLUTION INTRAVENOUS; SUBCUTANEOUS at 17:56

## 2022-04-21 RX ADMIN — SODIUM BICARBONATE 650 MG: 650 TABLET ORAL at 22:12

## 2022-04-21 RX ADMIN — CLOPIDOGREL BISULFATE 75 MG: 75 TABLET ORAL at 07:36

## 2022-04-21 RX ADMIN — CLINDAMYCIN IN 5 PERCENT DEXTROSE 600 MG: 12 INJECTION, SOLUTION INTRAVENOUS at 06:40

## 2022-04-21 RX ADMIN — INSULIN LISPRO 10 UNITS: 100 INJECTION, SOLUTION INTRAVENOUS; SUBCUTANEOUS at 07:37

## 2022-04-21 RX ADMIN — AMLODIPINE BESYLATE 5 MG: 5 TABLET ORAL at 07:36

## 2022-04-21 RX ADMIN — METOPROLOL TARTRATE 25 MG: 25 TABLET, FILM COATED ORAL at 17:56

## 2022-04-21 RX ADMIN — SODIUM CHLORIDE, PRESERVATIVE FREE 10 ML: 5 INJECTION INTRAVENOUS at 07:37

## 2022-04-21 RX ADMIN — OXYCODONE AND ACETAMINOPHEN 2 TABLET: 5; 325 TABLET ORAL at 18:02

## 2022-04-21 RX ADMIN — SODIUM CHLORIDE, PRESERVATIVE FREE 10 ML: 5 INJECTION INTRAVENOUS at 22:12

## 2022-04-21 RX ADMIN — INSULIN LISPRO 8 UNITS: 100 INJECTION, SOLUTION INTRAVENOUS; SUBCUTANEOUS at 12:52

## 2022-04-21 RX ADMIN — ATORVASTATIN CALCIUM 10 MG: 10 TABLET, FILM COATED ORAL at 07:36

## 2022-04-21 RX ADMIN — TRAMADOL HYDROCHLORIDE 50 MG: 50 TABLET, COATED ORAL at 04:19

## 2022-04-21 RX ADMIN — INSULIN GLARGINE 20 UNITS: 100 INJECTION, SOLUTION SUBCUTANEOUS at 11:09

## 2022-04-21 RX ADMIN — OXYCODONE AND ACETAMINOPHEN 2 TABLET: 5; 325 TABLET ORAL at 06:41

## 2022-04-21 RX ADMIN — SODIUM BICARBONATE 650 MG: 650 TABLET ORAL at 07:37

## 2022-04-21 RX ADMIN — INSULIN GLARGINE 10 UNITS: 100 INJECTION, SOLUTION SUBCUTANEOUS at 21:59

## 2022-04-21 RX ADMIN — CLINDAMYCIN IN 5 PERCENT DEXTROSE 600 MG: 12 INJECTION, SOLUTION INTRAVENOUS at 00:27

## 2022-04-21 RX ADMIN — FERROUS SULFATE TAB EC 325 MG (65 MG FE EQUIVALENT) 325 MG: 325 (65 FE) TABLET DELAYED RESPONSE at 07:37

## 2022-04-21 RX ADMIN — INSULIN LISPRO 2 UNITS: 100 INJECTION, SOLUTION INTRAVENOUS; SUBCUTANEOUS at 22:00

## 2022-04-21 RX ADMIN — OXYCODONE AND ACETAMINOPHEN 2 TABLET: 5; 325 TABLET ORAL at 02:19

## 2022-04-21 RX ADMIN — FLUCONAZOLE 200 MG: 100 TABLET ORAL at 12:51

## 2022-04-21 RX ADMIN — SODIUM ZIRCONIUM CYCLOSILICATE 5 G: 5 POWDER, FOR SUSPENSION ORAL at 08:23

## 2022-04-21 RX ADMIN — DOCUSATE SODIUM 100 MG: 100 CAPSULE, LIQUID FILLED ORAL at 22:12

## 2022-04-21 RX ADMIN — METOPROLOL TARTRATE 25 MG: 25 TABLET, FILM COATED ORAL at 07:36

## 2022-04-21 ASSESSMENT — PAIN DESCRIPTION - ONSET: ONSET: ON-GOING

## 2022-04-21 ASSESSMENT — PAIN SCALES - GENERAL
PAINLEVEL_OUTOF10: 9
PAINLEVEL_OUTOF10: 8
PAINLEVEL_OUTOF10: 5
PAINLEVEL_OUTOF10: 8
PAINLEVEL_OUTOF10: 8

## 2022-04-21 ASSESSMENT — PAIN DESCRIPTION - LOCATION: LOCATION: LEG

## 2022-04-21 ASSESSMENT — PAIN DESCRIPTION - FREQUENCY: FREQUENCY: CONTINUOUS

## 2022-04-21 ASSESSMENT — PAIN - FUNCTIONAL ASSESSMENT: PAIN_FUNCTIONAL_ASSESSMENT: PREVENTS OR INTERFERES SOME ACTIVE ACTIVITIES AND ADLS

## 2022-04-21 ASSESSMENT — PAIN DESCRIPTION - ORIENTATION: ORIENTATION: LEFT

## 2022-04-21 ASSESSMENT — PAIN DESCRIPTION - DESCRIPTORS: DESCRIPTORS: SHARP

## 2022-04-21 ASSESSMENT — PAIN DESCRIPTION - PAIN TYPE: TYPE: SURGICAL PAIN

## 2022-04-21 NOTE — FLOWSHEET NOTE
[facility] NOTE    Room # 2102/2102-01   Name: Quinton Campos            Shinto: [place]     Reason for visit: Follow up    I visited the patient. Admit Date & Time: 4/11/2022  5:25 PM    Assessment:  Quinton Campos is a 67 y.o. male. Upon entering the room the patient was unavailable. No family members were present. Intervention:  Patient was not available. Writer provided a silent prayer of healing, comfort, and continued rest during his stay. Plan:  Chaplains will remain available to offer spiritual and emotional support as needed.     Electronically signed by Mehdi Mojica on 4/21/2022 at 1:24 PM.  Ca     04/21/22 1321   Encounter Summary   Encounter Overview/Reason  Palliative Care   Service Provided For: Patient   Referral/Consult From: Palliative Care   Last Encounter  04/20/22   Complexity of Encounter Low   Encounter    Type Follow up   Palliative Care   Type Palliative Care, Follow-up   Assessment/Intervention/Outcome   Assessment Coping;Peaceful   Intervention Sustaining Presence/Ministry of presence   Outcome Expressed Gratitude

## 2022-04-21 NOTE — PROGRESS NOTES
Physical Therapy  Facility/Department: [facility] MED SURG  Daily Treatment Note  NAME: Pily Sosa  : 1950  MRN: [id number]    Date of Service: 2022    Discharge Recommendations:  Patient would benefit from continued therapy after discharge   PT Equipment Recommendations  Equipment Needed: Yes  Other: Slide board    Patient Diagnosis(es): The encounter diagnosis was Necrotizing fasciitis of ankle and foot ([address] Assessment   Assessment: Patient with global deconditioning requires a MOD x2 assist for slide board transfer to electric chair. Patient requires increased time and encouragement to complete all tasks. Patient upon completion of transfer immediately begins to discuss return to bed. Activity Tolerance: Patient limited by fatigue;Patient limited by endurance; Patient limited by pain  Equipment Needed: Yes  Other: Slide board     Plan    Plan  Current Treatment Recommendations: Strengthening;Balance training;Functional mobility training;Transfer training; Endurance training;Patient/Caregiver education & training; Safety education & training;Home exercise program;Therapeutic activities     Subjective    Subjective  Subjective: Patient agreeable to PT treatment and RN Luly Patient is appropriate for PT treatment. Patient with L AKA on   Orientation  Overall Orientation Status: Within Functional Limits  Cognition  Overall Cognitive Status: Exceptions  Arousal/Alertness: Appropriate responses to stimuli  Following Commands: Follows one step commands with increased time; Follows one step commands with repetition  Attention Span: Attends with cues to redirect  Memory: Decreased short term memory  Safety Judgement: Decreased awareness of need for assistance;Decreased awareness of need for safety  Problem Solving: Decreased awareness of errors;Assistance required to generate solutions;Assistance required to implement solutions  Insights: Decreased awareness of deficits  Initiation: Requires cues for some  Sequencing: Requires cues for some  Cognition Comment: Pt has difficulty processing/following commands. Objective      Patient worked on transfer training with [employer] this date. Patient requires a MOD x2 with slide board including set up and max encouragement throughout transfer. MOD x2 assist also required to maintain seated balance and maximize safety in transfer activity. Patient assisted with brief change and gown change due to soiling and to teach patient skin care and the importance of pressure relief and skin care to prevent skin break down. Goals  Short Term Goals  Time Frame for Short term goals: 12 visits  Short term goal 1: Patient will be indep with bed mobility. Short term goal 2: Patient will be min x 1 with slide board transfers. Short term goal 3: Patient will have good dynamic seated balance. Short term goal 4: Patient to perform sit<>stand with Min A  Short term goal 5: Patient will tolerate 30 minutes of ther act  Patient Goals   Patient goals : return home      Therapy Time   Individual Concurrent Group Co-treatment   Time In 1         Time Out 1104         Minutes 44              Co-treatment with OT warranted secondary to decreased safety and independence requiring 2 skilled therapy professionals to address individual discipline's goals. PT addressing pre gait trunk strengthening, weight shifting prior to transfers, transfer training and postural control in sitting.     Maurice Dozier, PTA

## 2022-04-21 NOTE — PROGRESS NOTES
Renal Progress Note    Patient :  Elodia Garvey; 67 y.o. MRN# [id number]  Location:  2102/2102-01  Attending:  Ericka Quiñones DO  Admit Date:  4/11/2022   Hospital Day: 10      Subjective:     Patient was seen and examined. No new issues reported overnight. Patient is status post left AKA which was done on 4/19/2022. Does not report any shortness of breath, no nausea vomiting diarrhea, no chest pain reported. BMP results from today morning showed sodium 136 and potassium 4.8 , chloride 103, bicarb 23, BUN 24, creatinine 2.3 mg/dl, calcium 8.1. Patient is currently on normal saline at 50 cc an hour. Urine output documented as above 1.2 L in last 20 hours. Has positive left diabetic foot infection with suspected necrotizing fasciitis and calcaneal osteomyelitis status post I&D 4/12/2022 infectious disease on board on antibiotics and now s/p AKA 4/19/2022.     Outpatient Medications:     Medications Prior to Admission: metoprolol tartrate (LOPRESSOR) 25 MG tablet, Take 25 mg by mouth 2 times daily  QUEtiapine (SEROQUEL) 25 MG tablet, Take 25 mg by mouth at bedtime  amLODIPine (NORVASC) 10 MG tablet, Take 0.5 tablets by mouth daily (Patient taking differently: Take 10 mg by mouth daily )  insulin glargine (BASAGLAR KWIKPEN) 100 UNIT/ML injection pen, Inject 10-20 Units into the skin 2 times daily Injects 20 units AM in the morning and 10 units at night (Patient taking differently: Inject 12 Units into the skin nightly )  [DISCONTINUED] furosemide (LASIX) 40 MG tablet, Take 1 tablet by mouth every other day  [DISCONTINUED] lisinopril (PRINIVIL;ZESTRIL) 10 MG tablet, Take 1 tablet by mouth daily  clopidogrel (PLAVIX) 75 MG tablet, Take 1 tablet by mouth daily  insulin aspart (NOVOLOG FLEXPEN) 100 UNIT/ML injection pen, Inject 0-10 Units into the skin 3 times daily as needed Per sliding scale  simvastatin (ZOCOR) 10 MG tablet, Take 10 mg by mouth nightly  traZODone (DESYREL) 150 MG tablet, Take 150 mg by mouth nightly  [DISCONTINUED] metoprolol succinate (TOPROL XL) 25 MG extended release tablet, Take 25 mg by mouth 2 times daily  [DISCONTINUED] budesonide-formoterol (SYMBICORT) 160-4.5 MCG/ACT AERO, Inhale 2 puffs into the lungs 2 times daily  [DISCONTINUED] ferrous sulfate (IRON 325) 325 (65 Fe) MG tablet, Take 325 mg by mouth daily (with breakfast)  docusate sodium (COLACE) 100 MG capsule, Take 100 mg by mouth 2 times daily   acetaminophen (TYLENOL) 325 MG tablet, Take 650 mg by mouth as needed for Pain Give 2 tabs = 650 MG by mouth every 4 hours prn   Insulin Syringe-Needle U-100 (ACCUSURE INS SYR 1CC/30GX5/16\") 30G X 5/16\" 1 ML MISC, by Does not apply route.     Current Medications:     Scheduled Meds:    insulin glargine  20 Units SubCUTAneous Daily    insulin glargine  10 Units SubCUTAneous Nightly    fluconazole  200 mg Oral Daily    [START ON 4/23/2022] sodium zirconium cyclosilicate  5 g Oral Every Other Day    sodium bicarbonate  650 mg Oral BID    amLODIPine  5 mg Oral Daily    clopidogrel  75 mg Oral Daily    docusate sodium  100 mg Oral BID    ferrous sulfate  325 mg Oral Daily with breakfast    [Held by provider] furosemide  40 mg Oral Every Other Day    metoprolol tartrate  25 mg Oral BID    atorvastatin  10 mg Oral Daily    sodium chloride flush  5-40 mL IntraVENous 2 times per day    insulin lispro  0-12 Units SubCUTAneous TID WC    insulin lispro  0-6 Units SubCUTAneous Nightly     Continuous Infusions:    sodium chloride      dextrose      sodium chloride Stopped (04/16/22 1740)     PRN Meds:  sodium chloride, fentanNYL, oxyCODONE-acetaminophen **OR** oxyCODONE-acetaminophen, traMADol, glucagon (rDNA), dextrose, sodium chloride flush, sodium chloride, potassium chloride **OR** potassium alternative oral replacement **OR** potassium chloride, magnesium sulfate, ondansetron **OR** ondansetron, polyethylene glycol, acetaminophen **OR** acetaminophen, dextrose bolus (hypoglycemia) **OR** dextrose bolus (hypoglycemia), glucose, hydrALAZINE    Input/Output:       I/O last 3 completed shifts: In: 2749.6 [P.O.:325; I.V.:5.7; IV Piggyback:378.9]  Out:  [MDLHU:8733; Blood:450]. Patient Vitals for the past 96 hrs (Last 3 readings):   Weight   22 0641 175 lb 6.4 oz (79.6 kg)   22 0625 176 lb 1.6 oz (79.9 kg)   22 0054 187 lb (84.8 kg)       Vital Signs:   Temperature:  Temp: 98.2 °F (36.8 °C)  TMax:   Temp (24hrs), Av.1 °F (36.7 °C), Min:97.9 °F (36.6 °C), Max:98.3 °F (36.8 °C)    Respirations:  Resp: 16  Pulse:   Pulse: 62  BP:    BP: (!) 145/64  BP Range: Systolic (70XSX), YPM:605 , Min:140 , WGR:444       Diastolic (40XVG), MRQ:04, Min:50, Max:69      Physical Examination:     General:  AAO x 3, speaking in full sentences, no accessory muscle use. HEENT: Atraumatic, normocephalic, no throat congestion, moist mucosa. Eyes:   Pupils equal, round and reactive to light, EOMI. Neck:   Supple  Chest:   Bilateral vesicular breath sounds, no rales or wheezes. Cardiac:  S1 S2 RR, no murmurs, gallops or rubs. Abdomen: Soft, non-tender, no masses or organomegaly, BS audible. :   No suprapubic or flank tenderness. Neuro:  AAO x 3, No FND. SKIN:  No rashes, good skin turgor. Extremities:  History of right lower extremity amputation and now status post left AKA.     Labs:       Recent Labs     22  2223   WBC 10.8   RBC 3.48*   HGB 8.6*   HCT 28.6*   MCV 82.2*   MCH 24.7*   MCHC 30.1   RDW 14.8*      MPV 8.5      BMP:   Recent Labs     22  0600 22  1115 22  0542    137 139   K 5.4* 5.1 4.8    103 106   CO2 15* 16* 23   BUN 24* 28* 39*   CREATININE 2.02* 2.16* 2.32*   GLUCOSE 229* 333* 413*   CALCIUM 8.1* 8.1* 8.0*      SPEP:  Lab Results   Component Value Date    PROT 7.6 2020     C3:     Lab Results   Component Value Date    C3 120 2022     C4:     Lab Results   Component Value Date    C4 19 2022     Hep BsAg: Lab Results   Component Value Date    HEPBSAG REACTIVE 07/22/2013     Hep C AB:          Lab Results   Component Value Date    HEPCAB NONREACTIVE 07/22/2013     Urinalysis/Chemistries:      Lab Results   Component Value Date    NITRU NEGATIVE 04/18/2022    COLORU Yellow 04/18/2022    PHUR 5.5 04/18/2022    WBCUA TOO NUMEROUS TO COUNT 04/18/2022    RBCUA 20 TO 50 04/18/2022    MUCUS NOT REPORTED 03/30/2018    TRICHOMONAS NOT REPORTED 03/30/2018    YEAST FEW 02/23/2022    BACTERIA FEW 02/23/2022    SPECGRAV 1.025 04/18/2022    LEUKOCYTESUR LARGE 04/18/2022    UROBILINOGEN Normal 04/18/2022    BILIRUBINUR NEGATIVE 04/18/2022    GLUCOSEU NEGATIVE 04/18/2022    KETUA NEGATIVE 04/18/2022    AMORPHOUS 4+ 04/18/2022     Urine Sodium:     Lab Results   Component Value Date    JLUIS 65 09/17/2020     Urine Chloride:    Lab Results   Component Value Date    CLUR <15 12/15/2013      Urine Creatinine:     Lab Results   Component Value Date    LABCREA 66.7 04/12/2022     Radiology:     Reviewed. Assessment:     1. Acute Kidney Injury likely ATN due to underlying infection and also could have some azotemia for intravascular volume depletion from decreased intake, loop diuretics and ACE inhibitor usage with baseline creatinine of 2.0-2.1 mg/dl and creatinine now close to baseline. It had peaked at 3.1. Some elevation from elevated blood sugars as well  2. Left diabetic foot infection with suspected necrotizing fasciitis and calcaneal osteomyelitis status post I&D 4/12/2022 infectious disease on board on antibiotics. 3.  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 4.  Peripheral vascular disease status post right lower Covid amputation in 2014.  5.  Essential hypertension. 6.  Diabetes type 2.  7.  Anion gap metabolic acidosis. 8.  Hyperkalemia resolved. Plan:   1. Discontinue IV fluids  2. Stable for discharge  3.   Change Lokelma to 5 g every other day . 3. Continue to monitor strict I's and O's and renal function. 4.  Antibiotics per infectious disease per renal dosing. 5.  Continue sodium bicarbonate 650 twice a day   6. BMP 4/25/2022 as outpatient  7. Outpatient follow-up with nephrology in 2 to 3 weeks  8. If he stays in hospital we will continue to follow and BMP should be done daily    Nutrition   Please ensure that patient is on a renal diet/TF. Avoid nephrotoxic drugs/contrast exposure. We will continue to follow along with you.

## 2022-04-21 NOTE — CARE COORDINATION
Social Harshad Leong does not have availability for P2P until Fri morning at 1030. Updated Dr Deangelo Joshi

## 2022-04-21 NOTE — PLAN OF CARE
Problem: Infection:  Goal: Will remain free from infection  Description: Will remain free from infection  Outcome: Progressing     Problem: Safety:  Goal: Free from accidental physical injury  Description: Free from accidental physical injury  Outcome: Progressing     Problem: Daily Care:  Goal: Daily care needs are met  Description: Daily care needs are met  Outcome: Progressing     Problem: Pain:  Goal: Pain level will decrease  Description: Pain level will decrease  Outcome: Progressing     Problem: Skin Integrity:  Goal: Skin integrity will stabilize  Description: Skin integrity will stabilize  Outcome: Progressing

## 2022-04-21 NOTE — PROGRESS NOTES
Progress Note  Date:2022       Room:[facility]  Patient Name:Rohan Sr     YOB: 1950     Age:72 y.o. Subjective    Subjective:  Symptoms:  (The patient's pain is well controlled. His above-knee amputation stump dressing is without drainage. ). Diet:  Adequate intake. Review of Systems   Constitutional: Negative for fever. Objective         Vitals Last 24 Hours:  TEMPERATURE:  Temp  Av.1 °F (36.7 °C)  Min: 97.9 °F (36.6 °C)  Max: 98.3 °F (36.8 °C)  RESPIRATIONS RANGE: Resp  Av  Min: 14  Max: 17  PULSE OXIMETRY RANGE: SpO2  Av.3 %  Min: 97 %  Max: 100 %  PULSE RANGE: Pulse  Av.8  Min: 61  Max: 74  BLOOD PRESSURE RANGE: Systolic (11MTM), RAA:113 , Min:131 , OWJ:517   ; Diastolic (98DNR), AJF:27, Min:50, Max:69    I/O (24Hr): Intake/Output Summary (Last 24 hours) at 2022 0650  Last data filed at 2022 0541  Gross per 24 hour   Intake 1194.8 ml   Output 925 ml   Net 269.8 ml     Objective:  General Appearance:  Comfortable, in no acute distress and not in pain. Vital signs: (most recent): Blood pressure (!) 149/67, pulse 64, temperature 98.3 °F (36.8 °C), temperature source Oral, resp. rate 14, height 6' (1.829 m), weight 176 lb 1.6 oz (79.9 kg), SpO2 100 %. No fever. Output: Producing urine. Lungs:  He is in respiratory distress. Heart: Normal rate. Abdomen: Abdomen is soft. Extremities: (The dressing is intact. There is no drainage on the dressing.)    Labs/Imaging/Diagnostics    Labs:  CBC:  Recent Labs     22  2223   WBC 10.8   RBC 3.48*   HGB 8.6*   HCT 28.6*   MCV 82.2*   RDW 14.8*        CHEMISTRIES:  Recent Labs     22  0600 22  1115 22  0542    137 139   K 5.4* 5.1 4.8    103 106   CO2 15* 16* 23   BUN 24* 28* 39*   CREATININE 2.02* 2.16* 2.32*   GLUCOSE 229* 333* 413*     PT/INR:No results for input(s): PROTIME, INR in the last 72 hours. APTT:No results for input(s):  APTT in the last 72 hours. LIVER PROFILE:No results for input(s): AST, ALT, BILIDIR, BILITOT, ALKPHOS in the last 72 hours. Imaging Last 24 Hours:  No results found. Assessment//Plan           Hospital Problems           Last Modified POA    * (Principal) Necrotizing fasciitis ([address] 4/12/2022 Yes    Acute kidney injury superimposed on CKD ([address] 4/11/2022 Yes    CKD (chronic kidney disease), stage III ([address] 4/11/2022 Yes    Uncontrolled hypertension 4/12/2022 Yes    Type 2 diabetes mellitus with stage 3 chronic kidney disease, with long-term current use of insulin ([address] 4/12/2022 Yes    Class 1 obesity with serious comorbidity in adult 4/12/2022 Yes        Assessment:    Condition: In stable condition. (From my standpoint the patient is doing well. ). Plan:   (From a surgical standpoint the patient can be discharged to rehab at any time when medically stable. He can follow-up either with me or with Dr. Camilo Mccarthy. The patient is an original patient of his [employer] mine. I will discuss that with him when he returns from his week of vacation. ).        Electronically signed by Vandana Mullins MD on 4/21/22 at 6:50 AM EDT

## 2022-04-21 NOTE — PROGRESS NOTES
[facility]  Office: [address], DO, Jose Gear, DO, Jackie Lino, DO, Alpesh Chelly Blood, DO, Bryant Leger MD, Francoise Ennis MD, Renetta Gerard MD, Horacio Rodgers MD, Alissa Lloyd MD, Jolie Rayo MD, Brooklynn Jimenez MD, Sondra Fragmin, DO, Denisa Rubin, DO, Griselda Huitron MD,  Tressa Buckley, DO, Ness Delgado MD, Denice Hunter MD, Amairani Reyes MD, Aleksandra Mccall, DO, Linda Simental MD, Lisha Schroeder MD, Kenia Roger, CNP, [facility], CNP, Tricia Coburn, CNP, Melia Forbes, CNP, Vicenta Nina, CNP, Odilon Stewart, CNP, Lendel Nissen, PA-C, Pau Blood, DNP, Cele Valentino, CNP, Sarmad Punch, CNP, Karma Fly, CNP, Lary Mask, CNS, Shama Collazo, DNP, Aracelis Postal, CNP, Ro Hernández, CNP, Neyda Muhammad, CNP         [facility]    Progress Note    4/21/2022    9:50 AM    Name:   Jd Sylvester  MRN:     [id number]     Acct:      [de-identified]   Room:   2102/2102-01   Day:  10  Admit Date:  4/11/2022  5:25 PM    PCP:   Michael Sparks  Code Status:  Full Code    Subjective:     C/C:   Chief Complaint   Patient presents with    Wound Infection     Sent over by PCP for wound evaluation in foot (left)      Patient seen in follow-up for left foot osteomyelitis status post left above-the-knee amputation, patient states \"I am doing okay\"    Interval History Status: improved. Patient is doing well overall. He is eating reasonably well and tells me that his pain is under good control. Discussed with nursing staff. The patient's blood sugars have been elevated and he normally takes long-acting insulin twice daily. We will resume his home dosing of long-acting insulin. The titration of his insulins will occur based upon his clinical sponsor. He denies any chest pain or shortness of breath. Entry is noted, appreciate all services.   Working on disposition to acute rehab once okay with all services and arrangements made. Brief History: This is a very pleasant 70-year-old male who presents to the hospital with a left lower extremity wound infection.  He has osteomyelitis and peripheral arterial disease. He underwent below the knee amputation with conversion to above-the-knee amputation. Review of Systems:     Constitutional:  negative for chills, fevers, sweats  Respiratory:  negative for cough, dyspnea on exertion, shortness of breath, wheezing  Cardiovascular:  negative for chest pain, chest pressure/discomfort, lower extremity edema, palpitations  Gastrointestinal:  negative for abdominal pain, constipation, diarrhea, nausea, vomiting  Neurological:  negative for dizziness, headache    Medications:      Allergies:  No Known Allergies    Current Meds:   Scheduled Meds:    insulin glargine  20 Units SubCUTAneous Daily    insulin glargine  10 Units SubCUTAneous Nightly    sodium zirconium cyclosilicate  5 g Oral TID    sodium bicarbonate  650 mg Oral BID    fluconazole  200 mg IntraVENous Q24H    cefTRIAXone (ROCEPHIN) IV  2,000 mg IntraVENous Q24H    clindamycin (CLEOCIN) IV  600 mg IntraVENous Q8H    amLODIPine  5 mg Oral Daily    clopidogrel  75 mg Oral Daily    docusate sodium  100 mg Oral BID    ferrous sulfate  325 mg Oral Daily with breakfast    [Held by provider] furosemide  40 mg Oral Every Other Day    metoprolol tartrate  25 mg Oral BID    atorvastatin  10 mg Oral Daily    sodium chloride flush  5-40 mL IntraVENous 2 times per day    insulin lispro  0-12 Units SubCUTAneous TID WC    insulin lispro  0-6 Units SubCUTAneous Nightly     Continuous Infusions:    sodium chloride      sodium chloride 50 mL/hr at 04/21/22 0735    dextrose      sodium chloride Stopped (04/16/22 1740)     PRN Meds: sodium chloride, fentanNYL, oxyCODONE-acetaminophen **OR** oxyCODONE-acetaminophen, traMADol, glucagon (rDNA), dextrose, sodium chloride flush, sodium chloride, potassium chloride **OR** potassium alternative oral replacement **OR** potassium chloride, magnesium sulfate, ondansetron **OR** ondansetron, polyethylene glycol, acetaminophen **OR** acetaminophen, dextrose bolus (hypoglycemia) **OR** dextrose bolus (hypoglycemia), glucose, hydrALAZINE    Data:     Past Medical History:   has a past medical history of RISSA (acute kidney injury) ([facility] [address] Cerebrovascular disease, Erectile dysfunction, Hepatitis B infection, Hyperlipidemia, Hypertension, Kidney cysts, Liver cyst, MRSA (methicillin resistant staph aureus) culture positive, Neurotrophic ulcer of the foot ([facility] [address] Obesity, Osteomyelitis of ankle or foot, Peripheral [facility] Tobacco abuse, and Type II or unspecified type diabetes mellitus without mention of complication, not stated as uncontrolled. Social History:   reports that he has been smoking cigarettes. He has a 15.00 pack-year smoking history. He has never used smokeless tobacco. He reports that he does not drink alcohol and does not use drugs. Family History:   Family History   Problem Relation Age of Onset    Diabetes Mother     Diabetes Sister     Diabetes Brother     Diabetes Sister     Diabetes Sister     Diabetes Sister        Vitals:  BP (!) 149/67   Pulse 64   Temp 98.3 °F (36.8 °C) (Oral)   Resp 14   Ht 6' (1.829 m)   Wt 175 lb 6.4 oz (79.6 kg)   SpO2 100%   BMI 23.79 kg/m²   Temp (24hrs), Av.1 °F (36.7 °C), Min:97.9 °F (36.6 °C), Max:98.3 °F (36.8 °C)    Recent Labs     22  1105 22  1603 22  2102 22  0650   POCGLU 290* 361* 412* 362*       I/O (24Hr):     Intake/Output Summary (Last 24 hours) at 2022 0950  Last data filed at 2022 0735  Gross per 24 hour   Intake 1907.25 ml   Output 800 ml   Net 1107.25 ml       Labs:  Hematology:  Recent Labs     22  2223   WBC 10.8   RBC 3.48*   HGB 8.6*   HCT 28.6*   MCV 82.2*   MCH 24.7*   MCHC 30.1   RDW 14.8*      MPV 8.5     Chemistry:  Recent Labs 04/20/22  0600 04/20/22  1115 04/21/22  0542    137 139   K 5.4* 5.1 4.8    103 106   CO2 15* 16* 23   GLUCOSE 229* 333* 413*   BUN 24* 28* 39*   CREATININE 2.02* 2.16* 2.32*   ANIONGAP 18* 18* 10   LABGLOM 33* 30* 28*   GFRAA 40* 37* 34*   CALCIUM 8.1* 8.1* 8.0*     Recent Labs     04/19/22  2101 04/19/22  2213 04/20/22  1105 04/20/22  1603 04/20/22  2102 04/21/22  0650   POCGLU 135* 163* 290* 361* 412* 362*     ABG:No results found for: POCPH, PHART, PH, POCPCO2, NLJ9OQE, PCO2, POCPO2, PO2ART, PO2, POCHCO3, HPF9UOE, HCO3, NBEA, PBEA, BEART, BE, THGBART, THB, CXD3ZHG, DMTC7TTY, V0SZZTUX, O2SAT, FIO2  Lab Results   Component Value Date/Time    SPECIAL LT FA,7ML 04/11/2022 06:38 PM     Lab Results   Component Value Date/Time    CULTURE YEAST >[id number] CFU/ML 04/18/2022 11:15 AM       Radiology:  No results found. Physical Examination:        General appearance:  alert, cooperative and no distress  Mental Status:  oriented to person, place and time and normal affect  Lungs:  clear to auscultation bilaterally, normal effort  Heart:  regular rate and rhythm, no murmur  Abdomen:  soft, nontender, nondistended, normal bowel sounds, no masses, hepatomegaly, splenomegaly  Extremities: Right below the knee amputation noted, left above-the-knee amputation bandaged  Skin:  no gross lesions, rashes, induration    Assessment:        Hospital Problems           Last Modified POA    * (Principal) Necrotizing fasciitis ([facility] [address] 4/12/2022 Yes    Acute kidney injury superimposed on CKD ([facility] [address] 4/11/2022 Yes    CKD (chronic kidney disease), stage III ([facility] [address] 4/11/2022 Yes    Uncontrolled hypertension 4/12/2022 Yes    Type 2 diabetes mellitus with stage 3 chronic kidney disease, with long-term current use of insulin ([facility] [address] 4/12/2022 Yes    Class 1 obesity with serious comorbidity in adult 4/12/2022 Yes          Plan:        1. Necrotizing fasciitis/osteomyelitis  1. Infectious disease input noted and appreciated.   Plans to transition to oral antibiotics noted. 1. Status post above-the-knee amputation (4/20)  2. Diabetes mellitus  1. Resume long-acting insulin  2. Continue sliding scale  3. Monitor and titrate accordingly  3. Essential hypertension  1. Blood pressure under reasonable control  2. Blood pressure slightly exacerbated likely secondary to pain  3. Not titrate aggressively at this point in time, his overall trend is stable  4. Hyperkalemia  1. Resolved  2. Status post Lokelma  5. Acute kidney injury on stage III chronic kidney disease  1. Renal function has slightly worsened  2. Await nephrology input    Discharge planning, monitor renal function. Titrate insulins, discharge planning to acute recovery noted.     Caroline Hunter DO  4/21/2022  9:50 AM

## 2022-04-21 NOTE — PLAN OF CARE
Problem: Infection:  Goal: Will remain free from infection  Description: Will remain free from infection  Outcome: Progressing     Problem: Safety:  Goal: Free from accidental physical injury  Description: Free from accidental physical injury  Outcome: Progressing  Note: Siderails up x 2  Hourly rounding  Call light in reach  Instructed to call for assist before attempting out of bed. Remains free from falls and accidental injury at this time   Floor free from obstacles  Bed is locked and in lowest position  Adequate lighting provided  Bed alarm on, [place] star and Stay with Me signs posted         Problem: Daily Care:  Goal: Daily care needs are met  Description: Daily care needs are met  Outcome: Progressing     Problem: Pain:  Description: Pain management should include both nonpharmacologic and pharmacologic interventions. Goal: Patient's pain/discomfort is manageable  Description: Patient's pain/discomfort is manageable  Outcome: Progressing  Note: Pain level assessment complete.    Patient educated on pain scale and control interventions  PRN pain medication given per patient request  Patient instructed to call out with new onset of pain or unrelieved pain    Goal: Pain level will decrease  Description: Pain level will decrease  Outcome: Progressing  Goal: Control of acute pain  Description: Control of acute pain  Outcome: Progressing  Goal: Control of chronic pain  Description: Control of chronic pain  Outcome: Progressing     Problem: Skin Integrity:  Goal: Skin integrity will stabilize  Description: Skin integrity will stabilize  Outcome: Progressing     Problem: Discharge Planning:  Goal: Patients continuum of care needs are met  Description: Patients continuum of care needs are met  Outcome: Progressing     Problem: Skin Integrity:  Goal: Will show no infection signs and symptoms  Description: Will show no infection signs and symptoms  Outcome: Progressing  Goal: Absence of new skin breakdown  Description: Absence of new skin breakdown  Outcome: Progressing  Goal: Risk for impaired skin integrity will decrease  Description: Risk for impaired skin integrity will decrease  Outcome: Progressing     Problem: Falls - Risk of:  Goal: Will remain free from falls  Description: Will remain free from falls  Outcome: Progressing  Goal: Absence of physical injury  Description: Absence of physical injury  Outcome: Progressing     Problem: Nutrition  Goal: Optimal nutrition therapy  Outcome: Progressing     Problem:  Activity:  Goal: Risk for activity intolerance will decrease  Description: Risk for activity intolerance will decrease  Outcome: Progressing     Problem: Coping:  Goal: Ability to adjust to condition or change in health will improve  Description: Ability to adjust to condition or change in health will improve  Outcome: Progressing     Problem: Fluid Volume:  Goal: Ability to maintain a balanced intake and output will improve  Description: Ability to maintain a balanced intake and output will improve  Outcome: Progressing     Problem: Health Behavior:  Goal: Ability to identify and utilize available resources and services will improve  Description: Ability to identify and utilize available resources and services will improve  Outcome: Progressing  Goal: Ability to manage health-related needs will improve  Description: Ability to manage health-related needs will improve  Outcome: Progressing     Problem: Metabolic:  Goal: Ability to maintain appropriate glucose levels will improve  Description: Ability to maintain appropriate glucose levels will improve  Outcome: Progressing     Problem: Nutritional:  Goal: Maintenance of adequate nutrition will improve  Description: Maintenance of adequate nutrition will improve  Outcome: Progressing  Goal: Progress toward achieving an optimal weight will improve  Description: Progress toward achieving an optimal weight will improve  Outcome: Progressing Problem: Physical Regulation:  Goal: Complications related to the disease process, condition or treatment will be avoided or minimized  Description: Complications related to the disease process, condition or treatment will be avoided or minimized  Outcome: Progressing  Goal: Diagnostic test results will improve  Description: Diagnostic test results will improve  Outcome: Progressing     Problem: Tissue Perfusion:  Goal: Adequacy of tissue perfusion will improve  Description: Adequacy of tissue perfusion will improve  Outcome: Progressing     Problem: Discharge Planning  Goal: Discharge to home or other facility with appropriate resources  Outcome: Progressing     Problem: Nutrition Deficit:  Goal: Optimize nutritional status  Outcome: Progressing     Problem: Chronic Conditions and Co-morbidities  Goal: Patient's chronic conditions and co-morbidity symptoms are monitored and maintained or improved  Outcome: Progressing

## 2022-04-21 NOTE — CARE COORDINATION
Brenda Valentino is denying patient The number to call for P2P is [phone] ref number [id number]. Left message for Dr Edith Carballo to see if she is able to complete P2P today.  Nevaeh Thomas

## 2022-04-21 NOTE — PROGRESS NOTES
Occupational Therapy  Facility/Department: [facility]  Rehabilitation Occupational Therapy Daily Treatment Note    Date: 22  Patient Name: Jamal Dai       Room: 0276/2889-10  MRN: [id number]  Account: [de-identified]   : 1950  (73 y.o.) Gender: male              COREY Lan reports patient is medically stable for therapy treatment this date. Chart reviewed prior to treatment and patient is agreeable for therapy. All lines intact and patient positioned comfortably at end of treatment. All patient needs addressed prior to ending therapy session. Pt is currently functional below baseline and would suggest intense therapy post acute care. Would expect patient to be able to tolerate 3 hours of therapy per day and able to tolerate at least one hour up in chair. Please refer to AM-PAC score for current mobility/adl level. Past Medical History:  has a past medical history of RISSA (acute kidney injury) ([address] Cerebrovascular disease, Erectile dysfunction, Hepatitis B infection, Hyperlipidemia, Hypertension, Kidney cysts, Liver cyst, MRSA (methicillin resistant staph aureus) culture positive, Neurotrophic ulcer of the foot ([address] Obesity, Osteomyelitis of ankle or foot, Peripheral vascular disease ([address] Tobacco abuse, and Type II or unspecified type diabetes mellitus without mention of complication, not stated as uncontrolled. Past Surgical History:   has a past surgical history that includes Foot amputation through metatarsal (2011); Foot Amputation; Foot surgery (Right, 2013); Foot Debridement (Left, 2020); Foot Debridement (Left, 2022); above knee amputation (Left, 2022); and Leg amputation below knee (Left, 2022). Restrictions  Restrictions/Precautions: General Precautions; Fall Risk;Up as Tolerated  Other position/activity restrictions: new L AKA, R BKA with prosthesis, R UE IV, up with assist  Right Lower Extremity Weight Bearing: Non Weight Bearing  Left Lower Extremity Weight Bearing: Non Weight Bearing  Required Braces or Orthoses?: No    Subjective  Subjective: \"I can do this on my own\"  Restrictions/Precautions: General Precautions; Fall Risk;Up as Tolerated             Objective     Cognition  Overall Cognitive Status: Exceptions  Arousal/Alertness: Appropriate responses to stimuli  Following Commands: Follows one step commands with increased time; Follows one step commands with repetition  Attention Span: Attends with cues to redirect  Memory: Decreased short term memory  Safety Judgement: Decreased awareness of need for assistance;Decreased awareness of need for safety  Problem Solving: Decreased awareness of errors;Assistance required to generate solutions;Assistance required to implement solutions  Insights: Decreased awareness of deficits  Initiation: Requires cues for some  Sequencing: Requires cues for some  Cognition Comment: Pt has difficulty processing/following commands. Orientation  Overall Orientation Status: Within Functional Limits         ADL  Grooming/Oral Hygiene  Assistance Level: Set-up; Supervision (oral care seated in power w/c)  Upper Extremity Dressing  Assistance Level: Minimal assistance;Set-up (donning new hospital gown)  Toileting  Assistance Level: Set-up; Stand by assist (Pt has frequent urge to urinate. After performing supine to sit pt quickly laid back down in bed to use urinal and during brief change pt needed to urinate again.)     Bed Mobility  Overall Assistance Level: Moderate Assistance; Requires x 2 Assistance (Max verbal/tactile assist for progression of trunk/BLEs in/out of bed, sequencing, proper technique, pacing, pursed lip breathing, and awareness/assist with IV line.)  Additional Factors: Verbal cues; Increased time to complete; With handrails; Head of bed raised  Supine to Sit  Assistance Level: Requires x 2 assistance; Moderate assistance  Scooting  Assistance Level:  Moderate assistance x2   Transfers  Surface: Wheelchair;From bed (Required Mod A x2 w/slide board for safe transfer to power w/c. Max vc's for proper technique, hand placement, sequencing, pursed lip breathing, pacing, upright posture, and awareness/assist with IV line.)  Device: Sliding board  Sliding Board  Assistance Level: Moderate assistance; Requires x 2 assistance         Assessment  Assessment  Assessment: Pt tolerated session fair but has decreased awareness of deficts. Pt verbalizes he is okay to go home but was not receptive when writer reported to him how much assistance he needed for bed mobility and with transfer with slide board. Skilled OT indicated for deficits with overall safety awareness, balance, endurance, strength and safety with ADL transfer techs to increase overall function and independence. Activity Tolerance: Patient limited by fatigue;Patient limited by endurance; Patient limited by pain  Discharge Recommendations: Patient would benefit from continued therapy after discharge  OT Equipment Recommendations  Equipment Needed: Yes  Mobility Devices: ADL Assistive Devices  ADL Assistive Devices: Long-handled Shoe Horn;Long-handled Sponge;Reacher;Sock-Aid Hard  Safety Devices  Safety Devices in place: Yes  Type of devices: All fall risk precautions in place; Left in bed;Call light within reach;Nurse notified;Gait belt;Patient at risk for falls (left in power w/c)    Patient Education  Education  Education Given To: Patient  Education Provided: Role of Therapy;Plan of Care;Transfer Training; Safety; Energy Conservation; Fall Prevention Strategies  Education Provided Comments: benefits of being up OOB, proper use of slide board, proper bed moblity, pursed lip breathing, postural control  Education Method: Verbal  Barriers to Learning: Cognition  Education Outcome: Verbalized understanding;Continued education needed    Plan  Plan  Times per Week: 4-5x a week, 1-2x per day as hyacinth  Current Treatment Recommendations: Strengthening;ROM;Endurance training;Functional mobility training;Patient/Caregiver education & training;Equipment evaluation, education, & procurement; Safety education & training;Neuromuscular re-education;Self-Care / ADL; Home management training    Goals  Short Term Goals  Time Frame for Short term goals: By discharge pt will  Short Term Goal 1: demo mod A of 1 for ADL transfers with appropriate device/technique and good safety/pacing. Short Term Goal 2: demo SBA for UB ADLs and min A x1 for LB ADLs with good safety/pacing and use of AD/DME as needed. Short Term Goal 3: demo min A x1 for toileting tasks with good safety/pacing and use of AD/DME as needed. Short Term Goal 4: Increase B UE strength by 1/2 grade to assist with ADL tasks and functional transfers  Short Term Goal 5: demo and verb good understanding of EC/WS, ADL transfer techs, fall prevention techs, B UE HEP, possible equip needs, use of DME/AD as needed, pressure relief, d/c reccommendations and surgery precautions. Writer returned to pt's room at [phone] to assist RN with transfer back into bed. Pt transferred from power w/c to bed using slide requiring Mod A x2 for transfer and Max A x2 for sit <> supine and scooting up fully in bed. Pt needed a brief change and rolled R&L multiple times for changing brief and readjusting hira underneath. Pt left supine in bed with HOB raised bed alarm on, tray table close by, and call light within reach. Therapy Time   Co-Treatment Concurrent Group Co-treatment   Time In 1         Time Out 1104         Minutes 39+15  =54               Co-treatment with PT warranted secondary to decreased safety and independence requiring 2 skilled therapy professionals to address individual discipline's goals.  OT addressing preparation for ADL transfer, sitting balance for increased ADL performance, sitting/activity tolerance, functional reaching, environmental safety/scanning, fall prevention, ADL transfers, ability to sequence and follow directions and functional UE strength.     Griselda Furl, OLIVER

## 2022-04-21 NOTE — CARE COORDINATION
Social Work-Spoke with patient's sister regarding dc plans. They prefer acute rehab. The first choice is [facility]. [facility] approved patient for admission and began precert. Seth Edwards

## 2022-04-21 NOTE — PROGRESS NOTES
[facility] -   Infectious diseases evaluation  admission date 4/11/2022    reason for consultation:   Left foot infection    Impression :   Current:  · Diabetic left foot infection with suspected necrotizing fasciitis and calcaneal osteomyelitis. Status post incision and debridement 4/12/22 status above-knee amputation 4/19/2020  · Peripheral vascular disease  · Fungal UTI  · Diabetes mellitus  · Chronic renal disease  · History of right below-knee amputation      Recommendations        · Discontinue IV ceftriaxone , clindamycin   · P.o. Diflucan through 4/24/2022  · No objection for discharge from infectious disease point of view          History of Present Illness:   Initial history:  Haresh Zhang is a 67y.o.-year-old male was sent to hospital from Dr. Spike Hanna office for worsening left heel pain associated with left heel wound with surrounding dark discoloration and redness. The pain is severe, intermittent, no alleviating or aggravating factors. Left foot x-ray showed soft tissue emphysema within the posterior aspect of the distal left lower leg concerning for necrotizing fasciitis and possible calcaneal osteomyelitis. Initial WBC 18.5, creatinine 2.44, C-reactive protein more than 130, C-reactive protein 104.2, lactic acid 0 point  Afebrile  History of right below-knee amputation  Interval changes  4/21/2022   He is afebrile, status post above-knee amputation on 4/19/2022. He is complaining of loose stool, postoperative pain controlled, denied abdominal pain, no vomiting, denied cough or shortness of breath, no other complaints. The patient was noted to have cloudy urine on 4/18/2022 that was sent for urinalysis that showed too numerous to count WBC, large leukocyte esterase, urine culture grew yeast  Proteus mirabilis pansensitive and group B streptococcus growth on bone culture from 04/12/2022.   No growth on blood cultures from 4/11/2022  Patient Vitals for the past 8 hrs:   BP Temp Temp src Pulse Resp SpO2 Weight   04/21/22 1119 (!) 145/64 98.2 °F (36.8 °C) Oral 62 16 100 % --   04/21/22 0641 (!) 149/67 98.3 °F (36.8 °C) Oral 64 14 100 % 175 lb 6.4 oz (79.6 kg)   04/21/22 0419 (!) 141/64 98 °F (36.7 °C) Oral 61 16 97 % --     . I have personally reviewed the past medical history, past surgical history, medications, social history, and family history, and I haveupdated the database accordingly. Allergies:   Patient has no known allergies. Review of Systems:     Review of Systems  As per history present illness, other than above 12 system review was negative  Physical Examination :       Physical Exam  Constitutional:       General: He is not in acute distress. HENT:      Head: Normocephalic and atraumatic. Right Ear: External ear normal.      Left Ear: External ear normal.   Eyes:      General: No scleral icterus. Conjunctiva/sclera: Conjunctivae normal.   Pulmonary:      Effort: Pulmonary effort is normal. No respiratory distress. Abdominal:      General: There is no distension. Palpations: Abdomen is soft. Musculoskeletal:      Comments: Left above-knee amputation dressing was not removed  Right below-knee amputation   Skin:     General: Skin is warm. Coloration: Skin is not jaundiced. Neurological:      General: No focal deficit present. Mental Status: He is oriented to person, place, and time.          Past Medical History:     Past Medical History:   Diagnosis Date    RISSA (acute kidney injury) ([address]     Cerebrovascular disease     Erectile dysfunction     Hepatitis B infection     Hyperlipidemia     Hypertension     Kidney cysts     Liver cyst     MRSA (methicillin resistant staph aureus) culture positive 1/10/2014    right foot    Neurotrophic ulcer of the foot ([address]     Obesity     Osteomyelitis of ankle or foot     Peripheral vascular disease ([address]     Tobacco abuse     Type II or unspecified type diabetes mellitus without mention of complication, not stated as uncontrolled        Past Surgical  History:     Past Surgical History:   Procedure Laterality Date    ABOVE KNEE AMPUTATION Left 04/19/2022    by Dr. Lombardo Shoulder  01/01/2011    pt can't give exact date of surg-- toes and part of lt foot removed    FOOT DEBRIDEMENT Left 06/20/2020    WOUND BED PREPARATION AND WOUND VAC APPLICATION performed by Radha Allen DPM at [address] [place] [address] Left 04/12/2022    FOOT DEBRIDEMENT INCISION AND DRAINAGE WITH BX performed by Radha Allen DPM at [address] [place] [address] Right 03/01/2013    LEG AMPUTATION BELOW KNEE Left 4/19/2022    LEFT  LEG AMPUTATION ABOVE KNEE  WITH NERVE BLOCK performed by Max Evangelista MD at [facility]       Medications:      insulin glargine  20 Units SubCUTAneous Daily    insulin glargine  10 Units SubCUTAneous Nightly    sodium zirconium cyclosilicate  5 g Oral TID    sodium bicarbonate  650 mg Oral BID    amLODIPine  5 mg Oral Daily    clopidogrel  75 mg Oral Daily    docusate sodium  100 mg Oral BID    ferrous sulfate  325 mg Oral Daily with breakfast    [Held by provider] furosemide  40 mg Oral Every Other Day    metoprolol tartrate  25 mg Oral BID    atorvastatin  10 mg Oral Daily    sodium chloride flush  5-40 mL IntraVENous 2 times per day    insulin lispro  0-12 Units SubCUTAneous TID WC    insulin lispro  0-6 Units SubCUTAneous Nightly       Social History:     Social History     Socioeconomic History    Marital status:      Spouse name: Not on file    Number of children: Not on file    Years of education: Not on file    Highest education level: Not on file   Occupational History    Not on file   Tobacco Use    Smoking status: Current Every Day Smoker     Packs/day: 0.50     Years: 30.00     Pack years: 15.00     Types: Cigarettes    Smokeless tobacco: Never Used    Tobacco comment: working on quitting smoking   Vaping Use    Vaping Use: Never used   Substance and Sexual Activity    Alcohol use: No    Drug use: Never    Sexual activity: Yes     Partners: Female   Other Topics Concern    Not on file   Social History Narrative    Not on file     Social Determinants of Health     Financial Resource Strain:     Difficulty of Paying Living Expenses: Not on file   Food Insecurity:     Worried About Running Out of Food in the Last Year: Not on file    Marika of Food in the Last Year: Not on file   Transportation Needs:     Lack of Transportation (Medical): Not on file    Lack of Transportation (Non-Medical):  Not on file   Physical Activity:     Days of Exercise per Week: Not on file    Minutes of Exercise per Session: Not on file   Stress:     Feeling of Stress : Not on file   Social Connections:     Frequency of Communication with Friends and Family: Not on file    Frequency of Social Gatherings with Friends and Family: Not on file    Attends Baptism Services: Not on file    Active Member of Clubs or Organizations: Not on file    Attends Club or Organization Meetings: Not on file    Marital Status: Not on file   Intimate Partner Violence:     Fear of Current or Ex-Partner: Not on file    Emotionally Abused: Not on file    Physically Abused: Not on file    Sexually Abused: Not on file   Housing Stability:     Unable to Pay for Housing in the Last Year: Not on file    Number of [place] in the Last Year: Not on file    Unstable Housing in the Last Year: Not on file       Family History:     Family History   Problem Relation Age of Onset    Diabetes Mother     Diabetes Sister     Diabetes Brother     Diabetes Sister     Diabetes Sister     Diabetes Sister       Medical Decision Making:   I have independently reviewed/ordered the following labs:    CBC with Differential:   Recent Labs     04/19/22  2223   WBC 10.8   HGB 8.6*   HCT 28.6*    LYMPHOPCT 8*   MONOPCT 1*     BMP:  Recent Labs     04/20/22  1115 04/21/22  0542    139   K 5.1 4.8    106   CO2 16* 23   BUN 28* 39*   CREATININE 2.16* 2.32*     Hepatic Function Panel: No results for input(s): PROT, LABALBU, BILIDIR, IBILI, BILITOT, ALKPHOS, ALT, AST in the last 72 hours. No results for input(s): RPR in the last 72 hours. No results for input(s): HIV in the last 72 hours. No results for input(s): BC in the last 72 hours. Lab Results   Component Value Date    CREATININE 2.32 04/21/2022    GLUCOSE 413 04/21/2022    GLUCOSE 133 10/18/2011       Detailed results: Thank you for allowing us to participate in the care of this patient. Please call with questions. This note is created with the assistance of a speech recognition program.  While intending to generate adocument that actually reflects the content of the visit, the document can still have some errors including those of syntax and sound a like substitutions which may escape proof reading. It such instances, actual meaningcan be extrapolated by contextual diversion.     Gwen San MD  Office: [phone]  Perfect serve / office [phone]

## 2022-04-22 LAB
ABO/RH: NORMAL
ANION GAP SERPL CALCULATED.3IONS-SCNC: 11 MMOL/L (ref 9–17)
ANTIBODY SCREEN: NEGATIVE
ARM BAND NUMBER: NORMAL
BLD PROD TYP BPU: NORMAL
BLD PROD TYP BPU: NORMAL
BPU ID: NORMAL
BPU ID: NORMAL
BUN BLDV-MCNC: 43 MG/DL (ref 8–23)
BUN/CREAT BLD: 19 (ref 9–20)
CALCIUM SERPL-MCNC: 8 MG/DL (ref 8.6–10.4)
CHLORIDE BLD-SCNC: 109 MMOL/L (ref 98–107)
CO2: 23 MMOL/L (ref 20–31)
CREAT SERPL-MCNC: 2.27 MG/DL (ref 0.7–1.2)
CROSSMATCH RESULT: NORMAL
CROSSMATCH RESULT: NORMAL
DISPENSE STATUS BLOOD BANK: NORMAL
DISPENSE STATUS BLOOD BANK: NORMAL
EXPIRATION DATE: NORMAL
GFR AFRICAN AMERICAN: 35 ML/MIN
GFR NON-AFRICAN AMERICAN: 29 ML/MIN
GFR SERPL CREATININE-BSD FRML MDRD: ABNORMAL ML/MIN/{1.73_M2}
GLUCOSE BLD-MCNC: 119 MG/DL (ref 70–99)
GLUCOSE BLD-MCNC: 119 MG/DL (ref 75–110)
GLUCOSE BLD-MCNC: 179 MG/DL (ref 75–110)
GLUCOSE BLD-MCNC: 183 MG/DL (ref 75–110)
GLUCOSE BLD-MCNC: 98 MG/DL (ref 75–110)
POTASSIUM SERPL-SCNC: 4.6 MMOL/L (ref 3.7–5.3)
SODIUM BLD-SCNC: 143 MMOL/L (ref 135–144)
TRANSFUSION STATUS: NORMAL
TRANSFUSION STATUS: NORMAL
UNIT DIVISION: 0
UNIT DIVISION: 0

## 2022-04-22 PROCEDURE — 6370000000 HC RX 637 (ALT 250 FOR IP): Performed by: HOSPITALIST

## 2022-04-22 PROCEDURE — 6370000000 HC RX 637 (ALT 250 FOR IP): Performed by: INTERNAL MEDICINE

## 2022-04-22 PROCEDURE — 6370000000 HC RX 637 (ALT 250 FOR IP): Performed by: STUDENT IN AN ORGANIZED HEALTH CARE EDUCATION/TRAINING PROGRAM

## 2022-04-22 PROCEDURE — 80048 BASIC METABOLIC PNL TOTAL CA: CPT

## 2022-04-22 PROCEDURE — 97112 NEUROMUSCULAR REEDUCATION: CPT

## 2022-04-22 PROCEDURE — 97535 SELF CARE MNGMENT TRAINING: CPT

## 2022-04-22 PROCEDURE — 97530 THERAPEUTIC ACTIVITIES: CPT

## 2022-04-22 PROCEDURE — 36415 COLL VENOUS BLD VENIPUNCTURE: CPT

## 2022-04-22 PROCEDURE — 82947 ASSAY GLUCOSE BLOOD QUANT: CPT

## 2022-04-22 PROCEDURE — 2060000000 HC ICU INTERMEDIATE R&B

## 2022-04-22 PROCEDURE — 99232 SBSQ HOSP IP/OBS MODERATE 35: CPT | Performed by: HOSPITALIST

## 2022-04-22 PROCEDURE — 2580000003 HC RX 258: Performed by: STUDENT IN AN ORGANIZED HEALTH CARE EDUCATION/TRAINING PROGRAM

## 2022-04-22 PROCEDURE — 97110 THERAPEUTIC EXERCISES: CPT

## 2022-04-22 PROCEDURE — 99232 SBSQ HOSP IP/OBS MODERATE 35: CPT | Performed by: INTERNAL MEDICINE

## 2022-04-22 RX ORDER — INSULIN GLARGINE 100 [IU]/ML
20 INJECTION, SOLUTION SUBCUTANEOUS DAILY
Qty: 10 ML | Refills: 3
Start: 2022-04-23

## 2022-04-22 RX ORDER — OXYCODONE HYDROCHLORIDE AND ACETAMINOPHEN 5; 325 MG/1; MG/1
1 TABLET ORAL EVERY 4 HOURS PRN
Qty: 5 TABLET | Refills: 0 | Status: SHIPPED | OUTPATIENT
Start: 2022-04-22 | End: 2022-04-25

## 2022-04-22 RX ORDER — SODIUM BICARBONATE 650 MG/1
650 TABLET ORAL 2 TIMES DAILY
Qty: 60 TABLET | Refills: 0
Start: 2022-04-22 | End: 2022-05-22

## 2022-04-22 RX ORDER — FLUCONAZOLE 200 MG/1
200 TABLET ORAL DAILY
Qty: 1 TABLET | Refills: 0
Start: 2022-04-23 | End: 2022-04-24

## 2022-04-22 RX ADMIN — FERROUS SULFATE TAB EC 325 MG (65 MG FE EQUIVALENT) 325 MG: 325 (65 FE) TABLET DELAYED RESPONSE at 09:10

## 2022-04-22 RX ADMIN — OXYCODONE AND ACETAMINOPHEN 2 TABLET: 5; 325 TABLET ORAL at 09:09

## 2022-04-22 RX ADMIN — METOPROLOL TARTRATE 25 MG: 25 TABLET, FILM COATED ORAL at 09:10

## 2022-04-22 RX ADMIN — DOCUSATE SODIUM 100 MG: 100 CAPSULE, LIQUID FILLED ORAL at 09:10

## 2022-04-22 RX ADMIN — INSULIN LISPRO 2 UNITS: 100 INJECTION, SOLUTION INTRAVENOUS; SUBCUTANEOUS at 12:49

## 2022-04-22 RX ADMIN — OXYCODONE AND ACETAMINOPHEN 2 TABLET: 5; 325 TABLET ORAL at 01:56

## 2022-04-22 RX ADMIN — SODIUM CHLORIDE, PRESERVATIVE FREE 10 ML: 5 INJECTION INTRAVENOUS at 21:00

## 2022-04-22 RX ADMIN — INSULIN LISPRO 2 UNITS: 100 INJECTION, SOLUTION INTRAVENOUS; SUBCUTANEOUS at 17:20

## 2022-04-22 RX ADMIN — FLUCONAZOLE 200 MG: 100 TABLET ORAL at 09:10

## 2022-04-22 RX ADMIN — SODIUM CHLORIDE, PRESERVATIVE FREE 10 ML: 5 INJECTION INTRAVENOUS at 09:10

## 2022-04-22 RX ADMIN — METOPROLOL TARTRATE 25 MG: 25 TABLET, FILM COATED ORAL at 15:53

## 2022-04-22 RX ADMIN — SODIUM BICARBONATE 650 MG: 650 TABLET ORAL at 20:57

## 2022-04-22 RX ADMIN — SODIUM BICARBONATE 650 MG: 650 TABLET ORAL at 09:10

## 2022-04-22 RX ADMIN — DOCUSATE SODIUM 100 MG: 100 CAPSULE, LIQUID FILLED ORAL at 20:57

## 2022-04-22 RX ADMIN — AMLODIPINE BESYLATE 5 MG: 5 TABLET ORAL at 09:10

## 2022-04-22 RX ADMIN — OXYCODONE AND ACETAMINOPHEN 2 TABLET: 5; 325 TABLET ORAL at 20:57

## 2022-04-22 RX ADMIN — INSULIN GLARGINE 20 UNITS: 100 INJECTION, SOLUTION SUBCUTANEOUS at 09:16

## 2022-04-22 RX ADMIN — OXYCODONE AND ACETAMINOPHEN 2 TABLET: 5; 325 TABLET ORAL at 12:52

## 2022-04-22 RX ADMIN — ATORVASTATIN CALCIUM 10 MG: 10 TABLET, FILM COATED ORAL at 09:10

## 2022-04-22 RX ADMIN — CLOPIDOGREL BISULFATE 75 MG: 75 TABLET ORAL at 09:10

## 2022-04-22 ASSESSMENT — PAIN DESCRIPTION - PAIN TYPE
TYPE: SURGICAL PAIN
TYPE: SURGICAL PAIN

## 2022-04-22 ASSESSMENT — PAIN SCALES - GENERAL
PAINLEVEL_OUTOF10: 8
PAINLEVEL_OUTOF10: 6
PAINLEVEL_OUTOF10: 7
PAINLEVEL_OUTOF10: 8
PAINLEVEL_OUTOF10: 8
PAINLEVEL_OUTOF10: 6
PAINLEVEL_OUTOF10: 6

## 2022-04-22 ASSESSMENT — PAIN DESCRIPTION - DESCRIPTORS
DESCRIPTORS: SHARP
DESCRIPTORS: SHARP
DESCRIPTORS: SHARP;STABBING

## 2022-04-22 ASSESSMENT — PAIN - FUNCTIONAL ASSESSMENT
PAIN_FUNCTIONAL_ASSESSMENT: PREVENTS OR INTERFERES SOME ACTIVE ACTIVITIES AND ADLS
PAIN_FUNCTIONAL_ASSESSMENT: PREVENTS OR INTERFERES SOME ACTIVE ACTIVITIES AND ADLS

## 2022-04-22 ASSESSMENT — PAIN DESCRIPTION - FREQUENCY
FREQUENCY: CONTINUOUS
FREQUENCY: CONTINUOUS

## 2022-04-22 ASSESSMENT — PAIN DESCRIPTION - ONSET
ONSET: ON-GOING
ONSET: ON-GOING

## 2022-04-22 ASSESSMENT — PAIN DESCRIPTION - ORIENTATION
ORIENTATION: LEFT
ORIENTATION: LEFT

## 2022-04-22 ASSESSMENT — PAIN DESCRIPTION - LOCATION
LOCATION: LEG
LOCATION: LEG

## 2022-04-22 NOTE — PROGRESS NOTES
Renal Progress Note    Patient :  Pily Sosa; 67 y.o. MRN# [id number]  Location:  2102/2102-01  Attending:  Tony Amado DO  Admit Date:  4/11/2022   Hospital Day: 11      Subjective:     Patient was seen and examined. No new issues reported overnight. Patient is status post left AKA which was done on 4/19/2022. Does not report any shortness of breath, no nausea vomiting diarrhea, no chest pain reported. BMP results from today morning showed sodium 136 and potassium 4.8 , chloride 103, bicarb 23, BUN 24, creatinine 2.2 mg/dl, calcium 8.0. Patient is currently on normal saline at 50 cc an hour. Urine output documented as above 900 mL in last 20 hours. Has positive left diabetic foot infection with suspected necrotizing fasciitis and calcaneal osteomyelitis status post I&D 4/12/2022 infectious disease on board on antibiotics and now s/p AKA 4/19/2022. Awaiting placement, [id number] was denied today. Complaining of phantom leg pain over the left amputation stump.   Potassium better controlled, bicarbonate better on sodium bicarbonate and Lokelma  Outpatient Medications:     Medications Prior to Admission: metoprolol tartrate (LOPRESSOR) 25 MG tablet, Take 25 mg by mouth 2 times daily  QUEtiapine (SEROQUEL) 25 MG tablet, Take 25 mg by mouth at bedtime  amLODIPine (NORVASC) 10 MG tablet, Take 0.5 tablets by mouth daily (Patient taking differently: Take 10 mg by mouth daily )  insulin glargine (BASAGLAR KWIKPEN) 100 UNIT/ML injection pen, Inject 10-20 Units into the skin 2 times daily Injects 20 units AM in the morning and 10 units at night (Patient taking differently: Inject 12 Units into the skin nightly )  [DISCONTINUED] furosemide (LASIX) 40 MG tablet, Take 1 tablet by mouth every other day  [DISCONTINUED] lisinopril (PRINIVIL;ZESTRIL) 10 MG tablet, Take 1 tablet by mouth daily  clopidogrel (PLAVIX) 75 MG tablet, Take 1 tablet by mouth daily  insulin aspart (NOVOLOG FLEXPEN) 100 UNIT/ML injection oral replacement **OR** potassium chloride, magnesium sulfate, ondansetron **OR** ondansetron, polyethylene glycol, acetaminophen **OR** acetaminophen, dextrose bolus (hypoglycemia) **OR** dextrose bolus (hypoglycemia), glucose, hydrALAZINE    Input/Output:       I/O last 3 completed shifts: In: 960.5 [I.V.:757.8; IV Piggyback:202.7]  Out: 1450 [Urine:1450]. Patient Vitals for the past 96 hrs (Last 3 readings):   Weight   22 0553 178 lb 4.8 oz (80.9 kg)   22 0641 175 lb 6.4 oz (79.6 kg)   22 0625 176 lb 1.6 oz (79.9 kg)       Vital Signs:   Temperature:  Temp: 98.9 °F (37.2 °C)  TMax:   Temp (24hrs), Av.3 °F (36.8 °C), Min:97.5 °F (36.4 °C), Max:98.9 °F (37.2 °C)    Respirations:  Resp: 16  Pulse:   Pulse: 71  BP:    BP: (!) 171/63  BP Range: Systolic (95IWN), RPX:432 , Min:140 , ABR:146       Diastolic (13IMV), GFO:81, Min:56, Max:70      Physical Examination:     General:  AAO x 3, speaking in full sentences, no accessory muscle use. HEENT: Atraumatic, normocephalic, no throat congestion, moist mucosa. Eyes:   Pupils equal, round and reactive to light, EOMI. Neck:   Supple  Chest:   Bilateral vesicular breath sounds, no rales or wheezes. Cardiac:  S1 S2 RR, no murmurs, gallops or rubs. Abdomen: Soft, non-tender, no masses or organomegaly, BS audible. :   No suprapubic or flank tenderness. Neuro:  AAO x 3, No FND. SKIN:  No rashes, good skin turgor. Extremities:  History of right lower extremity amputation and now status post left AKA.     Labs:       Recent Labs     22  2223   WBC 10.8   RBC 3.48*   HGB 8.6*   HCT 28.6*   MCV 82.2*   MCH 24.7*   MCHC 30.1   RDW 14.8*      MPV 8.5      BMP:   Recent Labs     22  1115 22  0542 22  0749    139 143   K 5.1 4.8 4.6    106 109*   CO2 16* 23 23   BUN 28* 39* 43*   CREATININE 2.16* 2.32* 2.27*   GLUCOSE 333* 413* 119*   CALCIUM 8.1* 8.0* 8.0*      SPEP:  Lab Results   Component Value Date PROT 7.6 09/16/2020     C3:     Lab Results   Component Value Date    C3 120 04/12/2022     C4:     Lab Results   Component Value Date    C4 19 04/12/2022     Hep BsAg:         Lab Results   Component Value Date    HEPBSAG REACTIVE 07/22/2013     Hep C AB:          Lab Results   Component Value Date    HEPCAB NONREACTIVE 07/22/2013     Urinalysis/Chemistries:      Lab Results   Component Value Date    NITRU NEGATIVE 04/18/2022    COLORU Yellow 04/18/2022    PHUR 5.5 04/18/2022    WBCUA TOO NUMEROUS TO COUNT 04/18/2022    RBCUA 20 TO 50 04/18/2022    MUCUS NOT REPORTED 03/30/2018    TRICHOMONAS NOT REPORTED 03/30/2018    YEAST FEW 02/23/2022    BACTERIA FEW 02/23/2022    SPECGRAV 1.025 04/18/2022    LEUKOCYTESUR LARGE 04/18/2022    UROBILINOGEN Normal 04/18/2022    BILIRUBINUR NEGATIVE 04/18/2022    GLUCOSEU NEGATIVE 04/18/2022    KETUA NEGATIVE 04/18/2022    AMORPHOUS 4+ 04/18/2022     Urine Sodium:     Lab Results   Component Value Date    JLUIS 65 09/17/2020     Urine Chloride:    Lab Results   Component Value Date    CLUR <15 12/15/2013      Urine Creatinine:     Lab Results   Component Value Date    LABCREA 66.7 04/12/2022     Radiology:     Reviewed. Assessment:     1. Acute Kidney Injury likely ATN due to systemic inflammatory response syndrome from left lower extremity cellulitis/necrotizing fasciitis and calcaneal osteomyelitis and additional element from intravascular volume depletion from decreased intake, loop diuretics and ACE inhibitor usage with baseline creatinine of 2.0-2.1 mg/dl and creatinine now close to baseline. It had peaked at 3.1. Some elevation from elevated blood sugars as well  2. Left diabetic foot infection with suspected necrotizing fasciitis and calcaneal osteomyelitis status post I&D 4/12/2022 infectious disease on board on antibiotics. 3.  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   4.  Peripheral vascular disease status post right lower Covid amputation in 2014.  5.  Essential hypertension. 6.  Diabetes type 2.  7.  Anion gap metabolic acidosis. 8.  Hyperkalemia resolved. Plan:   1. Discontinue IV fluids  2. Stable for discharge  3. Discontinue Lokelma  3. Continue to monitor strict I's and O's and renal function. 4.  Antibiotics per infectious disease per renal dosing. 5.  Continue sodium bicarbonate 650 twice a day   6. [facility] 4/25/2022 as outpatient  7. Outpatient follow-up with nephrology in 2 to 3 weeks  8. Nothing else to add will sign off please call for questions    Nutrition   Please ensure that patient is on a renal diet/TF. Avoid nephrotoxic drugs/contrast exposure. We will continue to follow along with you.

## 2022-04-22 NOTE — CARE COORDINATION
[facility] anticipate a precert tomorrow. Please contact Jennifer Pandya at [phone] to check on status of precert. Notify sister of Brandi Jensen [phone]) Finish Ramírez. Phone number for report is [phone] and fax is [phone]. Completed RAMÍREZ.  Joon Chow

## 2022-04-22 NOTE — PROGRESS NOTES
Occupational Therapy  Facility/Department: [facility]  Rehabilitation Occupational Therapy Daily Treatment Note    Date: 22  Patient Name: Kym Rm       Room: 7021/4152-09  MRN: [id number]  Account: [de-identified]   : 1950  (73 y.o.) Gender: male         Pt currently functioning below baseline. Would suggest additional therapy at time of discharge to maximize long term outcomes and prevent re-admission. Please refer to AM-PAC score for current level of function. Past Medical History:  has a past medical history of RISSA (acute kidney injury) ([facility] [address] Cerebrovascular disease, Erectile dysfunction, Hepatitis B infection, Hyperlipidemia, Hypertension, Kidney cysts, Liver cyst, MRSA (methicillin resistant staph aureus) culture positive, Neurotrophic ulcer of the foot ([facility] [address] Obesity, Osteomyelitis of ankle or foot, Peripheral vascular disease ([facility] [address] Tobacco abuse, and Type II or unspecified type diabetes mellitus without mention of complication, not stated as uncontrolled. Past Surgical History:   has a past surgical history that includes Foot amputation through metatarsal (2011); Foot Amputation; Foot surgery (Right, 2013); Foot Debridement (Left, 2020); Foot Debridement (Left, 2022); above knee amputation (Left, 2022); and Leg amputation below knee (Left, 2022). Restrictions  Restrictions/Precautions: General Precautions; Fall Risk;Up as Tolerated (Simultaneous filing. User may not have seen previous data.)  Other position/activity restrictions: new L AKA, R BKA with prosthesis, R UE IV, up with assist (Simultaneous filing. User may not have seen previous data.)  Required Braces or Orthoses?: No (Simultaneous filing. User may not have seen previous data.)    Subjective  Subjective: Pt in bed upon arrival, reluctantly agreed to participate in therapy and get up to chair. Restrictions/Precautions: General Precautions; Fall Risk;Up as Tolerated (Simultaneous filing. User may not have seen previous data.)             Objective     Cognition  Overall Cognitive Status: Exceptions (Simultaneous filing. User may not have seen previous data.)  Arousal/Alertness: Appropriate responses to stimuli (Simultaneous filing. User may not have seen previous data.)  Following Commands: Follows one step commands with increased time; Follows one step commands with repetition (Simultaneous filing. User may not have seen previous data.)  Attention Span: Appears intact; Attends with cues to redirect (Simultaneous filing. User may not have seen previous data.)  Memory: Decreased short term memory (Simultaneous filing. User may not have seen previous data.)  Safety Judgement: Decreased awareness of need for assistance;Decreased awareness of need for safety (Simultaneous filing. User may not have seen previous data.)  Problem Solving: Decreased awareness of errors;Assistance required to generate solutions;Assistance required to implement solutions;Assistance required to identify errors made;Assistance required to correct errors made (Simultaneous filing. User may not have seen previous data.)  Insights: Decreased awareness of deficits (Simultaneous filing. User may not have seen previous data.)  Initiation: Requires cues for some (Simultaneous filing. User may not have seen previous data.)  Sequencing: Requires cues for some (Simultaneous filing. User may not have seen previous data.)  Cognition Comment: Pt has difficulty processing/following commands. (Simultaneous filing. User may not have seen previous data.)  Orientation  Overall Orientation Status: Within Functional Limits (Simultaneous filing. User may not have seen previous data.)         ADL  Lower Extremity Bathing  Assistance Level: Maximum assistance;Dependent  Lower Extremity Dressing  Assistance Level: Dependent; Requires x 2 assistance  Toileting  Assistance Level: Dependent; Requires x 2 assistance  Skilled Clinical Factors: ALL LB care done in supine position. Functional Mobility  Skilled Clinical Factors: UNABLE  Bed Mobility  Overall Assistance Level: Maximum Assistance; Requires x 2 Assistance  Additional Factors: Verbal cues; Increased time to complete; With handrails; Head of bed raised  Roll Left  Assistance Level: Maximum assistance;Dependent  Roll Right  Assistance Level: Maximum assistance;Dependent  Supine to Sit  Assistance Level: Maximum assistance;Dependent; Requires x 2 assistance  Scooting  Assistance Level: Maximum assistance;Dependent; Requires x 2 assistance  Transfers  Surface: From bed  Device: Sliding board  Sit to Stand  Skilled Clinical Factors: MAX A to place board and recliner with drop arm. Once board is placed pt was able to complete slide board transfer from bed to recliner with MIN A x2 for balance/safety and max verbal cues for technique and sequencing movements. Neuromuscular Education  Neuromuscular education: Yes  NDT Treatment: Sitting  Facilitation techniques: Pt sat EOB prior to transfers with heavy posterior lean at first but advanced to SBA. Assessment  Assessment  Assessment: Pt tolerated session fair. Pt req's 2 staff assist for all functional tasks and is a high fall risk. Pt is limited by decreased strength and activity tolerance and req's A LOT of assist with self care. Skilled OT indicated to increase safety and IND with all functional tasks to ensure a safe return to PLOF. Activity Tolerance: Patient limited by fatigue;Patient limited by endurance; Patient limited by pain;Treatment limited secondary to decreased cognition  Discharge Recommendations: Patient would benefit from continued therapy after discharge  Safety Devices  Safety Devices in place: Yes  Type of devices: All fall risk precautions in place; Left in chair;Nurse notified;Call light within reach; Chair alarm in place;Gait belt;Patient at risk for falls    Patient Education  Education  Education Given To: Patient  Education Provided: Transfer Training;Energy Conservation;Precautions; Safety  Education Method: Verbal  Barriers to Learning: Cognition  Education Outcome: Verbalized understanding;Continued education needed    Plan  Plan  Times per Week: 4-5x a week, 1-2x per day as hyacinth  Current Treatment Recommendations: Strengthening;ROM;Endurance training;Functional mobility training;Patient/Caregiver education & training;Equipment evaluation, education, & procurement; Safety education & training;Neuromuscular re-education;Self-Care / ADL; Home management training    Goals  Short Term Goals  Time Frame for Short term goals: By discharge pt will  Short Term Goal 1: demo mod A of 1 for ADL transfers with appropriate device/technique and good safety/pacing. Short Term Goal 2: demo SBA for UB ADLs and min A x1 for LB ADLs with good safety/pacing and use of AD/DME as needed. Short Term Goal 3: demo min A x1 for toileting tasks with good safety/pacing and use of AD/DME as needed. Short Term Goal 4: Increase B UE strength by 1/2 grade to assist with ADL tasks and functional transfers  Short Term Goal 5: demo and verb good understanding of EC/WS, ADL transfer techs, fall prevention techs, B UE HEP, possible equip needs, use of DME/AD as needed, pressure relief, d/c reccommendations and surgery precautions. Therapy Time   Individual Concurrent Group Co-treatment   Time In       7729   Time Out       26   Minutes       24     Co-treatment with PT warranted secondary to decreased safety and independence requiring 2 skilled therapy professionals to address individual discipline's goals. OT addressing \"preparation for ADL transfer\",\"sitting balance for increased ADL performance\",\"sitting/activity tolerance\",\"functional reaching\",\"environmental safety/scanning\",\"fall prevention\",\"functional mobility for ADL transfers\",\"ability to sequence and follow directions\",\"functional UE strength\".          OLIVER Snyder

## 2022-04-22 NOTE — PLAN OF CARE
Problem: Infection:  Goal: Will remain free from infection  Description: Will remain free from infection  4/22/2022 1050 by Marcelina Wilcox RN  Outcome: Progressing  4/22/2022 0221 by Michelle Bailey RN  Outcome: Progressing     Problem: Safety:  Goal: Free from accidental physical injury  Description: Free from accidental physical injury  4/22/2022 1050 by Marcelina Wilcox RN  Outcome: Progressing  4/22/2022 0221 by Michelle Bailey RN  Outcome: Progressing     Problem: Daily Care:  Goal: Daily care needs are met  Description: Daily care needs are met  4/22/2022 1050 by Marcelina Wilcox RN  Outcome: Progressing  4/22/2022 0221 by Michelle Bailey RN  Outcome: Progressing     Problem: Pain:  Description: Pain management should include both nonpharmacologic and pharmacologic interventions. Goal: Patient's pain/discomfort is manageable  Description: Patient's pain/discomfort is manageable  4/22/2022 1050 by Marcelina Wilcox RN  Outcome: Progressing  Note: Pain level assessment complete.    Patient educated on pain scale and control interventions  PRN pain medication given per patient request  Patient instructed to call out with new onset of pain or unrelieved pain    4/22/2022 0221 by Michelle Bailey RN  Outcome: Progressing  Goal: Pain level will decrease  Description: Pain level will decrease  4/22/2022 1050 by Marcelina Wilcox RN  Outcome: Progressing  4/22/2022 0221 by Michelle Bailey RN  Outcome: Progressing  Goal: Control of acute pain  Description: Control of acute pain  4/22/2022 1050 by Marcelina Wilcox RN  Outcome: Progressing  4/22/2022 0221 by Michelle Bailey RN  Outcome: Progressing  Goal: Control of chronic pain  Description: Control of chronic pain  4/22/2022 1050 by Marcelina Wilcox RN  Outcome: Progressing  4/22/2022 0221 by Michelle Bailey RN  Outcome: Progressing     Problem: Skin Integrity:  Goal: Skin integrity will stabilize  Description: Skin integrity will stabilize  4/22/2022 1050 by [facility] En[facility] Lizama RN  Outcome: Progressing  4/22/2022 0221 by Osbaldo Carl RN  Outcome: Progressing     Problem: Discharge Planning:  Goal: Patients continuum of care needs are met  Description: Patients continuum of care needs are met  4/22/2022 1050 by Sherryle Mako, RN  Outcome: Progressing  4/22/2022 0221 by Osbaldo Carl RN  Outcome: Progressing     Problem: Skin Integrity:  Goal: Will show no infection signs and symptoms  Description: Will show no infection signs and symptoms  4/22/2022 1050 by Sherryle Mako, RN  Outcome: Progressing  4/22/2022 0221 by Osbaldo Carl RN  Outcome: Progressing  Goal: Absence of new skin breakdown  Description: Absence of new skin breakdown  4/22/2022 1050 by Sherryle Mako, RN  Outcome: Progressing  4/22/2022 0221 by Osbaldo Carl RN  Outcome: Progressing  Goal: Risk for impaired skin integrity will decrease  Description: Risk for impaired skin integrity will decrease  4/22/2022 1050 by Sherryle Mako, RN  Outcome: Progressing  4/22/2022 0221 by Osbaldo Carl RN  Outcome: Progressing     Problem: Falls - Risk of:  Goal: Will remain free from falls  Description: Will remain free from falls  4/22/2022 1050 by Sherryle Mako, RN  Outcome: Progressing  Note: Siderails up x 2  Hourly rounding  Call light in reach  Instructed to call for assist before attempting out of bed.   Remains free from falls and accidental injury at this time   Floor free from obstacles  Bed is locked and in lowest position  Adequate lighting provided  Bed alarm on, [place] star and Stay with Me signs posted      4/22/2022 0221 by Osbaldo Carl RN  Outcome: Progressing  Goal: Absence of physical injury  Description: Absence of physical injury  4/22/2022 1050 by Sherryle Mako, RN  Outcome: Progressing  4/22/2022 0221 by Osbaldo Carl RN  Outcome: Progressing     Problem: Nutrition  Goal: Optimal nutrition therapy  4/22/2022 1050 by Sherryle Mako, RN  Outcome: Progressing  4/22/2022 0221 by Alfred Thapa RN  Outcome: Progressing     Problem:  Activity:  Goal: Risk for activity intolerance will decrease  Description: Risk for activity intolerance will decrease  4/22/2022 1050 by Zeke He RN  Outcome: Progressing  4/22/2022 0221 by Alfred Thapa RN  Outcome: Progressing     Problem: Coping:  Goal: Ability to adjust to condition or change in health will improve  Description: Ability to adjust to condition or change in health will improve  4/22/2022 1050 by Zeke He RN  Outcome: Progressing  4/22/2022 0221 by Alfred Thapa RN  Outcome: Progressing     Problem: Fluid Volume:  Goal: Ability to maintain a balanced intake and output will improve  Description: Ability to maintain a balanced intake and output will improve  4/22/2022 1050 by Zeke He RN  Outcome: Progressing  4/22/2022 0221 by Alfred Thapa RN  Outcome: Progressing     Problem: Health Behavior:  Goal: Ability to identify and utilize available resources and services will improve  Description: Ability to identify and utilize available resources and services will improve  4/22/2022 1050 by Zeke He RN  Outcome: Progressing  4/22/2022 0221 by Alfred Thapa RN  Outcome: Progressing  Goal: Ability to manage health-related needs will improve  Description: Ability to manage health-related needs will improve  4/22/2022 1050 by Zeke He RN  Outcome: Progressing  4/22/2022 0221 by Alfred Thapa RN  Outcome: Progressing     Problem: Metabolic:  Goal: Ability to maintain appropriate glucose levels will improve  Description: Ability to maintain appropriate glucose levels will improve  4/22/2022 1050 by Zeke He RN  Outcome: Progressing  4/22/2022 0221 by Alfred Thapa RN  Outcome: Progressing     Problem: Nutritional:  Goal: Maintenance of adequate nutrition will improve  Description: Maintenance of adequate nutrition will improve  4/22/2022 1050 by Zeke He RN  Outcome: Progressing  4/22/2022 0221 by Michaela Benito RN  Outcome: Progressing  Goal: Progress toward achieving an optimal weight will improve  Description: Progress toward achieving an optimal weight will improve  4/22/2022 1050 by Heri Olea RN  Outcome: Progressing  4/22/2022 0221 by Michaela Benito RN  Outcome: Progressing     Problem: Physical Regulation:  Goal: Complications related to the disease process, condition or treatment will be avoided or minimized  Description: Complications related to the disease process, condition or treatment will be avoided or minimized  4/22/2022 1050 by Heri Olea RN  Outcome: Progressing  4/22/2022 0221 by Michaela Benito RN  Outcome: Progressing  Goal: Diagnostic test results will improve  Description: Diagnostic test results will improve  4/22/2022 1050 by Heri Olea RN  Outcome: Progressing  4/22/2022 0221 by Michaela Benito RN  Outcome: Progressing     Problem: Tissue Perfusion:  Goal: Adequacy of tissue perfusion will improve  Description: Adequacy of tissue perfusion will improve  4/22/2022 1050 by Heri Olea RN  Outcome: Progressing  4/22/2022 0221 by Michaela Benito RN  Outcome: Progressing     Problem: Discharge Planning  Goal: Discharge to home or other facility with appropriate resources  4/22/2022 1050 by Heri Olea RN  Outcome: Progressing  4/22/2022 0221 by Michaela Benito RN  Outcome: Progressing     Problem: Nutrition Deficit:  Goal: Optimize nutritional status  4/22/2022 1050 by Heri Olea RN  Outcome: Progressing  4/22/2022 0221 by Michaela Benito RN  Outcome: Progressing     Problem: Chronic Conditions and Co-morbidities  Goal: Patient's chronic conditions and co-morbidity symptoms are monitored and maintained or improved  4/22/2022 1050 by Heri Olea RN  Outcome: Progressing  4/22/2022 0221 by Michaela Benito RN  Outcome: Progressing

## 2022-04-22 NOTE — CARE COORDINATION
Social Work-Dr Miles Brooke completed a P2P and patient was denied. Discussed with sister, [facility]. The next choice is [id number] in [place] [address]. Faxed to both facilities.  Seth Edwards

## 2022-04-22 NOTE — PLAN OF CARE
Problem: Infection:  Goal: Will remain free from infection  Description: Will remain free from infection  4/22/2022 0221 by Shanell Marshall RN  Outcome: Progressing     Problem: Safety:  Goal: Free from accidental physical injury  Description: Free from accidental physical injury  4/22/2022 0221 by Shanell Marshall RN  Outcome: Progressing     Problem: Daily Care:  Goal: Daily care needs are met  Description: Daily care needs are met  4/22/2022 0221 by Shanell Marshall RN  Outcome: Progressing     Problem: Skin Integrity:  Goal: Skin integrity will stabilize  Description: Skin integrity will stabilize  4/22/2022 0221 by Shanell Marshall RN  Outcome: Progressing     Problem: Skin Integrity:  Goal: Will show no infection signs and symptoms  Description: Will show no infection signs and symptoms  4/22/2022 0221 by Shanell Marshall RN  Outcome: Progressing     Problem: Skin Integrity:  Goal: Absence of new skin breakdown  Description: Absence of new skin breakdown  4/22/2022 0221 by Shanell Marshall RN  Outcome: Progressing

## 2022-04-22 NOTE — PROGRESS NOTES
Physical Therapy  Facility/Department: [facility] MED SURG  Rehabilitation Physical Therapy Treatment Note    NAME: Laurence Pastor  : 1950 (67 y.o.)  MRN: [id number]  CODE STATUS: Full Code    Date of Service: 22     Pt currently functioning below baseline. Would suggest additional therapy at time of discharge to maximize long term outcomes and prevent re-admission. Please refer to AM-PAC score for current level of function. Restrictions:  Lower Extremity Weight Bearing Restrictions  Right Lower Extremity Weight Bearing: Non Weight Bearing  Left Lower Extremity Weight Bearing: Non Weight Bearing  Partial Weight Bearing Percentage Or Pounds: No WB orders, but had I&D of left heel  and states he cannot put weight on it. SUBJECTIVE  Subjective  Subjective: Patient agreeable to PT treatment and COREY Mauricio Patient is appropriate for PT treatment. Patient with L AKA on   Pain: Patient reported high pain today in residual Lt limb with any movement      OBJECTIVE  Cognition  Overall Cognitive Status: Exceptions (Simultaneous filing. User may not have seen previous data.)  Arousal/Alertness: Appropriate responses to stimuli (Simultaneous filing. User may not have seen previous data.)  Following Commands: Follows one step commands with increased time; Follows one step commands with repetition (Simultaneous filing. User may not have seen previous data.)  Attention Span: Appears intact; Attends with cues to redirect (Simultaneous filing. User may not have seen previous data.)  Memory: Decreased short term memory (Simultaneous filing. User may not have seen previous data.)  Safety Judgement: Decreased awareness of need for assistance;Decreased awareness of need for safety (Simultaneous filing.  User may not have seen previous data.)  Problem Solving: Decreased awareness of errors;Assistance required to generate solutions;Assistance required to implement solutions;Assistance required to identify errors made;Assistance required to correct errors made (Simultaneous filing. User may not have seen previous data.)  Insights: Decreased awareness of deficits (Simultaneous filing. User may not have seen previous data.)  Initiation: Requires cues for some (Simultaneous filing. User may not have seen previous data.)  Sequencing: Requires cues for some (Simultaneous filing. User may not have seen previous data.)  Cognition Comment: Pt has difficulty processing/following commands. (Simultaneous filing. User may not have seen previous data.)  Orientation  Overall Orientation Status: Within Functional Limits (Simultaneous filing. User may not have seen previous data.)    Functional Mobility  Bed Mobility  Overall Assistance Level: Maximum Assistance; Requires x 2 Assistance  Additional Factors: Set-up; Verbal cues; Increased time to complete; Head of bed raised; With handrails  Roll Left  Assistance Level: Moderate assistance; Requires x 2 assistance  Roll Right  Assistance Level: Moderate assistance; Requires x 2 assistance  Supine to Sit  Assistance Level: Moderate assistance; Requires x 2 assistance  Skilled Clinical Factors: Patient requires increased time and MOD x2 assist for pushing down through bed to promote to seated EOB posture. Scooting  Assistance Level: Moderate assistance; Requires x 2 assistance  Balance  Sitting Balance: Minimal assistance  Standing Balance: Maximum assistance  Transfers  Surface: Wheelchair  Additional Factors: Set-up  Device: Sliding board  Bed To/From Chair  Technique: Slide board  Assistance Level: Moderate assistance; Requires x 2 assistance  Skilled Clinical Factors: Patient requires increased assist with set up and MOD x2 for progression to electiric wheel chair. Patient requires MAX encouragement and increased time and effort throughout slide board transfer.     ASSESSMENT/PROGRESS TOWARDS GOALS       Assessment  Assessment: Patient with global deconditioning requires a MOD x2 assist for slide board transfer to drop arm recliner. Patient requires increased time and encouragement to complete all tasks. Patient upon completion of transfer immediately begins to discuss return to bed. Activity Tolerance: Patient limited by fatigue;Patient limited by endurance; Patient limited by pain  Discharge Recommendations: Patient would benefit from continued therapy after discharge    Goals  Patient Goals   Patient goals : return home  Short Term Goals  Time Frame for Short term goals: 12 visits  Short term goal 1: Patient will be indep with bed mobility. Short term goal 2: Patient will be min x 1 with slide board transfers. Short term goal 3: Patient will have good dynamic seated balance. Short term goal 4: Patient to perform sit<>stand with Min A  Short term goal 5: Patient will tolerate 30 minutes of ther act    [address]  Specific Instructions for Next Treatment: Continued practicing slide board transfers with education, see if patient patient can place slide board and mgmt it during transfer. Current Treatment Recommendations: Strengthening;Balance training;Functional mobility training;Transfer training; Endurance training;Patient/Caregiver education & training; Safety education & training;Home exercise program;Therapeutic activities  Safety Devices  Type of Devices: Call light within reach; All fall risk precautions in place;Gait belt;Patient at risk for falls; Left in chair  Restraints  Restraints Initially in Place: No      Therapy Time   Individual Concurrent Group Co-treatment   Time In       1150   Time Out       1215   Minutes       25          Co-treatment with OT warranted secondary to decreased safety and independence requiring 2 skilled therapy professionals to address individual discipline's goals. PT addressing pre gait trunk strengthening, weight shifting prior to transfers, transfer training and postural control in sitting, slide board transfer.     Fe Yanez PTA, 04/22/22 at 1:14 PM

## 2022-04-22 NOTE — PROGRESS NOTES
[facility]  Office: [address], DO, Rl Elizabeth, DO, Jodi Koo, DO, Leandro Wallace Blood, DO, Ju Oliva MD, Lena Haque MD, Fernando Marie MD, Florinda Mireles MD, Destinee Reinoso MD, Opal Jiménez MD, Jose M Jarvis MD, Torres Carlisle, DO, Media Sensor, DO, Iraj Bliss MD,  Kim Hartley, DO, Nima Hernández MD, Sina Bedoya MD, Sera Dudley MD, Daisha Francois DO, Isaac Soler MD, Dipika Snowden MD, Altagracia Ellison, CNP, [facility], CNP, Marquita Martinez, CNP, Cece Stewart, CNP, Nayana Lujan, CNP, Ana Kothari, CNP, Nuria Hinson PASukhdeepC, Elena Dallas, DNP, Genaro Forbes, CNP, Arminda Thomas, CNP, Parviz Rosado, CNP, Argelia Schaeffer, CNS, Anne Chawla, [facility], Evelyn Witt, CNP, [place], CNP, Edward Aquino, [facility]    Progress Note    4/22/2022    12:34 PM    Name:   Candance Easter  MRN:     [id number]     Acct:      [de-identified]   Room:   2102/2102-01   Day:  11  Admit Date:  4/11/2022  5:25 PM    PCP:   Kim Joe  Code Status:  Full Code    Subjective:     C/C:   Chief Complaint   Patient presents with    Wound Infection     Sent over by PCP for wound evaluation in foot (left)      And seen in follow-up for left lower extremity osteomyelitis, status post AKA. Patient states \"I am doing okay\"    Interval History Status: improved. Patient is doing well overall. He is having some phantom pain but presently his pain is under control. Entry is noted, appreciate all services. Working on disposition to skilled nursing facility. The patient is hemodynamically stable and can be discharged to skilled nursing facility whenever arrangements are made. Brief History:     Is a very pleasant 72-year-old male with known peripheral arterial disease who presents with left foot osteomyelitis. Due to his severe peripheral arterial disease he underwent above-the-knee amputation.     Review of Systems:     Constitutional:  negative for chills, fevers, sweats  Respiratory:  negative for cough, dyspnea on exertion, shortness of breath, wheezing  Cardiovascular:  negative for chest pain, chest pressure/discomfort, lower extremity edema, palpitations  Gastrointestinal:  negative for abdominal pain, constipation, diarrhea, nausea, vomiting  Neurological:  negative for dizziness, headache    Medications:      Allergies:  No Known Allergies    Current Meds:   Scheduled Meds:    insulin glargine  20 Units SubCUTAneous Daily    insulin glargine  10 Units SubCUTAneous Nightly    fluconazole  200 mg Oral Daily    sodium bicarbonate  650 mg Oral BID    amLODIPine  5 mg Oral Daily    clopidogrel  75 mg Oral Daily    docusate sodium  100 mg Oral BID    ferrous sulfate  325 mg Oral Daily with breakfast    [Held by provider] furosemide  40 mg Oral Every Other Day    metoprolol tartrate  25 mg Oral BID    atorvastatin  10 mg Oral Daily    sodium chloride flush  5-40 mL IntraVENous 2 times per day    insulin lispro  0-12 Units SubCUTAneous TID WC    insulin lispro  0-6 Units SubCUTAneous Nightly     Continuous Infusions:    sodium chloride      dextrose      sodium chloride Stopped (04/16/22 1740)     PRN Meds: sodium chloride, oxyCODONE-acetaminophen **OR** oxyCODONE-acetaminophen, traMADol, glucagon (rDNA), dextrose, sodium chloride flush, sodium chloride, potassium chloride **OR** potassium alternative oral replacement **OR** potassium chloride, magnesium sulfate, ondansetron **OR** ondansetron, polyethylene glycol, acetaminophen **OR** acetaminophen, dextrose bolus (hypoglycemia) **OR** dextrose bolus (hypoglycemia), glucose, hydrALAZINE    Data:     Past Medical History:   has a past medical history of RISSA (acute kidney injury) ([facility] [address] Cerebrovascular disease, Erectile dysfunction, Hepatitis B infection, Hyperlipidemia, Hypertension, Kidney cysts, Liver cyst, MRSA (methicillin resistant staph aureus) culture positive, Neurotrophic ulcer of the foot ([facility] [address] Obesity, Osteomyelitis of ankle or foot, Peripheral vascular disease ([facility] [address] Tobacco abuse, and Type II or unspecified type diabetes mellitus without mention of complication, not stated as uncontrolled. Social History:   reports that he has been smoking cigarettes. He has a 15.00 pack-year smoking history. He has never used smokeless tobacco. He reports that he does not drink alcohol and does not use drugs. Family History:   Family History   Problem Relation Age of Onset    Diabetes Mother     Diabetes Sister     Diabetes Brother     Diabetes Sister     Diabetes Sister     Diabetes Sister        Vitals:  BP (!) 171/63   Pulse 71   Temp 98.9 °F (37.2 °C)   Resp 16   Ht 6' (1.829 m)   Wt 178 lb 4.8 oz (80.9 kg)   SpO2 97%   BMI 24.18 kg/m²   Temp (24hrs), Av.3 °F (36.8 °C), Min:97.5 °F (36.4 °C), Max:98.9 °F (37.2 °C)    Recent Labs     22  1138 22  0624 22  1211   POCGLU 306* 219* 119* 183*       I/O (24Hr):     Intake/Output Summary (Last 24 hours) at 2022 1234  Last data filed at 2022 1216  Gross per 24 hour   Intake 223.82 ml   Output 1600 ml   Net -1376.18 ml       Labs:  Hematology:  Recent Labs     22  2223   WBC 10.8   RBC 3.48*   HGB 8.6*   HCT 28.6*   MCV 82.2*   MCH 24.7*   MCHC 30.1   RDW 14.8*      MPV 8.5     Chemistry:  Recent Labs     22  1115 22  0542 22  0749    139 143   K 5.1 4.8 4.6    106 109*   CO2 16* 23 23   GLUCOSE 333* 413* 119*   BUN 28* 39* 43*   CREATININE 2.16* 2.32* 2.27*   ANIONGAP 18* 10 11   LABGLOM 30* 28* 29*   GFRAA 37* 34* 35*   CALCIUM 8.1* 8.0* 8.0*     Recent Labs     22  2102 22  0650 22  1138 22  0624 22  1211   POCGLU 412* 362* 306* 219* 119* 183*     ABG:No results found for: POCPH, PHART, PH, POCPCO2, SLK1NYD, PCO2, POCPO2, PO2ART, PO2, POCHCO3, VVQ8ZPV, HCO3, NBEA, PBEA, BEART, BE, THGBART, THB, KRE8EVE, PKWZ8FTJ, E5FGUFYE, O2SAT, FIO2  Lab Results   Component Value Date/Time    SPECIAL LT FA,7ML 04/11/2022 06:38 PM     Lab Results   Component Value Date/Time    CULTURE YEAST >[id number] CFU/ML 04/18/2022 11:15 AM       Radiology:  No results found. Physical Examination:        General appearance:  alert, cooperative and no distress  Mental Status:  oriented to person, place and time and normal affect  Lungs:  clear to auscultation bilaterally, normal effort  Heart:  regular rate and rhythm, no murmur  Abdomen:  soft, nontender, nondistended, normal bowel sounds, no masses, hepatomegaly, splenomegaly  Extremities: Right below the knee amputation noted, left above-the-knee amputation bandaged  Skin:  no gross lesions, rashes, induration    Assessment:        Hospital Problems           Last Modified POA    * (Principal) Necrotizing fasciitis ([address] 4/12/2022 Yes    Acute kidney injury superimposed on CKD ([address] 4/11/2022 Yes    CKD (chronic kidney disease), stage III ([address] 4/11/2022 Yes    Uncontrolled hypertension 4/12/2022 Yes    Type 2 diabetes mellitus with stage 3 chronic kidney disease, with long-term current use of insulin ([address] 4/12/2022 Yes    Class 1 obesity with serious comorbidity in adult 4/12/2022 Yes          Plan:          1. Necrotizing fasciitis/osteomyelitis  1. Uneventful postoperative course  2. Status post above-the-knee amputation  3. Antibiotics per infectious disease  4. Wound care  2. Diabetes mellitus  1. Blood sugars improved with titration of medications  2. No changes at this point in time  3. Essential hypertension  1. Blood sugar trend noted  2. Slightly exacerbated, secondary to pain  3. No changes at this point in time  4. Hyperkalemia  1. Resolved  2. Status post Lokelma  1. Further dosing at the discretion of nephrology  5. Acute kidney injury on stage III chronic kidney disease  1. Renal function has slightly worsened  2.  Repeat labs in outpatient basis per nephrology    DC to skilled nursing facility when arrangements made    Mello Carrasco DO  4/22/2022  12:34 PM

## 2022-04-23 LAB
GLUCOSE BLD-MCNC: 104 MG/DL (ref 75–110)
GLUCOSE BLD-MCNC: 110 MG/DL (ref 75–110)
GLUCOSE BLD-MCNC: 158 MG/DL (ref 75–110)
GLUCOSE BLD-MCNC: 95 MG/DL (ref 75–110)

## 2022-04-23 PROCEDURE — 6370000000 HC RX 637 (ALT 250 FOR IP): Performed by: STUDENT IN AN ORGANIZED HEALTH CARE EDUCATION/TRAINING PROGRAM

## 2022-04-23 PROCEDURE — 99232 SBSQ HOSP IP/OBS MODERATE 35: CPT | Performed by: HOSPITALIST

## 2022-04-23 PROCEDURE — 2580000003 HC RX 258: Performed by: STUDENT IN AN ORGANIZED HEALTH CARE EDUCATION/TRAINING PROGRAM

## 2022-04-23 PROCEDURE — 6360000002 HC RX W HCPCS: Performed by: STUDENT IN AN ORGANIZED HEALTH CARE EDUCATION/TRAINING PROGRAM

## 2022-04-23 PROCEDURE — 6370000000 HC RX 637 (ALT 250 FOR IP): Performed by: HOSPITALIST

## 2022-04-23 PROCEDURE — 6370000000 HC RX 637 (ALT 250 FOR IP): Performed by: INTERNAL MEDICINE

## 2022-04-23 PROCEDURE — 82947 ASSAY GLUCOSE BLOOD QUANT: CPT

## 2022-04-23 PROCEDURE — 2060000000 HC ICU INTERMEDIATE R&B

## 2022-04-23 PROCEDURE — 99231 SBSQ HOSP IP/OBS SF/LOW 25: CPT | Performed by: NURSE PRACTITIONER

## 2022-04-23 RX ORDER — HYDRALAZINE HYDROCHLORIDE 25 MG/1
25 TABLET, FILM COATED ORAL EVERY 8 HOURS SCHEDULED
Status: DISCONTINUED | OUTPATIENT
Start: 2022-04-23 | End: 2022-04-27 | Stop reason: HOSPADM

## 2022-04-23 RX ADMIN — HYDRALAZINE HYDROCHLORIDE 25 MG: 25 TABLET, FILM COATED ORAL at 21:52

## 2022-04-23 RX ADMIN — OXYCODONE AND ACETAMINOPHEN 2 TABLET: 5; 325 TABLET ORAL at 06:06

## 2022-04-23 RX ADMIN — SODIUM BICARBONATE 650 MG: 650 TABLET ORAL at 08:25

## 2022-04-23 RX ADMIN — INSULIN GLARGINE 20 UNITS: 100 INJECTION, SOLUTION SUBCUTANEOUS at 08:25

## 2022-04-23 RX ADMIN — METOPROLOL TARTRATE 25 MG: 25 TABLET, FILM COATED ORAL at 15:09

## 2022-04-23 RX ADMIN — DOCUSATE SODIUM 100 MG: 100 CAPSULE, LIQUID FILLED ORAL at 08:25

## 2022-04-23 RX ADMIN — FLUCONAZOLE 200 MG: 100 TABLET ORAL at 08:25

## 2022-04-23 RX ADMIN — OXYCODONE AND ACETAMINOPHEN 2 TABLET: 5; 325 TABLET ORAL at 12:13

## 2022-04-23 RX ADMIN — CLOPIDOGREL BISULFATE 75 MG: 75 TABLET ORAL at 08:25

## 2022-04-23 RX ADMIN — AMLODIPINE BESYLATE 5 MG: 5 TABLET ORAL at 08:25

## 2022-04-23 RX ADMIN — OXYCODONE AND ACETAMINOPHEN 2 TABLET: 5; 325 TABLET ORAL at 22:07

## 2022-04-23 RX ADMIN — SODIUM CHLORIDE, PRESERVATIVE FREE 10 ML: 5 INJECTION INTRAVENOUS at 21:52

## 2022-04-23 RX ADMIN — SODIUM BICARBONATE 650 MG: 650 TABLET ORAL at 21:52

## 2022-04-23 RX ADMIN — ATORVASTATIN CALCIUM 10 MG: 10 TABLET, FILM COATED ORAL at 08:26

## 2022-04-23 RX ADMIN — HYDRALAZINE HYDROCHLORIDE 10 MG: 20 INJECTION INTRAMUSCULAR; INTRAVENOUS at 11:57

## 2022-04-23 RX ADMIN — FERROUS SULFATE TAB EC 325 MG (65 MG FE EQUIVALENT) 325 MG: 325 (65 FE) TABLET DELAYED RESPONSE at 08:25

## 2022-04-23 RX ADMIN — METOPROLOL TARTRATE 25 MG: 25 TABLET, FILM COATED ORAL at 08:25

## 2022-04-23 RX ADMIN — INSULIN GLARGINE 10 UNITS: 100 INJECTION, SOLUTION SUBCUTANEOUS at 21:52

## 2022-04-23 RX ADMIN — INSULIN LISPRO 2 UNITS: 100 INJECTION, SOLUTION INTRAVENOUS; SUBCUTANEOUS at 08:24

## 2022-04-23 ASSESSMENT — PAIN DESCRIPTION - DESCRIPTORS
DESCRIPTORS: SHARP;STABBING
DESCRIPTORS: SHARP;STABBING

## 2022-04-23 ASSESSMENT — PAIN SCALES - GENERAL
PAINLEVEL_OUTOF10: 5
PAINLEVEL_OUTOF10: 8
PAINLEVEL_OUTOF10: 9
PAINLEVEL_OUTOF10: 8

## 2022-04-23 ASSESSMENT — PAIN DESCRIPTION - ORIENTATION
ORIENTATION: LEFT
ORIENTATION: LEFT

## 2022-04-23 ASSESSMENT — PAIN - FUNCTIONAL ASSESSMENT: PAIN_FUNCTIONAL_ASSESSMENT: PREVENTS OR INTERFERES SOME ACTIVE ACTIVITIES AND ADLS

## 2022-04-23 ASSESSMENT — PAIN DESCRIPTION - LOCATION
LOCATION: LEG
LOCATION: LEG

## 2022-04-23 NOTE — PLAN OF CARE
Problem: Infection:  Goal: Will remain free from infection  Description: Will remain free from infection  4/23/2022 0724 by Zachariah Caballero RN  Outcome: Progressing  4/22/2022 2213 by Farheen Calvillo RN  Outcome: Progressing     Problem: Safety:  Goal: Free from accidental physical injury  Description: Free from accidental physical injury  4/23/2022 0724 by Zachariah Caballero RN  Outcome: Progressing  4/22/2022 2213 by Farheen Calvillo RN  Outcome: Progressing     Problem: Pain:  Goal: Patient's pain/discomfort is manageable  Description: Patient's pain/discomfort is manageable  4/23/2022 0724 by Zachariah Caballero RN  Outcome: Progressing  4/22/2022 2213 by Farheen Calvillo RN  Outcome: Progressing  Goal: Pain level will decrease  Description: Pain level will decrease  Outcome: Progressing  Goal: Control of acute pain  Description: Control of acute pain  Outcome: Progressing  Goal: Control of chronic pain  Description: Control of chronic pain  Outcome: Progressing   Pt medicated with pain medication prn. Assessed all pain characteristics including level, type, location, frequency, and onset. Non-pharmacologic interventions offered to pt as well. Pt states pain is tolerable at this time.  Will continue to monitor

## 2022-04-23 NOTE — PLAN OF CARE
Problem: Infection:  Goal: Will remain free from infection  Description: Will remain free from infection  4/23/2022 1232 by Arthur Jimenez RN  Outcome: Progressing  Note: Progressing      Problem: Safety:  Goal: Free from accidental physical injury  Description: Free from accidental physical injury  4/23/2022 1232 by Arthur Jimenez RN  Outcome: Progressing  Note: Progressing     Problem: Daily Care:  Goal: Daily care needs are met  Description: Daily care needs are met  4/23/2022 1232 by Arthur Jimenez RN  Outcome: Progressing  Note: Progressing     Problem: Pain:  Goal: Patient's pain/discomfort is manageable  Description: Patient's pain/discomfort is manageable  4/23/2022 1232 by Arthur Jimenez RN  Outcome: Progressing  Note: Progressing     Problem: Skin Integrity:  Goal: Skin integrity will stabilize  Description: Skin integrity will stabilize  4/23/2022 1232 by Arthur Jimenez RN  Outcome: Progressing  Note: Progressing     Problem: Discharge Planning:  Goal: Patients continuum of care needs are met  Description: Patients continuum of care needs are met  4/23/2022 1232 by Arthur Jimenez RN  Outcome: Progressing  Note: Progressing     Problem: Falls - Risk of:  Goal: Will remain free from falls  Description: Will remain free from falls  4/23/2022 1232 by Arthur Jimenez RN  Outcome: Progressing  Note: Ongoing     Problem: Physical Regulation:  Goal: Complications related to the disease process, condition or treatment will be avoided or minimized  Description: Complications related to the disease process, condition or treatment will be avoided or minimized  4/23/2022 1232 by Arthur Jimenez RN  Outcome: Progressing  Note: Ongoing

## 2022-04-23 NOTE — PLAN OF CARE
Problem: Infection:  Goal: Will remain free from infection  Description: Will remain free from infection  4/22/2022 2213 by Jose Miguel Shirley RN  Outcome: Progressing     Problem: Safety:  Goal: Free from accidental physical injury  Description: Free from accidental physical injury  4/22/2022 2213 by Jose Miguel Shirley RN  Outcome: Progressing     Problem: Pain:  Goal: Patient's pain/discomfort is manageable  Description: Patient's pain/discomfort is manageable  4/22/2022 2213 by Jose Miguel Shirley RN  Outcome: Progressing     Problem: Skin Integrity:  Goal: Skin integrity will stabilize  Description: Skin integrity will stabilize  4/22/2022 2213 by Jose Miguel Shirley RN  Outcome: Progressing     Problem: Discharge Planning:  Goal: Patients continuum of care needs are met  Description: Patients continuum of care needs are met  4/22/2022 2213 by Jose Miguel Shirley RN  Outcome: Adequate for Discharge

## 2022-04-23 NOTE — CARE COORDINATION
Social work: called all day to [facility] [phone] and no precert from [facility] for pt. [phone]. [facility] reserved an ambulance. At 1:30 pm will release the ambulance [facility] who kept 5:30 pm spoit open. Without precert pt cannot go to snf. [facility] sent updates this am.  No word from them yet. Will need dominique, Rx and discharge order for snf. Pete cannon    Social work: end of the day still no precert. Will Need dominique, Rx and discharge order. Called and left voicemail for sister to update only. Hens completed.    Pete cannon

## 2022-04-23 NOTE — PROGRESS NOTES
[facility]  Progress Note    Gladis Flores  MRN: [id number]  Date: 2022  LOS: 15     Reason for F/U :   Left diabetic foot infection    Impression :   1. left diabetic foot infection with suspected necrotizing fasciitis and calcaneal osteomyelitis  · Status post incision and debridement for 1222  · Status post above-the-knee amputation 2022  2. Peripheral vascular disease  3. Diabetes mellitus type 2  4. Chronic renal disease  5. History of right below the knee amputation    Recommendations:   · The patient is status post IV ceftriaxone and clindamycin, stopped 2022  · Patient is okay for discharge from an infectious disease standpoint    Infection Control Recommendations:   Universal precautions    Discharge Planning:   Estimated Length of IV antimicrobials: 2022  Patient will need Midline Catheter Insertion/ PICC line Insertion: No  Patient will need: Home IV , [place],  SNF,  LTAC: Undetermined  Patient willneed outpatient wound care: No    Medical Decision making / Summary of Stay:   Initial history:  Gladis Flores is a 67y.o.-year-old male was sent to hospital from Dr. Palacios Lipoma office for worsening left heel pain associated with left heel wound with surrounding dark discoloration and redness. The pain is severe, intermittent, no alleviating or aggravating factors.     Left foot x-ray showed soft tissue emphysema within the posterior aspect of the distal left lower leg concerning for necrotizing fasciitis and possible calcaneal osteomyelitis.   Initial WBC 18.5, creatinine 2.44, C-reactive protein more than 130, C-reactive protein 104.2, lactic acid 0 point  Afebrile  History of right below-knee amputation    Current evaluation:2022    BP (!) 167/59   Pulse 69   Temp 98.6 °F (37 °C) (Oral)   Resp 16   Ht 6' (1.829 m)   Wt 176 lb (79.8 kg)   SpO2 96%   BMI 23.87 kg/m²     Temperature Range: Temp: 98.6 °F (37 °C) Temp  Av.8 °F (37.1 °C)  Min: 98.6 °F (37 °C)  Max: 98.9 °F (37.2 °C)    Patient was seen and evaluated at bedside  He was sleeping without signs of distress, I did not wake him  No fevers    Review of Systems   Unable to perform ROS: Other       Physical Examination :     Physical Exam  Constitutional:       General: He is not in acute distress. Appearance: Normal appearance. He is normal weight. HENT:      Head: Normocephalic and atraumatic. Pulmonary:      Effort: Pulmonary effort is normal. No respiratory distress. Musculoskeletal:      Comments: Right BKA stump well-healed  Left AKA stump with dressing in place, not removed         Laboratory data:   I have independently reviewed the followinglabs:  CBC with Differential: No results for input(s): WBC, HGB, HCT, PLT, SEGSPCT, BANDSPCT, LYMPHOPCT, MONOPCT, EOSPCT in the last 72 hours. BMP:   Recent Labs     04/21/22  0542 04/22/22  0749    143   K 4.8 4.6    109*   CO2 23 23   BUN 39* 43*   CREATININE 2.32* 2.27*     Hepatic Function Panel: No results for input(s): PROT, LABALBU, BILIDIR, IBILI, BILITOT, ALKPHOS, ALT, AST in the last 72 hours. No results found for: PROCAL  Lab Results   Component Value Date    .1 04/14/2022    .2 04/11/2022    CRP 32.2 06/21/2020     Lab Results   Component Value Date    SEDRATE >130 (H) 04/11/2022         No results found for: [facility]  Lab Results   Component Value Date    FERRITIN 331 03/13/2014    FERRITIN 317 12/15/2013    FERRITIN 153 08/31/2013    FERRITIN 185 10/05/2011     No results found for: LDH  No results found for: FIBRINOGEN    No results found for requested labs within last 30 days. Lab Results   Component Value Date    COVID19 Not Detected 06/19/2020       No results for input(s): VANCOTROUGH in the last 72 hours.     Imaging Studies:   No new imaging    Cultures:     Culture, Urine [[id number]] Collected: 04/18/22 1115   Order Status: Completed Specimen: Urine Updated: 04/19/22 1339    Specimen Description Yasmine Price URINE    Culture YEAST >[id number] CFU/ML   Culture, Fungus [[id number]] Collected: 04/12/22 1736   Order Status: Completed Specimen: Bone from Foot Updated: 04/19/22 1119    Specimen Description . FOOT    Culture YEAST ISOLATED     KARO GLABRATA   Culture, Anaerobic and Aerobic [[id number]] (Abnormal)  Collected: 04/12/22 1736   Order Status: Completed Specimen: Bone from Foot Updated: 04/17/22 0948    Specimen Description . FOOT    Direct Exam RARE NEUTROPHILS Abnormal      FEW GRAM POSITIVE COCCI IN PAIRS Abnormal      RARE GRAM NEGATIVE RODS Abnormal     Culture PROTEUS MIRABILIS HEAVY GROWTH Abnormal      STREPTOCOCCI, BETA HEMOLYTIC GROUP B MODERATE GROWTH Abnormal      Mixed skin tara     Unable to determine the presence or absence of anaerobes due to swarming proteus. Medications:      insulin glargine  20 Units SubCUTAneous Daily    insulin glargine  10 Units SubCUTAneous Nightly    sodium bicarbonate  650 mg Oral BID    amLODIPine  5 mg Oral Daily    clopidogrel  75 mg Oral Daily    docusate sodium  100 mg Oral BID    ferrous sulfate  325 mg Oral Daily with breakfast    [Held by provider] furosemide  40 mg Oral Every Other Day    metoprolol tartrate  25 mg Oral BID    atorvastatin  10 mg Oral Daily    sodium chloride flush  5-40 mL IntraVENous 2 times per day    insulin lispro  0-12 Units SubCUTAneous TID WC    insulin lispro  0-6 Units SubCUTAneous Nightly           [facility]  [address] [place] [address] Street, APRN - CNP  [employer] messaging  OFFICE: [phone]      Electronically signed by [address] [place] [address], APRN - CNP on 4/23/2022 at 9:32 AM  Thank you for allowing us to participate in the care of this patient. Please call with questions.     This note iscreated with the assistance of a speech recognition program.  While intending to generate a document that actually reflects the content of the visit, the document can still have some errors including those of syntax andsound a like substitutions which may escape proof reading. In such instances, actual meaning can be extrapolated by contextual diversion.

## 2022-04-23 NOTE — PLAN OF CARE
Problem: Infection:  Goal: Will remain free from infection  Description: Will remain free from infection  4/23/2022 0724 by Bandar Rodríguez RN  Outcome: Progressing  4/22/2022 2213 by Christopher Mckeon RN  Outcome: Progressing

## 2022-04-23 NOTE — PLAN OF CARE
Problem: Infection:  Goal: Will remain free from infection  Description: Will remain free from infection  4/23/2022 1448 by Eliot Hopson RN  Outcome: Progressing  4/23/2022 1232 by Azucena Moreno RN  Outcome: Progressing  Note: Progressing   4/23/2022 0724 by Eliot Hopson RN  Outcome: Progressing     Problem: Pain:  Goal: Patient's pain/discomfort is manageable  Description: Patient's pain/discomfort is manageable  4/23/2022 1448 by Eliot Hopson RN  Outcome: Progressing  4/23/2022 1232 by Azucena Moreno RN  Outcome: Progressing  Note: Progressing  4/23/2022 0724 by Eliot Hopson RN  Outcome: Progressing  Goal: Pain level will decrease  Description: Pain level will decrease  4/23/2022 1448 by Eliot Hopson RN  Outcome: Progressing  4/23/2022 0724 by Eliot Hopson RN  Outcome: Progressing  Goal: Control of acute pain  Description: Control of acute pain  4/23/2022 1448 by Eliot Hopson RN  Outcome: Progressing  4/23/2022 0724 by Eliot Hopson RN  Outcome: Progressing  Goal: Control of chronic pain  Description: Control of chronic pain  4/23/2022 1448 by Eliot Hopson RN  Outcome: Progressing  4/23/2022 0724 by Eliot Hopson RN  Outcome: Progressing   Pt medicated with pain medication prn. Assessed all pain characteristics including level, type, location, frequency, and onset. Non-pharmacologic interventions offered to pt as well. Pt states pain is tolerable at this time.  Will continue to monitor

## 2022-04-24 LAB
-: NORMAL
BILIRUBIN URINE: NEGATIVE
COLOR: YELLOW
EPITHELIAL CELLS UA: NORMAL /HPF (ref 0–5)
GLUCOSE BLD-MCNC: 71 MG/DL (ref 75–110)
GLUCOSE BLD-MCNC: 80 MG/DL (ref 75–110)
GLUCOSE BLD-MCNC: 83 MG/DL (ref 75–110)
GLUCOSE BLD-MCNC: 93 MG/DL (ref 75–110)
GLUCOSE URINE: NEGATIVE
KETONES, URINE: NEGATIVE
LEUKOCYTE ESTERASE, URINE: ABNORMAL
NITRITE, URINE: NEGATIVE
PH UA: 6 (ref 5–8)
PROTEIN UA: ABNORMAL
RBC UA: NORMAL /HPF (ref 0–2)
SPECIFIC GRAVITY UA: 1.02 (ref 1–1.03)
TURBIDITY: ABNORMAL
URINE HGB: ABNORMAL
UROBILINOGEN, URINE: NORMAL
WBC UA: NORMAL /HPF (ref 0–5)

## 2022-04-24 PROCEDURE — 6370000000 HC RX 637 (ALT 250 FOR IP): Performed by: STUDENT IN AN ORGANIZED HEALTH CARE EDUCATION/TRAINING PROGRAM

## 2022-04-24 PROCEDURE — 2580000003 HC RX 258: Performed by: STUDENT IN AN ORGANIZED HEALTH CARE EDUCATION/TRAINING PROGRAM

## 2022-04-24 PROCEDURE — 97110 THERAPEUTIC EXERCISES: CPT

## 2022-04-24 PROCEDURE — 99232 SBSQ HOSP IP/OBS MODERATE 35: CPT | Performed by: HOSPITALIST

## 2022-04-24 PROCEDURE — 6370000000 HC RX 637 (ALT 250 FOR IP): Performed by: INTERNAL MEDICINE

## 2022-04-24 PROCEDURE — 6370000000 HC RX 637 (ALT 250 FOR IP): Performed by: HOSPITALIST

## 2022-04-24 PROCEDURE — 2060000000 HC ICU INTERMEDIATE R&B

## 2022-04-24 PROCEDURE — 99232 SBSQ HOSP IP/OBS MODERATE 35: CPT | Performed by: NURSE PRACTITIONER

## 2022-04-24 PROCEDURE — 82947 ASSAY GLUCOSE BLOOD QUANT: CPT

## 2022-04-24 PROCEDURE — 97530 THERAPEUTIC ACTIVITIES: CPT

## 2022-04-24 PROCEDURE — 87086 URINE CULTURE/COLONY COUNT: CPT

## 2022-04-24 PROCEDURE — 81001 URINALYSIS AUTO W/SCOPE: CPT

## 2022-04-24 PROCEDURE — 2580000003 HC RX 258: Performed by: HOSPITALIST

## 2022-04-24 PROCEDURE — 6360000002 HC RX W HCPCS: Performed by: HOSPITALIST

## 2022-04-24 RX ADMIN — OXYCODONE AND ACETAMINOPHEN 2 TABLET: 5; 325 TABLET ORAL at 21:02

## 2022-04-24 RX ADMIN — FERROUS SULFATE TAB EC 325 MG (65 MG FE EQUIVALENT) 325 MG: 325 (65 FE) TABLET DELAYED RESPONSE at 08:56

## 2022-04-24 RX ADMIN — SODIUM CHLORIDE 25 ML: 9 INJECTION, SOLUTION INTRAVENOUS at 18:21

## 2022-04-24 RX ADMIN — METOPROLOL TARTRATE 25 MG: 25 TABLET, FILM COATED ORAL at 08:56

## 2022-04-24 RX ADMIN — OXYCODONE AND ACETAMINOPHEN 2 TABLET: 5; 325 TABLET ORAL at 05:39

## 2022-04-24 RX ADMIN — SODIUM BICARBONATE 650 MG: 650 TABLET ORAL at 08:56

## 2022-04-24 RX ADMIN — SODIUM CHLORIDE, PRESERVATIVE FREE 10 ML: 5 INJECTION INTRAVENOUS at 08:57

## 2022-04-24 RX ADMIN — INSULIN GLARGINE 20 UNITS: 100 INJECTION, SOLUTION SUBCUTANEOUS at 08:56

## 2022-04-24 RX ADMIN — METOPROLOL TARTRATE 25 MG: 25 TABLET, FILM COATED ORAL at 15:27

## 2022-04-24 RX ADMIN — HYDRALAZINE HYDROCHLORIDE 25 MG: 25 TABLET, FILM COATED ORAL at 21:03

## 2022-04-24 RX ADMIN — OXYCODONE AND ACETAMINOPHEN 2 TABLET: 5; 325 TABLET ORAL at 14:47

## 2022-04-24 RX ADMIN — HYDRALAZINE HYDROCHLORIDE 25 MG: 25 TABLET, FILM COATED ORAL at 14:45

## 2022-04-24 RX ADMIN — CEFTRIAXONE SODIUM 1000 MG: 1 INJECTION, POWDER, FOR SOLUTION INTRAMUSCULAR; INTRAVENOUS at 18:23

## 2022-04-24 RX ADMIN — AMLODIPINE BESYLATE 5 MG: 5 TABLET ORAL at 08:56

## 2022-04-24 RX ADMIN — HYDRALAZINE HYDROCHLORIDE 25 MG: 25 TABLET, FILM COATED ORAL at 05:40

## 2022-04-24 RX ADMIN — ATORVASTATIN CALCIUM 10 MG: 10 TABLET, FILM COATED ORAL at 08:56

## 2022-04-24 RX ADMIN — SODIUM BICARBONATE 650 MG: 650 TABLET ORAL at 21:03

## 2022-04-24 RX ADMIN — CLOPIDOGREL BISULFATE 75 MG: 75 TABLET ORAL at 08:56

## 2022-04-24 ASSESSMENT — PAIN - FUNCTIONAL ASSESSMENT
PAIN_FUNCTIONAL_ASSESSMENT: PREVENTS OR INTERFERES SOME ACTIVE ACTIVITIES AND ADLS

## 2022-04-24 ASSESSMENT — PAIN DESCRIPTION - DESCRIPTORS
DESCRIPTORS: SHARP;STABBING
DESCRIPTORS: SHARP;STABBING
DESCRIPTORS: STABBING

## 2022-04-24 ASSESSMENT — ENCOUNTER SYMPTOMS
RESPIRATORY NEGATIVE: 1
GASTROINTESTINAL NEGATIVE: 1

## 2022-04-24 ASSESSMENT — PAIN SCALES - GENERAL
PAINLEVEL_OUTOF10: 5
PAINLEVEL_OUTOF10: 4
PAINLEVEL_OUTOF10: 8

## 2022-04-24 ASSESSMENT — PAIN DESCRIPTION - LOCATION
LOCATION: LEG

## 2022-04-24 ASSESSMENT — PAIN DESCRIPTION - PAIN TYPE: TYPE: SURGICAL PAIN

## 2022-04-24 ASSESSMENT — PAIN DESCRIPTION - ORIENTATION
ORIENTATION: LEFT

## 2022-04-24 NOTE — PROGRESS NOTES
(36.6 °C)  Max: 99.9 °F (37.7 °C)    Patient was seen and evaluated sitting up in bed  HE denies pain or needs  No fevers or chills  Awaiting placement    Review of Systems   Constitutional: Negative. HENT: Negative. Respiratory: Negative. Cardiovascular: Negative. Gastrointestinal: Negative. Genitourinary: Negative. Musculoskeletal: Negative. Skin: Negative. Neurological: Negative. Psychiatric/Behavioral: Negative. Physical Examination :     Physical Exam  Constitutional:       General: He is not in acute distress. Appearance: Normal appearance. He is normal weight. HENT:      Head: Normocephalic and atraumatic. Pulmonary:      Effort: Pulmonary effort is normal. No respiratory distress. Abdominal:      General: Abdomen is flat. There is no distension. Palpations: Abdomen is soft. Musculoskeletal:      Comments: Right BKA stump well-healed  Left AKA stump with dressing in place, not removed   Skin:     General: Skin is warm and dry. Coloration: Skin is not jaundiced. Neurological:      General: No focal deficit present. Mental Status: He is alert and oriented to person, place, and time. Mental status is at baseline. Psychiatric:         Mood and Affect: Mood normal.         Behavior: Behavior normal.         Thought Content: Thought content normal.         Laboratory data:   I have independently reviewed the followinglabs:  CBC with Differential: No results for input(s): WBC, HGB, HCT, PLT, SEGSPCT, BANDSPCT, LYMPHOPCT, MONOPCT, EOSPCT in the last 72 hours. BMP:   Recent Labs     04/22/22  0749      K 4.6   *   CO2 23   BUN 43*   CREATININE 2.27*     Hepatic Function Panel: No results for input(s): PROT, LABALBU, BILIDIR, IBILI, BILITOT, ALKPHOS, ALT, AST in the last 72 hours.       No results found for: PROCAL  Lab Results   Component Value Date    .1 04/14/2022    .2 04/11/2022    CRP 32.2 06/21/2020     Lab Results Component Value Date    SEDRATE >130 (H) 04/11/2022         No results found for: [facility]  Lab Results   Component Value Date    FERRITIN 331 03/13/2014    FERRITIN 317 12/15/2013    FERRITIN 153 08/31/2013    FERRITIN 185 10/05/2011     No results found for: LDH  No results found for: FIBRINOGEN    No results found for requested labs within last 30 days. Lab Results   Component Value Date    COVID19 Not Detected 06/19/2020       No results for input(s): VANCOTROUGH in the last 72 hours. Imaging Studies:   No new imaging    Cultures:     Culture, Urine [[id number]] Collected: 04/18/22 1115   Order Status: Completed Specimen: Urine Updated: 04/19/22 1338    Specimen Description . URINE    Culture YEAST >[id number] CFU/ML   Culture, Fungus [[id number]] Collected: 04/12/22 1736   Order Status: Completed Specimen: Bone from Foot Updated: 04/19/22 1119    Specimen Description . FOOT    Culture YEAST ISOLATED     KARO GLABRATA   Culture, Anaerobic and Aerobic [[id number]] (Abnormal)  Collected: 04/12/22 1736   Order Status: Completed Specimen: Bone from Foot Updated: 04/17/22 0962    Specimen Description . FOOT    Direct Exam RARE NEUTROPHILS Abnormal      FEW GRAM POSITIVE COCCI IN PAIRS Abnormal      RARE GRAM NEGATIVE RODS Abnormal     Culture PROTEUS MIRABILIS HEAVY GROWTH Abnormal      STREPTOCOCCI, BETA HEMOLYTIC GROUP B MODERATE GROWTH Abnormal      Mixed skin tara     Unable to determine the presence or absence of anaerobes due to swarming proteus.          Medications:      hydrALAZINE  25 mg Oral 3 times per day    insulin glargine  20 Units SubCUTAneous Daily    insulin glargine  10 Units SubCUTAneous Nightly    sodium bicarbonate  650 mg Oral BID    amLODIPine  5 mg Oral Daily    clopidogrel  75 mg Oral Daily    docusate sodium  100 mg Oral BID    ferrous sulfate  325 mg Oral Daily with breakfast    [Held by provider] furosemide  40 mg Oral Every Other Day    metoprolol tartrate  25 mg Oral BID  atorvastatin  10 mg Oral Daily    sodium chloride flush  5-40 mL IntraVENous 2 times per day    insulin lispro  0-12 Units SubCUTAneous TID WC    insulin lispro  0-6 Units SubCUTAneous Nightly           [facility]  Dillon Apgar, [address] messaging  OFFICE: [phone]      Electronically signed by Dillon Apgar, APRN - CNP on 2022 at 10:29 AM  Thank you for allowing us to participate in the care of this patient. Please call with questions. This note iscreated with the assistance of a speech recognition program.  While intending to generate a document that actually reflects the content of the visit, the document can still have some errors including those of syntax andsound a like substitutions which may escape proof reading. In such instances, actual meaning can be extrapolated by contextual diversion.

## 2022-04-24 NOTE — PLAN OF CARE
Problem: Infection:  Goal: Will remain free from infection  Description: Will remain free from infection  4/24/2022 0034 by Lázaro Thornton RN  Outcome: Progressing     Problem: Safety:  Goal: Free from accidental physical injury  Description: Free from accidental physical injury  4/24/2022 0034 by Lázaro Thornton RN  Outcome: Progressing     Problem: Daily Care:  Goal: Daily care needs are met  Description: Daily care needs are met  4/24/2022 0034 by Lázaro Thornton RN  Outcome: Progressing     Problem: Pain:  Goal: Patient's pain/discomfort is manageable  Description: Patient's pain/discomfort is manageable  4/24/2022 0034 by Lázaro Thornton RN  Outcome: Progressing     Problem: Pain:  Goal: Pain level will decrease  Description: Pain level will decrease  4/24/2022 0034 by Lázaro Thornton RN  Outcome: Progressing     Problem: Pain:  Goal: Control of acute pain  Description: Control of acute pain  4/24/2022 0034 by Lázaro Thornton RN  Outcome: Progressing     Problem: Pain:  Goal: Control of chronic pain  Description: Control of chronic pain  4/24/2022 0034 by Lázaro Thornton RN  Outcome: Progressing     Problem: Skin Integrity:  Goal: Skin integrity will stabilize  Description: Skin integrity will stabilize  4/24/2022 0034 by Lázaro Thornton RN  Outcome: Progressing     Problem: Skin Integrity:  Goal: Will show no infection signs and symptoms  Description: Will show no infection signs and symptoms  4/24/2022 0034 by Lázaro Thornton RN  Outcome: Progressing     Problem: Skin Integrity:  Goal: Absence of new skin breakdown  Description: Absence of new skin breakdown  4/24/2022 0034 by Lázaro Thornton RN  Outcome: Progressing     Problem: Falls - Risk of:  Goal: Will remain free from falls  Description: Will remain free from falls  4/24/2022 0034 by Lázaro Thornton RN  Outcome: Progressing

## 2022-04-24 NOTE — PLAN OF CARE
Problem: Infection:  Goal: Will remain free from infection  Description: Will remain free from infection  Outcome: Progressing     Problem: Safety:  Goal: Free from accidental physical injury  Description: Free from accidental physical injury  Outcome: Progressing     Problem: Daily Care:  Goal: Daily care needs are met  Description: Daily care needs are met  Outcome: Progressing     Problem: Pain:  Goal: Patient's pain/discomfort is manageable  Description: Patient's pain/discomfort is manageable  Outcome: Progressing  Goal: Pain level will decrease  Description: Pain level will decrease  Outcome: Progressing  Goal: Control of acute pain  Description: Control of acute pain  Outcome: Progressing  Goal: Control of chronic pain  Description: Control of chronic pain  Outcome: Progressing     Problem: Skin Integrity:  Goal: Skin integrity will stabilize  Description: Skin integrity will stabilize  Outcome: Progressing     Problem: Discharge Planning:  Goal: Patients continuum of care needs are met  Description: Patients continuum of care needs are met  Outcome: Progressing     Problem: Skin Integrity:  Goal: Will show no infection signs and symptoms  Description: Will show no infection signs and symptoms  Outcome: Progressing  Goal: Absence of new skin breakdown  Description: Absence of new skin breakdown  Outcome: Progressing  Goal: Risk for impaired skin integrity will decrease  Description: Risk for impaired skin integrity will decrease  Outcome: Progressing     Problem: Falls - Risk of:  Goal: Will remain free from falls  Description: Will remain free from falls  Outcome: Progressing  Goal: Absence of physical injury  Description: Absence of physical injury  Outcome: Progressing     Problem: Nutrition  Goal: Optimal nutrition therapy  Outcome: Progressing     Problem:  Activity:  Goal: Risk for activity intolerance will decrease  Description: Risk for activity intolerance will decrease  Outcome: Progressing Problem: Coping:  Goal: Ability to adjust to condition or change in health will improve  Description: Ability to adjust to condition or change in health will improve  Outcome: Progressing     Problem: Fluid Volume:  Goal: Ability to maintain a balanced intake and output will improve  Description: Ability to maintain a balanced intake and output will improve  Outcome: Progressing     Problem: Health Behavior:  Goal: Ability to identify and utilize available resources and services will improve  Description: Ability to identify and utilize available resources and services will improve  Outcome: Progressing  Goal: Ability to manage health-related needs will improve  Description: Ability to manage health-related needs will improve  Outcome: Progressing     Problem: Metabolic:  Goal: Ability to maintain appropriate glucose levels will improve  Description: Ability to maintain appropriate glucose levels will improve  Outcome: Progressing     Problem: Nutritional:  Goal: Maintenance of adequate nutrition will improve  Description: Maintenance of adequate nutrition will improve  Outcome: Progressing  Goal: Progress toward achieving an optimal weight will improve  Description: Progress toward achieving an optimal weight will improve  Outcome: Progressing     Problem: Physical Regulation:  Goal: Complications related to the disease process, condition or treatment will be avoided or minimized  Description: Complications related to the disease process, condition or treatment will be avoided or minimized  Outcome: Progressing  Goal: Diagnostic test results will improve  Description: Diagnostic test results will improve  Outcome: Progressing     Problem: Tissue Perfusion:  Goal: Adequacy of tissue perfusion will improve  Description: Adequacy of tissue perfusion will improve  Outcome: Progressing     Problem: Discharge Planning  Goal: Discharge to home or other facility with appropriate resources  Outcome: Progressing Problem: Nutrition Deficit:  Goal: Optimize nutritional status  Outcome: Progressing     Problem: Chronic Conditions and Co-morbidities  Goal: Patient's chronic conditions and co-morbidity symptoms are monitored and maintained or improved  Outcome: Progressing     Problem: Skin/Tissue Integrity - Adult  Goal: Skin integrity remains intact  Outcome: Progressing  Goal: Incisions, wounds, or drain sites healing without S/S of infection  Outcome: Progressing  Goal: Oral mucous membranes remain intact  Outcome: Progressing     Problem: Musculoskeletal - Adult  Goal: Return mobility to safest level of function  Outcome: Progressing  Goal: Maintain proper alignment of affected body part  Outcome: Progressing  Goal: Return ADL status to a safe level of function  Outcome: Progressing     Problem: Infection - Adult  Goal: Absence of infection at discharge  Outcome: Progressing  Goal: Absence of infection during hospitalization  Outcome: Progressing     Problem: Cardiovascular - Adult  Goal: Maintains optimal cardiac output and hemodynamic stability  Outcome: Progressing     Problem: Metabolic/Fluid and Electrolytes - Adult  Goal: Electrolytes maintained within normal limits  Outcome: Progressing  Goal: Hemodynamic stability and optimal renal function maintained  Outcome: Progressing  Goal: Glucose maintained within prescribed range  Outcome: Progressing     Problem: Neurosensory - Adult  Goal: Remains free of injury related to seizures activity  Outcome: Progressing  Goal: Achieves maximal functionality and self care  Outcome: Progressing

## 2022-04-24 NOTE — PROGRESS NOTES
Physical Therapy  Facility/Department: [facility]  Daily Treatment Note  NAME: Jamal Dai  : 1950  MRN: [id number]    Date of Service: 2022    Discharge Recommendations:  Patient would benefit from continued therapy after discharge   PT Equipment Recommendations  Equipment Needed: Yes  Other: Slide board  Patient Diagnosis(es): The primary encounter diagnosis was Necrotizing fasciitis of ankle and foot ([facility] [address] (BKA), Diagnoses of RISSA (acute kidney injury) ,([facility] [address] and Necrotizing fasciitis ([facility] [address] were also pertinent to this visit. Assessment   Assessment: Patient continues with global deconditioning with deficits in strength, transfers, bed mobility, and endurance requiring moderate assistance x2 for slide board transfer to and from his personal motorized chair. Pt is independent with stan/doffing of prosthesis R LE however unable to stand d/t strength and balance deficits this date. Pt a BKA and would benefit from continued therapy after discharge  Activity Tolerance: Patient limited by fatigue;Patient limited by endurance; Patient limited by pain  Equipment Needed: Yes  Other: Slide board   Plan    Plan  Current Treatment Recommendations: Strengthening;Balance training;Functional mobility training;Transfer training; Endurance training;Patient/Caregiver education & training; Safety education & training;Home exercise program;Therapeutic activities   Subjective    Subjective  Subjective: Pt agreeable to PT session and transfer to power wheelchair  Pain: Nurse states pt appropriate for PT  Orientation  Overall Orientation Status: Within Functional Limits   Objective   Bed Mobility Training  Bed Mobility Training: Yes  Overall Level of Assistance: Moderate assistance;Assist X2  Interventions: Verbal cues;Manual cues; Tactile cues; Safety awareness training  Rolling: Moderate assistance;Assist X2  Supine to Sit: Moderate assistance;Assist X2  Sit to Supine:  Moderate assistance;Assist X2  Scooting: Maximum assistance;Assist X2  Balance  Sitting: Intact  Sitting - Static: Fair (occasional) initially then static balance sitting at EOB improved to unsupported  Sitting - Dynamic: Fair (occasional)  Transfer Training  Transfer Training: Yes  Overall Level of Assistance: Moderate assistance;Assist X2  Interventions: Manual cues; Verbal cues; Tactile cues; Safety awareness training  Sliding Board: Moderate assistance;Assist X2  Comments: Max cues for safety  Gait Training  Gait Training: No    PT Exercises  Pt educated on safe transfer technique using a sliding board to and from his personal power chair. Note generalized weakness in UEs      Patient Education  Education Given To: Patient  Education Provided: Role of Therapy;Plan of Care;Transfer Training  Education Method: Verbal  Barriers to Learning: None  Education Outcome: Verbalized understanding;Demonstrated understanding;Continued education needed    Goals  Short Term Goals  Time Frame for Short term goals: 12 visits  Short term goal 1: Patient will be indep with bed mobility. Short term goal 2: Patient will be min x 1 with slide board transfers. Short term goal 3: Patient will have good dynamic seated balance.   Short term goal 4: Patient to perform sit<>stand with Min A  Short term goal 5: Patient will tolerate 30 minutes of ther act  Patient Goals   Patient goals : return home      Therapy Time   Individual Concurrent Group Co-treatment   Time In 1118/1310           Time Out 1202/1325         Minutes [address] [place] [address]

## 2022-04-24 NOTE — PROGRESS NOTES
[facility]  Office: [address], DO, Isabel Monteiro, DO, Peterkeisha Martins, DO, Tari Hadley Max, DO, Alicia Bermeo MD, Marci Kaur MD, Marychuy Rico MD, Brit Edwards MD, Kathe Ring MD, Joseph Fields MD, Kaylene Sumner MD, David Deng, DO, Margarette Sarmiento, DO, Car Shelby MD,  Rome Luciano DO, Charisse Holman MD, Franco Vee MD, Nieves Caldwell MD, Melinda Hurtado DO, Joli Angelucci, MD, Braxton Manuel MD, Charisma Otero, [facility], [facility], [facility], Paolo Paige, CNP, Katerina Wallace, CNP, Erma Roman, CNP, Cara Amos, CNP, Guerda Spence, PA-C, Debby Gilmore, DNP, Casi Mckeon, CNP, Robel Leonard, CNP, Nery Davila, CNP, Ozzy Mantilla, CNS, Michelleolysherry Nails, DNP, Arnold Clapper, CNP, Bruno Proper, CNP, Chelsea Serum, [facility]         [address]    Progress Note    4/24/2022    11:34 AM    Name:   Rufina Scott  MRN:     [id number]     Acct:      [de-identified]   Room:   2008/2008-02  IP Day:  15  Admit Date:  4/11/2022  5:25 PM    PCP:   Lay Lovelace  Code Status:  Full Code    Subjective:     C/C:   Chief Complaint   Patient presents with    Wound Infection     Sent over by PCP for wound evaluation in foot (left)      Patient seen in follow-up for left foot osteomyelitis status post left above-the-knee amputation. Patient states \"pain is better, I am having some muscle spasms\"    Interval History Status: improved. Each day patient does better. He tells me that his pain has been improving. Overnight he did have some muscle spasms. Presently he is feeling well. We are awaiting placement to skilled nursing facility. Infectious disease input noted and appreciated. Patient himself denies any questions or concerns. Brief History:     Is a very pleasant 70-year-old male who presented to the hospital with left foot osteomyelitis.   Due to his severe peripheral arterial disease he underwent a above-the-knee amputation. Review of Systems:     Constitutional:  negative for chills, fevers, sweats  Respiratory:  negative for cough, dyspnea on exertion, shortness of breath, wheezing  Cardiovascular:  negative for chest pain, chest pressure/discomfort, lower extremity edema, palpitations  Gastrointestinal:  negative for abdominal pain, constipation, diarrhea, nausea, vomiting  Neurological:  negative for dizziness, headache    Medications:      Allergies:  No Known Allergies    Current Meds:   Scheduled Meds:    hydrALAZINE  25 mg Oral 3 times per day    insulin glargine  20 Units SubCUTAneous Daily    insulin glargine  10 Units SubCUTAneous Nightly    sodium bicarbonate  650 mg Oral BID    amLODIPine  5 mg Oral Daily    clopidogrel  75 mg Oral Daily    docusate sodium  100 mg Oral BID    ferrous sulfate  325 mg Oral Daily with breakfast    [Held by provider] furosemide  40 mg Oral Every Other Day    metoprolol tartrate  25 mg Oral BID    atorvastatin  10 mg Oral Daily    sodium chloride flush  5-40 mL IntraVENous 2 times per day    insulin lispro  0-12 Units SubCUTAneous TID WC    insulin lispro  0-6 Units SubCUTAneous Nightly     Continuous Infusions:    sodium chloride      dextrose      sodium chloride Stopped (04/16/22 1740)     PRN Meds: sodium chloride, oxyCODONE-acetaminophen **OR** oxyCODONE-acetaminophen, traMADol, glucagon (rDNA), dextrose, sodium chloride flush, sodium chloride, potassium chloride **OR** potassium alternative oral replacement **OR** potassium chloride, magnesium sulfate, ondansetron **OR** ondansetron, polyethylene glycol, acetaminophen **OR** acetaminophen, dextrose bolus (hypoglycemia) **OR** dextrose bolus (hypoglycemia), glucose, hydrALAZINE    Data:     Past Medical History:   has a past medical history of RISSA (acute kidney injury) ([facility] [address] Cerebrovascular disease, Erectile dysfunction, Hepatitis B infection, Hyperlipidemia, Hypertension, Kidney cysts, Liver cyst, MRSA (methicillin resistant staph aureus) culture positive, Neurotrophic ulcer of the foot ([facility] [address] Obesity, Osteomyelitis of ankle or foot, Peripheral vascular disease ([facility] [address] Tobacco abuse, and Type II or unspecified type diabetes mellitus without mention of complication, not stated as uncontrolled. Social History:   reports that he has been smoking cigarettes. He has a 15.00 pack-year smoking history. He has never used smokeless tobacco. He reports that he does not drink alcohol and does not use drugs. Family History:   Family History   Problem Relation Age of Onset    Diabetes Mother     Diabetes Sister     Diabetes Brother     Diabetes Sister     Diabetes Sister     Diabetes Sister        Vitals:  BP (!) 148/42   Pulse 84   Temp 98.4 °F (36.9 °C) (Oral)   Resp 16   Ht 6' (1.829 m)   Wt 176 lb (79.8 kg)   SpO2 98%   BMI 23.87 kg/m²   Temp (24hrs), Av.3 °F (36.8 °C), Min:97.9 °F (36.6 °C), Max:98.9 °F (37.2 °C)    Recent Labs     22  1609 22  0613 22  1111   POCGLU 95 110 71* 80       I/O (24Hr): Intake/Output Summary (Last 24 hours) at 2022 1134  Last data filed at 2022 0905  Gross per 24 hour   Intake --   Output 1575 ml   Net -1575 ml       Labs:  Hematology:No results for input(s): WBC, RBC, HGB, HCT, MCV, MCH, MCHC, RDW, PLT, MPV, SEDRATE, CRP, INR, DDIMER, DQ8WWPRM, LABABSO in the last 72 hours.     Invalid input(s): PT  Chemistry:  Recent Labs     22  0749      K 4.6   *   CO2 23   GLUCOSE 119*   BUN 43*   CREATININE 2.27*   ANIONGAP 11   LABGLOM 29*   GFRAA 35*   CALCIUM 8.0*     Recent Labs     22  0617 22  1144 22  1609 22  0613 22  1111   POCGLU 158* 104 95 110 71* 80     ABG:No results found for: POCPH, PHART, PH, POCPCO2, BNQ5UVO, PCO2, POCPO2, PO2ART, PO2, POCHCO3, VMO6FCA, HCO3, NBEA, PBEA, BEART, BE, THGBART, THB, TVV6PKD, IJLB0AUU, G2CHAKZW, O2SAT, FIO2  Lab Results Component Value Date/Time    SPECIAL LT FA,7ML 04/11/2022 06:38 PM     Lab Results   Component Value Date/Time    CULTURE YEAST >[id number] CFU/ML 04/18/2022 11:15 AM       Radiology:  No results found. Physical Examination:        General appearance:  alert, cooperative and no distress  Mental Status:  oriented to person, place and time and normal affect  Lungs:  clear to auscultation bilaterally, normal effort  Heart:  regular rate and rhythm, no murmur  Abdomen:  soft, nontender, nondistended, normal bowel sounds, no masses, hepatomegaly, splenomegaly  Extremities: Right below the knee amputation noted, left above-the-knee amputation bandaged  Skin:  no gross lesions, rashes, induration    Assessment:        Hospital Problems           Last Modified POA    * (Principal) Necrotizing fasciitis ([address] 4/12/2022 Yes    Acute kidney injury superimposed on CKD ([address] 4/11/2022 Yes    CKD (chronic kidney disease), stage III ([address] 4/11/2022 Yes    Uncontrolled hypertension 4/12/2022 Yes    Type 2 diabetes mellitus with stage 3 chronic kidney disease, with long-term current use of insulin ([address] 4/12/2022 Yes    Class 1 obesity with serious comorbidity in adult 4/12/2022 Yes          Plan:        1. Necrotizing fasciitis/osteomyelitis  1. Patient underwent above-the-knee amputation  1. Uneventful postoperative course  2. Patient has had muscle spasms, presently controlled  3. Wound care per vascular surgery  2. Diabetes mellitus  1. Patient has excellent glycemic control  2. Continue current regimen of medications  3. Essential hypertension  1. Patient was experiencing elevated blood pressures  2. Blood pressure improved with the addition of hydralazine  3. Continue to monitor and titrate accordingly  4. Hyperkalemia  1. Resolved  5. Acute kidney injury on stage III chronic kidney disease  1. Renal function stable  2.  Repeat labs in outpatient basis per nephrology     Discharge to skilled nursing facility when arrangements made    Lokesh Saldana DO  4/24/2022  11:34 AM

## 2022-04-25 LAB
CULTURE: NORMAL
GLUCOSE BLD-MCNC: 120 MG/DL (ref 75–110)
GLUCOSE BLD-MCNC: 130 MG/DL (ref 75–110)
GLUCOSE BLD-MCNC: 135 MG/DL (ref 75–110)
GLUCOSE BLD-MCNC: 250 MG/DL (ref 75–110)
GLUCOSE BLD-MCNC: 67 MG/DL (ref 75–110)
SPECIMEN DESCRIPTION: NORMAL

## 2022-04-25 PROCEDURE — 6370000000 HC RX 637 (ALT 250 FOR IP): Performed by: INTERNAL MEDICINE

## 2022-04-25 PROCEDURE — 6370000000 HC RX 637 (ALT 250 FOR IP): Performed by: STUDENT IN AN ORGANIZED HEALTH CARE EDUCATION/TRAINING PROGRAM

## 2022-04-25 PROCEDURE — 6370000000 HC RX 637 (ALT 250 FOR IP): Performed by: HOSPITALIST

## 2022-04-25 PROCEDURE — 99232 SBSQ HOSP IP/OBS MODERATE 35: CPT | Performed by: HOSPITALIST

## 2022-04-25 PROCEDURE — 97112 NEUROMUSCULAR REEDUCATION: CPT

## 2022-04-25 PROCEDURE — 99232 SBSQ HOSP IP/OBS MODERATE 35: CPT | Performed by: NURSE PRACTITIONER

## 2022-04-25 PROCEDURE — 97535 SELF CARE MNGMENT TRAINING: CPT

## 2022-04-25 PROCEDURE — 97530 THERAPEUTIC ACTIVITIES: CPT

## 2022-04-25 PROCEDURE — 2580000003 HC RX 258: Performed by: STUDENT IN AN ORGANIZED HEALTH CARE EDUCATION/TRAINING PROGRAM

## 2022-04-25 PROCEDURE — 82947 ASSAY GLUCOSE BLOOD QUANT: CPT

## 2022-04-25 PROCEDURE — 2060000000 HC ICU INTERMEDIATE R&B

## 2022-04-25 RX ORDER — HYDRALAZINE HYDROCHLORIDE 25 MG/1
25 TABLET, FILM COATED ORAL EVERY 8 HOURS SCHEDULED
Qty: 90 TABLET | Refills: 3
Start: 2022-04-25

## 2022-04-25 RX ADMIN — INSULIN LISPRO 6 UNITS: 100 INJECTION, SOLUTION INTRAVENOUS; SUBCUTANEOUS at 13:38

## 2022-04-25 RX ADMIN — AMLODIPINE BESYLATE 5 MG: 5 TABLET ORAL at 09:36

## 2022-04-25 RX ADMIN — DOCUSATE SODIUM 100 MG: 100 CAPSULE, LIQUID FILLED ORAL at 09:36

## 2022-04-25 RX ADMIN — OXYCODONE AND ACETAMINOPHEN 2 TABLET: 5; 325 TABLET ORAL at 05:50

## 2022-04-25 RX ADMIN — HYDRALAZINE HYDROCHLORIDE 25 MG: 25 TABLET, FILM COATED ORAL at 20:59

## 2022-04-25 RX ADMIN — METOPROLOL TARTRATE 25 MG: 25 TABLET, FILM COATED ORAL at 17:37

## 2022-04-25 RX ADMIN — HYDRALAZINE HYDROCHLORIDE 25 MG: 25 TABLET, FILM COATED ORAL at 13:38

## 2022-04-25 RX ADMIN — METOPROLOL TARTRATE 25 MG: 25 TABLET, FILM COATED ORAL at 09:36

## 2022-04-25 RX ADMIN — INSULIN GLARGINE 10 UNITS: 100 INJECTION, SOLUTION SUBCUTANEOUS at 20:59

## 2022-04-25 RX ADMIN — OXYCODONE AND ACETAMINOPHEN 2 TABLET: 5; 325 TABLET ORAL at 09:55

## 2022-04-25 RX ADMIN — SODIUM BICARBONATE 650 MG: 650 TABLET ORAL at 20:59

## 2022-04-25 RX ADMIN — FERROUS SULFATE TAB EC 325 MG (65 MG FE EQUIVALENT) 325 MG: 325 (65 FE) TABLET DELAYED RESPONSE at 09:36

## 2022-04-25 RX ADMIN — INSULIN GLARGINE 20 UNITS: 100 INJECTION, SOLUTION SUBCUTANEOUS at 09:36

## 2022-04-25 RX ADMIN — HYDRALAZINE HYDROCHLORIDE 25 MG: 25 TABLET, FILM COATED ORAL at 05:50

## 2022-04-25 RX ADMIN — CLOPIDOGREL BISULFATE 75 MG: 75 TABLET ORAL at 09:36

## 2022-04-25 RX ADMIN — ATORVASTATIN CALCIUM 10 MG: 10 TABLET, FILM COATED ORAL at 09:36

## 2022-04-25 RX ADMIN — SODIUM BICARBONATE 650 MG: 650 TABLET ORAL at 09:36

## 2022-04-25 RX ADMIN — SODIUM CHLORIDE, PRESERVATIVE FREE 10 ML: 5 INJECTION INTRAVENOUS at 20:59

## 2022-04-25 RX ADMIN — OXYCODONE AND ACETAMINOPHEN 2 TABLET: 5; 325 TABLET ORAL at 17:39

## 2022-04-25 ASSESSMENT — ENCOUNTER SYMPTOMS
BACK PAIN: 0
ABDOMINAL PAIN: 0
NAUSEA: 0
DIARRHEA: 0
GASTROINTESTINAL NEGATIVE: 1
VOMITING: 0
RESPIRATORY NEGATIVE: 1

## 2022-04-25 ASSESSMENT — PAIN SCALES - GENERAL
PAINLEVEL_OUTOF10: 8
PAINLEVEL_OUTOF10: 0

## 2022-04-25 ASSESSMENT — PAIN DESCRIPTION - LOCATION
LOCATION: KNEE
LOCATION: OTHER (COMMENT)
LOCATION: LEG

## 2022-04-25 ASSESSMENT — PAIN DESCRIPTION - DESCRIPTORS
DESCRIPTORS: STABBING
DESCRIPTORS: ACHING;THROBBING
DESCRIPTORS: STABBING

## 2022-04-25 ASSESSMENT — PAIN DESCRIPTION - ORIENTATION
ORIENTATION: LEFT

## 2022-04-25 ASSESSMENT — PAIN - FUNCTIONAL ASSESSMENT: PAIN_FUNCTIONAL_ASSESSMENT: PREVENTS OR INTERFERES SOME ACTIVE ACTIVITIES AND ADLS

## 2022-04-25 NOTE — PROGRESS NOTES
Comprehensive Nutrition Assessment    Type and Reason for Visit:  Reassess    Nutrition Recommendations/Plan:   1. Continue current diet and supplements     Malnutrition Assessment:  Malnutrition Status: At risk for malnutrition (Comment) (04/16/22 6186)    Context:  Chronic Illness     Findings of the 6 clinical characteristics of malnutrition:  Energy Intake:  Mild decrease in energy intake (Comment)  Weight Loss:  1 - Mild weight loss (specify amount and time period) (10% weight loss over 1 year per EHR)     Body Fat Loss:  Unable to assess     Muscle Mass Loss:  Unable to assess    Fluid Accumulation:  No significant fluid accumulation     Strength:  Not Performed    Nutrition Assessment:    Patient's appetite is fair, consuming 26-50% of meals and supplements. Lost 1 lb weight in 5 days likely r/t AKA. Per Physician notes wounds are healing well. Possible discharge to SNF later today, awaiting placement. Will continue current diet and supplements. Nutrition Related Findings:    No edema. Bowel sounds active Wound Type: Surgical Incision       Current Nutrition Intake & Therapies:    Average Meal Intake: 26-50%  Average Supplements Intake: 26-50%  ADULT DIET; Regular; 4 carb choices (60 gm/meal)  ADULT ORAL NUTRITION SUPPLEMENT; Breakfast, Lunch, Dinner; Diabetic Oral Supplement    Anthropometric Measures:  Height: 6' (182.9 cm)  Ideal Body Weight (IBW): 178 lbs (81 kg)    Admission Body Weight: 225 lb (102.1 kg)  Current Body Weight: 176 lb (79.8 kg), 98.9 % IBW. Weight Source:  Other (Comment) (s/p AKA)  Current BMI (kg/m2): 23.9  Usual Body Weight: 205 lb (93 kg) (1 year ago)  % Weight Change (Calculated): -10.2  Weight Adjustment For: Amputation  Total Adjusted Percentage (Calculated): 10.1  Adjusted Ideal Body Weight (lbs) (Calculated): 160 lbs  Adjusted Ideal Body Weight (kg) (Calculated): 72.73 kg  Adjusted % Ideal Body Weight (Calculated): 110  Adjusted BMI (kg/m2) (Calculated): 26.3  BMI Categories: Overweight (BMI 25.0-29. 9)    Estimated Daily Nutrient Needs:  Energy Requirements Based On: Kcal/kg  Weight Used for Energy Requirements: Adjusted  Energy (kcal/day): 8372-9054 kcal (24-25 kcal/kg)  Weight Used for Protein Requirements: Admission  Protein (g/day): 80-95 gm (1.1-1.3 gm/kg)       Nutrition Diagnosis:   · Increased nutrient needs related to increase demand for energy/nutrients as evidenced by wounds      Nutrition Interventions:   Food and/or Nutrient Delivery: Continue Current Diet,Continue Oral Nutrition Supplement  Nutrition Education/Counseling: Education not indicated  Coordination of Nutrition Care: Continue to monitor while inpatient       Goals:  Previous Goal Met: Progressing toward Goal(s)  Goals: Meet at least 75% of estimated needs       Nutrition Monitoring and Evaluation:   Behavioral-Environmental Outcomes: None Identified  Food/Nutrient Intake Outcomes: Food and Nutrient Intake,Supplement Intake  Physical Signs/Symptoms Outcomes: Biochemical Data,Fluid Status or Edema,Skin,Weight,GI Status    Discharge Planning:    Continue current diet,Continue Oral Nutrition Supplement       Some areas of assessment may be incomplete due to COVID-19 precautions    Merlin Marcum RD, LD  Office phone [phone]

## 2022-04-25 NOTE — PROGRESS NOTES
[facility]  Office: [address], DO, Vira [place], DO, Abelardo Kumar, DO, Stephani [place] Blood, DO, Gonzalo Arevalo MD, Sonny Han MD, Sim Vyas MD, Fabian Wynn MD, Jamal Eagle MD, Lm Reardon MD, Madhu Billings MD, Gerardo Villatoro, DO, Anjum Dee, DO, Bhaskar Chavez MD,  Kobe Gallegos, DO, Alida Mendez MD, Reginald Hill MD, Chante Garcia MD, Moon Dwyer, DO, Kortney Pinzon MD, Dony Dumont MD, Daryle Springer, [facility], [facility], CNP, Chela Oglesby, CNP, Gilmar Paredes, CNP, Neida Peterson, CNP, Urbano Amaya, CNP, HAM ShannonC, Conner Peralta, [facility], Dora Parham, CNP, Eliza Norris, CNP, Teena Sylvester, CNP, Grady Lynne, CNS, Lex Bourgeois, [facility], Rajni Brooks, CNP, Pauline Corona, CNP, Mery Waddell, [facility]    Progress Note    4/25/2022    3:00 PM    Name:   Desmond Aldridge  MRN:     [id number]     Acct:      [de-identified]   Room:   2008/2008-02  IP Day:  15  Admit Date:  4/11/2022  5:25 PM    PCP:   Leora Dc  Code Status:  Full Code    Subjective:     C/C:   Chief Complaint   Patient presents with    Wound Infection     Sent over by PCP for wound evaluation in foot (left)      Seen in follow-up for left lower extremity osteomyelitis that is post left above-the-knee amputation, patient states \"I am doing okay\"    Interval History Status: improved. Patient tells me that his pain has improved in his left lower extremity. Case discussed with infectious disease. Appreciate input/help regarding abnormal urinalysis. At this point in time we are awaiting placement to skilled nursing facility. I have updated his discharge med rec as well as updated his TATIANNA. Once arrangements are made the patient can be discharged to skilled nursing facility. No major issues overnight. Patient denies any questions or concerns at this point in time.   He is in good spirits and denies any questions. Brief History: This is a very pleasant 42-year-old male who presented to the hospital with a left lower extremity wound/osteomyelitis. There was an attempt for below the knee amputation however with his severe peripheral vascular disease he ultimately required above-the-knee amputation. Review of Systems:     Constitutional:  negative for chills, fevers, sweats  Respiratory:  negative for cough, dyspnea on exertion, shortness of breath, wheezing  Cardiovascular:  negative for chest pain, chest pressure/discomfort, lower extremity edema, palpitations  Gastrointestinal:  negative for abdominal pain, constipation, diarrhea, nausea, vomiting  Neurological:  negative for dizziness, headache    Medications:      Allergies:  No Known Allergies    Current Meds:   Scheduled Meds:    hydrALAZINE  25 mg Oral 3 times per day    insulin glargine  20 Units SubCUTAneous Daily    insulin glargine  10 Units SubCUTAneous Nightly    sodium bicarbonate  650 mg Oral BID    amLODIPine  5 mg Oral Daily    clopidogrel  75 mg Oral Daily    docusate sodium  100 mg Oral BID    ferrous sulfate  325 mg Oral Daily with breakfast    [Held by provider] furosemide  40 mg Oral Every Other Day    metoprolol tartrate  25 mg Oral BID    atorvastatin  10 mg Oral Daily    sodium chloride flush  5-40 mL IntraVENous 2 times per day    insulin lispro  0-12 Units SubCUTAneous TID WC    insulin lispro  0-6 Units SubCUTAneous Nightly     Continuous Infusions:    sodium chloride      dextrose      sodium chloride 100 mL/hr at 04/24/22 1910     PRN Meds: sodium chloride, oxyCODONE-acetaminophen **OR** oxyCODONE-acetaminophen, traMADol, glucagon (rDNA), dextrose, sodium chloride flush, sodium chloride, potassium chloride **OR** potassium alternative oral replacement **OR** potassium chloride, magnesium sulfate, ondansetron **OR** ondansetron, polyethylene glycol, acetaminophen **OR** acetaminophen, dextrose bolus (hypoglycemia) **OR** dextrose bolus (hypoglycemia), glucose, hydrALAZINE    Data:     Past Medical History:   has a past medical history of RISSA (acute kidney injury) ([facility] [address] Cerebrovascular disease, Erectile dysfunction, Hepatitis B infection, Hyperlipidemia, Hypertension, Kidney cysts, Liver cyst, MRSA (methicillin resistant staph aureus) culture positive, Neurotrophic ulcer of the foot ([facility] [address] Obesity, Osteomyelitis of ankle or foot, Peripheral vascular disease ([facility] Tobacco abuse, and Type II or unspecified type diabetes mellitus without mention of complication, not stated as uncontrolled. Social History:   reports that he has been smoking cigarettes. He has a 15.00 pack-year smoking history. He has never used smokeless tobacco. He reports that he does not drink alcohol and does not use drugs. Family History:   Family History   Problem Relation Age of Onset    Diabetes Mother     Diabetes Sister     Diabetes Brother     Diabetes Sister     Diabetes Sister     Diabetes Sister        Vitals:  BP (!) 143/50   Pulse 85   Temp 98.1 °F (36.7 °C) (Oral)   Resp 17   Ht 6' (1.829 m)   Wt 175 lb (79.4 kg)   SpO2 100%   BMI 23.73 kg/m²   Temp (24hrs), Av.5 °F (36.9 °C), Min:98.1 °F (36.7 °C), Max:98.9 °F (37.2 °C)    Recent Labs     22  1943 22  0617 22  0723 22  1140   POCGLU 83 67* 135* 250*       I/O (24Hr): Intake/Output Summary (Last 24 hours) at 2022 1500  Last data filed at 2022 0824  Gross per 24 hour   Intake 64.51 ml   Output 1250 ml   Net -1185.49 ml       Labs:  Hematology:No results for input(s): WBC, RBC, HGB, HCT, MCV, MCH, MCHC, RDW, PLT, MPV, SEDRATE, CRP, INR, DDIMER, [id number], LABABSO in the last 72 hours.     Invalid input(s): PT  Chemistry:No results for input(s): NA, K, CL, CO2, GLUCOSE, BUN, CREATININE, MG, ANIONGAP, LABGLOM, GFRAA, CALCIUM, CAION, PHOS, PSA, PROBNP, TROPHS, CKTOTAL, CKMB, CKMBINDEX, MYOGLOBIN, DIGOXIN, LACTACIDWB in the last 72 hours. Recent Labs     04/24/22  1111 04/24/22  1600 04/24/22  1943 04/25/22  0617 04/25/22  0723 04/25/22  1140   POCGLU 80 93 83 67* 135* 250*     ABG:No results found for: POCPH, PHART, PH, POCPCO2, HWV2NAY, PCO2, POCPO2, PO2ART, PO2, POCHCO3, JXT8FPK, HCO3, NBEA, PBEA, BEART, BE, THGBART, THB, TVY9ATU, LXOZ3HBH, W1DWDDEM, O2SAT, FIO2  Lab Results   Component Value Date/Time    SPECIAL LT FA,7ML 04/11/2022 06:38 PM     Lab Results   Component Value Date/Time    CULTURE YEAST >[id number] CFU/ML 04/18/2022 11:15 AM       Radiology:  No results found. Physical Examination:        General appearance:  alert, cooperative and no distress  Mental Status:  oriented to person, place and time and normal affect  Lungs:  clear to auscultation bilaterally, normal effort  Heart:  regular rate and rhythm, no murmur  Abdomen:  soft, nontender, nondistended, normal bowel sounds, no masses, hepatomegaly, splenomegaly  Extremities: Right below the knee amputation noted, left above-the-knee amputation wrapped  Skin:  no gross lesions, rashes, induration    Assessment:        Hospital Problems           Last Modified POA    * (Principal) Necrotizing fasciitis ([address] 4/12/2022 Yes    Acute kidney injury superimposed on CKD ([address] 4/11/2022 Yes    CKD (chronic kidney disease), stage III ([address] 4/11/2022 Yes    Uncontrolled hypertension 4/12/2022 Yes    Type 2 diabetes mellitus with stage 3 chronic kidney disease, with long-term current use of insulin ([address] 4/12/2022 Yes    Class 1 obesity with serious comorbidity in adult 4/12/2022 Yes          Plan:        1. Necrotizing fasciitis/osteomyelitis  1. Status post above-the-knee amputation, uneventful postoperative course  2. Discharge planning to skilled nursing facility for rehabilitation  2. Diabetes mellitus  1. Blood sugar trend has been stable overall  2. Most recent blood sugar slightly elevated  3. Monitor further prior to aggressive titration  3. Essential hypertension  1.  Blood pressure improved with addition of hydralazine  1. Continue hydralazine on discharge  2. Continue remaining home medications  4. Hyperkalemia  1. Resolved  5. Acute kidney injury on stage III chronic kidney disease  1. Renal function stable  2.  Repeat labs in outpatient basis per nephrology    Discharge to skilled nursing facility once arrangements made    Ina Madrid DO  4/25/2022  3:00 PM

## 2022-04-25 NOTE — PROGRESS NOTES
Occupational Therapy  Facility/Department: [facility]  Rehabilitation Occupational Therapy Daily Treatment Note    Date: 22  Patient Name: Huong Cartagena       Room: [facility]/9503-  MRN: [id number]  Account: [de-identified]   : 1950  (73 y.o.) Gender: male         Pt currently functioning below baseline. Would suggest additional therapy at time of discharge to maximize long term outcomes and prevent re-admission. Please refer to AM-PAC score for current level of function. Past Medical History:  has a past medical history of RISSA (acute kidney injury) ([facility] [address] Cerebrovascular disease, Erectile dysfunction, Hepatitis B infection, Hyperlipidemia, Hypertension, Kidney cysts, Liver cyst, MRSA (methicillin resistant staph aureus) culture positive, Neurotrophic ulcer of the foot ([facility] [address] Obesity, Osteomyelitis of ankle or foot, Peripheral vascular disease ([facility] [address] Tobacco abuse, and Type II or unspecified type diabetes mellitus without mention of complication, not stated as uncontrolled. Past Surgical History:   has a past surgical history that includes Foot amputation through metatarsal (2011); Foot Amputation; Foot surgery (Right, 2013); Foot Debridement (Left, 2020); Foot Debridement (Left, 2022); above knee amputation (Left, 2022); and Leg amputation below knee (Left, 2022). Restrictions  Restrictions/Precautions: General Precautions; Fall Risk;Up as Tolerated  Other position/activity restrictions: new L AKA, R BKA with prosthesis, R UE IV, up with assist  Required Braces or Orthoses  Right Lower Extremity Brace:  (prothesis)  Required Braces or Orthoses?: Yes    Subjective  Subjective: Pt in bed upon arrival, agreeable to therapy. Restrictions/Precautions: General Precautions; Fall Risk;Up as Tolerated             Objective     Cognition  Overall Cognitive Status: Exceptions  Arousal/Alertness: Appropriate responses to stimuli  Following Commands:  Follows one step commands with increased time; Follows one step commands with repetition  Attention Span: Appears intact; Attends with cues to redirect  Memory: Decreased short term memory  Safety Judgement: Decreased awareness of need for assistance;Decreased awareness of need for safety  Problem Solving: Decreased awareness of errors;Assistance required to generate solutions;Assistance required to implement solutions;Assistance required to identify errors made;Assistance required to correct errors made  Insights: Decreased awareness of deficits  Initiation: Requires cues for some  Sequencing: Requires cues for some  Cognition Comment: Pt has difficulty processing/following commands. Orientation  Overall Orientation Status: Within Normal Limits         ADL  Lower Extremity Dressing  Assistance Level: Maximum assistance;Dependent  Putting On/Taking Off Footwear  Assistance Level: Maximum assistance;Dependent     Functional Mobility  Skilled Clinical Factors: UNABLE  Bed Mobility  Overall Assistance Level: Moderate Assistance; Requires x 2 Assistance  Additional Factors: Verbal cues; Increased time to complete; With handrails; Head of bed raised  Roll Left  Assistance Level: Maximum assistance  Roll Right  Assistance Level: Moderate assistance  Supine to Sit  Assistance Level: Moderate assistance; Requires x 2 assistance  Skilled Clinical Factors: Max verbal/tactile cues during all bed mobilty for sequencing movements, use of bed rail, adjusting HOB and overall safety. Transfers  Surface: From bed (to electric scooter)  Additional Factors: Set-up; Verbal cues; Hand placement cues; Increased time to complete  Device: Sliding board (Max A to position w/c, place board and bed height.  Once pt was completely set up for transfer pt was able to complete with Min-Mod A x2 with verbal cues for sequencing movements, hand placement, posture and overall safety.)  Sit to Stand  Assistance Level: Requires x 2 assistance;Maximum assistance;Dependent  Skilled Clinical Factors: Attempted to stand with walker but pt was unable to achieve a full stand. Stand to Sit  Assistance Level: Maximum assistance;Dependent; Requires x 2 assistance  Sliding Board  Assistance Level: Moderate assistance; Requires x 2 assistance         Assessment  Assessment  Assessment: Pt tolerated session fair. Pt req's 2 staff assist for all functional tasks and is a high fall risk. Pt is limited by decreased strength and activity tolerance and req's A LOT of assist with self care. Skilled OT indicated to increase safety and IND with all functional tasks to ensure a safe return to PLOF. Activity Tolerance: Patient limited by fatigue;Patient limited by endurance; Patient limited by pain  Discharge Recommendations: Patient would benefit from continued therapy after discharge  Safety Devices  Type of devices: Left in chair;Nurse notified;Call light within reach;Gait belt;Patient at risk for falls (Pt in scooter and roaming unit, RN aware)    Patient Education  Education  Education Given To: Patient  Education Provided: Transfer Training;Energy Conservation;Precautions; Safety  Education Method: Verbal  Barriers to Learning: Cognition  Education Outcome: Verbalized understanding;Continued education needed    Plan  Plan  Times per Week: 4-5x a week, 1-2x per day as hyacinth  Current Treatment Recommendations: Strengthening;ROM;Endurance training;Functional mobility training;Patient/Caregiver education & training;Equipment evaluation, education, & procurement; Safety education & training;Neuromuscular re-education;Self-Care / ADL; Home management training    Goals  Short Term Goals  Time Frame for Short term goals: By discharge pt will  Short Term Goal 1: demo mod A of 1 for ADL transfers with appropriate device/technique and good safety/pacing. Short Term Goal 2: demo SBA for UB ADLs and min A x1 for LB ADLs with good safety/pacing and use of AD/DME as needed.   Short Term Goal 3: demo min A x1 for toileting tasks with good safety/pacing and use of AD/DME as needed. Short Term Goal 4: Increase B UE strength by 1/2 grade to assist with ADL tasks and functional transfers  Short Term Goal 5: demo and verb good understanding of EC/WS, ADL transfer techs, fall prevention techs, B UE HEP, possible equip needs, use of DME/AD as needed, pressure relief, d/c reccommendations and surgery precautions. Therapy Time   Individual Concurrent Group Co-treatment   Time In       2968   Time Out       1435   Minutes       37        Co-treatment with PT warranted secondary to decreased safety and independence requiring 2 skilled therapy professionals to address individual discipline's goals. OT addressing \"preparation for ADL transfer\",\"sitting balance for increased ADL performance\",\"sitting/activity tolerance\",\"functional reaching\",\"environmental safety/scanning\",\"fall prevention\",\"functional mobility for ADL transfers\",\"ability to sequence and follow directions\",\"functional UE strength\".       OLIVER Arenas

## 2022-04-25 NOTE — PROGRESS NOTES
Physical Therapy  Facility/Department: [facility]  Rehabilitation Physical Therapy Treatment Note    NAME: Yonas Guzman  : 1950 (67 y.o.)  MRN: [id number]  CODE STATUS: Full Code    Date of Service: 22    Pt currently functioning below baseline. Would suggest additional therapy at time of discharge to maximize long term outcomes and prevent re-admission. Please refer to AM-PAC score for current level of function. Restrictions:  Restrictions/Precautions: General Precautions; Fall Risk;Up as Tolerated  Lower Extremity Weight Bearing Restrictions  Left Lower Extremity Weight Bearing: Non Weight Bearing  Partial Weight Bearing Percentage Or Pounds: Recent AKA and is NWBing on residual limb  Required Braces or Orthoses  Right Lower Extremity Brace:  (Prosthetic)  Position Activity Restriction  Other position/activity restrictions: new L AKA, R BKA with prosthesis, R UE IV, up with assist     SUBJECTIVE  Subjective  Subjective: Pt agreeable to PT session and transfer to power wheelchair  Pain: Patient reported the pain in his Lt residual limb has been decreasing every day. OBJECTIVE  Cognition  Overall Cognitive Status: Exceptions  Arousal/Alertness: Appropriate responses to stimuli  Following Commands: Follows one step commands with increased time; Follows one step commands with repetition  Attention Span: Appears intact; Attends with cues to redirect  Memory: Decreased short term memory  Safety Judgement: Decreased awareness of need for assistance;Decreased awareness of need for safety  Problem Solving: Decreased awareness of errors;Assistance required to generate solutions;Assistance required to implement solutions;Assistance required to identify errors made;Assistance required to correct errors made  Insights: Decreased awareness of deficits  Initiation: Requires cues for some  Sequencing: Requires cues for some  Cognition Comment: Pt has difficulty processing/following commands. Orientation  Overall Orientation Status: Within Normal Limits    Functional Mobility  Bed Mobility  Overall Assistance Level: Moderate Assistance; Requires x 2 Assistance  Additional Factors: Set-up; Verbal cues; Increased time to complete; Head of bed raised; With handrails  Bridging  Assistance Level: Moderate assistance; Requires x 2 assistance  Roll Left  Assistance Level: Moderate assistance; Requires x 2 assistance  Roll Right  Assistance Level: Moderate assistance; Requires x 2 assistance  Supine to Sit  Assistance Level: Moderate assistance; Requires x 2 assistance  Skilled Clinical Factors: Patient requires increased time and MOD x2 assist for pushing down through bed to promote to seated EOB posture. Scooting  Assistance Level: Moderate assistance; Requires x 2 assistance  Balance  Sitting Balance: Minimal assistance  Standing Balance: Dependent/Total  Standing Balance  Time: Patient sat EOB x 15-20 mins for aclimation to upright position, donning Rt prosthetic (Mod A), laid back down to use urinal.  Transfers  Surface:  (Personal electric chair with drop arm)  Additional Factors: Set-up; Verbal cues; Hand placement cues; Increased time to complete  Device: Sliding board  Sit to Stand  Assistance Level: Moderate assistance; Requires x 2 assistance  Skilled Clinical Factors: Patient requires increased assist with set up and MOD x2 for progression to electiric wheel chair. Patient requires MAX encouragement and increased time and effort throughout slide board transfer. Stand to Sit  Assistance Level: Dependent; Requires x 2 assistance  Skilled Clinical Factors: Attempted STS with RW from raised EOB. Poor quad control during required MAX-DEP x 2 A, Able to achieve full upright posture and but unable to \"hop\" or pivot on the Rt LE prosthetic.       Environmental Mobility         Neuromuscular Education  Neuromuscular Education: Yes  NDT Treatment: Sitting             ASSESSMENT/PROGRESS TOWARDS GOALS Assessment  Assessment: Patient with global deconditioning requires a MOD x2 assist for slide board transfer to drop arm personal electric W/C. Patient requires increased time and encouragement to complete all tasks. Patient upon completion of transfer immediately begins to discuss return to bed. Activity Tolerance: Patient limited by fatigue;Patient limited by endurance; Patient limited by pain    Goals  Patient Goals   Patient goals : return home  Short Term Goals  Time Frame for Short term goals: 12 visits  Short term goal 1: Patient will be indep with bed mobility. Short term goal 2: Patient will be min x 1 with slide board transfers. Short term goal 3: Patient will have good dynamic seated balance. Short term goal 4: Patient to perform sit<>stand with Min A  Short term goal 5: Patient will tolerate 30 minutes of ther act    PLAN OF CARE/SAFETY  Plan  Current Treatment Recommendations: Strengthening;Balance training;Functional mobility training;Transfer training; Endurance training;Patient/Caregiver education & training; Safety education & training;Home exercise program;Therapeutic activities  Safety Devices  Type of Devices: Call light within reach; All fall risk precautions in place;Gait belt;Patient at risk for falls; Left in chair  Restraints  Restraints Initially in Place: No      Therapy Time   Individual Concurrent Group Co-treatment   Time In       1352   Time Out       1435   Minutes       37          Co-treatment with OT warranted secondary to decreased safety and independence requiring 2 skilled therapy professionals to address individual discipline's goals. PT addressing pre gait trunk strengthening, weight shifting prior to transfers, transfer training and postural control in sitting.     Claire Stevens PTA, 04/25/22 at 3:28 PM

## 2022-04-25 NOTE — PROGRESS NOTES
[facility]  Progress Note    Kym Rm  MRN: [id number]  Date: 4/25/2022  LOS: 15     Reason for F/U :   Left diabetic foot infection    Impression :   1. 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 Peripheral vascular disease  3. Diabetes mellitus type 2  4. Chronic renal disease  5. History of right below the knee amputation    Recommendations:   · The patient is status post IV ceftriaxone and clindamycin, stopped 4/21/2022  · Patient did have some cloudy urine according to nursing staff, so a urinalysis was sent revealing WBC, and leuk esterase. The patient does not have any dysuria, urinary urgency or frequency, or any urinary symptoms. In this instance, he does not need antibiotics due to asymptomatic bacteruria  · Patient is okay for discharge from an infectious disease standpoint    Infection Control Recommendations:   Universal precautions    Discharge Planning:   Estimated Length of IV antimicrobials: 4/21/2022  Patient will need Midline Catheter Insertion/ PICC line Insertion: No  Patient will need: Home IV , [place],  SNF,  LTAC: Undetermined  Patient willneed outpatient wound care: No    Medical Decision making / Summary of Stay:   Initial history:  Kym Rm is a 67y.o.-year-old male was sent to hospital from Dr. Blanca Arellano office for worsening left heel pain associated with left heel wound with surrounding dark discoloration and redness. The pain is severe, intermittent, no alleviating or aggravating factors.     Left foot x-ray showed soft tissue emphysema within the posterior aspect of the distal left lower leg concerning for necrotizing fasciitis and possible calcaneal osteomyelitis.   Initial WBC 18.5, creatinine 2.44, C-reactive protein more than 130, C-reactive protein 104.2, lactic acid 0 point  Afebrile  History of right below-knee amputation    Current evaluation:2022    BP (!) 143/50   Pulse 85   Temp 98.1 °F (36.7 °C) (Oral)   Resp 17   Ht 6' (1.829 m)   Wt 175 lb (79.4 kg)   SpO2 100%   BMI 23.73 kg/m²     Temperature Range: Temp: 98.1 °F (36.7 °C) Temp  Av.6 °F (37 °C)  Min: 98.1 °F (36.7 °C)  Max: 98.9 °F (37.2 °C)    Patient seen sitting up in bed  He is eating lunch  He denies fevers or chills  No back pain, abdominal pain  No urinary urgency or frequency, no dysuria    Review of Systems   Constitutional: Negative. HENT: Negative. Respiratory: Negative. Cardiovascular: Negative. Gastrointestinal: Negative. Negative for abdominal pain, diarrhea, nausea and vomiting. Genitourinary: Negative. Negative for decreased urine volume, difficulty urinating, dysuria, frequency and urgency. Musculoskeletal: Negative. Negative for back pain. Skin: Negative. Neurological: Negative. Psychiatric/Behavioral: Negative. Physical Examination :     Physical Exam  Constitutional:       General: He is not in acute distress. Appearance: Normal appearance. He is normal weight. HENT:      Head: Normocephalic and atraumatic. Pulmonary:      Effort: Pulmonary effort is normal. No respiratory distress. Musculoskeletal:      Comments: Right BKA stump well-healed  Left AKA stump with dressing in place, not removed         Laboratory data:   I have independently reviewed the followinglabs:  CBC with Differential: No results for input(s): WBC, HGB, HCT, PLT, SEGSPCT, BANDSPCT, LYMPHOPCT, MONOPCT, EOSPCT in the last 72 hours. BMP:   No results for input(s): NA, K, CL, CO2, BUN, CREATININE, CA, MG in the last 72 hours. Hepatic Function Panel: No results for input(s): PROT, LABALBU, BILIDIR, IBILI, BILITOT, ALKPHOS, ALT, AST in the last 72 hours.       No results found for: PROCAL  Lab Results   Component Value Date    .1 2022    .2 2022    CRP 32.2 2020     Lab Results   Component Value Date    SEDRATE >130 (H) 04/11/2022         No results found for: DDIMER  Lab Results   Component Value Date    FERRITIN 331 03/13/2014    FERRITIN 317 12/15/2013    FERRITIN 153 08/31/2013    FERRITIN 185 10/05/2011     No results found for: LDH  No results found for: FIBRINOGEN    No results found for requested labs within last 30 days. Lab Results   Component Value Date    COVID19 Not Detected 06/19/2020       No results for input(s): VANCOTROUGH in the last 72 hours. Imaging Studies:   No new imaging    Cultures:     Culture, Urine [[id number]] Collected: 04/24/22 1615   Order Status: No result Specimen: Urine, clean catch Updated: 04/24/22 1849   Culture, Urine [[id number]] Collected: 04/18/22 1115   Order Status: Completed Specimen: Urine Updated: 04/19/22 1338    Specimen Description . URINE    Culture YEAST >814125 CFU/ML   Culture, Fungus [[id number]] Collected: 04/12/22 1736   Order Status: Completed Specimen: Bone from Foot Updated: 04/19/22 1119    Specimen Description . FOOT    Culture YEAST ISOLATED     KARO GLABRATA   Culture, Anaerobic and Aerobic [[id number]] (Abnormal)  Collected: 04/12/22 1736   Order Status: Completed Specimen: Bone from Foot Updated: 04/17/22 0948    Specimen Description . FOOT    Direct Exam RARE NEUTROPHILS Abnormal      FEW GRAM POSITIVE COCCI IN PAIRS Abnormal      RARE GRAM NEGATIVE RODS Abnormal     Culture PROTEUS MIRABILIS HEAVY GROWTH Abnormal      STREPTOCOCCI, BETA HEMOLYTIC GROUP B MODERATE GROWTH Abnormal      Mixed skin tara     Unable to determine the presence or absence of anaerobes due to swarming proteus. Culture, Blood 1 [[id number]] Collected: 04/11/22 1838   Order Status: Completed Specimen: Blood Updated: 04/16/22 2301    Specimen Description . BLOOD    Special Requests LT FA,7ML    Culture NO GROWTH 5 DAYS   Culture, Blood 1 [[id number]] Collected: 04/11/22 1818   Order Status: Completed Specimen: Blood Updated: 04/16/22 2300    Specimen Description . BLOOD    Special Requests RT HAND,2.5ML    Culture NO GROWTH 5 DAYS         Medications:      cefTRIAXone (ROCEPHIN) IV  1,000 mg IntraVENous Q24H    hydrALAZINE  25 mg Oral 3 times per day    insulin glargine  20 Units SubCUTAneous Daily    insulin glargine  10 Units SubCUTAneous Nightly    sodium bicarbonate  650 mg Oral BID    amLODIPine  5 mg Oral Daily    clopidogrel  75 mg Oral Daily    docusate sodium  100 mg Oral BID    ferrous sulfate  325 mg Oral Daily with breakfast    [Held by provider] furosemide  40 mg Oral Every Other Day    metoprolol tartrate  25 mg Oral BID    atorvastatin  10 mg Oral Daily    sodium chloride flush  5-40 mL IntraVENous 2 times per day    insulin lispro  0-12 Units SubCUTAneous TID WC    insulin lispro  0-6 Units SubCUTAneous Nightly           [facility]  [address] [place] [address] Street, APRN - CNP  Perfect Serve messaging  OFFICE: [phone]      Electronically signed by [address] [place] [address], APRN - CNP on 4/25/2022 at 12:21 PM  Thank you for allowing us to participate in the care of this patient. Please call with questions. This note iscreated with the assistance of a speech recognition program.  While intending to generate a document that actually reflects the content of the visit, the document can still have some errors including those of syntax andsound a like substitutions which may escape proof reading. In such instances, actual meaning can be extrapolated by contextual diversion.

## 2022-04-25 NOTE — PROGRESS NOTES
Palliative Care Progress Note    NAME:  Sai Ho  MEDICAL RECORD NUMBER:  [id number]  AGE: 67 y.o. GENDER: male  : 1950  TODAY'S DATE:  2022    Reason for Consult:  goals of care  History of Present Illness     The patient is a 67 y.o. Non- / non  male who presents with Wound Infection (Sent over by PCP for wound evaluation in foot (left) )      Referred to Palliative Care by  [x] Physician   [] Nursing  [] Family Request   [] Other:       He was admitted to the Medical surgical service for Necrotizing fasciitis ([facility] [M72.6]  Necrotizing fasciitis of ankle and foot ([facility] [M72.6]. His hospital course has been associated with wound care, AKA, . The patient has a complicated medical history and has been hospitalized since 2022  5:25 PM.    Patient resting in bed without any complaints. Patient with Ace wrap dressing on left AKA that keeps falling off. Patient continues on Rocephin IV antibiotics and Percocet for pain. Patient awaiting pre certification for him to go to [address] [place] [address] for strengthening.  Palliative care will continue to follow     OVERNIGHT EVENTS:  Patient is full code  Patient continues on Rocephin and percocet   Awaiting acceptance for LONG TERM ACUTE CARE [facility]     VITALS   BP (!) 143/50   Pulse 85   Temp 98.1 °F (36.7 °C) (Oral)   Resp 17   Ht 6' (1.829 m)   Wt 175 lb (79.4 kg)   SpO2 100%   BMI 23.73 kg/m²     PAST MEDICAL HISTORY  Past Medical History:   Diagnosis Date    RISSA (acute kidney injury) ([facility]     Cerebrovascular disease     Erectile dysfunction     Hepatitis B infection     Hyperlipidemia     Hypertension     Kidney cysts     Liver cyst     MRSA (methicillin resistant staph aureus) culture positive 1/10/2014    right foot    Neurotrophic ulcer of the foot ([facility]     Obesity     Osteomyelitis of ankle or foot     Peripheral vascular disease ([facility]     Tobacco abuse     Type II or unspecified type diabetes mellitus without mention of complication, not stated as uncontrolled         SURGICAL HISTORY  Past Surgical History:   Procedure Laterality Date    ABOVE KNEE AMPUTATION Left 04/19/2022    by Dr. True Bunch  01/01/2011    pt can't give exact date of surg-- toes and part of lt foot removed    FOOT DEBRIDEMENT Left 06/20/2020    WOUND BED PREPARATION AND WOUND VAC APPLICATION performed by Malia Rabago DPM at [facility] Left 04/12/2022    FOOT DEBRIDEMENT INCISION AND DRAINAGE WITH BX performed by Malia Rabago DPM at [address] Right 03/01/2013    LEG AMPUTATION BELOW KNEE Left 4/19/2022    LEFT  LEG AMPUTATION ABOVE KNEE  WITH NERVE BLOCK performed by Riya Munoz MD at [id number] [place] [address] HISTORY  Family History   Problem Relation Age of Onset    Diabetes Mother     Diabetes Sister     Diabetes Brother     Diabetes Sister     Diabetes Sister     Diabetes Sister         SOCIAL HISTORY  Social History     Tobacco History     Smoking Status  Current Every Day Smoker Smoking Frequency  0.5 packs/day for 30 years (15 pk yrs) Smoking Tobacco Type  Cigarettes    Smokeless Tobacco Use  Never Used    Tobacco Comment  working on quitting smoking          Alcohol History     Alcohol Use Status  No          Drug Use     Drug Use Status  Never          Sexual Activity     Sexually Active  Yes Partners  Female                 Assessment        REVIEW OF SYSTEMS    []   UNABLE TO OBTAIN:     Constitutional:  []   Chills   [x]  Fatigue   []  Fevers   [x]  Malaise   []  Weight loss   [] Other:     Respiratory:   []  Cough    []  Shortness of breath    []  Chest pain    [x] Other: respirations relaxed lungs diminished   Cardiovascular:   []  Chest pain  []  Dyspnea    []  Exertional chest pressure/discomfort     [] Fatigue      []  Palpitations    []  Syncope   [x] Other: Denies cp or pressure   Gastrointestinal:   []  Abdominal pain []  Constipation    []  Diarrhea    []   Dysphagia   []  Reflux             []  Vomiting   [x] Other:denies nausea or vomiting      Genitourinary:  []  Dysuria     []  Frequency   []  Hematuria   [] Nocturia   []  Urinary incontinence   [] Other:     Musculoskeletal:   [] Back pain    [x]  Muscle weakness   [x]  Myalgias    []  Neck pain   []  Stiff joints   []  Other:     Behavioral/Psych:   [] Anxiety    []  Depression     []  Mood swings   [] Other:     PHYSICAL ASSESSMENT:     General: [x]  Oriented x3      [] well appearing      [] Intubated      [] ill appearing      [] Other:    Mental Status: [x] normal mental status exam      [] drowsy      [] Confused      [] Other:     Cardiovascular: [x]  Regular rate/rhythm      [] Arrhythmia      [] Other:     Chest: [] Effort normal      [] lungs clear      [] respiratory distress      [] Tachypnea      [x]  Other:respirations relaxed lungs diminished   Abdomen: [x] Soft/non-tender      []  Normal appearance      [] Distended      [] Ascites      [x] Other:denies nausea and vomiting   Neurological: [x] Normal Speech      [x] Normal Sensation      []  Deficits present:      Extremity:  [] normal skin color/temp      [] clubbing/cyanosis      []  No edema      [x] Other: right aka with dressing on   Palliative Performance Scale:  ___60%  Ambulation reduced; Significant disease; Can't do hobbies/housework; intake normal or reduced; occasional assist; LOC full/confusion  ___50%  Mainly sit/lie; Extensive disease; Can't do any work; Considerable assist; intake normal or reduced; LOC full/confusion  __x_40%  Mainly in bed; Extensive disease; Mainly assist; intake normal or reduced; LOC full/confusion   ___30%  Bed Bound; Extensive disease; Total care; intake reduced; LOCfull/confusion  ___20%  Bed Bound; Extensive disease; Total care; intake minimal; Drowsy/coma  ___10%  Bed Bound; Extensive disease;  Total care; Mouth care only; Drowsy/coma  ___0       Death      Plan Palliative Interaction:  Went in to see patient and he denies any issues or concerns at present. He states he his feeling better and ready to go to therapy for strengthening. Palliative care will continue to follow. Education/support to patient  Discharge planning/helping to coordinate care  Communications with primary service  Pharmacologic pain management  Providing support for coping/adaptation/distress of family  Providing support for coping/adaptation/distress of patient  Discussing meaning/purpose   Caregiver support/education  Continue with current plan of care  Code status clarified: Full Code  Principle Problem/Diagnosis:  Necrotizing fasciitis ([facility]    Additional Assessments:  Principal Problem:    Necrotizing fasciitis ([address]  Active Problems:    Acute kidney injury superimposed on CKD ([facility] [address] [facility]    CKD (chronic kidney disease), stage III ([address]    Uncontrolled hypertension    Type 2 diabetes mellitus with stage 3 chronic kidney disease, with long-term current use of insulin ([facility] [address]    Class 1 obesity with serious comorbidity in adult  Resolved Problems:    * No resolved hospital problems. *    1- Symptom management/ pain control     Pain Assessment:  Percocet                Anxiety:  none                          Dyspnea:  none                          Fatigue:  generalized weakness and discomfort     Other:    2- Goals of care evaluation   The patient goals of care are improve or maintain function/quality of life   Goals of care discussed with:    [x] Patient independently    [] Patient and Family    [] Family or Healthcare DPOA independently    [] Unable to discuss with patient, family/DPOA not present    3- Code Status  Full Code    4- Other recommendations  - We will continue to provide comfort and support to the patient and the family        Palliative Care will continue to follow Mr. Selvin Tinoco care as needed.     The note has been dictated by dragon, typing errors may be a possibility     Thank you for allowing Palliative Care to participate in the care of Mr. Delaney Ngo . Electronically signed by   ALBERTO Duong NP  Palliative Care Team  on 4/25/2022 at 12:02 PM    Palliative care Number [phone]. Please call with any palliative questions or concerns. Palliative Care Team is available via perfect serve or via phone.

## 2022-04-25 NOTE — PLAN OF CARE
Problem: Infection:  Goal: Will remain free from infection  Description: Will remain free from infection  4/25/2022 0257 by Barbara Norris RN  Outcome: Progressing     Problem: Safety:  Goal: Free from accidental physical injury  Description: Free from accidental physical injury  4/25/2022 0257 by Barbara Norris RN  Outcome: Progressing     Problem: Daily Care:  Goal: Daily care needs are met  Description: Daily care needs are met  4/25/2022 0257 by Barbara Norris RN  Outcome: Progressing     Problem: Pain:  Goal: Patient's pain/discomfort is manageable  Description: Patient's pain/discomfort is manageable  4/25/2022 0257 by Barbara Norris RN  Outcome: Progressing     Problem: Pain:  Goal: Pain level will decrease  Description: Pain level will decrease  4/25/2022 0257 by Barbara Norris RN  Outcome: Progressing  Note: Pain level assessment complete.    Patient educated on pain scale and control interventions  PRN pain medication given per patient request  Patient instructed to call out with new onset of pain or unrelieved pain       Problem: Pain:  Goal: Control of acute pain  Description: Control of acute pain  4/25/2022 0257 by Barbara Norris RN  Outcome: Progressing     Problem: Pain:  Goal: Control of chronic pain  Description: Control of chronic pain  4/25/2022 0257 by Barbara Norris RN  Outcome: Progressing     Problem: Skin Integrity:  Goal: Skin integrity will stabilize  Description: Skin integrity will stabilize  4/25/2022 0257 by Barbara Norris RN  Outcome: Progressing     Problem: Skin Integrity:  Goal: Will show no infection signs and symptoms  Description: Will show no infection signs and symptoms  4/25/2022 0257 by Barbara Norris RN  Outcome: Progressing     Problem: Skin Integrity:  Goal: Absence of new skin breakdown  Description: Absence of new skin breakdown  4/25/2022 0257 by Barbara Norris RN  Outcome: Progressing     Problem: Skin Integrity:  Goal: Risk for impaired skin integrity will decrease  Description: Risk for impaired skin integrity will decrease  4/25/2022 0257 by Alfonso Cho RN  Outcome: Progressing     Problem: Falls - Risk of:  Goal: Will remain free from falls  Description: Will remain free from falls  4/25/2022 0257 by Alfonso Cho RN  Outcome: Progressing     Problem: Falls - Risk of:  Goal: Absence of physical injury  Description: Absence of physical injury  4/25/2022 0257 by Alfonso Cho RN  Outcome: Progressing     Problem:  Activity:  Goal: Risk for activity intolerance will decrease  Description: Risk for activity intolerance will decrease  4/25/2022 0257 by Alfonso Cho RN  Outcome: Progressing     Problem: Coping:  Goal: Ability to adjust to condition or change in health will improve  Description: Ability to adjust to condition or change in health will improve  4/25/2022 0257 by Alfonso Cho RN  Outcome: Progressing

## 2022-04-26 LAB
GLUCOSE BLD-MCNC: 107 MG/DL (ref 75–110)
GLUCOSE BLD-MCNC: 120 MG/DL (ref 75–110)
GLUCOSE BLD-MCNC: 144 MG/DL (ref 75–110)
GLUCOSE BLD-MCNC: 185 MG/DL (ref 75–110)
SARS-COV-2, RAPID: NOT DETECTED
SPECIMEN DESCRIPTION: NORMAL

## 2022-04-26 PROCEDURE — 2580000003 HC RX 258: Performed by: STUDENT IN AN ORGANIZED HEALTH CARE EDUCATION/TRAINING PROGRAM

## 2022-04-26 PROCEDURE — 97535 SELF CARE MNGMENT TRAINING: CPT

## 2022-04-26 PROCEDURE — 99232 SBSQ HOSP IP/OBS MODERATE 35: CPT | Performed by: INTERNAL MEDICINE

## 2022-04-26 PROCEDURE — 6370000000 HC RX 637 (ALT 250 FOR IP): Performed by: STUDENT IN AN ORGANIZED HEALTH CARE EDUCATION/TRAINING PROGRAM

## 2022-04-26 PROCEDURE — 97530 THERAPEUTIC ACTIVITIES: CPT

## 2022-04-26 PROCEDURE — 97110 THERAPEUTIC EXERCISES: CPT

## 2022-04-26 PROCEDURE — 6370000000 HC RX 637 (ALT 250 FOR IP): Performed by: INTERNAL MEDICINE

## 2022-04-26 PROCEDURE — 82947 ASSAY GLUCOSE BLOOD QUANT: CPT

## 2022-04-26 PROCEDURE — 6370000000 HC RX 637 (ALT 250 FOR IP): Performed by: HOSPITALIST

## 2022-04-26 PROCEDURE — 2060000000 HC ICU INTERMEDIATE R&B

## 2022-04-26 PROCEDURE — 87635 SARS-COV-2 COVID-19 AMP PRB: CPT

## 2022-04-26 RX ADMIN — SODIUM CHLORIDE, PRESERVATIVE FREE 5 ML: 5 INJECTION INTRAVENOUS at 10:14

## 2022-04-26 RX ADMIN — SODIUM BICARBONATE 650 MG: 650 TABLET ORAL at 21:10

## 2022-04-26 RX ADMIN — FERROUS SULFATE TAB EC 325 MG (65 MG FE EQUIVALENT) 325 MG: 325 (65 FE) TABLET DELAYED RESPONSE at 10:12

## 2022-04-26 RX ADMIN — INSULIN GLARGINE 20 UNITS: 100 INJECTION, SOLUTION SUBCUTANEOUS at 10:11

## 2022-04-26 RX ADMIN — HYDRALAZINE HYDROCHLORIDE 25 MG: 25 TABLET, FILM COATED ORAL at 05:55

## 2022-04-26 RX ADMIN — METOPROLOL TARTRATE 25 MG: 25 TABLET, FILM COATED ORAL at 17:24

## 2022-04-26 RX ADMIN — METOPROLOL TARTRATE 25 MG: 25 TABLET, FILM COATED ORAL at 10:12

## 2022-04-26 RX ADMIN — ATORVASTATIN CALCIUM 10 MG: 10 TABLET, FILM COATED ORAL at 10:12

## 2022-04-26 RX ADMIN — SODIUM CHLORIDE, PRESERVATIVE FREE 10 ML: 5 INJECTION INTRAVENOUS at 21:10

## 2022-04-26 RX ADMIN — AMLODIPINE BESYLATE 5 MG: 5 TABLET ORAL at 10:12

## 2022-04-26 RX ADMIN — INSULIN LISPRO 1 UNITS: 100 INJECTION, SOLUTION INTRAVENOUS; SUBCUTANEOUS at 21:10

## 2022-04-26 RX ADMIN — OXYCODONE AND ACETAMINOPHEN 2 TABLET: 5; 325 TABLET ORAL at 10:12

## 2022-04-26 RX ADMIN — CLOPIDOGREL BISULFATE 75 MG: 75 TABLET ORAL at 10:12

## 2022-04-26 RX ADMIN — HYDRALAZINE HYDROCHLORIDE 25 MG: 25 TABLET, FILM COATED ORAL at 13:19

## 2022-04-26 RX ADMIN — OXYCODONE AND ACETAMINOPHEN 2 TABLET: 5; 325 TABLET ORAL at 00:09

## 2022-04-26 RX ADMIN — OXYCODONE AND ACETAMINOPHEN 2 TABLET: 5; 325 TABLET ORAL at 05:54

## 2022-04-26 RX ADMIN — OXYCODONE AND ACETAMINOPHEN 2 TABLET: 5; 325 TABLET ORAL at 17:23

## 2022-04-26 RX ADMIN — INSULIN GLARGINE 10 UNITS: 100 INJECTION, SOLUTION SUBCUTANEOUS at 21:10

## 2022-04-26 RX ADMIN — SODIUM BICARBONATE 650 MG: 650 TABLET ORAL at 10:12

## 2022-04-26 RX ADMIN — HYDRALAZINE HYDROCHLORIDE 25 MG: 25 TABLET, FILM COATED ORAL at 21:10

## 2022-04-26 RX ADMIN — INSULIN LISPRO 2 UNITS: 100 INJECTION, SOLUTION INTRAVENOUS; SUBCUTANEOUS at 12:20

## 2022-04-26 RX ADMIN — OXYCODONE AND ACETAMINOPHEN 2 TABLET: 5; 325 TABLET ORAL at 23:57

## 2022-04-26 ASSESSMENT — ENCOUNTER SYMPTOMS
ALLERGIC/IMMUNOLOGIC NEGATIVE: 1
RESPIRATORY NEGATIVE: 1
GASTROINTESTINAL NEGATIVE: 1

## 2022-04-26 ASSESSMENT — PAIN DESCRIPTION - DESCRIPTORS
DESCRIPTORS: ACHING
DESCRIPTORS: ACHING;THROBBING
DESCRIPTORS: ACHING;THROBBING
DESCRIPTORS: ACHING
DESCRIPTORS: SHARP;THROBBING

## 2022-04-26 ASSESSMENT — PAIN DESCRIPTION - LOCATION
LOCATION: LEG

## 2022-04-26 ASSESSMENT — PAIN SCALES - GENERAL
PAINLEVEL_OUTOF10: 10
PAINLEVEL_OUTOF10: 5
PAINLEVEL_OUTOF10: 0
PAINLEVEL_OUTOF10: 8
PAINLEVEL_OUTOF10: 0

## 2022-04-26 ASSESSMENT — PAIN DESCRIPTION - PAIN TYPE
TYPE: SURGICAL PAIN
TYPE: SURGICAL PAIN

## 2022-04-26 ASSESSMENT — PAIN DESCRIPTION - ORIENTATION
ORIENTATION: LEFT

## 2022-04-26 ASSESSMENT — PAIN DESCRIPTION - FREQUENCY
FREQUENCY: CONTINUOUS
FREQUENCY: CONTINUOUS

## 2022-04-26 ASSESSMENT — PAIN DESCRIPTION - DIRECTION
RADIATING_TOWARDS: KNEE
RADIATING_TOWARDS: KNEE

## 2022-04-26 ASSESSMENT — PAIN - FUNCTIONAL ASSESSMENT
PAIN_FUNCTIONAL_ASSESSMENT: PREVENTS OR INTERFERES SOME ACTIVE ACTIVITIES AND ADLS
PAIN_FUNCTIONAL_ASSESSMENT: PREVENTS OR INTERFERES WITH ALL ACTIVE AND SOME PASSIVE ACTIVITIES
PAIN_FUNCTIONAL_ASSESSMENT: PREVENTS OR INTERFERES WITH ALL ACTIVE AND SOME PASSIVE ACTIVITIES
PAIN_FUNCTIONAL_ASSESSMENT: PREVENTS OR INTERFERES SOME ACTIVE ACTIVITIES AND ADLS

## 2022-04-26 ASSESSMENT — PAIN DESCRIPTION - ONSET
ONSET: ON-GOING
ONSET: ON-GOING

## 2022-04-26 NOTE — CARE COORDINATION
Social Work-[facility] received precert, but they can not admit until tomorrow. Arranged [facility]  to transport at [facility].. Discussed with patient. He is agreeable HENS completed. Nurse to call report to [phone].  Veronica Londono

## 2022-04-26 NOTE — PLAN OF CARE
Problem: Pain:  Goal: Patient's pain/discomfort is manageable  Description: Patient's pain/discomfort is manageable  4/26/2022 1003 by Keara Campo RN  Outcome: Progressing  4/26/2022 0041 by Marla Fuentes RN  Outcome: Progressing     Problem: Pain:  Goal: Pain level will decrease  Description: Pain level will decrease  4/26/2022 1003 by Keara Campo RN  Outcome: Progressing  4/26/2022 0041 by Marla Fuentes RN  Outcome: Progressing  Note: Pain level assessment complete.    Patient educated on pain scale and control interventions  PRN pain medication given per patient request  Patient instructed to call out with new onset of pain or unrelieved pain       Problem: Pain:  Goal: Control of acute pain  Description: Control of acute pain  4/26/2022 1003 by Keara Campo RN  Outcome: Progressing  4/26/2022 0041 by Marla Fuentes RN  Outcome: Progressing

## 2022-04-26 NOTE — PROGRESS NOTES
[facility]  Office: [address], DO, Hong Corral, DO, Quiana Bazan, DO, Esther Longond Blood, DO, Demetria Billy MD, Govind Marcano MD, Arlen Gunn MD, Hernan Rizzo MD, Inder Barragan MD, Rcico Ojeda MD, Miguel A Flowers MD, Joan Odonnell, DO, Mehrdad Daniels, DO, Alejandra Gunn MD,  Rob Govea DO, Griffin Cleveland MD, Rossi Rodriguez MD, Saundra Thomas MD, Agata Yip DO, Maira Dickson MD, Igor Bedoya MD, Terri Nunez [facility], [facility], CNP, Kira Kumar, CNP, Griffin Grain, CNP, Shaggy Ing, CNP, Cuco Johnson, CNP, HAM HowardC, Rosi Rose, DNP, Rhett Rapp, CNP, Donato Dodd, CNP, Rai Robert, CNP, Marvin Jerry, CNS, Tonya Bartlett, [facility], Boston Braxton, CNP, Lakhwinder Romo, CNP, Davy Putnam, [facility]    Progress Note    4/26/2022    8:15 AM    Name:   Marisa Sanchez  MRN:     [id number]     Acct:      [de-identified]   Room:   2008/2008-02  IP Day:  15  Admit Date:  4/11/2022  5:25 PM    PCP:   Herman Barragan  Code Status:  Full Code    Subjective:     C/C:   Chief Complaint   Patient presents with    Wound Infection     Sent over by PCP for wound evaluation in foot (left)      Interval History Status: improved. Patient is resting company, denies any complaints of chest pain, shortness of breath, nausea vomiting, fevers or chills or acute issues. Brief History: This is a 78-year-old male that presents with left lower extremity wound with osteomyelitis. He underwent routine investigation with vascular surgery, podiatry as well as infectious disease and required amputation. Attempt was made for below-knee amputation but due to severe peripheral arterial disease an AKA was required.     Review of Systems:     Constitutional:  negative for chills, fevers, sweats  Respiratory:  negative for cough, dyspnea on exertion, shortness of breath, wheezing  Cardiovascular: negative for chest pain, chest pressure/discomfort, lower extremity edema, palpitations  Gastrointestinal:  negative for abdominal pain, constipation, diarrhea, nausea, vomiting  Neurological:  negative for dizziness, headache    Medications:      Allergies:  No Known Allergies    Current Meds:   Scheduled Meds:    hydrALAZINE  25 mg Oral 3 times per day    insulin glargine  20 Units SubCUTAneous Daily    insulin glargine  10 Units SubCUTAneous Nightly    sodium bicarbonate  650 mg Oral BID    amLODIPine  5 mg Oral Daily    clopidogrel  75 mg Oral Daily    docusate sodium  100 mg Oral BID    ferrous sulfate  325 mg Oral Daily with breakfast    [Held by provider] furosemide  40 mg Oral Every Other Day    metoprolol tartrate  25 mg Oral BID    atorvastatin  10 mg Oral Daily    sodium chloride flush  5-40 mL IntraVENous 2 times per day    insulin lispro  0-12 Units SubCUTAneous TID WC    insulin lispro  0-6 Units SubCUTAneous Nightly     Continuous Infusions:    sodium chloride      dextrose      sodium chloride 100 mL/hr at 04/24/22 1910     PRN Meds: sodium chloride, oxyCODONE-acetaminophen **OR** oxyCODONE-acetaminophen, traMADol, glucagon (rDNA), dextrose, sodium chloride flush, sodium chloride, potassium chloride **OR** potassium alternative oral replacement **OR** potassium chloride, magnesium sulfate, ondansetron **OR** ondansetron, polyethylene glycol, acetaminophen **OR** acetaminophen, dextrose bolus (hypoglycemia) **OR** dextrose bolus (hypoglycemia), glucose, hydrALAZINE    Data:     Past Medical History:   has a past medical history of RISSA (acute kidney injury) ([facility] [address] Cerebrovascular disease, Erectile dysfunction, Hepatitis B infection, Hyperlipidemia, Hypertension, Kidney cysts, Liver cyst, MRSA (methicillin resistant staph aureus) culture positive, Neurotrophic ulcer of the foot ([facility] [address] Obesity, Osteomyelitis of ankle or foot, Peripheral vascular disease [facility] Tobacco abuse, and Type II or unspecified type diabetes mellitus without mention of complication, not stated as uncontrolled. Social History:   reports that he has been smoking cigarettes. He has a 15.00 pack-year smoking history. He has never used smokeless tobacco. He reports that he does not drink alcohol and does not use drugs. Family History:   Family History   Problem Relation Age of Onset    Diabetes Mother     Diabetes Sister     Diabetes Brother     Diabetes Sister     Diabetes Sister     Diabetes Sister        Vitals:  BP (!) 132/51   Pulse 78   Temp 98.4 °F (36.9 °C) (Oral)   Resp 17   Ht 6' (1.829 m)   Wt 174 lb 1.6 oz (79 kg)   SpO2 98%   BMI 23.61 kg/m²   Temp (24hrs), Av °F (36.7 °C), Min:97.5 °F (36.4 °C), Max:98.4 °F (36.9 °C)    Recent Labs     22  1140 22  16222  0609   POCGLU 250* 130* 120* 120*       I/O (24Hr): Intake/Output Summary (Last 24 hours) at 2022 0815  Last data filed at 2022 0556  Gross per 24 hour   Intake --   Output 850 ml   Net -850 ml       Labs:  Hematology:No results for input(s): WBC, RBC, HGB, HCT, MCV, MCH, MCHC, RDW, PLT, MPV, SEDRATE, CRP, INR, DDIMER, CX7RESTL, LABABSO in the last 72 hours. Invalid input(s): PT  Chemistry:No results for input(s): NA, K, CL, CO2, GLUCOSE, BUN, CREATININE, MG, ANIONGAP, LABGLOM, GFRAA, CALCIUM, CAION, PHOS, PSA, PROBNP, TROPHS, CKTOTAL, CKMB, CKMBINDEX, MYOGLOBIN, DIGOXIN, LACTACIDWB in the last 72 hours.   Recent Labs     22  0617 22  0723 22  1140 22  1626 22  0609   POCGLU 67* 135* 250* 130* 120* 120*     ABG:No results found for: POCPH, PHART, PH, POCPCO2, SKI7SXS, PCO2, POCPO2, PO2ART, PO2, POCHCO3, NWJ8ITI, HCO3, NBEA, PBEA, BEART, BE, THGBART, THB, SRD6FHB, PCZG6RQL, C1SUGEAN, O2SAT, FIO2  Lab Results   Component Value Date/Time    SPECIAL LT FA,7ML 2022 06:38 PM     Lab Results   Component Value Date/Time    CULTURE NO SIGNIFICANT GROWTH 04/24/2022 04:15 PM       Radiology:  No results found. Physical Examination:        General appearance:  alert, cooperative and no distress  Mental Status:  oriented to person, place and time and normal affect  Lungs:  clear to auscultation bilaterally, normal effort  Heart:  regular rate and rhythm, no murmur  Abdomen:  soft, nontender, nondistended, normal bowel sounds, no masses, hepatomegaly, splenomegaly  Extremities:  no edema, redness, tenderness in the calves  Skin:  no gross lesions, rashes, induration    Assessment:        Hospital Problems           Last Modified POA    * (Principal) Necrotizing fasciitis ([address] 4/12/2022 Yes    Acute kidney injury superimposed on CKD ([address] 4/11/2022 Yes    CKD (chronic kidney disease), stage III ([address] 4/11/2022 Yes    Uncontrolled hypertension 4/12/2022 Yes    Type 2 diabetes mellitus with stage 3 chronic kidney disease, with long-term current use of insulin ([address] 4/12/2022 Yes    Class 1 obesity with serious comorbidity in adult 4/12/2022 Yes          Plan:        1. Continue stump care, surgery following. Monitor off antibiotics  2. Insulin scale for glycemic control  3. Monitor renal function, avoid nephrotoxic agents  4. Trend electrolytes, correct as needed  5. PT and OT   6. antihypertensives as ordered  7.  Discharge planning to skilled facility pending Bety Conklin DO  4/26/2022  8:15 AM

## 2022-04-26 NOTE — PLAN OF CARE
Problem: Infection:  Goal: Will remain free from infection  Description: Will remain free from infection  Outcome: Progressing     Problem: Safety:  Goal: Free from accidental physical injury  Description: Free from accidental physical injury  Outcome: Progressing     Problem: Daily Care:  Goal: Daily care needs are met  Description: Daily care needs are met  Outcome: Progressing     Problem: Pain:  Goal: Patient's pain/discomfort is manageable  Description: Patient's pain/discomfort is manageable  Outcome: Progressing  Goal: Pain level will decrease  Description: Pain level will decrease  Outcome: Progressing  Note: Pain level assessment complete. Patient educated on pain scale and control interventions  PRN pain medication given per patient request  Patient instructed to call out with new onset of pain or unrelieved pain    Goal: Control of acute pain  Description: Control of acute pain  Outcome: Progressing  Goal: Control of chronic pain  Description: Control of chronic pain  Outcome: Progressing     Problem: Skin Integrity:  Goal: Skin integrity will stabilize  Description: Skin integrity will stabilize  Outcome: Progressing     Problem: Discharge Planning:  Goal: Patients continuum of care needs are met  Description: Patients continuum of care needs are met  Outcome: Progressing     Problem: Skin Integrity:  Goal: Will show no infection signs and symptoms  Description: Will show no infection signs and symptoms  Outcome: Progressing  Note: Checked for incontinence every 2 hours and prn. Pericare as needed. Assisted to reposition off back frequently. Heels off bed with pillows.     Goal: Absence of new skin breakdown  Description: Absence of new skin breakdown  Outcome: Progressing  Goal: Risk for impaired skin integrity will decrease  Description: Risk for impaired skin integrity will decrease  Outcome: Progressing     Problem: Falls - Risk of:  Goal: Will remain free from falls  Description: Will remain free from falls  Outcome: Progressing  Goal: Absence of physical injury  Description: Absence of physical injury  Outcome: Progressing     Problem:  Activity:  Goal: Risk for activity intolerance will decrease  Description: Risk for activity intolerance will decrease  Outcome: Progressing     Problem: Fluid Volume:  Goal: Ability to maintain a balanced intake and output will improve  Description: Ability to maintain a balanced intake and output will improve  Outcome: Progressing     Problem: Health Behavior:  Goal: Ability to identify and utilize available resources and services will improve  Description: Ability to identify and utilize available resources and services will improve  Outcome: Progressing  Goal: Ability to manage health-related needs will improve  Description: Ability to manage health-related needs will improve  Outcome: Progressing

## 2022-04-26 NOTE — ACP (ADVANCE CARE PLANNING)
Advance Care Planning     Advance Care Planning Activator (Inpatient)  Conversation Note      Date of ACP Conversation: 4/26/2022     Conversation Conducted with: Patient with Decision Making Capacity    ACP Activator: Michael Plummer RN    {When Decision Maker makes decisions on behalf of the incapacitated patient: Decision Maker is asked to consider and make decisions based on patient values, known preferences, or best interests. Health Care Decision Maker:     Current Designated Health Care Decision Maker:     Primary Decision Maker: Sergey Renetta - Brother/Sister - [phone]  Click here to complete Healthcare Decision Makers including section of the Healthcare Decision Maker Relationship (ie \"Primary\")    Care Preferences    Ventilation: \"If you were in your present state of health and suddenly became very ill and were unable to breathe on your own, what would your preference be about the use of a ventilator (breathing machine) if it were available to you? \"      Would the patient desire the use of ventilator (breathing machine)?: yes    \"If your health worsens and it becomes clear that your chance of recovery is unlikely, what would your preference be about the use of a ventilator (breathing machine) if it were available to you? \"     Would the patient desire the use of ventilator (breathing machine)?: No      Resuscitation  \"CPR works best to restart the heart when there is a sudden event, like a heart attack, in someone who is otherwise healthy. Unfortunately, CPR does not typically restart the heart for people who have serious health conditions or who are very sick. \"    \"In the event your heart stopped as a result of an underlying serious health condition, would you want attempts to be made to restart your heart (answer \"yes\" for attempt to resuscitate) or would you prefer a natural death (answer \"no\" for do not attempt to resuscitate)? \" yes       [] Yes   [x] No   Educated Patient / Willi Escobar regarding differences between Advance Directives and portable DNR orders.     Length of ACP Conversation in minutes:  5    Conversation Outcomes:  [x] ACP discussion completed  [] Existing advance directive reviewed with patient; no changes to patient's previously recorded wishes  [] New Advance Directive completed  [] Portable Do Not Rescitate prepared for Provider review and signature  [] POLST/POST/MOLST/MOST prepared for Provider review and signature      Follow-up plan:    [] Schedule follow-up conversation to continue planning  [] Referred individual to Provider for additional questions/concerns   [] Advised patient/agent/surrogate to review completed ACP document and update if needed with changes in condition, patient preferences or care setting    [] This note routed to one or more involved healthcare providers

## 2022-04-26 NOTE — PROGRESS NOTES
Physical Therapy  Facility/Department: [facility]  Daily Treatment Note  NAME: Caroline Harper  : 1950  MRN: [id number]    Date of Service: 2022    Discharge Recommendations:  Patient would benefit from continued therapy after discharge   PT Equipment Recommendations  Equipment Needed: Yes  Other: Slide board    Patient Diagnosis(es): The primary encounter diagnosis was Necrotizing fasciitis of ankle and foot ([facility] [address] Diagnoses of RISSA (acute kidney injury) ([facility] [address] and Necrotizing fasciitis ([facility] [address] were also pertinent to this visit. Assessment   Assessment: Patient declined sliding board transfer but agreeable for bed mobility to [facility] and change brief, boost to [facility]. Required Mod x 2 A. Will continued to progress sliding board transfers to improve independent with set up, slide board mgmt and safety. Activity Tolerance: Patient limited by fatigue;Patient limited by endurance; Patient limited by pain  Equipment Needed: Yes     Plan    Plan  Plan: 5-7 times per week  Current Treatment Recommendations: Strengthening;Balance training;Functional mobility training;Transfer training; Endurance training;Patient/Caregiver education & training; Safety education & training;Home exercise program;Therapeutic activities     Subjective    Subjective  Subjective: Pt agreeable to PT session and transfer to power wheelchair  Pain: Patient reported the pain in his Lt residual limb has been decreasing every day. Orientation  Overall Orientation Status: Within Normal Limits  Cognition  Overall Cognitive Status: Exceptions  Arousal/Alertness: Appropriate responses to stimuli  Following Commands: Follows one step commands with increased time; Follows one step commands with repetition  Attention Span: Appears intact; Attends with cues to redirect  Memory: Decreased short term memory  Safety Judgement: Decreased awareness of need for assistance;Decreased awareness of need for safety  Problem Solving: Assistance required to identify errors made;Assistance required to correct errors made;Decreased awareness of errors;Assistance required to generate solutions;Assistance required to implement solutions  Insights: Decreased awareness of deficits  Initiation: Requires cues for some  Sequencing: Requires cues for some  Cognition Comment: Pt has difficulty processing/following commands. Objective   Vitals     Bed Mobility Training  Bed Mobility Training: Yes  Overall Level of Assistance: Moderate assistance;Assist X 1-2  Interventions: Verbal cues;Manual cues; Tactile cues; Safety awareness training  Rolling: Moderate assistance  Boost to [facility]: Moderate assistance; 2 assistance  Transfer Training  Transfer Training:  (Patient refused sliding board to chair today)     PT Exercises  Exercise Treatment: Returned for supine carson LE hip and Rt LE knee ROM x 10 reps to [facility] joint mobility. AM-PAC inpatient mobility RAW SCORE: 9    Patient Education  Education Given To: Patient  Education Provided: Role of Therapy;Plan of Care;Transfer Training  Education Method: Verbal  Barriers to Learning: None  Education Outcome: Verbalized understanding;Demonstrated understanding;Continued education needed    Goals  Short Term Goals  Time Frame for Short term goals: 12 visits  Short term goal 1: Patient will be indep with bed mobility. Short term goal 2: Patient will be min x 1 with slide board transfers. Short term goal 3: Patient will have good dynamic seated balance. Short term goal 4: Patient to perform sit<>stand with Min A  Short term goal 5: Patient will tolerate 30 minutes of ther act  Patient Goals   Patient goals : return home      Therapy Time   Individual Concurrent Group Co-treatment   Time In [phone]   Time Out 3120     6228   Minutes 18     9         Co-treatment with OT warranted secondary to decreased safety and independence requiring 2 skilled therapy professionals to address individual discipline's goals.  PT addressing pre gait trunk strengthening, weight shifting prior to transfers, transfer training and postural control in sitting.   Claire Stevens, PTA

## 2022-04-26 NOTE — PROGRESS NOTES
[facility]  Progress Note    Irvin Andino  MRN: [id number]  Date: 2022  LOS: 13     Reason for F/U :   Left diabetic foot infection. Impression :   1. Left diabetic foot infection with suspected necrotizing fasciitis and calcaneal osteomyelitis  · Status post incision and debridement 2022  · Status post above-the-knee amputation 2022  2. Peripheral vascular disease  3. Diabetes mellitus type 2  4. Chronic renal disease  5. History of right below the knee amputation    Recommendations:   · Infectious disease wise the patient is doing well has completed course of antibiotics and currently remains off antimicrobial therapy. · Continue local wound care    Infection Control Recommendations:   Universal precautions    Discharge Planning:   Patient will need Midline Catheter Insertion/ PICC line Insertion: No  Patient will need: Home IV , [place],  SNF,  LTAC: Undetermined  Patient willneed outpatient wound care: Yes    Medical Decision making / Summary of Stay:   Ashley hutchinson 67y.o.-year-old male was sent to hospital from Dr. Jasmin Colby office for worsening left heel pain associated with left heel wound with surrounding dark discoloration and redness. The pain is severe, intermittent, no alleviating or aggravating factors.     Left foot x-ray showed soft tissue emphysema within the posterior aspect of the distal left lower leg concerning for necrotizing fasciitis and possible calcaneal osteomyelitis.   Initial WBC 18.5, creatinine 2.44, C-reactive protein more than 130, C-reactive protein 104.2, lactic acid 0 point    Current evaluation:2022    BP (!) 141/43   Pulse 79   Temp 98.6 °F (37 °C) (Oral)   Resp 17   Ht 6' (1.829 m)   Wt 174 lb 1.6 oz (79 kg)   SpO2 99%   BMI 23.61 kg/m²     Temperature Range: Temp: 98.6 °F (37 °C) Temp  Av.2 °F (36.8 °C)  Min: 97.5 °F (36.4 °C)  Max: 98.6 °F (37 °C)  The patient is seen and evaluated at bedside he is awake and alert in no acute distress. No subjective fevers or chills. No abdominal pain nausea vomiting or diarrhea. Does have some pain at the left AKA stump. Review of Systems   Constitutional: Negative. Respiratory: Negative. Cardiovascular: Negative. Gastrointestinal: Negative. Genitourinary: Negative. Musculoskeletal: Negative. Allergic/Immunologic: Negative. Neurological: Negative. Physical Examination :     Physical Exam  Constitutional:       Appearance: He is well-developed. HENT:      Head: Normocephalic and atraumatic. Cardiovascular:      Rate and Rhythm: Normal rate. Heart sounds: Normal heart sounds. No friction rub. No gallop. Pulmonary:      Effort: Pulmonary effort is normal.      Breath sounds: Normal breath sounds. No wheezing. Abdominal:      General: Bowel sounds are normal. There is distension. Palpations: Abdomen is soft. There is no mass. Tenderness: There is no abdominal tenderness. Musculoskeletal:      Cervical back: Neck supple. Comments: Left AKA stump dressing in place. The right has a BKA and the stump is intact. Lymphadenopathy:      Cervical: No cervical adenopathy. Skin:     General: Skin is warm and dry. Neurological:      Mental Status: He is alert and oriented to person, place, and time. Laboratory data:   I have independently reviewed the followinglabs:  CBC with Differential: No results for input(s): WBC, HGB, HCT, PLT, SEGSPCT, BANDSPCT, LYMPHOPCT, MONOPCT, EOSPCT in the last 72 hours. BMP: No results for input(s): NA, K, CL, CO2, BUN, CREATININE, CA, MG in the last 72 hours. Hepatic Function Panel: No results for input(s): PROT, LABALBU, BILIDIR, IBILI, BILITOT, ALKPHOS, ALT, AST in the last 72 hours.       No results found for: PROCAL  Lab Results   Component Value Date    .1 04/14/2022    .2 04/11/2022    CRP 32.2 06/21/2020     Lab Results   Component Value Date    SEDRATE >130 (H) 04/11/2022         No results found for: DDIMER  Lab Results   Component Value Date    FERRITIN 331 03/13/2014    FERRITIN 317 12/15/2013    FERRITIN 153 08/31/2013    FERRITIN 185 10/05/2011     No results found for: LDH  No results found for: FIBRINOGEN    No results found for requested labs within last 30 days. Lab Results   Component Value Date    COVID19 Not Detected 06/19/2020       No results for input(s): VANCOTROUGH in the last 72 hours. Imaging Studies:   No new imaging    Cultures:     Culture, Urine [[id number]] Collected: 04/24/22 1615   Order Status: Completed Specimen: Urine, clean catch Updated: 04/25/22 2032    Specimen Description . CLEAN CATCH URINE    Culture NO SIGNIFICANT GROWTH   Culture, Urine [[id number]] Collected: 04/18/22 1115   Order Status: Completed Specimen: Urine Updated: 04/19/22 1338    Specimen Description . URINE    Culture YEAST >698889 CFU/ML   Culture, Fungus [[id number]] Collected: 04/12/22 1736   Order Status: Completed Specimen: Bone from Foot Updated: 04/19/22 1119    Specimen Description . FOOT    Culture YEAST ISOLATED     KARO GLABRATA   Culture, Anaerobic and Aerobic [[id number]] (Abnormal)  Collected: 04/12/22 1736   Order Status: Completed Specimen: Bone from Foot Updated: 04/17/22 0948    Specimen Description . FOOT    Direct Exam RARE NEUTROPHILS Abnormal      FEW GRAM POSITIVE COCCI IN PAIRS Abnormal      RARE GRAM NEGATIVE RODS Abnormal     Culture PROTEUS MIRABILIS HEAVY GROWTH Abnormal      STREPTOCOCCI, BETA HEMOLYTIC GROUP B MODERATE GROWTH Abnormal      Mixed skin tara     Unable to determine the presence or absence of anaerobes due to swarming proteus. Culture, Blood 1 [[id number]] Collected: 04/11/22 1838   Order Status: Completed Specimen: Blood Updated: 04/16/22 2301    Specimen Description . BLOOD    Special Requests LT FA,7ML    Culture NO GROWTH 5 DAYS   Culture, Blood 1 [[id number]] Collected: 04/11/22 1818   Order Status: Completed Specimen: Blood Updated: 04/16/22 2300    Specimen Description . BLOOD    Special Requests RT HAND,2.5ML    Culture NO GROWTH 5 DAYS   Fungal stain [[id number]] Collected: 04/12/22 0880   Order Status: Completed Specimen: Bone from Foot Updated: 04/13/22 1115    Specimen Description . FOOT    Direct Exam NO FUNGAL ELEMENTS SEEN           Medications:      hydrALAZINE  25 mg Oral 3 times per day    insulin glargine  20 Units SubCUTAneous Daily    insulin glargine  10 Units SubCUTAneous Nightly    sodium bicarbonate  650 mg Oral BID    amLODIPine  5 mg Oral Daily    clopidogrel  75 mg Oral Daily    docusate sodium  100 mg Oral BID    ferrous sulfate  325 mg Oral Daily with breakfast    [Held by provider] furosemide  40 mg Oral Every Other Day    metoprolol tartrate  25 mg Oral BID    atorvastatin  10 mg Oral Daily    sodium chloride flush  5-40 mL IntraVENous 2 times per day    insulin lispro  0-12 Units SubCUTAneous TID WC    insulin lispro  0-6 Units SubCUTAneous Nightly           [facility]  Troy Garcia MD  [employer]  OFFICE: [phone]      Electronically signed by Troy Garcia MD on 4/26/2022 at 12:01 PM  Thank you for allowing us to participate in the care of this patient. Please call with questions. This note iscreated with the assistance of a speech recognition program.  While intending to generate a document that actually reflects the content of the visit, the document can still have some errors including those of syntax andsound a like substitutions which may escape proof reading. In such instances, actual meaning can be extrapolated by contextual diversion.

## 2022-04-26 NOTE — PROGRESS NOTES
Occupational Therapy  Facility/Department: [facility]  Rehabilitation Occupational Therapy Daily Treatment Note    Date: 22  Patient Name: Mary Vazquez       Room: 7620/5445-76  MRN: [id number]  Account: [de-identified]   : 1950  (73 y.o.) Gender: male         Pt currently functioning below baseline. Would suggest additional therapy at time of discharge to maximize long term outcomes and prevent re-admission. Please refer to AM-PAC score for current level of function. Past Medical History:  has a past medical history of RISSA (acute kidney injury) ([facility] [address] Cerebrovascular disease, Erectile dysfunction, Hepatitis B infection, Hyperlipidemia, Hypertension, Kidney cysts, Liver cyst, MRSA (methicillin resistant staph aureus) culture positive, Neurotrophic ulcer of the foot ([facility] [address] Obesity, Osteomyelitis of ankle or foot, Peripheral vascular disease ([facility] [address] Tobacco abuse, and Type II or unspecified type diabetes mellitus without mention of complication, not stated as uncontrolled. Past Surgical History:   has a past surgical history that includes Foot amputation through metatarsal (2011); Foot Amputation; Foot surgery (Right, 2013); Foot Debridement (Left, 2020); Foot Debridement (Left, 2022); above knee amputation (Left, 2022); and Leg amputation below knee (Left, 2022). Restrictions  Restrictions/Precautions: General Precautions; Fall Risk;Up as Tolerated  Other position/activity restrictions: new L AKA, R BKA with prosthesis, R UE IV, up with assist  Required Braces or Orthoses?: Yes    Subjective  Subjective: Pt in bed upon arrival, refusing to get up but does need assist with brief change and repositioning in bed. Restrictions/Precautions: General Precautions; Fall Risk;Up as Tolerated             Objective     Cognition  Overall Cognitive Status: Exceptions  Arousal/Alertness: Appropriate responses to stimuli  Following Commands:  Follows one step commands with increased time; Follows one step commands with repetition  Attention Span: Appears intact; Attends with cues to redirect  Memory: Decreased short term memory  Safety Judgement: Decreased awareness of need for assistance;Decreased awareness of need for safety  Problem Solving: Assistance required to identify errors made;Assistance required to correct errors made;Decreased awareness of errors;Assistance required to generate solutions;Assistance required to implement solutions  Insights: Decreased awareness of deficits  Initiation: Requires cues for some  Sequencing: Requires cues for some  Cognition Comment: Pt has difficulty processing/following commands. Orientation  Overall Orientation Status: Within Normal Limits         ADL  Toileting  Assistance Level: Dependent  Skilled Clinical Factors: ALL LB care done in supine position. Functional Mobility  Skilled Clinical Factors: UNABLE  Bed Mobility  Overall Assistance Level: Moderate Assistance; Requires x 2 Assistance  Additional Factors: Verbal cues; Increased time to complete; With handrails; Head of bed raised  Roll Left  Assistance Level: Moderate assistance  Skilled Clinical Factors: Verbal/tactile cues for technique and use of bed rail  Roll Right  Assistance Level: Moderate assistance  Skilled Clinical Factors: Verbal/tactile cues for technique and use of bed rail  Scooting  Assistance Level: Dependent; Requires x 2 assistance  Skilled Clinical Factors: to boost pt to [facility]         Assessment  Assessment  Assessment: Pt req's 2 staff assist for all functional tasks and is a high fall risk. Pt is limited by decreased strength and activity tolerance and req's A LOT of assist with self care. Skilled OT indicated to increase safety and IND with all functional tasks to ensure a safe return to [facility]. Activity Tolerance: Patient limited by fatigue;Patient limited by endurance; Patient limited by pain  Discharge Recommendations: Patient would benefit from continued

## 2022-04-27 VITALS
OXYGEN SATURATION: 96 % | WEIGHT: 172 LBS | SYSTOLIC BLOOD PRESSURE: 165 MMHG | DIASTOLIC BLOOD PRESSURE: 61 MMHG | TEMPERATURE: 97.3 F | HEART RATE: 85 BPM | BODY MASS INDEX: 23.3 KG/M2 | RESPIRATION RATE: 16 BRPM | HEIGHT: 72 IN

## 2022-04-27 LAB
GLUCOSE BLD-MCNC: 64 MG/DL (ref 75–110)
GLUCOSE BLD-MCNC: 90 MG/DL (ref 75–110)

## 2022-04-27 PROCEDURE — 6370000000 HC RX 637 (ALT 250 FOR IP): Performed by: STUDENT IN AN ORGANIZED HEALTH CARE EDUCATION/TRAINING PROGRAM

## 2022-04-27 PROCEDURE — 99231 SBSQ HOSP IP/OBS SF/LOW 25: CPT | Performed by: NURSE PRACTITIONER

## 2022-04-27 PROCEDURE — 2580000003 HC RX 258: Performed by: STUDENT IN AN ORGANIZED HEALTH CARE EDUCATION/TRAINING PROGRAM

## 2022-04-27 PROCEDURE — 82947 ASSAY GLUCOSE BLOOD QUANT: CPT

## 2022-04-27 PROCEDURE — 99239 HOSP IP/OBS DSCHRG MGMT >30: CPT | Performed by: INTERNAL MEDICINE

## 2022-04-27 PROCEDURE — 6370000000 HC RX 637 (ALT 250 FOR IP): Performed by: INTERNAL MEDICINE

## 2022-04-27 PROCEDURE — 6370000000 HC RX 637 (ALT 250 FOR IP): Performed by: HOSPITALIST

## 2022-04-27 RX ADMIN — ATORVASTATIN CALCIUM 10 MG: 10 TABLET, FILM COATED ORAL at 08:46

## 2022-04-27 RX ADMIN — SODIUM CHLORIDE, PRESERVATIVE FREE 10 ML: 5 INJECTION INTRAVENOUS at 08:47

## 2022-04-27 RX ADMIN — METOPROLOL TARTRATE 25 MG: 25 TABLET, FILM COATED ORAL at 08:46

## 2022-04-27 RX ADMIN — HYDRALAZINE HYDROCHLORIDE 25 MG: 25 TABLET, FILM COATED ORAL at 05:44

## 2022-04-27 RX ADMIN — CLOPIDOGREL BISULFATE 75 MG: 75 TABLET ORAL at 08:46

## 2022-04-27 RX ADMIN — OXYCODONE AND ACETAMINOPHEN 2 TABLET: 5; 325 TABLET ORAL at 09:40

## 2022-04-27 RX ADMIN — SODIUM BICARBONATE 650 MG: 650 TABLET ORAL at 08:45

## 2022-04-27 RX ADMIN — FERROUS SULFATE TAB EC 325 MG (65 MG FE EQUIVALENT) 325 MG: 325 (65 FE) TABLET DELAYED RESPONSE at 08:46

## 2022-04-27 RX ADMIN — AMLODIPINE BESYLATE 5 MG: 5 TABLET ORAL at 08:46

## 2022-04-27 RX ADMIN — OXYCODONE AND ACETAMINOPHEN 2 TABLET: 5; 325 TABLET ORAL at 05:44

## 2022-04-27 ASSESSMENT — PAIN DESCRIPTION - LOCATION
LOCATION: LEG
LOCATION: LEG

## 2022-04-27 ASSESSMENT — PAIN DESCRIPTION - FREQUENCY: FREQUENCY: CONTINUOUS

## 2022-04-27 ASSESSMENT — PAIN SCALES - GENERAL
PAINLEVEL_OUTOF10: 8
PAINLEVEL_OUTOF10: 4

## 2022-04-27 ASSESSMENT — PAIN DESCRIPTION - ONSET: ONSET: ON-GOING

## 2022-04-27 ASSESSMENT — ENCOUNTER SYMPTOMS
GASTROINTESTINAL NEGATIVE: 1
RESPIRATORY NEGATIVE: 1

## 2022-04-27 ASSESSMENT — PAIN - FUNCTIONAL ASSESSMENT: PAIN_FUNCTIONAL_ASSESSMENT: PREVENTS OR INTERFERES SOME ACTIVE ACTIVITIES AND ADLS

## 2022-04-27 ASSESSMENT — PAIN DESCRIPTION - ORIENTATION
ORIENTATION: LEFT
ORIENTATION: LEFT

## 2022-04-27 ASSESSMENT — PAIN DESCRIPTION - PAIN TYPE: TYPE: SURGICAL PAIN

## 2022-04-27 ASSESSMENT — PAIN DESCRIPTION - DESCRIPTORS
DESCRIPTORS: THROBBING
DESCRIPTORS: THROBBING

## 2022-04-27 NOTE — PROGRESS NOTES
[facility]  Progress Note    Elodia Garvey  MRN: [id number]  Date: 4/27/2022  LOS: 12     Reason for F/U :   Left diabetic foot infection    Impression :   1. 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 Peripheral vascular disease  3. Diabetes mellitus type 2  4. Chronic renal disease  5. History of right below the knee amputation    Recommendations:   · Patient is doing well from an infectious disease standpoint  · He has completed a course of antibiotics and can continue off antimicrobial therapy at this time  · Patient is okay for discharge from an infectious disease standpoint    Infection Control Recommendations:   Universal precautions    Discharge Planning:   Estimated Length of IV antimicrobials: 4/21/2022  Patient will need Midline Catheter Insertion/ PICC line Insertion: No  Patient will need: Home IV , [place],  SNF,  LTAC: Undetermined  Patient willneed outpatient wound care: No    Medical Decision making / Summary of Stay:   Initial history:  Elodia Garvey is a 67y.o.-year-old male was sent to hospital from Dr. Bearden Notice office for worsening left heel pain associated with left heel wound with surrounding dark discoloration and redness. The pain is severe, intermittent, no alleviating or aggravating factors.     Left foot x-ray showed soft tissue emphysema within the posterior aspect of the distal left lower leg concerning for necrotizing fasciitis and possible calcaneal osteomyelitis.   Initial WBC 18.5, creatinine 2.44, C-reactive protein more than 130, C-reactive protein 104.2, lactic acid 0 point  Afebrile  History of right below-knee amputation    Current evaluation:4/27/2022    BP (!) 165/61   Pulse 85   Temp 97.3 °F (36.3 °C) (Oral)   Resp 16   Ht 6' (1.829 m)   Wt 172 lb (78 kg)   SpO2 96%   BMI 23.33 kg/m² Temperature Range: Temp: 97.3 °F (36.3 °C) Temp  Av.9 °F (36.6 °C)  Min: 97.3 °F (36.3 °C)  Max: 98.6 °F (37 °C)    The patient was seen and evaluated sitting up in the bed  States he feels okay, is just frustrated with having to use a urinal  No abdominal pain or back pain, denies dysuria    Review of Systems   Constitutional: Negative. HENT: Negative. Respiratory: Negative. Cardiovascular: Negative. Gastrointestinal: Negative. Genitourinary: Negative. Musculoskeletal: Negative. Skin: Negative. Neurological: Negative. Psychiatric/Behavioral: Negative. Physical Examination :     Physical Exam  Constitutional:       General: He is not in acute distress. Appearance: Normal appearance. He is normal weight. HENT:      Head: Normocephalic and atraumatic. Pulmonary:      Effort: Pulmonary effort is normal. No respiratory distress. Musculoskeletal:      Comments: Right BKA stump well-healed  Left AKA stump with dressing in place, not removed         Laboratory data:   I have independently reviewed the followinglabs:  CBC with Differential: No results for input(s): WBC, HGB, HCT, PLT, SEGSPCT, BANDSPCT, LYMPHOPCT, MONOPCT, EOSPCT in the last 72 hours. BMP:   No results for input(s): NA, K, CL, CO2, BUN, CREATININE, CA, MG in the last 72 hours. Hepatic Function Panel: No results for input(s): PROT, LABALBU, BILIDIR, IBILI, BILITOT, ALKPHOS, ALT, AST in the last 72 hours.       No results found for: PROCAL  Lab Results   Component Value Date    .1 2022    .2 2022    CRP 32.2 2020     Lab Results   Component Value Date    SEDRATE >130 (H) 2022         No results found for: [facility]  Lab Results   Component Value Date    FERRITIN 331 2014    FERRITIN 317 12/15/2013    FERRITIN 153 2013    FERRITIN 185 10/05/2011     No results found for: LDH  No results found for: FIBRINOGEN    Results in Past 30 Days  Result Component Current Result Ref Range Previous Result Ref Range   SARS-CoV-2, Rapid Not Detected (4/26/2022) Not Detected Not in Time Range      Lab Results   Component Value Date    COVID19 Not Detected 04/26/2022    COVID19 Not Detected 06/19/2020       No results for input(s): [facility] in the last 72 hours. Imaging Studies:   No new imaging    Cultures:     Culture, Urine [[id number]] Collected: 04/24/22 1615   Order Status: Completed Specimen: Urine, clean catch Updated: 04/25/22 2032    Specimen Description . CLEAN CATCH URINE    Culture NO SIGNIFICANT GROWTH       Medications:      hydrALAZINE  25 mg Oral 3 times per day    insulin glargine  20 Units SubCUTAneous Daily    insulin glargine  10 Units SubCUTAneous Nightly    sodium bicarbonate  650 mg Oral BID    amLODIPine  5 mg Oral Daily    clopidogrel  75 mg Oral Daily    docusate sodium  100 mg Oral BID    ferrous sulfate  325 mg Oral Daily with breakfast    [Held by provider] furosemide  40 mg Oral Every Other Day    metoprolol tartrate  25 mg Oral BID    atorvastatin  10 mg Oral Daily    sodium chloride flush  5-40 mL IntraVENous 2 times per day    insulin lispro  0-12 Units SubCUTAneous TID WC    insulin lispro  0-6 Units SubCUTAneous Nightly           [facility]  [address] [place] [address]  Ricky [address], APRN - CNP  [employer]  OFFICE: [phone]      Electronically signed by [address] [place] [address], APRN - CNP on 4/27/2022 at 8:09 AM  Thank you for allowing us to participate in the care of this patient. Please call with questions. This note iscreated with the assistance of a speech recognition program.  While intending to generate a document that actually reflects the content of the visit, the document can still have some errors including those of syntax andsound a like substitutions which may escape proof reading. In such instances, actual meaning can be extrapolated by contextual diversion.

## 2022-04-27 NOTE — PLAN OF CARE
Monitoring pain level hourly and prn,   Prn Percocet for pain control.    Patient instructed to call out if needed

## 2022-04-27 NOTE — PLAN OF CARE
Problem: Infection:  Goal: Will remain free from infection  Description: Will remain free from infection  Outcome: Progressing     Problem: Safety:  Goal: Free from accidental physical injury  Description: Free from accidental physical injury  Outcome: Progressing     Problem: Daily Care:  Goal: Daily care needs are met  Description: Daily care needs are met  Outcome: Progressing     Problem: Pain:  Goal: Patient's pain/discomfort is manageable  Description: Patient's pain/discomfort is manageable  Outcome: Progressing  Goal: Pain level will decrease  Description: Pain level will decrease  Outcome: Progressing  Goal: Control of acute pain  Description: Control of acute pain  Outcome: Progressing  Goal: Control of chronic pain  Description: Control of chronic pain  Outcome: Progressing     Problem: Skin Integrity:  Goal: Skin integrity will stabilize  Description: Skin integrity will stabilize  Outcome: Progressing     Problem: Skin Integrity:  Goal: Will show no infection signs and symptoms  Description: Will show no infection signs and symptoms  Outcome: Progressing  Goal: Absence of new skin breakdown  Description: Absence of new skin breakdown  Outcome: Progressing  Goal: Risk for impaired skin integrity will decrease  Description: Risk for impaired skin integrity will decrease  Outcome: Progressing     Problem: Falls - Risk of:  Goal: Will remain free from falls  Description: Will remain free from falls  Outcome: Progressing  Goal: Absence of physical injury  Description: Absence of physical injury  Outcome: Progressing     Problem:  Activity:  Goal: Risk for activity intolerance will decrease  Description: Risk for activity intolerance will decrease  Outcome: Progressing     Problem: Coping:  Goal: Ability to adjust to condition or change in health will improve  Description: Ability to adjust to condition or change in health will improve  Outcome: Progressing     Problem: Fluid Volume:  Goal: Ability to maintain a balanced intake and output will improve  Description: Ability to maintain a balanced intake and output will improve  Outcome: Progressing

## 2022-04-27 NOTE — DISCHARGE SUMMARY
[facility]  Office: [address], DO, Jose Donatof, DO, Raul Multanileo, DO, Marisela Sandoval Blood, DO, Sushil Rousseau MD, Hector Nayak MD, Alfred Mathews MD, Gabriel Randall MD, Yogi Noble MD, Diane Courtney MD, Ria Canela MD, Patrick Cormier, DO, Elisabeth Russell, DO, Reva Sharma MD,  John Valdez, DO, Alicia Napier MD, Ellen Noland MD, Maria Luisa Sosa MD, Michael Peralta DO, Pablito Osman MD, Steph Manriquez MD, Allegra Pemberton [facility], [facility], CNP, Micah Pardo, CNP, Gerilyn Cockayne, CNP, Sofia Moise, CNP, Camillo Schaumann, CNP, Samson Reardon PA-C, Smiley Rousseau, [facility], Armand Landeros, CNP, Alex Anguiano, CNP, Sury Sears, CNP, Jim Warner, CNS, Khadar Esqueda, [facility], Mikayla Gavin, CNP, Mazin Merino, CNP, Tran Lama, [facility]    Discharge Summary     Patient ID: Desi Narvaez  :  1950   MRN: [id number]     ACCOUNT:  [de-identified]   Patient's PCP: Varsha Keyes  Admit Date: 2022   Discharge Date: 2022     Length of Stay: 16  Code Status:  Full Code  Admitting Physician: No admitting provider for patient encounter. Discharge Physician: Francisco Becerra DO     Active Discharge Diagnoses:     Hospital Problem Lists:  Principal Problem:    Necrotizing fasciitis of ankle and foot ([facility]  Active Problems:    Acute kidney injury superimposed on CKD ([facility]    CKD (chronic kidney disease), stage III ([facility]    Uncontrolled hypertension    Type 2 diabetes mellitus with stage 3 chronic kidney disease, with long-term current use of insulin (HCC)    Class 1 obesity with serious comorbidity in adult  Resolved Problems:    * No resolved hospital problems.  *      Admission Condition:  fair     Discharged Condition: stable    Hospital Stay:     Hospital Course:  Desi Narvaez is a 67 y.o. male who was admitted for the management of  Necrotizing fasciitis of ankle and foot ([address] , presented to ER with Wound Infection (Sent over by PCP for wound evaluation in foot (left) )    This is a 70-year-old male that presents with left lower extremity wound and ultimately found to have osteomyelitis. He is initially treated with IV antibiotics underwent podiatry and vascular surgery evaluations. After extensive evaluation was determined he benefit from [address] however at the time of surgery due to the severity of his arterial disease he decided proceed with an AKA. Postoperatively he has stay off antibiotics per ID recommendations and monitored. Ultimately he had done well and was able to be discharged to skilled facility. Significant therapeutic interventions: AKA left lower extremity    Significant Diagnostic Studies:   Labs / Micro:  CBC:   Lab Results   Component Value Date    WBC 10.8 04/19/2022    RBC 3.48 04/19/2022    RBC 4.20 10/10/2011    HGB 8.6 04/19/2022    HCT 28.6 04/19/2022    MCV 82.2 04/19/2022    MCH 24.7 04/19/2022    MCHC 30.1 04/19/2022    RDW 14.8 04/19/2022     04/19/2022     10/10/2011     BMP:    Lab Results   Component Value Date    GLUCOSE 119 04/22/2022    GLUCOSE 133 10/18/2011     04/22/2022    K 4.6 04/22/2022     04/22/2022    CO2 23 04/22/2022    ANIONGAP 11 04/22/2022    BUN 43 04/22/2022    CREATININE 2.27 04/22/2022    BUNCRER 19 04/22/2022    CALCIUM 8.0 04/22/2022    LABGLOM 29 04/22/2022    GFRAA 35 04/22/2022    GFR      04/22/2022        Radiology:  No results found. Consultations:    Consults:     Final Specialist Recommendations/Findings:   IP CONSULT TO INTERNAL MEDICINE  IP CONSULT TO PODIATRY  PHARMACY TO DOSE VANCOMYCIN  IP CONSULT TO NEPHROLOGY  IP CONSULT TO INFECTIOUS DISEASES  IP CONSULT TO VASCULAR SURGERY  IP CONSULT TO PALLIATIVE CARE  IP CONSULT TO SPIRITUAL SERVICES      The patient was seen and examined on day of discharge and this discharge summary is in conjunction with any daily progress note from day of discharge.     Discharge plan:     Disposition: To a non-[facility] facility    Physician Follow Up:   PCP 1 week  No follow-up provider specified. Requiring Further Evaluation/Follow Up POST HOSPITALIZATION/Incidental Findings: Follow-up labs at discretion of PCP    Diet: diabetic diet    Activity: As tolerated    Instructions to Patient: Take medication as prescribed    Discharge Medications:      Medication List      START taking these medications    hydrALAZINE 25 MG tablet  Commonly known as: APRESOLINE  Take 1 tablet by mouth every 8 hours     insulin glargine 100 UNIT/ML injection vial  Commonly known as: LANTUS  Inject 20 Units into the skin daily  Replaces: Basaglar KwikPen 100 UNIT/ML injection pen     sodium bicarbonate 650 MG tablet  Take 1 tablet by mouth 2 times daily        CHANGE how you take these medications    amLODIPine 10 MG tablet  Commonly known as: NORVASC  Take 0.5 tablets by mouth daily  What changed: how much to take        CONTINUE taking these medications    acetaminophen 325 MG tablet  Commonly known as: TYLENOL     clopidogrel 75 MG tablet  Commonly known as: PLAVIX  Take 1 tablet by mouth daily     Insulin Syringe-Needle U-100 30G X 5/16\" 1 ML Misc  Commonly known as: ACCUSURE INS SYR 1CC/30GX5/16\"  by Does not apply route.      metoprolol tartrate 25 MG tablet  Commonly known as: LOPRESSOR     NovoLOG FlexPen 100 UNIT/ML injection pen  Generic drug: insulin aspart     QUEtiapine 25 MG tablet  Commonly known as: SEROQUEL     simvastatin 10 MG tablet  Commonly known as: ZOCOR     traZODone 150 MG tablet  Commonly known as: DESYREL        STOP taking these medications    Basaglar KwikPen 100 UNIT/ML injection pen  Generic drug: insulin glargine  Replaced by: insulin glargine 100 UNIT/ML injection vial     docusate sodium 100 MG capsule  Commonly known as: COLACE     metoprolol succinate 25 MG extended release tablet  Commonly known as: TOPROL XL        ASK your doctor about these medications    fluconazole 200 MG tablet  Commonly known as: DIFLUCAN  Take 1 tablet by mouth daily for 1 dose  Ask about: Should I take this medication?     oxyCODONE-acetaminophen 5-325 MG per tablet  Commonly known as: PERCOCET  Take 1 tablet by mouth every 4 hours as needed for Pain for up to 3 days. Ask about: Should I take this medication? Where to Get Your Medications      You can get these medications from any pharmacy    Bring a paper prescription for each of these medications  oxyCODONE-acetaminophen 5-325 MG per tablet     Information about where to get these medications is not yet available    Ask your nurse or doctor about these medications  fluconazole 200 MG tablet  hydrALAZINE 25 MG tablet  insulin glargine 100 UNIT/ML injection vial  sodium bicarbonate 650 MG tablet         Discharge Procedure Orders   Basic Metabolic Panel   Standing Status: Future Standing Exp. Date: 04/21/23   Order Comments: Fax to [phone]       Time Spent on discharge is  35 mins in patient examination, evaluation, counseling as well as medication reconciliation, prescriptions for required medications, discharge plan and follow up. Electronically signed by   Pacheco West DO  4/27/2022  12:45 PM      Thank you Dr. Anna Desir for the opportunity to be involved in this patient's care.

## 2022-04-27 NOTE — PROGRESS NOTES
[facility]  Office: [address], DO, Deisy Hooper, DO, Sabrina Caocristofer, DO, David Painting Blood, DO, Cinthya Escobar MD, Rafa Nava MD, Ted Aguilar MD, Rosaura Locke MD, Nila Hanson MD, Hanna Schultz MD, Therese Jones MD, Laury Ashford, DO, Marybel Rose, DO, Everton Simmons MD,  Broderick Terrazas DO, Lakhwinder Rain MD, Sonia Norris MD, Ninfa Hernandez MD, Richard Hernandez DO, Que Saldaña MD, Shravan Gastelum MD, Roseline Atwood, [facility], [facility], CNP, Yunior Francisco, CNP, Trevor Bobo, CNP, Jacqui Davison, CNP, Abhi Goodman, CNP, Mitesh Lancaster PASukhdeepC, Oxana Delaney, [facility], Shi Arizmendi, CNP, Carmencita Stokes, CNP, Allison Iglesias, CNP, Phu Lehman, CNS, Jacobo Najera, [facility], Federico Carbajal, CNP, Jessica Bueno, CNP, Veronica Escobedo, [facility]    Progress Note    4/27/2022    7:55 AM    Name:   Linda Durbin  MRN:     [id number]     Acct:      [de-identified]   Room:   2008/2008-02  IP Day:  12  Admit Date:  4/11/2022  5:25 PM    PCP:   Anna Desir  Code Status:  Full Code    Subjective:     C/C:   Chief Complaint   Patient presents with    Wound Infection     Sent over by PCP for wound evaluation in foot (left)      Interval History Status: improved. Patient is resting comfortably denies any acute complaints. Denies any chest pain, shortness of breath, nausea vomiting, fevers or chills or acute issues. Brief History: This is a 79-year-old male that presents with left lower extremity wound with osteomyelitis. He underwent routine investigation with vascular surgery, podiatry as well as infectious disease and required amputation. Attempt was made for below-knee amputation but due to severe peripheral arterial disease an AKA was required.     Review of Systems:     Constitutional:  negative for chills, fevers, sweats  Respiratory:  negative for cough, dyspnea on exertion, shortness of breath, wheezing  Cardiovascular:  negative for chest pain, chest pressure/discomfort, lower extremity edema, palpitations  Gastrointestinal:  negative for abdominal pain, constipation, diarrhea, nausea, vomiting  Neurological:  negative for dizziness, headache    Medications:      Allergies:  No Known Allergies    Current Meds:   Scheduled Meds:    hydrALAZINE  25 mg Oral 3 times per day    insulin glargine  20 Units SubCUTAneous Daily    insulin glargine  10 Units SubCUTAneous Nightly    sodium bicarbonate  650 mg Oral BID    amLODIPine  5 mg Oral Daily    clopidogrel  75 mg Oral Daily    docusate sodium  100 mg Oral BID    ferrous sulfate  325 mg Oral Daily with breakfast    [Held by provider] furosemide  40 mg Oral Every Other Day    metoprolol tartrate  25 mg Oral BID    atorvastatin  10 mg Oral Daily    sodium chloride flush  5-40 mL IntraVENous 2 times per day    insulin lispro  0-12 Units SubCUTAneous TID WC    insulin lispro  0-6 Units SubCUTAneous Nightly     Continuous Infusions:    sodium chloride      dextrose      sodium chloride 100 mL/hr at 04/24/22 1910     PRN Meds: sodium chloride, oxyCODONE-acetaminophen **OR** oxyCODONE-acetaminophen, traMADol, glucagon (rDNA), dextrose, sodium chloride flush, sodium chloride, potassium chloride **OR** potassium alternative oral replacement **OR** potassium chloride, magnesium sulfate, ondansetron **OR** ondansetron, polyethylene glycol, acetaminophen **OR** acetaminophen, dextrose bolus (hypoglycemia) **OR** dextrose bolus (hypoglycemia), glucose, hydrALAZINE    Data:     Past Medical History:   has a past medical history of RISSA (acute kidney injury) ([facility] Cerebrovascular disease, Erectile dysfunction, Hepatitis B infection, Hyperlipidemia, Hypertension, Kidney cysts, Liver cyst, MRSA (methicillin resistant staph aureus) culture positive, Neurotrophic ulcer of the foot ([facility] Obesity, Osteomyelitis of ankle or foot, Peripheral vascular disease ([facility] Tobacco abuse, and Type II or unspecified type diabetes mellitus without mention of complication, not stated as uncontrolled. Social History:   reports that he has been smoking cigarettes. He has a 15.00 pack-year smoking history. He has never used smokeless tobacco. He reports that he does not drink alcohol and does not use drugs. Family History:   Family History   Problem Relation Age of Onset    Diabetes Mother     Diabetes Sister     Diabetes Brother     Diabetes Sister     Diabetes Sister     Diabetes Sister        Vitals:  BP (!) 165/61   Pulse 85   Temp 97.3 °F (36.3 °C) (Oral)   Resp 16   Ht 6' (1.829 m)   Wt 172 lb (78 kg)   SpO2 96%   BMI 23.33 kg/m²   Temp (24hrs), Av °F (36.7 °C), Min:97.3 °F (36.3 °C), Max:98.6 °F (37 °C)    Recent Labs     22  16522  0607 22  0742   POCGLU 107 144* 64* 90       I/O (24Hr): Intake/Output Summary (Last 24 hours) at 2022 0755  Last data filed at 2022 0745  Gross per 24 hour   Intake --   Output 2000 ml   Net -2000 ml       Labs:  Hematology:No results for input(s): WBC, RBC, HGB, HCT, MCV, MCH, MCHC, RDW, PLT, MPV, SEDRATE, CRP, INR, DDIMER, RD4RMDDU, LABABSO in the last 72 hours. Invalid input(s): PT  Chemistry:No results for input(s): NA, K, CL, CO2, GLUCOSE, BUN, CREATININE, MG, ANIONGAP, LABGLOM, GFRAA, CALCIUM, CAION, PHOS, PSA, PROBNP, TROPHS, CKTOTAL, CKMB, CKMBINDEX, MYOGLOBIN, DIGOXIN, LACTACIDWB in the last 72 hours.   Recent Labs     22  0609 22  1145 22  1650 22  0607 22  0742   POCGLU 120* 185* 107 144* 64* 90     ABG:No results found for: POCPH, PHART, PH, POCPCO2, FIZ6ZWO, PCO2, POCPO2, PO2ART, PO2, POCHCO3, HGZ5FNR, HCO3, NBEA, PBEA, BEART, BE, THGBART, THB, YKH2RZX, JBUE6DCP, N6LATBWO, O2SAT, FIO2  Lab Results   Component Value Date/Time    SPECIAL LT FA,7ML 2022 06:38 PM     Lab Results   Component Value Date/Time    CULTURE NO SIGNIFICANT GROWTH 04/24/2022 04:15 PM       Radiology:  No results found. Physical Examination:        General appearance:  alert, cooperative and no distress  Mental Status:  oriented to person, place and time and normal affect  Lungs:  clear to auscultation bilaterally, normal effort  Heart:  regular rate and rhythm, no murmur  Abdomen:  soft, nontender, nondistended, normal bowel sounds, no masses, hepatomegaly, splenomegaly  Extremities:  no edema, redness, tenderness in the calves  Skin:  no gross lesions, rashes, induration    Assessment:        Hospital Problems           Last Modified POA    * (Principal) Necrotizing fasciitis of ankle and foot ([address] 4/26/2022 Yes    Acute kidney injury superimposed on CKD ([address] 4/11/2022 Yes    CKD (chronic kidney disease), stage III ([address] 4/11/2022 Yes    Uncontrolled hypertension 4/12/2022 Yes    Type 2 diabetes mellitus with stage 3 chronic kidney disease, with long-term current use of insulin ([address] 4/12/2022 Yes    Class 1 obesity with serious comorbidity in adult 4/12/2022 Yes          Plan:        1. Continue local wound care  2. PT and OT  3. Insulin scale as ordered  4. Monitor labs, avoid nephrotoxic agents  5. GI and DVT prophylaxis  6. Monitoring control blood pressure  7.  Discharge to skilled facility, pre-CERT obtained    Ariel Rdoriguez DO  4/27/2022  7:55 AM

## 2023-01-01 NOTE — CONSULTS
[facility]pe      Name: Desi Narvaez  MRN: [id number]     Acct: [de-identified]  Room: 2020/2020-01    Admit Date: 4/11/2022  PCP: Varsha Keyes    Physician Requesting Consult:  Dr Nolberto Stephens    Reason for Consult:  Left leg amputation    Chief Complaint:     Chief Complaint   Patient presents with    Wound Infection     Sent over by PCP for wound evaluation in foot (left)          History Obtained From:     patient    History of Present Illness:      Desi Narvaze is a  67 y.o.  male who presents with Wound Infection (Sent over by PCP for wound evaluation in foot (left) )  This is a 40-year-old male with a diabetic foot infection of his left foot. I last saw him for an in-hospital arteriogram about 2 to 3 years ago but really have not seen him in follow-up. He was referred to me because he has extensive soft tissue infection had thought that he will need an amputation. I reviewed the notes from infectious disease as well as the podiatry team and agree that he likely will need amputation. I spent time discussing this with the patient at bedside. On one hand he is agreeable to having the procedure my overall sense is that he does not fully realize of the scope of how this will change his life. Currently he lives at home in an apartment alone. He has been using his left leg to transfer and to me he appears extremely weak and I told him he likely will have to live in a nursing home for the rest of his life. I do not see him getting a prosthesis and he states it has been several years since he is walked. He has a history of right below-knee amputation which she has done 7 to 8 years ago. I think in the grand scheme of things an amputation which would need to be above the knee may not significantly prolong his life. I think it poses additional challenges to his living situation and will likely worsen his decubitus ulcer.   To discuss these issues I think a palliative care consult is appropriate. Past Medical History:     Past Medical History:   Diagnosis Date    [facility]ISSA (acute [facility] injury) ([facility]     Cerebrovascular disease     Erectile dysfunction     Hepatitis B infection     Hyperlipidemia     Hypertension     Kidney cysts     Liver cyst     MRSA (methicillin resistant staph aureus) culture positive 1/10/2014    right foot    Neurotrophic ulcer of the foot ([facility]     Obesity     Osteomyelitis of ankle or foot     Peripheral vascular disease ([facility]     Tobacco abuse     Type II or unspecified type diabetes mellitus without mention of complication, not stated as uncontrolled         Past Surgical History:     Past Surgical History:   Procedure Laterality Date    FOOT AMPUTATION      FOOT AMPUTATION THROUGH METATARSAL  2011    pt can't give exact date of surg-- toes and part of lt foot removed    FOOT DEBRIDEMENT Left 6/20/2020    WOUND BED PREPARATION AND WOUND VAC APPLICATION performed by Jayla Grossman DPM at [address] [place] [address] Left 4/12/2022    FOOT DEBRIDEMENT INCISION AND DRAINAGE WITH BX performed by Jayla Grossman DPM at [place] [address]. Right 3/2013        Medications Prior to Admission:       Prior to Admission medications    Medication Sig Start Date End Date Taking?  Authorizing Provider   metoprolol tartrate (LOPRESSOR) 25 MG tablet Take 25 mg by mouth 2 times daily   Yes Historical Provider, MD   QUEtiapine (SEROQUEL) 25 MG tablet Take 25 mg by mouth at bedtime   Yes Historical Provider, MD   amLODIPine (NORVASC) 10 MG tablet Take 0.5 tablets by mouth daily  Patient taking differently: Take 10 mg by mouth daily  9/20/20   Arisda Charisse, DO   insulin glargine (BASAGLAR KWIKPEN) 100 UNIT/ML injection pen Inject 10-20 Units into the skin 2 times daily Injects 20 units AM in the morning and 10 units at night  Patient taking differently: Inject 12 Units into the skin nightly  9/20/20 10/20/20  Relda Houston, DO   clopidogrel (PLAVIX) 75 MG tablet Take 1 tablet by mouth daily 6/23/20   Esequiel Nice P Blood, DO   insulin aspart (NOVOLOG FLEXPEN) 100 UNIT/ML injection pen Inject 0-10 Units into the skin 3 times daily as needed Per sliding scale    Historical Provider, MD   simvastatin (ZOCOR) 10 MG tablet Take 10 mg by mouth nightly    Historical Provider, MD   traZODone (DESYREL) 150 MG tablet Take 150 mg by mouth nightly    Historical Provider, MD   docusate sodium (COLACE) 100 MG capsule Take 100 mg by mouth 2 times daily     Historical Provider, MD   acetaminophen (TYLENOL) 325 MG tablet Take 650 mg by mouth as needed for Pain Give 2 tabs = 650 MG by mouth every 4 hours prn     Historical Provider, MD   Insulin Syringe-Needle U-100 (ACCUSURE INS SYR 1CC/30GX5/16\") 30G X 5/16\" 1 ML MISC by Does not apply route. 7/9/13   Keara Garrido MD        Allergies:       Patient has no known allergies. Social History:     Tobacco:    reports that he has been smoking cigarettes. He has a 15.00 pack-year smoking history. He has never used smokeless tobacco.  Alcohol:      reports no history of alcohol use. Drug Use:  reports no history of drug use.     Family History:     Family History   Problem Relation Age of Onset    Diabetes Mother     Diabetes Sister     Diabetes Brother     Diabetes Sister     Diabetes Sister     Diabetes Sister        Review of Systems:     Positive and Negative as described in HPI    Constitutional:  negative for  fevers, chills, sweats, fatigue, and weight loss  HEENT:  negative for vision or hearing changes,   Respiratory:  negative for shortness of breath, cough, or congestion  Cardiovascular:  negative for  chest pain, palpitations  Gastrointestinal:  negative for nausea, vomiting, diarrhea, constipation, abdominal pain  Genitourinary:  negative for frequency, dysuria  Integument/Breast:  negative for rash, skin lesions  Musculoskeletal:  negative for muscle aches or joint pain  Neurological:  negative for headaches, dizziness, lightheadedness, numbness, pain and tingling extremities  Behavior/Psych:  negative for depression and anxiety    Code Status:  Full Code    Physical Exam:     Vitals:  BP (!) 125/52   Pulse 64   Temp 98.2 °F (36.8 °C) (Oral)   Resp 17   Ht 6' (1.829 m)   Wt 175 lb (79.4 kg)   SpO2 96%   BMI 23.73 kg/m²   Temp (24hrs), Av.7 °F (36.5 °C), Min:97 °F (36.1 °C), Max:98.6 °F (37 °C)      General appearance - alert, well appearing and in no acute distress  Mental status - oriented to person, place and time with normal affect  Head - normocephalic and atraumatic  Eyes - pupils equal and reactive, extraocular eye movements intact, conjunctiva clear  Ears - hearing appears to be intact  Nose - no drainage noted  Mouth - mucous membranes moist  Neck - supple, no carotid bruits, thyroid not palpable, no JVD  Chest - clear to auscultation, normal effort  Heart - normal rate, regular rhythm, no murmurs  Abdomen - soft, non-tender, non-distended, bowel sounds present all four quadrants, no masses, hepatomegaly, splenomegaly or aortic enlargement  Neurological - normal speech, no focal findings or movement disorder noted, cranial nerves II through XII grossly intact  Extremities -right leg with below-knee amputation. Left leg wrapped but images were reviewed.   Extensive skin changes to the level of the knee           Data:     Lab Results   Component Value Date    WBC 21.3 (H) 2022    HGB 8.9 (L) 2022    HCT 29.1 (L) 2022    MCV 80.4 (L) 2022     2022     Lab Results   Component Value Date     2022    K 4.6 2022     2022    CO2 17 2022    BUN 34 2022    CREATININE 2.29 2022    GLUCOSE 137 2022    GLUCOSE 133 10/18/2011    CALCIUM 7.9 2022      Lab Results   Component Value Date    INR 1.0 2013    PROTIME 10.3 2013       Assessment:     Primary Problem  Necrotizing fasciitis [facility])  3 66-year-old male with diabetic foot infection    Active Hospital Problems    Diagnosis Date Noted    Acute kidney injury superimposed on CKD ([facility] [N17.9, N18.9] 09/16/2020     Priority: High    CKD (chronic kidney disease), stage III ([facility] [N18.30] 09/20/2020     Priority: Medium    Necrotizing fasciitis ([facility] [M72.6] 04/11/2022    Class 1 obesity with serious comorbidity in adult [E66.9] 01/22/2013    Uncontrolled hypertension [I10]     Type 2 diabetes mellitus with stage 3 chronic kidney disease, with long-term current use of insulin ([facility] [E11.22, N18.30, Z79.4]        Plan:     1. Currently he has a leukocytosis but no signs of septic shock. 2. I agree that he will likely need an amputation of the left lower extremity and this would need to be above the knee to assure wound healing. 3. I have consulted palliative care to discuss with the patient and his family the end-of-life issues which will change significantly after an amputation if he decides this is the appropriate course of action  4. If he needs an immediate amputation, he will likely require transfer to [facility] because I will be out of town starting on April 15. Any amputation will be done by my covering service with the [facility] vascular group.       Electronically signed by Musa Carter MD on 4/13/2022 at 5:46 PM     Copy sent to Dr. Barbara Worthy [phone]

## 2023-01-11 NOTE — PROGRESS NOTES
[facility] -   Infectious diseases evaluation  admission date 4/11/2022    reason for consultation:   Left foot infection    Impression :   Current:  · Diabetic left foot infection with suspected necrotizing fasciitis and calcaneal osteomyelitis. Status post incision and debridement 4/12/22 status above-knee amputation 4/19/2020  · Peripheral vascular disease  · Fungal UTI  · Diabetes mellitus  · Chronic renal disease  · History of right below-knee amputation      Recommendations        ·  IV ceftriaxone , clindamycin discontinued 4/21/2022  · P.o. Diflucan through 4/24/2022  · No objection for discharge from infectious disease point of view          History of Present Illness:   Initial history:  Candance Easter is a 67y.o.-year-old male was sent to hospital from Dr. Emmie Rossi office for worsening left heel pain associated with left heel wound with surrounding dark discoloration and redness. The pain is severe, intermittent, no alleviating or aggravating factors. Left foot x-ray showed soft tissue emphysema within the posterior aspect of the distal left lower leg concerning for necrotizing fasciitis and possible calcaneal osteomyelitis. Initial WBC 18.5, creatinine 2.44, C-reactive protein more than 130, C-reactive protein 104.2, lactic acid 0 point  Afebrile  History of right below-knee amputation  Interval changes  4/22/2022   He is afebrile, status post above-knee amputation on 4/19/2022. The patient is complaining of postoperative pain, surgical incision intact with no significant drainage, no redness, denied abdominal pain, no diarrhea, no vomiting, no other complaints. The patient was noted to have cloudy urine on 4/18/2022 that was sent for urinalysis that showed too numerous to count WBC, large leukocyte esterase, urine culture grew yeast  Proteus mirabilis pansensitive and group B streptococcus growth on bone culture from 04/12/2022.   No growth on blood cultures from 4/11/2022  Patient Vitals for the past 8 hrs:   BP Temp Temp src Pulse Resp SpO2 Weight   04/22/22 1117 (!) 171/63 98.9 °F (37.2 °C) -- 71 16 97 % --   04/22/22 0724 (!) 151/58 97.5 °F (36.4 °C) Oral 67 16 100 % --   04/22/22 0553 -- -- -- -- -- -- 178 lb 4.8 oz (80.9 kg)     . I have personally reviewed the past medical history, past surgical history, medications, social history, and family history, and I haveupdated the database accordingly. Allergies:   Patient has no known allergies. Review of Systems:     Review of Systems  As per history present illness, other than above 12 system review was negative  Physical Examination :       Physical Exam  Constitutional:       General: He is not in acute distress. HENT:      Head: Normocephalic and atraumatic. Right Ear: External ear normal.      Left Ear: External ear normal.   Eyes:      General: No scleral icterus. Conjunctiva/sclera: Conjunctivae normal.   Pulmonary:      Effort: Pulmonary effort is normal. No respiratory distress. Abdominal:      General: There is no distension. Palpations: Abdomen is soft. Musculoskeletal:      Comments: Left above-knee amputation surgical incision intact with no surrounding erythema, no fluctuation or crepitus, no significant drainage. Skin:     General: Skin is warm. Coloration: Skin is not jaundiced. Neurological:      General: No focal deficit present. Mental Status: He is oriented to person, place, and time.          Past Medical History:     Past Medical History:   Diagnosis Date    RISSA (acute kidney injury) ([address]     Cerebrovascular disease     Erectile dysfunction     Hepatitis B infection     Hyperlipidemia     Hypertension     Kidney cysts     Liver cyst     MRSA (methicillin resistant staph aureus) culture positive 1/10/2014    right foot    Neurotrophic ulcer of the foot ([address]     Obesity     Osteomyelitis of ankle or foot     Peripheral vascular disease [facility]     Tobacco abuse     Type II or unspecified type diabetes mellitus without mention of complication, not stated as uncontrolled        Past Surgical  History:     Past Surgical History:   Procedure Laterality Date    ABOVE KNEE AMPUTATION Left 04/19/2022    by Dr. Darin Brown  01/01/2011    pt can't give exact date of surg-- toes and part of lt foot removed    FOOT DEBRIDEMENT Left 06/20/2020    WOUND BED PREPARATION AND WOUND VAC APPLICATION performed by Kimberly Luo DPM at [address] [place] [address] Left 04/12/2022    FOOT DEBRIDEMENT INCISION AND DRAINAGE WITH BX performed by Kimberly Luo DPM at [address] [place] [address] Right 03/01/2013    LEG AMPUTATION BELOW KNEE Left 4/19/2022    LEFT  LEG AMPUTATION ABOVE KNEE  WITH NERVE BLOCK performed by Ana Luisa Bellamy MD at [facility]       Medications:      insulin glargine  20 Units SubCUTAneous Daily    insulin glargine  10 Units SubCUTAneous Nightly    fluconazole  200 mg Oral Daily    sodium bicarbonate  650 mg Oral BID    amLODIPine  5 mg Oral Daily    clopidogrel  75 mg Oral Daily    docusate sodium  100 mg Oral BID    ferrous sulfate  325 mg Oral Daily with breakfast    [Held by provider] furosemide  40 mg Oral Every Other Day    metoprolol tartrate  25 mg Oral BID    atorvastatin  10 mg Oral Daily    sodium chloride flush  5-40 mL IntraVENous 2 times per day    insulin lispro  0-12 Units SubCUTAneous TID WC    insulin lispro  0-6 Units SubCUTAneous Nightly       Social History:     Social History     Socioeconomic History    Marital status:      Spouse name: Not on file    Number of children: Not on file    Years of education: Not on file    Highest education level: Not on file   Occupational History    Not on file   Tobacco Use    Smoking status: Current Every Day Smoker     Packs/day: 0.50     Years: 30.00     Pack years: 15.00     Types: Cigarettes    Smokeless tobacco: Never Used    Tobacco comment: working on quitting smoking   Vaping Use    Vaping Use: Never used   Substance and Sexual Activity    Alcohol use: No    Drug use: Never    Sexual activity: Yes     Partners: Female   Other Topics Concern    Not on file   Social History Narrative    Not on file     Social Determinants of Health     Financial Resource Strain:     Difficulty of Paying Living Expenses: Not on file   Food Insecurity:     Worried About Running Out of Food in the Last Year: Not on file    Marika of Food in the Last Year: Not on file   Transportation Needs:     Lack of Transportation (Medical): Not on file    Lack of Transportation (Non-Medical):  Not on file   Physical Activity:     Days of Exercise per Week: Not on file    Minutes of Exercise per Session: Not on file   Stress:     Feeling of Stress : Not on file   Social Connections:     Frequency of Communication with Friends and Family: Not on file    Frequency of Social Gatherings with Friends and Family: Not on file    Attends [employer] Services: Not on file    Active Member of Clubs or Organizations: Not on file    Attends Club or Organization Meetings: Not on file    Marital Status: Not on file   Intimate Partner Violence:     Fear of Current or Ex-Partner: Not on file    Emotionally Abused: Not on file    Physically Abused: Not on file    Sexually Abused: Not on file   Housing Stability:     Unable to Pay for Housing in the Last Year: Not on file    Number of [place] in the Last Year: Not on file    Unstable Housing in the Last Year: Not on file       Family History:     Family History   Problem Relation Age of Onset    Diabetes Mother     Diabetes Sister     Diabetes Brother     Diabetes Sister     Diabetes Sister     Diabetes Sister       Medical Decision Making:   I have independently reviewed/ordered the following labs:    CBC with Differential:   Recent Labs Adbry Counseling: I discussed with the patient the risks of tralokinumab including but not limited to eye infection and irritation, cold sores, injection site reactions, worsening of asthma, allergic reactions and increased risk of parasitic infection.  Live vaccines should be avoided while taking tralokinumab. The patient understands that monitoring is required and they must alert us or the primary physician if symptoms of infection or other concerning signs are noted.

## 2023-06-06 ENCOUNTER — TELEPHONE (OUTPATIENT)
Dept: GASTROENTEROLOGY | Age: 73
End: 2023-06-06

## 2023-06-06 NOTE — TELEPHONE ENCOUNTER
Referral in system for patient to see Dr Kathryn De La Paz due to Constipation. Please call patient back and schedule an appointment.

## 2023-06-30 ENCOUNTER — OFFICE VISIT (OUTPATIENT)
Dept: GASTROENTEROLOGY | Age: 73
End: 2023-06-30
Payer: COMMERCIAL

## 2023-06-30 VITALS
SYSTOLIC BLOOD PRESSURE: 153 MMHG | DIASTOLIC BLOOD PRESSURE: 70 MMHG | BODY MASS INDEX: 23.33 KG/M2 | WEIGHT: 172 LBS | TEMPERATURE: 98.2 F

## 2023-06-30 DIAGNOSIS — K76.9 LIVER DISEASE, CHRONIC: ICD-10-CM

## 2023-06-30 DIAGNOSIS — B18.1 HEPATITIS B, CHRONIC (HCC): Primary | ICD-10-CM

## 2023-06-30 DIAGNOSIS — Z11.59 NEED FOR HEPATITIS B SCREENING TEST: ICD-10-CM

## 2023-06-30 PROCEDURE — 1123F ACP DISCUSS/DSCN MKR DOCD: CPT | Performed by: INTERNAL MEDICINE

## 2023-06-30 PROCEDURE — 3078F DIAST BP <80 MM HG: CPT | Performed by: INTERNAL MEDICINE

## 2023-06-30 PROCEDURE — 99205 OFFICE O/P NEW HI 60 MIN: CPT | Performed by: INTERNAL MEDICINE

## 2023-06-30 PROCEDURE — 3074F SYST BP LT 130 MM HG: CPT | Performed by: INTERNAL MEDICINE

## 2023-06-30 ASSESSMENT — ENCOUNTER SYMPTOMS
WHEEZING: 0
SHORTNESS OF BREATH: 0
COLOR CHANGE: 0
DIARRHEA: 0
TROUBLE SWALLOWING: 0
RECTAL PAIN: 0
VOMITING: 0
ABDOMINAL DISTENTION: 1
ABDOMINAL PAIN: 1
COUGH: 0
NAUSEA: 1
SORE THROAT: 0
CONSTIPATION: 0
CHOKING: 0
BLOOD IN STOOL: 0
ANAL BLEEDING: 0

## 2023-07-07 ENCOUNTER — TELEPHONE (OUTPATIENT)
Dept: GASTROENTEROLOGY | Age: 73
End: 2023-07-07

## 2023-07-07 DIAGNOSIS — B18.1 HEPATITIS B, CHRONIC (HCC): ICD-10-CM

## 2023-07-07 DIAGNOSIS — K76.9 LIVER DISEASE, CHRONIC: Primary | ICD-10-CM

## 2023-07-07 NOTE — TELEPHONE ENCOUNTER
Patient  caregiver Saturnino Becton  called as patients stomach is filling up and he needs a Paracentesis and the referral to [facility] for Hepatology. He does not need a referral as patient was seen in the hospital by Hepatologist.   Adelaida Peabody gave care giver the Central Scheduling number and the number [phone]  [id number] [facility].

## 2023-07-11 ENCOUNTER — HOSPITAL ENCOUNTER (OUTPATIENT)
Age: 73
Discharge: HOME | End: 2023-07-11
Payer: COMMERCIAL

## 2023-07-11 ENCOUNTER — HOSPITAL ENCOUNTER (OUTPATIENT)
Dept: ULTRASOUND IMAGING | Age: 73
Discharge: HOME | End: 2023-07-13
Payer: COMMERCIAL

## 2023-07-11 VITALS
RESPIRATION RATE: 18 BRPM | SYSTOLIC BLOOD PRESSURE: 184 MMHG | HEART RATE: 72 BPM | DIASTOLIC BLOOD PRESSURE: 73 MMHG | OXYGEN SATURATION: 100 %

## 2023-07-11 DIAGNOSIS — B18.1 HEPATITIS B, CHRONIC (HCC): ICD-10-CM

## 2023-07-11 DIAGNOSIS — R18.8 OTHER ASCITES: ICD-10-CM

## 2023-07-11 DIAGNOSIS — R18.8 OTHER ASCITES: Primary | ICD-10-CM

## 2023-07-11 DIAGNOSIS — K76.9 LIVER DISEASE, CHRONIC: ICD-10-CM

## 2023-07-11 LAB
INR PPP: 1.3
PARTIAL THROMBOPLASTIN TIME: 30.7 SEC (ref 23–36.5)
PLATELET # BLD AUTO: 104 K/UL (ref 138–453)
PROTHROMBIN TIME: 16.4 SEC (ref 11.7–14.9)

## 2023-07-11 PROCEDURE — 36415 COLL VENOUS BLD VENIPUNCTURE: CPT

## 2023-07-11 PROCEDURE — 85049 AUTOMATED PLATELET COUNT: CPT

## 2023-07-11 PROCEDURE — P9047 ALBUMIN (HUMAN), 25%, 50ML: HCPCS | Performed by: PHYSICIAN ASSISTANT

## 2023-07-11 PROCEDURE — 85730 THROMBOPLASTIN TIME PARTIAL: CPT

## 2023-07-11 PROCEDURE — 85610 PROTHROMBIN TIME: CPT

## 2023-07-11 PROCEDURE — 6360000002 HC RX W HCPCS: Performed by: PHYSICIAN ASSISTANT

## 2023-07-11 PROCEDURE — 49083 ABD PARACENTESIS W/IMAGING: CPT

## 2023-07-11 RX ORDER — ALBUMIN (HUMAN) 12.5 G/50ML
75 SOLUTION INTRAVENOUS ONCE
Status: COMPLETED | OUTPATIENT
Start: 2023-07-11 | End: 2023-07-11

## 2023-07-11 RX ADMIN — ALBUMIN (HUMAN) 75 G: 0.25 INJECTION, SOLUTION INTRAVENOUS at 12:36

## 2023-07-11 NOTE — PROGRESS NOTES
Paracentesis     YESSY MENDEZ  [facility] RN  Taylor RN  Moni Alexander RDMS    Pt. To ultrasound room 8  Identified, allergies confirmed  Supine position  Stable    Time out complete. Prep to abd.      9.3 L of pale yellow clear colored fluid drawn off. 2x2 with Tegaderm to puncture site. Josse Ewing from dr. Rivas Needs office stated they do not need specimen  Patient tolerated well. No problems noted.      Patient stable, off monitor and transported to [facility] for albumin

## 2023-07-11 NOTE — BRIEF OP NOTE
Brief Postoperative Note for Paracentesis    Anuj Mail  YOB: 1950  [id number]    Pre-operative Diagnosis:  Ascites     Post-operative Diagnosis: Same    Procedure: Ultrasound guided Paracentesis     Anesthesia: 1% Lidocaine     Surgeons/Assistants: Haroon Fierro PA-C    Complications: none    EBL: Minimal    Specimens: Were obtained    Ultrasound guided paracentesis performed. 9300 ml clear yellow fluid obtained. Dressing applied.      Electronically signed by VANESSA Lindsey on 7/11/2023 at 12:14 PM

## 2023-07-25 ENCOUNTER — HOSPITAL ENCOUNTER (OUTPATIENT)
Dept: ULTRASOUND IMAGING | Age: 73
End: 2023-07-25
Payer: COMMERCIAL

## 2023-07-25 VITALS
SYSTOLIC BLOOD PRESSURE: 167 MMHG | RESPIRATION RATE: 15 BRPM | DIASTOLIC BLOOD PRESSURE: 68 MMHG | HEART RATE: 65 BPM | OXYGEN SATURATION: 99 %

## 2023-07-25 DIAGNOSIS — B18.1 HEPATITIS B, CHRONIC (HCC): ICD-10-CM

## 2023-07-25 DIAGNOSIS — K76.9 LIVER DISEASE, CHRONIC: ICD-10-CM

## 2023-07-25 PROCEDURE — 6360000002 HC RX W HCPCS: Performed by: RADIOLOGY

## 2023-07-25 PROCEDURE — 7100000010 HC PHASE II RECOVERY - FIRST 15 MIN

## 2023-07-25 PROCEDURE — 7100000011 HC PHASE II RECOVERY - ADDTL 15 MIN

## 2023-07-25 PROCEDURE — 2709999900 US GUIDED PARACENTESIS

## 2023-07-25 PROCEDURE — P9047 ALBUMIN (HUMAN), 25%, 50ML: HCPCS | Performed by: RADIOLOGY

## 2023-07-25 RX ORDER — ALBUMIN (HUMAN) 12.5 G/50ML
50 SOLUTION INTRAVENOUS ONCE
Status: COMPLETED | OUTPATIENT
Start: 2023-07-25 | End: 2023-07-25

## 2023-07-25 RX ADMIN — ALBUMIN (HUMAN) 50 G: 0.25 INJECTION, SOLUTION INTRAVENOUS at 16:39

## 2023-07-25 NOTE — BRIEF OP NOTE
Brief Postoperative Note for Paracentesis    Tapan Blanton  YOB: 1950  [id number]    Pre-operative Diagnosis:  Ascites     Post-operative Diagnosis: Same    Procedure: Ultrasound guided Paracentesis     Anesthesia: 1% Lidocaine     Surgeons/Assistants: Birdie Fierro PA-C    Complications: none    EBL: Minimal    Specimens: Were obtained    Ultrasound guided paracentesis performed. 7000 ml clear yellow fluid obtained. Dressing applied.      Electronically signed by VANESSA Shaikh on 7/25/2023 at 3:48 PM

## 2023-07-25 NOTE — PROGRESS NOTES
Paracentesis     YESSY Skinner RDMS    Pt. To ultrasound room 8  Identified, allergies confirmed  Supine position  Stable    Time out complete. Prep to abd.      7 L of clear yellow colored fluid drawn off. 2x2 with Tegaderm to puncture site. Patient tolerated well. No problems noted. RN from dr Lakshmi Winters office stated he does not need a sample.     Patient stable, off monitor and transported to PACU for albumin  Report given to leslie braun in pacu

## 2023-08-08 ENCOUNTER — HOSPITAL ENCOUNTER (OUTPATIENT)
Dept: ULTRASOUND IMAGING | Age: 73
Discharge: HOME | End: 2023-08-10
Payer: COMMERCIAL

## 2023-08-08 VITALS
DIASTOLIC BLOOD PRESSURE: 66 MMHG | RESPIRATION RATE: 18 BRPM | OXYGEN SATURATION: 100 % | HEART RATE: 55 BPM | SYSTOLIC BLOOD PRESSURE: 162 MMHG

## 2023-08-08 DIAGNOSIS — K76.9 LIVER DISEASE, CHRONIC: ICD-10-CM

## 2023-08-08 DIAGNOSIS — B18.1 HEPATITIS B, CHRONIC (HCC): ICD-10-CM

## 2023-08-08 PROCEDURE — 7100000041 HC SPAR PHASE II RECOVERY - ADDTL 15 MIN

## 2023-08-08 PROCEDURE — P9047 ALBUMIN (HUMAN), 25%, 50ML: HCPCS | Performed by: RADIOLOGY

## 2023-08-08 PROCEDURE — 6360000002 HC RX W HCPCS: Performed by: RADIOLOGY

## 2023-08-08 PROCEDURE — 7100000040 HC SPAR PHASE II RECOVERY - FIRST 15 MIN

## 2023-08-08 PROCEDURE — 2709999900 US GUIDED PARACENTESIS

## 2023-08-08 RX ORDER — ALBUMIN (HUMAN) 12.5 G/50ML
50 SOLUTION INTRAVENOUS ONCE
Status: COMPLETED | OUTPATIENT
Start: 2023-08-08 | End: 2023-08-08

## 2023-08-08 RX ADMIN — ALBUMIN (HUMAN) 50 G: 0.25 INJECTION, SOLUTION INTRAVENOUS at 12:54

## 2023-08-08 NOTE — PROGRESS NOTES
Paracentesis     YESSY carmona RDMS    Pt. To ultrasound room 8  Identified, allergies confirmed  Supine position  Stable    Time out complete. Prep to abd.      5250 ml of clear yellow colored fluid drawn off. 2x2 with Tegaderm to puncture site. no samples at time. Patient tolerated well. No problems noted.      Patient stable, off monitor sent to [facility] for albumin  Report given to COREY oliver

## 2023-08-08 NOTE — BRIEF OP NOTE
Brief Postoperative Note for Paracentesis    Luc Schaefer  YOB: 1950  [id number]    Pre-operative Diagnosis:  Ascites     Post-operative Diagnosis: Same    Procedure: Ultrasound guided Paracentesis     Anesthesia: 1% Lidocaine     Surgeons/Assistants: Prosper Romero PA-C and Dr. Amanda Norris    Complications: none    EBL: Minimal    Specimens: Were obtained    Ultrasound guided paracentesis performed. 5250 ml clear yellow fluid obtained. Dressing applied.      Electronically signed by VANESSA Wright on 8/8/2023 at 12:23 PM

## 2023-08-23 ENCOUNTER — HOSPITAL ENCOUNTER (OUTPATIENT)
Age: 73
Discharge: HOME | End: 2023-08-23
Payer: COMMERCIAL

## 2023-08-23 ENCOUNTER — HOSPITAL ENCOUNTER (OUTPATIENT)
Dept: ULTRASOUND IMAGING | Age: 73
Discharge: HOME | End: 2023-08-25
Payer: COMMERCIAL

## 2023-08-23 VITALS
SYSTOLIC BLOOD PRESSURE: 183 MMHG | HEART RATE: 61 BPM | RESPIRATION RATE: 18 BRPM | OXYGEN SATURATION: 100 % | DIASTOLIC BLOOD PRESSURE: 73 MMHG

## 2023-08-23 DIAGNOSIS — R18.8 OTHER ASCITES: ICD-10-CM

## 2023-08-23 DIAGNOSIS — B18.1 HEPATITIS B, CHRONIC (HCC): ICD-10-CM

## 2023-08-23 DIAGNOSIS — K76.9 LIVER DISEASE, CHRONIC: ICD-10-CM

## 2023-08-23 DIAGNOSIS — R18.8 OTHER ASCITES: Primary | ICD-10-CM

## 2023-08-23 LAB
INR PPP: 1.4
PARTIAL THROMBOPLASTIN TIME: 30.8 SEC (ref 23–36.5)
PLATELET # BLD AUTO: 83 K/UL (ref 138–453)
PROTHROMBIN TIME: 16.8 SEC (ref 11.7–14.9)

## 2023-08-23 PROCEDURE — 85049 AUTOMATED PLATELET COUNT: CPT

## 2023-08-23 PROCEDURE — 85730 THROMBOPLASTIN TIME PARTIAL: CPT

## 2023-08-23 PROCEDURE — 36415 COLL VENOUS BLD VENIPUNCTURE: CPT

## 2023-08-23 PROCEDURE — 85610 PROTHROMBIN TIME: CPT

## 2023-08-23 PROCEDURE — 2709999900 US GUIDED PARACENTESIS

## 2023-08-23 NOTE — BRIEF OP NOTE
Brief Postoperative Note for Paracentesis    Marleni Pink  YOB: 1950  [id number]    Pre-operative Diagnosis:  Ascites     Post-operative Diagnosis: Same    Procedure: Ultrasound guided Paracentesis     Anesthesia: 1% Lidocaine     Surgeons/Assistants: Osbaldo Fierro PA-C    Complications: none    EBL: Minimal    Specimens: Were obtained    Ultrasound guided paracentesis performed. 4500 ml clear yellow fluid obtained. Dressing applied.      Electronically signed by VANESSA Sher on 8/23/2023 at 11:51 AM

## 2023-08-23 NOTE — PROGRESS NOTES
Patient to US for therapeutic paracentesis. JR MENDEZ and RENATO RDMS at bedside. Site prepped and draped, area numbed with lidocaine. Access obtained 4.5L of clear yellow fluid drained. Access removed and dry sterile drsg placed to site. Patient tolerated well.

## 2023-09-06 ENCOUNTER — HOSPITAL ENCOUNTER (OUTPATIENT)
Dept: ULTRASOUND IMAGING | Age: 73
Discharge: HOME | End: 2023-09-08
Payer: COMMERCIAL

## 2023-09-06 VITALS
OXYGEN SATURATION: 100 % | RESPIRATION RATE: 20 BRPM | DIASTOLIC BLOOD PRESSURE: 68 MMHG | HEART RATE: 77 BPM | SYSTOLIC BLOOD PRESSURE: 167 MMHG

## 2023-09-06 DIAGNOSIS — B18.1 HEPATITIS B, CHRONIC (HCC): ICD-10-CM

## 2023-09-06 DIAGNOSIS — K76.9 LIVER DISEASE, CHRONIC: ICD-10-CM

## 2023-09-06 PROCEDURE — 49083 ABD PARACENTESIS W/IMAGING: CPT

## 2023-09-06 NOTE — BRIEF OP NOTE
Brief Postoperative Note for Paracentesis    Jocelyne Snellen  YOB: 1950  [id number]    Pre-operative Diagnosis:  Ascites     Post-operative Diagnosis: Same    Procedure: Ultrasound guided Paracentesis     Anesthesia: 1% Lidocaine     Surgeons/Assistants: Reinier Fierro PA-C    Complications: none    EBL: Minimal    Specimens: Were obtained    Ultrasound guided paracentesis performed. 4500 ml clear dark yellow fluid obtained. Dressing applied.      Electronically signed by VANESSA Dinero on 9/6/2023 at 10:39 AM

## 2023-09-06 NOTE — PROGRESS NOTES
Paracentesis     YESSY pedersen RDMS    Pt. To ultrasound room 8  Identified, allergies confirmed consent obtained  Supine position  Stable    Time out complete. Prep to abd.      4.5L of clear dark yellow colored fluid drawn off. 2x2 with Tegaderm to puncture site. no samples at time. Patient tolerated well. No problems noted. Patient stable, off monitor and able to use motorized wheel chair self out of IR.

## 2023-09-26 ENCOUNTER — HOSPITAL ENCOUNTER (OUTPATIENT)
Dept: ULTRASOUND IMAGING | Age: 73
Discharge: HOME | End: 2023-09-28
Payer: COMMERCIAL

## 2023-09-26 VITALS
SYSTOLIC BLOOD PRESSURE: 179 MMHG | RESPIRATION RATE: 14 BRPM | HEART RATE: 81 BPM | OXYGEN SATURATION: 99 % | DIASTOLIC BLOOD PRESSURE: 64 MMHG

## 2023-09-26 DIAGNOSIS — B18.1 HEPATITIS B, CHRONIC (HCC): ICD-10-CM

## 2023-09-26 DIAGNOSIS — K76.9 LIVER DISEASE, CHRONIC: ICD-10-CM

## 2023-09-26 PROCEDURE — 2709999900 US GUIDED PARACENTESIS

## 2023-09-26 NOTE — BRIEF OP NOTE
Brief Postoperative Note for Paracentesis    Bernardino Hamman  YOB: 1950  [id number]    Pre-operative Diagnosis:  Ascites     Post-operative Diagnosis: Same    Procedure: Ultrasound guided Paracentesis     Anesthesia: 1% Lidocaine     Surgeons/Assistants: Rima Fierro PA-C    Complications: none    EBL: Minimal    Specimens: Were obtained    Ultrasound guided paracentesis performed. 4700 ml clear yellow fluid obtained. Dressing applied.      Electronically signed by VANESSA Woody on 9/26/2023 at 2:53 PM

## 2023-09-26 NOTE — PROGRESS NOTES
Paracentesis     YESSY Rose  Consent obtained  Pt. To ultrasound room 8  Identified, allergies confirmed  Supine position  Stable    Time out complete. Prep to abd.      4700 ML of pale clear yellow colored fluid drawn off. 2x2 with Tegaderm to puncture site. no samples at time. Patient tolerated well. No problems noted. Patient stable, off monitor and able to motorized wheel chaired self out of IR.

## 2023-10-11 ENCOUNTER — HOSPITAL ENCOUNTER (OUTPATIENT)
Age: 73
Discharge: HOME | End: 2023-10-11
Payer: COMMERCIAL

## 2023-10-11 DIAGNOSIS — R18.8 OTHER ASCITES: ICD-10-CM

## 2023-10-11 LAB
INR PPP: 1.4
PARTIAL THROMBOPLASTIN TIME: 30.7 SEC (ref 23–36.5)
PLATELET # BLD AUTO: 99 K/UL (ref 138–453)
PROTHROMBIN TIME: 16.7 SEC (ref 11.7–14.9)

## 2023-10-11 PROCEDURE — 85730 THROMBOPLASTIN TIME PARTIAL: CPT

## 2023-10-11 PROCEDURE — 85049 AUTOMATED PLATELET COUNT: CPT

## 2023-10-11 PROCEDURE — 36415 COLL VENOUS BLD VENIPUNCTURE: CPT

## 2023-10-11 PROCEDURE — 85610 PROTHROMBIN TIME: CPT

## 2023-11-09 ENCOUNTER — HOSPITAL ENCOUNTER (OUTPATIENT)
Dept: ULTRASOUND IMAGING | Age: 73
Discharge: HOME | End: 2023-11-11
Payer: COMMERCIAL

## 2023-11-09 VITALS
HEART RATE: 60 BPM | RESPIRATION RATE: 14 BRPM | SYSTOLIC BLOOD PRESSURE: 174 MMHG | DIASTOLIC BLOOD PRESSURE: 69 MMHG | OXYGEN SATURATION: 100 %

## 2023-11-09 DIAGNOSIS — K76.9 LIVER DISEASE, CHRONIC: ICD-10-CM

## 2023-11-09 DIAGNOSIS — B18.1 HEPATITIS B, CHRONIC (HCC): ICD-10-CM

## 2023-11-09 PROCEDURE — 49083 ABD PARACENTESIS W/IMAGING: CPT

## 2023-11-09 NOTE — PROGRESS NOTES
Paracentesis     YESSY Torres RDMS    Pt. To ultrasound room 8  Identified, allergies confirmed  Supine position  Stable    Time out complete. Prep to abd.      5500ML of pale clear colored fluid drawn off. 2x2 with Tegaderm to puncture site. no samples at time. Patient tolerated well. No problems noted.    Patient declines/refuses albumin at this time    Patient stable, off monitor and able to wheel out of IR

## 2023-11-09 NOTE — BRIEF OP NOTE
Brief Postoperative Note for Paracentesis    Elly Santos  YOB: 1950  [id number]    Pre-operative Diagnosis:  Ascites     Post-operative Diagnosis: Same    Procedure: Ultrasound guided Paracentesis     Anesthesia: 1% Lidocaine     Surgeons/Assistants: Danise Kehr RedFox, PA-C    Complications: none    EBL: Minimal    Specimens: Were obtained    Ultrasound guided paracentesis performed. 5500 ml clear yellow fluid obtained. Dressing applied.      Electronically signed by VANESSA Teran on 11/9/2023 at 10:51 AM

## 2023-11-22 ENCOUNTER — HOSPITAL ENCOUNTER (OUTPATIENT)
Age: 73
Discharge: HOME | End: 2023-11-22
Payer: COMMERCIAL

## 2023-11-22 ENCOUNTER — HOSPITAL ENCOUNTER (OUTPATIENT)
Dept: ULTRASOUND IMAGING | Age: 73
Discharge: HOME | End: 2023-11-24
Payer: COMMERCIAL

## 2023-11-22 VITALS
DIASTOLIC BLOOD PRESSURE: 61 MMHG | OXYGEN SATURATION: 100 % | SYSTOLIC BLOOD PRESSURE: 149 MMHG | HEART RATE: 53 BPM | RESPIRATION RATE: 16 BRPM

## 2023-11-22 DIAGNOSIS — R18.8 OTHER ASCITES: ICD-10-CM

## 2023-11-22 DIAGNOSIS — B18.1 HEPATITIS B, CHRONIC (HCC): ICD-10-CM

## 2023-11-22 DIAGNOSIS — R18.8 OTHER ASCITES: Primary | ICD-10-CM

## 2023-11-22 DIAGNOSIS — K76.9 LIVER DISEASE, CHRONIC: ICD-10-CM

## 2023-11-22 LAB
INR PPP: 1.5
PARTIAL THROMBOPLASTIN TIME: 32.3 SEC (ref 23–36.5)
PLATELET # BLD AUTO: 92 K/UL (ref 138–453)
PROTHROMBIN TIME: 18.1 SEC (ref 11.7–14.9)

## 2023-11-22 PROCEDURE — 2709999900 US GUIDED PARACENTESIS

## 2023-11-22 PROCEDURE — 85049 AUTOMATED PLATELET COUNT: CPT

## 2023-11-22 PROCEDURE — 36415 COLL VENOUS BLD VENIPUNCTURE: CPT

## 2023-11-22 PROCEDURE — 85730 THROMBOPLASTIN TIME PARTIAL: CPT

## 2023-11-22 PROCEDURE — 85610 PROTHROMBIN TIME: CPT

## 2023-11-22 NOTE — BRIEF OP NOTE
Brief Postoperative Note for Paracentesis    Deshawn Burns  YOB: 1950  [id number]    Pre-operative Diagnosis:  Ascites     Post-operative Diagnosis: Same    Procedure: Ultrasound guided Paracentesis     Anesthesia: 1% Lidocaine     Surgeons/Assistants: Madhu Fierro PA-C    Complications: none    EBL: Minimal    Specimens: Were obtained    Ultrasound guided paracentesis performed. 5400 ml light kristine fluid obtained. Dressing applied.      Electronically signed by VANESSA Londono on 11/22/2023 at 10:21 AM

## 2023-11-22 NOTE — PROGRESS NOTES
Paracentesis     YESSY Caal RDMS    Pt. To ultrasound room 8  Identified, allergies confirmed  Supine position  Stable    Time out complete. Prep to abd.      5400ML of light kristine colored fluid drawn off. 2x2 with Tegaderm to puncture site. no samples at time. Patient tolerated well. No problems noted. Patient offered albumin. Patient refused albumin at this time. Patient educated about the benefits. Patient still refused  Patient stable, off monitor and able to wheeled self out of IR.

## 2023-12-06 ENCOUNTER — TELEPHONE (OUTPATIENT)
Dept: INTERVENTIONAL RADIOLOGY/VASCULAR | Age: 73
End: 2023-12-06

## 2023-12-06 ENCOUNTER — HOSPITAL ENCOUNTER (OUTPATIENT)
Age: 73
Discharge: HOME | End: 2023-12-06
Payer: COMMERCIAL

## 2023-12-06 ENCOUNTER — HOSPITAL ENCOUNTER (OUTPATIENT)
Dept: ULTRASOUND IMAGING | Age: 73
Discharge: HOME | End: 2023-12-08
Payer: COMMERCIAL

## 2023-12-06 VITALS
HEART RATE: 72 BPM | SYSTOLIC BLOOD PRESSURE: 163 MMHG | OXYGEN SATURATION: 99 % | RESPIRATION RATE: 18 BRPM | DIASTOLIC BLOOD PRESSURE: 78 MMHG

## 2023-12-06 DIAGNOSIS — K76.9 LIVER DISEASE: Primary | ICD-10-CM

## 2023-12-06 DIAGNOSIS — B18.1 CHRONIC VIRAL HEPATITIS B WITHOUT DELTA AGENT AND WITHOUT COMA (HCC): ICD-10-CM

## 2023-12-06 DIAGNOSIS — B18.1 HEPATITIS B, CHRONIC (HCC): ICD-10-CM

## 2023-12-06 DIAGNOSIS — K76.9 LIVER DISEASE, CHRONIC: ICD-10-CM

## 2023-12-06 DIAGNOSIS — K76.9 LIVER DISEASE: ICD-10-CM

## 2023-12-06 LAB
INR PPP: 1.7
PLATELET # BLD AUTO: 83 K/UL (ref 138–453)
PROTHROMBIN TIME: 20 SEC (ref 11.7–14.9)

## 2023-12-06 PROCEDURE — 36415 COLL VENOUS BLD VENIPUNCTURE: CPT

## 2023-12-06 PROCEDURE — 49083 ABD PARACENTESIS W/IMAGING: CPT

## 2023-12-06 PROCEDURE — 85049 AUTOMATED PLATELET COUNT: CPT

## 2023-12-06 PROCEDURE — 85610 PROTHROMBIN TIME: CPT

## 2023-12-06 NOTE — PROGRESS NOTES
Patient to US for therapeutic paracentesis. Dr. Darshana Florez and RS CANDIDA at bedside. Site prepped and draped, area numbed with lidocaine. 4.2L of pink tinged fluid drained. Dry sterile drsg with tegaderm placed to site. Patient tolerated well.

## 2023-12-06 NOTE — BRIEF OP NOTE
Brief Postoperative Note for Paracentesis    Eliseo Larry  YOB: 1950  [id number]    Pre-operative Diagnosis: Ascites      Post-operative Diagnosis: Same    Procedure: Ultrasound guided Paracentesis     Anesthesia: 1% Lidocaine     Surgeons/Assistants: Yahir Diamond MD    Complications: none    Specimens: were not obtained    Ultrasound guided paracentesis performed. 4200 ml pink tinged kristine fluid obtained from RLQ. Dressing applied. Vital signs were reviewed and were stable after the procedure. Discharged to home.       Electronically signed by Yahir Diamond MD on 12/6/2023 at 10:39 AM

## (undated) DEVICE — GLOVE SURG SZ 8 CRM LTX FREE POLYISOPRENE POLYMER BEAD ANTI

## (undated) DEVICE — SUTURE VCRL SZ 2-0 L54IN ABSRB UD LIGAPAK REEL NDL J286G

## (undated) DEVICE — PAD,ABDOMINAL,5"X9",ST,LF,25/BX: Brand: MEDLINE INDUSTRIES, INC.

## (undated) DEVICE — GAUZE,PACKING STRIP,IODOFORM,1/2"X5YD,ST: Brand: CURAD

## (undated) DEVICE — TUBING, SUCTION, 1/4" X 12', STRAIGHT: Brand: MEDLINE

## (undated) DEVICE — STRIP SKIN CLSR W0.25XL4IN WHT SPUNBOUND FBR NYL HI ADH

## (undated) DEVICE — Device

## (undated) DEVICE — SPONGE GZ W4XL4IN RAYON POLY FILL CVR W/ NONWOVEN FAB

## (undated) DEVICE — GLOVE SURG SZ 75 CRM LTX FREE POLYISOPRENE POLYMER BEAD ANTI

## (undated) DEVICE — SOLUTION IV IRRIG 500ML 0.9% SODIUM CHL 2F7123

## (undated) DEVICE — GLOVE SURG SZ 7 CRM LTX FREE POLYISOPRENE POLYMER BEAD ANTI

## (undated) DEVICE — DRAPE,REIN 53X77,STERILE: Brand: MEDLINE

## (undated) DEVICE — BLADE SAW L510MM S STL GIGLI FOR BNE CUT

## (undated) DEVICE — SOLUTION IRRIG 3000ML 0.9% SOD CHL USP UROMATIC PLAS CONT

## (undated) DEVICE — YANKAUER,FLEXIBLE HANDLE,REGLR CAPACITY: Brand: MEDLINE INDUSTRIES, INC.

## (undated) DEVICE — GLOVE SURG SZ 65 CRM LTX FREE POLYISOPRENE POLYMER BEAD ANTI

## (undated) DEVICE — SUTURE ETHBND EXCEL SZ 2-0 L30IN NONABSORBABLE GRN CT1 X423H

## (undated) DEVICE — KIT NEG PRSS SM W2.95XH1.26XL3.94IN BLK HYDROPHOBIC FOAM W/

## (undated) DEVICE — SPONGE LAP W18XL18IN WHT COT 4 PLY FLD STRUNG RADPQ DISP ST

## (undated) DEVICE — PADDING,UNDERCAST,COTTON, 4"X4YD STERILE: Brand: MEDLINE

## (undated) DEVICE — INTENDED FOR TISSUE SEPARATION, AND OTHER PROCEDURES THAT REQUIRE A SHARP SURGICAL BLADE TO PUNCTURE OR CUT.: Brand: BARD-PARKER ® CARBON RIB-BACK BLADES

## (undated) DEVICE — HANDPIECE SET WITH COAXIAL HIGH FLOW TIP AND SUCTION TUBE: Brand: INTERPULSE

## (undated) DEVICE — TRAY PREP DRY W/ PREM GLV 2 APPL 6 SPNG 2 UNDPD 1 OVERWRAP

## (undated) DEVICE — STRAP ARMBRD W1.5XL32IN FOAM STR YET SFT W/ HK AND LOOP

## (undated) DEVICE — SKIN PREP TRAY W/CHG: Brand: MEDLINE INDUSTRIES, INC.

## (undated) DEVICE — Z DISCONTINUED USE 2624853 GLOVE SURG SZ 75 L12IN THK91MIL BRN LTX FREE

## (undated) DEVICE — SOLUTION IV IRRIG POUR BRL 0.9% SODIUM CHL 2F7124

## (undated) DEVICE — STRIP,CLOSURE,WOUND,MEDI-STRIP,1/2X4: Brand: MEDLINE

## (undated) DEVICE — CONTAINER,SPECIMEN,OR STERILE,4OZ: Brand: MEDLINE

## (undated) DEVICE — NEEDLE SPNL 18GA L3.5IN W/ QNCKE SHARPER BVL DURA CLICK

## (undated) DEVICE — CANISTER NEG PRSS 1000ML W/ GEL INFOVAC

## (undated) DEVICE — SUTURE VCRL + SZ 0 L27IN ABSRB UD L36MM CT-1 1/2 CIR VCPP41D